# Patient Record
Sex: MALE | Race: WHITE | NOT HISPANIC OR LATINO | Employment: OTHER | URBAN - METROPOLITAN AREA
[De-identification: names, ages, dates, MRNs, and addresses within clinical notes are randomized per-mention and may not be internally consistent; named-entity substitution may affect disease eponyms.]

---

## 2017-08-16 ENCOUNTER — HOSPITAL ENCOUNTER (EMERGENCY)
Facility: HOSPITAL | Age: 62
Discharge: HOME | End: 2017-08-16
Attending: EMERGENCY MEDICINE
Payer: MEDICARE

## 2017-08-16 ENCOUNTER — APPOINTMENT (EMERGENCY)
Dept: RADIOLOGY | Facility: HOSPITAL | Age: 62
End: 2017-08-16
Payer: MEDICARE

## 2017-08-16 VITALS
HEART RATE: 61 BPM | OXYGEN SATURATION: 96 % | HEIGHT: 70 IN | BODY MASS INDEX: 28.63 KG/M2 | SYSTOLIC BLOOD PRESSURE: 149 MMHG | RESPIRATION RATE: 18 BRPM | WEIGHT: 200 LBS | DIASTOLIC BLOOD PRESSURE: 87 MMHG | TEMPERATURE: 98.1 F

## 2017-08-16 DIAGNOSIS — R07.89 CHEST WALL PAIN: Primary | ICD-10-CM

## 2017-08-16 DIAGNOSIS — B02.9 SHINGLES: ICD-10-CM

## 2017-08-16 LAB
ALBUMIN SERPL BCP-MCNC: 3.5 G/DL (ref 3.5–5)
ALP SERPL-CCNC: 102 U/L (ref 46–116)
ALT SERPL W P-5'-P-CCNC: 43 U/L (ref 12–78)
ANION GAP SERPL CALCULATED.3IONS-SCNC: 8 MMOL/L (ref 4–13)
APTT PPP: 28 SECONDS (ref 23–35)
AST SERPL W P-5'-P-CCNC: 55 U/L (ref 5–45)
BASOPHILS # BLD AUTO: 0 THOUSANDS/ΜL (ref 0–0.1)
BASOPHILS NFR BLD AUTO: 1 % (ref 0–1)
BILIRUB SERPL-MCNC: 0.2 MG/DL (ref 0.2–1)
BUN SERPL-MCNC: 18 MG/DL (ref 5–25)
CALCIUM SERPL-MCNC: 9.2 MG/DL (ref 8.3–10.1)
CHLORIDE SERPL-SCNC: 103 MMOL/L (ref 100–108)
CO2 SERPL-SCNC: 27 MMOL/L (ref 21–32)
CREAT SERPL-MCNC: 0.94 MG/DL (ref 0.6–1.3)
DEPRECATED D DIMER PPP: 330 NG/ML (FEU) (ref 190–520)
EOSINOPHIL # BLD AUTO: 0.3 THOUSAND/ΜL (ref 0–0.61)
EOSINOPHIL NFR BLD AUTO: 4 % (ref 0–6)
ERYTHROCYTE [DISTWIDTH] IN BLOOD BY AUTOMATED COUNT: 13.9 % (ref 11.6–15.1)
GFR SERPL CREATININE-BSD FRML MDRD: 87 ML/MIN/1.73SQ M
GLUCOSE SERPL-MCNC: 105 MG/DL (ref 65–140)
HCT VFR BLD AUTO: 41.2 % (ref 42–52)
HGB BLD-MCNC: 14.1 G/DL (ref 14–18)
INR PPP: 1 (ref 0.86–1.16)
LIPASE SERPL-CCNC: 217 U/L (ref 73–393)
LYMPHOCYTES # BLD AUTO: 1.5 THOUSANDS/ΜL (ref 0.6–4.47)
LYMPHOCYTES NFR BLD AUTO: 23 % (ref 14–44)
MAGNESIUM SERPL-MCNC: 2.1 MG/DL (ref 1.6–2.6)
MCH RBC QN AUTO: 29.7 PG (ref 27–31)
MCHC RBC AUTO-ENTMCNC: 34.3 G/DL (ref 31.4–37.4)
MCV RBC AUTO: 87 FL (ref 82–98)
MONOCYTES # BLD AUTO: 0.4 THOUSAND/ΜL (ref 0.17–1.22)
MONOCYTES NFR BLD AUTO: 6 % (ref 4–12)
NEUTROPHILS # BLD AUTO: 4.4 THOUSANDS/ΜL (ref 1.85–7.62)
NEUTS SEG NFR BLD AUTO: 67 % (ref 43–75)
NRBC BLD AUTO-RTO: 0 /100 WBCS
PLATELET # BLD AUTO: 356 THOUSANDS/UL (ref 130–400)
PMV BLD AUTO: 7.1 FL (ref 8.9–12.7)
POTASSIUM SERPL-SCNC: 3.9 MMOL/L (ref 3.5–5.3)
PROT SERPL-MCNC: 6.9 G/DL (ref 6.4–8.2)
PROTHROMBIN TIME: 10.5 SECONDS (ref 9.4–11.7)
RBC # BLD AUTO: 4.75 MILLION/UL (ref 4.7–6.1)
SODIUM SERPL-SCNC: 138 MMOL/L (ref 136–145)
TROPONIN I SERPL-MCNC: <0.02 NG/ML
TROPONIN I SERPL-MCNC: <0.02 NG/ML
WBC # BLD AUTO: 6.5 THOUSAND/UL (ref 4.8–10.8)

## 2017-08-16 PROCEDURE — 85025 COMPLETE CBC W/AUTO DIFF WBC: CPT | Performed by: EMERGENCY MEDICINE

## 2017-08-16 PROCEDURE — 85610 PROTHROMBIN TIME: CPT | Performed by: EMERGENCY MEDICINE

## 2017-08-16 PROCEDURE — 85379 FIBRIN DEGRADATION QUANT: CPT | Performed by: EMERGENCY MEDICINE

## 2017-08-16 PROCEDURE — 83690 ASSAY OF LIPASE: CPT | Performed by: EMERGENCY MEDICINE

## 2017-08-16 PROCEDURE — 71010 HB CHEST X-RAY 1 VIEW FRONTAL (PORTABLE): CPT

## 2017-08-16 PROCEDURE — 93005 ELECTROCARDIOGRAM TRACING: CPT | Performed by: EMERGENCY MEDICINE

## 2017-08-16 PROCEDURE — 96361 HYDRATE IV INFUSION ADD-ON: CPT

## 2017-08-16 PROCEDURE — 83735 ASSAY OF MAGNESIUM: CPT | Performed by: EMERGENCY MEDICINE

## 2017-08-16 PROCEDURE — 80053 COMPREHEN METABOLIC PANEL: CPT | Performed by: EMERGENCY MEDICINE

## 2017-08-16 PROCEDURE — 96375 TX/PRO/DX INJ NEW DRUG ADDON: CPT

## 2017-08-16 PROCEDURE — 85730 THROMBOPLASTIN TIME PARTIAL: CPT | Performed by: EMERGENCY MEDICINE

## 2017-08-16 PROCEDURE — 99285 EMERGENCY DEPT VISIT HI MDM: CPT

## 2017-08-16 PROCEDURE — 74177 CT ABD & PELVIS W/CONTRAST: CPT

## 2017-08-16 PROCEDURE — 84484 ASSAY OF TROPONIN QUANT: CPT | Performed by: EMERGENCY MEDICINE

## 2017-08-16 PROCEDURE — 36415 COLL VENOUS BLD VENIPUNCTURE: CPT | Performed by: EMERGENCY MEDICINE

## 2017-08-16 PROCEDURE — 96374 THER/PROPH/DIAG INJ IV PUSH: CPT

## 2017-08-16 RX ORDER — TRAMADOL HYDROCHLORIDE 50 MG/1
50 TABLET ORAL EVERY 6 HOURS PRN
Qty: 10 TABLET | Refills: 0 | Status: SHIPPED | OUTPATIENT
Start: 2017-08-16 | End: 2017-08-19

## 2017-08-16 RX ORDER — KETOROLAC TROMETHAMINE 30 MG/ML
30 INJECTION, SOLUTION INTRAMUSCULAR; INTRAVENOUS ONCE
Status: COMPLETED | OUTPATIENT
Start: 2017-08-16 | End: 2017-08-16

## 2017-08-16 RX ORDER — MORPHINE SULFATE 4 MG/ML
4 INJECTION, SOLUTION INTRAMUSCULAR; INTRAVENOUS ONCE
Status: DISCONTINUED | OUTPATIENT
Start: 2017-08-16 | End: 2017-08-16 | Stop reason: HOSPADM

## 2017-08-16 RX ORDER — ONDANSETRON 2 MG/ML
4 INJECTION INTRAMUSCULAR; INTRAVENOUS ONCE
Status: COMPLETED | OUTPATIENT
Start: 2017-08-16 | End: 2017-08-16

## 2017-08-16 RX ORDER — NAPROXEN 500 MG/1
500 TABLET ORAL 2 TIMES DAILY WITH MEALS
Qty: 10 TABLET | Refills: 0 | Status: SHIPPED | OUTPATIENT
Start: 2017-08-16 | End: 2019-05-24 | Stop reason: ALTCHOICE

## 2017-08-16 RX ORDER — LOVASTATIN 20 MG/1
20 TABLET ORAL
COMMUNITY
End: 2019-05-24 | Stop reason: ALTCHOICE

## 2017-08-16 RX ORDER — LISINOPRIL 5 MG/1
5 TABLET ORAL DAILY
COMMUNITY

## 2017-08-16 RX ORDER — VALACYCLOVIR HYDROCHLORIDE 1 G/1
1000 TABLET, FILM COATED ORAL 2 TIMES DAILY
Qty: 20 TABLET | Refills: 0 | Status: SHIPPED | OUTPATIENT
Start: 2017-08-16 | End: 2019-05-24 | Stop reason: ALTCHOICE

## 2017-08-16 RX ORDER — TRAMADOL HYDROCHLORIDE 50 MG/1
50 TABLET ORAL EVERY 6 HOURS PRN
COMMUNITY
End: 2022-05-27

## 2017-08-16 RX ADMIN — SODIUM CHLORIDE 1000 ML: 0.9 INJECTION, SOLUTION INTRAVENOUS at 17:20

## 2017-08-16 RX ADMIN — KETOROLAC TROMETHAMINE 30 MG: 30 INJECTION, SOLUTION INTRAMUSCULAR at 17:21

## 2017-08-16 RX ADMIN — ONDANSETRON 4 MG: 2 INJECTION INTRAMUSCULAR; INTRAVENOUS at 17:21

## 2017-08-16 RX ADMIN — IOHEXOL 100 ML: 350 INJECTION, SOLUTION INTRAVENOUS at 18:59

## 2017-08-17 LAB
ATRIAL RATE: 73 BPM
P AXIS: 19 DEGREES
PR INTERVAL: 176 MS
QRS AXIS: 13 DEGREES
QRSD INTERVAL: 98 MS
QT INTERVAL: 394 MS
QTC INTERVAL: 434 MS
T WAVE AXIS: 29 DEGREES
VENTRICULAR RATE: 73 BPM

## 2017-09-12 ENCOUNTER — TRANSCRIBE ORDERS (OUTPATIENT)
Dept: ADMINISTRATIVE | Facility: HOSPITAL | Age: 62
End: 2017-09-12

## 2017-09-12 DIAGNOSIS — M96.1 POSTLAMINECTOMY SYNDROME, UNSPECIFIED REGION: Primary | ICD-10-CM

## 2017-09-19 ENCOUNTER — HOSPITAL ENCOUNTER (OUTPATIENT)
Dept: RADIOLOGY | Facility: HOSPITAL | Age: 62
Discharge: HOME | End: 2017-09-19
Payer: MEDICARE

## 2017-09-19 DIAGNOSIS — M96.1 POSTLAMINECTOMY SYNDROME, UNSPECIFIED REGION: ICD-10-CM

## 2017-09-19 PROCEDURE — 72146 MRI CHEST SPINE W/O DYE: CPT

## 2017-09-25 ENCOUNTER — TRANSCRIBE ORDERS (OUTPATIENT)
Dept: ADMINISTRATIVE | Facility: HOSPITAL | Age: 62
End: 2017-09-25

## 2017-09-25 DIAGNOSIS — M96.1 POSTLAMINECTOMY SYNDROME, UNSPECIFIED REGION: Primary | ICD-10-CM

## 2017-10-06 ENCOUNTER — HOSPITAL ENCOUNTER (OUTPATIENT)
Dept: RADIOLOGY | Facility: HOSPITAL | Age: 62
Discharge: HOME | End: 2017-10-06
Payer: MEDICARE

## 2017-10-06 DIAGNOSIS — M96.1 POSTLAMINECTOMY SYNDROME: ICD-10-CM

## 2017-10-06 PROCEDURE — A9503 TC99M MEDRONATE: HCPCS

## 2017-10-06 PROCEDURE — 78320 HB BONE IMAGING (3D): CPT

## 2019-05-21 ENCOUNTER — TRANSCRIBE ORDERS (OUTPATIENT)
Dept: ADMINISTRATIVE | Facility: HOSPITAL | Age: 64
End: 2019-05-21

## 2019-05-21 DIAGNOSIS — M25.461 SWELLING OF RIGHT KNEE JOINT: Primary | ICD-10-CM

## 2019-05-22 ENCOUNTER — HOSPITAL ENCOUNTER (OUTPATIENT)
Dept: RADIOLOGY | Facility: HOSPITAL | Age: 64
Discharge: HOME | End: 2019-05-22
Payer: MEDICARE

## 2019-05-22 DIAGNOSIS — M25.461 SWELLING OF RIGHT KNEE JOINT: ICD-10-CM

## 2019-05-22 PROCEDURE — 73700 CT LOWER EXTREMITY W/O DYE: CPT

## 2019-05-24 VITALS
HEIGHT: 70 IN | DIASTOLIC BLOOD PRESSURE: 81 MMHG | WEIGHT: 219 LBS | HEART RATE: 76 BPM | SYSTOLIC BLOOD PRESSURE: 135 MMHG | BODY MASS INDEX: 31.35 KG/M2

## 2019-05-24 DIAGNOSIS — M25.461 EFFUSION OF RIGHT KNEE: ICD-10-CM

## 2019-05-24 DIAGNOSIS — M25.561 RIGHT KNEE PAIN, UNSPECIFIED CHRONICITY: Primary | ICD-10-CM

## 2019-05-24 DIAGNOSIS — M10.061 ACUTE IDIOPATHIC GOUT OF RIGHT KNEE: Primary | ICD-10-CM

## 2019-05-24 LAB
CRYSTALS SNV QL MICRO: NORMAL
LYMPHOCYTES # SNV MANUAL: 6 %
MONOCYTES NFR SNV MANUAL: 17 %
NEUTROPHILS NFR SNV MANUAL: 77 %
TOTAL CELLS COUNTED SPEC: 100
WBC # FLD MANUAL: 8619 /UL (ref 0–200)

## 2019-05-24 PROCEDURE — 87205 SMEAR GRAM STAIN: CPT | Performed by: ORTHOPAEDIC SURGERY

## 2019-05-24 PROCEDURE — 87476 LYME DIS DNA AMP PROBE: CPT | Performed by: ORTHOPAEDIC SURGERY

## 2019-05-24 PROCEDURE — 87070 CULTURE OTHR SPECIMN AEROBIC: CPT | Performed by: ORTHOPAEDIC SURGERY

## 2019-05-24 PROCEDURE — 99204 OFFICE O/P NEW MOD 45 MIN: CPT | Performed by: ORTHOPAEDIC SURGERY

## 2019-05-24 PROCEDURE — 89051 BODY FLUID CELL COUNT: CPT | Performed by: ORTHOPAEDIC SURGERY

## 2019-05-24 PROCEDURE — 89060 EXAM SYNOVIAL FLUID CRYSTALS: CPT | Performed by: ORTHOPAEDIC SURGERY

## 2019-05-24 PROCEDURE — 20610 DRAIN/INJ JOINT/BURSA W/O US: CPT | Performed by: ORTHOPAEDIC SURGERY

## 2019-05-24 RX ORDER — COLCHICINE 0.6 MG/1
0.6 TABLET ORAL DAILY
Qty: 5 TABLET | Refills: 0 | Status: SHIPPED | OUTPATIENT
Start: 2019-05-24 | End: 2019-10-04 | Stop reason: SDUPTHER

## 2019-05-24 RX ORDER — LIDOCAINE HYDROCHLORIDE 10 MG/ML
4 INJECTION, SOLUTION INFILTRATION; PERINEURAL
Status: COMPLETED | OUTPATIENT
Start: 2019-05-24 | End: 2019-05-24

## 2019-05-24 RX ORDER — LOVASTATIN 10 MG/1
10 TABLET ORAL EVERY EVENING
COMMUNITY

## 2019-05-24 RX ORDER — DEXAMETHASONE SODIUM PHOSPHATE 100 MG/10ML
40 INJECTION INTRAMUSCULAR; INTRAVENOUS
Status: COMPLETED | OUTPATIENT
Start: 2019-05-24 | End: 2019-05-24

## 2019-05-24 RX ORDER — OXYCODONE HYDROCHLORIDE AND ACETAMINOPHEN 5; 325 MG/1; MG/1
TABLET ORAL
Refills: 0 | COMMUNITY
Start: 2019-05-21 | End: 2020-02-13

## 2019-05-24 RX ADMIN — LIDOCAINE HYDROCHLORIDE 4 ML: 10 INJECTION, SOLUTION INFILTRATION; PERINEURAL at 11:47

## 2019-05-24 RX ADMIN — DEXAMETHASONE SODIUM PHOSPHATE 40 MG: 100 INJECTION INTRAMUSCULAR; INTRAVENOUS at 11:47

## 2019-05-27 LAB
BACTERIA SPEC BFLD CULT: NO GROWTH
GRAM STN SPEC: NORMAL

## 2019-05-30 LAB — B BURGDOR DNA SPEC QL NAA+PROBE: NEGATIVE

## 2019-10-04 DIAGNOSIS — M10.061 ACUTE IDIOPATHIC GOUT OF RIGHT KNEE: ICD-10-CM

## 2019-10-04 RX ORDER — COLCHICINE 0.6 MG/1
0.6 TABLET ORAL DAILY
Qty: 5 TABLET | Refills: 0 | Status: SHIPPED | OUTPATIENT
Start: 2019-10-04 | End: 2020-02-13

## 2019-10-04 NOTE — TELEPHONE ENCOUNTER
Patient states he is having a gout attack  Stated you told him to call if he had a gout attack and you would call in prescription

## 2019-10-04 NOTE — PROGRESS NOTES
I did call Quang Quinonez and spoke with him  He is having symptoms consistent with another gout attack  I did write for an electronically sent a prescription for colchicine once again to the pharmacy  He is going to pick that up tomorrow  In the meantime, I did let him know that he can take an anti-inflammatory such as Motrin or Aleve  He understands  He will let us know if there are any problems

## 2020-02-13 ENCOUNTER — HOSPITAL ENCOUNTER (EMERGENCY)
Facility: HOSPITAL | Age: 65
Discharge: HOME | End: 2020-02-13
Attending: EMERGENCY MEDICINE
Payer: MEDICARE

## 2020-02-13 ENCOUNTER — APPOINTMENT (EMERGENCY)
Dept: RADIOLOGY | Facility: HOSPITAL | Age: 65
End: 2020-02-13
Payer: MEDICARE

## 2020-02-13 VITALS
SYSTOLIC BLOOD PRESSURE: 147 MMHG | OXYGEN SATURATION: 96 % | RESPIRATION RATE: 18 BRPM | TEMPERATURE: 98.5 F | DIASTOLIC BLOOD PRESSURE: 97 MMHG | HEART RATE: 70 BPM | WEIGHT: 220.9 LBS | BODY MASS INDEX: 31.7 KG/M2

## 2020-02-13 DIAGNOSIS — R07.89 ATYPICAL CHEST PAIN: Primary | ICD-10-CM

## 2020-02-13 LAB
ALBUMIN SERPL BCP-MCNC: 3.9 G/DL (ref 3.5–5)
ALP SERPL-CCNC: 100 U/L (ref 46–116)
ALT SERPL W P-5'-P-CCNC: 53 U/L (ref 12–78)
ANION GAP SERPL CALCULATED.3IONS-SCNC: 9 MMOL/L (ref 4–13)
AST SERPL W P-5'-P-CCNC: 59 U/L (ref 5–45)
BASOPHILS # BLD AUTO: 0.03 THOUSANDS/ΜL (ref 0–0.1)
BASOPHILS NFR BLD AUTO: 1 % (ref 0–1)
BILIRUB SERPL-MCNC: 0.6 MG/DL (ref 0.2–1)
BUN SERPL-MCNC: 18 MG/DL (ref 5–25)
CALCIUM SERPL-MCNC: 9.4 MG/DL (ref 8.3–10.1)
CHLORIDE SERPL-SCNC: 104 MMOL/L (ref 100–108)
CO2 SERPL-SCNC: 25 MMOL/L (ref 21–32)
CREAT SERPL-MCNC: 0.95 MG/DL (ref 0.6–1.3)
EOSINOPHIL # BLD AUTO: 0.09 THOUSAND/ΜL (ref 0–0.61)
EOSINOPHIL NFR BLD AUTO: 2 % (ref 0–6)
ERYTHROCYTE [DISTWIDTH] IN BLOOD BY AUTOMATED COUNT: 12.6 % (ref 11.6–15.1)
GFR SERPL CREATININE-BSD FRML MDRD: 85 ML/MIN/1.73SQ M
GLUCOSE SERPL-MCNC: 108 MG/DL (ref 65–140)
HCT VFR BLD AUTO: 48.6 % (ref 36.5–49.3)
HGB BLD-MCNC: 15.8 G/DL (ref 12–17)
IMM GRANULOCYTES # BLD AUTO: 0.01 THOUSAND/UL (ref 0–0.2)
IMM GRANULOCYTES NFR BLD AUTO: 0 % (ref 0–2)
LIPASE SERPL-CCNC: 169 U/L (ref 73–393)
LYMPHOCYTES # BLD AUTO: 1.34 THOUSANDS/ΜL (ref 0.6–4.47)
LYMPHOCYTES NFR BLD AUTO: 22 % (ref 14–44)
MCH RBC QN AUTO: 29.3 PG (ref 26.8–34.3)
MCHC RBC AUTO-ENTMCNC: 32.5 G/DL (ref 31.4–37.4)
MCV RBC AUTO: 90 FL (ref 82–98)
MONOCYTES # BLD AUTO: 0.27 THOUSAND/ΜL (ref 0.17–1.22)
MONOCYTES NFR BLD AUTO: 4 % (ref 4–12)
NEUTROPHILS # BLD AUTO: 4.4 THOUSANDS/ΜL (ref 1.85–7.62)
NEUTS SEG NFR BLD AUTO: 71 % (ref 43–75)
NRBC BLD AUTO-RTO: 0 /100 WBCS
PLATELET # BLD AUTO: 301 THOUSANDS/UL (ref 149–390)
PMV BLD AUTO: 8.8 FL (ref 8.9–12.7)
POTASSIUM SERPL-SCNC: 5.2 MMOL/L (ref 3.5–5.3)
PROT SERPL-MCNC: 7.7 G/DL (ref 6.4–8.2)
RBC # BLD AUTO: 5.4 MILLION/UL (ref 3.88–5.62)
SODIUM SERPL-SCNC: 138 MMOL/L (ref 136–145)
TROPONIN I SERPL-MCNC: <0.02 NG/ML
TROPONIN I SERPL-MCNC: <0.02 NG/ML
WBC # BLD AUTO: 6.14 THOUSAND/UL (ref 4.31–10.16)

## 2020-02-13 PROCEDURE — 93005 ELECTROCARDIOGRAM TRACING: CPT

## 2020-02-13 PROCEDURE — 36415 COLL VENOUS BLD VENIPUNCTURE: CPT

## 2020-02-13 PROCEDURE — 99285 EMERGENCY DEPT VISIT HI MDM: CPT

## 2020-02-13 PROCEDURE — 83690 ASSAY OF LIPASE: CPT | Performed by: EMERGENCY MEDICINE

## 2020-02-13 PROCEDURE — 84484 ASSAY OF TROPONIN QUANT: CPT

## 2020-02-13 PROCEDURE — 96374 THER/PROPH/DIAG INJ IV PUSH: CPT

## 2020-02-13 PROCEDURE — 84484 ASSAY OF TROPONIN QUANT: CPT | Performed by: EMERGENCY MEDICINE

## 2020-02-13 PROCEDURE — 99283 EMERGENCY DEPT VISIT LOW MDM: CPT | Performed by: EMERGENCY MEDICINE

## 2020-02-13 PROCEDURE — 80053 COMPREHEN METABOLIC PANEL: CPT

## 2020-02-13 PROCEDURE — 85025 COMPLETE CBC W/AUTO DIFF WBC: CPT

## 2020-02-13 PROCEDURE — 71045 X-RAY EXAM CHEST 1 VIEW: CPT

## 2020-02-13 RX ORDER — KETOROLAC TROMETHAMINE 30 MG/ML
15 INJECTION, SOLUTION INTRAMUSCULAR; INTRAVENOUS ONCE
Status: COMPLETED | OUTPATIENT
Start: 2020-02-13 | End: 2020-02-13

## 2020-02-13 RX ADMIN — KETOROLAC TROMETHAMINE 15 MG: 30 INJECTION, SOLUTION INTRAMUSCULAR at 10:06

## 2020-02-13 NOTE — ED PROVIDER NOTES
History  Chief Complaint   Patient presents with    Chest Pain     States started 8pm last night with left sided chest pain and " fluttering in chest " episodes x 2  No aspirin      HPI    This is a 63 yo m who presents with chest pain  patietn states since last night 8pm chest pain and fluttering in his chest  Stats having left sided pain  No radiation  No hx of MI  No trauma  No shortness of breath  Impression: 63 yo M with chest pain  Will do cardiac workup     Prior to Admission Medications   Prescriptions Last Dose Informant Patient Reported? Taking?   lisinopril (ZESTRIL) 5 mg tablet 2/13/2020 at Unknown time Self Yes Yes   Sig: Take 5 mg by mouth daily   lovastatin (MEVACOR) 10 MG tablet 2/13/2020 at Unknown time  Yes Yes   Sig: Take 10 mg by mouth   traMADol (ULTRAM) 50 mg tablet 2/13/2020 at Unknown time  Yes Yes   Sig: Take 50 mg by mouth every 6 (six) hours as needed for moderate pain      Facility-Administered Medications: None       Past Medical History:   Diagnosis Date    Hyperlipidemia     Hypertension     Stroke [facility])        Past Surgical History:   Procedure Laterality Date    BACK SURGERY         History reviewed  No pertinent family history  I have reviewed and agree with the history as documented  Social History     Tobacco Use    Smoking status: Former Smoker    Smokeless tobacco: Never Used   Substance Use Topics    Alcohol use: Yes     Alcohol/week: 3 0 standard drinks     Types: 3 Cans of beer per week     Comment: drinks once a month    Drug use: No       Review of Systems   Constitutional: Negative  Negative for diaphoresis and fever  HENT: Negative  Respiratory: Negative  Negative for cough, shortness of breath and wheezing  Cardiovascular: Positive for chest pain  Negative for palpitations and leg swelling  Gastrointestinal: Negative for abdominal distention, abdominal pain, nausea and vomiting  Genitourinary: Negative  Musculoskeletal: Negative      Skin: Negative  Neurological: Negative  Psychiatric/Behavioral: Negative  All other systems reviewed and are negative  Physical Exam  Physical Exam   Constitutional: He is oriented to person, place, and time  He appears well-developed and well-nourished  No distress  HENT:   Head: Normocephalic and atraumatic  Nose: Nose normal    Mouth/Throat: Oropharynx is clear and moist    Eyes: Pupils are equal, round, and reactive to light  Conjunctivae and EOM are normal    Neck: Normal range of motion  Neck supple  Cardiovascular: Normal rate, regular rhythm and normal heart sounds  No murmur heard  Pulmonary/Chest: Effort normal and breath sounds normal  No respiratory distress  He has no wheezes  He has no rales  Abdominal: Soft  Bowel sounds are normal  He exhibits no distension  There is no tenderness  There is no rebound and no guarding  Musculoskeletal: Normal range of motion  He exhibits no edema, tenderness or deformity  Neurological: He is alert and oriented to person, place, and time  No cranial nerve deficit  Skin: Skin is warm and dry  No rash noted  He is not diaphoretic  No pallor  Psychiatric: He has a normal mood and affect  Vitals reviewed        Vital Signs  ED Triage Vitals [02/13/20 0926]   Temperature Pulse Respirations Blood Pressure SpO2   97 8 °F (36 6 °C) 80 20 160/93 98 %      Temp Source Heart Rate Source Patient Position - Orthostatic VS BP Location FiO2 (%)   Oral Monitor Lying Right arm --      Pain Score       3           Vitals:    02/13/20 0945 02/13/20 1144 02/13/20 1145 02/13/20 1257   BP: 157/85 132/81 132/81 147/97   Pulse: 78 73 68 70   Patient Position - Orthostatic VS: Lying Lying  Sitting         Visual Acuity      ED Medications  Medications   ketorolac (TORADOL) injection 15 mg (15 mg Intravenous Given 2/13/20 1006)       Diagnostic Studies  Results Reviewed     Procedure Component Value Units Date/Time    Troponin I [[id number]]  (Normal) Collected: 02/13/20 1214    Lab Status:  Final result Specimen:  Blood from Arm, Left Updated:  02/13/20 1239     Troponin I <0 02 ng/mL     Lipase [[id number]]  (Normal) Collected:  02/13/20 0945    Lab Status:  Final result Specimen:  Blood from Arm, Left Updated:  02/13/20 1021     Lipase 169 u/L     Troponin I [[id number]]  (Normal) Collected:  02/13/20 0945    Lab Status:  Final result Specimen:  Blood from Arm, Left Updated:  02/13/20 1012     Troponin I <0 02 ng/mL     Comprehensive metabolic panel [[id number]]  (Abnormal) Collected:  02/13/20 0945    Lab Status:  Final result Specimen:  Blood from Arm, Left Updated:  02/13/20 1009     Sodium 138 mmol/L      Potassium 5 2 mmol/L      Chloride 104 mmol/L      CO2 25 mmol/L      ANION GAP 9 mmol/L      BUN 18 mg/dL      Creatinine 0 95 mg/dL      Glucose 108 mg/dL      Calcium 9 4 mg/dL      AST 59 U/L      ALT 53 U/L      Alkaline Phosphatase 100 U/L      Total Protein 7 7 g/dL      Albumin 3 9 g/dL      Total Bilirubin 0 60 mg/dL      eGFR 85 ml/min/1 73sq m     Narrative:       [place] guidelines for Chronic Kidney Disease (CKD):     Stage 1 with normal or high GFR (GFR > 90 mL/min/1 73 square meters)    Stage 2 Mild CKD (GFR = 60-89 mL/min/1 73 square meters)    Stage 3A Moderate CKD (GFR = 45-59 mL/min/1 73 square meters)    Stage 3B Moderate CKD (GFR = 30-44 mL/min/1 73 square meters)    Stage 4 Severe CKD (GFR = 15-29 mL/min/1 73 square meters)    Stage 5 End Stage CKD (GFR <15 mL/min/1 73 square meters)  Note: GFR calculation is accurate only with a steady state creatinine    CBC and differential [[id number]]  (Abnormal) Collected:  02/13/20 0945    Lab Status:  Final result Specimen:  Blood from Arm, Left Updated:  02/13/20 0952     WBC 6 14 Thousand/uL      RBC 5 40 Million/uL      Hemoglobin 15 8 g/dL      Hematocrit 48 6 %      MCV 90 fL      MCH 29 3 pg      MCHC 32 5 g/dL      RDW 12 6 %      MPV 8 8 fL      Platelets 837 Thousands/uL      nRBC 0 /100 WBCs      Neutrophils Relative 71 %      Immat GRANS % 0 %      Lymphocytes Relative 22 %      Monocytes Relative 4 %      Eosinophils Relative 2 %      Basophils Relative 1 %      Neutrophils Absolute 4 40 Thousands/µL      Immature Grans Absolute 0 01 Thousand/uL      Lymphocytes Absolute 1 34 Thousands/µL      Monocytes Absolute 0 27 Thousand/µL      Eosinophils Absolute 0 09 Thousand/µL      Basophils Absolute 0 03 Thousands/µL                  XR chest 1 view portable   Final Result by Ivette De La Fuente MD (02/13 1017)      No acute cardiopulmonary disease  Workstation performed: [id number]                    Procedures  Procedures         ED Course  ED Course as of Feb 13 1701   Thu Feb 13, 2020   1249 Repeat troponin was normal  Will discharge home with appropriate return precautions             HEART Risk Score      Most Recent Value   History  0 Filed at: 02/13/2020 1700   ECG  0 Filed at: 02/13/2020 1700   Age  1 Filed at: 02/13/2020 1700   Risk Factors  1 Filed at: 02/13/2020 1700   Troponin  0 Filed at: 02/13/2020 1700   Heart Score Risk Calculator   History  0 Filed at: 02/13/2020 1700   ECG  0 Filed at: 02/13/2020 1700   Age  1 Filed at: 02/13/2020 1700   Risk Factors  1 Filed at: 02/13/2020 1700   Troponin  0 Filed at: 02/13/2020 1700   HEART Score  2 Filed at: 02/13/2020 1700   HEART Score  2 Filed at: 02/13/2020 1700                            MDM      Disposition  Final diagnoses:   Atypical chest pain     Time reflects when diagnosis was documented in both MDM as applicable and the Disposition within this note     Time User Action Codes Description Comment    2/13/2020 12:49 PM Marlen Gomez Add [R07 89] Atypical chest pain       ED Disposition     ED Disposition Condition Date/Time Comment    Discharge Stable Thu Feb 13, 2020 12:49 PM [address] discharge to home/self care              Follow-up Information     Follow up With Specialties Details Why Contact Info Additional [facility] Schedule an appointment as soon as possible for a visit  As needed [address] [facility], [place] [address] [place] [facility], [address] [place] [address], [address] [facility], [address] [facility] [place]          Discharge Medication List as of 2/13/2020 12:50 PM      CONTINUE these medications which have NOT CHANGED    Details   lisinopril (ZESTRIL) 5 mg tablet Take 5 mg by mouth daily, Historical Med      lovastatin (MEVACOR) 10 MG tablet Take 10 mg by mouth, Historical Med      traMADol (ULTRAM) 50 mg tablet Take 50 mg by mouth every 6 (six) hours as needed for moderate pain, Historical Med           No discharge procedures on file      PDMP Review     None          ED Provider  Electronically Signed by           Martha Faust MD  02/13/20 7069

## 2020-02-14 LAB
ATRIAL RATE: 72 BPM
P AXIS: 21 DEGREES
PR INTERVAL: 142 MS
QRS AXIS: 1 DEGREES
QRSD INTERVAL: 92 MS
QT INTERVAL: 372 MS
QTC INTERVAL: 407 MS
T WAVE AXIS: 53 DEGREES
VENTRICULAR RATE: 72 BPM

## 2020-02-14 PROCEDURE — 93010 ELECTROCARDIOGRAM REPORT: CPT | Performed by: INTERNAL MEDICINE

## 2021-03-04 ENCOUNTER — IMMUNIZATIONS (OUTPATIENT)
Dept: FAMILY MEDICINE CLINIC | Facility: HOSPITAL | Age: 66
End: 2021-03-04

## 2021-03-04 DIAGNOSIS — Z23 ENCOUNTER FOR IMMUNIZATION: Primary | ICD-10-CM

## 2021-03-04 PROCEDURE — 0011A SARS-COV-2 / COVID-19 MRNA VACCINE (MODERNA) 100 MCG: CPT

## 2021-03-04 PROCEDURE — 91301 SARS-COV-2 / COVID-19 MRNA VACCINE (MODERNA) 100 MCG: CPT

## 2021-04-02 ENCOUNTER — IMMUNIZATIONS (OUTPATIENT)
Dept: FAMILY MEDICINE CLINIC | Facility: HOSPITAL | Age: 66
End: 2021-04-02

## 2021-04-02 DIAGNOSIS — Z23 ENCOUNTER FOR IMMUNIZATION: Primary | ICD-10-CM

## 2021-04-02 PROCEDURE — 91301 SARS-COV-2 / COVID-19 MRNA VACCINE (MODERNA) 100 MCG: CPT

## 2021-04-02 PROCEDURE — 0012A SARS-COV-2 / COVID-19 MRNA VACCINE (MODERNA) 100 MCG: CPT

## 2021-11-17 VITALS
DIASTOLIC BLOOD PRESSURE: 83 MMHG | SYSTOLIC BLOOD PRESSURE: 163 MMHG | HEIGHT: 70 IN | HEART RATE: 73 BPM | BODY MASS INDEX: 30.06 KG/M2 | WEIGHT: 210 LBS

## 2021-11-17 DIAGNOSIS — Z98.890 HISTORY OF LUMBAR SURGERY: Primary | ICD-10-CM

## 2021-11-17 PROCEDURE — 99213 OFFICE O/P EST LOW 20 MIN: CPT | Performed by: ORTHOPAEDIC SURGERY

## 2021-12-13 ENCOUNTER — HOSPITAL ENCOUNTER (OUTPATIENT)
Dept: RADIOLOGY | Facility: HOSPITAL | Age: 66
Discharge: HOME | End: 2021-12-13
Payer: COMMERCIAL

## 2021-12-13 VITALS
HEIGHT: 70 IN | SYSTOLIC BLOOD PRESSURE: 172 MMHG | DIASTOLIC BLOOD PRESSURE: 116 MMHG | BODY MASS INDEX: 30.06 KG/M2 | HEART RATE: 94 BPM | WEIGHT: 210 LBS

## 2021-12-13 DIAGNOSIS — M54.6 THORACIC SPINE PAIN: ICD-10-CM

## 2021-12-13 DIAGNOSIS — M40.30 FLATBACK SYNDROME: ICD-10-CM

## 2021-12-13 DIAGNOSIS — M54.6 THORACIC SPINE PAIN: Primary | ICD-10-CM

## 2021-12-13 PROCEDURE — 99213 OFFICE O/P EST LOW 20 MIN: CPT | Performed by: ORTHOPAEDIC SURGERY

## 2021-12-13 PROCEDURE — 72082 X-RAY EXAM ENTIRE SPI 2/3 VW: CPT

## 2021-12-20 VITALS
BODY MASS INDEX: 30.13 KG/M2 | HEART RATE: 84 BPM | DIASTOLIC BLOOD PRESSURE: 92 MMHG | HEIGHT: 70 IN | SYSTOLIC BLOOD PRESSURE: 155 MMHG

## 2021-12-20 DIAGNOSIS — Z98.890 HISTORY OF LUMBAR SURGERY: ICD-10-CM

## 2021-12-20 DIAGNOSIS — M51.35 DJD (DEGENERATIVE JOINT DISEASE), THORACOLUMBAR: ICD-10-CM

## 2021-12-20 DIAGNOSIS — M40.30 FLATBACK SYNDROME: ICD-10-CM

## 2021-12-20 DIAGNOSIS — M54.6 THORACIC SPINE PAIN: Primary | ICD-10-CM

## 2021-12-20 PROCEDURE — 99213 OFFICE O/P EST LOW 20 MIN: CPT | Performed by: ORTHOPAEDIC SURGERY

## 2021-12-21 ENCOUNTER — HOSPITAL ENCOUNTER (OUTPATIENT)
Dept: RADIOLOGY | Facility: HOSPITAL | Age: 66
Discharge: HOME | End: 2021-12-21
Payer: COMMERCIAL

## 2021-12-21 DIAGNOSIS — M54.6 THORACIC SPINE PAIN: ICD-10-CM

## 2021-12-21 DIAGNOSIS — Z98.890 HISTORY OF LUMBAR SURGERY: ICD-10-CM

## 2021-12-21 DIAGNOSIS — M40.30 FLATBACK SYNDROME: ICD-10-CM

## 2021-12-21 PROCEDURE — 72146 MRI CHEST SPINE W/O DYE: CPT

## 2021-12-21 PROCEDURE — G1004 CDSM NDSC: HCPCS

## 2021-12-21 PROCEDURE — 72148 MRI LUMBAR SPINE W/O DYE: CPT

## 2021-12-27 ENCOUNTER — OFFICE VISIT (OUTPATIENT)
Dept: OBGYN CLINIC | Facility: CLINIC | Age: 66
End: 2021-12-27
Payer: COMMERCIAL

## 2021-12-27 VITALS
DIASTOLIC BLOOD PRESSURE: 92 MMHG | WEIGHT: 210 LBS | HEART RATE: 81 BPM | HEIGHT: 70 IN | BODY MASS INDEX: 30.06 KG/M2 | SYSTOLIC BLOOD PRESSURE: 165 MMHG

## 2021-12-27 DIAGNOSIS — Z98.890 HISTORY OF LUMBAR SURGERY: ICD-10-CM

## 2021-12-27 DIAGNOSIS — M51.35 DJD (DEGENERATIVE JOINT DISEASE), THORACOLUMBAR: Primary | ICD-10-CM

## 2021-12-27 DIAGNOSIS — M40.30 FLATBACK SYNDROME: ICD-10-CM

## 2021-12-27 PROCEDURE — 99214 OFFICE O/P EST MOD 30 MIN: CPT | Performed by: ORTHOPAEDIC SURGERY

## 2022-03-14 NOTE — PROGRESS NOTES
[id number] Daniel Walters MD  [address] [place] [id number]  [phone]    HISTORY OF PRESENT ILLNESS:  Pleasant 55-year-old male presents to the office for follow-up evaluation of degenerative joint disease of thoracolumbar, flat back syndrome, scoliosis, lumbar radiculopathy, and prior lumbar surgery  Patient was last seen in the clinic on 12/27/2021, at which time MRIs of the thoracic and lumbar spine reviewed with the patient  He was advised that he would require a surgery with replacement of spinal fusion hardware  Patient returns today for follow-up after being in [place] for past few months  Patient states he has had improvement in his function and low back pain since being in [place]  However, patient states he is not able to do daily activities because his nerves get pinched  Patient is unable to lay flat on his back  Patient is currently not using a cane for ambulation  Patent continues to have pain on the right side of neck and has difficulty moving the neck  ALLERGIES: No Known Allergies    MEDICATIONS:    Current Outpatient Medications:     lisinopril (ZESTRIL) 5 mg tablet, Take 5 mg by mouth daily, Disp: , Rfl:     lovastatin (MEVACOR) 10 MG tablet, Take 10 mg by mouth, Disp: , Rfl:     traMADol (ULTRAM) 50 mg tablet, Take 50 mg by mouth every 6 (six) hours as needed for moderate pain, Disp: , Rfl:      PAST MEDICAL HISTORY:   Past Medical History:   Diagnosis Date    Hyperlipidemia     Hypertension     Stroke ([facility] [address] )        PAST SURGICAL HISTORY:  Past Surgical History:   Procedure Laterality Date    BACK SURGERY         SOCIAL HISTORY:  Social History     Tobacco Use   Smoking Status Former Smoker   Smokeless Tobacco Never Used        ROS:  Review of Systems   Constitutional: Negative for appetite change, chills, fever and unexpected weight change  HENT: Negative for congestion, hearing loss, nosebleeds, sore throat and trouble swallowing  Eyes: Negative for pain, redness and visual disturbance  Respiratory: Negative for cough, shortness of breath and wheezing  Cardiovascular: Negative for chest pain, palpitations and leg swelling  Gastrointestinal: Negative for abdominal pain, nausea and vomiting  Endocrine: Negative for cold intolerance, heat intolerance, polydipsia and polyuria  Genitourinary: Negative for dysuria and hematuria  Musculoskeletal: Positive for arthralgias and back pain  Negative for gait problem and myalgias  Skin: Negative for rash and wound  Neurological: Negative for dizziness, light-headedness, numbness and headaches  Psychiatric/Behavioral: Negative for decreased concentration, dysphoric mood and suicidal ideas  The patient is not nervous/anxious  PHYSICAL EXAM:  Patient is a pleasant 28-year-old male sitting on exam table in mild distress  Patient ambulates with hunched position and antalgic gait Significant coronal/sagittal balance dysfunction  Loss of lumbar lordosis and lower thoracic kyphosis  No significant tenderness to palpation over cervical, thoracic, or lumbar spine  5/5 motor strength bilateral upper extremities  Normal sensation intact bilateral upper extremities  2+ patellar reflexes bilaterally  Symmetric upper extremity reflexes  5/5 motor strength with normal sensation in upper extremities  RADIOGRAPHIC STUDIES:  1 X-rays, scoli films, 12/13/2021:  Prior multilevel lumbar fusion with unknown instrumentation   Significant sagittal coronal balance   Adjacent segment kyphosis      2  MRI, lumbar spine, 12/21/2021:  Multilevel moderate to severe thoracic degenerative disc  Prior instrumentation from L1 to S1  Kyphosis at the thoracolumbar junction  No spinal cord compression or myelomalacia  Malposition right L1 screw      3  MRI, thoracic spine, 12/21/2021:  1 to S1 posterior instrumentation with kyphotic deformity at the upper lumbar spine    Flat back deformity  No significant central lateral recess stenosis  Malposition right L1 screw  4  Xrays, lumbar spine, 3/15/2022:  Status post L1-S1 posterior spinal fusion instrumentation  There was evidence of solid fusion from L2-S1  Screw cut-out is present at L1  There is also evidence of adjacent segment kyphosis and flat back deformity  ASSESSMENT:  1  Lumbar radiculopathy    2  Scoliosis, unspecified scoliosis type, unspecified spinal region        PLAN:  Patient is a 18-year-old male with back pain, kyphosis, and sagittal imbalance  History of lumbar spinal fusion 13 years ago by Dr Gabriel Torres  Lumbar fusion revision surgery planning for May  Ordered CT of thoracic spine to evaluate for surgery planning  Patient instructed to follow-up in 1month  At that visit, will obtain lumbar xrays with views cross table lateral with bolster under back        Scribe Attestation    I,:  Lopez Camargo PA-C am acting as a scribe while in the presence of the attending physician :       I,:  Vinny Soriano MD personally performed the services described in this documentation    as scribed in my presence :

## 2022-03-15 ENCOUNTER — OFFICE VISIT (OUTPATIENT)
Dept: OBGYN CLINIC | Facility: CLINIC | Age: 67
End: 2022-03-15
Payer: MEDICARE

## 2022-03-15 ENCOUNTER — APPOINTMENT (OUTPATIENT)
Dept: RADIOLOGY | Facility: CLINIC | Age: 67
End: 2022-03-15
Payer: MEDICARE

## 2022-03-15 VITALS
SYSTOLIC BLOOD PRESSURE: 146 MMHG | BODY MASS INDEX: 30.06 KG/M2 | HEIGHT: 70 IN | DIASTOLIC BLOOD PRESSURE: 88 MMHG | HEART RATE: 78 BPM | WEIGHT: 210 LBS

## 2022-03-15 DIAGNOSIS — M51.35 DJD (DEGENERATIVE JOINT DISEASE), THORACOLUMBAR: ICD-10-CM

## 2022-03-15 DIAGNOSIS — M41.9 SCOLIOSIS, UNSPECIFIED SCOLIOSIS TYPE, UNSPECIFIED SPINAL REGION: ICD-10-CM

## 2022-03-15 DIAGNOSIS — M54.6 THORACIC SPINE PAIN: ICD-10-CM

## 2022-03-15 DIAGNOSIS — Z98.890 HISTORY OF LUMBAR SURGERY: Primary | ICD-10-CM

## 2022-03-15 DIAGNOSIS — M40.30 FLATBACK SYNDROME: ICD-10-CM

## 2022-03-15 DIAGNOSIS — M54.2 NECK PAIN: ICD-10-CM

## 2022-03-15 DIAGNOSIS — M54.16 LUMBAR RADICULOPATHY: ICD-10-CM

## 2022-03-15 PROCEDURE — 99213 OFFICE O/P EST LOW 20 MIN: CPT | Performed by: ORTHOPAEDIC SURGERY

## 2022-03-15 PROCEDURE — 72100 X-RAY EXAM L-S SPINE 2/3 VWS: CPT

## 2022-03-16 ENCOUNTER — TELEPHONE (OUTPATIENT)
Dept: OBGYN CLINIC | Facility: CLINIC | Age: 67
End: 2022-03-16

## 2022-03-16 NOTE — TELEPHONE ENCOUNTER
Left message for pt to call me back to help schedule his CT-scan  Left my contact info    Timo Acosta

## 2022-03-18 ENCOUNTER — HOSPITAL ENCOUNTER (OUTPATIENT)
Dept: RADIOLOGY | Facility: HOSPITAL | Age: 67
Discharge: HOME | End: 2022-03-18
Payer: MEDICARE

## 2022-03-18 DIAGNOSIS — M51.35 DJD (DEGENERATIVE JOINT DISEASE), THORACOLUMBAR: ICD-10-CM

## 2022-03-18 DIAGNOSIS — M41.9 SCOLIOSIS, UNSPECIFIED SCOLIOSIS TYPE, UNSPECIFIED SPINAL REGION: ICD-10-CM

## 2022-03-18 DIAGNOSIS — Z98.890 HISTORY OF LUMBAR SURGERY: ICD-10-CM

## 2022-03-18 DIAGNOSIS — M40.30 FLATBACK SYNDROME: ICD-10-CM

## 2022-03-18 DIAGNOSIS — M54.6 THORACIC SPINE PAIN: ICD-10-CM

## 2022-03-18 PROCEDURE — G1004 CDSM NDSC: HCPCS

## 2022-03-18 PROCEDURE — 72131 CT LUMBAR SPINE W/O DYE: CPT

## 2022-03-18 PROCEDURE — 72128 CT CHEST SPINE W/O DYE: CPT

## 2022-04-18 ENCOUNTER — APPOINTMENT (OUTPATIENT)
Dept: RADIOLOGY | Facility: CLINIC | Age: 67
End: 2022-04-18
Payer: MEDICARE

## 2022-04-18 ENCOUNTER — OFFICE VISIT (OUTPATIENT)
Dept: OBGYN CLINIC | Facility: CLINIC | Age: 67
End: 2022-04-18
Payer: MEDICARE

## 2022-04-18 VITALS
SYSTOLIC BLOOD PRESSURE: 157 MMHG | HEART RATE: 78 BPM | BODY MASS INDEX: 30.35 KG/M2 | WEIGHT: 212 LBS | DIASTOLIC BLOOD PRESSURE: 90 MMHG | HEIGHT: 70 IN

## 2022-04-18 DIAGNOSIS — M41.9 SCOLIOSIS, UNSPECIFIED SCOLIOSIS TYPE, UNSPECIFIED SPINAL REGION: ICD-10-CM

## 2022-04-18 DIAGNOSIS — M41.9 SCOLIOSIS, UNSPECIFIED SCOLIOSIS TYPE, UNSPECIFIED SPINAL REGION: Primary | ICD-10-CM

## 2022-04-18 DIAGNOSIS — Z98.890 HISTORY OF LUMBAR SURGERY: ICD-10-CM

## 2022-04-18 DIAGNOSIS — M51.35 DJD (DEGENERATIVE JOINT DISEASE), THORACOLUMBAR: ICD-10-CM

## 2022-04-18 DIAGNOSIS — M40.30 FLATBACK SYNDROME: ICD-10-CM

## 2022-04-18 PROCEDURE — 99214 OFFICE O/P EST MOD 30 MIN: CPT | Performed by: ORTHOPAEDIC SURGERY

## 2022-04-18 PROCEDURE — 72100 X-RAY EXAM L-S SPINE 2/3 VWS: CPT

## 2022-04-18 RX ORDER — CHLORHEXIDINE GLUCONATE 0.12 MG/ML
15 RINSE ORAL ONCE
Status: CANCELLED | OUTPATIENT
Start: 2022-04-18 | End: 2022-04-18

## 2022-04-18 NOTE — PROGRESS NOTES
[id number] Daniel Walters MD  [address] [place] [address]  Sarah [place] [address]  [phone]    HISTORY OF PRESENT ILLNESS:      Prior history: Pleasant 59-year-old male presents to the office for follow-up evaluation of degenerative joint disease of thoracolumbar, flat back syndrome, scoliosis, lumbar radiculopathy, and prior lumbar surgery  Patient was last seen in the clinic on 12/27/2021, at which time MRIs of the thoracic and lumbar spine reviewed with the patient  He was advised that he would require a surgery with replacement of spinal fusion hardware  Patient returns today for follow-up after being in [place] for past few months  Patient states he has had improvement in his function and low back pain since being in [place]  However, patient states he is not able to do daily activities because his nerves get pinched  Patient is unable to lay flat on his back  Patient is currently not using a cane for ambulation  Patent continues to have pain on the right side of neck and has difficulty moving the neck  Updated history: Since his last visit, the patient has noted persistent back pain and inability to lay flat  He has had no interval changes since his last visit  He presents today to discuss surgical planning   He has no history of prior DVT or coagulopathy    ALLERGIES: No Known Allergies    MEDICATIONS:    Current Outpatient Medications:     lisinopril (ZESTRIL) 5 mg tablet, Take 5 mg by mouth daily, Disp: , Rfl:     lovastatin (MEVACOR) 10 MG tablet, Take 10 mg by mouth, Disp: , Rfl:     traMADol (ULTRAM) 50 mg tablet, Take 50 mg by mouth every 6 (six) hours as needed for moderate pain, Disp: , Rfl:      PAST MEDICAL HISTORY:   Past Medical History:   Diagnosis Date    Hyperlipidemia     Hypertension     Stroke ([facility] [address] )        PAST SURGICAL HISTORY:  Past Surgical History:   Procedure Laterality Date    BACK SURGERY         SOCIAL HISTORY:  Social History     Tobacco Use Smoking Status Former Smoker   Smokeless Tobacco Never Used        ROS:  Review of Systems   Constitutional: Negative  Negative for chills and fever  HENT: Negative  Eyes: Negative  Respiratory: Negative  Cardiovascular: Negative  Gastrointestinal: Negative  Endocrine: Negative  Genitourinary: Negative  Musculoskeletal: Positive for back pain and gait problem  Skin: Negative for wound  Neurological: Negative for weakness and numbness  Hematological: Does not bruise/bleed easily  Psychiatric/Behavioral: Negative  PHYSICAL EXAM:  Gen: no acute distress  HENT: moist mucous membranes  Heart: distal pulses intact  Lung: breathing comfortably on room air  Spine  - skin intact, well healed surgical scar  - gross kyphosis of the lumbar and thoracic spine  - 5/5 motor L3-S2  - Sensation intact L3-S2    RADIOGRAPHIC STUDIES:  1 X-rays, scoli films, 12/13/2021:  Prior multilevel lumbar fusion with unknown instrumentation   Significant sagittal coronal balance   Adjacent segment kyphosis      2  MRI, lumbar spine, 12/21/2021:  Multilevel moderate to severe thoracic degenerative disc   Prior instrumentation from L1 to S1   Kyphosis at the thoracolumbar junction   No spinal cord compression or myelomalacia   Malposition right L1 screw      3  MRI, thoracic spine, 12/21/2021:  1 to S1 posterior instrumentation with kyphotic deformity at the upper lumbar spine   Flat back deformity   No significant central lateral recess stenosis   Malposition right L1 screw      4  Xrays, lumbar spine, 3/15/2022:  Status post L1-S1 posterior spinal fusion instrumentation  There was evidence of solid fusion from L2-S1  Screw cut-out is present at L1  There is also evidence of adjacent segment kyphosis and flat back deformity      5  CT lumbar and thoracic spine 3/18/2022: significant kyphosis of the lumbar and thoracic spine  Robust bony fusion of L1-L5, hardware intact   Screw penetration of the right lumbar spinal canal at L2 and L1 right pedicle screw cut out into the disk space  Failure of fusion of L5-S1  6  X-ray lumbar spine 4/18 demonstrates intact hardware with lumbar and thoracic kyphosis  ASSESSMENT:  1  Scoliosis, unspecified scoliosis type, unspecified spinal region  -     XR spine lumbar 2 or 3 views injury; Future; Expected date: 04/18/2022  -     Case request operating room: FUSION LUMBAR/THORACIC POSTERIOR    Rule hardware from L1-S1; multilevel osteotomy for removal of bone mass; L5-S1 bilateral facetectomy possible osteotomy and revision laminectomy; interbody fusion M1-J8; application    ; Standing  -     Ambulatory referral to Internal Medicine; Future  -     CBC and Platelet; Future  -     Comprehensive metabolic panel; Future  -     EKG 12 lead; Future  -     XR chest pa & lateral; Future; Expected date: 04/18/2022  -     Case request operating room: FUSION LUMBAR/THORACIC POSTERIOR    Rule hardware from L1-S1; multilevel osteotomy for removal of bone mass; L5-S1 bilateral facetectomy possible osteotomy and revision laminectomy; interbody fusion O3-M1; application        2  Flatback syndrome  -     Case request operating room: FUSION LUMBAR/THORACIC POSTERIOR    Rule hardware from L1-S1; multilevel osteotomy for removal of bone mass; L5-S1 bilateral facetectomy possible osteotomy and revision laminectomy; interbody fusion Z3-F4; application    ; Standing  -     Ambulatory referral to Internal Medicine; Future  -     CBC and Platelet; Future  -     Comprehensive metabolic panel; Future  -     EKG 12 lead; Future  -     XR chest pa & lateral; Future; Expected date: 04/18/2022  -     Case request operating room: FUSION LUMBAR/THORACIC POSTERIOR    Rule hardware from L1-S1; multilevel osteotomy for removal of bone mass; L5-S1 bilateral facetectomy possible osteotomy and revision laminectomy; interbody fusion A6-W5; application        3  History of lumbar surgery  -     Case request operating room: FUSION LUMBAR/THORACIC POSTERIOR    Rule hardware from L1-S1; multilevel osteotomy for removal of bone mass; L5-S1 bilateral facetectomy possible osteotomy and revision laminectomy; interbody fusion L3-E3; application    ; Standing  -     Ambulatory referral to Internal Medicine; Future  -     CBC and Platelet; Future  -     Comprehensive metabolic panel; Future  -     EKG 12 lead; Future  -     XR chest pa & lateral; Future; Expected date: 04/18/2022  -     Case request operating room: FUSION LUMBAR/THORACIC POSTERIOR    Rule hardware from L1-S1; multilevel osteotomy for removal of bone mass; L5-S1 bilateral facetectomy possible osteotomy and revision laminectomy; interbody fusion N2-N5; application     4  DJD (degenerative joint disease), thoracolumbar  -     Case request operating room: FUSION LUMBAR/THORACIC POSTERIOR    Rule hardware from L1-S1; multilevel osteotomy for removal of bone mass; L5-S1 bilateral facetectomy possible osteotomy and revision laminectomy; interbody fusion F8-Y7; application    ; Standing  -     Ambulatory referral to Internal Medicine; Future  -     CBC and Platelet; Future  -     Comprehensive metabolic panel; Future  -     EKG 12 lead; Future  -     XR chest pa & lateral; Future; Expected date: 04/18/2022  -     Case request operating room: FUSION LUMBAR/THORACIC POSTERIOR    Rule hardware from L1-S1; multilevel osteotomy for removal of bone mass; L5-S1 bilateral facetectomy possible osteotomy and revision laminectomy; interbody fusion K8-H4; application     PLAN:  73year old male s/p L1- L5 posterior spinal fusion  The patient has significant low back pain with evidence of screw penetration into the T12 -L1 disk space and failure of L5-S1 fusion with junctional kyphosis of the lumbar spine with sagittal balance    The patient will require a staged procedure with hardware removal and L5-S1 osteotomies with possible additional levels and L5-S1 fusion with possible device insertion  Discussion was had with the patient regarding treatment options including risks, benefits and alternatives and the need for a staged procedure  The staged procedure will require him to return to the hospital approximately 4 weeks later for 2nd stage of the procedure  The patient understands and agrees with the plan  - Informed consent obtained today  - Case request placed  - pre-op labs  -Counseled regarding likelihood for need for transfusion during hospital stay  - Pre-op clearance with PCP  - Return to clinic after surgery for wound check and post op x-rays    Discuss the surgical option with Paul uDque  He does have a significant deformity affecting his lumbosacral junction, as lumbar spine and thoracolumbar junction  He has significant kyphosis at the thoracolumbar junction  Loss of lumbar lordosis present  The CT scan demonstrated evidence of solid fusion throughout the lumbar spine  At L5-S1 there is a vacuum disc formation which is consistent with pseudoarthrosis  The best way to obtain the sagittal correction is through a interbody approach at L5-S1  Unfortunately his hardware is fully encased in bone which would make its removal difficult  It is extremely unlikely that the entire procedure can be performed on a single day  He has been scheduled for late May  Risk and benefits surgery was discussed with him  The 1st stage of surgery would involve removal of hardware, I will replace the hardware with the new instrumentation  He does have a pedicle screw on the right side at L2 which appears to be within the canal   A1c that screw cannot be replaced  The facets at L5-S1 will also need to be excised and partial osteotomy will need to be performed  If possible L5-S1 will be decompressed and interbody fusion using a lordotic implant will be performed    If not the procedure will need to be stage I anterior procedure at L5-S1 will be necessary to correct the lower lumbar deformity  He most likely will need additional osteotomies in the upper lumbar area to correct the thoracolumbar deformity  The fusion will need to be extended to the midthoracic area  That procedure will be performed at the late fashion and the extent of procedure will depend on the amount of correction obtained through the lumbar spine  He will require medical clearance prior to surgery  Surgery has been scheduled for late May       Scribe Attestation    I,:   am acting as a scribe while in the presence of the attending physician :       I,:   personally performed the services described in this documentation    as scribed in my presence :

## 2022-04-19 ENCOUNTER — TELEPHONE (OUTPATIENT)
Dept: OBGYN CLINIC | Facility: CLINIC | Age: 67
End: 2022-04-19

## 2022-04-19 NOTE — TELEPHONE ENCOUNTER
Spoke pt today and confirm his surgery date for 05/24/22 and discuss preop testing appts    Shawn Goodman

## 2022-04-19 NOTE — TELEPHONE ENCOUNTER
Hi Dr Autumn Campbell,  Please provide cpt-codes for pt case  I need to schedule with the OR    Thanks   Win Frye

## 2022-05-03 ENCOUNTER — APPOINTMENT (OUTPATIENT)
Dept: LAB | Facility: HOSPITAL | Age: 67
End: 2022-05-03
Payer: MEDICARE

## 2022-05-03 DIAGNOSIS — M41.9 SCOLIOSIS, UNSPECIFIED SCOLIOSIS TYPE, UNSPECIFIED SPINAL REGION: ICD-10-CM

## 2022-05-03 DIAGNOSIS — M51.35 DJD (DEGENERATIVE JOINT DISEASE), THORACOLUMBAR: ICD-10-CM

## 2022-05-03 DIAGNOSIS — M40.30 FLATBACK SYNDROME: ICD-10-CM

## 2022-05-03 DIAGNOSIS — Z98.890 HISTORY OF LUMBAR SURGERY: ICD-10-CM

## 2022-05-03 LAB
ALBUMIN SERPL BCP-MCNC: 3.8 G/DL (ref 3.5–5)
ALP SERPL-CCNC: 88 U/L (ref 46–116)
ALT SERPL W P-5'-P-CCNC: 68 U/L (ref 12–78)
ANION GAP SERPL CALCULATED.3IONS-SCNC: 8 MMOL/L (ref 4–13)
AST SERPL W P-5'-P-CCNC: 63 U/L (ref 5–45)
ATRIAL RATE: 61 BPM
BILIRUB SERPL-MCNC: 0.53 MG/DL (ref 0.2–1)
BUN SERPL-MCNC: 17 MG/DL (ref 5–25)
CALCIUM SERPL-MCNC: 8.9 MG/DL (ref 8.3–10.1)
CHLORIDE SERPL-SCNC: 104 MMOL/L (ref 100–108)
CO2 SERPL-SCNC: 24 MMOL/L (ref 21–32)
CREAT SERPL-MCNC: 0.84 MG/DL (ref 0.6–1.3)
ERYTHROCYTE [DISTWIDTH] IN BLOOD BY AUTOMATED COUNT: 13.2 % (ref 11.6–15.1)
GFR SERPL CREATININE-BSD FRML MDRD: 91 ML/MIN/1.73SQ M
GLUCOSE P FAST SERPL-MCNC: 104 MG/DL (ref 65–99)
HCT VFR BLD AUTO: 45.7 % (ref 36.5–49.3)
HGB BLD-MCNC: 14.8 G/DL (ref 12–17)
MCH RBC QN AUTO: 28.7 PG (ref 26.8–34.3)
MCHC RBC AUTO-ENTMCNC: 32.4 G/DL (ref 31.4–37.4)
MCV RBC AUTO: 89 FL (ref 82–98)
P AXIS: 10 DEGREES
PLATELET # BLD AUTO: 291 THOUSANDS/UL (ref 149–390)
PMV BLD AUTO: 8.9 FL (ref 8.9–12.7)
POTASSIUM SERPL-SCNC: 4.1 MMOL/L (ref 3.5–5.3)
PR INTERVAL: 206 MS
PROT SERPL-MCNC: 7 G/DL (ref 6.4–8.2)
QRS AXIS: -8 DEGREES
QRSD INTERVAL: 96 MS
QT INTERVAL: 414 MS
QTC INTERVAL: 416 MS
RBC # BLD AUTO: 5.15 MILLION/UL (ref 3.88–5.62)
SODIUM SERPL-SCNC: 136 MMOL/L (ref 136–145)
T WAVE AXIS: 7 DEGREES
VENTRICULAR RATE: 61 BPM
WBC # BLD AUTO: 5.52 THOUSAND/UL (ref 4.31–10.16)

## 2022-05-03 PROCEDURE — 93005 ELECTROCARDIOGRAM TRACING: CPT

## 2022-05-03 PROCEDURE — 93010 ELECTROCARDIOGRAM REPORT: CPT | Performed by: INTERNAL MEDICINE

## 2022-05-03 PROCEDURE — 36415 COLL VENOUS BLD VENIPUNCTURE: CPT

## 2022-05-03 PROCEDURE — 85027 COMPLETE CBC AUTOMATED: CPT

## 2022-05-03 PROCEDURE — 80053 COMPREHEN METABOLIC PANEL: CPT

## 2022-05-09 ENCOUNTER — HOSPITAL ENCOUNTER (OUTPATIENT)
Dept: RADIOLOGY | Facility: HOSPITAL | Age: 67
Discharge: HOME | End: 2022-05-09
Payer: MEDICARE

## 2022-05-09 ENCOUNTER — TELEPHONE (OUTPATIENT)
Dept: OBGYN CLINIC | Facility: CLINIC | Age: 67
End: 2022-05-09

## 2022-05-09 DIAGNOSIS — Z98.890 HISTORY OF LUMBAR SURGERY: ICD-10-CM

## 2022-05-09 DIAGNOSIS — M41.9 SCOLIOSIS, UNSPECIFIED SCOLIOSIS TYPE, UNSPECIFIED SPINAL REGION: ICD-10-CM

## 2022-05-09 DIAGNOSIS — M40.30 FLATBACK SYNDROME: ICD-10-CM

## 2022-05-09 DIAGNOSIS — M51.35 DJD (DEGENERATIVE JOINT DISEASE), THORACOLUMBAR: ICD-10-CM

## 2022-05-09 PROCEDURE — 71046 X-RAY EXAM CHEST 2 VIEWS: CPT

## 2022-05-09 NOTE — TELEPHONE ENCOUNTER
Called patient to make him aware he did not get his chest xray done     Patient states he didn't think he has too     Made patient aware he does for surgery     He will go today for it and I also reminded him of his PCP appointment on 05/12/22 at 12:30

## 2022-05-12 ENCOUNTER — ANESTHESIA EVENT (OUTPATIENT)
Dept: PERIOP | Facility: HOSPITAL | Age: 67
DRG: 454 | End: 2022-05-12
Payer: MEDICARE

## 2022-05-12 NOTE — PRE-PROCEDURE INSTRUCTIONS
Pre-Surgery Instructions:   Medication Instructions    lisinopril (ZESTRIL) 5 mg tablet Hold day of surgery   lovastatin (MEVACOR) 10 MG tablet Take day of surgery   traMADol (ULTRAM) 50 mg tablet Take day of surgery  Patient / instructed on use of chlorhexidine soap per hospital protocol    Patient instructed to stop all ASA, NSAIDS, vitamins and herbal supplements one week prior to surgery or per Dr Block Began

## 2022-05-24 ENCOUNTER — APPOINTMENT (OUTPATIENT)
Dept: RADIOLOGY | Facility: HOSPITAL | Age: 67
DRG: 454 | End: 2022-05-24
Payer: MEDICARE

## 2022-05-24 ENCOUNTER — ANESTHESIA (OUTPATIENT)
Dept: PERIOP | Facility: HOSPITAL | Age: 67
DRG: 454 | End: 2022-05-24
Payer: MEDICARE

## 2022-05-24 ENCOUNTER — HOSPITAL ENCOUNTER (INPATIENT)
Facility: HOSPITAL | Age: 67
LOS: 3 days | DRG: 454 | End: 2022-05-27
Attending: ORTHOPAEDIC SURGERY | Admitting: ORTHOPAEDIC SURGERY
Payer: MEDICARE

## 2022-05-24 DIAGNOSIS — I10 PRIMARY HYPERTENSION: ICD-10-CM

## 2022-05-24 DIAGNOSIS — E78.5 HYPERLIPIDEMIA, UNSPECIFIED HYPERLIPIDEMIA TYPE: ICD-10-CM

## 2022-05-24 DIAGNOSIS — Z98.1 STATUS POST LUMBAR SPINAL FUSION: Primary | ICD-10-CM

## 2022-05-24 LAB
ABO GROUP BLD: NORMAL
ABO GROUP BLD: NORMAL
ALBUMIN SERPL BCP-MCNC: 3.2 G/DL (ref 3.5–5)
ALP SERPL-CCNC: 66 U/L (ref 46–116)
ALT SERPL W P-5'-P-CCNC: 41 U/L (ref 12–78)
ANION GAP SERPL CALCULATED.3IONS-SCNC: 10 MMOL/L (ref 4–13)
APTT PPP: 30 SECONDS (ref 23–37)
AST SERPL W P-5'-P-CCNC: 72 U/L (ref 5–45)
BASE EXCESS BLDA CALC-SCNC: -2 MMOL/L (ref -2–3)
BASE EXCESS BLDA CALC-SCNC: -3 MMOL/L (ref -2–3)
BASE EXCESS BLDA CALC-SCNC: -4 MMOL/L (ref -2–3)
BASE EXCESS BLDA CALC-SCNC: -4 MMOL/L (ref -2–3)
BILIRUB SERPL-MCNC: 0.61 MG/DL (ref 0.2–1)
BLD GP AB SCN SERPL QL: NEGATIVE
BUN SERPL-MCNC: 14 MG/DL (ref 5–25)
CA-I BLD-SCNC: 1.23 MMOL/L (ref 1.12–1.32)
CA-I BLD-SCNC: 1.25 MMOL/L (ref 1.12–1.32)
CA-I BLD-SCNC: 1.26 MMOL/L (ref 1.12–1.32)
CA-I BLD-SCNC: 1.28 MMOL/L (ref 1.12–1.32)
CALCIUM ALBUM COR SERPL-MCNC: 8.7 MG/DL (ref 8.3–10.1)
CALCIUM SERPL-MCNC: 8.1 MG/DL (ref 8.3–10.1)
CHLORIDE SERPL-SCNC: 106 MMOL/L (ref 100–108)
CO2 SERPL-SCNC: 24 MMOL/L (ref 21–32)
CREAT SERPL-MCNC: 0.81 MG/DL (ref 0.6–1.3)
GFR SERPL CREATININE-BSD FRML MDRD: 93 ML/MIN/1.73SQ M
GLUCOSE SERPL-MCNC: 121 MG/DL (ref 65–140)
GLUCOSE SERPL-MCNC: 129 MG/DL (ref 65–140)
GLUCOSE SERPL-MCNC: 134 MG/DL (ref 65–140)
GLUCOSE SERPL-MCNC: 135 MG/DL (ref 65–140)
GLUCOSE SERPL-MCNC: 139 MG/DL (ref 65–140)
HCO3 BLDA-SCNC: 20.7 MMOL/L (ref 22–28)
HCO3 BLDA-SCNC: 21.1 MMOL/L (ref 22–28)
HCO3 BLDA-SCNC: 22.1 MMOL/L (ref 22–28)
HCO3 BLDA-SCNC: 23.1 MMOL/L (ref 22–28)
HCT VFR BLD CALC: 30 % (ref 36.5–49.3)
HCT VFR BLD CALC: 32 % (ref 36.5–49.3)
HCT VFR BLD CALC: 37 % (ref 36.5–49.3)
HCT VFR BLD CALC: 39 % (ref 36.5–49.3)
HGB BLDA-MCNC: 10.2 G/DL (ref 12–17)
HGB BLDA-MCNC: 10.9 G/DL (ref 12–17)
HGB BLDA-MCNC: 12.6 G/DL (ref 12–17)
HGB BLDA-MCNC: 13.3 G/DL (ref 12–17)
INR PPP: 1.02 (ref 0.84–1.19)
PCO2 BLD: 22 MMOL/L (ref 21–32)
PCO2 BLD: 22 MMOL/L (ref 21–32)
PCO2 BLD: 23 MMOL/L (ref 21–32)
PCO2 BLD: 24 MMOL/L (ref 21–32)
PCO2 BLD: 36.3 MM HG (ref 36–44)
PCO2 BLD: 38.9 MM HG (ref 36–44)
PCO2 BLD: 39.4 MM HG (ref 36–44)
PCO2 BLD: 41 MM HG (ref 36–44)
PH BLD: 7.34 [PH] (ref 7.35–7.45)
PH BLD: 7.36 [PH] (ref 7.35–7.45)
PO2 BLD: 143 MM HG (ref 75–129)
PO2 BLD: 146 MM HG (ref 75–129)
PO2 BLD: 153 MM HG (ref 75–129)
PO2 BLD: 197 MM HG (ref 75–129)
POTASSIUM BLD-SCNC: 4.1 MMOL/L (ref 3.5–5.3)
POTASSIUM BLD-SCNC: 4.1 MMOL/L (ref 3.5–5.3)
POTASSIUM BLD-SCNC: 4.3 MMOL/L (ref 3.5–5.3)
POTASSIUM BLD-SCNC: 4.3 MMOL/L (ref 3.5–5.3)
POTASSIUM SERPL-SCNC: 4 MMOL/L (ref 3.5–5.3)
PROT SERPL-MCNC: 5.5 G/DL (ref 6.4–8.2)
PROTHROMBIN TIME: 13 SECONDS (ref 11.6–14.5)
RH BLD: POSITIVE
RH BLD: POSITIVE
SAO2 % BLD FROM PO2: 100 % (ref 60–85)
SAO2 % BLD FROM PO2: 99 % (ref 60–85)
SODIUM BLD-SCNC: 139 MMOL/L (ref 136–145)
SODIUM BLD-SCNC: 139 MMOL/L (ref 136–145)
SODIUM BLD-SCNC: 140 MMOL/L (ref 136–145)
SODIUM BLD-SCNC: 140 MMOL/L (ref 136–145)
SODIUM SERPL-SCNC: 140 MMOL/L (ref 136–145)
SPECIMEN EXPIRATION DATE: NORMAL
SPECIMEN SOURCE: ABNORMAL

## 2022-05-24 PROCEDURE — 86920 COMPATIBILITY TEST SPIN: CPT

## 2022-05-24 PROCEDURE — 84132 ASSAY OF SERUM POTASSIUM: CPT

## 2022-05-24 PROCEDURE — 22633 ARTHRD CMBN 1NTRSPC LUMBAR: CPT | Performed by: ORTHOPAEDIC SURGERY

## 2022-05-24 PROCEDURE — C1713 ANCHOR/SCREW BN/BN,TIS/BN: HCPCS | Performed by: ORTHOPAEDIC SURGERY

## 2022-05-24 PROCEDURE — 86850 RBC ANTIBODY SCREEN: CPT | Performed by: ANESTHESIOLOGY

## 2022-05-24 PROCEDURE — 22614 ARTHRD PST TQ 1NTRSPC EA ADD: CPT | Performed by: ORTHOPAEDIC SURGERY

## 2022-05-24 PROCEDURE — 84295 ASSAY OF SERUM SODIUM: CPT

## 2022-05-24 PROCEDURE — 20936 SP BONE AGRFT LOCAL ADD-ON: CPT | Performed by: ORTHOPAEDIC SURGERY

## 2022-05-24 PROCEDURE — 3E0U0GB INTRODUCTION OF RECOMBINANT BONE MORPHOGENETIC PROTEIN INTO JOINTS, OPEN APPROACH: ICD-10-PCS | Performed by: ORTHOPAEDIC SURGERY

## 2022-05-24 PROCEDURE — 22848 INSERT PELV FIXATION DEVICE: CPT | Performed by: ORTHOPAEDIC SURGERY

## 2022-05-24 PROCEDURE — 85730 THROMBOPLASTIN TIME PARTIAL: CPT | Performed by: ANESTHESIOLOGY

## 2022-05-24 PROCEDURE — 0SG10AJ FUSION OF 2 OR MORE LUMBAR VERTEBRAL JOINTS WITH INTERBODY FUSION DEVICE, POSTERIOR APPROACH, ANTERIOR COLUMN, OPEN APPROACH: ICD-10-PCS | Performed by: ORTHOPAEDIC SURGERY

## 2022-05-24 PROCEDURE — 82330 ASSAY OF CALCIUM: CPT

## 2022-05-24 PROCEDURE — 0SG00K1 FUSION OF LUMBAR VERTEBRAL JOINT WITH NONAUTOLOGOUS TISSUE SUBSTITUTE, POSTERIOR APPROACH, POSTERIOR COLUMN, OPEN APPROACH: ICD-10-PCS | Performed by: ORTHOPAEDIC SURGERY

## 2022-05-24 PROCEDURE — 22214 INCIS 1 VERTEBRAL SEG LUMBAR: CPT | Performed by: ORTHOPAEDIC SURGERY

## 2022-05-24 PROCEDURE — 20930 SP BONE ALGRFT MORSEL ADD-ON: CPT | Performed by: ORTHOPAEDIC SURGERY

## 2022-05-24 PROCEDURE — 22853 INSJ BIOMECHANICAL DEVICE: CPT | Performed by: ORTHOPAEDIC SURGERY

## 2022-05-24 PROCEDURE — 0SG00J1 FUSION OF LUMBAR VERTEBRAL JOINT WITH SYNTHETIC SUBSTITUTE, POSTERIOR APPROACH, POSTERIOR COLUMN, OPEN APPROACH: ICD-10-PCS | Performed by: ORTHOPAEDIC SURGERY

## 2022-05-24 PROCEDURE — 80053 COMPREHEN METABOLIC PANEL: CPT

## 2022-05-24 PROCEDURE — 86901 BLOOD TYPING SEROLOGIC RH(D): CPT | Performed by: ANESTHESIOLOGY

## 2022-05-24 PROCEDURE — 85610 PROTHROMBIN TIME: CPT | Performed by: ANESTHESIOLOGY

## 2022-05-24 PROCEDURE — 72100 X-RAY EXAM L-S SPINE 2/3 VWS: CPT

## 2022-05-24 PROCEDURE — 86900 BLOOD TYPING SEROLOGIC ABO: CPT | Performed by: ANESTHESIOLOGY

## 2022-05-24 PROCEDURE — 22842 INSERT SPINE FIXATION DEVICE: CPT | Performed by: ORTHOPAEDIC SURGERY

## 2022-05-24 PROCEDURE — 22852 REMOVE SPINE FIXATION DEVICE: CPT | Performed by: ORTHOPAEDIC SURGERY

## 2022-05-24 PROCEDURE — NC001 PR NO CHARGE: Performed by: ORTHOPAEDIC SURGERY

## 2022-05-24 PROCEDURE — 82947 ASSAY GLUCOSE BLOOD QUANT: CPT

## 2022-05-24 PROCEDURE — 85014 HEMATOCRIT: CPT

## 2022-05-24 PROCEDURE — 82803 BLOOD GASES ANY COMBINATION: CPT

## 2022-05-24 DEVICE — IMPLANTABLE DEVICE: Type: IMPLANTABLE DEVICE | Site: SPINE LUMBAR | Status: FUNCTIONAL

## 2022-05-24 DEVICE — MOSS MIAMI SPINE SYSTEM WASHER 5.5
Type: IMPLANTABLE DEVICE | Site: SPINE LUMBAR | Status: FUNCTIONAL
Brand: MOSS

## 2022-05-24 DEVICE — I-FACTOR™ PUTTY, 5.0 CC SYRINGE
Type: IMPLANTABLE DEVICE | Site: SPINE LUMBAR | Status: FUNCTIONAL
Brand: I-FACTOR™ PEPTIDE ENHANCED BONE GRAFT

## 2022-05-24 DEVICE — 5.5MM TI PRECONTOURED ROD 80MM STRAIGHT LENGTH/400MM: Type: IMPLANTABLE DEVICE | Site: SPINE LUMBAR | Status: FUNCTIONAL

## 2022-05-24 DEVICE — SHIM, 11 MM OR 13 MM X 23 MM/ 25 MM, 6 DEGREE, FH11 STERILE
Type: IMPLANTABLE DEVICE | Site: SPINE LUMBAR | Status: FUNCTIONAL
Brand: FLAREHAWK

## 2022-05-24 DEVICE — SHELL, LORDOTIC 25 MM, TIH11 STERILE
Type: IMPLANTABLE DEVICE | Site: SPINE LUMBAR | Status: FUNCTIONAL
Brand: FLAREHAWK

## 2022-05-24 DEVICE — IMPLANTABLE DEVICE
Type: IMPLANTABLE DEVICE | Site: SPINE LUMBAR | Status: FUNCTIONAL
Brand: OPTECURE

## 2022-05-24 RX ORDER — PROPOFOL 10 MG/ML
INJECTION, EMULSION INTRAVENOUS AS NEEDED
Status: DISCONTINUED | OUTPATIENT
Start: 2022-05-24 | End: 2022-05-24

## 2022-05-24 RX ORDER — SODIUM CHLORIDE 9 MG/ML
INJECTION, SOLUTION INTRAVENOUS CONTINUOUS PRN
Status: DISCONTINUED | OUTPATIENT
Start: 2022-05-24 | End: 2022-05-24

## 2022-05-24 RX ORDER — CHLORHEXIDINE GLUCONATE 0.12 MG/ML
15 RINSE ORAL ONCE
Status: COMPLETED | OUTPATIENT
Start: 2022-05-24 | End: 2022-05-24

## 2022-05-24 RX ORDER — CEFAZOLIN SODIUM 2 G/50ML
2000 SOLUTION INTRAVENOUS ONCE
Status: COMPLETED | OUTPATIENT
Start: 2022-05-24 | End: 2022-05-24

## 2022-05-24 RX ORDER — METOCLOPRAMIDE HYDROCHLORIDE 5 MG/ML
10 INJECTION INTRAMUSCULAR; INTRAVENOUS ONCE AS NEEDED
Status: DISCONTINUED | OUTPATIENT
Start: 2022-05-24 | End: 2022-05-24 | Stop reason: HOSPADM

## 2022-05-24 RX ORDER — MIDAZOLAM HYDROCHLORIDE 2 MG/2ML
INJECTION, SOLUTION INTRAMUSCULAR; INTRAVENOUS AS NEEDED
Status: DISCONTINUED | OUTPATIENT
Start: 2022-05-24 | End: 2022-05-24

## 2022-05-24 RX ORDER — ONDANSETRON 2 MG/ML
4 INJECTION INTRAMUSCULAR; INTRAVENOUS EVERY 6 HOURS PRN
Status: DISCONTINUED | OUTPATIENT
Start: 2022-05-24 | End: 2022-05-25 | Stop reason: SDUPTHER

## 2022-05-24 RX ORDER — CEFAZOLIN SODIUM 1 G/50ML
1000 SOLUTION INTRAVENOUS EVERY 8 HOURS
Status: DISPENSED | OUTPATIENT
Start: 2022-05-24 | End: 2022-05-25

## 2022-05-24 RX ORDER — PHENYLEPHRINE HYDROCHLORIDE 10 MG/ML
INJECTION INTRAVENOUS AS NEEDED
Status: DISCONTINUED | OUTPATIENT
Start: 2022-05-24 | End: 2022-05-24

## 2022-05-24 RX ORDER — SODIUM CHLORIDE, SODIUM LACTATE, POTASSIUM CHLORIDE, CALCIUM CHLORIDE 600; 310; 30; 20 MG/100ML; MG/100ML; MG/100ML; MG/100ML
75 INJECTION, SOLUTION INTRAVENOUS CONTINUOUS
Status: DISCONTINUED | OUTPATIENT
Start: 2022-05-24 | End: 2022-05-27 | Stop reason: HOSPADM

## 2022-05-24 RX ORDER — ASPIRIN 325 MG
325 TABLET ORAL DAILY
Status: DISCONTINUED | OUTPATIENT
Start: 2022-05-25 | End: 2022-05-27 | Stop reason: HOSPADM

## 2022-05-24 RX ORDER — HYDROMORPHONE HCL/PF 1 MG/ML
0.5 SYRINGE (ML) INJECTION
Status: DISCONTINUED | OUTPATIENT
Start: 2022-05-24 | End: 2022-05-24 | Stop reason: HOSPADM

## 2022-05-24 RX ORDER — ONDANSETRON 2 MG/ML
INJECTION INTRAMUSCULAR; INTRAVENOUS AS NEEDED
Status: DISCONTINUED | OUTPATIENT
Start: 2022-05-24 | End: 2022-05-24

## 2022-05-24 RX ORDER — PRAVASTATIN SODIUM 20 MG
10 TABLET ORAL
Status: DISCONTINUED | OUTPATIENT
Start: 2022-05-25 | End: 2022-05-27 | Stop reason: HOSPADM

## 2022-05-24 RX ORDER — ONDANSETRON 2 MG/ML
4 INJECTION INTRAMUSCULAR; INTRAVENOUS EVERY 6 HOURS PRN
Status: DISCONTINUED | OUTPATIENT
Start: 2022-05-24 | End: 2022-05-27 | Stop reason: HOSPADM

## 2022-05-24 RX ORDER — MAGNESIUM HYDROXIDE 1200 MG/15ML
LIQUID ORAL AS NEEDED
Status: DISCONTINUED | OUTPATIENT
Start: 2022-05-24 | End: 2022-05-24 | Stop reason: HOSPADM

## 2022-05-24 RX ORDER — HYDROMORPHONE HCL 110MG/55ML
PATIENT CONTROLLED ANALGESIA SYRINGE INTRAVENOUS AS NEEDED
Status: DISCONTINUED | OUTPATIENT
Start: 2022-05-24 | End: 2022-05-24

## 2022-05-24 RX ORDER — DEXAMETHASONE SODIUM PHOSPHATE 10 MG/ML
INJECTION, SOLUTION INTRAMUSCULAR; INTRAVENOUS AS NEEDED
Status: DISCONTINUED | OUTPATIENT
Start: 2022-05-24 | End: 2022-05-24

## 2022-05-24 RX ORDER — LISINOPRIL 5 MG/1
5 TABLET ORAL DAILY
Status: DISCONTINUED | OUTPATIENT
Start: 2022-05-25 | End: 2022-05-27 | Stop reason: HOSPADM

## 2022-05-24 RX ORDER — SODIUM CHLORIDE, SODIUM LACTATE, POTASSIUM CHLORIDE, CALCIUM CHLORIDE 600; 310; 30; 20 MG/100ML; MG/100ML; MG/100ML; MG/100ML
INJECTION, SOLUTION INTRAVENOUS CONTINUOUS PRN
Status: DISCONTINUED | OUTPATIENT
Start: 2022-05-24 | End: 2022-05-24

## 2022-05-24 RX ORDER — LIDOCAINE HYDROCHLORIDE 10 MG/ML
0.5 INJECTION, SOLUTION EPIDURAL; INFILTRATION; INTRACAUDAL; PERINEURAL ONCE AS NEEDED
Status: DISCONTINUED | OUTPATIENT
Start: 2022-05-24 | End: 2022-05-24 | Stop reason: HOSPADM

## 2022-05-24 RX ORDER — DOCUSATE SODIUM 100 MG/1
100 CAPSULE, LIQUID FILLED ORAL 2 TIMES DAILY
Status: DISCONTINUED | OUTPATIENT
Start: 2022-05-24 | End: 2022-05-27 | Stop reason: HOSPADM

## 2022-05-24 RX ORDER — PROPOFOL 10 MG/ML
INJECTION, EMULSION INTRAVENOUS CONTINUOUS PRN
Status: DISCONTINUED | OUTPATIENT
Start: 2022-05-24 | End: 2022-05-24

## 2022-05-24 RX ORDER — FENTANYL CITRATE/PF 50 MCG/ML
50 SYRINGE (ML) INJECTION
Status: DISCONTINUED | OUTPATIENT
Start: 2022-05-24 | End: 2022-05-24 | Stop reason: HOSPADM

## 2022-05-24 RX ORDER — SODIUM CHLORIDE, SODIUM LACTATE, POTASSIUM CHLORIDE, CALCIUM CHLORIDE 600; 310; 30; 20 MG/100ML; MG/100ML; MG/100ML; MG/100ML
125 INJECTION, SOLUTION INTRAVENOUS CONTINUOUS
Status: DISCONTINUED | OUTPATIENT
Start: 2022-05-24 | End: 2022-05-25

## 2022-05-24 RX ORDER — SUCCINYLCHOLINE/SOD CL,ISO/PF 100 MG/5ML
SYRINGE (ML) INTRAVENOUS AS NEEDED
Status: DISCONTINUED | OUTPATIENT
Start: 2022-05-24 | End: 2022-05-24

## 2022-05-24 RX ORDER — ALBUMIN, HUMAN INJ 5% 5 %
SOLUTION INTRAVENOUS CONTINUOUS PRN
Status: DISCONTINUED | OUTPATIENT
Start: 2022-05-24 | End: 2022-05-24

## 2022-05-24 RX ORDER — FENTANYL CITRATE 50 UG/ML
INJECTION, SOLUTION INTRAMUSCULAR; INTRAVENOUS AS NEEDED
Status: DISCONTINUED | OUTPATIENT
Start: 2022-05-24 | End: 2022-05-24

## 2022-05-24 RX ORDER — VANCOMYCIN HYDROCHLORIDE 1 G/20ML
INJECTION, POWDER, LYOPHILIZED, FOR SOLUTION INTRAVENOUS AS NEEDED
Status: DISCONTINUED | OUTPATIENT
Start: 2022-05-24 | End: 2022-05-24 | Stop reason: HOSPADM

## 2022-05-24 RX ORDER — NALOXONE HYDROCHLORIDE 0.4 MG/ML
0.1 INJECTION, SOLUTION INTRAMUSCULAR; INTRAVENOUS; SUBCUTANEOUS
Status: DISCONTINUED | OUTPATIENT
Start: 2022-05-24 | End: 2022-05-27 | Stop reason: HOSPADM

## 2022-05-24 RX ORDER — CEFAZOLIN SODIUM 1 G/3ML
INJECTION, POWDER, FOR SOLUTION INTRAMUSCULAR; INTRAVENOUS AS NEEDED
Status: DISCONTINUED | OUTPATIENT
Start: 2022-05-24 | End: 2022-05-24

## 2022-05-24 RX ORDER — LIDOCAINE HYDROCHLORIDE 20 MG/ML
INJECTION, SOLUTION EPIDURAL; INFILTRATION; INTRACAUDAL; PERINEURAL AS NEEDED
Status: DISCONTINUED | OUTPATIENT
Start: 2022-05-24 | End: 2022-05-24

## 2022-05-24 RX ADMIN — DEXAMETHASONE SODIUM PHOSPHATE 10 MG: 10 INJECTION, SOLUTION INTRAMUSCULAR; INTRAVENOUS at 10:10

## 2022-05-24 RX ADMIN — ALBUMIN (HUMAN): 12.5 INJECTION, SOLUTION INTRAVENOUS at 16:40

## 2022-05-24 RX ADMIN — REMIFENTANIL HYDROCHLORIDE 0.15 MCG/KG/MIN: 1 INJECTION, POWDER, LYOPHILIZED, FOR SOLUTION INTRAVENOUS at 10:33

## 2022-05-24 RX ADMIN — PHENYLEPHRINE HYDROCHLORIDE 100 MCG: 10 INJECTION INTRAVENOUS at 10:05

## 2022-05-24 RX ADMIN — HYDROMORPHONE HYDROCHLORIDE 0.5 MG: 2 INJECTION INTRAMUSCULAR; INTRAVENOUS; SUBCUTANEOUS at 12:21

## 2022-05-24 RX ADMIN — HYDROMORPHONE HYDROCHLORIDE 0.5 MG: 2 INJECTION INTRAMUSCULAR; INTRAVENOUS; SUBCUTANEOUS at 12:51

## 2022-05-24 RX ADMIN — HYDROMORPHONE HYDROCHLORIDE: 10 INJECTION, SOLUTION INTRAMUSCULAR; INTRAVENOUS; SUBCUTANEOUS at 20:08

## 2022-05-24 RX ADMIN — SODIUM CHLORIDE, SODIUM LACTATE, POTASSIUM CHLORIDE, AND CALCIUM CHLORIDE: .6; .31; .03; .02 INJECTION, SOLUTION INTRAVENOUS at 14:18

## 2022-05-24 RX ADMIN — CHLORHEXIDINE GLUCONATE 0.12% ORAL RINSE 15 ML: 1.2 LIQUID ORAL at 09:01

## 2022-05-24 RX ADMIN — PHENYLEPHRINE HYDROCHLORIDE 200 MCG: 10 INJECTION INTRAVENOUS at 10:27

## 2022-05-24 RX ADMIN — FENTANYL CITRATE 100 MCG: 50 INJECTION, SOLUTION INTRAMUSCULAR; INTRAVENOUS at 11:07

## 2022-05-24 RX ADMIN — HYDROMORPHONE HYDROCHLORIDE 0.5 MG: 2 INJECTION INTRAMUSCULAR; INTRAVENOUS; SUBCUTANEOUS at 13:36

## 2022-05-24 RX ADMIN — FENTANYL CITRATE 50 MCG: 50 INJECTION, SOLUTION INTRAMUSCULAR; INTRAVENOUS at 10:15

## 2022-05-24 RX ADMIN — PROPOFOL 120 MCG/KG/MIN: 10 INJECTION, EMULSION INTRAVENOUS at 11:46

## 2022-05-24 RX ADMIN — FENTANYL CITRATE 50 MCG: 50 INJECTION, SOLUTION INTRAMUSCULAR; INTRAVENOUS at 10:05

## 2022-05-24 RX ADMIN — CEFAZOLIN SODIUM 2000 MG: 1 INJECTION, POWDER, FOR SOLUTION INTRAMUSCULAR; INTRAVENOUS at 18:10

## 2022-05-24 RX ADMIN — SODIUM CHLORIDE, SODIUM LACTATE, POTASSIUM CHLORIDE, AND CALCIUM CHLORIDE: .6; .31; .03; .02 INJECTION, SOLUTION INTRAVENOUS at 16:41

## 2022-05-24 RX ADMIN — FENTANYL CITRATE 50 MCG: 50 INJECTION, SOLUTION INTRAMUSCULAR; INTRAVENOUS at 16:23

## 2022-05-24 RX ADMIN — FENTANYL CITRATE 50 MCG: 50 INJECTION, SOLUTION INTRAMUSCULAR; INTRAVENOUS at 15:24

## 2022-05-24 RX ADMIN — Medication 20 MG: at 12:29

## 2022-05-24 RX ADMIN — PROPOFOL 100 MG: 10 INJECTION, EMULSION INTRAVENOUS at 10:14

## 2022-05-24 RX ADMIN — CEFAZOLIN SODIUM 2000 MG: 1 INJECTION, POWDER, FOR SOLUTION INTRAMUSCULAR; INTRAVENOUS at 14:18

## 2022-05-24 RX ADMIN — SODIUM CHLORIDE, SODIUM LACTATE, POTASSIUM CHLORIDE, AND CALCIUM CHLORIDE 100 ML/HR: .6; .31; .03; .02 INJECTION, SOLUTION INTRAVENOUS at 21:27

## 2022-05-24 RX ADMIN — SODIUM CHLORIDE: 0.9 INJECTION, SOLUTION INTRAVENOUS at 13:15

## 2022-05-24 RX ADMIN — ALBUMIN (HUMAN): 12.5 INJECTION, SOLUTION INTRAVENOUS at 17:03

## 2022-05-24 RX ADMIN — LIDOCAINE HYDROCHLORIDE 100 MG: 20 INJECTION, SOLUTION EPIDURAL; INFILTRATION; INTRACAUDAL at 10:05

## 2022-05-24 RX ADMIN — Medication 100 MG: at 10:05

## 2022-05-24 RX ADMIN — SODIUM CHLORIDE, SODIUM LACTATE, POTASSIUM CHLORIDE, AND CALCIUM CHLORIDE: .6; .31; .03; .02 INJECTION, SOLUTION INTRAVENOUS at 19:24

## 2022-05-24 RX ADMIN — SODIUM CHLORIDE, SODIUM LACTATE, POTASSIUM CHLORIDE, AND CALCIUM CHLORIDE: .6; .31; .03; .02 INJECTION, SOLUTION INTRAVENOUS at 10:01

## 2022-05-24 RX ADMIN — CEFAZOLIN SODIUM 1000 MG: 1 SOLUTION INTRAVENOUS at 23:54

## 2022-05-24 RX ADMIN — PHENYLEPHRINE HYDROCHLORIDE 100 MCG/MIN: 10 INJECTION INTRAVENOUS at 10:05

## 2022-05-24 RX ADMIN — MIDAZOLAM 2 MG: 1 INJECTION INTRAMUSCULAR; INTRAVENOUS at 10:01

## 2022-05-24 RX ADMIN — PHENYLEPHRINE HYDROCHLORIDE 200 MCG: 10 INJECTION INTRAVENOUS at 10:20

## 2022-05-24 RX ADMIN — PROPOFOL 200 MG: 10 INJECTION, EMULSION INTRAVENOUS at 10:05

## 2022-05-24 RX ADMIN — Medication 30 MG: at 10:05

## 2022-05-24 RX ADMIN — FENTANYL CITRATE 50 MCG: 50 INJECTION, SOLUTION INTRAMUSCULAR; INTRAVENOUS at 14:57

## 2022-05-24 RX ADMIN — PHENYLEPHRINE HYDROCHLORIDE 200 MCG: 10 INJECTION INTRAVENOUS at 10:43

## 2022-05-24 RX ADMIN — SODIUM CHLORIDE, SODIUM LACTATE, POTASSIUM CHLORIDE, AND CALCIUM CHLORIDE 125 ML/HR: .6; .31; .03; .02 INJECTION, SOLUTION INTRAVENOUS at 09:43

## 2022-05-24 RX ADMIN — CEFAZOLIN SODIUM 2000 MG: 2 SOLUTION INTRAVENOUS at 10:24

## 2022-05-24 RX ADMIN — HYDROMORPHONE HYDROCHLORIDE 0.5 MG: 2 INJECTION INTRAMUSCULAR; INTRAVENOUS; SUBCUTANEOUS at 14:20

## 2022-05-24 RX ADMIN — PROPOFOL 140 MCG/KG/MIN: 10 INJECTION, EMULSION INTRAVENOUS at 10:33

## 2022-05-24 RX ADMIN — PROPOFOL 100 MG: 10 INJECTION, EMULSION INTRAVENOUS at 10:20

## 2022-05-24 RX ADMIN — PHENYLEPHRINE HYDROCHLORIDE 30 MCG/MIN: 10 INJECTION INTRAVENOUS at 10:58

## 2022-05-24 RX ADMIN — ONDANSETRON 4 MG: 2 INJECTION INTRAMUSCULAR; INTRAVENOUS at 18:09

## 2022-05-24 RX ADMIN — SODIUM CHLORIDE: 0.9 INJECTION, SOLUTION INTRAVENOUS at 10:12

## 2022-05-24 NOTE — ANESTHESIA PROCEDURE NOTES
Arterial Line Insertion    Date/Time: 5/24/2022 10:13 AM  Performed by: Lora Almonte MD  Authorized by: Lora Almonte MD   Consent: Verbal consent obtained  Written consent not obtained  Risks and benefits: risks, benefits and alternatives were discussed  Consent given by: patient  Patient understanding: patient states understanding of the procedure being performed  Patient consent: the patient's understanding of the procedure matches consent given  Procedure consent: procedure consent matches procedure scheduled  Relevant documents: relevant documents present and verified  Test results: test results available and properly labeled  Site marked: the operative site was marked  Radiology Images: Radiology Images displayed and confirmed  If images not available, report reviewed  Required items: required blood products, implants, devices, and special equipment available  Patient identity confirmed: arm band  Time out: Immediately prior to procedure a "time out" was called to verify the correct patient, procedure, equipment, support staff and site/side marked as required  Preparation: Patient was prepped and draped in the usual sterile fashion    Indications: hemodynamic monitoring  Orientation:  Left  Location: radial artery  Sedation:  Patient sedated: no    Procedure Details:  Jeffrey's test normal: yes  Needle gauge: 20  Seldinger technique: Seldinger technique used  Number of attempts: 1    Post-procedure:  Post-procedure: dressing applied  Waveform: good waveform  Post-procedure CNS: normal  Patient tolerance: patient tolerated the procedure well with no immediate complications

## 2022-05-24 NOTE — ANESTHESIA POSTPROCEDURE EVALUATION
Post-Op Assessment Note    CV Status:  Stable  Pain Score: 0    Pain management: adequate     Mental Status:  Alert and awake   Hydration Status:  Stable   PONV Controlled:  None   Airway Patency:  Patent      Post Op Vitals Reviewed: Yes      Staff: Anesthesiologist, CRNA         No complications documented      BP  153/72   Temp   97 2   Pulse  76   Resp   20   SpO2   96% Valacyclovir. No appointment since 6/19/19.   Needs appt. If still [facility] pt.

## 2022-05-24 NOTE — ANESTHESIA PREPROCEDURE EVALUATION
Procedure:  FUSION LUMBAR/THORACIC POSTERIOR Remove  hardware from L1-S1; multilevel osteotomy for removal of bone mass; L5-S1 bilateral facetectomy possible osteotomy and revision laminectomy; interbody fusion J8-I6; application of interbody implant L5-S1; posterior instrumentation L1 to S1; possible pelvic instrumentation (N/A Spine Lumbar)    Relevant Problems   No relevant active problems        Physical Exam    Airway    Mallampati score: III  TM Distance: >3 FB  Neck ROM: full     Dental   No notable dental hx     Cardiovascular      Pulmonary      Other Findings        Anesthesia Plan  ASA Score- 3     Anesthesia Type- general with ASA Monitors  Additional Monitors: arterial line  Airway Plan: ETT  Plan Factors-Exercise tolerance (METS): >4 METS  Chart reviewed  EKG reviewed  Existing labs reviewed  Patient summary reviewed  Patient is not a current smoker  Induction- intravenous  Postoperative Plan- Plan for postoperative opioid use  Informed Consent- Anesthetic plan and risks discussed with patient  I personally reviewed this patient with the CRNA  Discussed and agreed on the Anesthesia Plan with the CRNA  Kemi Sanders

## 2022-05-24 NOTE — H&P
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  T12-L1 disk space screw penetration    Plan:  Proceed today with L1-S1 removal of hardware revision instrumentation L1-S1, multilevel osteotomy and revision laminectomy with interbody fusion L5-S1 and pelvic instrumentation      History of Present Illness   HPI:  Ariana Hernandez is a 73 y o  male who presents with L5-S1 pseudoarthrosis and T12- L1 junctional kyphosis with disk space screw penetration  He has tried and failed conservative management and presents today for surgical treatment  The patient is planned for likely a staged procedure with today's procedure involving revision of L1- S1 instrumentation, laminectomy, L5-S1 interbody fusion and possible pelvic fixation  He has no interval changes to his medical or surgical history since his last visit  He has had no fevers or chills      Historical Information  Review Of Systems:   · Skin: Normal  · Neuro: See HPI  · Musculoskeletal: See HPI  · 14 point review of systems negative except as stated above     Past Medical History:   Past Medical History:   Diagnosis Date    Ambulates with cane     "long distance"    Arthritis     Back pain     Chronic pain disorder     Dental crowns present     Exercise involving walking     daily -short walks    History of hepatitis C     per pt "went away on its own" age 13"    History of transfusion     Hyperlipidemia     Hypertension     Leg pain     and foot pain    Limb alert care status     per pt NO IV's LEFT UPPER ARM due to old injury    Risk for falls     Spinal stenosis     Stroke ([facility] [address] )     per pt in 1996-and no lasting problems    Wears glasses     readers       Past Surgical History:   Past Surgical History:   Procedure Laterality Date    BACK SURGERY      with implants    COLONOSCOPY      WISDOM TOOTH EXTRACTION Family History:  Family history reviewed and non-contributory  History reviewed  No pertinent family history  Social History:  Social History     Socioeconomic History    Marital status: /Civil Union     Spouse name: None    Number of children: None    Years of education: None    Highest education level: None   Occupational History    None   Tobacco Use    Smoking status: Former Smoker    Smokeless tobacco: Never Used   Substance and Sexual Activity    Alcohol use:  Yes     Alcohol/week: 3 0 standard drinks     Types: 3 Cans of beer per week     Comment: drinks once a month    Drug use: No    Sexual activity: None     Comment: defer   Other Topics Concern    None   Social History Narrative    None     Social Determinants of Health     Financial Resource Strain: Not on file   Food Insecurity: Not on file   Transportation Needs: Not on file   Physical Activity: Not on file   Stress: Not on file   Social Connections: Not on file   Intimate Partner Violence: Not on file   Housing Stability: Not on file       Allergies:   No Known Allergies        Labs:  0   Lab Value Date/Time    HCT 45 7 05/03/2022 0800    HCT 48 6 02/13/2020 0945    HCT 41 2 (L) 08/16/2017 1718    HGB 14 8 05/03/2022 0800    HGB 15 8 02/13/2020 0945    HGB 14 1 08/16/2017 1718    INR 1 02 05/24/2022 0849    WBC 5 52 05/03/2022 0800    WBC 6 14 02/13/2020 0945    WBC 6 50 08/16/2017 1718       Meds:    Current Facility-Administered Medications:     ceFAZolin (ANCEF) IVPB (premix in dextrose) 2,000 mg 50 mL, 2,000 mg, Intravenous, Once, Jennifer Varma MD    lactated ringers infusion, 125 mL/hr, Intravenous, Continuous, Radha Evans MD    lidocaine (PF) (XYLOCAINE-MPF) 1 % injection 0 5 mL, 0 5 mL, Infiltration, Once PRN, Radha Evans MD    Blood Culture:   No results found for: BLOODCX    Wound Culture:   No results found for: WOUNDCULT    Ins and Outs:  No intake/output data recorded  Physical Exam  BP (!) 175/101   Pulse 70   Temp 98 7 °F (37 1 °C) (Temporal)   Resp 18   Ht 5' 10" (1 778 m)   Wt 97 6 kg (215 lb 2 7 oz)   SpO2 100%   BMI 30 87 kg/m²   BP (!) 175/101   Pulse 70   Temp 98 7 °F (37 1 °C) (Temporal)   Resp 18   Ht 5' 10" (1 778 m)   Wt 97 6 kg (215 lb 2 7 oz)   SpO2 100%   BMI 30 87 kg/m²   Gen: Alert and oriented to person, place, time  HEENT: EOMI, eyes clear, moist mucus membranes, hearing intact  Respiratory: Bilateral chest rise   No audible wheezing found  Cardiovascular: Regular Rate and Rhythm  Abdomen: soft nontender/nondistended  Ortho Exam:   Spine:   - Skin intact with well healed prior incision  - non tender to palpation  - gross kyphosis of the lumbar and thoracic spine  - 2+ DP pulses bilateral lower extremities    Neuro Exam:  Bilateral lower extremities:  - 5/5 motor L3-S2  - Sensation intact to light touch L3-S2    Lab Results: Reviewed  Imaging: Reviewed

## 2022-05-25 ENCOUNTER — APPOINTMENT (INPATIENT)
Dept: RADIOLOGY | Facility: HOSPITAL | Age: 67
DRG: 454 | End: 2022-05-25
Payer: MEDICARE

## 2022-05-25 PROBLEM — Z98.1 STATUS POST LUMBAR SPINAL FUSION: Status: ACTIVE | Noted: 2022-05-25

## 2022-05-25 LAB
ANION GAP SERPL CALCULATED.3IONS-SCNC: 7 MMOL/L (ref 4–13)
BASOPHILS # BLD AUTO: 0.01 THOUSANDS/ΜL (ref 0–0.1)
BASOPHILS NFR BLD AUTO: 0 % (ref 0–1)
BUN SERPL-MCNC: 16 MG/DL (ref 5–25)
CALCIUM SERPL-MCNC: 7.9 MG/DL (ref 8.3–10.1)
CHLORIDE SERPL-SCNC: 107 MMOL/L (ref 100–108)
CO2 SERPL-SCNC: 24 MMOL/L (ref 21–32)
CREAT SERPL-MCNC: 0.83 MG/DL (ref 0.6–1.3)
EOSINOPHIL # BLD AUTO: 0 THOUSAND/ΜL (ref 0–0.61)
EOSINOPHIL NFR BLD AUTO: 0 % (ref 0–6)
ERYTHROCYTE [DISTWIDTH] IN BLOOD BY AUTOMATED COUNT: 13.2 % (ref 11.6–15.1)
GFR SERPL CREATININE-BSD FRML MDRD: 92 ML/MIN/1.73SQ M
GLUCOSE SERPL-MCNC: 126 MG/DL (ref 65–140)
GLUCOSE SERPL-MCNC: 127 MG/DL (ref 65–140)
HCT VFR BLD AUTO: 30.3 % (ref 36.5–49.3)
HGB BLD-MCNC: 10 G/DL (ref 12–17)
IMM GRANULOCYTES # BLD AUTO: 0.04 THOUSAND/UL (ref 0–0.2)
IMM GRANULOCYTES NFR BLD AUTO: 0 % (ref 0–2)
LYMPHOCYTES # BLD AUTO: 1.02 THOUSANDS/ΜL (ref 0.6–4.47)
LYMPHOCYTES NFR BLD AUTO: 11 % (ref 14–44)
MCH RBC QN AUTO: 29.4 PG (ref 26.8–34.3)
MCHC RBC AUTO-ENTMCNC: 33 G/DL (ref 31.4–37.4)
MCV RBC AUTO: 89 FL (ref 82–98)
MONOCYTES # BLD AUTO: 0.52 THOUSAND/ΜL (ref 0.17–1.22)
MONOCYTES NFR BLD AUTO: 6 % (ref 4–12)
NEUTROPHILS # BLD AUTO: 7.93 THOUSANDS/ΜL (ref 1.85–7.62)
NEUTS SEG NFR BLD AUTO: 83 % (ref 43–75)
NRBC BLD AUTO-RTO: 0 /100 WBCS
PLATELET # BLD AUTO: 241 THOUSANDS/UL (ref 149–390)
PMV BLD AUTO: 8.8 FL (ref 8.9–12.7)
POTASSIUM SERPL-SCNC: 3.9 MMOL/L (ref 3.5–5.3)
RBC # BLD AUTO: 3.4 MILLION/UL (ref 3.88–5.62)
SODIUM SERPL-SCNC: 138 MMOL/L (ref 136–145)
WBC # BLD AUTO: 9.52 THOUSAND/UL (ref 4.31–10.16)

## 2022-05-25 PROCEDURE — 97167 OT EVAL HIGH COMPLEX 60 MIN: CPT

## 2022-05-25 PROCEDURE — 85025 COMPLETE CBC W/AUTO DIFF WBC: CPT

## 2022-05-25 PROCEDURE — 82948 REAGENT STRIP/BLOOD GLUCOSE: CPT

## 2022-05-25 PROCEDURE — 97163 PT EVAL HIGH COMPLEX 45 MIN: CPT

## 2022-05-25 PROCEDURE — 99223 1ST HOSP IP/OBS HIGH 75: CPT | Performed by: FAMILY MEDICINE

## 2022-05-25 PROCEDURE — 99024 POSTOP FOLLOW-UP VISIT: CPT | Performed by: ORTHOPAEDIC SURGERY

## 2022-05-25 PROCEDURE — 72100 X-RAY EXAM L-S SPINE 2/3 VWS: CPT

## 2022-05-25 PROCEDURE — 94762 N-INVAS EAR/PLS OXIMTRY CONT: CPT

## 2022-05-25 PROCEDURE — 80048 BASIC METABOLIC PNL TOTAL CA: CPT

## 2022-05-25 PROCEDURE — 97760 ORTHOTIC MGMT&TRAING 1ST ENC: CPT

## 2022-05-25 RX ORDER — HYDROCODONE BITARTRATE AND ACETAMINOPHEN 5; 325 MG/1; MG/1
1 TABLET ORAL EVERY 6 HOURS PRN
Status: DISCONTINUED | OUTPATIENT
Start: 2022-05-25 | End: 2022-05-27 | Stop reason: HOSPADM

## 2022-05-25 RX ORDER — METHOCARBAMOL 500 MG/1
500 TABLET, FILM COATED ORAL EVERY 6 HOURS PRN
Status: DISCONTINUED | OUTPATIENT
Start: 2022-05-25 | End: 2022-05-27 | Stop reason: HOSPADM

## 2022-05-25 RX ADMIN — PRAVASTATIN SODIUM 10 MG: 20 TABLET ORAL at 17:16

## 2022-05-25 RX ADMIN — LISINOPRIL 5 MG: 5 TABLET ORAL at 09:27

## 2022-05-25 RX ADMIN — ASPIRIN 325 MG ORAL TABLET 325 MG: 325 PILL ORAL at 09:27

## 2022-05-25 RX ADMIN — DOCUSATE SODIUM 100 MG: 100 CAPSULE ORAL at 17:16

## 2022-05-25 RX ADMIN — HYDROCODONE BITARTRATE AND ACETAMINOPHEN 1 TABLET: 5; 325 TABLET ORAL at 17:16

## 2022-05-25 NOTE — OP NOTE
OPERATIVE REPORT  PATIENT NAME: Joseph Claude    :  1955  MRN: [id number]  Pt Location: [facility] OR ROOM 03    SURGERY DATE: 2022    Surgeon(s) and Role:     * Jo Donald MD - Primary     * Sarah Ramos MD - Assisting     * Damon Moody PA-C - Assisting    Preop Diagnosis:  Scoliosis, unspecified scoliosis type, unspecified spinal region [M41 9]  Flatback syndrome [M40 30]  History of lumbar surgery [Z98 890]  DJD (degenerative joint disease), thoracolumbar [M51 35]    Post-Op Diagnosis Codes: * Scoliosis, unspecified scoliosis type, unspecified spinal region [M41 9]     * Flatback syndrome [M40 30]     * History of lumbar surgery [Z98 890]     * DJD (degenerative joint disease), thoracolumbar [M51 35]    Procedure(s) (LRB):  L1-S1 revision of segmental hardware, L5-S1 osteotomy, posterior spinal fusion L4-S1, pelvic instrumentation, Transforaminal lumbar interbody fusion L5S1, Cage L5S1, instrumentation L1-pelvis (N/A)    1  Removal of prior segmental instrumentation from L1-S1  2  Lumbar osteotomy at L5-S1  3  Transforaminal lumbar interbody fusion right L5-S1 for correction of sagittal and coronal deformity  4  Application of an interbody implant, 15 degree hyperlordotic expandable cage, right L5-S1  5  Posterior instrumentation L1 to pelvis  6  Application of bilateral pelvic screws, S2-Alar technique  7  Revision laminectomy L4-5 and L5-S1  8  Posterior spinal fusion L4-S1  9  Use of local autograft, demineralized bone matrix (Optecure) and iFactor to promote fusion    Specimen(s):  * No specimens in log *    Estimated Blood Loss:   1000 mL    Drains:  [REMOVED] Urethral Catheter Double-lumen 16 Fr   (Removed)   Goal for Removal Remove POD#1 22   Site Assessment Clean;Skin intact 22   Pollard Care Done 22   Collection Container Standard drainage bag 22   Securement Method Securing device (Describe) 22   Output (mL) 250 mL 22 0900 Number of days: 1       Anesthesia Type:   General    Operative Indications:  Scoliosis, unspecified scoliosis type, unspecified spinal region [M41 9]  Flatback syndrome [M40 30]  History of lumbar surgery [Z98 890]  DJD (degenerative joint disease), thoracolumbar []    52-year-old gentleman status post L1-S1 posterior instrumentation  He has significant sagittal imbalance and ambulatory dysfunction  He was treated conserve and failed opted to pursue surgery  Risks and benefits surgery was discussed  His questions were answered  He was fully aware that surgery will be performed staged procedure  The complexity of the prior hardware, duration of surgery could not be discussed  Operative Findings:  Pseudoarthrosis anteriorly at L5-S1  Malposition right L2 pedicle screw with medial breach  Malposition right L1 pedicle screw with superior breech  Significant sagittal imbalance  Hardware complication including metal debris and loosening at multiple locations  Complications:   None    Procedure and Technique:  The patient was brought to the operating room and following identification, underwent general endotracheal anesthesia  Antibiotic prophylaxis was administered  Neural monitoring electrodes were placed  Pollard was placed  He was then positioned prone on a Mo table and all bony promises were padded  The back was then prepped and draped in sterile fashion  Previous scar was visualized  Was significant bone formation on need to scar with sagittal deformity  The deformity appear to be both at the lumbosacral junction and at the thoracolumbar junction  Vertical incision was made in line with the prior scar  Subcutaneous tissue was divided using electrocautery  The paraspinal muscles were released subperiosteally  Significant bone mass was present bilaterally  The bone mass covered the hardware  Following completion of exposure attention was directed to the ex exposure    Multiple osteotomes were utilized and the bone was with removed and the hardware was exposed  There was evidence of metal debris involving multiple junctions  Prior instrumentation involved multiple single level stackable plates and screws  Major hardware was removed with minor difficulty  One of the knots was stock and could not be loosened  A metal cutting bur was then utilized and the knot was removed  Initially the plates were removed and the screws were examined  There was evidence of screw loosening at the lumbosacral junction, L5 and S1  The remainder of the screws appear to be intact  Screws were utilized bilaterally at L1 and L2 in addition to at L5 and S1  Unilateral screws were present on the left side at L3 and L4  Each screw was examined  The right L1 screw was into the T12-L1 disc space  The right L2 screw was medially within the canal and could not be replaced  Under right-sided L1 screw was replaced with a shorter screw  6 mm screws were utilized at upper and mid lumbar level  At L5 and S1 7 mm screws were utilized  I utilized a [employer] system  Attention was then directed to the osteotomy  Prior to this a revision decompression at to be performed at L4-5 and L5-S1  The scar tissue was released and removed  Central decompression was then performed by removing the scar tissue  This was followed by medial facetectomy  Once the position of the L5 and S1 were palpated, bilateral osteotomy was performed through the fusion mass posteriorly proximally a 1 5 cut was made into the bilateral fusion mass exposing the foramen at L5-S1  X-rays did demonstrate evidence of a coronal deformity  Given the coronal deformity right-sided approach was utilized  Far lateral disc was then exposed  The annular cut was made  The disc was then decompressed and the soft tissue was removed    Sequential dilation was then performed and a expandable 15 mm implant,Flarehawk was implanted with some difficulty  Local bone graft in addition to I factor was utilized to obtain interbody fusion  The wound was then thoroughly irrigated  Attention was then directed to the pelvic screws  Under fluoroscopy bilateral S2 a lower screws were placed  A guidewire was placed  The guidewire was then drilled  The drill hole was then tapped  9 x 90 mm screws were then placed  Two titanium rods were then bent to appropriate shape and placed bilaterally  Final tightening was then performed  The wound was again irrigated  Surgiflo was placed in the gutters assist with hemostasis  Local bone graft was morselized and cleaned of soft tissue  Normalized bone matrix and local bone graft was mixed with the remainder of the high factor and was placed posterior laterally at from L4 to pelvis  Given the significant scarring  The scar tissue needed to be released laterally  The fascia was then brought back together and closed along midline  This was followed by closure subcutaneous tissue and the skin  Subcuticular suture was utilized  At this time the patient was extubated and taken to PACU in stable condition  Procedure was performed with assistance of a resident  Dr Antoinette Monet assistant was essential during the case  At times the PA was also needed to assist with the procedure  Removal hardware to approximately 3 hours  Physician assistant was utilized during this part procedure  Assistant was also required to clean this bone of soft tissue for the fusion      I was present for the entire procedure    Patient Disposition:  PACU       SIGNATURE: Ana Mcdonald MD  DATE: May 25, 2022  TIME: 4:50 PM

## 2022-05-25 NOTE — OCCUPATIONAL THERAPY NOTE
Occupational Therapy Evaluation Note        Patient Name: Krysta Bennett  HDPQT'L Date: 5/25/2022 05/25/22 0813   OT Last Visit   OT Visit Date 05/25/22   Note Type   Note type Evaluation   Restrictions/Precautions   Weight Bearing Precautions Per Order Yes   RUE Weight Bearing Per Order WBAT   LUE Weight Bearing Per Order WBAT   RLE Weight Bearing Per Order WBAT   LLE Weight Bearing Per Order WBAT   Braces or Orthoses LSO  (LSO brace fitted by PT at time of IE)   Other Precautions Chair Alarm; Bed Alarm; Fall Risk;Multiple lines;Spinal precautions;Pain  (Back safety precautions/log roll technique)   Pain Assessment   Pain Assessment Tool 0-10   Pain Score 8   Pain Location/Orientation Orientation: Lower; Location: Back;Orientation: Left; Location: Groin   Pain Onset/Description Descriptor: [facility] Pain Intervention(s) Repositioned   Home Living   Type of [address] [place] [address] Two level;Performs ADLs on one level; Able to live on main level with bedroom/bathroom;Stairs to enter with rails  (Full bath on first floor, sleeps on couch; 1 BRENDA)   Bathroom Shower/Tub Walk-in shower   Bathroom Toilet Raised   Bathroom Equipment Built-in shower seat;Grab bars in shower;Grab bars around toilet   [address] [facility] for community mobility, primarily ambulatory with no AD at baseline)   Prior Function   Level of [place] Independent with ADLs and functional mobility   Lives With Spouse  (Reports spouse will be home all the time)   Receives Help From Family   ADL Assistance Independent   IADLs Independent  (Shares responsibilities with spouse)   Falls in the last 6 months 0   Vocational Retired   Comments (+) drives   Lifestyle   Autonomy Per patient report, he lives with his spouse in a two-story home with 1 BRENDA and a first-floor setup available   At baseline, patient reports that he is independent in ADLs and he and his spouse share responsibilities for performing IADLs  Patient is primarily ambulatory with no AD, uses a cane for community mobility  Reciprocal Relationships Supportive spouse   Service to Others Retired- Respiratory therapist   Intrinsic Gratification [address], watching TV   Psychosocial   Psychosocial ([address]) WDL   Patient Behaviors/Mood Cooperative;Pleasant   Ability to Express Feelings Able to express   Ability to Express Needs Able to express   Ability to Express Thoughts Able to express   Ability to Understand Others Understands   ADL   Eating Assistance 6  Modified independent   Grooming Assistance 5  Supervision/Setup   UB Bathing Assistance 4  Minimal Assistance   LB Bathing Assistance 2  Maximal Assistance   UB Dressing Assistance 5  Supervision/Setup   LB Dressing Assistance 2  Maximal [address]  3  Moderate Assistance   Functional Assistance 3  Moderate Assistance   Additional Comments ADL assist levels based on pt's functional performance during OT evaluation   Bed Mobility   Rolling R 5  Supervision   Additional items Assist x 1;HOB elevated; Bedrails; Increased time required;Verbal cues;LE management  (Log roll technique for back safety)   Supine to Sit 4  Minimal assistance   Additional items Assist x 2;HOB elevated; Bedrails; Increased time required;Verbal cues;LE management   Sit to Supine 4  Minimal assistance   Additional items Assist x 2;HOB elevated; Increased time required;Verbal cues;LE management   Additional Comments Patient received lying supine in bed upon OT arrival; at end of session: pt positioned sidelying on bed with all needs within reach and bed alarm activated  Log roll technique utilized for back safety/spinal precautions  Patient demonstrated ability to scoot laterally towards [facility] with minimal assist x1     Transfers   Sit to Stand 4  Minimal assistance  (Patient with noted L knee buckling upon STS, requiring eccentric control for return to seated position at EOB )   Additional items Assist x 2;Increased time required;Verbal cues   Stand to Sit 4  Minimal assistance   Additional items Assist x 2; Increased time required;Verbal cues   Additional Comments Attempted STS transfer with RW, pt with noted L knee buckling with STS trial, requiring assistance with eccentric control to return to seated position at EOB  Functional Mobility   Functional Mobility   (Unable to assess at time of IE)   Additional Comments Unable to assess d/t LLE weakness/knee buckling with STS trial    Balance   Static Sitting Fair +   Dynamic Sitting Fair   Static Standing Poor   Dynamic Standing   (DNT)   Activity Tolerance   Activity Tolerance Patient limited by fatigue;Patient limited by pain   Medical Staff Made Aware  [address]   Nurse Made Aware RN Madhavi Smith confirmed pt appropriate for therapy and made aware of session outcomes   RUE Assessment   RUE Assessment WFL  (Grossly WFL based on functional assessment, to be further formally assessed in future treatment sessions )   LUE Assessment   LUE Assessment WFL  (Grossly WFL based on functional assessment, to be further formally assessed in future treatment sessions )   Hand Function   Gross Motor Coordination   (Further assessment required)   Fine Motor Coordination   (Further assessment required)   Sensation   Light Touch No apparent deficits  (BUEs)   Additional Comments Patient denies diminished sensation t/o BUEs   Vision-Basic Assessment   Current Vision Wears glasses only for reading   Cognition   Overall Cognitive Status [facility]   Arousal/Participation Responsive; Cooperative   Attention Within functional limits   Orientation Level Oriented X4   Memory Within functional limits   Following Commands Follows all commands and directions without difficulty   Comments Patient agreeable to OT evaluation   Assessment   Limitation Decreased ADL status; Decreased endurance;Decreased self-care trans;Decreased high-level ADLs   Prognosis Good   Assessment Patient is a 73 y o  male seen for OT evaluation s/p admit to [address] [facility] on 5/24/2022 w/Status post lumbar spinal fusion  Commorbidities affecting patient's functional performance at time of assessment include: HTN and hyperlipidemia  Patient  has a past medical history of Ambulates with cane, Arthritis, Back pain, Chronic pain disorder, Dental crowns present, Exercise involving walking, History of hepatitis C, History of transfusion, Hyperlipidemia, Hypertension, Leg pain, Limb alert care status, Risk for falls, Spinal stenosis, Stroke ([facility] [address] ), and Wears glasses  Orders placed for OT evaluation and treatment  Performed at least two patient identifiers during session including name and wristband  Prior to admission, patient was living with his spouse in a two-story home with 1 step to enter  At baseline, patient reports that he is independent in both ADLs and IADLs  Personal factors affecting patient at time of initial evaluation include: steps to enter, difficulty performing ADLs and difficulty performing IADLs  Upon evaluation, patient requires supervision, set up and minimal assist for UB ADLs, maximal assist for LB ADLs, transfers with minimal assist x2, with the use of Rolling Walker  Patient is alert and oriented x 4  Occupational performance is affected by the following deficits: degenerative arthritic joint changes, dynamic sit/ stand balance deficit with poor standing tolerance time for self care and functional mobility, decreased activity tolerance, increased pain, orthopedic restrictions and postural control and postural alignment deficit, requiring external assistance to complete transitional movements, decreased energy level, and weakness  Patient to benefit from continued Occupational Therapy treatment while in the hospital to address deficits as defined above and maximize level of functional independence with ADLs and functional mobility   Occupational Performance areas to address include: grooming , bathing/ shower, dressing, toilet hygiene, transfer to all surfaces, functional mobility, IADLs: safety procedures, Leisure Participation and Home management  From OT standpoint, recommendation at time of d/c would be post-acute rehabilitation services  Plan   Treatment Interventions ADL retraining;Functional transfer training;UE strengthening/ROM; Endurance training;Patient/family training;Equipment evaluation/education; Compensatory technique education;Continued evaluation; Energy conservation; Activityengagement   Goal Expiration Date 06/08/22   OT Treatment Day 0   OT Frequency 3-5x/wk   Recommendation   OT Discharge Recommendation Post acute rehabilitation services   Additional Comments  The patient's raw score on the AM-PAC Daily Activity inpatient short form is 16, standardized score is 35 96, less than 39 4  Patients at this level are likely to benefit from discharge to post-acute rehabilitation services  Please refer to the recommendation of the Occupational Therapist for safe discharge planning     AM-PAC Daily Activity Inpatient   Lower Body Dressing 2   Bathing 2   Toileting 2   Upper Body Dressing 3   Grooming 3   Eating 4   Daily Activity Raw Score 16   Daily Activity Standardized Score (Calc for Raw Score >=11) 35 96   AM-PAC Applied Cognition Inpatient   Following a Speech/Presentation 4   Understanding Ordinary Conversation 4   Taking Medications 4   Remembering Where Things Are Placed or Put Away 4   Remembering List of 4-5 Errands 4   Taking Care of Complicated Tasks 3   Applied Cognition Raw Score 23   Applied Cognition Standardized Score 53 08   Barthel Index   Feeding 10   Bathing 0   Grooming Score 0   Dressing Score 5   Bladder Score 0   Bowels Score 10   Toilet Use Score 5   Transfers (Bed/Chair) Score 5   Mobility (Level Surface) Score 0   Stairs Score 0   Barthel Index Score 35   Modified Aspen Scale   Modified [place] Scale 4     Occupational Therapy Goals to be completed in 7-14 Days:    1 - Patient will verbalize and demonstrate use of energy conservation/ deep breathing technique and work simplification skills during functional activity with no verbal cues  2 - Patient will verbalize and demonstrate good body mechanics and joint protection techniques during  ADLs/ IADLs with no verbal cues  3 - Patient will increase OOB/ sitting tolerance to 2-4 hours per day for increased participation in self care and leisure tasks with no s/s of exertion  4 - Patient will increase standing tolerance time to 5 minutes with unilateral UE support to complete sink level ADLs@ supervision/setup assist level  5 - Patient will increase sitting tolerance at edge of bed to 30 minutes to complete UB ADLs @ set up assist level  6 - Patient will transfer bed to Chair / toilet at Set up assist level with AD as indicated  7 - Patient will complete UB ADLs with set up assist      8 - Patient will complete LB ADLs with supervision/setup assist with the use of adaptive equipment  9 - Patient will complete toileting hygiene with set up assist/ supervision for thoroughness  8 - Patient/ Family will demonstrate competency with UE Home Exercise Program     11- Patient will verbalize/ demonstrate back safety precautions during functional activity with no verbal cues  12- Patient will restore  independence in bed mobility using Log Rolling technique to transfer OOB at Mod I level       Gloria Mccain OTR/L

## 2022-05-25 NOTE — PLAN OF CARE
Problem: OCCUPATIONAL THERAPY ADULT  Goal: Performs self-care activities at highest level of function for planned discharge setting  See evaluation for individualized goals  Description: Treatment Interventions: ADL retraining, Functional transfer training, UE strengthening/ROM, Endurance training, Patient/family training, Equipment evaluation/education, Compensatory technique education, Continued evaluation, Energy conservation, Activityengagement          See flowsheet documentation for full assessment, interventions and recommendations  Note: Limitation: Decreased ADL status, Decreased endurance, Decreased self-care trans, Decreased high-level ADLs  Prognosis: Good  Assessment: Patient is a 73 y o  male seen for OT evaluation s/p admit to [id number] [place] [address] on 5/24/2022 w/Status post lumbar spinal fusion  Commorbidities affecting patient's functional performance at time of assessment include: HTN and hyperlipidemia  Patient  has a past medical history of Ambulates with cane, Arthritis, Back pain, Chronic pain disorder, Dental crowns present, Exercise involving walking, History of hepatitis C, History of transfusion, Hyperlipidemia, Hypertension, Leg pain, Limb alert care status, Risk for falls, Spinal stenosis, Stroke ([facility] [address] ), and Wears glasses  Orders placed for OT evaluation and treatment  Performed at least two patient identifiers during session including name and wristband  Prior to admission, patient was living with his spouse in a two-story home with 1 step to enter  At baseline, patient reports that he is independent in both ADLs and IADLs  Personal factors affecting patient at time of initial evaluation include: steps to enter, difficulty performing ADLs and difficulty performing IADLs  Upon evaluation, patient requires supervision, set up and minimal assist for UB ADLs, maximal assist for LB ADLs, transfers with minimal assist x2, with the use of Rolling Walker   Patient is alert and oriented x 4  Occupational performance is affected by the following deficits: degenerative arthritic joint changes, dynamic sit/ stand balance deficit with poor standing tolerance time for self care and functional mobility, decreased activity tolerance, increased pain, orthopedic restrictions and postural control and postural alignment deficit, requiring external assistance to complete transitional movements, and weakness  Patient to benefit from continued Occupational Therapy treatment while in the hospital to address deficits as defined above and maximize level of functional independence with ADLs and functional mobility  Occupational Performance areas to address include: grooming , bathing/ shower, dressing, toilet hygiene, transfer to all surfaces, functional mobility, IADLs: safety procedures, Leisure Participation and Home management  From OT standpoint, recommendation at time of d/c would be post-acute rehabilitation services       OT Discharge Recommendation: Post acute rehabilitation services

## 2022-05-25 NOTE — CONSULTS
[address] 1955, 73 y o  male MRN: [id number]  Unit/Bed#: -01 Encounter: [id number]  Primary Care Provider: Monie Angeles MD   Date and time admitted to hospital: 5/24/2022  8:14 AM    Consults    * Status post lumbar spinal fusion  Assessment & Plan  This is per the primary service  Monitor for postop blood loss anemia  TLSO brace is at the bedside  PT OT evaluation per Orthopedics    Hypertension  Assessment & Plan  Patient is on an ACE-inhibitor  Will continue  Will be careful not to lower blood pressure too much for the risk of postop acute renal insufficiency secondary to hypoperfusion    Hyperlipidemia  Assessment & Plan  Continue statin  Monitor LFTs  If they go up anymore we can hold it but looks like his LFTs have been chronically elevated  He does have a history of hepatitis-C as well  VTE Prophylaxis: Reason for no pharmacologic prophylaxis This will be per the primary service  Since the patient has had back surgery hopefully start tomorrow  / sequential compression device     Recommendations for Discharge:  · Will make recommendations closer to discharge    Counseling / Coordination of Care Time: 20 minutes  Greater than 50% of total time spent on patient counseling and coordination of care  Collaboration of Care: Were Recommendations Directly Discussed with Primary Treatment Team? - Yes     History of Present Illness:    Anton Merida is a 73 y o  male who is originally admitted to the orthopedic service due to elective back surgery  We are consulted for medical management  History presenting illness:  This is a very pleasant 71-year-old male patient who came in for back surgery  Patient has failed conservative treatment as an outpatient  The patient was plan for staged procedure involving revision of L1/S1 instrumentation laminectomy and L5 and S1 interbody fusion     The patient is postop day 1   He is currently a on a PCA pump but his pain is stable  Patient states he denies any nausea or vomiting  His appetite is getting better  The patient states he just feels a little loopy from the pain medications  Patient denies any chest pain or shortness of breath  Review of Systems:    Review of Systems   Constitutional: Positive for activity change  Musculoskeletal: Positive for back pain  All other systems reviewed and are negative  Past Medical and Surgical History:     Past Medical History:   Diagnosis Date    Ambulates with cane     "long distance"    Arthritis     Back pain     Chronic pain disorder     Dental crowns present     Exercise involving walking     daily -short walks    History of hepatitis C     per pt "went away on its own" age 13"    History of transfusion     Hyperlipidemia     Hypertension     Leg pain     and foot pain    Limb alert care status     per pt NO IV's LEFT UPPER ARM due to old injury    Risk for falls     Spinal stenosis     Stroke ([facility] [address] )     per pt in 1996-and no lasting problems    Wears glasses     readers       Past Surgical History:   Procedure Laterality Date    BACK SURGERY      with implants    COLONOSCOPY      LUMBAR FUSION N/A 5/24/2022    Procedure: L1-S1 revision of segmental hardware, L5-S1 osteotomy, posterior spinal fusion L4-S1, pelvic instrumentation, Transforaminal lumbar interbody fusion L5S1, Cage L5S1, instrumentation L1-pelvis;   Surgeon: Macy Lam MD;  Location: [facility];  Service: Orthopedics    WISDOM TOOTH EXTRACTION         Meds/Allergies:    all medications and allergies reviewed    Allergies: No Known Allergies    Social History:     Marital Status: /Civil Union    Substance Use History:   Social History     Substance and Sexual Activity   Alcohol Use Yes    Alcohol/week: 3 0 standard drinks    Types: 3 Cans of beer per week    Comment: drinks once a month     Social History     Tobacco Use   Smoking Status Former Smoker   Smokeless Tobacco Never Used     Social History     Substance and Sexual Activity   Drug Use No       Family History:    History reviewed  No pertinent family history  Physical Exam:     Vitals:   Blood Pressure: 140/91 (05/25/22 0747)  Pulse: 93 (05/25/22 0747)  Temperature: 98 3 °F (36 8 °C) (05/25/22 0747)  Temp Source: Oral (05/25/22 0517)  Respirations: 16 (05/25/22 0747)  Height: 5' 10" (177 8 cm) (05/24/22 0841)  Weight - Scale: 97 6 kg (215 lb 2 7 oz) (05/24/22 0841)  SpO2: 90 % (05/25/22 1300)    Physical Exam    (   General Appearance:    Alert, cooperative, no distress, appears stated age                               Lungs:     Clear to auscultation bilaterally, respirations unlabored       Heart:    Regular rate and rhythm, S1 and S2 normal, no murmur, rub    or gallop   Abdomen:     Soft, non-tender, bowel sounds active all four quadrants,     no masses, no organomegaly           Extremities:   Extremities normal, atraumatic, no cyanosis or edema       Additional Data:     Lab Results: I have personally reviewed pertinent reports  Results from last 7 days   Lab Units 05/25/22  0441   WBC Thousand/uL 9 52   HEMOGLOBIN g/dL 10 0*   HEMATOCRIT % 30 3*   PLATELETS Thousands/uL 241   NEUTROS PCT % 83*   LYMPHS PCT % 11*   MONOS PCT % 6   EOS PCT % 0     Results from last 7 days   Lab Units 05/25/22  0441 05/24/22  2245   SODIUM mmol/L 138 140   POTASSIUM mmol/L 3 9 4 0   CHLORIDE mmol/L 107 106   CO2 mmol/L 24 24   BUN mg/dL 16 14   CREATININE mg/dL 0 83 0 81   ANION GAP mmol/L 7 10   CALCIUM mg/dL 7 9* 8 1*   ALBUMIN g/dL  --  3 2*   TOTAL BILIRUBIN mg/dL  --  0 61   ALK PHOS U/L  --  66   ALT U/L  --  41   AST U/L  --  72*   GLUCOSE RANDOM mg/dL 126 139     Results from last 7 days   Lab Units 05/24/22  0849   INR  1 02         No results found for: HGBA1C            Imaging: I have personally reviewed pertinent reports        XR spine lumbar 2 or 3 views injury   Final Result by Chema Mendoza Altaf Pearson MD (05/25 6067)      Postoperative changes as described above without complication  Workstation performed: [id number]         XR spine lumbar 2 or 3 views injury    (Results Pending)       ·     ** Please Note: This note has been constructed using a voice recognition system   **

## 2022-05-25 NOTE — ASSESSMENT & PLAN NOTE
This is per the primary service  Monitor for postop blood loss anemia  TLSO brace is at the bedside    PT OT evaluation per Orthopedics

## 2022-05-25 NOTE — CASE MANAGEMENT
Case Management Assessment & Discharge Planning Note    Patient name Edie Bojorquez  Location /-43 MRN [id number]  : 1955 Date 2022       Current Admission Date: 2022  Current Admission Diagnosis:Scoliosis, unspecified scoliosis type, unspecified spinal region, Flatback syndrome, History of lumbar surgery, DJD (degenerative joint disease), thoracolumbar   There are no problems to display for this patient  LOS (days): 1  Geometric Mean LOS (GMLOS) (days): 3 70  Days to GMLOS:2 7     OBJECTIVE:    Risk of Unplanned Readmission Score: 8 25      Current admission status: Inpatient  Referral Reason: Other (D/C plan)    Preferred Pharmacy:   [facility] [phone]  Phone: [phone] Fax: [phone]    Primary Care Provider: Starla Rodriguez MD    Primary Insurance: MEDICARE  Secondary Insurance: [facility]    ASSESSMENT:  Nissa 26 Proxies     Dez Serrato 50 Representative - Spouse   Primary Phone: [phone] (Mobile)               Advance Directives  Does patient have a [address] [place] [address] Avenue?: Yes  Does patient have Advance Directives?: Yes  Advance Directives: Power of  for health care  Primary Contact: Spouse Mylene    Readmission Root Cause  30 Day Readmission: No    Patient Information  Admitted from[de-identified] Home  Mental Status: Alert  During Assessment patient was accompanied by: Not accompanied during assessment  Assessment information provided by[de-identified] Patient  Primary Caregiver: Spouse  Caregiver's Name[de-identified] Mylene-spouse  Caregiver's Relationship to Patient[de-identified] Family Member  Support Systems: Spouse/significant other, Family members  [place] of Residence: [address] [place] [address] do you live in?: [address] entry access options  Select all that apply : Stairs  Number of steps to enter home  : 1  Do the steps have railings?: Yes  Type of Current Residence: 2 story home (Patient able to stay on first floor)  Upon entering residence, is there a bathroom on the main floor (no further steps)?: Yes  In the last 12 months, was there a time when you were not able to pay the mortgage or rent on time?: No  In the last 12 months, was there a time when you did not have a steady place to sleep or slept in a shelter (including now)?: No  Homeless/housing insecurity resource given?: No  Living Arrangements: Lives w/ Spouse/significant other    Activities of Daily Living Prior to Admission  Functional Status: Independent  Completes ADLs independently?: Yes  Ambulates independently?: Yes  Does patient use assisted devices?: No  Does patient currently own DME?: Yes  What DME does the patient currently own?: Vivian Irwin  Does patient have a history of Outpatient Therapy (PT/OT)?: No  Does the patient have a history of Short-Term Rehab?: No  Does patient have a history of HHC?: No  Does patient currently have SeeWhy?: No    Patient Information Continued  Income Source: SSI/SSD  Does patient have prescription coverage?: Yes  Food insecurity resource given?: No  Does patient receive dialysis treatments?: No  Does patient have a history of substance abuse?: No  Does patient have a history of Mental Health Diagnosis?: No    PHQ 2/9 Screening   Reviewed PHQ 2/9 Depression Screening Score?: No    Means of Transportation  Means of Transport to Appts[de-identified] Drives Self  In the past 12 months, has lack of transportation kept you from medical appointments or from getting medications?: No  In the past 12 months, has lack of transportation kept you from meetings, work, or from getting things needed for daily living?: No  Was application for public transport provided?: No    DISCHARGE DETAILS:    Discharge planning discussed with[de-identified] patient and spouse  Freedom of Choice: Yes     CM contacted family/caregiver?: Yes  Were Treatment Team discharge recommendations reviewed with patient/caregiver?: Yes  Did patient/caregiver verbalize understanding of patient care needs?: Yes  Were patient/caregiver advised of the risks associated with not following Treatment Team discharge recommendations?: Yes    Contacts  Patient Contacts: Mylene-spouse  Relationship to Patient[de-identified] Family  Contact Method: Phone  Phone Number: [phone]  Reason/Outcome: Continuity of Care, Emergency Contact, Referral, Discharge [address]         Is the patient interested in [facility] at discharge?: Yes  [address] requested[de-identified] Occupational Therapy, Physical [address] Name[de-identified] Other  [facility] Provider[de-identified] PCP  Home Health Services Needed[de-identified] Evaluate Functional Status and Safety, Gait/ADL Training, Strengthening/Theraputic Exercises to Improve Function  Homebound Criteria Met[de-identified] Requires the Assistance of Another Person for Safe Ambulation or to Leave the Home, Uses an Assist Device (i e  cane, walker, etc)  Supporting Clincal Findings[de-identified] Limited Endurance    DME Referral Provided  Referral made for DME?: Yes  DME referral completed for the following items[de-identified] Dmitriy Flores  DME Supplier Name[de-identified] [employer]    Other Referral/Resources/Interventions Provided:  Interventions: HHC, DME  Referral Comments: [place] referrals sent for HHC-PT/OT evaluation pending, DME order sent for RW-pending, FU with PT/OT-Shower chair with back/no back recs-spouse interested in shower chair at d/c-aware may have copay    CM spoke with patient at the bedside  CM introduced self and role  Patient stated prior to hospitalization, he was independent with ADLS  Patient lives in [facility] with spouse Talia Mannie  Patient's spouse Talia Chand provides help at home  Spouse will transport at discharge  Patient sitting up on side of bed  No complaints noted at this time  Patient aware PT/OT evaluation pending  CM spoke with patient's spouse Talia Chand on the phone, [phone]  CM introduced self and role   Patient's spouse has a hospital bed coming to the home  Patient's spouse requesting a RW at discharge  CM discussed referral process and DME companies  No preference noted  Patient's spouse expressed interest in a shower chair  CM discussed different shower chairs with back/no back  CM will touch base with PT/OT best option for patient  Spouse aware that shower chair may have an out of pocket cost  Spouse expressed understanding on the phone  Spouse updated PT/OT evaluation pending  Spouse is open to Home PT/OT and Outpatient PT/OT  Outpatient PT/OT is located close to home  CM offered to send referrals to [facility] for possible Home PT/OT  CM will f/u with spouse and patient re:PT/OT recommendation at discharge  All questions/concerns answered at this time  Patient is Covid vaccinated x 2  CM sent referral via parachute to [facility] for RW  Waiting for response  CM sent referral to [facility] agencies for Home PT/OT  Waiting for options  CM reviewed discharge planning process including the following: identifying caregivers at home, preference for d/c planning needs,   availability of [facility] to [facility] program, availability of treatment team to discuss questions or concerns patient and/or family may have regarding diagnosis, plan of care, old or new medications and discharge planning   CM will continue to follow for care coordination and update assessment as appropriate

## 2022-05-25 NOTE — PHYSICAL THERAPY NOTE
Physical Therapy Evaluation   Time in: 807  Time out: 825  Total evaluation time: 18 minutes    Patient's Name: Leann Li    Admitting Diagnosis  Scoliosis, unspecified scoliosis type, unspecified spinal region [M41 9]  Flatback syndrome [M40 30]  History of lumbar surgery [Z98 890]  DJD (degenerative joint disease), thoracolumbar [M51 35]    Problem List  Patient Active Problem List   Diagnosis    Status post lumbar spinal fusion       Past Medical History  Past Medical History:   Diagnosis Date    Ambulates with cane     "long distance"    Arthritis     Back pain     Chronic pain disorder     Dental crowns present     Exercise involving walking     daily -short walks    History of hepatitis C     per pt "went away on its own" age 13"    History of transfusion     Hyperlipidemia     Hypertension     Leg pain     and foot pain    Limb alert care status     per pt NO IV's LEFT UPPER ARM due to old injury    Risk for falls     Spinal stenosis     Stroke ([facility] [address] )     per pt in 1996-and no lasting problems    Wears glasses     readers       Past Surgical History  Past Surgical History:   Procedure Laterality Date    BACK SURGERY      with implants    COLONOSCOPY      WISDOM TOOTH EXTRACTION         PT performed at least 2 patient identifiers during session: Name and wristband  05/25/22 0814   PT Last Visit   PT Visit Date 05/25/22   Note Type   Note type Evaluation   Pain Assessment   Pain Assessment Tool 0-10   Pain Score 8   Pain Location/Orientation Orientation: Lower; Location: Back;Orientation: Left; Location: Groin   Hospital Pain Intervention(s)   (RN aware of pt's pain, PCA in reach for pt to use prn)   Restrictions/Precautions   Weight Bearing Precautions Per Order Yes  (per ortho, WBAT all extremities)   RUE Weight Bearing Per Order WBAT   LUE Weight Bearing Per Order WBAT   RLE Weight Bearing Per Order WBAT   LLE Weight Bearing Per Order WBAT   Braces or Orthoses LSO  (see separate documentation re fitting/education of LSO brace)   Other Precautions Fall Risk;Bed Alarm; Chair Alarm  (back safety precautions/ log roll technique)   Home Living   Type of [address] Two level;Performs ADLs on one level; Able to live on main level with bedroom/bathroom;Stairs to enter with rails  (Full bath on first floor, sleeps on couch; 1 BRENDA)   Bathroom Shower/Tub Walk-in shower   Bathroom Toilet Raised   Bathroom Equipment Built-in shower seat;Grab bars in shower;Grab bars around toilet   [address]  ([place] for community mobility, primarily ambulatory with no AD at baseline)   Prior Function   Level of [place] Independent with ADLs and functional mobility   Lives With Spouse  (Reports spouse will be home all the time)   [address] [facility] in the last 6 months 0   Vocational Retired   Comments +   General   Family/Caregiver Present No   Cognition   Overall Cognitive Status WFL   Arousal/Participation Alert   Orientation Level Oriented X4   Memory Within functional limits   Following Commands Follows all commands and directions without difficulty   Comments pt agreeable to PT eval   Subjective   Subjective "I'm feeling alittle out of it"   RUE Assessment   RUE Assessment   (defer to OT eval for comments)   LUE Assessment   LUE Assessment   (defer to OT eval for comments)   RLE Assessment   RLE Assessment WFL   LLE Assessment   LLE Assessment X  (pt reporting since surgery, PT notified RN Karla Simmons and [employer] re same)   Strength LLE   L Hip Flexion 2+/5   L Knee Extension 2+/5   L Ankle Dorsiflexion 2+/5   Vision-Basic Assessment   Current Vision Wears glasses only for reading   Coordination   Movements are Fluid and Coordinated 1   Sensation X   Light Touch   RLE Light Touch Grossly intact   LLE Light Touch Impaired   LLE Light Touch Comments numbness reported to dorsal L foot [place] notified of same   Bed Mobility   Rolling R 5  Supervision   Additional items Assist x 1;HOB elevated; Increased time required;Verbal cues   Supine to Sit 4  Minimal assistance   Additional items HOB elevated; Bedrails; Increased time required;Verbal cues;LE management;Assist x 2   Sit to Supine 4  Minimal assistance   Additional items HOB elevated; Bedrails; Increased time required;Verbal cues;LE management;Assist x 2   Additional Comments LOG ROLL TECHNIQUE   Transfers   Sit to Stand   (attempted, however pt unable to achieve full upright posture d/t L knee buckling)   Balance   Static Sitting Fair +   Dynamic Sitting Fair   Static Standing Poor   Endurance Deficit   Endurance Deficit Yes   Activity Tolerance   Activity Tolerance Patient limited by fatigue   Medical Staff Made Aware SOMIN [address]   Assessment   Prognosis Good   Problem List Decreased strength;Decreased endurance; Impaired balance;Decreased mobility;Orthopedic restrictions;Pain   Assessment Pt is 73 y o  male seen for high-complexity PT evaluation on 5/25/2022 [facility]  Pt POD 1 L1-S1 revision of segmental hardware, L5-S1 osteotomy, posterior spinal fusion L4-S1, Transforaminal lumbar interbody fusion L5-S1, and instrumentation L1-pelvis  PT was consulted to assess pt's functional mobility and d/c needs  Order placed for PT eval and tx  PTA, pt resides with wife in [facility] with 1 BRENDA, 1st floor set up, ambulates with SPC in community, typically no AD at baseline, I with all I/ADLS, +  At time of eval, pt requiring min A x2 for supine to sit transfer, attempted STS, however unable to achieve full stance d/t L knee buckling  Upon evaluation, pt presenting with impaired functional mobility d/t decreased strength, decreased ROM, decreased endurance, impaired balance, decreased mobility, impaired sensation, orthopedic restrictions of POD1, pain and activity intolerance   Pertinent PMHx and current co-morbidities affecting pt's physical performance at time of assessment include: s/p L spinal fusion, scoliosis, DJD, flatback syndrome  Personal factors affecting pt at time of eval include: inaccessible home environment, ambulating w/ assistive device, inability to ambulate household distances and limited home support  The following objective measures performed on IE also reveal limitations: Barthel Index: 40/100, Modified [place]: 4 (moderate/severe disability) and AM-PAC 6-Clicks: 93/55  Pt's clinical presentation is currently unstable/unpredictable seen in pt's presentation of advanced age, abnormal lab value(s), need for input for task focus and mobility technique and ongoing medical assessment  Overall, pt's rehab potential and prognosis to return to PLOF is good as impacted by objective findings, warranting pt to receive further skilled PT interventions to address identified impairments, activity limitation(s), and participation restriction(s)  Pt to benefit from continued PT tx to address deficits as defined above and maximize level of functional independent mobility and consistency  From PT/mobility standpoint, recommendation at time of d/c would be post acute rehabilitation services pending progress in order to facilitate return to PLOF  Barriers to Discharge Decreased caregiver support; Inaccessible home environment   Goals   STG Expiration Date 06/04/22   Short Term Goal #1 In 7-10 days: Increase L LE strength 1/2 grade to facilitate independent mobility, Perform all bed mobility tasks modified independent to decrease caregiver burden, Perform all transfers modified independent to improve independence, Increase all balance 1/2 grade to decrease risk for falls, Tolerate seated at EOB 30 minutes to facilitate functional task performance, Improve Barthel Index score to 55 or greater to facilitate independence, PT provider will perform functional balance assessment to determine fall risk and PT to see and establish goals for gait, dynamic standing/ambulatory balance when appropriate   PT Treatment Day 1   Plan   Treatment/Interventions Functional transfer training;LE strengthening/ROM; Therapeutic exercise; Endurance training;Patient/family training;Equipment eval/education; Bed mobility;Spoke to nursing;Continued evaluation;Spoke to advanced practitioner;OT   PT Frequency 5-7x/wk   Recommendation   PT Discharge Recommendation Post acute rehabilitation services   Equipment Recommended   (TBD)   AM-PAC Basic Mobility Inpatient   Turning in Bed Without Bedrails 3   Lying on Back to Sitting on Edge of Flat Bed 3   Moving Bed to Chair 2   Standing Up From Chair 2   Walk in Room 1   Climb 3-5 Stairs 1   Basic Mobility Inpatient Raw Score 12   Basic Mobility Standardized Score 32 23   Highest Level Of Mobility   -HL Goal 4: Move to chair/commode   JH-HLM Achieved 3: Sit at edge of bed   Modified Menard Scale   Modified Menard Scale 4   Barthel Index   Feeding 10   Bathing 0   Grooming Score 0   Dressing Score 5   Bladder Score 0   Bowels Score 10   Toilet Use Score 5   Transfers (Bed/Chair) Score 10   Mobility (Level Surface) Score 0   Stairs Score 0   Barthel Index Score 40       Jennie Russoer, PT, DPT                    Orthotic Note            Date: 5/25/22      Patient Name: Lupe Sanchez         Reason for Consult:  Order placed for LSO brace per orthopedics  Recommendations:  Pt sized w/ LSO prior to PT mobility assessment, see PT evaluation above for assessment findings  Pt educated on brace components, wearing schedule when OOB per MD recommendation, maintenance of brace, donning/doffing, skin inspection  Educated that brace may need to be repositioned throughout the day   Pt verbalizing understanding w/ all components, requiring moderate assistance to don/doff brace w/ verbal instruction 100% of the time from therapist  Education to [facility] staff on same, including don/doffing, wearing schedule, skin inspection of NSG q shift as well as assessing circulation for appropriate fit of brace  PT provided handout re LSO brace, back safety precautions (no bending, no lifting, no twisting), proper body mechanics, log roll technique  PT to continue to assess pt's carryover of education via teachback/feedback method      Faheem Rivas, PT, DPT

## 2022-05-25 NOTE — PLAN OF CARE
Problem: PHYSICAL THERAPY ADULT  Goal: Performs mobility at highest level of function for planned discharge setting  See evaluation for individualized goals  Description: Treatment/Interventions: Functional transfer training, LE strengthening/ROM, Therapeutic exercise, Endurance training, Patient/family training, Equipment eval/education, Bed mobility, Spoke to nursing, Continued evaluation, Spoke to advanced practitioner, OT  Equipment Recommended:  (TBD)       See flowsheet documentation for full assessment, interventions and recommendations  5/25/2022 1322 by Jaya Ann PT  Note: Prognosis: Good  Problem List: Decreased strength, Decreased endurance, Impaired balance, Decreased mobility, Orthopedic restrictions, Pain  Assessment: Pt is 73 y o  male seen for high-complexity PT evaluation on 5/25/2022 s/p admit to [facility] on 5/24/2022  Pt POD 1 L1-S1 revision of segmental hardware, L5-S1 osteotomy, posterior spinal fusion L4-S1, Transforaminal lumbar interbody fusion L5-S1, and instrumentation L1-pelvis  PT was consulted to assess pt's functional mobility and d/c needs  Order placed for PT eval and tx  PTA, pt resides with wife in [facility] with 1 BRENDA, 1st floor set up, ambulates with SPC in community, typically no AD at baseline, I with all I/ADLS, +  At time of eval, pt requiring min A x2 for supine to sit transfer, attempted STS, however unable to achieve full stance d/t L knee buckling  Upon evaluation, pt presenting with impaired functional mobility d/t decreased strength, decreased ROM, decreased endurance, impaired balance, decreased mobility, impaired sensation, orthopedic restrictions of POD1, pain and activity intolerance  Pertinent PMHx and current co-morbidities affecting pt's physical performance at time of assessment include: s/p L spinal fusion, scoliosis, DJD, flatback syndrome   Personal factors affecting pt at time of eval include: inaccessible home environment, ambulating w/ assistive device, inability to ambulate household distances and limited home support  The following objective measures performed on IE also reveal limitations: Barthel Index: 40/100, Modified [place]: 4 (moderate/severe disability) and AM-PAC 6-Clicks: 82/29  Pt's clinical presentation is currently unstable/unpredictable seen in pt's presentation of advanced age, abnormal lab value(s), need for input for task focus and mobility technique and ongoing medical assessment  Overall, pt's rehab potential and prognosis to return to PLOF is good as impacted by objective findings, warranting pt to receive further skilled PT interventions to address identified impairments, activity limitation(s), and participation restriction(s)  Pt to benefit from continued PT tx to address deficits as defined above and maximize level of functional independent mobility and consistency  From PT/mobility standpoint, recommendation at time of d/c would be post acute rehabilitation services pending progress in order to facilitate return to PLOF  Barriers to Discharge: Decreased caregiver support, Inaccessible home environment        PT Discharge Recommendation: Post acute rehabilitation services          See flowsheet documentation for full assessment  5/25/2022 1322 by Apurva Cornejo PT  Note: Prognosis: Good  Problem List: Decreased strength, Decreased endurance, Impaired balance, Decreased mobility, Orthopedic restrictions, Pain  Assessment: Pt is 73 y o  male seen for high-complexity PT evaluation on 5/25/2022 s/p admit to [facility] on 5/24/2022  Pt POD 1 L1-S1 revision of segmental hardware, L5-S1 osteotomy, posterior spinal fusion L4-S1, Transforaminal lumbar interbody fusion L5-S1, and instrumentation L1-pelvis  PT was consulted to assess pt's functional mobility and d/c needs  Order placed for PT eval and tx   PTA, pt resides with wife in [facility] with 1 BRENDA, 1st floor set up, ambulates with SPC in community, typically no AD at baseline, I with all I/ADLS, +  At time of eval, pt requiring min A x2 for supine to sit transfer, attempted STS, however unable to achieve full stance d/t L knee buckling  Upon evaluation, pt presenting with impaired functional mobility d/t decreased strength, decreased ROM, decreased endurance, impaired balance, decreased mobility, impaired sensation, orthopedic restrictions of POD1, pain and activity intolerance  Pertinent PMHx and current co-morbidities affecting pt's physical performance at time of assessment include: s/p L spinal fusion, scoliosis, DJD, flatback syndrome  Personal factors affecting pt at time of eval include: inaccessible home environment, ambulating w/ assistive device, inability to ambulate household distances and limited home support  The following objective measures performed on IE also reveal limitations: Barthel Index: 40/100, Modified [place]: 4 (moderate/severe disability) and AM-PAC 6-Clicks: 12/51  Pt's clinical presentation is currently unstable/unpredictable seen in pt's presentation of advanced age, abnormal lab value(s), need for input for task focus and mobility technique and ongoing medical assessment  Overall, pt's rehab potential and prognosis to return to PLOF is good as impacted by objective findings, warranting pt to receive further skilled PT interventions to address identified impairments, activity limitation(s), and participation restriction(s)  Pt to benefit from continued PT tx to address deficits as defined above and maximize level of functional independent mobility and consistency  From PT/mobility standpoint, recommendation at time of d/c would be post acute rehabilitation services pending progress in order to facilitate return to PLOF  Barriers to Discharge: Decreased caregiver support, Inaccessible home environment        PT Discharge Recommendation: Post acute rehabilitation services          See flowsheet documentation for full assessment

## 2022-05-25 NOTE — ASSESSMENT & PLAN NOTE
Continue statin  Monitor LFTs  If they go up anymore we can hold it but looks like his LFTs have been chronically elevated  He does have a history of hepatitis-C as well

## 2022-05-25 NOTE — PLAN OF CARE
Problem: Prexisting or High Potential for Compromised Skin Integrity  Goal: Skin integrity is maintained or improved  Description: INTERVENTIONS:  - Identify patients at risk for skin breakdown  - Assess and monitor skin integrity  - Assess and monitor nutrition and hydration status  - Monitor labs   - Assess for incontinence   - Turn and reposition patient  - Assist with mobility/ambulation  - Relieve pressure over bony prominences  - Avoid friction and shearing  - Provide appropriate hygiene as needed including keeping skin clean and dry  - Evaluate need for skin moisturizer/barrier cream  - Collaborate with interdisciplinary team   - Patient/family teaching  - Consider wound care consult   Outcome: Progressing     Problem: PAIN - ADULT  Goal: Verbalizes/displays adequate comfort level or baseline comfort level  Description: Interventions:  - Encourage patient to monitor pain and request assistance  - Assess pain using appropriate pain scale  - Administer analgesics based on type and severity of pain and evaluate response  - Implement non-pharmacological measures as appropriate and evaluate response  - Consider cultural and social influences on pain and pain management  - Notify physician/advanced practitioner if interventions unsuccessful or patient reports new pain  Outcome: Progressing     Problem: INFECTION - ADULT  Goal: Absence or prevention of progression during hospitalization  Description: INTERVENTIONS:  - Assess and monitor for signs and symptoms of infection  - Monitor lab/diagnostic results  - Monitor all insertion sites, i e  indwelling lines, tubes, and drains  - Monitor endotracheal if appropriate and nasal secretions for changes in amount and color  - [place] appropriate cooling/warming therapies per order  - Administer medications as ordered  - Instruct and encourage patient and family to use good hand hygiene technique  - Identify and instruct in appropriate isolation precautions for identified infection/condition  Outcome: Progressing  Goal: Absence of fever/infection during neutropenic period  Description: INTERVENTIONS:  - Monitor WBC    Outcome: Progressing     Problem: SAFETY ADULT  Goal: Patient will remain free of falls  Description: INTERVENTIONS:  - Educate patient/family on patient safety including physical limitations  - Instruct patient to call for assistance with activity   - Consult OT/PT to assist with strengthening/mobility   - Keep Call bell within reach  - Keep bed low and locked with side rails adjusted as appropriate  - Keep care items and personal belongings within reach  - Initiate and maintain comfort rounds  - Make Fall Risk Sign visible to staff  - Offer Toileting every  Hours, in advance of need  - Initiate/Maintain alarm  - Obtain necessary fall risk management equipment:   - Apply yellow socks and bracelet for high fall risk patients  - Consider moving patient to room near nurses station  Outcome: Progressing  Goal: Maintain or return to baseline ADL function  Description: INTERVENTIONS:  -  Assess patient's ability to carry out ADLs; assess patient's baseline for ADL function and identify physical deficits which impact ability to perform ADLs (bathing, care of mouth/teeth, toileting, grooming, dressing, etc )  - Assess/evaluate cause of self-care deficits   - Assess range of motion  - Assess patient's mobility; develop plan if impaired  - Assess patient's need for assistive devices and provide as appropriate  - Encourage maximum independence but intervene and supervise when necessary  - Involve family in performance of ADLs  - Assess for home care needs following discharge   - Consider OT consult to assist with ADL evaluation and planning for discharge  - Provide patient education as appropriate  Outcome: Progressing  Goal: Maintains/Returns to pre admission functional level  Description: INTERVENTIONS:  - Perform BMAT or MOVE assessment daily    - Set and communicate daily mobility goal to care team and patient/family/caregiver  - Collaborate with rehabilitation services on mobility goals if consulted  - Perform Range of Motion  times a day  - Reposition patient every  hours  - Dangle patient  times a day  - Stand patient  times a day  - Ambulate patient  times a day  - Out of bed to chair  times a day   - Out of bed for meals times a day  - Out of bed for toileting  - Record patient progress and toleration of activity level   Outcome: Progressing     Problem: DISCHARGE PLANNING  Goal: Discharge to home or other facility with appropriate resources  Description: INTERVENTIONS:  - Identify barriers to discharge w/patient and caregiver  - Arrange for needed discharge resources and transportation as appropriate  - Identify discharge learning needs (meds, wound care, etc )  - Arrange for interpretive services to assist at discharge as needed  - Refer to Case Management Department for coordinating discharge planning if the patient needs post-hospital services based on physician/advanced practitioner order or complex needs related to functional status, cognitive ability, or social support system  Outcome: Progressing     Problem: Knowledge Deficit  Goal: Patient/family/caregiver demonstrates understanding of disease process, treatment plan, medications, and discharge instructions  Description: Complete learning assessment and assess knowledge base    Interventions:  - Provide teaching at level of understanding  - Provide teaching via preferred learning methods  Outcome: Progressing

## 2022-05-25 NOTE — ASSESSMENT & PLAN NOTE
Patient is on an ACE-inhibitor  Will continue    Will be careful not to lower blood pressure too much for the risk of postop acute renal insufficiency secondary to hypoperfusion

## 2022-05-25 NOTE — PROGRESS NOTES
Orthopedics   Shira Valladares 73 y o  male MRN: [id number]  Unit/Bed#: -01    Subjective:  73 y  o male POD#1 s/p L1-S1 revision of segmental hardware, L5-S1 osteotomy, posterior spinal fusion L4-S1, Transforaminal lumbar interbody fusion L5-S1, and instrumentation L1-pelvis  Patient is laying comfortably in hospital bed in no acute distress  Patient mentions some discomfort in his back this morning  Patient has been utilizing PCA pump  Patient denies any pain in lower extremities  Patient reports that left lower extremity is difficult to move with heavy sensation  Patient states he has numbness in the top of left foot  Patient denies any shortness of breath, chest pain, lightheadedness, dizziness, nausea, and vomiting  Patient offers no additional complaints at this time       Labs:  0   Lab Value Date/Time    HCT 30 3 (L) 05/25/2022 0441    HCT 30 (L) 05/24/2022 1732    HCT 32 (L) 05/24/2022 1642    HCT 37 05/24/2022 1452    HCT 45 7 05/03/2022 0800    HCT 48 6 02/13/2020 0945    HGB 10 0 (L) 05/25/2022 0441    HGB 10 2 (L) 05/24/2022 1732    HGB 10 9 (L) 05/24/2022 1642    HGB 12 6 05/24/2022 1452    HGB 14 8 05/03/2022 0800    HGB 15 8 02/13/2020 0945    INR 1 02 05/24/2022 0849    WBC 9 52 05/25/2022 0441    WBC 5 52 05/03/2022 0800    WBC 6 14 02/13/2020 0945       Meds:    Current Facility-Administered Medications:     aspirin tablet 325 mg, 325 mg, Oral, Daily, Tori Meehan PA-C    ceFAZolin (ANCEF) IVPB (premix in dextrose) 1,000 mg 50 mL, 1,000 mg, Intravenous, Q8H, Tori Meehan PA-C, Last Rate: 100 mL/hr at 05/24/22 2354, 1,000 mg at 05/24/22 2354    docusate sodium (COLACE) capsule 100 mg, 100 mg, Oral, BID, Tori Meehan PA-C    HYDROmorphone (DILAUDID) 1 mg/mL 50 mL PCA, , Intravenous, Continuous, Tori Meehan PA-C, New Bag at 05/24/22 2008    lactated ringers infusion, 125 mL/hr, Intravenous, Continuous, Radha Vaughan MD, Last Rate: 125 mL/hr at 05/24/22 6573, 125 mL/hr at 05/24/22 0943    lactated ringers infusion, 100 mL/hr, Intravenous, Continuous, Tori Meehan PA-C, Last Rate: 100 mL/hr at 05/24/22 2127, 100 mL/hr at 05/24/22 2127    lisinopril (ZESTRIL) tablet 5 mg, 5 mg, Oral, Daily, Tori Meehan PA-C    naloxone (NARCAN) injection 0 1 mg, 0 1 mg, Intravenous, Q3 min PRN, HUEY Juárez-RHONDA    ondansetron (ZOFRAN) injection 4 mg, 4 mg, Intravenous, Q6H PRN, Tori Meehan PA-RHONDA    ondansetron (ZOFRAN) injection 4 mg, 4 mg, Intravenous, Q6H PRN, Tori Meehan PA-RHONDA    pravastatin (PRAVACHOL) tablet 10 mg, 10 mg, Oral, Daily With Dinner, Tori Meehan PA-C    Blood Culture:   No results found for: BLOODCX    Wound Culture:   No results found for: WOUNDCULT    Ins and Outs:  I/O last 24 hours: In: 4300 [I V :3750; IV Piggyback:550]  Out: 3190 [Urine:1410; Blood:1000]          Physical:  Vitals:    05/25/22 0747   BP: 140/91   Pulse: 93   Resp: 16   Temp: 98 3 °F (36 8 °C)   SpO2: 94%     Lumbar spine:  · Patient is a 76-year-old male laying comfortably in hospital bed in no acute distress  · Mepilex dressing clean, dry, and intact without strike through  · Neurovascularly intact L2-S1 bilateral lower extremities  · 5/5 motor strength with EHL/FHL and ankle dorsi/plantar flexion in right lower extremity  · 3/5 motor strength with EHL/FHL and 5/5 motor strength with ankle dorsi/plantar flexion in left lower extremity  · 2+ DP pulse bilateral lower extremities  · Calfs supple and nontender    Assessment:  73 y  o male POD#1 s/p L1-S1 revision of segmental hardware, L5-S1 osteotomy, posterior spinal fusion L4-S1, Transforaminal lumbar interbody fusion L5-S1, and instrumentation L1-pelvis      Plan:  · Weight Bearing as tolerated all extremities  · Up and out of bed  · LSO brace to be worn at all times out of bed  · DVT prophylaxis: mechanical and aspirin 325mg   · Analgesics  · PT/OT  · Patient noted to have acute blood loss anemia due to a drop in Hbg of > 2 0g from preop levels, will monitor vital signs and resuscitate with IV fluids as needed   · Dispo: Patient is doing well follow-ing surgical intervention  Weightbearing as tolerated all extremities  Will discontinue PCA pump this morning  LSO brace to be worn at all times when up and out of bed  Ordered xrays of lumbar spine for postop evaluation       Vianey Pickens PA-C

## 2022-05-26 LAB
ANION GAP SERPL CALCULATED.3IONS-SCNC: 6 MMOL/L (ref 4–13)
BASOPHILS # BLD AUTO: 0.01 THOUSANDS/ΜL (ref 0–0.1)
BASOPHILS NFR BLD AUTO: 0 % (ref 0–1)
BUN SERPL-MCNC: 13 MG/DL (ref 5–25)
CALCIUM SERPL-MCNC: 8.1 MG/DL (ref 8.3–10.1)
CHLORIDE SERPL-SCNC: 103 MMOL/L (ref 100–108)
CO2 SERPL-SCNC: 26 MMOL/L (ref 21–32)
CREAT SERPL-MCNC: 0.72 MG/DL (ref 0.6–1.3)
EOSINOPHIL # BLD AUTO: 0.04 THOUSAND/ΜL (ref 0–0.61)
EOSINOPHIL NFR BLD AUTO: 1 % (ref 0–6)
ERYTHROCYTE [DISTWIDTH] IN BLOOD BY AUTOMATED COUNT: 13.1 % (ref 11.6–15.1)
FLUAV RNA RESP QL NAA+PROBE: NEGATIVE
FLUBV RNA RESP QL NAA+PROBE: NEGATIVE
GFR SERPL CREATININE-BSD FRML MDRD: 97 ML/MIN/1.73SQ M
GLUCOSE SERPL-MCNC: 121 MG/DL (ref 65–140)
HCT VFR BLD AUTO: 28.1 % (ref 36.5–49.3)
HGB BLD-MCNC: 9.3 G/DL (ref 12–17)
IMM GRANULOCYTES # BLD AUTO: 0.04 THOUSAND/UL (ref 0–0.2)
IMM GRANULOCYTES NFR BLD AUTO: 1 % (ref 0–2)
LYMPHOCYTES # BLD AUTO: 1.02 THOUSANDS/ΜL (ref 0.6–4.47)
LYMPHOCYTES NFR BLD AUTO: 12 % (ref 14–44)
MCH RBC QN AUTO: 29.3 PG (ref 26.8–34.3)
MCHC RBC AUTO-ENTMCNC: 33.1 G/DL (ref 31.4–37.4)
MCV RBC AUTO: 89 FL (ref 82–98)
MONOCYTES # BLD AUTO: 0.55 THOUSAND/ΜL (ref 0.17–1.22)
MONOCYTES NFR BLD AUTO: 6 % (ref 4–12)
NEUTROPHILS # BLD AUTO: 7.2 THOUSANDS/ΜL (ref 1.85–7.62)
NEUTS SEG NFR BLD AUTO: 80 % (ref 43–75)
NRBC BLD AUTO-RTO: 0 /100 WBCS
PLATELET # BLD AUTO: 199 THOUSANDS/UL (ref 149–390)
PMV BLD AUTO: 9.2 FL (ref 8.9–12.7)
POTASSIUM SERPL-SCNC: 3.6 MMOL/L (ref 3.5–5.3)
RBC # BLD AUTO: 3.17 MILLION/UL (ref 3.88–5.62)
RSV RNA RESP QL NAA+PROBE: NEGATIVE
SARS-COV-2 RNA RESP QL NAA+PROBE: NEGATIVE
SODIUM SERPL-SCNC: 135 MMOL/L (ref 136–145)
WBC # BLD AUTO: 8.86 THOUSAND/UL (ref 4.31–10.16)

## 2022-05-26 PROCEDURE — 80048 BASIC METABOLIC PNL TOTAL CA: CPT

## 2022-05-26 PROCEDURE — 97110 THERAPEUTIC EXERCISES: CPT

## 2022-05-26 PROCEDURE — 85025 COMPLETE CBC W/AUTO DIFF WBC: CPT

## 2022-05-26 PROCEDURE — NC001 PR NO CHARGE

## 2022-05-26 PROCEDURE — 99232 SBSQ HOSP IP/OBS MODERATE 35: CPT | Performed by: FAMILY MEDICINE

## 2022-05-26 PROCEDURE — 0241U HB NFCT DS VIR RESP RNA 4 TRGT: CPT

## 2022-05-26 PROCEDURE — 97763 ORTHC/PROSTC MGMT SBSQ ENC: CPT

## 2022-05-26 PROCEDURE — 99024 POSTOP FOLLOW-UP VISIT: CPT

## 2022-05-26 PROCEDURE — 97530 THERAPEUTIC ACTIVITIES: CPT

## 2022-05-26 RX ADMIN — HYDROCODONE BITARTRATE AND ACETAMINOPHEN 1 TABLET: 5; 325 TABLET ORAL at 15:39

## 2022-05-26 RX ADMIN — LISINOPRIL 5 MG: 5 TABLET ORAL at 09:21

## 2022-05-26 RX ADMIN — ASPIRIN 325 MG ORAL TABLET 325 MG: 325 PILL ORAL at 09:21

## 2022-05-26 RX ADMIN — PRAVASTATIN SODIUM 10 MG: 20 TABLET ORAL at 17:53

## 2022-05-26 RX ADMIN — DOCUSATE SODIUM 100 MG: 100 CAPSULE ORAL at 17:53

## 2022-05-26 RX ADMIN — HYDROCODONE BITARTRATE AND ACETAMINOPHEN 1 TABLET: 5; 325 TABLET ORAL at 01:16

## 2022-05-26 RX ADMIN — SODIUM CHLORIDE, SODIUM LACTATE, POTASSIUM CHLORIDE, AND CALCIUM CHLORIDE 75 ML/HR: .6; .31; .03; .02 INJECTION, SOLUTION INTRAVENOUS at 22:03

## 2022-05-26 RX ADMIN — SODIUM CHLORIDE, SODIUM LACTATE, POTASSIUM CHLORIDE, AND CALCIUM CHLORIDE 75 ML/HR: .6; .31; .03; .02 INJECTION, SOLUTION INTRAVENOUS at 17:41

## 2022-05-26 RX ADMIN — HYDROCODONE BITARTRATE AND ACETAMINOPHEN 1 TABLET: 5; 325 TABLET ORAL at 09:21

## 2022-05-26 RX ADMIN — DOCUSATE SODIUM 100 MG: 100 CAPSULE ORAL at 09:21

## 2022-05-26 RX ADMIN — SODIUM CHLORIDE, SODIUM LACTATE, POTASSIUM CHLORIDE, AND CALCIUM CHLORIDE 100 ML/HR: .6; .31; .03; .02 INJECTION, SOLUTION INTRAVENOUS at 04:55

## 2022-05-26 NOTE — CASE MANAGEMENT
Case Management Discharge Planning Note    Patient name Flora Sinclair  Location /-10 MRN [id number]  : 1955 Date 2022       Current Admission Date: 2022  Current Admission Diagnosis:Status post lumbar spinal fusion   Patient Active Problem List    Diagnosis Date Noted    Status post lumbar spinal fusion 2022    Hyperlipidemia     Hypertension       LOS (days): 2  Geometric Mean LOS (GMLOS) (days): 2 70  Days to GMLOS:0 8     OBJECTIVE:  Risk of Unplanned Readmission Score: 8 28         Current admission status: Inpatient   Preferred Pharmacy:   [facility] [id number] [facility] [address]  Phone: [phone] Fax: [phone]    Primary Care Provider: Layne Mendoza MD    Primary Insurance: MEDICARE  Secondary Insurance: [facility]    DISCHARGE DETAILS:    Discharge planning discussed with[de-identified] patient  Freedom of Choice: Yes  Comments - Freedom of Choice: pt discussed freedom of choice  pt wants acute/str rehab  no preference  CM contacted family/caregiver?: Yes  Were Treatment Team discharge recommendations reviewed with patient/caregiver?: Yes  Did patient/caregiver verbalize understanding of patient care needs?: Yes  Were patient/caregiver advised of the risks associated with not following Treatment Team discharge recommendations?: Yes    Contacts  Patient Contacts: Mylene-spouse  Relationship to Patient[de-identified] Family  Contact Method: Phone  Phone Number: [phone]  Reason/Outcome: Continuity of Care, Emergency Contact, Referral, Discharge [address]         Is the patient interested in [facility] at discharge?: No    DME Referral Provided  Referral made for DME?: No    Other Referral/Resources/Interventions Provided:  Interventions: Acute Rehab  Referral Comments: blanket referrals made for acute rehab and str as per no preference per pt and wife  [facility] HAS ACCEPTED  AWAIT ACUTE REHAB DETERMINATION  Treatment Team Recommendation: Acute Rehab  Discharge Destination Plan[de-identified] Acute Rehab  Transport at Discharge : BLS Ambulance  Dispatcher Contacted:  No                 Transfer Mode: Stretcher  Accompanied by: Alone

## 2022-05-26 NOTE — PLAN OF CARE
Problem: PAIN - ADULT  Goal: Verbalizes/displays adequate comfort level or baseline comfort level  Description: Interventions:  - Encourage patient to monitor pain and request assistance  - Assess pain using appropriate pain scale  - Administer analgesics based on type and severity of pain and evaluate response  - Implement non-pharmacological measures as appropriate and evaluate response  - Consider cultural and social influences on pain and pain management  - Notify physician/advanced practitioner if interventions unsuccessful or patient reports new pain  Outcome: Progressing     Problem: INFECTION - ADULT  Goal: Absence or prevention of progression during hospitalization  Description: INTERVENTIONS:  - Assess and monitor for signs and symptoms of infection  - Monitor lab/diagnostic results  - Monitor all insertion sites, i e  indwelling lines, tubes, and drains  - Monitor endotracheal if appropriate and nasal secretions for changes in amount and color  - [place] appropriate cooling/warming therapies per order  - Administer medications as ordered  - Instruct and encourage patient and family to use good hand hygiene technique  - Identify and instruct in appropriate isolation precautions for identified infection/condition  Outcome: Progressing  Goal: Absence of fever/infection during neutropenic period  Description: INTERVENTIONS:  - Monitor WBC    Outcome: Progressing     Problem: DISCHARGE PLANNING  Goal: Discharge to home or other facility with appropriate resources  Description: INTERVENTIONS:  - Identify barriers to discharge w/patient and caregiver  - Arrange for needed discharge resources and transportation as appropriate  - Identify discharge learning needs (meds, wound care, etc )  - Arrange for interpretive services to assist at discharge as needed  - Refer to Case Management Department for coordinating discharge planning if the patient needs post-hospital services based on physician/advanced practitioner order or complex needs related to functional status, cognitive ability, or social support system  Outcome: Progressing     Problem: Knowledge Deficit  Goal: Patient/family/caregiver demonstrates understanding of disease process, treatment plan, medications, and discharge instructions  Description: Complete learning assessment and assess knowledge base    Interventions:  - Provide teaching at level of understanding  - Provide teaching via preferred learning methods  Outcome: Progressing

## 2022-05-26 NOTE — ASSESSMENT & PLAN NOTE
This is per the primary service  Monitor for postop blood loss anemia  TLSO brace is at the bedside  PT OT evaluation per Orthopedics    Discharge per them

## 2022-05-26 NOTE — ARC ADMISSION
Referral received for [facility] [address]  Patient accepted at all three campuses  After speaking with patient, he decided on the [facility]

## 2022-05-26 NOTE — PROGRESS NOTES
PHYSICAL MEDICINE AND REHABILITATION   PREADMISSION ASSESSMENT     Projected [facility] Diagnoses:  Impairment of mobility, safety and Activities of Daily Living (ADLs) due to Orthopedic Disorders:  08 9  Other Orthopedic scoliosis, flat back syndrome, thoracolumbar DJD  Etiologic: scoliosis, flat back syndrome, thoracolumbar DJD  Date of Onset: 5/24/22   Date of surgery: 5/24/22     PATIENT INFORMATION  Name: Revonda Paget Phone #: [phone] (home)   Address: [address] [place] [address]  YOB: 1955 Age: 73 y o  SS#   Marital Status: /Civil Union  Ethnicity: White  Employment Status: retired  Extended Emergency Contact Information  Primary Emergency Contact: Luis Talavera of Meaghan Harmonors [place] Phone: [phone]  Relation: Spouse  Advance Directive: No ACP docs    INSURANCE/COVERAGE:     Primary Payor: MEDICARE / Plan: MEDICARE A AND B / Product Type: Medicare A & B Fee for Service /   Secondary Payer: [facility] #[id number]   Payer Contact:  Payer Contact:   Contact Phone:  Contact Phone:     Authorization #: n/a  Coverage Dates: n/a  MARIELADSsergioosiris WanChichi  MEDICARE #: [id number]  Medicare Days: 60/30/60  Medical Record #: [id number]    REFERRAL SOURCE:   Referring provider: Reyna Diaz MD  Referring facility: [address] [place] [address]  Room: [facility]/[place] [address]  PCP: Kelsy An MD PCP phone number: [phone]    MEDICAL INFORMATION  HPI:  This is a 73year old male who presented to [place] [address] on 5/25/22 for an elective back surgery after failing conservative treatment  Patient has a h/o scoliosis, flat back syndrome, thoracolumbar DJD and lumbar surgery  He is s/p L1-S1 revision of segmental hardware, L5-S1 osteotomy, posterior spinal fusion L4-S1, Transforaminal lumbar interbody fusion L5-S1, and instrumentation L1-pelvis on 5/24/22  Following his surgery, his pain was managed on a PCA pump    Pump now discontinued and his pain is being managed on PO pain medications  Patient also c/o numbness on the top of his left foot which has been decreasing  He is ordered WBAT to all extremities  Also ordered LSO brace to be worn at all times when OOB  SCDs and ASA ordered for DVT prophylaxis  X-rays of lumbar spine obtained on 5/25 show orthopedic hardware in stable alignment  He was noted to have ABLA  Hgb dropped to 8 9 on 5/27 from 10 0 on 5/25  Patient worked with PT and OT and recommended for rehab following his hospital stay  He is now medically cleared and ready for discharge to the [facility]  Past Medical History:   Past Surgical History: Allergies:     Past Medical History:   Diagnosis Date    Ambulates with cane     "long distance"    Arthritis     Back pain     Chronic pain disorder     Dental crowns present     Exercise involving walking     daily -short walks    History of hepatitis C     per pt "went away on its own" age 13"    History of transfusion     Hyperlipidemia     Hypertension     Leg pain     and foot pain    Limb alert care status     per pt NO IV's LEFT UPPER ARM due to old injury    Risk for falls     Spinal stenosis     Stroke ([facility] [address] )     per pt in 1996-and no lasting problems    Wears glasses     readers    Past Surgical History:   Procedure Laterality Date    BACK SURGERY      with implants    COLONOSCOPY      LUMBAR FUSION N/A 5/24/2022    Procedure: L1-S1 revision of segmental hardware, L5-S1 osteotomy, posterior spinal fusion L4-S1, pelvic instrumentation, Transforaminal lumbar interbody fusion L5S1, Cage L5S1, instrumentation L1-pelvis;   Surgeon: Ana Mcdonald MD;  Location: [facility];  Service: Orthopedics    WISDOM TOOTH EXTRACTION       No Known Allergies      Medical/functional conditions requiring inpatient rehabilitation:   Scoliosis, flat back syndrome, thoracolumbar DJD s/p L1-S1 revision of segmental hardware, L5-S1 osteotomy, posterior spinal fusion L4-S1, transforaminal lumbar interbody fusion L5-S1, and instrumentation L1-pelvis  Post-op pain  ABLA  HTN  Hyperlipidemia      Risk for medical/clinical complications: risk for falls and injury related to falls, risk for complications with incision, risk for skin breakdown, risk for DVT/PE/CVA, risk for uncontrolled pain, risk for post-op anemia    Comorbidities/Surgeries in the last 100 days:   5/24/22-L1-S1 revision of segmental hardware, L5-S1 osteotomy, posterior spinal fusion L4-S1, transforaminal lumbar interbody fusion L5-S1, and instrumentation L1-pelvis      CURRENT VITAL SIGNS:   Temp:  [99 1 °F (37 3 °C)] 99 1 °F (37 3 °C)  HR:  [94-99] 97  Resp:  [16-18] 18  BP: (120-162)/(55-83) 136/83   Intake/Output Summary (Last 24 hours) at 5/27/2022 1055  Last data filed at 5/27/2022 0800  Gross per 24 hour   Intake 1722 92 ml   Output 1600 ml   Net 122 92 ml        LABORATORY RESULTS:      Lab Results   Component Value Date    HGB 8 9 (L) 05/27/2022    HCT 27 5 (L) 05/27/2022    WBC 8 11 05/27/2022     Lab Results   Component Value Date    BUN 12 05/27/2022    K 4 0 05/27/2022     05/27/2022    GLUCOSE 134 05/24/2022    CREATININE 0 71 05/27/2022     Lab Results   Component Value Date    PROTIME 13 0 05/24/2022    INR 1 02 05/24/2022        DIAGNOSTIC STUDIES:  XR chest pa & lateral    Result Date: 5/11/2022  Impression: No acute cardiopulmonary disease   Workstation performed: [id number]        PRECAUTIONS/SPECIAL NEEDS:  Splints/Braces: LSO brace at all times when OOB, Anticoagulation:  aspirin 325 mg orally every day, Pain Management, Dietary Restrictions: Regular diet, Language Preference: English and fall precautions    MEDICATIONS:     Current Facility-Administered Medications:     aspirin tablet 325 mg, 325 mg, Oral, Daily, Tori Meehan PA-C, 325 mg at 05/27/22 0813    docusate sodium (COLACE) capsule 100 mg, 100 mg, Oral, BID, Tori Meehan PA-C, 100 mg at 05/27/22 6924   HYDROcodone-acetaminophen (NORCO) 5-325 mg per tablet 1 tablet, 1 tablet, Oral, Q6H PRN, Tori Schwabp, PA-C, 1 tablet at 05/27/22 0813    lactated ringers infusion, 75 mL/hr, Intravenous, Continuous, Todd Craven MD, Last Rate: 75 mL/hr at 05/26/22 2203, 75 mL/hr at 05/26/22 2203    lisinopril (ZESTRIL) tablet 5 mg, 5 mg, Oral, Daily, Tori Schwabp, PA-C, 5 mg at 05/27/22 0813    methocarbamol (ROBAXIN) tablet 500 mg, 500 mg, Oral, Q6H PRN, Tori Schwabp, PA-C    naloxone (NARCAN) injection 0 1 mg, 0 1 mg, Intravenous, Q3 min PRN, Tori Schwabp, PA-C    ondansetron (ZOFRAN) injection 4 mg, 4 mg, Intravenous, Q6H PRN, Tori Schwabp, PA-C    pravastatin (PRAVACHOL) tablet 10 mg, 10 mg, Oral, Daily With Dinner, Tori Meehan, PA-C, 10 mg at 05/26/22 1753    SKIN INTEGRITY:   Surgical site on back    PRIOR LEVEL OF FUNCTION:  He lives in a(n) single family home  Terrance Seo is  and lives with their spouse  Self Care: Independent, Indoor Mobility: Independent, Stairs (in/outdoor): Independent and Cognition: Independent    FALLS IN THE LAST 6 MONTHS: 0    HOME ENVIRONMENT:  The living area: Patient lives in a 2 story home  He can have a 1st floor set up  There are 1 step to enter the home  The patient will have 24 hour supervision/physical assistance available upon discharge  PREVIOUS DME:  Equipment in home (previous DME): Grab Bars, [address] and built in shower seat    FUNCTIONAL STATUS:  Physical Therapy Occupational Therapy Speech Therapy   05/26/22 0912    PT Last Visit   PT Visit Date 05/26/22   Note Type   Note Type Treatment   Pain Assessment   Pain Assessment Tool 0-10   Pain Score 4  (4/10 at rest, 8/10 with mobility)   Pain Location/Orientation Orientation: Lower; Location: Back   Hospital Pain Intervention(s) Repositioned; Ambulation/increased activity; Rest  (RN aware of pt's pain report)   Restrictions/Precautions   Weight Bearing Precautions Per Order Yes  (per ortho, WBAT all extremities)   RUE Weight Bearing Per Order WBAT   LUE Weight Bearing Per Order WBAT   RLE Weight Bearing Per Order WBAT   LLE Weight Bearing Per Order WBAT   Braces or Orthoses LSO   Other Precautions Chair Alarm; Bed Alarm; Fall Risk;Multiple lines;Spinal precautions;Pain  (Back safety precautions/log roll technique)   General   Chart Reviewed Yes   Additional Pertinent History per ortho: Weightbearing as tolerated all extremities  LSO brace to be worn at all times when up and out of bed   Response to Previous Treatment Patient with no complaints from previous session  Family/Caregiver Present No   Cognition   Overall Cognitive Status WFL   Arousal/Participation Alert; Cooperative   Attention Within functional limits   Orientation Level Oriented to person;Oriented to place;Oriented to time;Oriented to situation   Memory Within functional limits   Following Commands Follows all commands and directions without difficulty   Comments pt agreeable to PT session   Subjective   Subjective " I can try"   Bed Mobility   Supine to Sit 4  Minimal assistance   Additional items Assist x 2;HOB elevated; Increased time required;Verbal cues;LE management   Additional Comments LOG ROLL TECHNIQUE  time spent educating pt on back safety precautions, log roll technique, LSO brace components, donning instructions, adjusting brace for enhanced comfort, and skin check   Transfers   Sit to Stand 4  Minimal assistance   Additional items Assist x 2;Armrests; Increased time required;Verbal cues   Stand to Sit 4  Minimal assistance   Additional items Assist x 2;Armrests; Increased time required;Verbal cues   Ambulation/Elevation   Gait pattern Decreased foot clearance;Decreased L stance; Antalgic; Short stride; Step to   Gait Assistance 4  Minimal assist   Additional items Assist x 2;Verbal cues; Tactile cues   Assistive Device Rolling walker   Distance 5'   Balance   Static Sitting Fair +   Dynamic Sitting Fair   Static Standing Fair -   Dynamic Standing Poor +   Ambulatory Poor +   Endurance Deficit   Endurance Deficit Yes   Activity Tolerance   Activity Tolerance Patient limited by pain; Patient limited by fatigue   Medical Staff Made Aware KETURAH Menchaca   Nurse Made Aware RN [facility]   Exercises   Knee AROM Long Arc Quad Sitting;10 reps;AROM; Bilateral   Ankle Pumps Sitting;10 reps;AROM; Bilateral  (heel/toe raises)   Marching Sitting;10 reps;AROM; Bilateral   Assessment   Prognosis Good   Problem List Decreased strength;Decreased endurance; Impaired balance;Decreased mobility;Orthopedic restrictions;Pain   Assessment Pt seen for PT treatment session this date, consisting of ther act focused on bed mobility/log roll technique, STS transfers; short gait trial from EOB to recliner with use of RW, ther ex focused on strengthening and LSO brace training  Since previous session, pt has made good progress in terms of initiation of OOB mobility, able to achieve full upright posture without L knee buckling observed this date; pt able to initiate seated Reba to tolerance, pt requiring min A x2 for all phases of mobility; LSO brace donned at EOB prior to OOB mobility; improved standing balance scores and initiation of short gait trial  Pertinent barriers during this session include pain & fatigue  Current goals and POC remain appropriate, pt continues to have rehab potential and is making progress towards STGs  Pt prognosis for achieving goals is good, pending pt progress with hospitalization/medical status improvements, and indicated by [facility], ability to follow directions, recent onset and previous response to intervention  Pt limited d/t the presence of intractable pain, fear of pain provocation and fear of falling  PT recommends post acute rehabilitation services upon discharge  Pt continues to be functioning below baseline level, and remains limited 2* factors listed above   PT will continue to see pt during current hospitalization in order to address the deficits listed above and provide interventions consistent w/ POC in effort to achieve STGs     05/25/22 0813    OT Last Visit   OT Visit Date 05/25/22   Note Type   Note type Evaluation   Restrictions/Precautions   Weight Bearing Precautions Per Order Yes   RUE Weight Bearing Per Order WBAT   LUE Weight Bearing Per Order WBAT   RLE Weight Bearing Per Order WBAT   LLE Weight Bearing Per Order WBAT   Braces or Orthoses LSO  (LSO brace fitted by PT at time of IE)   Other Precautions Chair Alarm; Bed Alarm; Fall Risk;Multiple lines;Spinal precautions;Pain  (Back safety precautions/log roll technique)   Pain Assessment   Pain Assessment Tool 0-10   Pain Score 8   Pain Location/Orientation Orientation: Lower; Location: Back;Orientation: Left; Location: Groin   Pain Onset/Description Descriptor: [facility]   Hospital Pain Intervention(s) Repositioned   Home Living   Type of [address] [place] [address] Two level;Performs ADLs on one level; Able to live on main level with bedroom/bathroom;Stairs to enter with rails  (Full bath on first floor, sleeps on couch; 1 BRENDA)   Bathroom Shower/Tub Walk-in shower   Bathroom Toilet Raised   Bathroom Equipment Built-in shower seat;Grab bars in shower;Grab bars around toilet   [address] [facility]  ([address] [facility] [address] for community mobility, primarily ambulatory with no AD at baseline)   Prior Function   Level of [place] Independent with ADLs and functional mobility   Lives With Spouse  (Reports spouse will be home all the time)   Receives Help From Family   ADL Assistance Independent   IADLs Independent  (Shares responsibilities with spouse)   Falls in the last 6 months 0   Vocational Retired   Comments (+) drives   Lifestyle   Autonomy Per patient report, he lives with his spouse in a two-story home with 1 BRENDA and a first-floor setup available   At baseline, patient reports that he is independent in ADLs and he and his spouse share responsibilities for performing IADLs  Patient is primarily ambulatory with no AD, uses a cane for community mobility  Reciprocal Relationships Supportive spouse   Service to Others Retired- Respiratory therapist   Intrinsic Gratification [address], watching TV   Psychosocial   Psychosocial ([address]) WDL   Patient Behaviors/Mood Cooperative;Pleasant   Ability to Express Feelings Able to express   Ability to Express Needs Able to express   Ability to Express Thoughts Able to express   Ability to Understand Others Understands   ADL   Eating Assistance 6  Modified independent   Grooming Assistance 5  Supervision/Setup   UB Bathing Assistance 4  Minimal Assistance   LB Bathing Assistance 2  Maximal Assistance   UB Dressing Assistance 5  Supervision/Setup   LB Dressing Assistance 2  Maximal [address]  3  Moderate Assistance   Functional Assistance 3  Moderate Assistance   Additional Comments ADL assist levels based on pt's functional performance during OT evaluation   Bed Mobility   Rolling R 5  Supervision   Additional items Assist x 1;HOB elevated; Bedrails; Increased time required;Verbal cues;LE management  (Log roll technique for back safety)   Supine to Sit 4  Minimal assistance   Additional items Assist x 2;HOB elevated; Bedrails; Increased time required;Verbal cues;LE management   Sit to Supine 4  Minimal assistance   Additional items Assist x 2;HOB elevated; Increased time required;Verbal cues;LE management   Additional Comments Patient received lying supine in bed upon OT arrival; at end of session: pt positioned sidelying on bed with all needs within reach and bed alarm activated  Log roll technique utilized for back safety/spinal precautions  Patient demonstrated ability to scoot laterally towards [facility] with minimal assist x1     Transfers   Sit to Stand 4  Minimal assistance  (Patient with noted L knee buckling upon STS, requiring eccentric control for return to seated position at EOB )   Additional items Assist x 2;Increased time required;Verbal cues   Stand to Sit 4  Minimal assistance   Additional items Assist x 2; Increased time required;Verbal cues   Additional Comments Attempted STS transfer with RW, pt with noted L knee buckling with STS trial, requiring assistance with eccentric control to return to seated position at EOB  Functional Mobility   Functional Mobility    (Unable to assess at time of IE)   Additional Comments Unable to assess d/t LLE weakness/knee buckling with STS trial    Balance   Static Sitting Fair +   Dynamic Sitting Fair   Static Standing Poor   Dynamic Standing    (DNT)   Activity Tolerance   Activity Tolerance Patient limited by fatigue;Patient limited by pain   Medical Staff Made Aware PT Edel Ennis   Nurse Made Aware JENI Acosta confirmed pt appropriate for therapy and made aware of session outcomes   RUE Assessment   RUE Assessment WFL  (Grossly WFL based on functional assessment, to be further formally assessed in future treatment sessions )   LUE Assessment   LUE Assessment WFL  (Grossly WFL based on functional assessment, to be further formally assessed in future treatment sessions )   Hand Function   Gross Motor Coordination    (Further assessment required)   Fine Motor Coordination    (Further assessment required)   Sensation   Light Touch No apparent deficits  (BUEs)   Additional Comments Patient denies diminished sensation t/o BUEs   Vision-Basic Assessment   Current Vision Wears glasses only for reading   Cognition   Overall Cognitive Status [facility]   Arousal/Participation Responsive; Cooperative   Attention Within functional limits   Orientation Level Oriented X4   Memory Within functional limits   Following Commands Follows all commands and directions without difficulty   Comments Patient agreeable to OT evaluation   Assessment   Limitation Decreased ADL status; Decreased endurance;Decreased self-care trans;Decreased high-level ADLs   Prognosis Good   Assessment Patient is a 73 y o  male seen for OT evaluation s/p admit to [address] [place] [facility] on 5/24/2022 w/Status post lumbar spinal fusion  Commorbidities affecting patient's functional performance at time of assessment include: HTN and hyperlipidemia  Patient  has a past medical history of Ambulates with cane, Arthritis, Back pain, Chronic pain disorder, Dental crowns present, Exercise involving walking, History of hepatitis C, History of transfusion, Hyperlipidemia, Hypertension, Leg pain, Limb alert care status, Risk for falls, Spinal stenosis, Stroke ([facility] [address] ), and Wears glasses  Orders placed for OT evaluation and treatment  Performed at least two patient identifiers during session including name and wristband  Prior to admission, patient was living with his spouse in a two-story home with 1 step to enter  At baseline, patient reports that he is independent in both ADLs and IADLs  Personal factors affecting patient at time of initial evaluation include: steps to enter, difficulty performing ADLs and difficulty performing IADLs  Upon evaluation, patient requires supervision, set up and minimal assist for UB ADLs, maximal assist for LB ADLs, transfers with minimal assist x2, with the use of Rolling Walker  Patient is alert and oriented x 4  Occupational performance is affected by the following deficits: degenerative arthritic joint changes, dynamic sit/ stand balance deficit with poor standing tolerance time for self care and functional mobility, decreased activity tolerance, increased pain, orthopedic restrictions and postural control and postural alignment deficit, requiring external assistance to complete transitional movements, decreased energy level, and weakness  Patient to benefit from continued Occupational Therapy treatment while in the hospital to address deficits as defined above and maximize level of functional independence with ADLs and functional mobility   Occupational Performance areas to address include: grooming , bathing/ shower, dressing, toilet hygiene, transfer to all surfaces, functional mobility, IADLs: safety procedures, Leisure Participation and Home management  From OT standpoint, recommendation at time of d/c would be post-acute rehabilitation services  CARE SCORES:  Self Care:  Eatin: Independent  Oral hygiene: 04: Supervision or touching  assistance  Toilet hygiene: 03: Partial/moderate assistance  Shower/bathing self: 02: Substantial/maximal assistance  Upper body dressin: Supervision or touching  assistance  Lower body dressin: Substantial/maximal assistance  Putting on/taking off footwear: 09: Not applicable  Transfers:  Roll left and right: 09: Not applicable  Sit to lyin: Not applicable  Lying to sitting on side of bed: 01: Dependent min x 2 assistance  Sit to stand: 01: Dependent min x 2 assistance  Chair/bed to chair transfer: 09: Not applicable  Toilet transfer: 09: Not applicable  Mobility:  Walk 10 ft: 09: Not applicable  Walk 50 ft with two turns: 09: Not applicable  Walk 022WO: 09: Not applicable     Ambulated 5 feet with RW and min x 2 assistance    CURRENT GAP IN FUNCTION  Prior to Admission: Functional Status: Patient was independent with his ADLs  He ambulated primarily independently without an AD but did use cane PRN  +    Estimated length of stay: 10 to 14 days    Anticipated Post-Discharge Disposition/Treatment  Disposition: Return to previous home/apartment    Outpatient Services: Physical Therapy (PT) and Occupational Therapy (OT)    BARRIERS TO DISCHARGE  Home Accessibility, Decreased strength, Decreased endurance, Impaired balance, Decreased mobility, Orthopedic restrictions, Pain, Decreased ADL status, Decreased self-care trans, Decreased high-level ADLs    INTERVENTIONS FOR DISCHARGE  ADL retraining, Functional transfer training, UE strengthening/ROM, Endurance training, Patient/family training, Equipment evaluation/education, Compensatory technique education, Continued evaluation, Energy conservation, Activity engagement, LE strengthening/ROM, Therapeutic exercise, Bed mobility, Gait training, Continued evaluation    REQUIRED THERAPY:  Patient will require PT and OT 90 minutes each per day, five days per week to achieve rehab goals  REQUIRED FUNCTIONAL AND MEDICAL MANAGEMENT FOR INPATIENT REHABILITATION:  Skin:  Patient will require close monitoring of skin for any breakdown secondary to decreased mobility  He will also require close monitoring of his back incision for any complications, Pain Management: Overall pain is moderately controlled, Deep Vein Thrombosis (DVT) Prophylaxis:  SCD's while in bed, PT and OT interventions, any necessary labs, consults, imaging studies and medication adjustments needed to manage patient's case while on ARC    RECOMMENDED LEVEL OF CARE:   This is a 73year old male who presented to [address] [place] [address] on 5/25/22 for an elective back surgery after failing conservative treatment  Patient has a h/o scoliosis, flat back syndrome, thoracolumbar DJD and lumbar surgery  He is s/p L1-S1 revision of segmental hardware, L5-S1 osteotomy, posterior spinal fusion L4-S1, Transforaminal lumbar interbody fusion L5-S1, and instrumentation L1-pelvis on 5/24/22  Following his surgery, his pain was managed on a PCA pump  Pump now discontinued and his pain is being managed on PO pain medications  Patient also c/o numbness on the top of his left foot which has been decreasing  He is ordered WBAT to all extremities  Also ordered LSO brace to be worn at all times when OOB  SCDs and ASA ordered for DVT prophylaxis  X-rays of lumbar spine obtained on 5/25 show orthopedic hardware in stable alignment  He was noted to have ABLA  Hgb dropped to 8 9 on 5/27 from 10 0 on 5/25  Patient worked with PT and OT and recommended for rehab following his hospital stay  He is now medically cleared and ready for discharge to the [facility]    PTA, patient was independent with his ADLs and primarily ambulated independently without a device  He used a SPC PRN  +  He is now requiring supervision to maximum assistance for his ADLs  With PT, he is requiring min x 2 assistance for supine to sit bed mobility and min x 2 assistance for sit to stand and stand to sit transfers  He ambulated 5 feet with RW and min x 2 assistance  Gait pattern: decreased foot clearance, decreased left stance, antalgic, short stride and step to  Patient requires close oversight by a physician, PM&R management, 24/7 rehabilitation nursing care and specialized interdisciplinary therapy services in preparation for discharge which can only be provided in the inpatient acute rehabilitation setting  Patient requires close monitoring of his VS, I/Os, pain level, back incision, skin and his overall condition  Inpatient acute rehabilitation is recommended for this patient to maximize his overall strength, endurance, self care and mobility upon discharge home with the support of his wife

## 2022-05-26 NOTE — CASE MANAGEMENT
Case Management Discharge Planning Note    Patient name Harini Marc  Location /-44 MRN [id number]  : 1955 Date 2022       Current Admission Date: 2022  Current Admission Diagnosis:Status post lumbar spinal fusion   Patient Active Problem List    Diagnosis Date Noted    Status post lumbar spinal fusion 2022    Hyperlipidemia     Hypertension       LOS (days): 2  Geometric Mean LOS (GMLOS) (days): 2 70  Days to GMLOS:0 9     OBJECTIVE:  Risk of Unplanned Readmission Score: 8 26         Current admission status: Inpatient   Preferred Pharmacy:   [facility] [id number] [facility] [address]  Phone: [phone] Fax: [phone]    Primary Care Provider: Redd Mccormack MD    Primary Insurance: MEDICARE  Secondary Insurance: [facility]    DISCHARGE DETAILS:    Discharge planning discussed with[de-identified] patient  Freedom of Choice: Yes  Comments - Freedom of Choice: pt discussed freedom of choice  pt wants acute/str rehab  no preference  CM contacted family/caregiver?: Yes  Were Treatment Team discharge recommendations reviewed with patient/caregiver?: Yes  Did patient/caregiver verbalize understanding of patient care needs?: Yes  Were patient/caregiver advised of the risks associated with not following Treatment Team discharge recommendations?: Yes    Contacts  Patient Contacts: Mylene-spouse  Relationship to Patient[de-identified] Family  Contact Method: Phone  Phone Number: [phone]  Reason/Outcome: Continuity of Care, Emergency Contact, Referral, Discharge [address]         Is the patient interested in [place] [address] at discharge?: No    DME Referral Provided  Referral made for DME?: No    Other Referral/Resources/Interventions Provided:  Interventions: Acute Rehab  Referral Comments: blanket referrals made for acute rehab and str as per no preference per pt and wife           Treatment Team Recommendation: Acute Rehab  Discharge Destination Plan[de-identified] Acute Rehab  Transport at Discharge : BLS Ambulance  Dispatcher Contacted:  No                 Transfer Mode: Stretcher  Accompanied by: Alone

## 2022-05-26 NOTE — PHYSICAL THERAPY NOTE
Physical Therapy Treatment Note       05/26/22 0912   PT Last Visit   PT Visit Date 05/26/22   Note Type   Note Type Treatment   Pain Assessment   Pain Assessment Tool 0-10   Pain Score 4  (4/10 at rest, 8/10 with mobility)   Pain Location/Orientation Orientation: Lower; Location: Back   Hospital Pain Intervention(s) Repositioned; Ambulation/increased activity; Rest  (RN aware of pt's pain report)   Restrictions/Precautions   Weight Bearing Precautions Per Order Yes  (per ortho, WBAT all extremities)   RUE Weight Bearing Per Order WBAT   LUE Weight Bearing Per Order WBAT   RLE Weight Bearing Per Order WBAT   LLE Weight Bearing Per Order WBAT   Braces or Orthoses LSO   Other Precautions Chair Alarm; Bed Alarm; Fall Risk;Multiple lines;Spinal precautions;Pain  (Back safety precautions/log roll technique)   General   Chart Reviewed Yes   Additional Pertinent History per ortho: Weightbearing as tolerated all extremities  LSO brace to be worn at all times when up and out of bed   Response to Previous Treatment Patient with no complaints from previous session  Family/Caregiver Present No   Cognition   Overall Cognitive Status WFL   Arousal/Participation Alert; Cooperative   Attention Within functional limits   Orientation Level Oriented to person;Oriented to place;Oriented to time;Oriented to situation   Memory Within functional limits   Following Commands Follows all commands and directions without difficulty   Comments pt agreeable to PT session   Subjective   Subjective " I can try"   Bed Mobility   Supine to Sit 4  Minimal assistance   Additional items Assist x 2;HOB elevated; Increased time required;Verbal cues;LE management   Additional Comments LOG ROLL TECHNIQUE  time spent educating pt on back safety precautions, log roll technique, LSO brace components, donning instructions, adjusting brace for enhanced comfort, and skin check   Transfers   Sit to Stand 4  Minimal assistance   Additional items Assist x 2;Armrests; Increased time required;Verbal cues   Stand to Sit 4  Minimal assistance   Additional items Assist x 2;Armrests; Increased time required;Verbal cues   Ambulation/Elevation   Gait pattern Decreased foot clearance;Decreased L stance; Antalgic; Short stride; Step to   Gait Assistance 4  Minimal assist   Additional items Assist x 2;Verbal cues; Tactile cues   Assistive Device Rolling walker   Distance 5'   Balance   Static Sitting Fair +   Dynamic Sitting Fair   Static Standing Fair -   Dynamic Standing Poor +   Ambulatory Poor +   Endurance Deficit   Endurance Deficit Yes   Activity Tolerance   Activity Tolerance Patient limited by pain; Patient limited by fatigue   Medical Staff Made Aware KETURAH Menchaca   Nurse Made Aware JENI Mccall   Exercises   Knee AROM Long Arc Quad Sitting;10 reps;AROM; Bilateral   Ankle Pumps Sitting;10 reps;AROM; Bilateral  (heel/toe raises)   Marching Sitting;10 reps;AROM; Bilateral   Assessment   Prognosis Good   Problem List Decreased strength;Decreased endurance; Impaired balance;Decreased mobility;Orthopedic restrictions;Pain   Assessment Pt seen for PT treatment session this date, consisting of ther act focused on bed mobility/log roll technique, STS transfers; short gait trial from EOB to recliner with use of RW, ther ex focused on strengthening and LSO brace training  Since previous session, pt has made good progress in terms of initiation of OOB mobility, able to achieve full upright posture without L knee buckling observed this date; pt able to initiate seated Reba to tolerance, pt requiring min A x2 for all phases of mobility; LSO brace donned at EOB prior to OOB mobility; improved standing balance scores and initiation of short gait trial  Pertinent barriers during this session include pain & fatigue  Current goals and POC remain appropriate, pt continues to have rehab potential and is making progress towards STGs   Pt prognosis for achieving goals is good, pending pt progress with hospitalization/medical status improvements, and indicated by [facility], ability to follow directions, recent onset and previous response to intervention  Pt limited d/t the presence of intractable pain, fear of pain provocation and fear of falling  PT recommends post acute rehabilitation services upon discharge  Pt continues to be functioning below baseline level, and remains limited 2* factors listed above  PT will continue to see pt during current hospitalization in order to address the deficits listed above and provide interventions consistent w/ POC in effort to achieve STGs  Barriers to Discharge Decreased caregiver support; Inaccessible home environment   Goals   Patient Goals to go to rehab   STG Expiration Date 06/04/22   Short Term Goal #1 Additional: Ambulate > 50 ft  with least restrictive assistive device with close S w/o LOB and w/ normalized gait pattern 100% of the time, Increase dynamic standing and ambulatory balance 1/2 grade to decrease risk for falls and PT to see and establish goals for elevations/stairs when appropriate   PT Treatment Day 2   Plan   Treatment/Interventions Functional transfer training;LE strengthening/ROM; Therapeutic exercise; Endurance training;Patient/family training;Equipment eval/education; Bed mobility;Gait training;Continued evaluation;Spoke to nursing   Progress Progressing toward goals   PT Frequency 5-7x/wk   Recommendation   PT Discharge Recommendation Post acute rehabilitation services   Equipment Recommended Walker  (RW)   AM-PAC Basic Mobility Inpatient   Turning in Bed Without Bedrails 3   Lying on Back to Sitting on Edge of Flat Bed 3   Moving Bed to Chair 3   Standing Up From Chair 3   Walk in Room 1   Climb 3-5 Stairs 1   Basic Mobility Inpatient Raw Score 14   Basic Mobility Standardized Score 35 55   Highest Level Of Mobility   [facility] Goal 4: Move to chair/commode   Education   Education Provided Mobility training;Home exercise program;Assistive device Patient Reinforcement needed   End of Consult   Patient Position at End of Consult Bedside chair;Bed/Chair alarm activated; All needs within reach             Sukhdev Faustin PT    Time of PT treatment session: 122-159 (total treatment time = 39 minutes)

## 2022-05-26 NOTE — PLAN OF CARE
Problem: PHYSICAL THERAPY ADULT  Goal: Performs mobility at highest level of function for planned discharge setting  See evaluation for individualized goals  Description: Treatment/Interventions: Functional transfer training, LE strengthening/ROM, Therapeutic exercise, Endurance training, Patient/family training, Equipment eval/education, Bed mobility, Spoke to nursing, Continued evaluation, Spoke to advanced practitioner, OT  Equipment Recommended:  (TBD)       See flowsheet documentation for full assessment, interventions and recommendations  Outcome: Progressing  Note: Prognosis: Good  Problem List: Decreased strength, Decreased endurance, Impaired balance, Decreased mobility, Orthopedic restrictions, Pain  Assessment: Pt seen for PT treatment session this date, consisting of ther act focused on bed mobility/log roll technique, STS transfers; short gait trial from EOB to recliner with use of RW, ther ex focused on strengthening and LSO brace training  Since previous session, pt has made good progress in terms of initiation of OOB mobility, able to achieve full upright posture without L knee buckling observed this date; pt able to initiate seated Reba to tolerance, pt requiring min A x2 for all phases of mobility; LSO brace donned at EOB prior to OOB mobility; improved standing balance scores and initiation of short gait trial  Pertinent barriers during this session include pain & fatigue  Current goals and POC remain appropriate, pt continues to have rehab potential and is making progress towards STGs  Pt prognosis for achieving goals is good, pending pt progress with hospitalization/medical status improvements, and indicated by [facility], ability to follow directions, recent onset and previous response to intervention  Pt limited d/t the presence of intractable pain, fear of pain provocation and fear of falling  PT recommends post acute rehabilitation services upon discharge   Pt continues to be functioning below baseline level, and remains limited 2* factors listed above  PT will continue to see pt during current hospitalization in order to address the deficits listed above and provide interventions consistent w/ POC in effort to achieve STGs  Barriers to Discharge: Decreased caregiver support, Inaccessible home environment        PT Discharge Recommendation: Post acute rehabilitation services          See flowsheet documentation for full assessment

## 2022-05-26 NOTE — PROGRESS NOTES
[facility]  Progress Note - Cody Jimenez 1955, 73 y o  male MRN: [id number]  Unit/Bed#: -01 Encounter: [id number]  Primary Care Provider: Clark Hanson MD   Date and time admitted to hospital: 2022  8:14 AM    * Status post lumbar spinal fusion  Assessment & Plan  This is per the primary service  Monitor for postop blood loss anemia  TLSO brace is at the bedside  PT OT evaluation per Orthopedics  Discharge per them    Hypertension  Assessment & Plan  Patient is on an ACE-inhibitor  Will continue  Will be careful not to lower blood pressure too much for the risk of postop acute renal insufficiency secondary to hypoperfusion  Blood pressure is acceptable    Hyperlipidemia  Assessment & Plan  Continue statin  Monitor LFTs  If they go up anymore we can hold it but looks like his LFTs have been chronically elevated  He does have a history of hepatitis-C as well  Pio Brewer VTE Pharmacologic Prophylaxis:   Pharmacologic: VT prophylaxis per surgery  Mechanical VTE Prophylaxis in Place: Yes    Patient Centered Rounds: I have performed bedside rounds with nursing staff today  Time Spent for Care: 20 minutes  More than 50% of total time spent on counseling and coordination of care as described above  Current Length of Stay: 2 day(s)    Current Patient Status: Inpatient       Discharge Plan:  Disposition will depend on primary service    Code Status: No Order      Subjective:   Patient seen examined  Did have some pain after working with physical therapy    Objective:     Vitals:   Temp (24hrs), Av 6 °F (37 6 °C), Min:99 4 °F (37 4 °C), Max:99 8 °F (37 7 °C)    Temp:  [99 4 °F (37 4 °C)-99 8 °F (37 7 °C)] 99 4 °F (37 4 °C)  HR:  [] 98  Resp:  [18-19] 18  BP: (134-141)/(69-78) 134/69  SpO2:  [90 %-96 %] 96 %  Body mass index is 30 87 kg/m²  Input and Output Summary (last 24 hours):        Intake/Output Summary (Last 24 hours) at 2022 1250  Last data filed at 5/26/2022 0800  Gross per 24 hour   Intake 1360 ml   Output 2310 ml   Net -950 ml       Physical Exam:     Physical Exam  (   General Appearance:    Alert, cooperative, no distress, appears stated age                               Lungs:     Clear to auscultation bilaterally, respirations unlabored       Heart:    Regular rate and rhythm, S1 and S2 normal, no murmur, rub    or gallop   Abdomen:     Soft, non-tender, bowel sounds active all four quadrants,     no masses, no organomegaly           Extremities:   Extremities normal, atraumatic, no cyanosis or edema   )    Additional Data:     Labs:    Results from last 7 days   Lab Units 05/26/22  0526   WBC Thousand/uL 8 86   HEMOGLOBIN g/dL 9 3*   HEMATOCRIT % 28 1*   PLATELETS Thousands/uL 199   NEUTROS PCT % 80*   LYMPHS PCT % 12*   MONOS PCT % 6   EOS PCT % 1     Results from last 7 days   Lab Units 05/26/22  0526 05/25/22  0441 05/24/22  2245   SODIUM mmol/L 135*   < > 140   POTASSIUM mmol/L 3 6   < > 4 0   CHLORIDE mmol/L 103   < > 106   CO2 mmol/L 26   < > 24   BUN mg/dL 13   < > 14   CREATININE mg/dL 0 72   < > 0 81   ANION GAP mmol/L 6   < > 10   CALCIUM mg/dL 8 1*   < > 8 1*   ALBUMIN g/dL  --   --  3 2*   TOTAL BILIRUBIN mg/dL  --   --  0 61   ALK PHOS U/L  --   --  66   ALT U/L  --   --  41   AST U/L  --   --  72*   GLUCOSE RANDOM mg/dL 121   < > 139    < > = values in this interval not displayed  Results from last 7 days   Lab Units 05/24/22  0849   INR  1 02     Results from last 7 days   Lab Units 05/25/22  1557   POC GLUCOSE mg/dl 127                   * I Have Reviewed All Lab Data Listed Above  * Additional Pertinent Lab Tests Reviewed:  [facility] Review[facility]        Recent Cultures (last 7 days):           Last 24 Hours Medication List:   Current Facility-Administered Medications   Medication Dose Route Frequency Provider Last Rate    aspirin  325 mg Oral Daily Tori Meehan PA-C      docusate sodium 100 mg Oral BID Tori Meehan PA-C      HYDROcodone-acetaminophen  1 tablet Oral Q6H PRN Tori Meehan PA-C      lactated ringers  75 mL/hr Intravenous Continuous Jose Knight MD 75 mL/hr (05/26/22 1026)    lisinopril  5 mg Oral Daily Tori Meehan PA-C      methocarbamol  500 mg Oral Q6H PRN Tori Meehan PA-C      naloxone  0 1 mg Intravenous Q3 min PRN Tori Meehan PA-C      ondansetron  4 mg Intravenous Q6H PRN Tori Meehan PA-C      pravastatin  10 mg Oral Daily With OSMIN Brooks          Today, Patient Was Seen By: Jose Knight MD    ** Please Note: Dictation voice to text software may have been used in the creation of this document   **

## 2022-05-26 NOTE — PROGRESS NOTES
Orthopedics   Jessica Sanders 73 y o  male MRN: [id number]  Unit/Bed#: -01    Subjective:  73 y  o male POD#2 s/p L1-S1 revision of segmental hardware, L5-S1 osteotomy, posterior spinal fusion L4-S1, Transforaminal lumbar interbody fusion L5-S1, and instrumentation L1-pelvis  Patient has some pain in back this morning  Patient states he has no pain at rest, but pain is 9/10 with movement  Patient denies any radiation of pain into lower extremities  Patient continues to have numbness on the top of left foot  Patient states he is able to move the left lower extremity with more ease compared to yesterday  Patient denies any shortness of breath, chest pain, lightheadedness, dizziness, nausea, and vomiting  Patient offers no additional complaints at this time       Labs:  0   Lab Value Date/Time    HCT 28 1 (L) 05/26/2022 0526    HCT 30 3 (L) 05/25/2022 0441    HCT 30 (L) 05/24/2022 1732    HCT 32 (L) 05/24/2022 1642    HCT 37 05/24/2022 1452    HCT 45 7 05/03/2022 0800    HGB 9 3 (L) 05/26/2022 0526    HGB 10 0 (L) 05/25/2022 0441    HGB 10 2 (L) 05/24/2022 1732    HGB 10 9 (L) 05/24/2022 1642    HGB 12 6 05/24/2022 1452    HGB 14 8 05/03/2022 0800    INR 1 02 05/24/2022 0849    WBC 8 86 05/26/2022 0526    WBC 9 52 05/25/2022 0441    WBC 5 52 05/03/2022 0800       Meds:    Current Facility-Administered Medications:     aspirin tablet 325 mg, 325 mg, Oral, Daily, Tori Meehan PA-C, 325 mg at 05/25/22 0763    docusate sodium (COLACE) capsule 100 mg, 100 mg, Oral, BID, HUEY Juárez-C, 100 mg at 05/25/22 1716    HYDROcodone-acetaminophen (NORCO) 5-325 mg per tablet 1 tablet, 1 tablet, Oral, Q6H PRN, Tori Meehan PA-C, 1 tablet at 05/26/22 0116    lactated ringers infusion, 100 mL/hr, Intravenous, Continuous, Tori Meehan PA-C, Last Rate: 100 mL/hr at 05/26/22 0455, 100 mL/hr at 05/26/22 0455    lisinopril (ZESTRIL) tablet 5 mg, 5 mg, Oral, Daily, Tori Meehan PA-C, 5 mg at 05/25/22 3167    methocarbamol (ROBAXIN) tablet 500 mg, 500 mg, Oral, Q6H PRN, Tori eMehan PA-C    naloxone (NARCAN) injection 0 1 mg, 0 1 mg, Intravenous, Q3 min PRN, Tori Meehan PA-C    ondansetron (ZOFRAN) injection 4 mg, 4 mg, Intravenous, Q6H PRN, Tori Meehan PA-C    pravastatin (PRAVACHOL) tablet 10 mg, 10 mg, Oral, Daily With Dinner, Tori Meehan PA-C, 10 mg at 05/25/22 1716    Blood Culture:   No results found for: BLOODCX    Wound Culture:   No results found for: WOUNDCULT    Ins and Outs:  I/O last 24 hours: In: 1360 [P O :360; I V :1000]  Out: 2560 [Urine:2560]          Physical:  Vitals:    05/26/22 0800   BP: 134/69   Pulse: 98   Resp: 18   Temp: 99 4 °F (37 4 °C)   SpO2: 96%     Lumbar spine:  · Patient is a 64-year-old male laying comfortably in hospital bed in no acute distress  · Mepilex dressing clean, dry, and intact without strike through  · Neurovascularly intact L2-S1 bilateral lower extremities  · 5/5 motor strength with EHL/FHL and ankle dorsi/plantar flexion in right lower extremity  · 4/5 motor strength with EHL/FHL and 5/5 motor strength with ankle dorsi/plantar flexion in left lower extremity  · 2+ DP pulse bilateral lower extremities  · Calfs supple and nontender    Assessment:  73 y  o male POD#2 s/p L1-S1 revision of segmental hardware, L5-S1 osteotomy, posterior spinal fusion L4-S1, Transforaminal lumbar interbody fusion L5-S1, and instrumentation L1-pelvis  Plan:  · Weight Bearing as tolerated all extremities  · Up and out of bed  · LSO brace to be worn at all times out of bed  · DVT prophylaxis: mechanical and aspirin 325mg   · Analgesics  · PT/OT  · Patient noted to have acute blood loss anemia due to a drop in Hbg of > 2 0g from preop levels, will monitor vital signs and resuscitate with IV fluids as needed   · Dispo: Patient is doing well following surgical intervention  Weightbearing as tolerated all extremities   LSO brace to be worn at all times when up and out of bed  Patient encouraged to work with PT/OT  Xrays of lumbar spine obtained yesterday shows orthopedic hardware in stable alignment  Discharge planning to acute rehab       Stormy Li PA-C

## 2022-05-26 NOTE — ASSESSMENT & PLAN NOTE
Patient is on an ACE-inhibitor  Will continue  Will be careful not to lower blood pressure too much for the risk of postop acute renal insufficiency secondary to hypoperfusion    Blood pressure is acceptable

## 2022-05-26 NOTE — ASSESSMENT & PLAN NOTE
Continue statin  Monitor LFTs  If they go up anymore we can hold it but looks like his LFTs have been chronically elevated  He does have a history of hepatitis-C as well  Felix Cruz

## 2022-05-26 NOTE — CASE MANAGEMENT
Case Management Discharge Planning Note    Patient name Clem Morley  Location /-15 MRN [id number]  : 1955 Date 2022       Current Admission Date: 2022  Current Admission Diagnosis:Status post lumbar spinal fusion   Patient Active Problem List    Diagnosis Date Noted    Status post lumbar spinal fusion 2022    Hyperlipidemia     Hypertension       LOS (days): 2  Geometric Mean LOS (GMLOS) (days): 2 70  Days to GMLOS:0 6     OBJECTIVE:  Risk of Unplanned Readmission Score: 8 31         Current admission status: Inpatient   Preferred Pharmacy:   [facility] [phone] [facility] [address]  Phone: [phone] Fax: [phone]    Primary Care Provider: Rafaela Redmond MD    Primary Insurance: MEDICARE  Secondary Insurance: [facility]    DISCHARGE DETAILS:    Discharge planning discussed with[de-identified] PATIENT  Freedom of Choice: Yes  Comments - Freedom of Choice: PATIENT AGREES TO [facility] 2022 AS PER DONALD PT APPROVED  W/ BED AVAILABLE 2022 1330  CM contacted family/caregiver?: Yes  Were Treatment Team discharge recommendations reviewed with patient/caregiver?: Yes  Did patient/caregiver verbalize understanding of patient care needs?: Yes  Were patient/caregiver advised of the risks associated with not following Treatment Team discharge recommendations?: Yes    Contacts  Patient Contacts: Mylene-spouse  Relationship to Patient[de-identified] Family  Contact Method: Phone  Phone Number: [phone]  Reason/Outcome: Continuity of Care, Emergency Contact, Referral, Discharge [address]         Is the patient interested in [address] [address] at discharge?: No    DME Referral Provided  Referral made for DME?: No    Other Referral/Resources/Interventions Provided:  Interventions: Acute Rehab  Referral Comments: SEE NOTES ABOVE           Treatment Team Recommendation: Acute Rehab  Discharge Destination Plan[de-identified] Acute Rehab     Dispatcher Contacted: Yes (CM REQUESTED TRANSPORT FOR 5/27/2022  CMN SENT TO [facility]  COPY TO MEDICAL RECORDS BIN FOR SCANNING   ORIGINAL AND FACESHEET TO [facility] CHART FOR TRANSPORT)                 Transfer Mode: Stretcher  Accompanied by: Alone

## 2022-05-27 ENCOUNTER — HOSPITAL ENCOUNTER (INPATIENT)
Facility: HOSPITAL | Age: 67
LOS: 10 days | Discharge: HOME | DRG: 560 | End: 2022-06-06
Attending: PHYSICAL MEDICINE & REHABILITATION | Admitting: PHYSICAL MEDICINE & REHABILITATION
Payer: MEDICARE

## 2022-05-27 VITALS
RESPIRATION RATE: 18 BRPM | HEART RATE: 97 BPM | SYSTOLIC BLOOD PRESSURE: 136 MMHG | BODY MASS INDEX: 30.8 KG/M2 | WEIGHT: 215.17 LBS | HEIGHT: 70 IN | OXYGEN SATURATION: 95 % | TEMPERATURE: 99.1 F | DIASTOLIC BLOOD PRESSURE: 83 MMHG

## 2022-05-27 DIAGNOSIS — I10 HYPERTENSION: Primary | ICD-10-CM

## 2022-05-27 DIAGNOSIS — Z98.1 STATUS POST LUMBAR SPINAL FUSION: ICD-10-CM

## 2022-05-27 DIAGNOSIS — D64.9 ANEMIA: ICD-10-CM

## 2022-05-27 LAB
ABO GROUP BLD BPU: NORMAL
ABO GROUP BLD BPU: NORMAL
ANION GAP SERPL CALCULATED.3IONS-SCNC: 5 MMOL/L (ref 4–13)
BASOPHILS # BLD AUTO: 0.01 THOUSANDS/ΜL (ref 0–0.1)
BASOPHILS NFR BLD AUTO: 0 % (ref 0–1)
BPU ID: NORMAL
BPU ID: NORMAL
BUN SERPL-MCNC: 12 MG/DL (ref 5–25)
CALCIUM SERPL-MCNC: 8.2 MG/DL (ref 8.3–10.1)
CHLORIDE SERPL-SCNC: 105 MMOL/L (ref 100–108)
CO2 SERPL-SCNC: 27 MMOL/L (ref 21–32)
CREAT SERPL-MCNC: 0.71 MG/DL (ref 0.6–1.3)
CROSSMATCH: NORMAL
CROSSMATCH: NORMAL
EOSINOPHIL # BLD AUTO: 0.1 THOUSAND/ΜL (ref 0–0.61)
EOSINOPHIL NFR BLD AUTO: 1 % (ref 0–6)
ERYTHROCYTE [DISTWIDTH] IN BLOOD BY AUTOMATED COUNT: 13.2 % (ref 11.6–15.1)
GFR SERPL CREATININE-BSD FRML MDRD: 98 ML/MIN/1.73SQ M
GLUCOSE SERPL-MCNC: 113 MG/DL (ref 65–140)
HCT VFR BLD AUTO: 27.5 % (ref 36.5–49.3)
HGB BLD-MCNC: 8.9 G/DL (ref 12–17)
IMM GRANULOCYTES # BLD AUTO: 0.02 THOUSAND/UL (ref 0–0.2)
IMM GRANULOCYTES NFR BLD AUTO: 0 % (ref 0–2)
LYMPHOCYTES # BLD AUTO: 1.36 THOUSANDS/ΜL (ref 0.6–4.47)
LYMPHOCYTES NFR BLD AUTO: 17 % (ref 14–44)
MCH RBC QN AUTO: 29.2 PG (ref 26.8–34.3)
MCHC RBC AUTO-ENTMCNC: 32.4 G/DL (ref 31.4–37.4)
MCV RBC AUTO: 90 FL (ref 82–98)
MONOCYTES # BLD AUTO: 0.5 THOUSAND/ΜL (ref 0.17–1.22)
MONOCYTES NFR BLD AUTO: 6 % (ref 4–12)
NEUTROPHILS # BLD AUTO: 6.12 THOUSANDS/ΜL (ref 1.85–7.62)
NEUTS SEG NFR BLD AUTO: 76 % (ref 43–75)
NRBC BLD AUTO-RTO: 0 /100 WBCS
PLATELET # BLD AUTO: 196 THOUSANDS/UL (ref 149–390)
PMV BLD AUTO: 9 FL (ref 8.9–12.7)
POTASSIUM SERPL-SCNC: 4 MMOL/L (ref 3.5–5.3)
RBC # BLD AUTO: 3.05 MILLION/UL (ref 3.88–5.62)
SODIUM SERPL-SCNC: 137 MMOL/L (ref 136–145)
UNIT DISPENSE STATUS: NORMAL
UNIT DISPENSE STATUS: NORMAL
UNIT PRODUCT CODE: NORMAL
UNIT PRODUCT CODE: NORMAL
UNIT PRODUCT VOLUME: 350 ML
UNIT PRODUCT VOLUME: 350 ML
UNIT RH: NORMAL
UNIT RH: NORMAL
WBC # BLD AUTO: 8.11 THOUSAND/UL (ref 4.31–10.16)

## 2022-05-27 PROCEDURE — 99232 SBSQ HOSP IP/OBS MODERATE 35: CPT | Performed by: FAMILY MEDICINE

## 2022-05-27 PROCEDURE — NC001 PR NO CHARGE

## 2022-05-27 PROCEDURE — 97110 THERAPEUTIC EXERCISES: CPT

## 2022-05-27 PROCEDURE — 99222 1ST HOSP IP/OBS MODERATE 55: CPT | Performed by: INTERNAL MEDICINE

## 2022-05-27 PROCEDURE — 80048 BASIC METABOLIC PNL TOTAL CA: CPT

## 2022-05-27 PROCEDURE — 97116 GAIT TRAINING THERAPY: CPT

## 2022-05-27 PROCEDURE — 85025 COMPLETE CBC W/AUTO DIFF WBC: CPT

## 2022-05-27 PROCEDURE — 99222 1ST HOSP IP/OBS MODERATE 55: CPT | Performed by: PHYSICAL MEDICINE & REHABILITATION

## 2022-05-27 PROCEDURE — 99024 POSTOP FOLLOW-UP VISIT: CPT

## 2022-05-27 PROCEDURE — 1124F ACP DISCUSS-NO DSCNMKR DOCD: CPT | Performed by: INTERNAL MEDICINE

## 2022-05-27 PROCEDURE — 97530 THERAPEUTIC ACTIVITIES: CPT

## 2022-05-27 RX ORDER — PRAVASTATIN SODIUM 10 MG
10 TABLET ORAL EVERY EVENING
Status: DISCONTINUED | OUTPATIENT
Start: 2022-05-27 | End: 2022-06-06

## 2022-05-27 RX ORDER — ASPIRIN 325 MG
325 TABLET ORAL DAILY
Qty: 25 TABLET | Refills: 0 | Status: ON HOLD | OUTPATIENT
Start: 2022-05-28 | End: 2022-06-06 | Stop reason: CLARIF

## 2022-05-27 RX ORDER — NALOXONE HYDROCHLORIDE 0.4 MG/ML
0.1 INJECTION, SOLUTION INTRAMUSCULAR; INTRAVENOUS; SUBCUTANEOUS
Qty: 1 ML | Refills: 0 | Status: ON HOLD | OUTPATIENT
Start: 2022-05-27 | End: 2022-06-06 | Stop reason: CLARIF

## 2022-05-27 RX ORDER — HYDROCODONE BITARTRATE AND ACETAMINOPHEN 5; 325 MG/1; MG/1
1 TABLET ORAL EVERY 6 HOURS PRN
Qty: 30 TABLET | Refills: 0 | Status: ON HOLD | OUTPATIENT
Start: 2022-05-27 | End: 2022-06-06 | Stop reason: CLARIF

## 2022-05-27 RX ORDER — PRAVASTATIN SODIUM 20 MG
10 TABLET ORAL
Status: CANCELLED | OUTPATIENT
Start: 2022-05-27

## 2022-05-27 RX ORDER — METHOCARBAMOL 500 MG/1
500 TABLET, FILM COATED ORAL EVERY 6 HOURS PRN
Qty: 40 TABLET | Refills: 1 | Status: ON HOLD | OUTPATIENT
Start: 2022-05-27 | End: 2022-06-06 | Stop reason: CLARIF

## 2022-05-27 RX ORDER — ONDANSETRON 2 MG/ML
4 INJECTION INTRAMUSCULAR; INTRAVENOUS EVERY 6 HOURS PRN
Status: CANCELLED | OUTPATIENT
Start: 2022-05-27

## 2022-05-27 RX ORDER — HYDRALAZINE HYDROCHLORIDE 25 MG/1
25 TABLET, FILM COATED ORAL EVERY 8 HOURS PRN
Status: DISCONTINUED | OUTPATIENT
Start: 2022-05-27 | End: 2022-06-06

## 2022-05-27 RX ORDER — METHOCARBAMOL 500 MG/1
500 TABLET, FILM COATED ORAL EVERY 6 HOURS PRN
Status: CANCELLED | OUTPATIENT
Start: 2022-05-27

## 2022-05-27 RX ORDER — HYDROCODONE BITARTRATE AND ACETAMINOPHEN 5; 325 MG/1; MG/1
1 TABLET ORAL EVERY 6 HOURS PRN
Status: CANCELLED | OUTPATIENT
Start: 2022-05-27

## 2022-05-27 RX ORDER — DOCUSATE SODIUM 100 MG/1
100 CAPSULE, LIQUID FILLED ORAL 2 TIMES DAILY PRN
Qty: 30 CAPSULE | Refills: 0 | Status: ON HOLD | OUTPATIENT
Start: 2022-05-27 | End: 2022-06-06 | Stop reason: CLARIF

## 2022-05-27 RX ORDER — ASPIRIN 325 MG
325 TABLET ORAL DAILY
Status: DISCONTINUED | OUTPATIENT
Start: 2022-05-28 | End: 2022-06-06 | Stop reason: HOSPADM

## 2022-05-27 RX ORDER — ASPIRIN 325 MG
325 TABLET ORAL DAILY
Status: CANCELLED | OUTPATIENT
Start: 2022-05-28

## 2022-05-27 RX ORDER — METHOCARBAMOL 500 MG/1
500 TABLET, FILM COATED ORAL EVERY 6 HOURS PRN
Status: DISCONTINUED | OUTPATIENT
Start: 2022-05-27 | End: 2022-05-27

## 2022-05-27 RX ORDER — LISINOPRIL 5 MG/1
5 TABLET ORAL DAILY
Status: CANCELLED | OUTPATIENT
Start: 2022-05-28

## 2022-05-27 RX ORDER — LISINOPRIL 5 MG/1
5 TABLET ORAL DAILY
Status: DISCONTINUED | OUTPATIENT
Start: 2022-05-28 | End: 2022-06-06 | Stop reason: HOSPADM

## 2022-05-27 RX ORDER — OXYCODONE HYDROCHLORIDE 10 MG/1
10 TABLET ORAL EVERY 4 HOURS PRN
Status: DISCONTINUED | OUTPATIENT
Start: 2022-05-27 | End: 2022-06-06 | Stop reason: HOSPADM

## 2022-05-27 RX ORDER — OXYCODONE HYDROCHLORIDE 5 MG/1
5 TABLET ORAL EVERY 4 HOURS PRN
Status: DISCONTINUED | OUTPATIENT
Start: 2022-05-27 | End: 2022-06-06 | Stop reason: HOSPADM

## 2022-05-27 RX ORDER — POLYETHYLENE GLYCOL 3350 17 G/17G
17 POWDER, FOR SOLUTION ORAL DAILY PRN
Status: DISCONTINUED | OUTPATIENT
Start: 2022-05-27 | End: 2022-06-06

## 2022-05-27 RX ORDER — NALOXONE HYDROCHLORIDE 0.4 MG/ML
0.1 INJECTION, SOLUTION INTRAMUSCULAR; INTRAVENOUS; SUBCUTANEOUS
Status: CANCELLED | OUTPATIENT
Start: 2022-05-27

## 2022-05-27 RX ORDER — METHOCARBAMOL 500 MG/1
500 TABLET, FILM COATED ORAL EVERY 6 HOURS PRN
Status: DISCONTINUED | OUTPATIENT
Start: 2022-05-27 | End: 2022-06-03

## 2022-05-27 RX ADMIN — PRAVASTATIN SODIUM 10 MG: 10 TABLET ORAL at 17:05

## 2022-05-27 RX ADMIN — DOCUSATE SODIUM 100 MG: 100 CAPSULE ORAL at 08:12

## 2022-05-27 RX ADMIN — OXYCODONE HYDROCHLORIDE 10 MG: 10 TABLET ORAL at 17:05

## 2022-05-27 RX ADMIN — OXYCODONE HYDROCHLORIDE 10 MG: 10 TABLET ORAL at 22:08

## 2022-05-27 RX ADMIN — LISINOPRIL 5 MG: 5 TABLET ORAL at 08:13

## 2022-05-27 RX ADMIN — HYDROCODONE BITARTRATE AND ACETAMINOPHEN 1 TABLET: 5; 325 TABLET ORAL at 08:13

## 2022-05-27 RX ADMIN — ASPIRIN 325 MG ORAL TABLET 325 MG: 325 PILL ORAL at 08:13

## 2022-05-27 RX ADMIN — HYDROCODONE BITARTRATE AND ACETAMINOPHEN 1 TABLET: 5; 325 TABLET ORAL at 00:54

## 2022-05-27 NOTE — PHYSICAL THERAPY NOTE
Physical Therapy Treatment Note       05/27/22 1004   PT Last Visit   PT Visit Date 05/27/22   Note Type   Note Type Treatment   Pain Assessment   Pain Assessment Tool 0-10   Pain Score 6   Pain Location/Orientation Orientation: Lower; Location: Back   Restrictions/Precautions   Weight Bearing Precautions Per Order Yes  (WBAT all extremities per ortho)   RUE Weight Bearing Per Order WBAT   LUE Weight Bearing Per Order WBAT   RLE Weight Bearing Per Order WBAT   LLE Weight Bearing Per Order WBAT   Braces or Orthoses LSO   Other Precautions Chair Alarm; Bed Alarm; Fall Risk;Multiple lines;Spinal precautions;Pain;Limb alert  (Back safety precautions/log roll technique)   General   Chart Reviewed Yes   Response to Previous Treatment Patient with no complaints from previous session  Family/Caregiver Present No   Cognition   Overall Cognitive Status WFL   Arousal/Participation Alert; Cooperative   Attention Within functional limits   Orientation Level Oriented X4   Memory Within functional limits   Following Commands Follows all commands and directions without difficulty   Comments pt agreeable to PT session   Subjective   Subjective "I'm doing alot better today"   Bed Mobility   Supine to Sit 4  Minimal assistance   Additional items Assist x 1;HOB elevated; Bedrails; Increased time required;Verbal cues;LE management   Transfers   Sit to Stand 4  Minimal assistance   Additional items Assist x 1; Armrests; Increased time required;Verbal cues   Stand to Sit 4  Minimal assistance   Additional items Assist x 1; Armrests; Increased time required;Verbal cues   Ambulation/Elevation   Gait pattern Decreased foot clearance; Short stride; Step to; Antalgic  (no knee buckling observed)   Gait Assistance 4  Minimal assist   Additional items Assist x 1;Verbal cues; Tactile cues  (close chair follow of 2nd)   Assistive Device Rolling walker   Distance 70' x 2   Balance   Static Sitting Fair +   Dynamic Sitting Fair   Static Standing Fair   Dynamic Standing Fair -   Ambulatory Fair -   Endurance Deficit   Endurance Deficit Yes   Activity Tolerance   Activity Tolerance Patient limited by pain   Nurse Made Aware JENI Penn confirmed pt appropriate for therapy and made aware of session outcomes   Exercises   Heelslides Sitting;15 reps;AROM; Bilateral   Hip Abduction Sitting;15 reps;AROM; Bilateral   Knee AROM Long Arc Quad Sitting;15 reps;AROM; Bilateral   Ankle Pumps Sitting;15 reps;AROM; Bilateral   Marching Sitting;15 reps;AROM; Bilateral   Assessment   Prognosis Good   Problem List Decreased strength;Decreased endurance; Impaired balance;Decreased mobility;Orthopedic restrictions;Pain   Assessment Pt seen for PT treatment session this date, consisting of ther act focused on bed mobility/log roll technique, sit to stand transfers from various surfaces/heights, ther ex focused on strengthening and gt training on level surfaces to improve pt safety in household environment  Since previous session, pt has made good progress in terms of increased household distance gait trials without overt LOB observed, pt performing all phases of mobility at min A x1 with use of RW; continued LB exercises to tolerance without worsened pain reported  Pertinent barriers during this session include pain  Current goals and POC remain appropriate, pt continues to have rehab potential and is making progress towards STGs  Pt prognosis for achieving goals is excellent, pending pt progress with hospitalization/medical status improvements, and indicated by Stimulability, ability to follow directions, motivation, recent history of independence with functional skills/High PLOF, supportive family/caregivers, learning potential and previous response to intervention  Pt limited d/t fear of pain provocation and fear of falling  PT recommends post acute rehabilitation services upon discharge  Pt continues to be functioning below baseline level, and remains limited 2* factors listed above   PT will continue to see pt during current hospitalization in order to address the deficits listed above and provide interventions consistent w/ POC in effort to achieve STGs  Barriers to Discharge Decreased caregiver support; Inaccessible home environment   Goals   Patient Goals to go to rehab today   STG Expiration Date 06/04/22   Short Term Goal #1 Additional/ upgraded: Ambulate > 150 ft  with least restrictive assistive device with close S w/o LOB and w/ normalized gait pattern 100% of the time   PT Treatment Day 3   Plan   Treatment/Interventions Functional transfer training;LE strengthening/ROM   Progress Progressing toward goals   PT Frequency 5-7x/wk   Recommendation   PT Discharge Recommendation Post acute rehabilitation services   Equipment Recommended Walker  (RW)   [address] Mobility Inpatient   Turning in Bed Without Bedrails 3   Lying on Back to Sitting on Edge of Flat Bed 3   Moving Bed to Chair 3   Standing Up From Chair 3   Walk in Room 3   Climb 3-5 Stairs 1   Basic Mobility Inpatient Raw Score 16   Basic Mobility Standardized Score 38 32   Highest Level Of Mobility   JH-HLM Goal 5: Stand one or more mins   JH-HLM Achieved 7: Walk 25 feet or more   Education   Education Provided Mobility training;Home exercise program;Assistive device   Patient Demonstrates acceptance/verbal understanding   End of Consult   Patient Position at End of Consult Bedside chair;Bed/Chair alarm activated; All needs within reach             Tanner Tabor, PT, DPT

## 2022-05-27 NOTE — PROGRESS NOTES
Orthopedics   Twan Rodríguez 73 y o  male MRN: [id number]  Unit/Bed#: -01    Subjective:  73 y  o male POD#3 s/p L1-S1 revision of segmental hardware, L5-S1 osteotomy, posterior spinal fusion L4-S1, Transforaminal lumbar interbody fusion L5-S1, and instrumentation L1-pelvis  Patient states pain in back is improving since yesterday  Patient denies any radiation of pain into lower extremities  Patient continues to have numbness on the top of left foot, which is lessened since yesterday  Patient mentions left lower extremity weakness is improving  Patient was able to sit in the hospital chair yesterday for an hour  Patient denies any shortness of breath, chest pain, lightheadedness, dizziness, nausea, and vomiting  Patient offers no additional complaints at this time       Labs:  0   Lab Value Date/Time    HCT 27 5 (L) 05/27/2022 0433    HCT 28 1 (L) 05/26/2022 0526    HCT 30 3 (L) 05/25/2022 0441    HGB 8 9 (L) 05/27/2022 0433    HGB 9 3 (L) 05/26/2022 0526    HGB 10 0 (L) 05/25/2022 0441    INR 1 02 05/24/2022 0849    WBC 8 11 05/27/2022 0433    WBC 8 86 05/26/2022 0526    WBC 9 52 05/25/2022 0441       Meds:    Current Facility-Administered Medications:     aspirin tablet 325 mg, 325 mg, Oral, Daily, Tori Meehan PA-C, 325 mg at 05/26/22 0921    docusate sodium (COLACE) capsule 100 mg, 100 mg, Oral, BID, Tori Meehan PA-C, 100 mg at 05/26/22 1753    HYDROcodone-acetaminophen (NORCO) 5-325 mg per tablet 1 tablet, 1 tablet, Oral, Q6H PRN, Tori Meehan PA-C, 1 tablet at 05/27/22 0054    lactated ringers infusion, 75 mL/hr, Intravenous, Continuous, Billie Arenas MD, Last Rate: 75 mL/hr at 05/26/22 2203, 75 mL/hr at 05/26/22 2203    lisinopril (ZESTRIL) tablet 5 mg, 5 mg, Oral, Daily, Tori Meehan PA-C, 5 mg at 05/26/22 4467    methocarbamol (ROBAXIN) tablet 500 mg, 500 mg, Oral, Q6H PRN, Tori Meehan PA-C    naloxone (NARCAN) injection 0 1 mg, 0 1 mg, Intravenous, Q3 min PRN, Tori Meehan PA-C    ondansetron (ZOFRAN) injection 4 mg, 4 mg, Intravenous, Q6H PRN, Tori Meehan PA-C    pravastatin (PRAVACHOL) tablet 10 mg, 10 mg, Oral, Daily With Dinner, Tori Meehan PA-C, 10 mg at 05/26/22 1753    Blood Culture:   No results found for: BLOODCX    Wound Culture:   No results found for: WOUNDCULT    Ins and Outs:  I/O last 24 hours: In: 2902 9 [P O :480; I V :2422 9]  Out: 3560 [Urine:3560]          Physical:  Vitals:    05/26/22 2155   BP: 120/55   Pulse: 94   Resp: 18   Temp:    SpO2: 96%     Lumbar spine:  · Patient is a 71-year-old male laying comfortably in hospital bed in no acute distress  · Mepilex dressing clean, dry, and intact without strike through  · Neurovascularly intact L2-S1 bilateral lower extremities  · 5/5 motor strength with EHL/FHL and ankle dorsi/plantar flexion in right lower extremity  · 4/5 motor strength with EHL/FHL and 5/5 motor strength with ankle dorsi/plantar flexion in left lower extremity  · 2+ DP pulse bilateral lower extremities  · Calfs supple and nontender    Assessment:  73 y  o male POD#3 s/p L1-S1 revision of segmental hardware, L5-S1 osteotomy, posterior spinal fusion L4-S1, Transforaminal lumbar interbody fusion L5-S1, and instrumentation L1-pelvis  Plan:  · Weight Bearing as tolerated all extremities  · Up and out of bed  · LSO brace to be worn at all times out of bed  · DVT prophylaxis: mechanical and aspirin 325mg   · Analgesics  · PT/OT  · Patient noted to have acute blood loss anemia due to a drop in Hbg of > 2 0g from preop levels, will monitor vital signs and resuscitate with IV fluids as needed   · Xrays of lumbar spine obtained on 5/25 show orthopedic hardware in stable alignment  · Dispo: Patient is doing well following surgical intervention  Weightbearing as tolerated all extremities  LSO brace to be worn at all times when up and out of bed  Patient encouraged to work with PT/OT   Discharge planning to acute rehab today      Buck Yuen PA-C

## 2022-05-27 NOTE — ASSESSMENT & PLAN NOTE
Continue statin  Monitor LFTs  If they go up anymore we can hold it but looks like his LFTs have been chronically elevated  He does have a history of hepatitis-C as well   continue on dc

## 2022-05-27 NOTE — CASE MANAGEMENT
Case Management Discharge Planning Note    Patient name Rosalia Sanders  Location /-16 MRN [id number]  : 1955 Date 2022       Current Admission Date: 2022  Current Admission Diagnosis:Status post lumbar spinal fusion   Patient Active Problem List    Diagnosis Date Noted    Status post lumbar spinal fusion 2022    Hyperlipidemia     Hypertension       LOS (days): 3  Geometric Mean LOS (GMLOS) (days): 2 70  Days to GMLOS:-0 1     OBJECTIVE:  Risk of Unplanned Readmission Score: 8 39         Current admission status: Inpatient   Preferred Pharmacy:   [id number] [facility] [address]  Phone: [phone] Fax: [phone]    Primary Care Provider: Sagar Alvarado MD    Primary Insurance: MEDICARE  Secondary Insurance: AARP    DISCHARGE DETAILS:    Discharge planning discussed with[de-identified] PATIENT  Freedom of Choice: Yes  Comments - Freedom of Choice: PATIENT AGREES TO  [facility] 2022 AND AS PER DONALD PT APPROVED W/ BED AVAILABLE 2022 1330  CM contacted family/caregiver?: Yes  Were Treatment Team discharge recommendations reviewed with patient/caregiver?: Yes  Did patient/caregiver verbalize understanding of patient care needs?: Yes  Were patient/caregiver advised of the risks associated with not following Treatment Team discharge recommendations?: Yes    Contacts  Patient Contacts: Mylene-spouse  Relationship to Patient[de-identified] Family  Contact Method: Phone  Phone Number: [phone]  Reason/Outcome: Continuity of Care, Emergency Contact, Referral, Discharge [address]         Is the patient interested in [facility] at discharge?: No    DME Referral Provided  Referral made for DME?: No    Other Referral/Resources/Interventions Provided:  Interventions: Acute Rehab  Referral Comments: SEE NOTES ABOVE           Treatment Team Recommendation: Acute Rehab  Discharge Destination Plan[de-identified] Acute Rehab  Transport at Discharge : BLS Ambulance     Number/Name of Dispatcher: ORALIA PEDRAZA  Transported by [employer] and Unit #):  SLETS  ETA of Transport (Date): 05/27/22  ETA of Transport (Time): 1330     Transfer Mode: Stretcher  Accompanied by: Alone

## 2022-05-27 NOTE — PLAN OF CARE
Problem: PAIN - ADULT  Goal: Verbalizes/displays adequate comfort level or baseline comfort level  Description: Interventions:  - Encourage patient to monitor pain and request assistance  - Assess pain using appropriate pain scale  - Administer analgesics based on type and severity of pain and evaluate response  - Implement non-pharmacological measures as appropriate and evaluate response  - Consider cultural and social influences on pain and pain management  - Notify physician/advanced practitioner if interventions unsuccessful or patient reports new pain  Outcome: Progressing     Problem: INFECTION - ADULT  Goal: Absence or prevention of progression during hospitalization  Description: INTERVENTIONS:  - Assess and monitor for signs and symptoms of infection  - Monitor lab/diagnostic results  - Monitor all insertion sites, i e  indwelling lines, tubes, and drains  - Monitor endotracheal if appropriate and nasal secretions for changes in amount and color  - [place] appropriate cooling/warming therapies per order  - Administer medications as ordered  - Instruct and encourage patient and family to use good hand hygiene technique  - Identify and instruct in appropriate isolation precautions for identified infection/condition  Outcome: Progressing

## 2022-05-27 NOTE — ASSESSMENT & PLAN NOTE
This is per the primary service  Patient is doing well today  Ready for discharge    I just recommend continuing home medications

## 2022-05-27 NOTE — PROGRESS NOTES
Inter-facility transfer paperwork completed for receiving institution  IVF d/c'd  Jocelynn removed  Pt awaiting 1330 ambulance  at this time

## 2022-05-27 NOTE — ASSESSMENT & PLAN NOTE
Patient is on an ACE-inhibitor  Will continue  Will be careful not to lower blood pressure too much for the risk of postop acute renal insufficiency secondary to hypoperfusion    stable

## 2022-05-27 NOTE — DISCHARGE SUMMARY
[facility] DISCHARGE SUMMARY  Cydney No 73 y o  male MRN: [id number]  Unit/Bed#: -01    Attending Physician: Dr Rodriguez Paige    Admitting diagnosis: Scoliosis, unspecified scoliosis type, unspecified spinal region [M41 9]  Flatback syndrome [M40 30]  History of lumbar surgery [Z98 890]  DJD (degenerative joint disease), thoracolumbar [M51 35]    Discharge diagnosis: Scoliosis, unspecified scoliosis type, unspecified spinal region [M41 9]  Flatback syndrome [M40 30]  History of lumbar surgery [Z98 890]  DJD (degenerative joint disease), thoracolumbar [M51 35]    Date of admission: 5/24/2022    Date of discharge: 05/27/22    Procedure: L1-S1 revision of segmental hardware, L5-S1 osteotomy, posterior spinal fusion L4-S1, Transforaminal lumbar interbody fusion L5-S1, and instrumentation L1-pelvis    HPI:  This is a 73y o  year old male that presented to the office with signs and symptoms of L5-S1 pseudoarthrosis and T12- L1 junctional kyphosis with disk space screw penetration  They tried and failed conservative treatment measures and wished to proceed with surgical intervention  The risks, benefits, and complications of the procedure were discussed with the patient and informed consent was obtained  Hospital Course: The patient was admitted to the hospital on 5/24/2022 and underwent an uncomplicated S8-O0 revision of segmental hardware, L5-S1 osteotomy, posterior spinal fusion L4-S1, Transforaminal lumbar interbody fusion L5-S1, and instrumentation L1-pelvis  They were transferred to the floor after a brief stay in the post-anesthesia care unit  Their pain was well managed with IV and oral pain medications  Pain was managed with PCA pump and discontinued on POD#1  Urinary catheter was discontinued on POD#1  They began therapy on post operative day #1  Patient had some weakness in left lower extremity  Recommendations were made for acute rehab placement   On POD#3, patient was deemed ready for discharge to acute rehab center  Daily discussion was had with the patient, nursing staff, orthopaedic team, and family members if present  All questions were answered to the patients satisfaction  0   Lab Value Date/Time    HGB 8 9 (L) 05/27/2022 0433    HGB 9 3 (L) 05/26/2022 0526    HGB 10 0 (L) 05/25/2022 0441    HGB 10 2 (L) 05/24/2022 1732    HGB 10 9 (L) 05/24/2022 1642    HGB 12 6 05/24/2022 1452    HGB 13 3 05/24/2022 1300    HGB 14 8 05/03/2022 0800    HGB 15 8 02/13/2020 0945    HGB 14 1 08/16/2017 1718       Monitored for acute blood loss anemia  Vital signs remained stable and pt was resuscitated with IVF as needed   Body mass index is 30 87 kg/m²  mildly obese  Recommend behavior modifications  Discharge Instructions: The patient was discharged weight bearing as tolerated to all extremities  Take pain medications as instructed  Patient must keep LSO brace on when out of bed  Continue aspirin 325mg daily for 4 weeks postop  Follow-up in the office two weeks postop for further evaluation  Discharge Medications: For the complete list of discharge medications, please refer to the patient's medication reconciliation

## 2022-05-27 NOTE — DISCHARGE INSTRUCTIONS
Dr Ryan Troncoso Surgery  Post-op Information and Instructions  Lumbar Fusion    1  Follow-up  - You should return to the office for your follow-up appointment approximately 2 weeks post-op  This should have been scheduled prior to your surgery  If you do not have an appointment scheduled, please call [phone] to do so as soon as possible  - At the first post op appointment we will check your incision, remove any sutures, and make sure that you are healing well  - X-rays will be taken at this appointment to evaluate your hardware  - If you need any refills on your pain medications, this will be addressed at your follow-up appointment  - You can expect to have post-op appointments at 2 weeks, 6 weeks, 3 months, 6 months and 1 year after surgery  After that time, we will continue to follow you yearly to monitor your fusion  2  Incision Care:  - You will have a surgical dressing over the area  You may remove this dressing in 24 hours  It is placed in a sterile environment and is important to allow the wound to begin healing in a sterile environment   - Once the initial dressing is removed, you may leave the incision open to air   - You may shower on the day that your dressing is removed  Your incision may get wet in the shower, but DO NOT submerge your incision (bath, hot tub, pool, lake, etc ) until you have been cleared to do so  - The majority of your sutures are buried beneath the skin and will dissolve with time  You may have a small knot on either end of the incision which will be removed at your post-op appointment  The incision may be raised initially, although this will improve as the sutures dissolve  - You will have steri-strips over the incision - these look like little white pieces of tape  Please do not remove these as they will fall off on their own or be removed at your first post-op appointment    - Please do not apply any ointments or salves to the incision unless instructed to do so   - If you notice any drainage, foul odor, increased redness, swelling or pain of the incision, please call our office immediately  These may be signs of an infection and should be evaluated  3  Medications  - You will be given a prescription for pain medication when you are discharged from the hospital   - After the first few days, you should try to take the pain medication less often, and only when needed  - You may take Tylenol in place of, not in addition to, your pain medication if you wish  - Do not take NSAIDs (Advil, Aleve, Ibuprofen, Motrin, Naproxen, etc ) for 3 months following your surgery  This may impede the healing of your fusion  - DO NOT drive while you are taking narcotic pain medications  - If there is an issue with any of your discharge medications, please call our office at [phone] to discuss  - It is very common for surgery and narcotic pain medications to cause constipation  It is very important that you eat a high fiber diet, drink lots of water, and also consider taking a stool softener while taking pain medications to help with this  It is not uncommon for you to go several days without a bowel movement after surgery  If you are constipated and it is accompanied by severe abdominal pain, nausea and vomiting, inability to keep food or drink down, and/or a fever, please call our office as these may be signs of a more serious medical condition  4  Activity:  - You should have been given a prescription to get fitted for a lumbar brace  You may remove this for hygeine (showering, etc ), and for sleeping  The brace should otherwise be worn for the first 3 months post-operatively  If you have any issues with the fit of your brace, you can call [facility] [employer] [facility] at [phone]   - You should avoid any bending, twisting  If you must bend over, please use your knees  - You CANNOT lift greater than 10lbs, or a gallon of milk, until instructed    - You should try to avoid sitting for greater than 20 minutes at a time as this will cause your muscles to stiffen and spasm, making you more uncomfortable  - You will start PT (physical therapy) around 3 months after surgery   - You may resume your normal daily activities as tolerated  - Walking is the best exercise and you should try to walk up to 1 mile throughout the day as you are recovering   - You may resume sexual activity as tolerated if desired  - You should NOT smoke following spinal fusion as this will impede your healing  5  Diet:  - It is important to eat a well balanced diet to help your body heal   - You should make sure that you are drinking plenty of fluids - water is best    6  Return to Work:  - You can expect to be out of work for at least 6 weeks up to 3 months  We will discuss your return to work status at your post-op appointments, but please do not hesitate to call if you have any questions or concerns  - Please make sure that any short-term disability paperwork is dropped off at the   Please call our office if you have any of the following after surgery:  - Persistent fever greater than 100 5 degrees  - Foul odor, drainage, redness, swelling or increased pain around your incision site  - A headache that is worse with standing or sitting, and is alleviated with lying flat on your back  - New onset arm or leg weakness, numbness or tingling  - Significantly increased pain that is not alleviated with pain medications, rest and ice  - New-onset calf pain    If you experience any chest pain or shortness of breath after you are discharged, call 911 immediately                  HPI  Review of Systems

## 2022-05-27 NOTE — PROGRESS NOTES
[address] [facility]  Progress Note - Shira Agloni 1955, 73 y o  male MRN: [id number]  Unit/Bed#: -01 Encounter: [id number]  Primary Care Provider: Kitty Bradley MD   Date and time admitted to hospital: 2022  8:14 AM    * Status post lumbar spinal fusion  Assessment & Plan  This is per the primary service  Patient is doing well today  Ready for discharge  I just recommend continuing home medications    Hypertension  Assessment & Plan  Patient is on an ACE-inhibitor  Will continue  Will be careful not to lower blood pressure too much for the risk of postop acute renal insufficiency secondary to hypoperfusion  stable    Hyperlipidemia  Assessment & Plan  Continue statin  Monitor LFTs  If they go up anymore we can hold it but looks like his LFTs have been chronically elevated  He does have a history of hepatitis-C as well   continue on dc        VTE Pharmacologic Prophylaxis:   Pharmacologic:  Discharge per the primary service  Mechanical VTE Prophylaxis in Place: Yes    Patient Centered Rounds: I have performed bedside rounds with nursing staff today  Education and Discussions with Family / Patient:  Patient    Time Spent for Care: 15 minutes  More than 50% of total time spent on counseling and coordination of care as described above  Current Length of Stay: 3 day(s)    Current Patient Status: Inpatient       Discharge Plan:  Discharge today per the primary service    Code Status: No Order      Subjective:   Patient seen examined  He states he is doing okay today  States his pain is under control    Objective:     Vitals:   Temp (24hrs), Av 1 °F (37 3 °C), Min:99 1 °F (37 3 °C), Max:99 1 °F (37 3 °C)    Temp:  [99 1 °F (37 3 °C)] 99 1 °F (37 3 °C)  HR:  [94-99] 97  Resp:  [16-18] 18  BP: (120-162)/(55-83) 136/83  SpO2:  [94 %-96 %] 95 %  Body mass index is 30 87 kg/m²  Input and Output Summary (last 24 hours):        Intake/Output Summary (Last 24 hours) at 5/27/2022 1135  Last data filed at 5/27/2022 1101  Gross per 24 hour   Intake 1722 92 ml   Output 1950 ml   Net -227 08 ml       Physical Exam:     Physical Exam  (   General Appearance:    Alert, cooperative, no distress, appears stated age                               Lungs:     Clear to auscultation bilaterally, respirations unlabored       Heart:    Regular rate and rhythm, S1 and S2 normal, no murmur, rub    or gallop   Abdomen:     Soft, non-tender, bowel sounds active all four quadrants,     no masses, no organomegaly           Extremities:   Extremities normal, atraumatic, no cyanosis or edema     Additional Data:     Labs:    Results from last 7 days   Lab Units 05/27/22  0433   WBC Thousand/uL 8 11   HEMOGLOBIN g/dL 8 9*   HEMATOCRIT % 27 5*   PLATELETS Thousands/uL 196   NEUTROS PCT % 76*   LYMPHS PCT % 17   MONOS PCT % 6   EOS PCT % 1     Results from last 7 days   Lab Units 05/27/22  0433 05/25/22  0441 05/24/22  2245   SODIUM mmol/L 137   < > 140   POTASSIUM mmol/L 4 0   < > 4 0   CHLORIDE mmol/L 105   < > 106   CO2 mmol/L 27   < > 24   BUN mg/dL 12   < > 14   CREATININE mg/dL 0 71   < > 0 81   ANION GAP mmol/L 5   < > 10   CALCIUM mg/dL 8 2*   < > 8 1*   ALBUMIN g/dL  --   --  3 2*   TOTAL BILIRUBIN mg/dL  --   --  0 61   ALK PHOS U/L  --   --  66   ALT U/L  --   --  41   AST U/L  --   --  72*   GLUCOSE RANDOM mg/dL 113   < > 139    < > = values in this interval not displayed  Results from last 7 days   Lab Units 05/24/22  0849   INR  1 02     Results from last 7 days   Lab Units 05/25/22  1557   POC GLUCOSE mg/dl 127                   * I Have Reviewed All Lab Data Listed Above  * Additional Pertinent Lab Tests Reviewed:  [facility]        Recent Cultures (last 7 days):           Last 24 Hours Medication List:   Current Facility-Administered Medications   Medication Dose Route Frequency Provider Last Rate    aspirin  325 mg Oral Daily Jordyn Aponte PA-RHONDA      docusate sodium  100 mg Oral BID HUEY Juárez-RHONDA      HYDROcodone-acetaminophen  1 tablet Oral Q6H PRN Tori Meehan, PA-RHONDA      lactated ringers  75 mL/hr Intravenous Continuous Kevin Blackman MD 75 mL/hr (05/26/22 2203)    lisinopril  5 mg Oral Daily Tori Meehan, PA-RHONDA      methocarbamol  500 mg Oral Q6H PRN Tori Meehan, PA-RHONDA      naloxone  0 1 mg Intravenous Q3 min PRN Tori Meehan, PA-RHONDA      ondansetron  4 mg Intravenous Q6H PRN Tori Meehan, PA-RHONDA      pravastatin  10 mg Oral Daily With OSMIN Brooks          Today, Patient Was Seen By: Kevin Blackman MD    ** Please Note: Dictation voice to text software may have been used in the creation of this document   **

## 2022-05-27 NOTE — CONSULTS
Internal Medicine Consultation Note    Patient: Terrance Seo  Age/sex: 73 y o  male  Medical Record #: [id number]      ASSESSMENT PLAN    L1-S1 revision of HW, L5-S1 osteotomy  Posterior spinal fusion L4-S1  Interbody fusion L5-S1   Cont TLSO brace when OOB   Pain/rehab per PMR   DVT prophylaxis with ASA    Post operative blood loss anemia   Stable   Monitor cbc    HTN   Cont lisinopril   Monitor trend and titrate based on same    Hyperlipidemia   Low cholesterol diet   Continue statin therapy    Chronic elevated LFT's   stable      Subjective/ HPI: Patient seen and examined  Ade Esquivel is a 74 y/o male with h/o HTN, HLD, chronic LBP with previous surgery 13 y/ago by Dr Marilynn Muller  He had symptoms of progressive pain, neuropathic pain and opted to undergo surgical fixation  Dr Samm Harden performed L1-S1 revision of HW, L5-S1 osteotomy, post spinal fusion L4-S1, interbody fusion L5-S1 on 5/24  He underwent the surgery with some post operative LLE weakness  He is coming to [facility] for rehab and we are asked to follow along for medical management    ROS:   A 10 point ROS was performed; negative except as noted above  Social History:    Substance Use History:   Social History     Substance and Sexual Activity   Alcohol Use Yes    Alcohol/week: 3 0 standard drinks    Types: 3 Cans of beer per week    Comment: drinks once a month     Social History     Tobacco Use   Smoking Status Former Smoker   Smokeless Tobacco Never Used     Social History     Substance and Sexual Activity   Drug Use No       Family History:    History reviewed  No pertinent family history        Review of Scheduled Meds:  Current Facility-Administered Medications   Medication Dose Route Frequency Provider Last Rate    [START ON 5/28/2022] aspirin  325 mg Oral Daily MD Farheen Contreras [facility] ON 5/28/2022] lisinopril  5 mg Oral Daily Bakari Templeton MD      methocarbamol  500 mg Oral Q6H PRN Bakari Templeton MD      oxyCODONE  10 mg Oral Q4H PRN Nelia Carroll MD      oxyCODONE  5 mg Oral Q4H PRN Nelia Carroll MD      polyethylene glycol  17 g Oral Daily PRN Nelia Carroll MD      pravastatin  10 mg Oral QPM Nelia Carroll MD         Labs:     Results from last 7 days   Lab Units 22  0433 22  0526   WBC Thousand/uL 8 11 8 86   HEMOGLOBIN g/dL 8 9* 9 3*   HEMATOCRIT % 27 5* 28 1*   PLATELETS Thousands/uL 196 199     Results from last 7 days   Lab Units 22  0433 22  0526 22  2245 22  1732   SODIUM mmol/L 137 135*   < >  --    POTASSIUM mmol/L 4 0 3 6   < >  --    CHLORIDE mmol/L 105 103   < >  --    CO2 mmol/L 27 26   < >  --    CO2, I-STAT mmol/L  --   --   --  22   BUN mg/dL 12 13   < >  --    CREATININE mg/dL 0 71 0 72   < >  --    GLUCOSE, ISTAT mg/dl  --   --   --  134   CALCIUM mg/dL 8 2* 8 1*   < >  --     < > = values in this interval not displayed  Results from last 7 days   Lab Units 22  0849   INR  1 02        Results from last 7 days   Lab Units 22  1557   POC GLUCOSE mg/dl 127       No results found for: Ginger Nickels, WOUNDCULT, SPUTUMCULTUR    Input and Output Summary (last 24 hours): Intake/Output Summary (Last 24 hours) at 2022 1949  Last data filed at 2022 1901  Gross per 24 hour   Intake 240 ml   Output 400 ml   Net -160 ml       Imaging:     No orders to display       *Labs /Radiology studiesLabs reviewed  *Medications reviewed and reconciled as needed  *Please refer to order section for additional ordered labs studies  *Case discussed with primary attending during morning huddle case rounds    Vitals:   Temp (24hrs), Av 8 °F (37 1 °C), Min:98 8 °F (37 1 °C), Max:98 8 °F (37 1 °C)    Temp:  [98 8 °F (37 1 °C)] 98 8 °F (37 1 °C)  HR:  [93-97] 93  Resp:  [18-19] 19  BP: (120-151)/(55-83) 151/72  SpO2:  [95 %-96 %] 95 %  Body mass index is 31 27 kg/m²       Physical Exam:   GEN: No apparent distress, interactive  NEURO: Alert and oriented x3  HEENT: Pupils are equal and reactive, EOMI, mucous membranes are moist, face symmetrical  CV: S1 S2 regular, no MRG, no peripheral edema noted  RESP: Lungs are clear bilaterally, no wheezes, rales or rhonchi noted, on room air, respirations easy and non labored  GI: Flat, soft non tender, non distended; +BS x4  : Voiding without difficulty  MUSC: Moves all extremities; LLE weakness  SKIN: pink, warm and dry, normal turgor, lower back incision with dressing in place        Invasive Devices  Report    Peripheral Intravenous Line  Duration           Peripheral IV 05/24/22 Right Hand 3 days    Peripheral IV 05/24/22 Right;Ventral (anterior) Forearm 3 days                 VTE Pharmacologic Prophylaxis: Sequential compression device (Venodyne)   Code Status: Level 1 - Full Code  Current Length of Stay: 0 day(s)    Total floor / unit time spent today 1 hour with more than 50% spent counseling/coordinating care  Counseling includes discussion with patient re: progress  and discussion with patient of his/her current medical state/information  Coordination of patient's care was performed in conjunction with primary service  Time invested included review of patient's labs, vitals, and management of their comorbidities with continued monitoring  In addition, this patient was discussed with medical team including physician and advanced extenders  The care of the patient was extensively discussed and appropriate treatment plan was formulated unique for this patient  ** Please Note: Fluency Direct voice to text software may have been used in the creation of this document   Audio transcription errors may occur**

## 2022-05-27 NOTE — PLAN OF CARE
Problem: PAIN - ADULT  Goal: Verbalizes/displays adequate comfort level or baseline comfort level  Description: Interventions:  - Encourage patient to monitor pain and request assistance  - Assess pain using appropriate pain scale  - Administer analgesics based on type and severity of pain and evaluate response  - Implement non-pharmacological measures as appropriate and evaluate response  - Consider cultural and social influences on pain and pain management  - Notify physician/advanced practitioner if interventions unsuccessful or patient reports new pain  Outcome: Progressing     Problem: INFECTION - ADULT  Goal: Absence or prevention of progression during hospitalization  Description: INTERVENTIONS:  - Assess and monitor for signs and symptoms of infection  - Monitor lab/diagnostic results  - Monitor all insertion sites, i e  indwelling lines, tubes, and drains  - Monitor endotracheal if appropriate and nasal secretions for changes in amount and color  - [place] appropriate cooling/warming therapies per order  - Administer medications as ordered  - Instruct and encourage patient and family to use good hand hygiene technique  - Identify and instruct in appropriate isolation precautions for identified infection/condition  Outcome: Progressing  Goal: Absence of fever/infection during neutropenic period  Description: INTERVENTIONS:  - Monitor WBC    Outcome: Progressing     Problem: SAFETY ADULT  Goal: Patient will remain free of falls  Description: INTERVENTIONS:  - Educate patient/family on patient safety including physical limitations  - Instruct patient to call for assistance with activity   - Consult OT/PT to assist with strengthening/mobility   - Keep Call bell within reach  - Keep bed low and locked with side rails adjusted as appropriate  - Keep care items and personal belongings within reach  - Initiate and maintain comfort rounds  - Make Fall Risk Sign visible to staff  - Offer Toileting every  Hours, in advance of need  - Initiate/Maintain alarm  - Obtain necessary fall risk management equipment:   - Apply yellow socks and bracelet for high fall risk patients  - Consider moving patient to room near nurses station  Outcome: Progressing  Goal: Maintain or return to baseline ADL function  Description: INTERVENTIONS:  -  Assess patient's ability to carry out ADLs; assess patient's baseline for ADL function and identify physical deficits which impact ability to perform ADLs (bathing, care of mouth/teeth, toileting, grooming, dressing, etc )  - Assess/evaluate cause of self-care deficits   - Assess range of motion  - Assess patient's mobility; develop plan if impaired  - Assess patient's need for assistive devices and provide as appropriate  - Encourage maximum independence but intervene and supervise when necessary  - Involve family in performance of ADLs  - Assess for home care needs following discharge   - Consider OT consult to assist with ADL evaluation and planning for discharge  - Provide patient education as appropriate  Outcome: Progressing  Goal: Maintains/Returns to pre admission functional level  Description: INTERVENTIONS:  - Perform BMAT or MOVE assessment daily    - Set and communicate daily mobility goal to care team and patient/family/caregiver  - Collaborate with rehabilitation services on mobility goals if consulted  - Perform Range of Motion  times a day  - Reposition patient every  hours    - Dangle patient  times a day  - Stand patient  times a day  - Ambulate patient  times a day  - Out of bed to chair  times a day   - Out of bed for meal times a day  - Out of bed for toileting  - Record patient progress and toleration of activity level   Outcome: Progressing     Problem: DISCHARGE PLANNING  Goal: Discharge to home or other facility with appropriate resources  Description: INTERVENTIONS:  - Identify barriers to discharge w/patient and caregiver  - Arrange for needed discharge resources and transportation as appropriate  - Identify discharge learning needs (meds, wound care, etc )  - Arrange for interpretive services to assist at discharge as needed  - Refer to Case Management Department for coordinating discharge planning if the patient needs post-hospital services based on physician/advanced practitioner order or complex needs related to functional status, cognitive ability, or social support system  Outcome: Progressing

## 2022-05-27 NOTE — TREATMENT PLAN
PHYSICAL MEDICINE AND REHABILITATION IPOC  Irene See 73 y o  male MRN: [id number]  Unit/Bed#: -01 Encounter: [id number]       PATIENT INFORMATION  ADMISSION DATE: 5/27/22 [place] CATEGORY: other ortho   ADMISSION DIAGNOSIS: other ortho  EXPECTED LOS: 1-2 wks      MEDICAL/FUNCTIONAL PROGNOSIS  Based on my assessment of the patient's medical conditions and current functional status, the prognosis for attaining medical and functional goals or the IRF stay is:  Fair     Medical Goals: pain control     ANTICIPATED DISCHARGE DISPOSITION AND SERVICES  COMMUNITY SETTING: home     ANTICIPATED FOLLOW-UP SERVICE:   Outpatient Therapy Services: PT/OT    DISCIPLINE SPECIFIC PLANS:  Required Disciplines & Services: PT/OT    REQUIRED THERAPY:  Therapy Hours per Day Days per Week Total Days   Physical Therapy 1 5 5 10   Occupational Therapy 1 5 5 10   Speech/Language Therapy 0 0 0   NOTE: Additional therapy time(s) may be added as appropriate to meet patient needs and to achieve functional goals        ANTICIPATED FUNCTIONAL OUTCOMES:  ADL:   Patient will maximize level for ADLs with least restrictive device upon completion of the rehab program     Bladder/Bowel: Patient will maximize level for bladder/bowel management with least restrictive device upon completion of the rehab program     Transfers:   Patient will maximize level for transfers with least restrictive device upon completion of the rehab program     Locomotion:   Patient will maximize level for locomotion with least restrictive device upon completion of the rehab program     Cognitive:   Patient appears to be at baseline          DISCHARGE PLANNING NEEDS  Equipment needs: TBD       REHAB ANTICIPATED PARTICIPATION RESTRICTIONS:  None

## 2022-05-27 NOTE — H&P
PHYSICAL MEDICINE AND REHABILITATION H&P/ADMISSION NOTE  Irene See 73 y o  male MRN: [id number]  Unit/Bed#: [facility]-01 Encounter: [id number]     Rehab Diagnosis: 08 9    CC: Chronic back pain, L5-S1 pseudoarthrosis and T12- L1 junctional kyphosis with disk space screw penetration    HPI: Patient is a 74 yo male with h/o of prior spine surgery, chronic low back pain, L5-S1 pseudoarthrosis, and 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 who presented electively on 5/24 for L1-S1 revision of segmental hardware, L5-S1 osteotomy, posterior spinal fusion L4-S1, pelvic instrumentation, Transforaminal lumbar interbody fusion L5S1, Cage L5S1, instrumentation L1-pelvis by Dr Tavia Lin   Course complicated by LLE noted on POD #1 however patient was cleared by Dr Tavia Lin for transfer to acute inpt rehab on POD #3     ROS: A 10 point review of systems was performed and was negative unless noted below    Subjective: d/w patient pain management regimen       Assessment & Plan:     Chronic back pain, L5-S1 pseudoarthrosis and 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: s/p L1-S1 revision of segmental hardware, L5-S1 osteotomy, posterior spinal fusion L4-S1, pelvic instrumentation, Transforaminal lumbar interbody fusion L5S1, Cage L5S1, instrumentation L1-pelvis by Dr Tavia Lin on 5/24, LSO at all times when OOB per ortho, L-spine precautions; OP FU with Dr Tavia Lin scheduled for 6/6/22    LLE weakness: 2-3/5 L hip flexion, 5/5 dorsiflexion/plantar flexion (lt touch grossly intact), noted by acute care spine team on POD #1, will continue to monitor with serial exams during rehab course     Anemia: Hg currently 8 9 on 5/27 post-operatively, recheck on 5/31    Chronic pain: chronic back pain, at home on prn tramadol 50 mg (per PAPDMP patient last filled tramadol 50 mg on 5/19/22 for 60 tabs)    Elevated AST: appears to be chronic going back to at least 2017 per EMR lab records, was 72 on 5/24, HCV ab + in 3/2020 however PCR negative for HCV in 4/2021 and patient noted that he had HCV when he was younger but cleared it without treatment; patient states has been on lovastatin for 15 years w/o AE and is on only 10 mg qpm at home and per PCP's OP notes after investigating for HCV vs statin as possible etiology (with HCV PCR being negative in 4/2021) PCP elected to continue prescribing lovastatin 10 mg qpm (last prescribed in 12/2021 for 90 day supply with 2 refills); OP FU with PCP who is managing    Dyslipidemia: therapeutic substitution for home lovastatin 10 mg qpm; elevated AST appears to be chronic going back to at least 2017 per EMR lab records however per patient he has been on lovastatin for 15 years w/o AE and in only on 10 mg qpm, additionally PCP is aware of elevated AST and did elect to continue to prescribe it (last prescribed in 12/2021 for 90 day supply with 2 refills)    HTN: at home on lisinopril 5 mg qd, management per IM       DVT ppx: SCDs and  mg qd for 28 days post-op per Dr Karl York instructions    Note: upon dc from acute care, acute care team provided OP scrips to patient's OP pharmacy for  mg, hydrocodone-tylenol, robaxin, and narcan--> prior to dc will contact patient's OP pharmacy to determine if these prescriptions were maintained or cancelled by primary team      Incidental findings:    1) 1st degree AVB: OP FU with PCP with specialist referral at PCP's discretion     2) left hemidiaphragmatic elevation: OP FU with PCP with specialist referral at PCP's discretion     3) abnormalities on CT A/P of 8/2017 including but not limited to liver hypodensities (likely simple cysts per CT report), splenule, right renal cyst, diverticulosis, & prostatomegaly: OP FU with PCP with specialist referral at PCP's discretion         Functional History - Prior to Admission:     I PTA     Functional Status Upon Admission to AR[facility]:  Transfers: min  Mobility: min   ADLs: mod-max Physical Exam:        General: alert, no apparent distress, cooperative and comfortable  HEENT:  Head: Normal, normocephalic, atraumatic  Eye: Normal external eye, conjunctiva, lids, sclera   Ears: Normal external ears  Nose: Normal external nose, mucus membranes  CARDIAC:  +S1/2  LUNGS:  no abnormal respiratory pattern, no retractions noted, non-labored breathing   ABDOMEN:  soft NT   EXTREMITIES:  no cyanosis or clubbing   NEURO:  awake, alert, apporpriately answering questions, CN 2-12 grossly intact, no gross dysmetria on F-N testing B/L, lt touch grossly intact, 5/5 UE B/L, 5/5 R hip flexion, 2-3/5 L hip flexion, 5/5 B/L dorsiflexion/plantar flexion on MMT    PSYCH:  mood/affect currently stable  Incision: C/D/I      Laboratory:    Results from last 7 days   Lab Units 05/27/22  0433 05/26/22  0526 05/25/22  0441   HEMOGLOBIN g/dL 8 9* 9 3* 10 0*   HEMATOCRIT % 27 5* 28 1* 30 3*   WBC Thousand/uL 8 11 8 86 9 52     Results from last 7 days   Lab Units 05/27/22  0433 05/26/22  0526 05/25/22  0441 05/24/22  2245 05/24/22  2245 05/24/22  1732 05/24/22  1642 05/24/22  1452   BUN mg/dL 12 13 16   < > 14  --   --   --    SODIUM mmol/L 137 135* 138   < > 140  --   --   --    POTASSIUM mmol/L 4 0 3 6 3 9   < > 4 0  --   --   --    CHLORIDE mmol/L 105 103 107   < > 106  --   --   --    GLUCOSE, ISTAT mg/dl  --   --   --   --   --  134 135 129   CREATININE mg/dL 0 71 0 72 0 83   < > 0 81  --   --   --    AST U/L  --   --   --   --  72*  --   --   --    ALT U/L  --   --   --   --  41  --   --   --     < > = values in this interval not displayed       Results from last 7 days   Lab Units 05/24/22  0849   PROTIME seconds 13 0   INR  1 02            Past Medical History:   Past Surgical History:   Family History:   Social history:   Past Medical History:   Diagnosis Date    Ambulates with cane     "long distance"    Arthritis     Back pain     Chronic pain disorder     Dental crowns present     Exercise involving walking     daily -short walks    History of hepatitis C     per pt "went away on its own" age 13"    History of transfusion     Hyperlipidemia     Hypertension     Leg pain     and foot pain    Limb alert care status     per pt NO IV's LEFT UPPER ARM due to old injury    Risk for falls     Spinal stenosis     Stroke ([facility] [address] )     per pt in 1996-and no lasting problems    Wears glasses     readers    Past Surgical History:   Procedure Laterality Date    BACK SURGERY      with implants    COLONOSCOPY      LUMBAR FUSION N/A 5/24/2022    Procedure: L1-S1 revision of segmental hardware, L5-S1 osteotomy, posterior spinal fusion L4-S1, pelvic instrumentation, Transforaminal lumbar interbody fusion L5S1, Cage L5S1, instrumentation L1-pelvis; Surgeon: Louise Alicea MD;  Location: [facility];  Service: Orthopedics    WISDOM TOOTH EXTRACTION       No family history on file  Social History     Socioeconomic History    Marital status: /Civil Union     Spouse name: Not on file    Number of children: Not on file    Years of education: Not on file    Highest education level: Not on file   Occupational History    Not on file   Tobacco Use    Smoking status: Former Smoker    Smokeless tobacco: Never Used   Substance and Sexual Activity    Alcohol use: Yes     Alcohol/week: 3 0 standard drinks     Types: 3 Cans of beer per week     Comment: drinks once a month    Drug use: No    Sexual activity: Not on file     Comment: defer   Other Topics Concern    Not on file   Social History Narrative    Not on file     Social Determinants of Health     Financial Resource Strain: Not on file   Food Insecurity: Not on file   Transportation Needs: No Transportation Needs    Lack of Transportation (Medical): No    Lack of Transportation (Non-Medical):  No   Physical Activity: Not on file   Stress: Not on file   Social Connections: Not on file   Intimate Partner Violence: Not on file   Housing Stability: Unknown    Unable to Pay for Housing in the Last Year: No    Number of Places Lived in the Last Year: Not on file    Unstable Housing in the Last Year: No          Current Medical Diagnosis Allergies   Patient Active Problem List   Diagnosis    Status post lumbar spinal fusion    Hyperlipidemia    Hypertension    No Known Allergies        Medical Necessity Criteria for [facility] Admission: anemia In addition, the preadmission screen, post-admission physical evaluation, overall plan of care and admissions order demonstrate a reasonable expectation that the following criteria were met at the time of admission to the [facility]  1  The patient requires active and ongoing therapeutic intervention of multiple therapy disciplines (physical therapy, occupational therapy, speech-language pathology, or prosthetics/orthotics), one of which is physical or occupational therapy  2  Patient requires an intensive rehabilitation therapy program, as defined in Chapter 1, section 110 2 2 of the CMS Medicare Policy Manual  This intensive rehabilitation therapy program will consist of at least 3 hours of therapy per day at least 5 days per week or at least 15 hours of intensive rehabilitation therapy within a 7 consecutive day period, beginning with the date of admission to the [facility]  3  The patient is reasonably expected to actively participate in, and benefit significantly from, the intensive rehabilitation therapy program as defined in Chapter 1, section 110 2 2 of the CMS Medicare Policy Manual at this time of admission to the [facility]  He can reasonably be expected to make measurable improvement (that will be of practical value to improve the patients functional capacity or adaptation to impairments) as a result of the rehabilitation treatment, as defined in section 110 3, and such improvement can be expected to be made within the prescribed period of time   As noted in the CMS Medicare Policy Manual, the patient need not be expected to achieve complete independence in the domain of self-care nor be expected to return to his or her prior level of functioning in order to meet this standard  4  The patient must require physician supervision by a rehabilitation physician  As such, a rehabilitation physician will conduct face-to-face visits with the patient at least 3 days per week throughout the patients stay in the [facility] to assess the patient both medically and functionally, as well as to modify the course of treatment as needed to maximize the patients capacity to benefit from the rehabilitation process  5  The patient requires an intensive and coordinated interdisciplinary approach to providing rehabilitation, as defined in Chapter 1, section 110 2 5 of the CMS Medicare Policy Manual  This will be achieved through periodic team conferences, conducted at least once in a 7-day period, and comprising of an interdisciplinary team of medical professionals consisting of: a rehabilitation physician, registered nurse,  and/or , and a licensed/certified therapist from each therapy discipline involved in treating the patient  Changes Since Pre-admission Assessment: None -This patient's participation in rehab continues to be reasonable, necessary and appropriate  CMS Required Post-Admission Physician Evaluation Elements  History and Physical, including medical history, functional history and active comorbidities as in above text  Post-Admission Physician Evaluation:  The patient has the potential to make improvement and is in need of physical, occupational, and/or therapy services  The patient may also need nutritional services  Given the patient's complex medical condition and risk of further medical complications, rehabilitative services cannot be safely provided at a lower level of care, such as a skilled nursing facility   I have reviewed the patient's functional and medical status at the time of the preadmission screening and they are the same as on the day of this admission  I acknowledge that I have personally performed a full physical examination on this patient within 24 hours of admission  The patient and/or family demonstrated understanding the rehabilitation program and the discharge process after we discussed them       Agree in entirety: yes  Minor adaptions: none    Major changes: none     Heidi Nash MD  Physical Medicine and Rehabilitation

## 2022-05-27 NOTE — PLAN OF CARE
Problem: PHYSICAL THERAPY ADULT  Goal: Performs mobility at highest level of function for planned discharge setting  See evaluation for individualized goals  Description: Treatment/Interventions: Functional transfer training, LE strengthening/ROM, Therapeutic exercise, Endurance training, Patient/family training, Equipment eval/education, Bed mobility, Spoke to nursing, Continued evaluation, Spoke to advanced practitioner, OT  Equipment Recommended:  (TBD)       See flowsheet documentation for full assessment, interventions and recommendations  Outcome: Progressing  Note: Prognosis: Good  Problem List: Decreased strength, Decreased endurance, Impaired balance, Decreased mobility, Orthopedic restrictions, Pain  Assessment: Pt seen for PT treatment session this date, consisting of ther act focused on bed mobility/log roll technique, sit to stand transfers from various surfaces/heights, ther ex focused on strengthening and gt training on level surfaces to improve pt safety in household environment  Since previous session, pt has made good progress in terms of increased household distance gait trials without overt LOB observed, pt performing all phases of mobility at min A x1 with use of RW; continued LB exercises to tolerance without worsened pain reported  Pertinent barriers during this session include pain  Current goals and POC remain appropriate, pt continues to have rehab potential and is making progress towards STGs  Pt prognosis for achieving goals is excellent, pending pt progress with hospitalization/medical status improvements, and indicated by Stimulability, ability to follow directions, motivation, recent history of independence with functional skills/High PLOF, supportive family/caregivers, learning potential and previous response to intervention  Pt limited d/t fear of pain provocation and fear of falling  PT recommends post acute rehabilitation services upon discharge   Pt continues to be functioning below baseline level, and remains limited 2* factors listed above  PT will continue to see pt during current hospitalization in order to address the deficits listed above and provide interventions consistent w/ POC in effort to achieve STGs  Barriers to Discharge: Decreased caregiver support, Inaccessible home environment        PT Discharge Recommendation: Post acute rehabilitation services          See flowsheet documentation for full assessment

## 2022-05-28 PROCEDURE — 97116 GAIT TRAINING THERAPY: CPT

## 2022-05-28 PROCEDURE — 97166 OT EVAL MOD COMPLEX 45 MIN: CPT

## 2022-05-28 PROCEDURE — 97162 PT EVAL MOD COMPLEX 30 MIN: CPT

## 2022-05-28 PROCEDURE — 97535 SELF CARE MNGMENT TRAINING: CPT

## 2022-05-28 PROCEDURE — 99232 SBSQ HOSP IP/OBS MODERATE 35: CPT | Performed by: INTERNAL MEDICINE

## 2022-05-28 PROCEDURE — 97110 THERAPEUTIC EXERCISES: CPT

## 2022-05-28 PROCEDURE — 97530 THERAPEUTIC ACTIVITIES: CPT

## 2022-05-28 RX ORDER — DOCUSATE SODIUM 100 MG/1
100 CAPSULE, LIQUID FILLED ORAL 2 TIMES DAILY
Status: DISCONTINUED | OUTPATIENT
Start: 2022-05-28 | End: 2022-06-06

## 2022-05-28 RX ORDER — BISACODYL 10 MG
10 SUPPOSITORY, RECTAL RECTAL ONCE
Status: COMPLETED | OUTPATIENT
Start: 2022-05-28 | End: 2022-05-28

## 2022-05-28 RX ORDER — SENNOSIDES 8.6 MG
1 TABLET ORAL
Status: DISCONTINUED | OUTPATIENT
Start: 2022-05-28 | End: 2022-06-06

## 2022-05-28 RX ADMIN — OXYCODONE HYDROCHLORIDE 10 MG: 10 TABLET ORAL at 12:00

## 2022-05-28 RX ADMIN — OXYCODONE HYDROCHLORIDE 5 MG: 5 TABLET ORAL at 21:12

## 2022-05-28 RX ADMIN — SENNOSIDES 8.6 MG: 8.6 TABLET, FILM COATED ORAL at 21:12

## 2022-05-28 RX ADMIN — POLYETHYLENE GLYCOL 3350 17 G: 17 POWDER, FOR SOLUTION ORAL at 08:50

## 2022-05-28 RX ADMIN — OXYCODONE HYDROCHLORIDE 10 MG: 10 TABLET ORAL at 16:40

## 2022-05-28 RX ADMIN — DOCUSATE SODIUM 100 MG: 100 CAPSULE, LIQUID FILLED ORAL at 17:37

## 2022-05-28 RX ADMIN — POLYETHYLENE GLYCOL 3350 17 G: 17 POWDER, FOR SOLUTION ORAL at 17:37

## 2022-05-28 RX ADMIN — BISACODYL 10 MG: 10 SUPPOSITORY RECTAL at 19:32

## 2022-05-28 RX ADMIN — OXYCODONE HYDROCHLORIDE 10 MG: 10 TABLET ORAL at 06:13

## 2022-05-28 RX ADMIN — PRAVASTATIN SODIUM 10 MG: 10 TABLET ORAL at 17:37

## 2022-05-28 RX ADMIN — DOCUSATE SODIUM 100 MG: 100 CAPSULE, LIQUID FILLED ORAL at 11:51

## 2022-05-28 RX ADMIN — LISINOPRIL 5 MG: 5 TABLET ORAL at 08:46

## 2022-05-28 RX ADMIN — METHOCARBAMOL 500 MG: 500 TABLET, FILM COATED ORAL at 08:46

## 2022-05-28 RX ADMIN — ASPIRIN 325 MG ORAL TABLET 325 MG: 325 PILL ORAL at 08:46

## 2022-05-28 NOTE — PROGRESS NOTES
Internal Medicine Progress Note  Patient: Ariana Hernandez  Age/sex: 73 y o  male  Medical Record #: [id number]      ASSESSMENT/PLAN: (Interval History)  Ariana Hernandez is seen and examined and management for following issues:    L1-S1 revision of HW, L5-S1 osteotomy  Posterior spinal fusion L4-S1  Interbody fusion L5-S1  · Cont TLSO brace when OOB  · Pain/rehab per PMR  · DVT prophylaxis with ASA     Post operative blood loss anemia  · Stable  · Monitor cbc     HTN  · Cont lisinopril  · Monitor trend and titrate based on same     Hyperlipidemia  · Low cholesterol diet  · Continue statin therapy     Chronic elevated LFT's  · stable     Constipation  · Give miralax  · Cont colace/senna    The above assessment and plan was reviewed and updated as determined by my evaluation of the patient on 5/28/2022  Labs:   Results from last 7 days   Lab Units 05/27/22  0433 05/26/22  0526   WBC Thousand/uL 8 11 8 86   HEMOGLOBIN g/dL 8 9* 9 3*   HEMATOCRIT % 27 5* 28 1*   PLATELETS Thousands/uL 196 199     Results from last 7 days   Lab Units 05/27/22  0433 05/26/22  0526 05/24/22  2245 05/24/22  1732   SODIUM mmol/L 137 135*   < >  --    POTASSIUM mmol/L 4 0 3 6   < >  --    CHLORIDE mmol/L 105 103   < >  --    CO2 mmol/L 27 26   < >  --    CO2, I-STAT mmol/L  --   --   --  22   BUN mg/dL 12 13   < >  --    CREATININE mg/dL 0 71 0 72   < >  --    GLUCOSE, ISTAT mg/dl  --   --   --  134   CALCIUM mg/dL 8 2* 8 1*   < >  --     < > = values in this interval not displayed           Results from last 7 days   Lab Units 05/24/22  0849   INR  1 02     Results from last 7 days   Lab Units 05/25/22  1557   POC GLUCOSE mg/dl 127       Review of Scheduled Meds:  Current Facility-Administered Medications   Medication Dose Route Frequency Provider Last Rate    aspirin  325 mg Oral Daily Khai Bright MD      hydrALAZINE  25 mg Oral Q8H PRN BAYRON Judge      lisinopril  5 mg Oral Daily Khai Bright MD      methocarbamol  500 mg Oral Q6H PRN Eros Brown MD      oxyCODONE  10 mg Oral Q4H PRN Eros Brown MD      oxyCODONE  5 mg Oral Q4H PRN Eros Brown MD      polyethylene glycol  17 g Oral Daily PRN Eros Brown MD      pravastatin  10 mg Oral QPM Eros Brown MD         Subjective/ HPI: Patient seen and examined  Patients overnight issues or events were reviewed with nursing or staff during rounds or morning huddle session  New or overnight issues include the following:     Pt seen sitting in chair this am with TLSO in place  No overnight issues or concerns, he is anxious to start therapy    ROS:   A 10 point ROS was performed; negative except as noted above  Imaging:     No orders to display       *Labs /Radiology studies Reviewed  *Medications  reviewed and reconciled as needed  *Please refer to order section for additional ordered labs studies  *Case discussed with primary attending during morning huddle case rounds    Physical Examination:  Vitals:   Vitals:    05/27/22 1722 05/27/22 2100 05/28/22 0608 05/28/22 0843   BP:  139/65 142/75 115/75   BP Location:  Right arm Right arm    Pulse:  94 91 103   Resp:  17 18    Temp:  99 1 °F (37 3 °C) 98 6 °F (37 °C)    TempSrc:  Oral Oral    SpO2:  96% 95%    Weight: 98 8 kg (217 lb 14 4 oz)      Height: 5' 10" (1 778 m)          GEN: No apparent distress, interactive  NEURO: Alert and oriented x3  HEENT: Pupils are equal and reactive, EOMI, mucous membranes are moist, face symmetrical  CV: S1 S2 regular, no MRG, no peripheral edema noted  RESP: Lungs are clear bilaterally, no wheezes, rales or rhonchi noted, on room air, respirations easy and non labored  GI: Flat, soft non tender, non distended; +BS x4  : Voiding without difficulty  MUSC: Moves all extremities; +mild LLE weakness  SKIN: pink, warm and dry, normal turgor, dressing in place to lumbar spine      The above physical exam was reviewed and updated as determined by my evaluation of the patient on 5/28/2022      Invasive Devices  Report    Peripheral Intravenous Line  Duration           Peripheral IV 05/24/22 Right;Ventral (anterior) Forearm 4 days    Peripheral IV 05/24/22 Right Hand 3 days                   VTE Pharmacologic Prophylaxis: Sequential pneumatic compression stocking  Code Status: Level 1 - Full Code  Current Length of Stay: 1 day(s)      Total time spent:  1 hour with more than 50% spent counseling/coordinating care  Counseling includes discussion with patient re: progress  and discussion with patient of his/her current medical state/information  Coordination of patient's care was performed in conjunction with primary service  Time invested included review of patient's labs, vitals, and management of their comorbidities with continued monitoring  In addition, this patient was discussed with medical team including physician and advanced extenders  The care of the patient was extensively discussed and appropriate treatment plan was formulated unique for this patient  ** Please Note:  voice to text software may have been used in the creation of this document   Although proof errors in transcription or interpretation are a potential of such software**

## 2022-05-28 NOTE — PROGRESS NOTES
05/28/22 1230   Rehab Team Goals   Transfer Team Goal Patient will be independent with transfers with least restrictive device upon completion of rehab program   Locomotion Team Goal Patient will be independent with locomotion with least restrictive device upon completion of rehab program   Rehab Team Interventions   PT Interventions Gait Training; Therapeutic Exercise;Neuromuscualr Reeducation;Community Reintegration;Bed Mobility;Transfer Training;Modalities; Patient/Family Education;Group Therapy   PT Transfer Goal   Roll left and right Goal 06  Independent - Patient completes the activity by him/herself with no assistance from a helper  Sit to lying Goal 06  Independent - Patient completes the activity by him/herself with no assistance from a helper  Lying to sitting on side of bed Goal 06  Independent - Patient completes the activity by him/herself with no assistance from a helper  Sit to stand Goal 06  Independent - Patient completes the activity by him/herself with no assistance from a helper  Chair/bed-to-chair transfer Goal 06  Independent - Patient completes the activity by him/herself with no assistance from a helper  Car Transfer Goal 05  Setup or clean-up assistance - [place] SETS UP or CLEANS UP, patient completes activity  [place] assists only prior to or following the activity  Assistive Device Roller Walker   Environment Level Surface   Status Ongoing; Target goal - one week   Locomotion Goal   Primary discharge mode of locomotion Walking   Target Walk Distance 150 ft   Assist Device Roller Walker   Gait Pattern Improvement Inconsistant Leisa; Improper weight shift   Environment Level Surface   Walk 10 feet Goal 06  Independent - Patient completes the activity by him/herself with no assistance from a helper  Walk 50 feet with 2 turns Goal 06  Independent - Patient completes the activity by him/herself with no assistance from a helper  Walk 150 feet Goal 06   Independent - Patient completes the activity by him/herself with no assistance from a helper  Walking 10 feet on uneven surface 06  Independent - Patient completes the activity by him/herself with no assistance from a helper  Walking Goal Status Target goal - one week; Ongoing   Stairs Goal   1 step or curb goal 06  Independent - Patient completes the activity by him/herself with no assistance from a helper  4 steps Goal 04  Supervision or touching assistance- Lindrith provides VERBAL CUES or supervision throughout activity  12 steps Goal 09  Not applicable   Technique Reciprocal   Hand Rail Bilateral   Status Target goal - one week; Ongoing   Object Retrieval Goal   Picking up object Goal 06  Independent - Patient completes the activity by him/herself with no assistance from a helper     Assistive Device  Reacher   Small Object Picked Up cone

## 2022-05-28 NOTE — PLAN OF CARE
Problem: PAIN - ADULT  Goal: Verbalizes/displays adequate comfort level or baseline comfort level  Description: Interventions:  - Encourage patient to monitor pain and request assistance  - Assess pain using appropriate pain scale  - Administer analgesics based on type and severity of pain and evaluate response  - Implement non-pharmacological measures as appropriate and evaluate response  - Consider cultural and social influences on pain and pain management  - Notify physician/advanced practitioner if interventions unsuccessful or patient reports new pain  Outcome: Progressing     Problem: INFECTION - ADULT  Goal: Absence or prevention of progression during hospitalization  Description: INTERVENTIONS:  - Assess and monitor for signs and symptoms of infection  - Monitor lab/diagnostic results  - Monitor all insertion sites, i e  indwelling lines, tubes, and drains  - Monitor endotracheal if appropriate and nasal secretions for changes in amount and color  - [place] appropriate cooling/warming therapies per order  - Administer medications as ordered  - Instruct and encourage patient and family to use good hand hygiene technique  - Identify and instruct in appropriate isolation precautions for identified infection/condition  Outcome: Progressing  Goal: Absence of fever/infection during neutropenic period  Description: INTERVENTIONS:  - Monitor WBC    Outcome: Progressing     Problem: SAFETY ADULT  Goal: Patient will remain free of falls  Description: INTERVENTIONS:  - Educate patient/family on patient safety including physical limitations  - Instruct patient to call for assistance with activity   - Consult OT/PT to assist with strengthening/mobility   - Keep Call bell within reach  - Keep bed low and locked with side rails adjusted as appropriate  - Keep care items and personal belongings within reach  - Initiate and maintain comfort rounds  - Make Fall Risk Sign visible to staff  - Offer Toileting every Hours, in advance of need  - Initiate/Maintain alarm  - Obtain necessary fall risk management equipment:  - Apply yellow socks and bracelet for high fall risk patients  - Consider moving patient to room near nurses station  Outcome: Progressing  Goal: Maintain or return to baseline ADL function  Description: INTERVENTIONS:  -  Assess patient's ability to carry out ADLs; assess patient's baseline for ADL function and identify physical deficits which impact ability to perform ADLs (bathing, care of mouth/teeth, toileting, grooming, dressing, etc )  - Assess/evaluate cause of self-care deficits   - Assess range of motion  - Assess patient's mobility; develop plan if impaired  - Assess patient's need for assistive devices and provide as appropriate  - Encourage maximum independence but intervene and supervise when necessary  - Involve family in performance of ADLs  - Assess for home care needs following discharge   - Consider OT consult to assist with ADL evaluation and planning for discharge  - Provide patient education as appropriate  Outcome: Progressing  Goal: Maintains/Returns to pre admission functional level  Description: INTERVENTIONS:  - Perform BMAT or MOVE assessment daily    - Set and communicate daily mobility goal to care team and patient/family/caregiver     - Collaborate with rehabilitation services on mobility goals if consulted  - Perform Range of Mo  - Out of bed for toileting  - Record patient progress and toleration of activity level   Outcome: Progressing     Problem: DISCHARGE PLANNING  Goal: Discharge to home or other facility with appropriate resources  Description: INTERVENTIONS:  - Identify barriers to discharge w/patient and caregiver  - Arrange for needed discharge resources and transportation as appropriate  - Identify discharge learning needs (meds, wound care, etc )  - Arrange for interpretive services to assist at discharge as needed  - Refer to Case Management Department for coordinating discharge planning if the patient needs post-hospital services based on physician/advanced practitioner order or complex needs related to functional status, cognitive ability, or social support system  Outcome: Progressing     Problem: Potential for Falls  Goal: Patient will remain free of falls  Description: INTERVENTIONS:  - Educate patient/family on patient safety including physical limitations  - Instruct patient to call for assistance with activity   - Consult OT/PT to assist with strengthening/mobility   - Keep Call bell within reach  - Keep bed low and locked with side rails adjusted as appropriate  - Keep care items and personal belongings within reach  - Initiate and maintain comfort rounds  - Make Fall Risk Sign visible to staff  - Offer Toileting every Hours, in advance of need  - Initiate/Maintainalarm  - Obtain necessary fall risk management equipment:   - Apply yellow socks and bracelet for high fall risk patients  - Consider moving patient to room near nurses station  Outcome: Progressing

## 2022-05-28 NOTE — PLAN OF CARE
Problem: PAIN - ADULT  Goal: Verbalizes/displays adequate comfort level or baseline comfort level  Description: Interventions:  - Encourage patient to monitor pain and request assistance  - Assess pain using appropriate pain scale  - Administer analgesics based on type and severity of pain and evaluate response  - Implement non-pharmacological measures as appropriate and evaluate response  - Consider cultural and social influences on pain and pain management  - Notify physician/advanced practitioner if interventions unsuccessful or patient reports new pain  Outcome: Progressing     Problem: INFECTION - ADULT  Goal: Absence or prevention of progression during hospitalization  Description: INTERVENTIONS:  - Assess and monitor for signs and symptoms of infection  - Monitor lab/diagnostic results  - Monitor all insertion sites, i e  indwelling lines, tubes, and drains  - Monitor endotracheal if appropriate and nasal secretions for changes in amount and color  - [place] appropriate cooling/warming therapies per order  - Administer medications as ordered  - Instruct and encourage patient and family to use good hand hygiene technique  - Identify and instruct in appropriate isolation precautions for identified infection/condition  Outcome: Progressing  Goal: Absence of fever/infection during neutropenic period  Description: INTERVENTIONS:  - Monitor WBC    Outcome: Progressing     Problem: SAFETY ADULT  Goal: Patient will remain free of falls  Description: INTERVENTIONS:  - Educate patient/family on patient safety including physical limitations  - Instruct patient to call for assistance with activity   - Consult OT/PT to assist with strengthening/mobility   - Keep Call bell within reach  - Keep bed low and locked with side rails adjusted as appropriate  - Keep care items and personal belongings within reach  - Initiate and maintain comfort rounds  - Make Fall Risk Sign visible to staff  - Offer Toileting every Hours, in advance of need  - Initiate/Maintainalarm  - Obtain necessary fall risk management equipment:   - Apply yellow socks and bracelet for high fall risk patients  - Consider moving patient to room near nurses station  Outcome: Progressing  Goal: Maintain or return to baseline ADL function  Description: INTERVENTIONS:  -  Assess patient's ability to carry out ADLs; assess patient's baseline for ADL function and identify physical deficits which impact ability to perform ADLs (bathing, care of mouth/teeth, toileting, grooming, dressing, etc )  - Assess/evaluate cause of self-care deficits   - Assess range of motion  - Assess patient's mobility; develop plan if impaired  - Assess patient's need for assistive devices and provide as appropriate  - Encourage maximum independence but intervene and supervise when necessary  - Involve family in performance of ADLs  - Assess for home care needs following discharge   - Consider OT consult to assist with ADL evaluation and planning for discharge  - Provide patient education as appropriate  Outcome: Progressing  Goal: Maintains/Returns to pre admission functional level  Description: INTERVENTIONS:  - Perform BMAT or MOVE assessment daily    - Set and communicate daily mobility goal to care team and patient/family/caregiver     - Collaborate with rehabilitation services on mobility goals if consulted  - Perform Range of Mot  - Out of bed for toileting  - Record patient progress and toleration of activity level   Outcome: Progressing     Problem: DISCHARGE PLANNING  Goal: Discharge to home or other facility with appropriate resources  Description: INTERVENTIONS:  - Identify barriers to discharge w/patient and caregiver  - Arrange for needed discharge resources and transportation as appropriate  - Identify discharge learning needs (meds, wound care, etc )  - Arrange for interpretive services to assist at discharge as needed  - Refer to Case Management Department for coordinating discharge planning if the patient needs post-hospital services based on physician/advanced practitioner order or complex needs related to functional status, cognitive ability, or social support system  Outcome: Progressing     Problem: Potential for Falls  Goal: Patient will remain free of falls  Description: INTERVENTIONS:  - Educate patient/family on patient safety including physical limitations  - Instruct patient to call for assistance with activity   - Consult OT/PT to assist with strengthening/mobility   - Keep Call bell within reach  - Keep bed low and locked with side rails adjusted as appropriate  - Keep care items and personal belongings within reach  - Initiate and maintain comfort rounds  - Make Fall Risk Sign visible to staff  - Offer Toileting every Hours, in advance of need  - Initiate/Maintain alarm  - Obtain necessary fall risk management equipment:   - Apply yellow socks and bracelet for high fall risk patients  - Consider moving patient to room near nurses station  Outcome: Progressing

## 2022-05-28 NOTE — PROGRESS NOTES
05/28/22 0700   Patient Data   Etiologic Diagnosis orthopedic scoliosis flat back syndrome thoracolumbar DJD   Date of Onset 05/24/22   Home Setup   Type of Home Multi Level   Method of Entry Hand Rail Left   Number of Stairs 1   Number of Stairs in Home   (FF- but has 1st floor set up available)   First Floor Bathroom Full; Shower;Curtain   First Floor Setup Available Yes   Available Equipment Single Point Cane   Baseline Information   Outpatient Services Received Prior to Admission Physical Therapy   Vocation Other  (retired- respiratory therapist)   Transportation    Prior Device(s) [address]  ([facility] for community distances)   Prior IADL Participation   Money Management Identify Money;Estimate Costs;Estimate Change;Combine Bills;Manage Checkbook   Meal Preparation Full Participation   Laundry Full Participation   Home Cleaning Partial Participation  (splits with wife)   Prior Level of Function   Self-Care 3  Independent - Patient completed the activities by him/herself, with or without an assistive device, with no assistance from a helper  Indoor-Mobility (Ambulation) 3  Independent - Patient completed the activities by him/herself, with or without an assistive device, with no assistance from a helper  Stairs 3  Independent - Patient completed the activities by him/herself, with or without an assistive device, with no assistance from a helper  Functional Cognition 3  Independent - Patient completed the activities by him/herself, with or without an assistive device, with no assistance from a helper  Prior Device Used Z   None of the above  [facility] for community mobiliyt only)   Falls in the Last Year   Number of falls in the past 12 months 0   Patient Preference   Nickname (Patient Preference) Carlos   Psychosocial   Psychosocial (WDL) WDL   Patient Behaviors/Mood Appropriate for age   Restrictions/Precautions   Precautions Fall Risk;Pain;Spinal precautions   Braces or Orthoses LSO  (when OOB)   Pain Assessment   Pain Assessment Tool 0-10   Pain Score 7   Pain Location/Orientation Location: Back   Hospital Pain Intervention(s) Repositioned; Rest  (spoke to RN pt requesting robaxin)   Eating Assessment   Positioning Upright;Out of Bed   QI: Swallowing/Nutritional Status Regular food   Finishes Timely Yes   Type of Assistance Needed Independent   Physical Assistance Level No physical assistance   Eating CARE Score 6   Oral Hygiene   Type of Assistance Needed Incidental touching   Physical Assistance Level No physical assistance   Comment standing at sink to complete oral care CGA for balance support   Oral Hygiene CARE Score 4   Tub/Shower Transfer   Reason Not Assessed Medical  (pt has mepliex dressing in place for another 4 days)   Shower/Bathe Self   Type of Assistance Needed Physical assistance   Physical Assistance Level 26%-50%   Comment Pt able to wash 6/10 body parts  Pt requires assist with b/l LE's below knees, nila area and buttock  Pt able to wash UB and upper thighs while seated in w/c   Shower/Bathe Self CARE Score 3   Bathing   Assessed Bath Style Sponge Bath   Anticipated D/C Bath Style Shower   Able to [place] No   Dressing/Undressing Clothing   Remove UB Clothes   (hospital gown)   Don UB Clothes Pullover Shirt  (LSO)   Type of Assistance Needed Physical assistance   Physical Assistance Level 26%-50%   Comment Pt able to don pull over shirt but requires assist to pull shirt down in back  Pt also requires assist to don LSO brace  PT instructed to bring LSO overhead instead of around his back and pt with increased independence using this technique  Upper Body Dressing CARE Score 3   Remove LB Clothes Undergarment;Socks   Don LB Clothes Pants; Undergarment;TEDs;Socks   Type of Assistance Needed Physical assistance   Physical Assistance Level Total assistance   Comment Due to spinal precautions pt total A for LB dressing and footwear    Pt will need to [facility] at this time to improve independence  Lower Body Dressing CARE Score 1   Limitations Noted In Balance; Endurance;ROM;Strength   Positioning Supported Sit;Standing   Putting On/Taking Off Footwear   Type of Assistance Needed Physical assistance   Physical Assistance Level Total assistance   Comment Pt requires asisst to don MELANIE stockings and shoes  Pt unable to complete dressing using cross leg method  Putting On/Taking Off Footwear CARE Score 1   Toileting Hygiene   Type of Assistance Needed Physical assistance   Physical Assistance Level 26%-50%   Comment Pt able to manage pants up/down did require assist for rear hygiene   Toileting Hygiene CARE Score 3   Toilet Transfer   Surface Assessed Standard Commode   Limitations Noted In Balance; Endurance;LE Strength   Findings Pt requires assist to control descent of transfer  Pt reports having grab bar next to toilet at home on R side  Type of Assistance Needed Physical assistance   Physical Assistance Level 25% or less   Toilet Transfer CARE Score 3   Bowel/Bladder Management   QI: Bladder Continence 0  Always continent (no documented incontinence)   Transfer Bed/Chair/Wheelchair   Limitations Noted In Pain Management;LE Strength;Balance   Adaptive Equipment Roller Walker   Findings Pt requires assistance for stand pivot transfer with RW due to knee buckling as he becomes fatigued     Type of Assistance Needed Physical assistance   Physical Assistance Level 26%-50%   Chair/Bed-to-Chair Transfer CARE Score 3   Roll Left and Right   Type of Assistance Needed Physical assistance   Physical Assistance Level 25% or less   Comment Pt requires assist to come fully onto L side   Roll Left and Right CARE Score 3   Lying to Sitting on Side of Bed   Type of Assistance Needed Physical assistance   Physical Assistance Level 51%-75%   Comment Pt requires guiding assistance with LE's and assistance at trunk for sidelying to sit position   Lying to Sitting on Side of Bed CARE Score 2 Sit to Stand   Type of Assistance Needed Physical assistance   Physical Assistance Level 25% or less   Comment Pt requires min A for sit to stand transfers and cueing for correct hand placement  Pt noted with knee buckling when becoming fatigued  Sit to Stand CARE Score 3   Walk 10 Feet   Type of Assistance Needed Physical assistance   Physical Assistance Level 26%-50%   Comment assist with RW and support due to b/l knee buckling   Walk 10 Feet CARE Score 3   Walk 50 Feet with Two Turns   Type of Assistance Needed Physical assistance   Physical Assistance Level 26%-50%   Walk 50 Feet with Two Turns CARE Score 3   Comprehension   Auditory Complex   QI: Comprehension 4  Undestands: Clear comprehension without cues or repetitions   Comprehension (FIM) 7 - Understands complex/abstract conversation in a reasonable time w/o devices or helper  Expression   Verbal Complex   Intelligibility Sentence   QI: Expression 4  Express complex messages without difficulty and with speech that is clear and easy to [place]   Expression (FIM) 7 - Expresses complex/abstract ideas in a reasonable time w/o devices or helper  Social Interaction   Cooperation with staff   Social Interaction (FIM) 6 - Interacts appropriately with others BUT requires extra  time   Problem Solving   Routine [place] precautions;Manages ADL   Problem solving (FIM) 5 - Solves basic problems 90% of time   Memory   Recalls Precaution No   Memory (FIM) 5 - Needs cueing reminders <10%   RUE Assessment   RUE Assessment WFL   LUE Assessment   LUE Assessment WFL   Coordination   Movements are Fluid and Coordinated 0   Coordination and Movement Description antalgic gait pattern   Sensation   Light Touch Partial deficits in the LLE  (pt reports numbness and tingling in L toes)   Cognition   Overall Cognitive Status WFL   Arousal/Participation Alert; Cooperative   Attention Within functional limits   Orientation Level Oriented X4   Memory Within functional limits Discharge Information   Patient's Discharge Plan return home independently; wife works part time   Patient's Rehab Expectations I want to get stronger so my legs dont feel like theyre always going to buckle   Barriers to Discharge Home Decreased Strength;Decreased Endurance;Pain   Impressions Pt is 73 y o  M who [facility] for elective back sx after failed conservative treatment  Pt has h/o scoliosis and had back sx over 13 years ago  Pt is now s/p L1-S1 segmental hardware revision  L5-S1 osteotomy and posterior spinal fusion L4-S1  Pt had transforaminal lumbar interbody fusion L5-S1 and instrumentation of L1 through pelvis, Pt has spinal precautions and an LSO brace when OOB  Pt hospital stay complicated by Ree Morale but did not recieve a transfusion  Pt comorbidities include hyperlipidemia ABLA, and HTN  At baseline pt was living with wife (who works part time) in [facility] with 1STE (left HR)  Pt was using SPC for community mobility and when back pain was signficant  Pt otherwise was independent with ADLs/IADLs  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  Pt was seen today for medium complexity evaluation  Pt demonstrates deficits with strength, ROM, LE strength, balance, decreased acitivity tolerance, and signficant pain  Additional barriers include limited family support as wife works part time  Pt deficits lead to increased assistance for ADLs, decreased independence in functional transfers, and increased caregiver assistance  Pt would benefit from 2 week LOS to achieve mod I goals in order to d/c home safely adn prevent further risk for fall or injury  Please see ICP for detailed goals     OT Therapy Minutes   OT Time In 0700   OT Time Out 0830   OT Total Time (minutes) 90   OT Mode of treatment - Individual (minutes) 90   OT Mode of treatment - Concurrent (minutes) 0   OT Mode of treatment - Group (minutes) 0   OT Mode of treatment - Co-treat (minutes) 0 OT Mode of Treatment - Total time(minutes) 90 minutes   OT Cumulative Minutes 90   Cumulative Minutes   Cumulative therapy minutes 90

## 2022-05-28 NOTE — PROGRESS NOTES
[facility] OT LONG TERM GOALS: 2 WEEKS       05/28/22 0700   Rehab Team Goals   ADL Team Goal Patient will be independent with ADLs with least restrictive device upon completion of rehab program   Cognitive Team Goal Patient will be independent for basic and complex tasks upon completion of rehab program   Rehab Team Interventions   OT Interventions Self Care;Home Management; Therapeutic Exercise;Community Reintegration; Energy Conservation;Patient/Family Education;Group Therapy   Eating Goal   Eating Goal 06  Independent - Patient completes the activity by him/herself with no assistance from a helper  Meal Complete All meals   Status Ongoing; Target goal - two weeks   Grooming Goal   Oral Hygiene Goal 06  Independent - Patient completes the activity by him/herself with no assistance from a helper  Task Complete Groom   Environment Stand at [employer]   Status Ongoing; Target goal - two weeks   Intervention Balance Work;Tolerance Work   Tub/Shower Transfer Goal   Method Shower Stall  (CGA)   Assist Device Seat with Back;Grab Bar;Hand Held Shower   Status Ongoing; Target goal - two weeks   Interventions ADL Training;Assistive Device; Neuromuscular Education   Bathing Goal   Shower/bathe self Goal 06  Independent - Patient completes the activity by him/herself with no assistance from a helper  Environment Seated; Shower   Adaptive Equipment Long Handle Sponge;Seat with back;Grab Bar;Hand Held Shower   Safety Precautions   (back precautions)   Status Ongoing; Target goal - two weeks   Intervention Assistive Device; ADL Training; Therapeutic Exercise   Upper Body Dressing Goal   Upper body dressing Goal 06  Independent - Patient completes the activity by him/herself with no assistance from a helper  Task Upper Body;Arms in/out  (LSO brace)   Environment Seated   Safety Precautions   (back precautions)   Status Ongoing; Target goal - two weeks   Intervention Balance Work; Therapeutic Exercise; Tolerance Work   Lower [place] Goal Lower body dressing Goal 06  Independent - Patient completes the activity by him/herself with no assistance from a helper  Putting on/taking off footwear Goal 03  Partial/moderate assistance - [place] does less than half the effort  [place] lifts or holds trunk or limbs and provides more than half the effort   (for MELANIE stockings)   Task Lower Body; Undergarment;Pants;Shoe/Slipper;Socks; Anti-Embolic   Environment Seated;Standing   Safety Precautions   (back precautions)   Status Ongoing; Target goal - two weeks   Intervention Balance Work;Assistive Device; Therapeutic Exercise; Tolerance Work   Toileting Transfer Goal   Toilet transfer Goal 06  Independent - Patient completes the activity by him/herself with no assistance from a helper  Assistive Device [place]; Bedside Commode   Safety   (back precautions)   Status Ongoing; Target goal - two weeks   Intervention Balance Work;ADL Training;Assistive Device   Toileting Goal   Toileting hygiene Goal 06  Independent - Patient completes the activity by him/herself with no assistance from a helper  Task Pants Up;Pants Down;Hygiene   Safety Balance   Status Ongoing; Target goal - two weeks   PT Transfer Goal   Roll left and right Goal 06  Independent - Patient completes the activity by him/herself with no assistance from a helper  Sit to lying Goal 06  Independent - Patient completes the activity by him/herself with no assistance from a helper  Lying to sitting on side of bed Goal 06  Independent - Patient completes the activity by him/herself with no assistance from a helper  Sit to stand Goal 06  Independent - Patient completes the activity by him/herself with no assistance from a helper  Chair/bed-to-chair transfer Goal 06  Independent - Patient completes the activity by him/herself with no assistance from a helper  Assistive Device Roller Walker   Environment Level Surface; Well Lit   Status Ongoing; Target goal - two weeks   Comprehension Goal Comprehension Assist Level Independant   Function Demand Complex Needs   Status Ongoing; Target goal - two weeks   Expression Goal   Expression Assist Level Independant   Function Demand Complex Needs   Status Ongoing; Target goal - two weeks   Social Interaction Goal   Social Interaction Assist Level Independant   Status Ongoing; Target goal - two weeks   Problem Solving Goal   Problem Solving Assist Level Independant   Executive Function Cause/Effect;Provide Solution;Reason/; Thought Organ;Sequencing   Status Ongoing; Target goal - two weeks   Memory Goal   Memory Assist Level Moderate [place]   Short-Term Memory Orientation; Recent Recall   Long-Term Memory Past Events; [address] Re[facility]   Status Ongoing; Target goal - two weeks   Intervention Assistive Device;Precautions Review;Memory Book   Community Reintegration Goal   Goal mod I   Status Ongoing; Target - one week   Interventions Community Outing;Leisure Activity   Meal Prep and Kitchen Mobility   Assist Level Independent   Status Ongoing; Target goal - two weeks   Medication Management   Assist Level Independent   Status Ongoing; Target goal - two weeks   Laundry   Assist Level Independent   Status Target goal - two weeks; Ongoing   Finance Management   Assist Level Independent   Status Ongoing; Target goal - two weeks

## 2022-05-29 PROCEDURE — 99232 SBSQ HOSP IP/OBS MODERATE 35: CPT | Performed by: INTERNAL MEDICINE

## 2022-05-29 RX ADMIN — DOCUSATE SODIUM 100 MG: 100 CAPSULE, LIQUID FILLED ORAL at 09:38

## 2022-05-29 RX ADMIN — SENNOSIDES 8.6 MG: 8.6 TABLET, FILM COATED ORAL at 21:39

## 2022-05-29 RX ADMIN — OXYCODONE HYDROCHLORIDE 10 MG: 10 TABLET ORAL at 19:04

## 2022-05-29 RX ADMIN — OXYCODONE HYDROCHLORIDE 10 MG: 10 TABLET ORAL at 06:24

## 2022-05-29 RX ADMIN — PRAVASTATIN SODIUM 10 MG: 10 TABLET ORAL at 17:28

## 2022-05-29 RX ADMIN — OXYCODONE HYDROCHLORIDE 10 MG: 10 TABLET ORAL at 12:59

## 2022-05-29 RX ADMIN — LISINOPRIL 5 MG: 5 TABLET ORAL at 09:39

## 2022-05-29 RX ADMIN — ASPIRIN 325 MG ORAL TABLET 325 MG: 325 PILL ORAL at 09:40

## 2022-05-29 RX ADMIN — DOCUSATE SODIUM 100 MG: 100 CAPSULE, LIQUID FILLED ORAL at 17:28

## 2022-05-29 NOTE — PLAN OF CARE
Problem: PAIN - ADULT  Goal: Verbalizes/displays adequate comfort level or baseline comfort level  Description: Interventions:  - Encourage patient to monitor pain and request assistance  - Assess pain using appropriate pain scale  - Administer analgesics based on type and severity of pain and evaluate response  - Implement non-pharmacological measures as appropriate and evaluate response  - Consider cultural and social influences on pain and pain management  - Notify physician/advanced practitioner if interventions unsuccessful or patient reports new pain  Outcome: Progressing     Problem: INFECTION - ADULT  Goal: Absence or prevention of progression during hospitalization  Description: INTERVENTIONS:  - Assess and monitor for signs and symptoms of infection  - Monitor lab/diagnostic results  - Monitor all insertion sites, i e  indwelling lines, tubes, and drains  - Monitor endotracheal if appropriate and nasal secretions for changes in amount and color  - [place] appropriate cooling/warming therapies per order  - Administer medications as ordered  - Instruct and encourage patient and family to use good hand hygiene technique  - Identify and instruct in appropriate isolation precautions for identified infection/condition  Outcome: Progressing  Goal: Absence of fever/infection during neutropenic period  Description: INTERVENTIONS:  - Monitor WBC    Outcome: Progressing     Problem: SAFETY ADULT  Goal: Patient will remain free of falls  Description: INTERVENTIONS:  - Educate patient/family on patient safety including physical limitations  - Instruct patient to call for assistance with activity   - Consult OT/PT to assist with strengthening/mobility   - Keep Call bell within reach  - Keep bed low and locked with side rails adjusted as appropriate  - Keep care items and personal belongings within reach  - Initiate and maintain comfort rounds  - Make Fall Risk Sign visible to staff  - Offer Toileting every 2-4 Hours, in advance of need  - Initiate/Maintain bed/chair alarm  - Obtain necessary fall risk management equipment: yellow socks  - Apply yellow socks and bracelet for high fall risk patients  - Consider moving patient to room near nurses station  Outcome: Progressing  Goal: Maintain or return to baseline ADL function  Description: INTERVENTIONS:  -  Assess patient's ability to carry out ADLs; assess patient's baseline for ADL function and identify physical deficits which impact ability to perform ADLs (bathing, care of mouth/teeth, toileting, grooming, dressing, etc )  - Assess/evaluate cause of self-care deficits   - Assess range of motion  - Assess patient's mobility; develop plan if impaired  - Assess patient's need for assistive devices and provide as appropriate  - Encourage maximum independence but intervene and supervise when necessary  - Involve family in performance of ADLs  - Assess for home care needs following discharge   - Consider OT consult to assist with ADL evaluation and planning for discharge  - Provide patient education as appropriate  Outcome: Progressing  Goal: Maintains/Returns to pre admission functional level  Description: INTERVENTIONS:  - Perform BMAT or MOVE assessment daily    - Set and communicate daily mobility goal to care team and patient/family/caregiver  - Collaborate with rehabilitation services on mobility goals if consulted  - Perform Range of Motion 3 times a day  - Reposition patient every 2 hours    - Dangle patient 3 times a day  - Stand patient 3 times a day  - Ambulate patient 3 times a day  - Out of bed to chair 3 times a day   - Out of bed for meals 3 times a day  - Out of bed for toileting  - Record patient progress and toleration of activity level   Outcome: Progressing     Problem: DISCHARGE PLANNING  Goal: Discharge to home or other facility with appropriate resources  Description: INTERVENTIONS:  - Identify barriers to discharge w/patient and caregiver  - Arrange for needed discharge resources and transportation as appropriate  - Identify discharge learning needs (meds, wound care, etc )  - Arrange for interpretive services to assist at discharge as needed  - Refer to Case Management Department for coordinating discharge planning if the patient needs post-hospital services based on physician/advanced practitioner order or complex needs related to functional status, cognitive ability, or social support system  Outcome: Progressing     Problem: Potential for Falls  Goal: Patient will remain free of falls  Description: INTERVENTIONS:  - Educate patient/family on patient safety including physical limitations  - Instruct patient to call for assistance with activity   - Consult OT/PT to assist with strengthening/mobility   - Keep Call bell within reach  - Keep bed low and locked with side rails adjusted as appropriate  - Keep care items and personal belongings within reach  - Initiate and maintain comfort rounds  - Make Fall Risk Sign visible to staff  - Offer Toileting every 2-4 Hours, in advance of need  - Initiate/Maintain bed/chair alarm  - Obtain necessary fall risk management equipment: yellow socks   - Apply yellow socks and bracelet for high fall risk patients  - Consider moving patient to room near nurses station  Outcome: Progressing

## 2022-05-29 NOTE — PROGRESS NOTES
Internal Medicine Progress Note  Patient: Joseph Claude  Age/sex: 73 y o  male  Medical Record #: [id number]      ASSESSMENT/PLAN: (Interval History)  Joseph Claude is seen and examined and management for following issues:    L1-S1 revision of HW, L5-S1 osteotomy  Posterior spinal fusion L4-S1  Interbody fusion L5-S1  · Cont TLSO brace when OOB  · Pain/rehab per PMR  · DVT prophylaxis with ASA     Post operative blood loss anemia  · Stable  · Monitor cbc     HTN  · Cont lisinopril  · Monitor trend and titrate based on same     Hyperlipidemia  · Low cholesterol diet  · Continue statin therapy     Chronic elevated LFT's  · stable     Constipation  · improved    The above assessment and plan was reviewed and updated as determined by my evaluation of the patient on 5/29/2022  Labs:   Results from last 7 days   Lab Units 05/27/22  0433 05/26/22  0526   WBC Thousand/uL 8 11 8 86   HEMOGLOBIN g/dL 8 9* 9 3*   HEMATOCRIT % 27 5* 28 1*   PLATELETS Thousands/uL 196 199     Results from last 7 days   Lab Units 05/27/22  0433 05/26/22  0526 05/24/22  2245 05/24/22  1732   SODIUM mmol/L 137 135*   < >  --    POTASSIUM mmol/L 4 0 3 6   < >  --    CHLORIDE mmol/L 105 103   < >  --    CO2 mmol/L 27 26   < >  --    CO2, I-STAT mmol/L  --   --   --  22   BUN mg/dL 12 13   < >  --    CREATININE mg/dL 0 71 0 72   < >  --    GLUCOSE, ISTAT mg/dl  --   --   --  134   CALCIUM mg/dL 8 2* 8 1*   < >  --     < > = values in this interval not displayed           Results from last 7 days   Lab Units 05/24/22  0849   INR  1 02     Results from last 7 days   Lab Units 05/25/22  1557   POC GLUCOSE mg/dl 127       Review of Scheduled Meds:  Current Facility-Administered Medications   Medication Dose Route Frequency Provider Last Rate    aspirin  325 mg Oral Daily Zak Kidd MD      docusate sodium  100 mg Oral BID BAYRON Villarreal      hydrALAZINE  25 mg Oral Q8H PRN BAYRON Villarrael      lisinopril  5 mg Oral Daily Zak Kidd MD  methocarbamol  500 mg Oral Q6H PRN Salbador Bates MD      oxyCODONE  10 mg Oral Q4H PRN Salbador Bates MD      oxyCODONE  5 mg Oral Q4H PRN Salbador Bates MD      polyethylene glycol  17 g Oral Daily PRN Salbador Bates MD      pravastatin  10 mg Oral QPM Salbador Bates MD      senna  1 tablet Oral HS BAYRON Klein         Subjective/ HPI: Patient seen and examined  Patients overnight issues or events were reviewed with nursing or staff during rounds or morning huddle session  New or overnight issues include the following:     Pt seen in his room this am; no complaints overnight, pain is controlled    ROS:   A 10 point ROS was performed; negative except as noted above         Imaging:     No orders to display       *Labs /Radiology studies Reviewed  *Medications  reviewed and reconciled as needed  *Please refer to order section for additional ordered labs studies  *Case discussed with primary attending during morning huddle case rounds    Physical Examination:  Vitals:   Vitals:    05/28/22 0700 05/28/22 0843 05/1955 05/29/22 0618   BP: 136/81 115/75 155/83 130/68   BP Location: Left arm  Right arm Right arm   Pulse:  103 89 84   Resp:   17 18   Temp:   98 4 °F (36 9 °C) 98 2 °F (36 8 °C)   TempSrc:   Oral Oral   SpO2:   94% 97%   Weight:       Height:           GEN: No apparent distress, interactive  NEURO: Alert and oriented x3  HEENT: Pupils are equal and reactive, EOMI, mucous membranes are moist, face symmetrical  CV: S1 S2 regular, no MRG, no peripheral edema noted  RESP: Lungs are clear bilaterally, no wheezes, rales or rhonchi noted, on room air, respirations easy and non labored  GI: Flat, soft non tender, non distended; +BS x4  : Voiding without difficulty  MUSC: Moves all extremities; TLSO brace in place; LLE weakness is ongoing  SKIN: pink, warm and dry, normal turgor, no rashes, lesions        The above physical exam was reviewed and updated as determined by my evaluation of the patient on 5/29/2022  Invasive Devices  Report    None                    VTE Pharmacologic Prophylaxis: Sequential pneumatic compression stocking  Code Status: Level 1 - Full Code  Current Length of Stay: 2 day(s)      Total time spent:  1 hour with more than 50% spent counseling/coordinating care  Counseling includes discussion with patient re: progress  and discussion with patient of his/her current medical state/information  Coordination of patient's care was performed in conjunction with primary service  Time invested included review of patient's labs, vitals, and management of their comorbidities with continued monitoring  In addition, this patient was discussed with medical team including physician and advanced extenders  The care of the patient was extensively discussed and appropriate treatment plan was formulated unique for this patient  ** Please Note:  voice to text software may have been used in the creation of this document   Although proof errors in transcription or interpretation are a potential of such software**

## 2022-05-29 NOTE — PLAN OF CARE
Problem: PAIN - ADULT  Goal: Verbalizes/displays adequate comfort level or baseline comfort level  Description: Interventions:  - Encourage patient to monitor pain and request assistance  - Assess pain using appropriate pain scale  - Administer analgesics based on type and severity of pain and evaluate response  - Implement non-pharmacological measures as appropriate and evaluate response  - Consider cultural and social influences on pain and pain management  - Notify physician/advanced practitioner if interventions unsuccessful or patient reports new pain  Outcome: Progressing     Problem: INFECTION - ADULT  Goal: Absence or prevention of progression during hospitalization  Description: INTERVENTIONS:  - Assess and monitor for signs and symptoms of infection  - Monitor lab/diagnostic results  - Monitor all insertion sites, i e  indwelling lines, tubes, and drains  - Monitor endotracheal if appropriate and nasal secretions for changes in amount and color  - [place] appropriate cooling/warming therapies per order  - Administer medications as ordered  - Instruct and encourage patient and family to use good hand hygiene technique  - Identify and instruct in appropriate isolation precautions for identified infection/condition  Outcome: Progressing  Goal: Absence of fever/infection during neutropenic period  Description: INTERVENTIONS:  - Monitor WBC    Outcome: Progressing     Problem: SAFETY ADULT  Goal: Patient will remain free of falls  Description: INTERVENTIONS:  - Educate patient/family on patient safety including physical limitations  - Instruct patient to call for assistance with activity   - Consult OT/PT to assist with strengthening/mobility   - Keep Call bell within reach  - Keep bed low and locked with side rails adjusted as appropriate  - Keep care items and personal belongings within reach  - Initiate and maintain comfort rounds  - Make Fall Risk Sign visible to staff  - Offer Toileting every  Hours, in advance of need  - Initiate/Maintain larm  - Obtain necessary fall risk management equipment:   - Apply yellow socks and bracelet for high fall risk patients  - Consider moving patient to room near nurses station  Outcome: Progressing  Goal: Maintain or return to baseline ADL function  Description: INTERVENTIONS:  -  Assess patient's ability to carry out ADLs; assess patient's baseline for ADL function and identify physical deficits which impact ability to perform ADLs (bathing, care of mouth/teeth, toileting, grooming, dressing, etc )  - Assess/evaluate cause of self-care deficits   - Assess range of motion  - Assess patient's mobility; develop plan if impaired  - Assess patient's need for assistive devices and provide as appropriate  - Encourage maximum independence but intervene and supervise when necessary  - Involve family in performance of ADLs  - Assess for home care needs following discharge   - Consider OT consult to assist with ADL evaluation and planning for discharge  - Provide patient education as appropriate  Outcome: Progressing  Goal: Maintains/Returns to pre admission functional level  Description: INTERVENTIONS:  - Perform BMAT or MOVE assessment daily    - Set and communicate daily mobility goal to care team and patient/family/caregiver     - Collaborate with rehabilitation services on mobility goals if consulted  - Perform Range of [place]  - Out of bed for toileting  - Record patient progress and toleration of activity level   Outcome: Progressing     Problem: DISCHARGE PLANNING  Goal: Discharge to home or other facility with appropriate resources  Description: INTERVENTIONS:  - Identify barriers to discharge w/patient and caregiver  - Arrange for needed discharge resources and transportation as appropriate  - Identify discharge learning needs (meds, wound care, etc )  - Arrange for interpretive services to assist at discharge as needed  - Refer to Case Management Department for coordinating discharge planning if the patient needs post-hospital services based on physician/advanced practitioner order or complex needs related to functional status, cognitive ability, or social support system  Outcome: Progressing     Problem: Potential for Falls  Goal: Patient will remain free of falls  Description: INTERVENTIONS:  - Educate patient/family on patient safety including physical limitations  - Instruct patient to call for assistance with activity   - Consult OT/PT to assist with strengthening/mobility   - Keep Call bell within reach  - Keep bed low and locked with side rails adjusted as appropriate  - Keep care items and personal belongings within reach  - Initiate and maintain comfort rounds  - Make Fall Risk Sign visible to staff  - Offer Toileting every  Hours, in advance of need  - Initiate/Maintainalarm  - Obtain necessary fall risk management equipment:   - Apply yellow socks and bracelet for high fall risk patients  - Consider moving patient to room near nurses station  Outcome: Progressing

## 2022-05-30 PROCEDURE — 99232 SBSQ HOSP IP/OBS MODERATE 35: CPT | Performed by: INTERNAL MEDICINE

## 2022-05-30 PROCEDURE — 97110 THERAPEUTIC EXERCISES: CPT

## 2022-05-30 PROCEDURE — 97530 THERAPEUTIC ACTIVITIES: CPT

## 2022-05-30 PROCEDURE — 97535 SELF CARE MNGMENT TRAINING: CPT

## 2022-05-30 PROCEDURE — 97116 GAIT TRAINING THERAPY: CPT

## 2022-05-30 RX ADMIN — PRAVASTATIN SODIUM 10 MG: 10 TABLET ORAL at 17:32

## 2022-05-30 RX ADMIN — OXYCODONE HYDROCHLORIDE 10 MG: 10 TABLET ORAL at 06:19

## 2022-05-30 RX ADMIN — OXYCODONE HYDROCHLORIDE 10 MG: 10 TABLET ORAL at 20:27

## 2022-05-30 RX ADMIN — LISINOPRIL 5 MG: 5 TABLET ORAL at 08:26

## 2022-05-30 RX ADMIN — ASPIRIN 325 MG ORAL TABLET 325 MG: 325 PILL ORAL at 08:26

## 2022-05-30 RX ADMIN — OXYCODONE HYDROCHLORIDE 5 MG: 5 TABLET ORAL at 08:28

## 2022-05-30 RX ADMIN — DOCUSATE SODIUM 100 MG: 100 CAPSULE, LIQUID FILLED ORAL at 08:26

## 2022-05-30 RX ADMIN — OXYCODONE HYDROCHLORIDE 10 MG: 10 TABLET ORAL at 14:34

## 2022-05-30 NOTE — PROGRESS NOTES
Internal Medicine Progress Note  Patient: Cliff Ibrahim  Age/sex: 73 y o  male  Medical Record #: [id number]      ASSESSMENT/PLAN: (Interval History)  Cliff Ibrahim is seen and examined and management for following issues:    L1-S1 revision of HW, L5-S1 osteotomy  Posterior spinal fusion L4-S1  Interbody fusion L5-S1  · Cont TLSO brace when OOB  · Pain/rehab per PMR  · DVT prophylaxis with ASA     Post operative blood loss anemia  · Stable  · Monitor cbc     HTN  · Cont lisinopril  · Monitor trend and titrate based on same  · stable     Hyperlipidemia  · Low cholesterol diet  · Continue statin therapy     Chronic elevated LFT's  · stable     Constipation  · improved    The above assessment and plan was reviewed and updated as determined by my evaluation of the patient on 5/30/2022  Labs:   Results from last 7 days   Lab Units 05/27/22  0433 05/26/22  0526   WBC Thousand/uL 8 11 8 86   HEMOGLOBIN g/dL 8 9* 9 3*   HEMATOCRIT % 27 5* 28 1*   PLATELETS Thousands/uL 196 199     Results from last 7 days   Lab Units 05/27/22  0433 05/26/22  0526 05/24/22  2245 05/24/22  1732   SODIUM mmol/L 137 135*   < >  --    POTASSIUM mmol/L 4 0 3 6   < >  --    CHLORIDE mmol/L 105 103   < >  --    CO2 mmol/L 27 26   < >  --    CO2, I-STAT mmol/L  --   --   --  22   BUN mg/dL 12 13   < >  --    CREATININE mg/dL 0 71 0 72   < >  --    GLUCOSE, ISTAT mg/dl  --   --   --  134   CALCIUM mg/dL 8 2* 8 1*   < >  --     < > = values in this interval not displayed           Results from last 7 days   Lab Units 05/24/22  0849   INR  1 02     Results from last 7 days   Lab Units 05/25/22  1557   POC GLUCOSE mg/dl 127       Review of Scheduled Meds:  Current Facility-Administered Medications   Medication Dose Route Frequency Provider Last Rate    aspirin  325 mg Oral Daily Jermaine James MD      docusate sodium  100 mg Oral BID BAYRON James      hydrALAZINE  25 mg Oral Q8H PRN BAYRON James      lisinopril  5 mg Oral Daily Elijah Lyndsay Bentley MD      methocarbamol  500 mg Oral Q6H PRN Schuyler Michelle MD      oxyCODONE  10 mg Oral Q4H PRN Schuyler Michelle MD      oxyCODONE  5 mg Oral Q4H PRN Schuyler Michelle MD      polyethylene glycol  17 g Oral Daily PRN Schuyler Michelle MD      pravastatin  10 mg Oral QPM Schuyler Michelle MD      senna  1 tablet Oral HS BAYRON Levine         Subjective/ HPI: Patient seen and examined  Patients overnight issues or events were reviewed with nursing or staff during rounds or morning huddle session  New or overnight issues include the following:     Pt seen at bedside  Slept well overnight, feels that the weakness in his left leg is improving  Denies any pain         ROS:   A 10 point ROS was performed; negative except as noted above         Imaging:     No orders to display       *Labs /Radiology studies Reviewed  *Medications  reviewed and reconciled as needed  *Please refer to order section for additional ordered labs studies  *Case discussed with primary attending during morning huddle case rounds    Physical Examination:  Vitals:   Vitals:    05/29/22 0938 05/29/22 1353 05/29/22 2100 05/30/22 0616   BP: 134/78 124/71 159/95 134/82   BP Location: Left arm Right arm Right arm Right arm   Pulse:  102 98 91   Resp:  18 20 18   Temp:  98 1 °F (36 7 °C) 98 8 °F (37 1 °C) 98 °F (36 7 °C)   TempSrc:  Oral Oral Oral   SpO2:  96% 94% 92%   Weight:       Height:           GEN: No apparent distress, interactive  NEURO: Alert and oriented x3  HEENT: Pupils are equal and reactive, EOMI, mucous membranes are moist, face symmetrical  CV: S1 S2 regular, no MRG, no peripheral edema noted  RESP: Lungs are clear bilaterally, no wheezes, rales or rhonchi noted, on room air, respirations easy and non labored  GI: Flat, soft non tender, non distended; +BS x4  : Voiding without difficulty  MUSC: Moves all extremities; LLE weakness, TLSO brace in place  SKIN: pink, warm and dry, normal turgor, no rashes, lesions      The above physical exam was reviewed and updated as determined by my evaluation of the patient on 5/30/2022  Invasive Devices  Report    None                    VTE Pharmacologic Prophylaxis: Sequential pneumatic compression stocking  Code Status: Level 1 - Full Code  Current Length of Stay: 3 day(s)      Total time spent:  1 hour with more than 50% spent counseling/coordinating care  Counseling includes discussion with patient re: progress  and discussion with patient of his/her current medical state/information  Coordination of patient's care was performed in conjunction with primary service  Time invested included review of patient's labs, vitals, and management of their comorbidities with continued monitoring  In addition, this patient was discussed with medical team including physician and advanced extenders  The care of the patient was extensively discussed and appropriate treatment plan was formulated unique for this patient  ** Please Note:  voice to text software may have been used in the creation of this document   Although proof errors in transcription or interpretation are a potential of such software**

## 2022-05-30 NOTE — PCC OCCUPATIONAL THERAPY
Pt seen for OT sessions focused on ADL skills retraining, educating pt on LHAE for LB dressing and footwear mgmt, UE strengthening, and general OOB activity tolerance, for increased independence w/ADLs and decreased caregiver burden  Pt is tolerating sessions well, receptive to new education regarding [place] for LB dressing and demonstrating G carryover of therapist demonstrations and cues  Pt demo overall G awareness of maintaining spinal precautions, only requiring min vc's during nila/rear bathing to avoid twisting, pt receptive  Pt continues to be limited by decreased strength, ROM, standing balance, activity tolerance, and back pain  Pt would benefit from continued skilled OT focused on ADL skills retraining, repetitive practice using [place] for LB/footwear mgmt, standing balance/tolerance, fxl transfer training, progression to showering/ shower transfer when medically cleared to shower, and general OOB activity tolerance  D/C is pending pt progress

## 2022-05-30 NOTE — PROGRESS NOTES
05/30/22 1230   Pain Assessment   Pain Assessment Tool 0-10   Pain Score 5   Pain Location/Orientation Orientation: Lower; Location: Back   Pain Onset/Description Onset: Ongoing   Patient's Stated Pain Goal No pain   Hospital Pain Intervention(s) Medication (See MAR)   Multiple Pain Sites No   Restrictions/Precautions   Precautions Fall Risk;Pain;Spinal precautions; Bed/chair alarms;Limb alert  (LSO when OOB)   RUE Weight Bearing Per Order WBAT   LUE Weight Bearing Per Order WBAT   RLE Weight Bearing Per Order WBAT   LLE Weight Bearing Per Order WBAT   Braces or Orthoses LSO   Cognition   Overall Cognitive Status WFL   Arousal/Participation Alert; Cooperative   Attention Within functional limits   Orientation Level Oriented X4   Following Commands Follows all commands and directions without difficulty   Subjective   Subjective I'm sore from yesterday leg exercise   Roll Left and Right   Type of Assistance Needed Independent; Adaptive equipment   Physical Assistance Level No physical assistance   Comment bed rail   Roll Left and Right CARE Score 6   Sit to Lying   Type of Assistance Needed Supervision   Physical Assistance Level No physical assistance   Sit to Lying CARE Score 4   Lying to Sitting on Side of Bed   Type of Assistance Needed Supervision;Verbal cues   Physical Assistance Level No physical assistance   Comment rail   Lying to Sitting on Side of Bed CARE Score 4   Sit to Stand   Type of Assistance Needed Supervision;Verbal cues; Adaptive equipment   Physical Assistance Level No physical assistance   Comment RW   Sit to Stand CARE Score 4   Bed-Chair Transfer   Type of Assistance Needed Supervision;Verbal cues; Adaptive equipment   Physical Assistance Level No physical assistance   Comment RW   Chair/Bed-to-Chair Transfer CARE Score 4   Transfer Bed/Chair/Wheelchair   Limitations Noted In Balance; Endurance   Adaptive Equipment Roller Walker   Stand Pivot Supervision   Sit to TXU Johann   Stand to Alli Cartwright Supervision   Supine to Sit Supervision   Sit to Supine Supervision   Walk 10 Feet   Type of Assistance Needed Supervision;Verbal cues; Adaptive equipment   Physical Assistance Level No physical assistance   Walk 10 Feet CARE Score 4   Walk 50 Feet with Two Turns   Type of Assistance Needed Supervision;Verbal cues; Adaptive equipment   Physical Assistance Level No physical assistance   Walk 50 Feet with Two Turns CARE Score 4   Walk 150 Feet   Type of Assistance Needed Supervision;Verbal cues; Adaptive equipment   Physical Assistance Level No physical assistance   Walk 150 Feet CARE Score 4   Walking 10 Feet on Uneven Surfaces   Type of Assistance Needed Incidental touching;Verbal cues; Adaptive equipment   Physical Assistance Level No physical assistance   Comment CGA with RW   Walking 10 Feet on Uneven Surfaces CARE Score 4   Ambulation   Does the patient walk? 2  Yes   Primary Mode of Locomotion Prior to Admission Walk   Distance Walked (feet)   (180ft x 2 and 60ft)   Assist Device Roller Walker   Gait Pattern Slow Leisa; Inconsistant Leisa; Step through   Limitations Noted In Balance; Endurance;Speed;Strength   Provided Assistance with: Balance   Walk Assist Level Close Supervision   Findings cueing for posture and appropriate distance from [address] 50 Feet with Two Turns   Reason if not Attempted Activity not applicable   Wheel 50 Feet with Two Turns CARE Score 9   Wheel 150 Feet   Reason if not Attempted Activity not applicable   Wheel 849 Feet CARE Score 9   Curb or Single Stair   Style negotiated Curb   Type of Assistance Needed Incidental touching; Adaptive equipment;Verbal cues   Physical Assistance Level No physical assistance   Comment CGA with RW   1 Step (Curb) CARE Score 4   4 Steps   Type of Assistance Needed Incidental touching;Verbal cues   Physical Assistance Level No physical assistance   Comment cueing for sequence   4 Steps CARE Score 4   12 Steps   Reason if not Attempted Activity not applicable 12 Steps CARE Score 9   Stairs   Type Stairs   # of Steps 4   Assist Devices Bilateral Rail   Findings Ascend c step to and leading c R LE   Therapeutic Interventions   Strengthening Seated: hip add isometrics, hip abd, hamstring curls anc ankle DF c GTB, hip flex and LAQ 3#R and 2#L   Flexibility B hmastring and gastroc str x 15sec x 5 reps   Other STS c UE support x 5 reps x 2 sets   Equipment Use   NuStep L2 B UE/LE x 15reps SPM 31   Assessment   Treatment Assessment Patient participated well in skilled PT and showing progress with functional mobility  Pt able to perform bed mobility but log rolling, S and min vc for spinal precautions  He able to transfer c RW, S and min vc for hand placement specially during stand to sit  Patient amb c RW  x 180ft x 2, S and demonstrate improvement c step length  Also noted improvement c L LE strength  Cont PT as per POC to maximized functional mobility independence, dec risk of fall, burden of care and allow safe d/c to home   Problem List Decreased strength;Decreased endurance; Impaired balance;Decreased mobility;Pain;Orthopedic restrictions   Barriers to Discharge Inaccessible home environment;Decreased caregiver support   PT Barriers   Physical Impairment Decreased strength;Decreased endurance; Impaired balance;Decreased mobility;Orthopedic restrictions;Pain   Functional Limitation Car transfers;Standing;Stair negotiation;Ramp negotiation;Transfers; Walking   Plan   Treatment/Interventions Functional transfer training;Elevations;LE strengthening/ROM; Therapeutic exercise; Endurance training;Patient/family training;Bed mobility;Gait training   Progress Progressing toward goals   Recommendation   PT Discharge Recommendation Home with home health rehabilitation   Equipment Recommended Walker   PT Therapy Minutes   PT Time In 1230   PT Time Out 1400   PT Total Time (minutes) 90   PT Mode of treatment - Individual (minutes) 90   PT Mode of treatment - Concurrent (minutes) 0   PT Mode of treatment - Group (minutes) 0   PT Mode of treatment - Co-treat (minutes) 0   PT Mode of Treatment - Total time(minutes) 90 minutes   PT Cumulative Minutes 180   Therapy Time missed   Time missed?  No

## 2022-05-30 NOTE — PROGRESS NOTES
05/30/22 0900   Pain Assessment   Pain Assessment Tool 0-10   Pain Score 5   Pain Location/Orientation Orientation: Bilateral;Location: Back   Pain Onset/Description Onset: Ongoing;Frequency: Constant/Continuous   Hospital Pain Intervention(s) Repositioned   Restrictions/Precautions   Precautions Fall Risk;Pain;Spinal precautions   Braces or Orthoses LSO  (when OOB)   Oral Hygiene   Type of Assistance Needed Incidental touching   Physical Assistance Level No physical assistance   Comment CGA in stance at sink to brush teeth, with unilateral UE support on sink ledge, no LOB   Oral Hygiene CARE Score 4   Grooming   Able To Initiate Tasks;Comb/Brush Hair;Wash/Dry Face;Brush/Clean Teeth;Wash/Dry Hands   Limitation Noted In Strength;Timeliness   Findings CGA in stance at sink with unilateral UE release to complete all grooming tasks, no LOB   Shower/Bathe Self   Type of Assistance Needed Physical assistance;Verbal cues   Physical Assistance Level 25% or less   Comment Pt engaged in sponge bath, able to wash 8/10 parts, washing UB and B/L upper legs while seated in w/c at sink using blue-package body wipes  CGA in stance at sink with unilateral UE release to wash nila/rear, no LOB  Pt able to complete crouch/squatting position today to reach rear without twisting back, or breaking spinal precautions, with vc's to maintain  Pt continues to require A to wash B/L lower legs and feet 2* spinal precautions  Shower/Bathe Self CARE Score 3   Bathing   Assessed Bath Style Sponge Bath   Anticipated D/C Bath Style Shower   Able to Suki Scott No   Able to [employer] Temperature Yes   Able to Wash/Rinse/Dry (body part) Left Arm;Right Arm;L Upper Leg;R Upper Leg;Chest;Abdomen;Perineal Area; Buttocks   Limitations Noted in Balance; Coordination; Endurance;ROM;Strength;Timeliness   Positioning Seated;Standing   Tub/Shower Transfer   Reason Not Assessed Medical;Sponge Bath  (mepilex dressing in place for another 3 days)   Upper Body Dressing   Type of Assistance Needed Physical assistance;Verbal cues   Physical Assistance Level 26%-50%   Comment Pt continues to require A to pull down shirt in back (OH shirt) while seated, pt otherwise able to don  Pt able to doff button-down shirt with Sup  Pt continues to require Lucia to manage LSO brace, with vc's provided to educate pt on importance of pulling both 'pulleys' at the same time to maintain equal alignment/pressure on both sides of brace, pt receptive and demo G carryover   Upper Body Dressing CARE Score 3   Lower Body Dressing   Type of Assistance Needed Incidental touching;Verbal cues; Adaptive equipment   Physical Assistance Level No physical assistance   Comment CGA in stance for CM up/down over hips, no LOB  Educated pt on technique using dressing stick to doff LB clothing, and LHR to thread LEs through pants, pt receptive and demo G follow-through and carryover, pt would benefit from continued repetitive practice with Nicanor Resendiz for increased indep w/LB CM   Lower Body Dressing CARE Score 4   Putting On/Taking Off Footwear   Type of Assistance Needed Physical assistance;Verbal cues; Adaptive equipment   Physical Assistance Level 26%-50%   Comment Educated pt on technique of using dressing stick to doff B/L socks and sock aide to don them, pt receptive and demo G carryover of therapist demonstration with increased time to complete  Pt still required Lucia to adjust tops of socks fully over ankles  Pt dependent to don TEDs   Putting On/Taking Off Footwear CARE Score 3   Dressing/Undressing Clothing   Remove UB Clothes Button Shirt   Don UB Clothes Pullover Shirt   Remove LB Clothes Undergarment;Pants;Socks   Don LB Clothes Pants;Socks;TEDs   Limitations Noted In Balance; Endurance; Safety;Strength;ROM; Timeliness   Adaptive Equipment Reacher;Dressing Stick; Sock Aide   Positioning Supported Sit;Standing   Sit to Lying   Type of Assistance Needed Supervision   Physical Assistance Level No physical assistance   Comment increased time   Sit to Lying CARE Score 4   Lying to Sitting on Side of Bed   Type of Assistance Needed Supervision; Adaptive equipment;Verbal cues   Physical Assistance Level No physical assistance   Comment bed rail   Lying to Sitting on Side of Bed CARE Score 4   Sit to Stand   Type of Assistance Needed Incidental touching; Adaptive equipment   Physical Assistance Level No physical assistance   Comment CGA w/RW   Sit to Stand CARE Score 4   Bed-Chair Transfer   Type of Assistance Needed Incidental touching;Verbal cues; Adaptive equipment   Physical Assistance Level No physical assistance   Comment CGA SPT w/RW   Chair/Bed-to-Chair Transfer CARE Score 4   Exercise Tools   Exercise Tools Yes   Other Exercise Tool 1 Seated w/Sup, [id number] each of BUE chest press, bicep curls, horiz ABD, prograde rowing, retrograde rowing, and shoulder flexion to 90*, using 3# tbar, for increased UE strength/endurance for improved safety and indep during fxl transfers and sit<>stands  Pt tolerated well with brief rest breaks between sets to manage fatigue  Pt reported that shoulder flexion to 90* was the most difficult of exercises  Pt maintained spinal precautions throughout  Cognition   Overall Cognitive Status WFL   Arousal/Participation Alert; Cooperative   Attention Within functional limits   Orientation Level Oriented X4   Memory Within functional limits   Following Commands Follows all commands and directions without difficulty   Activity Tolerance   Activity Tolerance Patient tolerated treatment well   Assessment   Treatment Assessment Pt seen for 90min skilled OT session focused on ADL skills retraining, educating pt on LHAE for LB dressing and footwear mgmt, UE strengthening, and general OOB activity tolerance, for increased independence w/ADLs and decreased caregiver burden  See detailed descriptions of fxl performance above   Pt tolerated session well, and was receptive to new education regarding [place] for LB dressing and demo G carryover of therapist demonstration and cues  Pt demo overall G awareness of maintaining spinal precautions t/o session, only requiring min vc's during nila/rear bathing to avoid twisting, pt receptive  Pt continues to be limited by decreased strength, ROM, standing balance, activity tolerance, and back pain  Pt would benefit from continued skilled OT focused on ADL skills retraining, repetitive practice using Earlene Billie for LB/footwear mgmt, standing balance/tolerance, fxl transfer training, progression to showering/ shower transfer when medically cleared to shower, and general OOB activity tolerance  Prognosis Good   Problem List Decreased strength;Decreased range of motion;Decreased endurance; Impaired balance;Decreased mobility;Pain;Orthopedic restrictions   Barriers to Discharge Inaccessible home environment;Decreased caregiver support   Plan   Treatment/Interventions ADL retraining;Functional transfer training; Therapeutic exercise; Endurance training;Patient/family training;Equipment eval/education; Bed mobility; Compensatory technique education   Progress Progressing toward goals   Recommendation   OT Discharge Recommendation   (pending)   OT Therapy Minutes   OT Time In 0900   OT Time Out 1030   OT Total Time (minutes) 90   OT Mode of treatment - Individual (minutes) 90   OT Mode of treatment - Concurrent (minutes) 0   OT Mode of treatment - Group (minutes) 0   OT Mode of treatment - Co-treat (minutes) 0   OT Mode of Treatment - Total time(minutes) 90 minutes   OT Cumulative Minutes 180   Therapy Time missed   Time missed?  No

## 2022-05-30 NOTE — PLAN OF CARE
Problem: INFECTION - ADULT  Goal: Absence or prevention of progression during hospitalization  Description: INTERVENTIONS:  - Assess and monitor for signs and symptoms of infection  - Monitor lab/diagnostic results  - Monitor all insertion sites, i e  indwelling lines, tubes, and drains  - Monitor endotracheal if appropriate and nasal secretions for changes in amount and color  - Hubbard appropriate cooling/warming therapies per order  - Administer medications as ordered  - Instruct and encourage patient and family to use good hand hygiene technique  - Identify and instruct in appropriate isolation precautions for identified infection/condition  Outcome: Progressing

## 2022-05-31 LAB
HCT VFR BLD AUTO: 29.7 % (ref 36.5–49.3)
HGB BLD-MCNC: 9.5 G/DL (ref 12–17)

## 2022-05-31 PROCEDURE — 97530 THERAPEUTIC ACTIVITIES: CPT

## 2022-05-31 PROCEDURE — 99232 SBSQ HOSP IP/OBS MODERATE 35: CPT | Performed by: INTERNAL MEDICINE

## 2022-05-31 PROCEDURE — 85018 HEMOGLOBIN: CPT | Performed by: PHYSICAL MEDICINE & REHABILITATION

## 2022-05-31 PROCEDURE — 85014 HEMATOCRIT: CPT | Performed by: PHYSICAL MEDICINE & REHABILITATION

## 2022-05-31 PROCEDURE — 99233 SBSQ HOSP IP/OBS HIGH 50: CPT | Performed by: PHYSICAL MEDICINE & REHABILITATION

## 2022-05-31 PROCEDURE — 97110 THERAPEUTIC EXERCISES: CPT

## 2022-05-31 PROCEDURE — 97116 GAIT TRAINING THERAPY: CPT

## 2022-05-31 RX ADMIN — OXYCODONE HYDROCHLORIDE 10 MG: 10 TABLET ORAL at 06:38

## 2022-05-31 RX ADMIN — OXYCODONE HYDROCHLORIDE 5 MG: 5 TABLET ORAL at 02:37

## 2022-05-31 RX ADMIN — PRAVASTATIN SODIUM 10 MG: 10 TABLET ORAL at 17:28

## 2022-05-31 RX ADMIN — ASPIRIN 325 MG ORAL TABLET 325 MG: 325 PILL ORAL at 09:42

## 2022-05-31 RX ADMIN — OXYCODONE HYDROCHLORIDE 10 MG: 10 TABLET ORAL at 12:06

## 2022-05-31 RX ADMIN — OXYCODONE HYDROCHLORIDE 10 MG: 10 TABLET ORAL at 19:14

## 2022-05-31 RX ADMIN — DOCUSATE SODIUM 100 MG: 100 CAPSULE, LIQUID FILLED ORAL at 09:42

## 2022-05-31 RX ADMIN — LISINOPRIL 5 MG: 5 TABLET ORAL at 09:42

## 2022-05-31 NOTE — PROGRESS NOTES
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 who presented electively on 5/24 for L1-S1 revision of segmental hardware, L5-S1 osteotomy, posterior spinal fusion L4-S1, pelvic instrumentation, Transforaminal lumbar interbody fusion L5S1, Cage L5S1, instrumentation L1-pelvis by Dr James Ashley  Course complicated by LLE noted on POD #1 however patient was cleared by Dr James Ashley for transfer to acute inpt rehab on POD #3     Chief Complaint: Chronic back pain, L5-S1 pseudoarthrosis and T12- L1 junctional kyphosis with disk space screw penetration    Subjective: Patient denies any events overnight     ROS: A 10 point ROS was performed; negative except as noted above       Assessment/Plan:    Chronic back pain, L5-S1 pseudoarthrosis and 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: s/p L1-S1 revision of segmental hardware, L5-S1 osteotomy, posterior spinal fusion L4-S1, pelvic instrumentation, Transforaminal lumbar interbody fusion L5S1, Cage L5S1, instrumentation L1-pelvis by Dr James Ashley on 5/24, LSO at all times when OOB per ortho, L-spine precautions; OP FU with Dr James Ashley scheduled for 6/6/22     LLE weakness: 2-3/5 L hip flexion, 5/5 dorsiflexion/plantar flexion (lt touch grossly intact), noted by acute care spine team on POD #1, will continue to monitor with serial exams during rehab course      Anemia: Hg currently stable at 9 5 post-operatively, IM monitoring      Chronic pain: chronic back pain, at home on prn tramadol 50 mg (per PAPDMP patient last filled tramadol 50 mg on 5/19/22 for 60 tabs)     Elevated AST: appears to be chronic going back to at least 2017 per EMR lab records, was 72 on 5/24, HCV ab + in 3/2020 however PCR negative for HCV in 4/2021 and patient noted that he had HCV when he was younger but cleared it without treatment; patient states has been on lovastatin for 15 years w/o AE and is on only 10 mg qpm at home and per PCP's OP notes after investigating for HCV vs statin as possible etiology (with HCV PCR being negative in 4/2021) PCP elected to continue prescribing lovastatin 10 mg qpm (last prescribed in 12/2021 for 90 day supply with 2 refills); OP FU with PCP who is managing     Dyslipidemia: therapeutic substitution for home lovastatin 10 mg qpm; elevated AST appears to be chronic going back to at least 2017 per EMR lab records however per patient he has been on lovastatin for 15 years w/o AE and in only on 10 mg qpm, additionally PCP is aware of elevated AST and did elect to continue to prescribe it (last prescribed in 12/2021 for 90 day supply with 2 refills)     HTN: at home on lisinopril 5 mg qd, management per IM        DVT ppx: SCDs and  mg qd for 28 days post-op per Dr Hiram Brownlee instructions     Note: upon dc from acute care, acute care team provided OP scrips to patient's OP pharmacy for  mg, hydrocodone-tylenol, robaxin, and narcan--> prior to dc will contact patient's OP pharmacy to determine if these prescriptions were maintained or cancelled by primary team       Incidental findings:     1) 1st degree AVB: OP FU with PCP with specialist referral at PCP's discretion      2) left hemidiaphragmatic elevation: OP FU with PCP with specialist referral at PCP's discretion      3) abnormalities on CT A/P of 8/2017 including but not limited to liver hypodensities (likely simple cysts per CT report), splenule, right renal cyst, diverticulosis, & prostatomegaly: OP FU with PCP with specialist referral at PCP's discretion            Objective:    Functional Update:  Mobility: cg  Transfers: cg  ADLs: min       Physical Exam:  Vitals:    05/31/22 0941   BP: 136/74   Pulse:    Resp:    Temp:    SpO2: General: alert, no apparent distress, cooperative and comfortable  HEENT:  Head: Normal, normocephalic, atraumatic  Eye: Normal external eye, conjunctiva, lids, sclera   Ears: Normal external ears  Nose: Normal external nose, mucus membranes  CARDIAC:  +S1/2  LUNGS:  no abnormal respiratory pattern, no retractions noted, non-labored breathing   ABDOMEN:  soft NT   EXTREMITIES:  no cyanosis or clubbing   NEURO:  awake, alert, apporpriately answering questions  PSYCH:  mood/affect currently stable  Incision: C/D/I          This patient was discussed by the Interdisciplinary Team in weekly case conference today  The care of the patient was extensively discussed with all care providers and an appropriate rehabilitation plan was formulated unique for this patient  Barriers were identified preventing progression of therapy and appropriate interventions were discussed with each discipline  Please see the team note for input from all disciplines regarding barriers, intervention, and discharge planning  [ x ] Total time spent: 35 Mins, and greater than 50% of this time was spent counseling/coordinating care

## 2022-05-31 NOTE — PCC NURSING
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 who presented electively on 5/24 for L1-S1 revision of segmental hardware, L5-S1 osteotomy, posterior spinal fusion L4-S1, pelvic instrumentation, Transforaminal lumbar interbody fusion L5S1, Cage L5S1, instrumentation L1-pelvis by Dr Alberta Beckett  Course complicated by LLE noted on POD #1 however patient was cleared by Dr Alberta Beckett for transfer to acute inpt rehab on POD #3     L1-S1 revision of HW, L5-S1 osteotomy Posterior spinal fusion L4-S1 Interbody fusion L5-S1 Cont TLSO brace when OOB Pain/rehab per PMR DVT prophylaxis with ASA  Post operative blood loss anemia Stable Monitor cbc  HTN Cont lisinopril Monitor trend and titrate based on same stable  Hyperlipidemia low cholesterol diet, Continue statin therapy  Chronic elevated LFT's stable  Constipation improved  Pt is continent of bowel and bladder  Pt takes oxy for pain  Pt has incision on his back, still in mepilex dressing l     We willl encourage independence w/ ADL  We will monitor vitals and labs for s/s of infection  We will educate on the importance of t/r and perform routine skin checks  We will monitor incision for healing and s/s of infection  We will increase safety awareness and keep pt free from falls  We will monitor for adequate pain control and medicate  We will monitor for constipation and medicate as ordered  We will educate on the importance of LSO brace and maintaining spinal precautions

## 2022-05-31 NOTE — CASE MANAGEMENT
Cm met with pt and reviewed rehab routine and cm role  Pt lvies with his wife in a two story house with 2 BRENDA with a left handrail  He stays on the first floor in a hospital bed  He has a full bath with walk in shower on the first floor  He has a cane and srecently ordered a shower chair  He has had home therapy 25 years ago when he had a stroke  He uses [facility] in [id number] [facility] for rx needs  Reviewed team meeting process, update from today and IMM  His wife is able to provide transport at d/c to appointments and to home  Discussed recommendation for outpt therapy  He stated there is a  location close to his home in [facility] to assist w/ d/c planning needs

## 2022-05-31 NOTE — PROGRESS NOTES
Internal Medicine Progress Note  Patient: Edie Bojorquez  Age/sex: 73 y o  male  Medical Record #: [id number]      ASSESSMENT/PLAN: (Interval History)  Edie Bojorquez is seen and examined and management for following issues:    L1-S1 revision of HW, L5-S1 osteotomy  Posterior spinal fusion L4-S1  Interbody fusion L5-S1  · Cont TLSO brace when OOB  · Pain/rehab per PMR  · DVT prophylaxis with ASA     Post operative blood loss anemia  · Stable at 9 5  · Monitor cbc     HTN  · Cont lisinopril  · Monitor trend and titrate based on same  · stable     Hyperlipidemia  · Low cholesterol diet  · Continue statin therapy     Chronic elevated LFT's  · stable     Constipation  · improved    The above assessment and plan was reviewed and updated as determined by my evaluation of the patient on 5/31/2022  Labs:   Results from last 7 days   Lab Units 05/31/22  0555 05/27/22  0433 05/26/22  0526   WBC Thousand/uL  --  8 11 8 86   HEMOGLOBIN g/dL 9 5* 8 9* 9 3*   HEMATOCRIT % 29 7* 27 5* 28 1*   PLATELETS Thousands/uL  --  196 199     Results from last 7 days   Lab Units 05/27/22  0433 05/26/22  0526 05/24/22  2245 05/24/22  1732   SODIUM mmol/L 137 135*   < >  --    POTASSIUM mmol/L 4 0 3 6   < >  --    CHLORIDE mmol/L 105 103   < >  --    CO2 mmol/L 27 26   < >  --    CO2, I-STAT mmol/L  --   --   --  22   BUN mg/dL 12 13   < >  --    CREATININE mg/dL 0 71 0 72   < >  --    GLUCOSE, ISTAT mg/dl  --   --   --  134   CALCIUM mg/dL 8 2* 8 1*   < >  --     < > = values in this interval not displayed           Results from last 7 days   Lab Units 05/24/22  0849   INR  1 02     Results from last 7 days   Lab Units 05/25/22  1557   POC GLUCOSE mg/dl 127       Review of Scheduled Meds:  Current Facility-Administered Medications   Medication Dose Route Frequency Provider Last Rate    aspirin  325 mg Oral Daily Jerome Whelan MD      docusate sodium  100 mg Oral BID BAYRON Pollard      hydrALAZINE  25 mg Oral Q8H PRN Edward Villar BAYRON      lisinopril  5 mg Oral Daily Karen Brooks MD      methocarbamol  500 mg Oral Q6H PRN MD Nella Hathaway Dontrell oxyCODONE  10 mg Oral Q4H PRN Karen Brooks MD      oxyCODONE  5 mg Oral Q4H PRN Karen Brooks MD      polyethylene glycol  17 g Oral Daily PRN Karen Brooks MD      pravastatin  10 mg Oral QPM Karen Brooks MD      senna  1 tablet Oral HS BAYRON Buck         Subjective/ HPI: Patient seen and examined  Patients overnight issues or events were reviewed with nursing or staff during rounds or morning huddle session  New or overnight issues include the following:     Pt seen in bed  Overnight no issues, he had a little more pain than usual because he feels he pushed it a bit in therapy yesterday  ROS:   A 10 point ROS was performed; negative except as noted above         Imaging:     No orders to display       *Labs /Radiology studies Reviewed  *Medications  reviewed and reconciled as needed  *Please refer to order section for additional ordered labs studies  *Case discussed with primary attending during morning huddle case rounds    Physical Examination:  Vitals:   Vitals:    05/30/22 0826 05/30/22 1404 05/30/22 2001 05/31/22 0549   BP: 128/79 152/87 142/77 137/78   BP Location:  Right arm Left arm Right arm   Pulse:  94 88 88   Resp:  18 18 18   Temp:  98 3 °F (36 8 °C) 98 2 °F (36 8 °C) 98 6 °F (37 °C)   TempSrc:  Oral Oral Oral   SpO2:  94% 95% 99%   Weight:       Height:           GEN: No apparent distress, interactive  NEURO: Alert and oriented x3  HEENT: Pupils are equal and reactive, EOMI, mucous membranes are moist, face symmetrical  CV: S1 S2 regular, no MRG, no peripheral edema noted  RESP: Lungs are clear bilaterally, no wheezes, rales or rhonchi noted, on room air, respirations easy and non labored  GI: Flat, soft non tender, non distended; +BS x4  : Voiding without difficulty  MUSC: Moves all extremities; mild LLE weakness appears to be improving; TLSO brace in place when OOB  SKIN: pink, warm and dry, normal turgor, dressing intact      The above physical exam was reviewed and updated as determined by my evaluation of the patient on 5/31/2022  Invasive Devices  Report    None                    VTE Pharmacologic Prophylaxis: Sequential pneumatic compression stocking  Code Status: Level 1 - Full Code  Current Length of Stay: 4 day(s)      Total time spent:  30 minutes with more than 50% spent counseling/coordinating care  Counseling includes discussion with patient re: progress  and discussion with patient of his/her current medical state/information  Coordination of patient's care was performed in conjunction with primary service  Time invested included review of patient's labs, vitals, and management of their comorbidities with continued monitoring  In addition, this patient was discussed with medical team including physician and advanced extenders  The care of the patient was extensively discussed and appropriate treatment plan was formulated unique for this patient  ** Please Note:  voice to text software may have been used in the creation of this document   Although proof errors in transcription or interpretation are a potential of such software**

## 2022-05-31 NOTE — PROGRESS NOTES
05/31/22 0700   Pain Assessment   Pain Assessment Tool 0-10   Pain Score 3   Pain Location/Orientation Location: Back   Pain Onset/Description   (stiffness)   Restrictions/Precautions   Precautions Fall Risk;Pain;Spinal precautions;Limb alert  (LSO OOB)   RLE Weight Bearing Per Order WBAT   LLE Weight Bearing Per Order WBAT   Braces or Orthoses LSO   Upper Body Dressing   Type of Assistance Needed Supervision   Physical Assistance Level No physical assistance   Comment Sup to don LSO EOB, OK'd by MD to don/doff in this position  Upper Body Dressing CARE Score 4   Sit to Stand   Type of Assistance Needed Supervision   Physical Assistance Level No physical assistance   Comment CS w/ RW and extra time d/t reports of stiffness in back   Sit to Stand CARE Score 4   Meal Prep   Meal Preparation Basic meal prep using toaster to make toast w/ butter/jelly  Use of RW tray for activity  Pt completes w/ CGA-CS  Tolerated prolonged stance well  Required cues for safest technique for completing task w/ RW and tray  Pt receptive to new learning  Pt reports PTA completing hot meal prep occasionally, as well as frequently retrieving items from kitchen area  Will be appropriate to trial higher level cooking task in future session prior to DC home in effort to dec burden of care  Cognition   Overall Cognitive Status WFL   Arousal/Participation Cooperative   Attention Within functional limits   Orientation Level Oriented X4   Memory Within functional limits   Following Commands Follows all commands and directions without difficulty   Additional Activities   Additional Activities Comments Discussed purchase of hip kit w/ pt as he has successfully trialed LHAE for LB dressing   At this time pt is considering purchase of hip kit but also states he has a disabled friend who has many reachers that has offered to give him one, and then in addition to that pt will most benefit from sock aid and [facility], which he is open to use providing picture hand outs for self purchase  Also reviewed RW tray options, basket, and bag  At this time pt most interested in basic RW tray, will continue to trial use in OT sessions and if pt continues to express interest will provide handout to pt for self purchase  Activity Tolerance   Activity Tolerance Patient tolerated treatment well   Other Comments   Assessment /82 sitting, 126/77 standing  Assessment   Treatment Assessment OT session focusing on review of equipment such as RW tray, LHAE and what pt may be interested in purchasing prior to DC, basic meal prep, UE TE  Overall pt tolerated well despite report of stiffness in back and L leg which he contributes to yesterday's activity  Overall pt is progressing towards OT goals  OT to continue to treat and follow established POC  Prognosis Good   Problem List Decreased strength;Decreased range of motion;Decreased endurance; Impaired balance;Decreased mobility;Orthopedic restrictions;Pain;Decreased skin integrity   Plan   Treatment/Interventions ADL retraining;Functional transfer training; Therapeutic exercise; Endurance training;Patient/family training; Compensatory technique education;Equipment eval/education;Continued evaluation   Progress Progressing toward goals   Recommendation   OT Discharge Recommendation   (anticipating pt will not need OP OT)   Equipment Recommended Shower/Tub chair with back ($);Reacher ($);Sock aid ($)  (RW)   OT Therapy Minutes   OT Time In 0700   OT Time Out 0830   OT Total Time (minutes) 90   OT Mode of treatment - Individual (minutes) 90   OT Mode of treatment - Concurrent (minutes) 0   OT Mode of treatment - Group (minutes) 0   OT Mode of treatment - Co-treat (minutes) 0   OT Mode of Treatment - Total time(minutes) 90 minutes   OT Cumulative Minutes 270   Therapy Time missed   Time missed?  No

## 2022-05-31 NOTE — PROGRESS NOTES
05/31/22 1230   Pain Assessment   Pain Assessment Tool 0-10   Pain Score 5  (5/10 at rest; 6/10 with WB using SPC)   Pain Location/Orientation Location: Back   Pain Onset/Description Descriptor: Aching; Other (Comment)  (stiffness)   Effect of Pain on Daily Activities mobility   Patient's Stated Pain Goal No pain   Hospital Pain Intervention(s) Medication (See MAR); Repositioned; Ambulation/increased activity;Relaxation technique   Restrictions/Precautions   Precautions Fall Risk;Pain;Spinal precautions;Limb alert  (LSO OOB)   Braces or Orthoses LSO  (OOB)   Cognition   Overall Cognitive Status WFL   Arousal/Participation Cooperative   Attention Within functional limits   Subjective   Subjective "my legs are a little sore from yesterday  I think I overdid it" Pt reports has hospital bed and first floor set up at home  Roll Left and Right   Type of Assistance Needed Independent   Physical Assistance Level No physical assistance   Comment no bed rail, increased time needed to complete   Roll Left and Right CARE Score 6   Sit to Lying   Type of Assistance Needed Supervision   Physical Assistance Level No physical assistance   Comment log roll; increased time needed to complete; no bed rail use, however pt reports rented hospital bed for home   Sit to Lying CARE Score 4   Lying to Sitting on Side of Bed   Type of Assistance Needed Supervision   Physical Assistance Level No physical assistance   Comment log roll; increased time needed to complete; no bed rail use, however pt reports rented hospital bed for home   Lying to Sitting on Side of Bed CARE Score 4   Sit to Stand   Type of Assistance Needed Incidental touching   Physical Assistance Level No physical assistance   Comment RW   Sit to Stand CARE Score 4   Bed-Chair Transfer   Type of Assistance Needed Physical assistance   Physical Assistance Level 25% or less   Comment with SPC min Ax1 for proper weightshift and balance   CS with RW   Chair/Bed-to-Chair Transfer CARE Score 3   Car Transfer   Type of Assistance Needed Verbal cues; Physical assistance   Physical Assistance Level 25% or less   Comment VC for safe/appropriate hand placement, assist to not hit head on rail; using RW; used cushion to mimic SUV height   Car Transfer CARE Score 3   Walk 10 Feet   Type of Assistance Needed Physical assistance   Physical Assistance Level 25% or less   Comment min Ax1 with SPC and CS with RW   Walk 10 Feet CARE Score 3   Walk 50 Feet with Two Turns   Type of Assistance Needed Supervision   Physical Assistance Level No physical assistance   Comment RW   Walk 50 Feet with Two Turns CARE Score 4   Walk 150 Feet   Type of Assistance Needed Supervision   Physical Assistance Level No physical assistance   Comment RW   Walk 150 Feet CARE Score 4   Ambulation   Does the patient walk? 2  Yes   Primary Mode of Locomotion Prior to Admission Walk   Distance Walked (feet) 150 ft  (150 x2, 75ft, 10ft x2)   Assist Device Roller Walker;Cane   Gait Pattern Slow Leisa; Antalgic; Step through; Inconsistant Leisa   Limitations Noted In Balance; Endurance;Posture; Safety;Speed;Strength   Provided Assistance with: Balance;Direction;Trunk Support   Walk Assist Level Close Supervision;Minimum Assist;Assist x 1  (CS with RW; min Ax1 with SPC)   Findings Pt able to improve gait quality with VC for increased speed  VC required to maintain body positioning within RW and for proper posture with ambulation     Curb or Single Stair   Style negotiated Curb   Type of Assistance Needed Incidental touching   Physical Assistance Level No physical assistance   Comment CGA with RW   1 Step (Curb) CARE Score 4   4 Steps   Type of Assistance Needed Incidental touching   Physical Assistance Level No physical assistance   Comment VC for sequence, B/L hand rail on 6"  to mimic home set up   4 Steps CARE Score 4   12 Steps   Type of Assistance Needed Incidental touching   Physical Assistance Level No physical assistance Comment B/L handrail on 6" ; plan to complete FF next session   12 Steps CARE Score 4   Toilet Transfer   Type of Assistance Needed Physical assistance;Verbal cues   Physical Assistance Level 25% or less   Comment VC for safe/appropriate hand placement with grab bar on R side; independent with hygiene   Toilet Transfer CARE Score 3   Therapeutic Interventions   Strengthening B/L LE LAQ 1x10   Other 5xSTS: 22 8 sec (goal: <11 4sec for age-related norm); TU 61 sec (goal <13 9sec for age-related norm); pt is at a high risk for falls   Assessment   Treatment Assessment Pt was seen for 90 min skilled PT session focusing on functional mobility, LE strengthing and balance assessments  He reports he has new hospital bed for first floor set up, and wife available to assist  He was able to demo safe spinal precautions for bed mobility using log roll, and applied LSO in sitting without VC  He was able to complete fall risk outcome measures including 5xSTS (22 8sec) and TUG (20 61 sec) and remains at a high risk for falls  He required min Ax1 with ambulation with SPC for weight shifts, and balance, and supervision with ambulation using RW with VCs for appropriate positioning within walker Pt able to self correct demonstrating carry over of education of RW maintenance  He negotiated 8" steps 12x with B/L HR and CG assist  Pt continues to be limited by LE strength, balance, activity tolerance, and pain  He would benefit from continued skilled therapy focusing on functional mobility, LE strenghening, balance with SPC training and pain management  With progress, anticipate pt d/c home with OPPT  Will need RW for discharge, ordered   Will continue to progress as able  Next visit trial FF, and balance training with SPC  Family/Caregiver Present no   Problem List Decreased strength; Impaired balance;Decreased endurance;Decreased mobility; Decreased coordination;Decreased safety awareness;Pain   PT Barriers Physical Impairment Decreased strength;Decreased endurance; Impaired balance;Decreased mobility; Decreased coordination;Decreased safety awareness;Pain;Orthopedic restrictions   Functional Limitation Car transfers; Ramp negotiation;Stair negotiation;Standing;Transfers; Walking   Plan   Treatment/Interventions ADL retraining;Functional transfer training;LE strengthening/ROM; Therapeutic exercise; Endurance training;Patient/family training;Bed mobility;Gait training; Compensatory technique education;Spoke to MD;Spoke to nursing;Spoke to case management;Spoke to advanced practitioner   Progress Progressing toward goals   Recommendation   PT Discharge Recommendation Home with outpatient rehabilitation   Equipment Recommended Walker   PT Equipment ordered RW   Date ordered 05/31/22   PT - OK to Discharge No   PT Therapy Minutes   PT Time In 1230   PT Time Out 1400   PT Total Time (minutes) 90   PT Mode of treatment - Individual (minutes) 90   PT Mode of treatment - Concurrent (minutes) 0   PT Mode of treatment - Group (minutes) 0   PT Mode of treatment - Co-treat (minutes) 0   PT Mode of Treatment - Total time(minutes) 90 minutes   PT Cumulative Minutes 270   Therapy Time missed   Time missed?  No

## 2022-05-31 NOTE — PCC PHYSICAL THERAPY
Patient is a 74 yo male with h/o of prior spine surgery, chronic low back pain, L5-S1 pseudoarthrosis, and 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 who presented electively on 5/24 for L1-S1 revision of segmental hardware, L5-S1 osteotomy, posterior spinal fusion L4-S1, pelvic instrumentation, Transforaminal lumbar interbody fusion L5S1, Cage L5S1, instrumentation L1-pelvis by Dr Violet Gomes  Pt demos dec functional indep and safety requiring use of walker for support due to pain, dec standing balance, dec activity tolerance and gait dysfunction  Pt will benefit from cont skilled PT intervention to address abovementioned impairments, improve overall indep and dec functional indep to facilitate a safe home d/c

## 2022-05-31 NOTE — PCC CARE MANAGEMENT
Pt is a new admission and initial evaluation to occur 
The patient is a 2y11m Male complaining of allergic reaction.

## 2022-05-31 NOTE — TEAM CONFERENCE
Acute RehabilitationTeam Conference Note  Date: 5/31/2022   Time: 11:13 AM       Patient Name:  Severa Rondo       Medical Record Number: [id number]   YOB: 1955  Sex: Male          Room/Bed:  [facility] 459/[facility] 459-01  Payor Info:  Payor: MEDICARE / Plan: MEDICARE A AND B / Product Type: Medicare A & B Fee for Service /      Admitting Diagnosis: Status post lumbar spinal fusion [Z98 1]   Admit Date/Time:  5/27/2022  4:19 PM  Admission Comments: No comment available     Primary Diagnosis:  Status post lumbar spinal fusion  Principal Problem: Status post lumbar spinal fusion    Patient Active Problem List    Diagnosis Date Noted    Status post lumbar spinal fusion 05/25/2022    Hyperlipidemia     Hypertension        Physical Therapy:    Weight Bearing Status: Weight Bearing as Tolerated  Transfers: Incidental Touching  Bed Mobility: Supervision  Amulation Distance (ft): 180 feet  Ambulation: Incidental Touching  Assistive Device for Ambulation: Roller Walker  Number of Stairs: 4  Assistive Device for Stairs: Bilateral [employer]  Stair Assistance: Minimal Assistance  Discharge Recommendations: Home with:  [address] with[de-identified] Outpatient Physical Therapy, First Floor Setup, Family Support    Patient is a 74 yo male with h/o of prior spine surgery, chronic low back pain, L5-S1 pseudoarthrosis, and 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 who presented electively on 5/24 for L1-S1 revision of segmental hardware, L5-S1 osteotomy, posterior spinal fusion L4-S1, pelvic instrumentation, Transforaminal lumbar interbody fusion L5S1, Cage L5S1, instrumentation L1-pelvis by Dr James Ashley  Pt demos dec functional indep and safety requiring use of walker for support due to pain, dec standing balance, dec activity tolerance and gait dysfunction   Pt will benefit from cont skilled PT intervention to address abovementioned impairments, improve overall indep and dec functional indep to facilitate a safe home Community Health Worker called and left a message for the patient.  If the patient is returning my call, please transfer the patient to Reyna at ext. [phone].   Patient has been mailed a unreachable letter and was provided with [facility]W contact information if they are interested in accessing Clinic Care Coordination.  Order for Care Management has been closed, no further outreach will be done at this time and patient can be re-referred.    d/c        Occupational Therapy:  Eating: Independent  Grooming: Incidental Touching  Bathing: Minimal Assistance  Bathing: Minimal Assistance  Upper Body Dressing: Minimal Assistance  Lower Body Dressing: Incidental Touching  Toileting: Minimal Assistance  Tub/Shower Transfer:  (N/A - sponge bathing)  Toilet Transfer: Incidental Touching  Cognition: Within Defined Limits  Orientation: Person, Place, Time, Situation       Pt seen for OT sessions focused on ADL skills retraining, educating pt on LHAE for LB dressing and footwear mgmt, UE strengthening, and general OOB activity tolerance, for increased independence w/ADLs and decreased caregiver burden  Pt is tolerating sessions well, receptive to new education regarding [place] for LB dressing and demonstrating G carryover of therapist demonstrations and cues  Pt demo overall G awareness of maintaining spinal precautions, only requiring min vc's during nila/rear bathing to avoid twisting, pt receptive  Pt continues to be limited by decreased strength, ROM, standing balance, activity tolerance, and back pain  Pt would benefit from continued skilled OT focused on ADL skills retraining, repetitive practice using [place] for LB/footwear mgmt, standing balance/tolerance, fxl transfer training, progression to showering/ shower transfer when medically cleared to shower, and general OOB activity tolerance  D/C is pending pt progress  Speech Therapy:           No notes on file    Nursing Notes:  Appetite: Good  Diet Type: Regular/House                      Diet Patient/Family Education Complete: Yes    Type of Wound (LDA):  Wound                       Bladder: Continent     Bladder Patient/Family Education: Yes  Bowel: Continent     Bowel Patient/Family Education: Yes  Pain Location/Orientation: Orientation: Bilateral, Location: Back, Orientation: Left, Location: Foot  Pain Score: 0                       Hospital Pain Intervention(s): Medication (See MAR)          Patient is a 74 yo male with h/o of prior spine surgery, chronic low back pain, L5-S1 pseudoarthrosis, and 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 who presented electively on 5/24 for L1-S1 revision of segmental hardware, L5-S1 osteotomy, posterior spinal fusion L4-S1, pelvic instrumentation, Transforaminal lumbar interbody fusion L5S1, Cage L5S1, instrumentation L1-pelvis by Dr Sol Ko  Course complicated by LLE noted on POD #1 however patient was cleared by Dr Sol Ko for transfer to acute inpt rehab on POD #3     L1-S1 revision of HW, L5-S1 osteotomy Posterior spinal fusion L4-S1 Interbody fusion L5-S1 Cont TLSO brace when OOB Pain/rehab per PMR DVT prophylaxis with ASA  Post operative blood loss anemia Stable Monitor cbc  HTN Cont lisinopril Monitor trend and titrate based on same stable  Hyperlipidemia low cholesterol diet, Continue statin therapy  Chronic elevated LFT's stable  Constipation improved  Pt is continent of bowel and bladder  Pt takes oxy for pain  Pt has incision on his back, still in mepilex dressing l     We willl encourage independence w/ ADL  We will monitor vitals and labs for s/s of infection  We will educate on the importance of t/r and perform routine skin checks  We will monitor incision for healing and s/s of infection  We will increase safety awareness and keep pt free from falls  We will monitor for adequate pain control and medicate  We will monitor for constipation and medicate as ordered  We will educate on the importance of LSO brace and maintaining spinal precautions  Case Management:     Discharge Planning  Living Arrangements: Lives w/ Spouse/significant other  Support Systems: Spouse/significant other, Family members  Assistance Needed: TBD  Type of Current Residence: Private residence  Current Home Care Services: No  Pt is a new admission and initial evaluation to occur  Is the patient actively participating in therapies?  yes  List any modifications to the treatment plan:     Barriers Interventions   pain Med management   Decreased standing tolerance Therapy exercises   LSO brace management exercises             Is the patient making expected progress toward goals? yes  List any update or changes to goals:     Medical Goals: Patient will be medically stable for discharge to [facility] [place] upon completion of rehab program and Patient will be able to manage medical conditions and comorbid conditions with medications and follow up upon completion of rehab program    Weekly Team Goals:   Rehab Team Goals  ADL Team Goal: Patient will be independent with ADLs with least restrictive device upon completion of rehab program  Bowel/Bladder Team Goal: Patient will require assist with bladder/bowel management with least restrictive device upon completion of rehab program  Transfer Team Goal: Patient will be independent with transfers with least restrictive device upon completion of rehab program  Locomotion Team Goal: Patient will be independent with locomotion with least restrictive device upon completion of rehab program  Cognitive Team Goal: Patient will be independent for basic and complex tasks upon completion of rehab program    Discussion: Pt presents with the above barriers  He is functioning at contact guard/ min a for ambulation and he is min a for ADLs  He has a wife to assist and provide assist with transportation  Recommendations are for outpt PT  Anticipated Discharge Date:  TBD  [facility] Team Members Present: The following team members are supervising care for this patient and were present during this Weekly Team Conference      Physician: Dr Gemma Ryan MD  : RICHIE Ponce  Registered Nurse: Vernell Okeefe RN  Physical Therapist: Nava Nevarez DPT  Occupational Therapist: Messi Vital OTR/KEILA

## 2022-05-31 NOTE — PLAN OF CARE
Problem: PAIN - ADULT  Goal: Verbalizes/displays adequate comfort level or baseline comfort level  Description: Interventions:  - Encourage patient to monitor pain and request assistance  - Assess pain using appropriate pain scale  - Administer analgesics based on type and severity of pain and evaluate response  - Implement non-pharmacological measures as appropriate and evaluate response  - Consider cultural and social influences on pain and pain management  - Notify physician/advanced practitioner if interventions unsuccessful or patient reports new pain  5/31/2022 1021 by Randy Samaniego RN  Outcome: Progressing  5/31/2022 1021 by Randy Samaniego RN  Outcome: Progressing     Problem: INFECTION - ADULT  Goal: Absence or prevention of progression during hospitalization  Description: INTERVENTIONS:  - Assess and monitor for signs and symptoms of infection  - Monitor lab/diagnostic results  - Monitor all insertion sites, i e  indwelling lines, tubes, and drains  - Monitor endotracheal if appropriate and nasal secretions for changes in amount and color  - [place] appropriate cooling/warming therapies per order  - Administer medications as ordered  - Instruct and encourage patient and family to use good hand hygiene technique  - Identify and instruct in appropriate isolation precautions for identified infection/condition  5/31/2022 1021 by Randy Samaniego RN  Outcome: Progressing  5/31/2022 1021 by Randy Samaniego RN  Outcome: Progressing  Goal: Absence of fever/infection during neutropenic period  Description: INTERVENTIONS:  - Monitor WBC    5/31/2022 1021 by Randy Samaniego RN  Outcome: Progressing  5/31/2022 1021 by Randy Samaniego RN  Outcome: Progressing     Problem: SAFETY ADULT  Goal: Patient will remain free of falls  Description: INTERVENTIONS:  - Educate patient/family on patient safety including physical limitations  - Instruct patient to call for assistance with activity   - Consult OT/PT to assist with strengthening/mobility   - Keep Call bell within reach  - Keep bed low and locked with side rails adjusted as appropriate  - Keep care items and personal belongings within reach  - Initiate and maintain comfort rounds  - Make Fall Risk Sign visible to staff  - Offer Toileting every 2-4 Hours, in advance of need  - Initiate/Maintain bed/chair alarm  - Obtain necessary fall risk management equipment: yellow socks   - Apply yellow socks and bracelet for high fall risk patients  - Consider moving patient to room near nurses station  5/31/2022 1021 by Corina De La Torre RN  Outcome: Progressing  5/31/2022 1021 by Corina De La Torre RN  Outcome: Progressing  Goal: Maintain or return to baseline ADL function  Description: INTERVENTIONS:  -  Assess patient's ability to carry out ADLs; assess patient's baseline for ADL function and identify physical deficits which impact ability to perform ADLs (bathing, care of mouth/teeth, toileting, grooming, dressing, etc )  - Assess/evaluate cause of self-care deficits   - Assess range of motion  - Assess patient's mobility; develop plan if impaired  - Assess patient's need for assistive devices and provide as appropriate  - Encourage maximum independence but intervene and supervise when necessary  - Involve family in performance of ADLs  - Assess for home care needs following discharge   - Consider OT consult to assist with ADL evaluation and planning for discharge  - Provide patient education as appropriate  5/31/2022 1021 by Corina De La Torre RN  Outcome: Progressing  5/31/2022 1021 by Corina De La Torre RN  Outcome: Progressing  Goal: Maintains/Returns to pre admission functional level  Description: INTERVENTIONS:  - Perform BMAT or MOVE assessment daily    - Set and communicate daily mobility goal to care team and patient/family/caregiver  - Collaborate with rehabilitation services on mobility goals if consulted  - Perform Range of Motion 2 times a day  - Reposition patient every 2 hours    - Dangle patient 3 times a day  - Stand patient 3 times a day  - Ambulate patient 3 times a day  - Out of bed to chair 3 times a day   - Out of bed for meals 3 times a day  - Out of bed for toileting  - Record patient progress and toleration of activity level   5/31/2022 1021 by oCrina De La Torre RN  Outcome: Progressing  5/31/2022 1021 by Corina De La Torre RN  Outcome: Progressing     Problem: DISCHARGE PLANNING  Goal: Discharge to home or other facility with appropriate resources  Description: INTERVENTIONS:  - Identify barriers to discharge w/patient and caregiver  - Arrange for needed discharge resources and transportation as appropriate  - Identify discharge learning needs (meds, wound care, etc )  - Arrange for interpretive services to assist at discharge as needed  - Refer to Case Management Department for coordinating discharge planning if the patient needs post-hospital services based on physician/advanced practitioner order or complex needs related to functional status, cognitive ability, or social support system  5/31/2022 1021 by Corina De La Torre RN  Outcome: Progressing  5/31/2022 1021 by Corina De La Torre RN  Outcome: Progressing     Problem: Potential for Falls  Goal: Patient will remain free of falls  Description: INTERVENTIONS:  - Educate patient/family on patient safety including physical limitations  - Instruct patient to call for assistance with activity   - Consult OT/PT to assist with strengthening/mobility   - Keep Call bell within reach  - Keep bed low and locked with side rails adjusted as appropriate  - Keep care items and personal belongings within reach  - Initiate and maintain comfort rounds  - Make Fall Risk Sign visible to staff  - Offer Toileting every 2-4 Hours, in advance of need  - Initiate/Maintain bed/chair alarm  - Obtain necessary fall risk management equipment: yellow socks  - Apply yellow socks and bracelet for high fall risk patients  - Consider moving patient to room near nurses station  5/31/2022 1021 by Lydia Zarate RN  Outcome: Progressing  5/31/2022 1021 by Lydia Zarate RN  Outcome: Progressing

## 2022-05-31 NOTE — PLAN OF CARE
Problem: PAIN - ADULT  Goal: Verbalizes/displays adequate comfort level or baseline comfort level  Description: Interventions:  - Encourage patient to monitor pain and request assistance  - Assess pain using appropriate pain scale  - Administer analgesics based on type and severity of pain and evaluate response  - Implement non-pharmacological measures as appropriate and evaluate response  - Consider cultural and social influences on pain and pain management  - Notify physician/advanced practitioner if interventions unsuccessful or patient reports new pain  Outcome: Progressing     Problem: INFECTION - ADULT  Goal: Absence or prevention of progression during hospitalization  Description: INTERVENTIONS:  - Assess and monitor for signs and symptoms of infection  - Monitor lab/diagnostic results  - Monitor all insertion sites, i e  indwelling lines, tubes, and drains  - Monitor endotracheal if appropriate and nasal secretions for changes in amount and color  - [place] appropriate cooling/warming therapies per order  - Administer medications as ordered  - Instruct and encourage patient and family to use good hand hygiene technique  - Identify and instruct in appropriate isolation precautions for identified infection/condition  Outcome: Progressing  Goal: Absence of fever/infection during neutropenic period  Description: INTERVENTIONS:  - Monitor WBC    Outcome: Progressing     Problem: SAFETY ADULT  Goal: Patient will remain free of falls  Description: INTERVENTIONS:  - Educate patient/family on patient safety including physical limitations  - Instruct patient to call for assistance with activity   - Consult OT/PT to assist with strengthening/mobility   - Keep Call bell within reach  - Keep bed low and locked with side rails adjusted as appropriate  - Keep care items and personal belongings within reach  - Initiate and maintain comfort rounds  - Make Fall Risk Sign visible to staff  - Offer Toileting every 2-4 Hours, in advance of need  - Initiate/Maintain bed/chair alarm  - Obtain necessary fall risk management equipment: yellow socks   - Apply yellow socks and bracelet for high fall risk patients  - Consider moving patient to room near nurses station  Outcome: Progressing  Goal: Maintain or return to baseline ADL function  Description: INTERVENTIONS:  -  Assess patient's ability to carry out ADLs; assess patient's baseline for ADL function and identify physical deficits which impact ability to perform ADLs (bathing, care of mouth/teeth, toileting, grooming, dressing, etc )  - Assess/evaluate cause of self-care deficits   - Assess range of motion  - Assess patient's mobility; develop plan if impaired  - Assess patient's need for assistive devices and provide as appropriate  - Encourage maximum independence but intervene and supervise when necessary  - Involve family in performance of ADLs  - Assess for home care needs following discharge   - Consider OT consult to assist with ADL evaluation and planning for discharge  - Provide patient education as appropriate  Outcome: Progressing  Goal: Maintains/Returns to pre admission functional level  Description: INTERVENTIONS:  - Perform BMAT or MOVE assessment daily    - Set and communicate daily mobility goal to care team and patient/family/caregiver  - Collaborate with rehabilitation services on mobility goals if consulted  - Perform Range of Motion 2 times a day  - Reposition patient every 2 hours    - Dangle patient 3 times a day  - Stand patient 3 times a day  - Ambulate patient 3 times a day  - Out of bed to chair 3 times a day   - Out of bed for meals 3 times a day  - Out of bed for toileting  - Record patient progress and toleration of activity level   Outcome: Progressing     Problem: DISCHARGE PLANNING  Goal: Discharge to home or other facility with appropriate resources  Description: INTERVENTIONS:  - Identify barriers to discharge w/patient and caregiver  - Arrange for needed discharge resources and transportation as appropriate  - Identify discharge learning needs (meds, wound care, etc )  - Arrange for interpretive services to assist at discharge as needed  - Refer to Case Management Department for coordinating discharge planning if the patient needs post-hospital services based on physician/advanced practitioner order or complex needs related to functional status, cognitive ability, or social support system  Outcome: Progressing

## 2022-06-01 ENCOUNTER — TELEPHONE (OUTPATIENT)
Dept: OBGYN CLINIC | Facility: HOSPITAL | Age: 67
End: 2022-06-01

## 2022-06-01 PROCEDURE — 97110 THERAPEUTIC EXERCISES: CPT

## 2022-06-01 PROCEDURE — 99232 SBSQ HOSP IP/OBS MODERATE 35: CPT | Performed by: INTERNAL MEDICINE

## 2022-06-01 PROCEDURE — 99233 SBSQ HOSP IP/OBS HIGH 50: CPT | Performed by: PHYSICAL MEDICINE & REHABILITATION

## 2022-06-01 PROCEDURE — 97530 THERAPEUTIC ACTIVITIES: CPT

## 2022-06-01 PROCEDURE — 97116 GAIT TRAINING THERAPY: CPT

## 2022-06-01 PROCEDURE — 97112 NEUROMUSCULAR REEDUCATION: CPT

## 2022-06-01 RX ADMIN — DOCUSATE SODIUM 100 MG: 100 CAPSULE, LIQUID FILLED ORAL at 08:09

## 2022-06-01 RX ADMIN — OXYCODONE HYDROCHLORIDE 10 MG: 10 TABLET ORAL at 08:04

## 2022-06-01 RX ADMIN — PRAVASTATIN SODIUM 10 MG: 10 TABLET ORAL at 18:46

## 2022-06-01 RX ADMIN — ASPIRIN 325 MG ORAL TABLET 325 MG: 325 PILL ORAL at 08:09

## 2022-06-01 RX ADMIN — OXYCODONE HYDROCHLORIDE 10 MG: 10 TABLET ORAL at 13:39

## 2022-06-01 RX ADMIN — DOCUSATE SODIUM 100 MG: 100 CAPSULE, LIQUID FILLED ORAL at 18:46

## 2022-06-01 RX ADMIN — LISINOPRIL 5 MG: 5 TABLET ORAL at 08:10

## 2022-06-01 RX ADMIN — OXYCODONE HYDROCHLORIDE 10 MG: 10 TABLET ORAL at 18:47

## 2022-06-01 NOTE — TELEPHONE ENCOUNTER
DR Jose Contreras  RE: Narcan nasal spray  CB: [phone]    [facility] called stating that they do not have the injectable narcan  They can dispense it in a nasal spray  Caller asking for clarification and can be reached at number above

## 2022-06-01 NOTE — TELEPHONE ENCOUNTER
Spoke to pharmacist  Asher Dow that  they can dispense narcan nasal spray instead       Understanding verbalized

## 2022-06-01 NOTE — PROGRESS NOTES
Internal Medicine Progress Note  Patient: Hermes Urbina  Age/sex: 73 y o  male  Medical Record #: [id number]      ASSESSMENT/PLAN: (Interval History)  Hermes Urbina is seen and examined and management for following issues:    L1-S1 revision of HW, L5-S1 osteotomy  Posterior spinal fusion L4-S1  Interbody fusion L5-S1  · Cont TLSO brace when OOB  · Pain/rehab per PMR  · DVT prophylaxis with ASA     Post operative blood loss anemia  · Stable at 9 5  · Monitor cbc     HTN  · Cont lisinopril  · Monitor trend and titrate based on same  · stable     Hyperlipidemia  · Low cholesterol diet  · Continue statin therapy     Chronic elevated LFT's  · stable     Constipation  · improved    DC plannin/6  The above assessment and plan was reviewed and updated as determined by my evaluation of the patient on 2022      Labs:   Results from last 7 days   Lab Units 22  0555 22  0433 22  0526   WBC Thousand/uL  --  8 11 8 86   HEMOGLOBIN g/dL 9 5* 8 9* 9 3*   HEMATOCRIT % 29 7* 27 5* 28 1*   PLATELETS Thousands/uL  --  196 199     Results from last 7 days   Lab Units 22  0433 22  0526   SODIUM mmol/L 137 135*   POTASSIUM mmol/L 4 0 3 6   CHLORIDE mmol/L 105 103   CO2 mmol/L 27 26   BUN mg/dL 12 13   CREATININE mg/dL 0 71 0 72   CALCIUM mg/dL 8 2* 8 1*             Results from last 7 days   Lab Units 22  1557   POC GLUCOSE mg/dl 127       Review of Scheduled Meds:  Current Facility-Administered Medications   Medication Dose Route Frequency Provider Last Rate    aspirin  325 mg Oral Daily Noa Mcdaniel MD      docusate sodium  100 mg Oral BID Vinetta Fort, CRNP      hydrALAZINE  25 mg Oral Q8H PRN Vinetta Fort, CRNP      lisinopril  5 mg Oral Daily Noa Mcdaniel MD      methocarbamol  500 mg Oral Q6H PRN Noa Mcdaniel MD      oxyCODONE  10 mg Oral Q4H PRN Noa Mcdaniel MD      oxyCODONE  5 mg Oral Q4H PRN Noa Mcdaniel MD      polyethylene glycol  17 g Oral Daily PRN MD Geronimo Roland pravastatin  10 mg Oral QPM MD Jack Hathaway  1 tablet Oral HS BAYRON Buck         Subjective/ HPI: Patient seen and examined  Patients overnight issues or events were reviewed with nursing or staff during rounds or morning huddle session  New or overnight issues include the following:     Pt seen in therapy ambulating in hallway  No overnight complaints          ROS:   A 10 point ROS was performed; negative except as noted above  Imaging:     No orders to display       *Labs /Radiology studies Reviewed  *Medications  reviewed and reconciled as needed  *Please refer to order section for additional ordered labs studies  *Case discussed with primary attending during morning huddle case rounds    Physical Examination:  Vitals:   Vitals:    05/31/22 2040 06/01/22 0619 06/01/22 0715 06/01/22 0805   BP: 158/88 113/67  131/76   BP Location: Left arm Left arm     Pulse: 83 84     Resp: 16 18     Temp: 98 3 °F (36 8 °C) 98 2 °F (36 8 °C)     TempSrc: Oral Oral     SpO2: 98% 96%     Weight:   93 8 kg (206 lb 14 4 oz)    Height:           GEN: No apparent distress, interactive  NEURO: Alert and oriented x3  HEENT: Pupils are equal and reactive, EOMI, mucous membranes are moist, face symmetrical  CV: S1 S2 regular, no MRG, no peripheral edema noted  RESP: Lungs are clear bilaterally, no wheezes, rales or rhonchi noted, on room air, respirations easy and non labored  GI: Flat, soft non tender, non distended; +BS x4  : Voiding without difficulty  MUSC: Moves all extremities; mild LLE weakness  SKIN: pink, warm and dry, normal turgor, dressing in place to lumbar spine      The above physical exam was reviewed and updated as determined by my evaluation of the patient on 6/1/2022      Invasive Devices  Report    None                    VTE Pharmacologic Prophylaxis: Sequential pneumatic compression stocking  Code Status: Level 1 - Full Code  Current Length of Stay: 5 day(s)      Total time spent:  30 minutes with more than 50% spent counseling/coordinating care  Counseling includes discussion with patient re: progress  and discussion with patient of his/her current medical state/information  Coordination of patient's care was performed in conjunction with primary service  Time invested included review of patient's labs, vitals, and management of their comorbidities with continued monitoring  In addition, this patient was discussed with medical team including physician and advanced extenders  The care of the patient was extensively discussed and appropriate treatment plan was formulated unique for this patient  ** Please Note:  voice to text software may have been used in the creation of this document   Although proof errors in transcription or interpretation are a potential of such software**

## 2022-06-01 NOTE — PROGRESS NOTES
Physical Medicine and Rehabilitation Progress Note  Severa Rondo 73 y o  male MRN: [id number]  Unit/Bed#: -10 Encounter: [id number]    Chief Complaint:  Feeling well today  Interval History: No acute events overnight  Last BM 5/31  A little tachycardic this morning with activity, but improves from 110s to 90s with rest  He denies any CP, new SOB  No N/V, abdominal pain  Pain is controlled as long as he takes oxycodone prior to therapy and prior to going to sleep  No new numbness/tingling/weakness  Bowel/bladder are stable  Assessment/Plan - Continue plan of care as per primary attending, unless otherwise stated below:      · Chronic back pain, L5-S1 pseudoarthrosis, and 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  · S/p L1-S1 revision of segmental hardware, L5-S1 osteotomy, posterior spinal fusion L4-S1, pelvic instrumentation, transforaminal lumabr interbody fusion L5-S1, cage L5-S1, instrumentation L1-plevix by Dr James Ashley on 5/24  · LSO when OOB  · L-spine precautions  · F/U with Dr James Ashley on 6/6   · Continue current pain regimen without any adjustments  · Tachycardia  · MM appear dry  · Encouraged fluid intake  · 110s, improved to 98 with rest in therapy  ·  Asymptomatic, and did not sound irregular  If persistent, will hold therapy and check EKG  · Updated IM   · Anemia  · Hgb improved to 9 5 yesterday  Stable  · Monitor and transfuse PRN  · Chronic back pain on home tramadol  · See #1  · Elevated AST  · Per primary team, has history of HCV but "cleared without treatment"  · HCV PCR negative in 2021  · Continuing statin  · Outpatient f/u with PCP  · Dyslipidemia  · Continue therapeutic substitution for home lovastatin  · HTN  · Home: Lisinopril 5mg daily  · Monitoring and titrating  · DVT Ppx: SCDs and ASA 325mg daily for 28 days       Sabi Wolf MD  Physical Medicine and Rehabilitation      Review of Systems: A 10 point review of systems was negative except for what is noted in the HPI  Current Facility-Administered Medications:     aspirin tablet 325 mg, 325 mg, Oral, Daily, Thena Schlatter, MD, 325 mg at 06/01/22 0809    docusate sodium (COLACE) capsule 100 mg, 100 mg, Oral, BID, BAYRON Zhao, 100 mg at 06/01/22 0809    hydrALAZINE (APRESOLINE) tablet 25 mg, 25 mg, Oral, Q8H PRN, BAYRON Zhao    lisinopril (ZESTRIL) tablet 5 mg, 5 mg, Oral, Daily, Thena Schlatter, MD, 5 mg at 06/01/22 0810    methocarbamol (ROBAXIN) tablet 500 mg, 500 mg, Oral, Q6H PRN, Thena Schlatter, MD, 500 mg at 05/28/22 0846    oxyCODONE (ROXICODONE) immediate release tablet 10 mg, 10 mg, Oral, Q4H PRN, Thena Schlatter, MD, 10 mg at 06/01/22 0804    oxyCODONE (ROXICODONE) IR tablet 5 mg, 5 mg, Oral, Q4H PRN, Thena Schlatter, MD, 5 mg at 05/31/22 0237    polyethylene glycol (MIRALAX) packet 17 g, 17 g, Oral, Daily PRN, Thena Schlatter, MD, 17 g at 05/28/22 1737    pravastatin (PRAVACHOL) tablet 10 mg, 10 mg, Oral, QPM, Thena Schlatter, MD, 10 mg at 05/31/22 1728    senna (SENOKOT) tablet 8 6 mg, 1 tablet, Oral, HS, BAYRON Zhao, 8 6 mg at 05/29/22 2139    Physical Exam:  Temp:  [98 2 °F (36 8 °C)-98 3 °F (36 8 °C)] 98 2 °F (36 8 °C)  HR:  [83-96] 84  Resp:  [16-18] 18  BP: (113-158)/(67-88) 131/76  SpO2:  [96 %-98 %] 96 %      Gen: No acute distress, Well-nourished, well-appearing  HEENT: Dry lips and MM  Cardiovascular: Regular but tachycardic  Improved to 90s with rest    Heme/Extr: No edema  Pulmonary: Non-labored breathing  Lungs CTAB  : No randhawa  GI: Soft, non-tender, non-distended  BS+  MSK: Wearing LSO   Neuro: AAOx3,  Speech is intact  Appropriate to questioning  Psych: Normal mood and affect       Laboratory:  Labs reviewed  Results from last 7 days   Lab Units 05/31/22  0555 05/27/22  0433 05/26/22  0526   HEMOGLOBIN g/dL 9 5* 8 9* 9 3*   HEMATOCRIT % 29 7* 27 5* 28 1*   WBC Thousand/uL  --  8 11 8 86     Results from last 7 days   Lab Units 05/27/22  0433 05/26/22  0526 BUN mg/dL 12 13   SODIUM mmol/L 137 135*   POTASSIUM mmol/L 4 0 3 6   CHLORIDE mmol/L 105 103   CREATININE mg/dL 0 71 0 72            Diagnostic Studies: Reviewed, no new imaging   No orders to display         ** Please Note: Fluency Direct voice to text software may have been used in the creation of this document  **    Total time spent:  35 minutes, with more than 50% spent counseling/coordinating care  Counseling includes discussion with patient re: progress in therapies, functional issues observed by therapy staff, and discussion with patient his/her current medical state/wellbeing  Coordination of patient's care was performed in conjunction with Internal Medicine service to monitor patient's labs, vitals, and management of their comorbidities

## 2022-06-01 NOTE — PROGRESS NOTES
06/01/22 1230   Pain Assessment   Pain Assessment Tool 0-10   Pain Score 5   Pain Location/Orientation Orientation: Right;Location: Foot   Restrictions/Precautions   Precautions Fall Risk;Limb alert;Pain;Spinal precautions  (limb alert LUE)   RLE Weight Bearing Per Order WBAT   LLE Weight Bearing Per Order WBAT   Braces or Orthoses LSO  (LSO OOb)   Sit to Stand   Type of Assistance Needed Supervision   Physical Assistance Level No physical assistance   Comment RW   Sit to Stand CARE Score 4   Exercise Tools   Other Exercise Tool 1 Seated w/Sup, [id number] each of BUE chest press, bicep curls, horiz ABD, prograde rowing, retrograde rowing, and shoulder flexion to 90*, using 3# tbar, for increased UE strength/endurance for improved safety and indep during fxl transfers and sit<>stands  Pt tolerated well with brief rest breaks between sets to manage fatigue  Pt reported that shoulder flexion to 90* was the most difficult of exercises  Pt maintained spinal precautions throughout  Cognition   Overall Cognitive Status WFL   Arousal/Participation Cooperative   Attention Within functional limits   Orientation Level Oriented X4   Memory Within functional limits   Following Commands Follows all commands and directions without difficulty   Additional Activities   Additional Activities Comments Following tasks completed: 1) Fxnl mobility in therapy room and frausto w/ CS to retrieve targetted items from railing height  Use of RW tray to transport objects  Pt completed task but then reports increased pain in R foot, requesting pain meds  Pt reports that he knows he is due for pain med, as he was testing if he could tolerate therapy w/o taking dose of meds prior  2) In stance pt completes PVC pipe design activity w/ CS  Min cueing for problem solving initially  Pt in stance for 8min total  3) Card sort task in stance, pt completes w/ CS in 5 5min   Activities completed to increase tolerance for standing tasks and mobility to continue to increase pt IND for all fxnl tasks  Assessment   Treatment Assessment OT session focusing on fxnl standing tolerance, provided handouts of AE, fxnl standing activity and fxnl mobility tasks  Pt tolerated well despite increased pain as session progressed, pt was due for pain meds w/ RN provided  OT sessions to continue to focus on increasing IND w/ ADLs including shower (seated w/ incision covered,) use of LHAE, and higher level IADL and mobility tasks  Prognosis Good   Problem List Decreased strength;Decreased range of motion;Decreased endurance; Impaired balance;Decreased mobility; Decreased skin integrity;Orthopedic restrictions;Pain   Plan   Treatment/Interventions ADL retraining;Functional transfer training; Therapeutic exercise; Endurance training;Patient/family training;Equipment eval/education; Compensatory technique education;Continued evaluation   Progress Progressing toward goals   Recommendation   Equipment Recommended Shower/Tub chair with back ($);Reacher ($);Sock aid ($)   Date ordered   (Pt's wife ordered and now has shower chair, provided pt w/ handouts for RW tray, LH reacher, sock aid for private purchase  )   OT Therapy Minutes   OT Time In 1230   OT Time Out 1400   OT Total Time (minutes) 90   OT Mode of treatment - Individual (minutes) 90   OT Mode of treatment - Concurrent (minutes) 0   OT Mode of treatment - Group (minutes) 0   OT Mode of treatment - Co-treat (minutes) 0   OT Mode of Treatment - Total time(minutes) 90 minutes   OT Cumulative Minutes 360   Therapy Time missed   Time missed?  No

## 2022-06-01 NOTE — PLAN OF CARE
Problem: PAIN - ADULT  Goal: Verbalizes/displays adequate comfort level or baseline comfort level  Description: Interventions:  - Encourage patient to monitor pain and request assistance  - Assess pain using appropriate pain scale  - Administer analgesics based on type and severity of pain and evaluate response  - Implement non-pharmacological measures as appropriate and evaluate response  - Consider cultural and social influences on pain and pain management  - Notify physician/advanced practitioner if interventions unsuccessful or patient reports new pain  Outcome: Progressing     Problem: INFECTION - ADULT  Goal: Absence or prevention of progression during hospitalization  Description: INTERVENTIONS:  - Assess and monitor for signs and symptoms of infection  - Monitor lab/diagnostic results  - Monitor all insertion sites, i e  indwelling lines, tubes, and drains  - Monitor endotracheal if appropriate and nasal secretions for changes in amount and color  - [place] appropriate cooling/warming therapies per order  - Administer medications as ordered  - Instruct and encourage patient and family to use good hand hygiene technique  - Identify and instruct in appropriate isolation precautions for identified infection/condition  Outcome: Progressing  Goal: Absence of fever/infection during neutropenic period  Description: INTERVENTIONS:  - Monitor WBC    Outcome: Progressing     Problem: SAFETY ADULT  Goal: Patient will remain free of falls  Description: INTERVENTIONS:  - Educate patient/family on patient safety including physical limitations  - Instruct patient to call for assistance with activity   - Consult OT/PT to assist with strengthening/mobility   - Keep Call bell within reach  - Keep bed low and locked with side rails adjusted as appropriate  - Keep care items and personal belongings within reach  - Initiate and maintain comfort rounds  - Make Fall Risk Sign visible to staff  - Offer Toileting every 2-4 Hours, in advance of need  - Initiate/Maintain bed/chair alarm  - Obtain necessary fall risk management equipment: yellow socks   - Apply yellow socks and bracelet for high fall risk patients  - Consider moving patient to room near nurses station  Outcome: Progressing  Goal: Maintain or return to baseline ADL function  Description: INTERVENTIONS:  -  Assess patient's ability to carry out ADLs; assess patient's baseline for ADL function and identify physical deficits which impact ability to perform ADLs (bathing, care of mouth/teeth, toileting, grooming, dressing, etc )  - Assess/evaluate cause of self-care deficits   - Assess range of motion  - Assess patient's mobility; develop plan if impaired  - Assess patient's need for assistive devices and provide as appropriate  - Encourage maximum independence but intervene and supervise when necessary  - Involve family in performance of ADLs  - Assess for home care needs following discharge   - Consider OT consult to assist with ADL evaluation and planning for discharge  - Provide patient education as appropriate  Outcome: Progressing  Goal: Maintains/Returns to pre admission functional level  Description: INTERVENTIONS:  - Perform BMAT or MOVE assessment daily    - Set and communicate daily mobility goal to care team and patient/family/caregiver  - Collaborate with rehabilitation services on mobility goals if consulted  - Perform Range of Motion 2 times a day  - Reposition patient every 2 hours    - Dangle patient 3 times a day  - Stand patient 3 times a day  - Ambulate patient 3 times a day  - Out of bed to chair 3 times a day   - Out of bed for meals 3 times a day  - Out of bed for toileting  - Record patient progress and toleration of activity level   Outcome: Progressing     Problem: DISCHARGE PLANNING  Goal: Discharge to home or other facility with appropriate resources  Description: INTERVENTIONS:  - Identify barriers to discharge w/patient and caregiver  - Arrange for needed discharge resources and transportation as appropriate  - Identify discharge learning needs (meds, wound care, etc )  - Arrange for interpretive services to assist at discharge as needed  - Refer to Case Management Department for coordinating discharge planning if the patient needs post-hospital services based on physician/advanced practitioner order or complex needs related to functional status, cognitive ability, or social support system  Outcome: Progressing     Problem: Potential for Falls  Goal: Patient will remain free of falls  Description: INTERVENTIONS:  - Educate patient/family on patient safety including physical limitations  - Instruct patient to call for assistance with activity   - Consult OT/PT to assist with strengthening/mobility   - Keep Call bell within reach  - Keep bed low and locked with side rails adjusted as appropriate  - Keep care items and personal belongings within reach  - Initiate and maintain comfort rounds  - Make Fall Risk Sign visible to staff  - Offer Toileting every 2-4 Hours, in advance of need  - Initiate/Maintain bed/chair alarm  - Obtain necessary fall risk management equipment: yellow socks  - Apply yellow socks and bracelet for high fall risk patients  - Consider moving patient to room near nurses station  Outcome: Progressing

## 2022-06-01 NOTE — PROGRESS NOTES
06/01/22 0901   Pain Assessment   Pain Assessment Tool 0-10   Pain Score 4   Pain Location/Orientation Orientation: Right;Location: Foot; Location: Back   Pain Onset/Description Descriptor: Aching   Effect of Pain on Daily Activities mobility   Hospital Pain Intervention(s) Repositioned; Ambulation/increased activity;Relaxation technique   Restrictions/Precautions   Precautions Fall Risk;Limb alert;Pain;Spinal precautions  (Limb alert LUE)   Braces or Orthoses LSO  (LSO OOB)   Cognition   Overall Cognitive Status WFL   Arousal/Participation Cooperative; Alert   Attention Within functional limits   Memory Within functional limits   Following Commands Follows all commands and directions without difficulty   Subjective   Subjective "my right foot hurts, i think I slept on it wrong" Received sitting EOB, wife at bedside, agreeable to PT   Sit to Stand   Type of Assistance Needed Supervision   Physical Assistance Level No physical assistance   Comment RW   Sit to Stand CARE Score 4   Bed-Chair Transfer   Type of Assistance Needed Supervision   Physical Assistance Level No physical assistance   Comment RW   Chair/Bed-to-Chair Transfer CARE Score 4   Walk 10 Feet   Type of Assistance Needed Supervision   Physical Assistance Level No physical assistance   Comment RW, VC 25% for upright posture and RW management   Walk 10 Feet CARE Score 4   Walk 50 Feet with Two Turns   Type of Assistance Needed Supervision   Physical Assistance Level No physical assistance   Comment RW   Walk 50 Feet with Two Turns CARE Score 4   Walk 150 Feet   Type of Assistance Needed Supervision   Physical Assistance Level No physical assistance   Comment RW   Walk 150 Feet CARE Score 4   Ambulation   Does the patient walk? 2  Yes   Primary Mode of Locomotion Prior to Admission Walk   Distance Walked (feet) 200 ft  (200ft x 3)   Assist Device Roller Walker   Gait Pattern Inconsistant Leisa; Slow Leisa; Antalgic; Improper weight shift;Decreased R stance   Limitations Noted In Balance; Coordination; Endurance;Device Management;Posture;Speed;Strength   Walk Assist Level Supervision   Wheelchair mobility   Does the patient use a wheelchair? 0  No   4 Steps   Type of Assistance Needed Physical assistance   Physical Assistance Level 25% or less   Comment VC for safe/appropriate sequencing; B/L HR to mimic home set up; min Ax1 for descending, CG for ascending   4 Steps CARE Score 3   12 Steps   Type of Assistance Needed Physical assistance   Physical Assistance Level 25% or less   Comment VC for safe/appropriate sequencing; B/L HR to mimic home set up; min Ax1 for descending, CG for ascending   12 Steps CARE Score 3   Picking Up Object   Type of Assistance Needed Adaptive equipment;Supervision   Physical Assistance Level No physical assistance   Comment S with RW and reacher   Picking Up Object CARE Score 4   Therapeutic Interventions   Strengthening STS 3x10 with emphasis on slow eccentric (counting to 5 descent), narrow KAMALA  Pt able to hold squat on command and continue  No UE use; Hip adductor squeezes 2x10 3SH   Flexibility B/L hamstring and gastroc 30sec x 3   Balance standing unsupported 5 min with UE bean bag toss; Standing unsupported 5 min with ball toss   Equipment Use   NuStep L1 LEs 5 min HR <120bpm; L1 all four extremities 5 min HR SPM >45; did not aggravate pain   Other Comments   Comments R foot inspected, patient reported tenderness on R 3rd met head dorsally with palpation, no pain plantarly  no bruising, redness or swelling noted, does not aggravate with toe or ankle ROM; but noted decreased stance with ambulation and antalgic gait pattern   Assessment   Treatment Assessment Pt was seen for 90 min skilled PT session focusing on functional mobility, LE strengthening and balance   Upon start of session pt had HR 114bpm, MD made aware, cleared for therapy HR <120-130bpm  He was supervision with ambulation using RW, pt able to demo appropriate body position within walker  Pt educated on proper STS hand placement on variable surfaces  Pt able to complete FF steps with CG and B/L handrail for ascending, step to pattern, and min Ax1 for descent with B/L HR and VC for step to pattern  Pt continues to be limited in balance and LE strength  Pt would benefit from continued skilled therapy to improve functional mobility, strength and balance to allow increased independence and safe d/c home  Plan for d/c 6/6/22 and pt agreeable  Progress as able  Family/Caregiver Present no   Problem List Decreased strength;Decreased endurance; Impaired balance;Decreased mobility; Decreased safety awareness   PT Barriers   Physical Impairment Decreased strength;Decreased endurance; Impaired balance;Decreased mobility; Decreased safety awareness   Functional Limitation Car transfers;Stair negotiation;Ramp negotiation;Standing;Transfers; Walking   Plan   Treatment/Interventions ADL retraining;Functional transfer training;LE strengthening/ROM; Therapeutic exercise; Endurance training;Patient/family training;Bed mobility;Gait training;Spoke to MD;Spoke to nursing;Spoke to case management;Spoke to advanced practitioner   Progress Progressing toward goals   Recommendation   PT Discharge Recommendation Home with outpatient rehabilitation   Equipment Recommended Walker   PT Equipment ordered RW   Date ordered 05/31/22   PT - OK to Discharge No   PT Therapy Minutes   PT Time In 0900   PT Time Out 1030   PT Total Time (minutes) 90   PT Mode of treatment - Individual (minutes) 90   PT Mode of treatment - Concurrent (minutes) 0   PT Mode of treatment - Group (minutes) 0   PT Mode of treatment - Co-treat (minutes) 0   PT Mode of Treatment - Total time(minutes) 90 minutes   PT Cumulative Minutes 360   Therapy Time missed   Time missed?  No

## 2022-06-01 NOTE — CASE MANAGEMENT
In preparation for d/c, cm received DME sheet from therapy  Order for roller walker was placed with [employer] via ecin  Also in preparation for d/c, cm placed voicemail to [facility] for outpt Awaiting returned call  Following to assist w/ d/c planning needs

## 2022-06-02 PROCEDURE — 97110 THERAPEUTIC EXERCISES: CPT

## 2022-06-02 PROCEDURE — 99232 SBSQ HOSP IP/OBS MODERATE 35: CPT | Performed by: PHYSICAL MEDICINE & REHABILITATION

## 2022-06-02 PROCEDURE — 97530 THERAPEUTIC ACTIVITIES: CPT

## 2022-06-02 PROCEDURE — 97535 SELF CARE MNGMENT TRAINING: CPT

## 2022-06-02 PROCEDURE — 99232 SBSQ HOSP IP/OBS MODERATE 35: CPT | Performed by: INTERNAL MEDICINE

## 2022-06-02 RX ADMIN — OXYCODONE HYDROCHLORIDE 10 MG: 10 TABLET ORAL at 08:38

## 2022-06-02 RX ADMIN — LISINOPRIL 5 MG: 5 TABLET ORAL at 08:39

## 2022-06-02 RX ADMIN — DOCUSATE SODIUM 100 MG: 100 CAPSULE, LIQUID FILLED ORAL at 17:10

## 2022-06-02 RX ADMIN — PRAVASTATIN SODIUM 10 MG: 10 TABLET ORAL at 17:10

## 2022-06-02 RX ADMIN — ASPIRIN 325 MG ORAL TABLET 325 MG: 325 PILL ORAL at 08:39

## 2022-06-02 RX ADMIN — OXYCODONE HYDROCHLORIDE 10 MG: 10 TABLET ORAL at 20:35

## 2022-06-02 RX ADMIN — OXYCODONE HYDROCHLORIDE 10 MG: 10 TABLET ORAL at 13:59

## 2022-06-02 RX ADMIN — DOCUSATE SODIUM 100 MG: 100 CAPSULE, LIQUID FILLED ORAL at 08:39

## 2022-06-02 NOTE — PROGRESS NOTES
06/02/22 1230   Pain Assessment   Pain Assessment Tool 0-10   Pain Score 7   Pain Location/Orientation Orientation: Lower; Location: Back;Orientation: Bilateral;Location: Leg   Restrictions/Precautions   Precautions Fall Risk;Spinal precautions;Supervision on toilet/commode   Braces or Orthoses LSO   Cognition   Overall Cognitive Status WFL   Subjective   Subjective feeling better; pain at end of session, more discomfort   Roll Left and Right   Type of Assistance Needed Independent   Roll Left and Right CARE Score 6   Sit to Lying   Type of Assistance Needed Supervision   Comment log roll   Sit to Lying CARE Score 4   Lying to Sitting on Side of Bed   Type of Assistance Needed Supervision   Comment log roll   Lying to Sitting on Side of Bed CARE Score 4   Sit to Stand   Type of Assistance Needed Supervision   Comment with RW   Sit to Stand CARE Score 4   Bed-Chair Transfer   Type of Assistance Needed Supervision   Comment with RW   Chair/Bed-to-Chair Transfer CARE Score 4   Transfer Bed/Chair/Wheelchair   Adaptive Equipment Roller Walker   Walk 10 Feet   Type of Assistance Needed Incidental touching; Adaptive equipment   Comment with cane; sup with RW   Walk 10 Feet CARE Score 4   Walk 50 Feet with Two Turns   Type of Assistance Needed Incidental touching   Comment with cane; Sup with RW   Walk 50 Feet with Two Turns CARE Score 4   Walk 150 Feet   Type of Assistance Needed Incidental touching; Adaptive equipment   Comment with cane; sup with RW   Walk 150 Feet CARE Score 4   Ambulation   Distance Walked (feet) 200 ft  (x2)   Assist Device Roller Walker   Gait Pattern Inconsistant Leisa; Slow Leisa;Decreased foot clearance; Improper weight shift; Forward Flexion   Limitations Noted In Balance;Speed;Strength   Findings practiced with 'x2  slower gait speed with use of cane, faster pace with RW   Curb or Single Stair   Style negotiated Single stair   Type of Assistance Needed Supervision; Adaptive equipment Comment with B HRs   1 Step (Curb) CARE Score 4   4 Steps   Type of Assistance Needed Supervision; Adaptive equipment   Comment B HRs   4 Steps CARE Score 4   12 Steps   Type of Assistance Needed Adaptive equipment;Supervision   Comment B HRs   12 Steps CARE Score 4   Stairs   Findings FF; step to pattern with B HRs   Therapeutic Interventions   Strengthening supine modified bridging with green bolster 2x10   hooklying hip abd supine x20, sidelying clamshells both sides 3# x30  SAQ B x30 3#   Balance standing with 2# dowel with ball toss x2mins   Equipment Use   NuStep L2 10mins   Assessment   Treatment Assessment pt tolerated tx well  pt did practice walking with SPC for balance and strengthening but feels his legs, reynold L   pt still feels comfortable with use of RW, but will cont to use SPC with balance  Problem List Decreased strength;Decreased range of motion;Decreased endurance; Impaired balance;Decreased mobility;Orthopedic restrictions;Pain   Barriers to Discharge Inaccessible home environment;Decreased caregiver support   PT Barriers   Functional Limitation Car transfers;Stair negotiation;Ramp negotiation;Standing;Transfers; Walking   Plan   Progress Progressing toward goals   Recommendation   PT Discharge Recommendation Home with outpatient rehabilitation   Equipment Recommended Walker   PT Therapy Minutes   PT Time In 1230   PT Time Out 1400   PT Total Time (minutes) 90   PT Mode of treatment - Individual (minutes) 90   PT Mode of treatment - Concurrent (minutes) 0   PT Mode of treatment - Group (minutes) 0   PT Mode of treatment - Co-treat (minutes) 0   PT Mode of Treatment - Total time(minutes) 90 minutes   PT Cumulative Minutes 450   Therapy Time missed   Time missed?  No

## 2022-06-02 NOTE — PROGRESS NOTES
Internal Medicine Progress Note  Patient: Terrance Seo  Age/sex: 73 y o  male  Medical Record #: [id number]      ASSESSMENT/PLAN: (Interval History)  Terrance Seo is seen and examined and management for following issues:    L1-S1 revision of HW, L5-S1 osteotomy  Posterior spinal fusion L4-S1  Interbody fusion L5-S1  · Cont TLSO brace when OOB  · Pain/rehab per PMR  · DVT prophylaxis with ASA     Post operative blood loss anemia  · Stable at 9 5  · Monitor cbc     HTN  · Cont lisinopril  · Monitor trend and titrate based on same  · stable     Hyperlipidemia  · Low cholesterol diet  · Continue statin therapy     Chronic elevated LFT's  · stable     Constipation  · improved    DC plannin/6  The above assessment and plan was reviewed and updated as determined by my evaluation of the patient on 2022  Labs:   Results from last 7 days   Lab Units 22  0555 22  0433   WBC Thousand/uL  --  8 11   HEMOGLOBIN g/dL 9 5* 8 9*   HEMATOCRIT % 29 7* 27 5*   PLATELETS Thousands/uL  --  196     Results from last 7 days   Lab Units 22  0433   SODIUM mmol/L 137   POTASSIUM mmol/L 4 0   CHLORIDE mmol/L 105   CO2 mmol/L 27   BUN mg/dL 12   CREATININE mg/dL 0 71   CALCIUM mg/dL 8 2*                   Review of Scheduled Meds:  Current Facility-Administered Medications   Medication Dose Route Frequency Provider Last Rate    aspirin  325 mg Oral Daily Bakari Templeton MD      docusate sodium  100 mg Oral BID BAYRON Romano      hydrALAZINE  25 mg Oral Q8H PRN BAYRON Romano      lisinopril  5 mg Oral Daily Bakari Templeton MD      methocarbamol  500 mg Oral Q6H PRN Bakari Templeton MD      oxyCODONE  10 mg Oral Q4H PRN Bakari Templeton MD      oxyCODONE  5 mg Oral Q4H PRN Bakari Templeton MD      polyethylene glycol  17 g Oral Daily PRN Bakari Templeton MD      pravastatin  10 mg Oral QPM Bakari Templeton MD      senna  1 tablet Oral HS BAYRON Romano         Subjective/ HPI: Patient seen and examined   Patients overnight issues or events were reviewed with nursing or staff during rounds or morning huddle session  New or overnight issues include the following:     Pt seen in his room  He denies any current complaints  ROS:   A 10 point ROS was performed; negative except as noted above  Imaging:     No orders to display       *Labs /Radiology studies Reviewed  *Medications  reviewed and reconciled as needed  *Please refer to order section for additional ordered labs studies  *Case discussed with primary attending during morning huddle case rounds    Physical Examination:  Vitals:   Vitals:    06/01/22 0901 06/01/22 1339 06/01/22 2208 06/02/22 0609   BP:  149/85 143/79 141/82   BP Location:  Right arm Right arm Right arm   Pulse: (!) 110 90 85 84   Resp:  18 16 18   Temp:  98 5 °F (36 9 °C) 98 2 °F (36 8 °C) 98 7 °F (37 1 °C)   TempSrc:  Oral Oral Oral   SpO2:  96% 91% 95%   Weight:       Height:         GEN: No apparent distress, interactive  NEURO: Alert and oriented x3  HEENT: Pupils are equal and reactive, EOMI, mucous membranes are moist, face symmetrical  CV: S1 S2 regular, no MRG, no peripheral edema noted  RESP: Lungs are clear bilaterally, no wheezes, rales or rhonchi noted, on room air, respirations easy and non labored  GI: Flat, soft non tender, non distended; +BS x4  : Voiding without difficulty  MUSC: Moves all extremities; mild LLE weakness  SKIN: pink, warm and dry, normal turgor, dressing in place to lumbar spine    The above physical exam was reviewed and updated as determined by my evaluation of the patient on 6/2/2022  Invasive Devices  Report    None                    VTE Pharmacologic Prophylaxis: Sequential pneumatic compression stocking  Code Status: Level 1 - Full Code  Current Length of Stay: 6 day(s)      Total time spent:  30 minutes with more than 50% spent counseling/coordinating care   Counseling includes discussion with patient re: progress  and discussion with patient of his/her current medical state/information  Coordination of patient's care was performed in conjunction with primary service  Time invested included review of patient's labs, vitals, and management of their comorbidities with continued monitoring  In addition, this patient was discussed with medical team including physician and advanced extenders  The care of the patient was extensively discussed and appropriate treatment plan was formulated unique for this patient  ** Please Note:  voice to text software may have been used in the creation of this document   Although proof errors in transcription or interpretation are a potential of such software**

## 2022-06-02 NOTE — PROGRESS NOTES
OT Treatment Note       06/02/22 5144   Pain Assessment   Pain Assessment Tool 0-10   Pain Score 8  (decreasing to 5/10 during session)   Pain Location/Orientation Orientation: Bilateral;Location: Back   Hospital Pain Intervention(s) Repositioned; Shower/Bath;Ambulation/increased activity  (RN providing pain meds at start of session)   Restrictions/Precautions   Precautions Fall Risk;Limb alert;Pain;Spinal precautions   RLE Weight Bearing Per Order WBAT   LLE Weight Bearing Per Order WBAT   Braces or Orthoses LSO  (LSO OOB)   Lifestyle   Autonomy "That's what my wife's for," when OT referring to using LH sponge to wash feet upon d/c   Oral Hygiene   Type of Assistance Needed Supervision   Physical Assistance Level No physical assistance   Comment SUP standing at sink   Oral Hygiene CARE Score 4   Grooming   Able To Shave;Comb/Brush Hair;Wash/Dry Face;Brush/Clean Teeth;Wash/Dry Hands   Findings SUP standing at sink   Shower/Bathe Self   Type of Assistance Needed Supervision;Verbal cues   Physical Assistance Level No physical assistance   Comment Pt cleared for shower by MD, incision covered with waterproof dressing per orders  Pt edu re: technique to sit for entirety of shower without brace, shifting weight to wash buttocks  Pt requiring min vc's to maintain spinal precautions during task, attempting to bend and reach feet to wash  Edu re: LH sponge, pt reporting "that's what my wife's for "   Shower/Bathe Self CARE Score 4   Tub/Shower Transfer   Adaptive Equipment Seat with Back;Grab Bars   Assessed Shower   Findings SUP RW<->shower chair   Upper Body Dressing   Type of Assistance Needed Supervision   Physical Assistance Level No physical assistance   Comment Pt retrieving clothing from closet using RW with SUP   pt donning tshirt and LSO with SUP seated EOB   Upper Body Dressing CARE Score 4   Lower Body Dressing   Type of Assistance Needed Supervision;Verbal cues   Physical Assistance Level No physical assistance Comment Pt able to doff/don briefs/pants using reacher with SUP, though pt also initially attempting to don pants without AD by bending forward and requiring vc's to maintain spinal precautions  Lower Body Dressing CARE Score 4   Putting On/Taking Off Footwear   Type of Assistance Needed Supervision   Physical Assistance Level No physical assistance   Comment SUP using sock aide and reacher   Putting On/Taking Off Footwear CARE Score 4   Sit to Stand   Type of Assistance Needed Supervision   Physical Assistance Level No physical assistance   Comment using RW   Sit to Stand CARE Score 4   Bed-Chair Transfer   Type of Assistance Needed Supervision   Physical Assistance Level No physical assistance   Comment using RW   Chair/Bed-to-Chair Transfer CARE Score 4   Exercise Tools   Other Exercise Tool 1 Sitting and standing BUE therex completed unilaterally using 2# db 10x3 reps each for increased functional strength, endurance, and standing balance in prep for ADL tasks  pt completing seated bicep curls, lat pullbacks and standing chest press, shoulder flexion, and lat raises with SUP  All completed within spinal precautions, pt denying increased pain with exercises  Cognition   Overall Cognitive Status WFL   Arousal/Participation Alert; Cooperative   Attention Within functional limits   Orientation Level Oriented X4   Memory Within functional limits   Following Commands Follows all commands and directions without difficulty   Additional Activities   Additional Activities Comments Functional mobiltiy household distances with SUP using RW   Activity Tolerance   Activity Tolerance Patient tolerated treatment well   Assessment   Treatment Assessment Pt seen for 90 min OT session focusing on ADL routine including shower, functional transfers/mobility and BUE strengthening  Pt tolerating session well  Pt reporting he plans to purchase LHAE and handheld showerhead in prep for d/c home, will have his son install showerhead  Pt able to complete ADL routine at SUP level, required vc's in order to maintain spinal precautions throughout ADL session and use LHAE  OT to continue POC to continue to progress towards goals  Prognosis Good   Problem List Decreased strength;Decreased range of motion;Decreased endurance; Impaired balance;Decreased mobility;Orthopedic restrictions;Pain   Barriers to Discharge Inaccessible home environment;Decreased caregiver support   Plan   Treatment/Interventions ADL retraining;Functional transfer training; Therapeutic exercise; Endurance training;Patient/family training;Equipment eval/education; Compensatory technique education   Progress Progressing toward goals   Recommendation   OT Discharge Recommendation Home with outpatient rehabilitation  (outpt PT)   OT Therapy Minutes   OT Time In 0830   OT Time Out 1000   OT Total Time (minutes) 90   OT Mode of treatment - Individual (minutes) 90   OT Mode of treatment - Concurrent (minutes) 0   OT Mode of treatment - Group (minutes) 0   OT Mode of treatment - Co-treat (minutes) 0   OT Mode of Treatment - Total time(minutes) 90 minutes   OT Cumulative Minutes 450   Therapy Time missed   Time missed?  No

## 2022-06-02 NOTE — PROGRESS NOTES
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 who presented electively on 5/24 for L1-S1 revision of segmental hardware, L5-S1 osteotomy, posterior spinal fusion L4-S1, pelvic instrumentation, Transforaminal lumbar interbody fusion L5S1, Cage L5S1, instrumentation L1-pelvis by Dr Sol Ko  Course complicated by LLE noted on POD #1 however patient was cleared by Dr Sol Ko for transfer to acute inpt rehab on POD #3     Chief Complaint: Chronic back pain, L5-S1 pseudoarthrosis and T12- L1 junctional kyphosis with disk space screw penetration    Subjective: d/w patient potential dc date and patient is agreeable     ROS: A 10 point ROS was performed; negative except as noted above       Assessment/Plan:    Chronic back pain, L5-S1 pseudoarthrosis and 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: s/p L1-S1 revision of segmental hardware, L5-S1 osteotomy, posterior spinal fusion L4-S1, pelvic instrumentation, Transforaminal lumbar interbody fusion L5S1, Cage L5S1, instrumentation L1-pelvis by Dr Sol Ko on 5/24, LSO at all times when OOB per ortho, L-spine precautions; OP FU with Dr Sol Ko scheduled for 6/6/22 at 10:15 AM at the [facility] office (scheduled day of dc is also 6/6 therefore will dc patient in AM so that they may go to their appt)      LLE weakness: noted by acute care spine team on POD #1, however now resolved as patient is 5/5 in B/L LE      Anemia: Hg currently stable at 9 5 post-operatively, IM monitoring      Chronic pain: chronic back pain, at home on prn tramadol 50 mg (per PAPDMP patient last filled tramadol 50 mg on 5/19/22 for 60 tabs)     Elevated AST: appears to be chronic going back to at least 2017 per EMR lab records, was 67 on 5/24, HCV ab + in 3/2020 however PCR negative for HCV in 4/2021 and patient noted that he had HCV when he was younger but cleared it without treatment; patient states has been on lovastatin for 15 years w/o AE and is on only 10 mg qpm at home and per PCP's OP notes after investigating for HCV vs statin as possible etiology (with HCV PCR being negative in 4/2021) PCP elected to continue prescribing lovastatin 10 mg qpm (last prescribed in 12/2021 for 90 day supply with 2 refills); OP FU with PCP who is managing     Dyslipidemia: therapeutic substitution for home lovastatin 10 mg qpm; elevated AST appears to be chronic going back to at least 2017 per EMR lab records however per patient he has been on lovastatin for 15 years w/o AE and in only on 10 mg qpm, additionally PCP is aware of elevated AST and did elect to continue to prescribe it (last prescribed in 12/2021 for 90 day supply with 2 refills)     HTN: at home on lisinopril 5 mg qd, management per IM        DVT ppx: SCDs and  mg qd for 28 days post-op per Dr Kristian Mendoza instructions     Note: upon dc from acute care, acute care team provided OP scrips to patient's OP pharmacy for  mg, hydrocodone-tylenol, robaxin, and narcan--> prior to dc will contact patient's OP pharmacy to determine if these prescriptions were maintained or cancelled by primary team       Incidental findings:     1) 1st degree AVB: OP FU with PCP with specialist referral at PCP's discretion      2) left hemidiaphragmatic elevation: OP FU with PCP with specialist referral at PCP's discretion      3) abnormalities on CT A/P of 8/2017 including but not limited to liver hypodensities (likely simple cysts per CT report), splenule, right renal cyst, diverticulosis, & prostatomegaly: OP FU with PCP with specialist referral at PCP's discretion            Objective:    Functional Update:  Mobility: cg  Transfers: cg  ADLs: min       Physical Exam:    Vitals: 06/02/22 0609   BP: 141/82   Pulse: 84   Resp: 18   Temp: 98 7 °F (37 1 °C)   SpO2: 95%          General: alert, no apparent distress, cooperative and comfortable  HEENT:  Head: Normal, normocephalic, atraumatic    Eye: Normal external eye, conjunctiva, lids, sclera   Ears: Normal external ears  Nose: Normal external nose, mucus membranes  CARDIAC:  +S1/2  LUNGS:  no abnormal respiratory pattern, no retractions noted, non-labored breathing   ABDOMEN:  soft NT   EXTREMITIES:  no cyanosis or clubbing   NEURO:  awake, alert, apporpriately answering questions  PSYCH:  mood/affect currently stable  Incision: C/D/I

## 2022-06-02 NOTE — PLAN OF CARE
Problem: PAIN - ADULT  Goal: Verbalizes/displays adequate comfort level or baseline comfort level  Description: Interventions:  - Encourage patient to monitor pain and request assistance  - Assess pain using appropriate pain scale  - Administer analgesics based on type and severity of pain and evaluate response  - Implement non-pharmacological measures as appropriate and evaluate response  - Consider cultural and social influences on pain and pain management  - Notify physician/advanced practitioner if interventions unsuccessful or patient reports new pain  Outcome: Progressing     Problem: INFECTION - ADULT  Goal: Absence or prevention of progression during hospitalization  Description: INTERVENTIONS:  - Assess and monitor for signs and symptoms of infection  - Monitor lab/diagnostic results  - Monitor all insertion sites, i e  indwelling lines, tubes, and drains  - Monitor endotracheal if appropriate and nasal secretions for changes in amount and color  - [place] appropriate cooling/warming therapies per order  - Administer medications as ordered  - Instruct and encourage patient and family to use good hand hygiene technique  - Identify and instruct in appropriate isolation precautions for identified infection/condition  Outcome: Progressing  Goal: Absence of fever/infection during neutropenic period  Description: INTERVENTIONS:  - Monitor WBC    Outcome: Progressing     Problem: SAFETY ADULT  Goal: Patient will remain free of falls  Description: INTERVENTIONS:  - Educate patient/family on patient safety including physical limitations  - Instruct patient to call for assistance with activity   - Consult OT/PT to assist with strengthening/mobility   - Keep Call bell within reach  - Keep bed low and locked with side rails adjusted as appropriate  - Keep care items and personal belongings within reach  - Initiate and maintain comfort rounds  - Make Fall Risk Sign visible to staff  - Offer Toileting every 2-4 Hours, in advance of need  - Initiate/Maintain bed/chair alarm  - Obtain necessary fall risk management equipment: yellow socks   - Apply yellow socks and bracelet for high fall risk patients  - Consider moving patient to room near nurses station  Outcome: Progressing  Goal: Maintain or return to baseline ADL function  Description: INTERVENTIONS:  -  Assess patient's ability to carry out ADLs; assess patient's baseline for ADL function and identify physical deficits which impact ability to perform ADLs (bathing, care of mouth/teeth, toileting, grooming, dressing, etc )  - Assess/evaluate cause of self-care deficits   - Assess range of motion  - Assess patient's mobility; develop plan if impaired  - Assess patient's need for assistive devices and provide as appropriate  - Encourage maximum independence but intervene and supervise when necessary  - Involve family in performance of ADLs  - Assess for home care needs following discharge   - Consider OT consult to assist with ADL evaluation and planning for discharge  - Provide patient education as appropriate  Outcome: Progressing  Goal: Maintains/Returns to pre admission functional level  Description: INTERVENTIONS:  - Perform BMAT or MOVE assessment daily    - Set and communicate daily mobility goal to care team and patient/family/caregiver  - Collaborate with rehabilitation services on mobility goals if consulted  - Perform Range of Motion 2 times a day  - Reposition patient every 2 hours    - Dangle patient 3 times a day  - Stand patient 3 times a day  - Ambulate patient 3 times a day  - Out of bed to chair 3 times a day   - Out of bed for meals 3 times a day  - Out of bed for toileting  - Record patient progress and toleration of activity level   Outcome: Progressing     Problem: DISCHARGE PLANNING  Goal: Discharge to home or other facility with appropriate resources  Description: INTERVENTIONS:  - Identify barriers to discharge w/patient and caregiver  - Arrange for needed discharge resources and transportation as appropriate  - Identify discharge learning needs (meds, wound care, etc )  - Arrange for interpretive services to assist at discharge as needed  - Refer to Case Management Department for coordinating discharge planning if the patient needs post-hospital services based on physician/advanced practitioner order or complex needs related to functional status, cognitive ability, or social support system  Outcome: Progressing     Problem: Potential for Falls  Goal: Patient will remain free of falls  Description: INTERVENTIONS:  - Educate patient/family on patient safety including physical limitations  - Instruct patient to call for assistance with activity   - Consult OT/PT to assist with strengthening/mobility   - Keep Call bell within reach  - Keep bed low and locked with side rails adjusted as appropriate  - Keep care items and personal belongings within reach  - Initiate and maintain comfort rounds  - Make Fall Risk Sign visible to staff  - Offer Toileting every 2-4 Hours, in advance of need  - Initiate/Maintain bed/chair alarm  - Obtain necessary fall risk management equipment: yellow socks  - Apply yellow socks and bracelet for high fall risk patients  - Consider moving patient to room near nurses station  Outcome: Progressing

## 2022-06-03 PROCEDURE — 99232 SBSQ HOSP IP/OBS MODERATE 35: CPT | Performed by: INTERNAL MEDICINE

## 2022-06-03 PROCEDURE — 97112 NEUROMUSCULAR REEDUCATION: CPT

## 2022-06-03 PROCEDURE — 97530 THERAPEUTIC ACTIVITIES: CPT

## 2022-06-03 PROCEDURE — 97110 THERAPEUTIC EXERCISES: CPT

## 2022-06-03 PROCEDURE — 97116 GAIT TRAINING THERAPY: CPT

## 2022-06-03 PROCEDURE — 99232 SBSQ HOSP IP/OBS MODERATE 35: CPT | Performed by: PHYSICAL MEDICINE & REHABILITATION

## 2022-06-03 RX ADMIN — PRAVASTATIN SODIUM 10 MG: 10 TABLET ORAL at 17:19

## 2022-06-03 RX ADMIN — ASPIRIN 325 MG ORAL TABLET 325 MG: 325 PILL ORAL at 08:08

## 2022-06-03 RX ADMIN — DOCUSATE SODIUM 100 MG: 100 CAPSULE, LIQUID FILLED ORAL at 08:09

## 2022-06-03 RX ADMIN — OXYCODONE HYDROCHLORIDE 10 MG: 10 TABLET ORAL at 06:56

## 2022-06-03 RX ADMIN — LISINOPRIL 5 MG: 5 TABLET ORAL at 08:08

## 2022-06-03 RX ADMIN — OXYCODONE HYDROCHLORIDE 5 MG: 5 TABLET ORAL at 11:05

## 2022-06-03 RX ADMIN — OXYCODONE HYDROCHLORIDE 10 MG: 10 TABLET ORAL at 16:17

## 2022-06-03 RX ADMIN — SENNOSIDES 8.6 MG: 8.6 TABLET, FILM COATED ORAL at 21:11

## 2022-06-03 NOTE — PROGRESS NOTES
Internal Medicine Progress Note  Patient: Kolby Villa  Age/sex: 73 y o  male  Medical Record #: [id number]      ASSESSMENT/PLAN: (Interval History)  Kolby Villa is seen and examined and management for following issues:    L1-S1 revision of HW, L5-S1 osteotomy  Posterior spinal fusion L4-S1  Interbody fusion L5-S1  · Cont TLSO brace when OOB  · Pain/rehab per PMR  · DVT prophylaxis with ASA     Post operative blood loss anemia  · Stable at 9 5  · Monitor cbc     HTN  · Cont lisinopril  · Monitor trend and titrate based on same  · stable     Hyperlipidemia  · Low cholesterol diet  · Continue statin therapy     Chronic elevated LFT's  · stable     Constipation  · improved    DC plannin/6  The above assessment and plan was reviewed and updated as determined by my evaluation of the patient on 6/3/2022  Labs:   Results from last 7 days   Lab Units 22  0555   HEMOGLOBIN g/dL 9 5*   HEMATOCRIT % 29 7*           Invalid input(s): LABGLOM, CMP                Review of Scheduled Meds:  Current Facility-Administered Medications   Medication Dose Route Frequency Provider Last Rate    aspirin  325 mg Oral Daily Calli Salinas MD      docusate sodium  100 mg Oral BID BAYRON Arnold      hydrALAZINE  25 mg Oral Q8H PRN BAYRON Arnold      lisinopril  5 mg Oral Daily Calli Salinas MD      methocarbamol  500 mg Oral Q6H PRN Calli Salinas MD      oxyCODONE  10 mg Oral Q4H PRN Calli Salinas MD      oxyCODONE  5 mg Oral Q4H PRN Calli Salinas MD      polyethylene glycol  17 g Oral Daily PRN Calli Salinas MD      pravastatin  10 mg Oral QPM Calli Salinas MD      senna  1 tablet Oral HS BAYRON Arnold         Subjective/ HPI: Patient seen and examined  Patients overnight issues or events were reviewed with nursing or staff during rounds or morning huddle session  New or overnight issues include the following:     Pt seen in PT  He reports 6/10 back pain  He denies any other complaints      ROS:   A 10 point ROS was performed; negative except as noted above  Imaging:     No orders to display       *Labs /Radiology studies Reviewed  *Medications  reviewed and reconciled as needed  *Please refer to order section for additional ordered labs studies  *Case discussed with primary attending during morning huddle case rounds    Physical Examination:  Vitals:   Vitals:    06/02/22 1500 06/02/22 2108 06/03/22 0625 06/03/22 0808   BP: 127/68 124/59 141/70 149/81   BP Location: Right arm Right arm Right arm    Pulse: 63 87 85    Resp: 18 20 16    Temp: 98 3 °F (36 8 °C) 99 4 °F (37 4 °C) (!) 97 3 °F (36 3 °C)    TempSrc: Oral Oral Oral    SpO2: 96% 94% 95%    Weight:       Height:         GEN: No apparent distress, interactive  NEURO: Alert and oriented x3  HEENT: Pupils are equal and reactive, EOMI, mucous membranes are moist, face symmetrical  CV: S1 S2 regular, no MRG, no peripheral edema noted  RESP: Lungs are clear bilaterally, no wheezes, rales or rhonchi noted, on room air, respirations easy and non labored  GI: Flat, soft non tender, non distended; +BS x4  : Voiding without difficulty  MUSC: Moves all extremities; mild LLE weakness  SKIN: pink, warm and dry, normal turgor, dressing in place to lumbar spine    The above physical exam was reviewed and updated as determined by my evaluation of the patient on 6/3/2022  Invasive Devices  Report    None                    VTE Pharmacologic Prophylaxis: Sequential pneumatic compression stocking  Code Status: Level 1 - Full Code  Current Length of Stay: 7 day(s)      Total time spent:  30 minutes with more than 50% spent counseling/coordinating care  Counseling includes discussion with patient re: progress  and discussion with patient of his/her current medical state/information  Coordination of patient's care was performed in conjunction with primary service   Time invested included review of patient's labs, vitals, and management of their comorbidities with continued monitoring  In addition, this patient was discussed with medical team including physician and advanced extenders  The care of the patient was extensively discussed and appropriate treatment plan was formulated unique for this patient  ** Please Note:  voice to text software may have been used in the creation of this document   Although proof errors in transcription or interpretation are a potential of such software**

## 2022-06-03 NOTE — PROGRESS NOTES
06/03/22 0800   Pain Assessment   Pain Score 4   Pain Location/Orientation Orientation: Lower; Location: Back; Location: Generalized   Hospital Pain Intervention(s) Ambulation/increased activity;Repositioned  (reported to have pain med earlier)   Restrictions/Precautions   Precautions Fall Risk;Pain;Spinal precautions   Braces or Orthoses LSO   General   Change In Medical/Functional Status progressed to IRP with RW, nurse made aware   Cognition   Overall Cognitive Status WFL   Subjective   Subjective Pt  reported having severe pain when he woke up this morning but now is feeling better   Roll Left and Right   Type of Assistance Needed Independent   Roll Left and Right CARE Score 6   Sit to Lying   Type of Assistance Needed Independent   Comment via log roll   Sit to Lying CARE Score 6   Lying to Sitting on Side of Bed   Type of Assistance Needed Independent   Comment via log roll   Lying to Sitting on Side of Bed CARE Score 6   Sit to Stand   Type of Assistance Needed Independent; Adaptive equipment   Comment with RW   Sit to Stand CARE Score 6   Bed-Chair Transfer   Type of Assistance Needed Independent; Adaptive equipment   Comment with RW   Chair/Bed-to-Chair Transfer CARE Score 6   Transfer Bed/Chair/Wheelchair   Adaptive Equipment Roller Walker   Findings progressed to IRP   Walk 10 Feet   Type of Assistance Needed Adaptive equipment; Independent   Comment IRP with RW   Walk 10 Feet CARE Score 6   Walk 50 Feet with Two Turns   Type of Assistance Needed Supervision; Adaptive equipment   Comment with RW   Walk 50 Feet with Two Turns CARE Score 4   Walk 150 Feet   Type of Assistance Needed Supervision; Adaptive equipment   Comment with RW   Walk 150 Feet CARE Score 4   Walking 10 Feet on Uneven Surfaces   Type of Assistance Needed Supervision; Adaptive equipment;Verbal cues   Comment with RW   Cueing to take his time especially walking through gaps in cement side walk   Walking 10 Feet on Uneven Surfaces CARE Score 4 Ambulation   Does the patient walk? 2  Yes   Primary Mode of Locomotion Prior to Admission Walk   Distance Walked (feet) 200 ft  (walked outisde 200-300 feet , with seated rest in between walks)   Assist Device Roller Walker   Gait Pattern Forward Flexion; Inconsistant Leisa  (able to correct posture and stands up straight)   Limitations Noted In Endurance   Walk Assist Level Supervision   Findings IRP in room, S otherwise  walked from Cw4> lobby> outside, walked in side walk , outside ramp and up and down curb   Wheel 50 Feet with Two Turns   Reason if not Attempted Activity not applicable   Wheel 50 Feet with Two Turns CARE Score 9   Wheel 150 Feet   Reason if not Attempted Activity not applicable   Wheel 473 Feet CARE Score 9   Wheelchair mobility   Does the patient use a wheelchair? 0  No   Curb or Single Stair   Style negotiated Curb   Type of Assistance Needed Supervision; Adaptive equipment   Comment with RW   1 Step (Curb) CARE Score 4   4 Steps   Type of Assistance Needed Supervision; Adaptive equipment   Comment FF using B HR non reciprocal pattern   4 Steps CARE Score 4   12 Steps   Type of Assistance Needed Supervision; Adaptive equipment   Comment FF using B HR non reciprocal pattern   12 Steps CARE Score 4   Stairs   Type Stairs;Curb;Ramp   # of Steps   (FF)   Weight Bearing Precautions Back; Fall Risk   Assist Devices Bilateral Rail   Toilet Transfer   Type of Assistance Needed Independent; Adaptive equipment   Toilet Transfer CARE Score 6   Therapeutic Interventions   Flexibility B hamstring and gastroc stretching   Equipment Use   NuStep Level 1 X 10 mins LE only   Assessment   Treatment Assessment Pt  participated in 60 mins PT session and was able to tolerate above activities  Tx focused on community ambulation and uneven surfaces , and as well as IRP trial uisng RW  Pt  did well with IRP trial with good safety and no LOB noted   Pt  reminded to use DME when doing loer body dressing to keep spinal precautions as he tried to bend over to pull pants up from the floor sitting OOB  Handed patient grabber with OT made aware  Pt  also did well in community ambulation but needed cueing to pace himself especially with change of surfaces to dec risk for falls  Pt  able to demonstrate understanding  Cont with POC in the Pm and can work [facility] for balance again if appropriate  Problem List Decreased strength;Decreased endurance; Impaired balance;Decreased mobility;Orthopedic restrictions;Pain   Barriers to Discharge Inaccessible home environment;Decreased caregiver support   PT Barriers   Functional Limitation Car transfers; Ramp negotiation;Stair negotiation;Standing;Transfers; Walking   Plan   Treatment/Interventions Functional transfer training;LE strengthening/ROM; Elevations; Therapeutic exercise; Endurance training;Patient/family training;Equipment eval/education; Bed mobility;Gait training   Recommendation   PT Discharge Recommendation Home with outpatient rehabilitation   Equipment Recommended Walker   PT Therapy Minutes   PT Time In 0800   PT Time Out 0900   PT Total Time (minutes) 60   PT Mode of treatment - Individual (minutes) 60   PT Mode of treatment - Concurrent (minutes) 0   PT Mode of treatment - Group (minutes) 0   PT Mode of treatment - Co-treat (minutes) 0   PT Mode of Treatment - Total time(minutes) 60 minutes   PT Cumulative Minutes 510   Therapy Time missed   Time missed?  No

## 2022-06-03 NOTE — PROGRESS NOTES
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 who presented electively on 5/24 for L1-S1 revision of segmental hardware, L5-S1 osteotomy, posterior spinal fusion L4-S1, pelvic instrumentation, Transforaminal lumbar interbody fusion L5S1, Cage L5S1, instrumentation L1-pelvis by Dr Queen Bunch  Course complicated by LLE noted on POD #1 however patient was cleared by Dr Queen Bunch for transfer to acute inpt rehab on POD #3     Chief Complaint: Chronic back pain, L5-S1 pseudoarthrosis and T12- L1 junctional kyphosis with disk space screw penetration    Subjective: patient without complaint currently     ROS: A 10 point ROS was performed; negative except as noted above       Assessment/Plan:    Chronic back pain, L5-S1 pseudoarthrosis and 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: s/p L1-S1 revision of segmental hardware, L5-S1 osteotomy, posterior spinal fusion L4-S1, pelvic instrumentation, Transforaminal lumbar interbody fusion L5S1, Cage L5S1, instrumentation L1-pelvis by Dr Block Began on 5/24, LSO at all times when OOB per ortho, L-spine precautions; OP FU with Dr Block Began scheduled for 6/6/22 at 10:15 AM at the [facility] office (scheduled day of dc is also 6/6 therefore will dc patient in AM so that they may go to their appt)      LLE weakness: noted by acute care spine team on POD #1, however now resolved as patient is 5/5 in B/L LE      Anemia: Hg currently stable at 9 5 post-operatively, IM monitoring      Chronic pain: chronic back pain, at home on prn tramadol 50 mg (per PAPDMP patient last filled tramadol 50 mg on 5/19/22 for 60 tabs)     Elevated AST: appears to be chronic going back to at least 2017 per EMR lab records, was 73 on 5/24, HCV ab + in 3/2020 however PCR negative for HCV in 4/2021 and patient noted that he had HCV when he was younger but cleared it without treatment; patient states has been on lovastatin for 15 years w/o AE and is on only 10 mg qpm at home and per PCP's OP notes after investigating for HCV vs statin as possible etiology (with HCV PCR being negative in 4/2021) PCP elected to continue prescribing lovastatin 10 mg qpm (last prescribed in 12/2021 for 90 day supply with 2 refills); OP FU with PCP who is managing     Dyslipidemia: therapeutic substitution for home lovastatin 10 mg qpm; elevated AST appears to be chronic going back to at least 2017 per EMR lab records however per patient he has been on lovastatin for 15 years w/o AE and in only on 10 mg qpm, additionally PCP is aware of elevated AST and did elect to continue to prescribe it (last prescribed in 12/2021 for 90 day supply with 2 refills)     HTN: at home on lisinopril 5 mg qd, management per IM        DVT ppx: SCDs and  mg qd for 28 days post-op per Dr Ana Estrada instructions     Note: upon dc from acute care, acute care team provided OP scrips to patient's OP pharmacy for  mg (25 tabs 0 refills), hydrocodone-tylenol, robaxin, and narcan--> contacted patient's OP pharmacy and these prescriptions were maintained however patient plans to resume taking prn tramadol (takes this at home PTA) for pain and has not required any robaxin while in the rehab unit therefore spoke with pharmacist at [facility] and canceled the robaxin & hydrocodone-tylenol scrips and advised pharmacist to dispense only enough  mg tabs (scrip was originally written for 25 tabs) to complete a course through 6/21 which would be 28 days post-op per Dr Ana Estrada instructions      Incidental findings:     1) 1st degree AVB: OP FU with PCP with specialist referral at PCP's discretion      2) left hemidiaphragmatic elevation: OP FU with PCP with specialist referral at PCP's discretion      3) abnormalities on CT A/P of 8/2017 including but not limited to liver hypodensities (likely simple cysts per CT report), splenule, right renal cyst, diverticulosis, & prostatomegaly: OP FU with PCP with specialist referral at PCP's discretion            Objective:    Functional Update:  Mobility: cg  Transfers: cg  ADLs: min       Physical Exam:           General: alert, no apparent distress, cooperative and comfortable  HEENT:  Head: Normal, normocephalic, atraumatic    Eye: Normal external eye, conjunctiva, lids, sclera   Ears: Normal external ears  Nose: Normal external nose, mucus membranes  CARDIAC:  +S1/2  LUNGS:  no abnormal respiratory pattern, no retractions noted, non-labored breathing   ABDOMEN:  soft NT   EXTREMITIES:  no cyanosis or clubbing   NEURO:  awake, alert, apporpriately answering questions  PSYCH:  mood/affect currently stable  Incision: C/D/I

## 2022-06-03 NOTE — PROGRESS NOTES
06/03/22 1500   Pain Assessment   Pain Assessment Tool 0-10   Pain Score 4   Pain Location/Orientation Location: Back   Restrictions/Precautions   Precautions Fall Risk;Pain;Spinal precautions   Braces or Orthoses LSO   Cognition   Overall Cognitive Status WFL   Arousal/Participation Alert; Responsive; Cooperative   Attention Within functional limits   Orientation Level Oriented X4   Memory Within functional limits   Following Commands Follows all commands and directions without difficulty   Subjective   Subjective Patient reports that he is fatigued from his therapy today  Roll Left and Right   Type of Assistance Needed Independent   Roll Left and Right CARE Score 6   Sit to Lying   Type of Assistance Needed Independent   Comment log roll   Sit to Lying CARE Score 6   Lying to Sitting on Side of Bed   Type of Assistance Needed Independent   Comment log roll   Lying to Sitting on Side of Bed CARE Score 6   Sit to Stand   Type of Assistance Needed Independent   Comment rw   Sit to Stand CARE Score 6   Bed-Chair Transfer   Type of Assistance Needed Independent; Adaptive equipment   Comment rw   Chair/Bed-to-Chair Transfer CARE Score 6   Transfer Bed/Chair/Wheelchair   Adaptive Equipment Roller Walker   Walk 10 Feet   Type of Assistance Needed Adaptive equipment; Independent   Comment IRP with RW   Walk 10 Feet CARE Score 6   Walk 50 Feet with Two Turns   Type of Assistance Needed Adaptive equipment;Supervision   Comment RW   Walk 50 Feet with Two Turns CARE Score 4   Walk 150 Feet   Type of Assistance Needed Adaptive equipment;Supervision   Comment RW   Walk 150 Feet CARE Score 4   Ambulation   Does the patient walk? 2  Yes   Primary Mode of Locomotion Prior to Admission Walk   Distance Walked (feet) 200 ft   Assist Device Roller Walker   Gait Pattern Forward Flexion; Inconsistant Leisa   Limitations Noted In Endurance   Walk Assist Level Supervision   Wheel 50 Feet with Two Turns   Reason if not Attempted Activity not applicable   Wheel 50 Feet with Two Turns CARE Score 9   Wheel 150 Feet   Reason if not Attempted Activity not applicable   Wheel 003 Feet CARE Score 9   Wheelchair mobility   Does the patient use a wheelchair? 0  No   Therapeutic Interventions   Strengthening 20 reps, 3 second holds of the following- Hip flexion, hip abduction, hip extension; Heel raises; Mini squats- 20 reps, 3 second holds   Flexibility B hamstring and gastroc stretching   Balance FTEO on firm- 30 seconds, 3 sets; cone taps 20 reps, 3 second holds each cone   Other Step taps- 8", 20 reps forward, 20 reps laterally ; ambulation and bed mobility   Assessment   Treatment Assessment Patient tolerated session well, patient fatigued post session, patient demonstrates antalgic gait as well as challenged with unsupported walking, patient challenged with step height today with ambulation, demonstrates occasional foward flexion with activities  Patient requires skilled PT in order to maximize function  Problem List Decreased strength;Decreased endurance; Impaired balance;Decreased mobility;Orthopedic restrictions;Pain   Barriers to Discharge Inaccessible home environment   PT Barriers   Functional Limitation Car transfers; Ramp negotiation;Stair negotiation;Standing;Transfers; Walking   Plan   Treatment/Interventions Functional transfer training;LE strengthening/ROM; Elevations; Therapeutic exercise; Endurance training;Patient/family training;Equipment eval/education; Bed mobility;Gait training   Progress Progressing toward goals   Recommendation   PT Discharge Recommendation Home with outpatient rehabilitation   Equipment Recommended Walker   PT Therapy Minutes   PT Time In 1430   PT Time Out 1530   PT Total Time (minutes) 60   PT Mode of treatment - Individual (minutes) 60   PT Mode of treatment - Concurrent (minutes) 0   PT Mode of treatment - Group (minutes) 0   PT Mode of treatment - Co-treat (minutes) 0   PT Mode of Treatment - Total time(minutes) 60 minutes   PT Cumulative Minutes 570   Therapy Time missed   Time missed?  No

## 2022-06-03 NOTE — PROGRESS NOTES
06/03/22 1004   Pain Assessment   Pain Assessment Tool 0-10   Pain Score 5  (increased to 7 by end of session)   Pain Location/Orientation Orientation: Lower; Location: Back   Restrictions/Precautions   Precautions Fall Risk;Pain;Spinal precautions   Weight Bearing Restrictions No   ROM Restrictions   (lumbar spinal precautions)   Braces or Orthoses LSO  (pt independent with donning/doffing)   Sit to Lying   Type of Assistance Needed Independent   Physical Assistance Level No physical assistance   Sit to Lying CARE Score 6   Lying to Sitting on Side of Bed   Type of Assistance Needed Independent   Physical Assistance Level No physical assistance   Lying to Sitting on Side of Bed CARE Score 6   Sit to Stand   Type of Assistance Needed Independent   Physical Assistance Level No physical assistance   Comment RW   Sit to Stand CARE Score 6   Bed-Chair Transfer   Comment functional mobility to and from OT room with RW mod I with slightly increased time   Toilet Transfer   Type of Assistance Needed Independent   Physical Assistance Level No physical assistance   Comment practiced toilet transfer without toileting to ensure mod I status in room, pt mod I with transfer and reported no problems with clothing and LSO management during toileting tasks   Toilet Transfer CARE Score 6   Toilet Transfer   Surface Assessed Raised Toilet   Transfer Technique Standard   Limitations Noted In Balance; Safety   Adaptive Equipment Grab Bar;Walker   Functional Standing Tolerance   Time 10m47s during static standing shape matching/building activity, unilateral support as needed with one UE however pt primarily used bilat UE to complete task and showed no LOB throughout   Comments pt reported increased pain near end of standing activity, RN aware and pain medicine administered   Therapeutic Exercise - ROM   UE-ROM Yes   ROM- Right Upper Extremities   RUE ROM Comment used reacher to retrieve pegs from floor and place in bucket on table, pt moved chair as needed to maintain spinal precautions without verbal cues from OT   Therapeutic Excerise-Strength   UE Strength Yes   Right Upper Extremity- Strength   RUE Strength Comment 3x10 using 3# dumbbell: elbow flexion/extension, protraction/retraction, shoulder flexion/extension to 90 degrees, shoulder press to 90 degrees, internal/external rotation   Left Upper Extremity-Strength   LUE Strength Comment 3x10 using 3# dumbbell: elbow flexion/extension, protraction/retraction, shoulder flexion/extension to 90 degrees, shoulder press to 90 degrees, internal/external rotation   Cognition   Overall Cognitive Status WFL   Arousal/Participation Alert; Responsive; Cooperative   Attention Within functional limits   Orientation Level Oriented X4   Memory Within functional limits   Following Commands Follows all commands and directions without difficulty   Activity Tolerance   Activity Tolerance Patient tolerated treatment well   Assessment   Treatment Assessment Pt agreeable to OT session this AM  Received lying supine in bed  Pt transferred to EOB at mod I level using log roll technique to maintain spinal precautions; LSO brace donned at EOB at mod I level  Pt completed toilet transfer to ensure mod I status in room, pt completed at [facility] I level and verbalized no previous issues with toileting tasks and navigating LSO  Functional mobility to OT room completed with RW, then completed ROM/strength and standing tolerance exercises; details in respective sections  During session, pt maintained precautions without cues from OT and adjusted equipment and chairs as needed to maintain during exercises  Pt reported moderate pain that increased by end of session in lower back; RN notified  Pt reported having reacher in room that he uses to retrieve items from floor and demonstrated competency with reacher during session   Pt remains limited by spinal precautions, pain in low back, impaired balance, and overall decreased activity tolerance; however pt is a motivated participant in therapy and is making progress towards d/c goals  Pt would benefit from continued skilled OT services in order to maximize independence in self care, functional mobility/transfers, ROM/strength, and activity tolerance  Prognosis Good   Problem List Decreased strength;Decreased range of motion;Decreased endurance; Impaired balance;Orthopedic restrictions;Pain   Plan   Treatment/Interventions ADL retraining;Functional transfer training;LE strengthening/ROM; Therapeutic exercise; Endurance training;Patient/family training;Equipment eval/education; Compensatory technique education;OT   Progress Progressing toward goals   OT Therapy Minutes   OT Time In 1004   OT Time Out 1108   OT Total Time (minutes) 64   OT Mode of treatment - Individual (minutes) 64   OT Mode of treatment - Concurrent (minutes) 0   OT Mode of treatment - Group (minutes) 0   OT Mode of treatment - Co-treat (minutes) 0   OT Mode of Treatment - Total time(minutes) 64 minutes   OT Cumulative Minutes 514

## 2022-06-03 NOTE — PLAN OF CARE
Problem: INFECTION - ADULT  Goal: Absence or prevention of progression during hospitalization  Description: INTERVENTIONS:  - Assess and monitor for signs and symptoms of infection  - Monitor lab/diagnostic results  - Monitor all insertion sites, i e  indwelling lines, tubes, and drains  - Monitor endotracheal if appropriate and nasal secretions for changes in amount and color  - [place] appropriate cooling/warming therapies per order  - Administer medications as ordered  - Instruct and encourage patient and family to use good hand hygiene technique  - Identify and instruct in appropriate isolation precautions for identified infection/condition  Outcome: Progressing

## 2022-06-04 PROCEDURE — 97530 THERAPEUTIC ACTIVITIES: CPT

## 2022-06-04 PROCEDURE — 99232 SBSQ HOSP IP/OBS MODERATE 35: CPT | Performed by: NURSE PRACTITIONER

## 2022-06-04 PROCEDURE — 97110 THERAPEUTIC EXERCISES: CPT | Performed by: PHYSICAL THERAPIST

## 2022-06-04 PROCEDURE — 97112 NEUROMUSCULAR REEDUCATION: CPT | Performed by: PHYSICAL THERAPIST

## 2022-06-04 PROCEDURE — 97116 GAIT TRAINING THERAPY: CPT | Performed by: PHYSICAL THERAPIST

## 2022-06-04 RX ADMIN — OXYCODONE HYDROCHLORIDE 10 MG: 10 TABLET ORAL at 06:35

## 2022-06-04 RX ADMIN — LISINOPRIL 5 MG: 5 TABLET ORAL at 09:08

## 2022-06-04 RX ADMIN — OXYCODONE HYDROCHLORIDE 10 MG: 10 TABLET ORAL at 12:55

## 2022-06-04 RX ADMIN — OXYCODONE HYDROCHLORIDE 10 MG: 10 TABLET ORAL at 19:14

## 2022-06-04 RX ADMIN — PRAVASTATIN SODIUM 10 MG: 10 TABLET ORAL at 17:21

## 2022-06-04 RX ADMIN — DOCUSATE SODIUM 100 MG: 100 CAPSULE, LIQUID FILLED ORAL at 17:21

## 2022-06-04 RX ADMIN — ASPIRIN 325 MG ORAL TABLET 325 MG: 325 PILL ORAL at 09:08

## 2022-06-04 NOTE — PLAN OF CARE
Problem: INFECTION - ADULT  Goal: Absence or prevention of progression during hospitalization  Description: INTERVENTIONS:  - Assess and monitor for signs and symptoms of infection  - Monitor lab/diagnostic results  - Monitor all insertion sites, i e  indwelling lines, tubes, and drains  - Monitor endotracheal if appropriate and nasal secretions for changes in amount and color  - [place] appropriate cooling/warming therapies per order  - Administer medications as ordered  - Instruct and encourage patient and family to use good hand hygiene technique  - Identify and instruct in appropriate isolation precautions for identified infection/condition  Outcome: Progressing

## 2022-06-04 NOTE — PROGRESS NOTES
06/04/22 1301   Pain Assessment   Pain Assessment Tool 0-10   Pain Score 4   Pain Location/Orientation Orientation: Bilateral;Orientation: Lower; Location: Back;Orientation: Right;Location: Leg   Pain Radiating Towards R inner thigh   Pain Onset/Description Onset: Ongoing  ("tightness" "stiff)   Effect of Pain on Daily Activities stand tolerance   Patient's Stated Pain Goal No pain   Hospital Pain Intervention(s) Ambulation/increased activity   Restrictions/Precautions   Precautions Spinal precautions   ROM Restrictions   (spinal precautions)   Braces or Orthoses LSO   Lifestyle   Autonomy "I don't like any of it" regarding what activities pt enjoyed in therapy sessions   Intrinsic Gratification golfing   Picking Up Object   Type of Assistance Needed Adaptive equipment   Physical Assistance Level No physical assistance   Comment Indep with inc time with SPC and use of LHR to retrieve bean bags from all over the therapy gym and hallway, G carryover for spinal precautions and brief review with OT on need to turn whole body as a unit to maintain spinal precautions during turns and to  feet during turns to reduce fall risk  No rest break required and no increased pain reported with pt engaging in continuous activity up to at least 35 minutes and good activity tolerance noted     Picking Up Object CARE Score 6   Sit to Stand   Type of Assistance Needed Adaptive equipment   Physical Assistance Level No physical assistance   Comment SPC   Sit to Stand CARE Score 6   Bed-Chair Transfer   Type of Assistance Needed Adaptive equipment   Physical Assistance Level No physical assistance   Comment SPC   Chair/Bed-to-Chair Transfer CARE Score 6   Transfer Bed/Chair/Wheelchair   Findings IRP with SPC, RW with fatigue/pain   Functional Standing Tolerance   Time 13 minutes   Activity ball toss   Comments Pt indep in stance with no AD to engage in ball toss to basket game with OT with focus on challenging standing balance and inc stand tolerance  Pt tolerates though requires cue to weight shift back and forth and bend knees for back pain relief as needed as pt is uncomfortable in static stance for too long  Pt required seated rest break post completion of ther act prior to engaging in next task  No LOB noted and G awareness to not attempt to  dropped ball from the floor due to spinal precautions  Cognition   Overall Cognitive Status WFL   Arousal/Participation Alert; Cooperative   Attention Within functional limits   Orientation Level Oriented X4   Memory Within functional limits   Following Commands Follows all commands and directions without difficulty   Activity Tolerance   Activity Tolerance Patient tolerated treatment well   Assessment   Treatment Assessment Pt engaged in 60 min OT tx with focus on fxnl reach, item retrieval, activity tolerance, endurance, and stand tolerance  See above for detail  Pt demo G progress and activity tolerance with pain of 4 throughout and no increase in pain  Pt is receptive to instruction for use of LHR to retrieve items at home for fall prevention  Pt continues to require skilled OT intervention to max rehab potential for DC home on Monday with supportive wife and plan to FU OP PT services  Prognosis Good   Problem List Decreased strength;Decreased endurance;Decreased mobility; Impaired balance;Orthopedic restrictions;Pain   Barriers to Discharge Inaccessible home environment   Plan   Treatment/Interventions ADL retraining;Functional transfer training; Endurance training;Equipment eval/education;Patient/family training   Progress Progressing toward goals   Recommendation   OT Discharge Recommendation Home with outpatient rehabilitation  ((PT only))   OT Therapy Minutes   OT Time In 1300   OT Time Out 1400   OT Total Time (minutes) 60   OT Mode of treatment - Individual (minutes) 60   OT Mode of treatment - Concurrent (minutes) 0   OT Mode of treatment - Group (minutes) 0   OT Mode of treatment - Co-treat (minutes) 0   OT Mode of Treatment - Total time(minutes) 60 minutes   OT Cumulative Minutes 574   Therapy Time missed   Time missed?  No

## 2022-06-04 NOTE — PROGRESS NOTES
06/04/22 0800   Pain Assessment   Pain Assessment Tool 0-10   Pain Score No Pain   Restrictions/Precautions   Precautions Spinal precautions   Cognition   Overall Cognitive Status WFL   Subjective   Subjective No changes or new complaints  Going home on Monday  Thinks he may mostly use the cane in the house  Sit to Stand   Type of Assistance Needed Independent   Physical Assistance Level No physical assistance   Sit to Stand CARE Score 6   Transfer Bed/Chair/Wheelchair   Sit to Stand Modified Independent   Stand to Sit Modified Independent   Walk 10 Feet   Type of Assistance Needed Supervision   Physical Assistance Level No physical assistance   Walk 10 Feet CARE Score 4   Walk 50 Feet with Two Turns   Type of Assistance Needed Supervision   Physical Assistance Level No physical assistance   Walk 50 Feet with Two Turns CARE Score 4   Walk 150 Feet   Type of Assistance Needed Supervision   Physical Assistance Level No physical assistance   Walk 150 Feet CARE Score 4   Ambulation   Does the patient walk? 2  Yes   Primary Mode of Locomotion Prior to Admission Walk   Distance Walked (feet) 500 ft   Assist Device Roller Walker;Cane   Gait Pattern Forward Flexion; Slow Leisa  (decreased heel strike B)   Limitations Noted In Endurance; Safety;Speed   Walk Assist Level Supervision   Findings 500 x2 with [employer]; >1000', 500' with [facility]   Wheelchair mobility   Does the patient use a wheelchair? 0   No   Curb or Single Stair   Style negotiated Single stair   Type of Assistance Needed Supervision   Physical Assistance Level No physical assistance   1 Step (Curb) CARE Score 4   4 Steps   Type of Assistance Needed Supervision   Physical Assistance Level No physical assistance   4 Steps CARE Score 4   12 Steps   Type of Assistance Needed Incidental touching   Physical Assistance Level No physical assistance   12 Steps CARE Score 4   Stairs   Type Stairs   # of Steps 24   Assist Devices Bilateral Rail   Findings in [facility], reciprical   Therapeutic Interventions   Flexibility gastroc stretching off step 3x60s B   Neuromuscular Re-Education side-stepping along bar in fraust[place] x30' ea R & L; bwd walking at rail in [place] 2x30'   Equipment Use   NuStep L4 x15min for endurance and LE strength training   Assessment   Treatment Assessment Initiated gait training with SPC with pt demonstrating good balance, no LE buckling, and improved upright posture  Pt able to increase confidence with SPC with increased distances and states he would prefer to continue gait training with SPC  Continue SPC gait training needed to increase independence prior to d/c  Problem List Decreased strength;Decreased endurance; Impaired balance;Decreased mobility;Orthopedic restrictions;Pain   Barriers to Discharge Inaccessible home environment   PT Barriers   Physical Impairment Decreased strength;Decreased endurance; Impaired balance;Decreased mobility; Decreased safety awareness   Functional Limitation Car transfers; Ramp negotiation;Stair negotiation;Standing;Transfers; Walking   Plan   Treatment/Interventions Functional transfer training;LE strengthening/ROM; Elevations; Therapeutic exercise; Endurance training;Patient/family training;Bed mobility;Gait training   Progress Progressing toward goals   Recommendation   PT Discharge Recommendation Home with outpatient rehabilitation   PT Therapy Minutes   PT Time In 0800   PT Time Out 0900   PT Total Time (minutes) 60   PT Mode of treatment - Individual (minutes) 60   PT Mode of treatment - Concurrent (minutes) 0   PT Mode of treatment - Group (minutes) 0   PT Mode of treatment - Co-treat (minutes) 0   PT Mode of Treatment - Total time(minutes) 60 minutes   PT Cumulative Minutes 630   Therapy Time missed   Time missed?  No

## 2022-06-04 NOTE — PROGRESS NOTES
Internal Medicine Progress Note  Patient: Twan Rodríguez  Age/sex: 73 y o  male  Medical Record #: [id number]      ASSESSMENT/PLAN: (Interval History)  Twan Rodríguez is seen and examined and management for following issues:    L1-S1 revision of HW, L5-S1 osteotomy  Posterior spinal fusion L4-S1  Interbody fusion L5-S1  · Cont TLSO brace when OOB  · Pain/rehab per PMR  · DVT prophylaxis with ASA     Post operative blood loss anemia  · Stable at 9 5     HTN  · Cont lisinopril 5mg qd as at home  · stable     Hyperlipidemia  · Continue statin therapy     Chronic elevated LFT's  · stable        DC plannin/6    The above assessment and plan was reviewed and updated as determined by my evaluation of the patient on 2022  Labs:   Results from last 7 days   Lab Units 22  0555   HEMOGLOBIN g/dL 9 5*   HEMATOCRIT % 29 7*           Invalid input(s): LABGLOM, CMP                Review of Scheduled Meds:  Current Facility-Administered Medications   Medication Dose Route Frequency Provider Last Rate    aspirin  325 mg Oral Daily Schuyler Michelle MD      docusate sodium  100 mg Oral BID BAYRON Levine      hydrALAZINE  25 mg Oral Q8H PRN BAYRON Levine      lisinopril  5 mg Oral Daily Schuyler Michelle MD      oxyCODONE  10 mg Oral Q4H PRN Schuyler Michelle MD      oxyCODONE  5 mg Oral Q4H PRN Schuyler Michelle MD      polyethylene glycol  17 g Oral Daily PRN Schuyler Michelle MD      pravastatin  10 mg Oral QPM Schuyler Michelle MD      senna  1 tablet Oral HS BAYRON Levine         Subjective/ HPI: Patient seen and examined  Patients overnight issues or events were reviewed with nursing or staff during rounds or morning huddle session  New or overnight issues include the following:     No new or overnight issues  Offers no complaints    ROS:   A 10 point ROS was performed; negative except as noted above         Imaging:     No orders to display       *Labs /Radiology studies reviewed  *Medications reviewed and reconciled as needed  *Please refer to order section for additional ordered labs studies  *Case discussed with primary attending during morning huddle case rounds    Physical Examination:  Vitals:   Vitals:    06/03/22 1301 06/03/22 2015 06/04/22 0705 06/04/22 0908   BP: 115/66 109/61 106/64 140/76   BP Location:  Right arm Right arm    Pulse: 82 82 77    Resp: 18 20 18    Temp: 98 1 °F (36 7 °C) 98 3 °F (36 8 °C) 98 2 °F (36 8 °C)    TempSrc: Oral Oral Oral    SpO2: 98% 95% 95%    Weight:       Height:           General Appearance: no distress, conversive  HEENT: PERRLA, conjuctiva normal; oropharynx clear; mucous membranes moist   Neck:  Supple, normal ROM  Lungs: CTA, normal respiratory effort, no retractions, expiratory effort normal  CV: regular rate and rhythm; no rubs/murmurs/gallops, PMI normal   ABD: soft; ND/NT; +BS  EXT: no edema  Skin: normal turgor, normal texture, no rashes  Back incision is w/o erythema/drainage  Psych: affect normal, mood normal  Neuro: AAO     The above physical exam was reviewed and updated as determined by my evaluation of the patient on 6/4/2022  Invasive Devices  Report    None                    VTE Pharmacologic Prophylaxis: Sequential pneumatic compression stocking and ASA  Code Status: Level 1 - Full Code  Current Length of Stay: 8 day(s)      Total time spent:  30 minutes with more than 50% spent counseling/coordinating care  Counseling includes discussion with patient re: progress  and discussion with patient of his/her current medical state/information  Coordination of patient's care was performed in conjunction with primary service  Time invested included review of patient's labs, vitals, and management of their comorbidities with continued monitoring  In addition, this patient was discussed with medical team including physician and advanced extenders   The care of the patient was extensively discussed and appropriate treatment plan was formulated unique for this patient  ** Please Note:  voice to text software may have been used in the creation of this document   Although proof errors in transcription or interpretation are a potential of such software**

## 2022-06-05 PROCEDURE — 99232 SBSQ HOSP IP/OBS MODERATE 35: CPT | Performed by: INTERNAL MEDICINE

## 2022-06-05 PROCEDURE — 97535 SELF CARE MNGMENT TRAINING: CPT

## 2022-06-05 PROCEDURE — 97530 THERAPEUTIC ACTIVITIES: CPT

## 2022-06-05 PROCEDURE — 97110 THERAPEUTIC EXERCISES: CPT

## 2022-06-05 PROCEDURE — 97116 GAIT TRAINING THERAPY: CPT

## 2022-06-05 RX ADMIN — OXYCODONE HYDROCHLORIDE 10 MG: 10 TABLET ORAL at 19:24

## 2022-06-05 RX ADMIN — LISINOPRIL 5 MG: 5 TABLET ORAL at 08:22

## 2022-06-05 RX ADMIN — PRAVASTATIN SODIUM 10 MG: 10 TABLET ORAL at 18:15

## 2022-06-05 RX ADMIN — DOCUSATE SODIUM 100 MG: 100 CAPSULE, LIQUID FILLED ORAL at 18:15

## 2022-06-05 RX ADMIN — OXYCODONE HYDROCHLORIDE 10 MG: 10 TABLET ORAL at 09:03

## 2022-06-05 RX ADMIN — DOCUSATE SODIUM 100 MG: 100 CAPSULE, LIQUID FILLED ORAL at 08:22

## 2022-06-05 RX ADMIN — ASPIRIN 325 MG ORAL TABLET 325 MG: 325 PILL ORAL at 08:22

## 2022-06-05 RX ADMIN — OXYCODONE HYDROCHLORIDE 10 MG: 10 TABLET ORAL at 04:25

## 2022-06-05 NOTE — PROGRESS NOTES
Internal Medicine Progress Note  Patient: Cliff Ibrahim  Age/sex: 73 y o  male  Medical Record #: [id number]      ASSESSMENT/PLAN: (Interval History)  Cliff Ibrahim is seen and examined and management for following issues:    L1-S1 revision of HW, L5-S1 osteotomy  Posterior spinal fusion L4-S1  Interbody fusion L5-S1  · Cont TLSO brace when OOB  · Pain/rehab per PMR  · DVT prophylaxis with ASA 325mg qd     Post operative blood loss anemia  · Hemoglobin stable at 9 5     HTN  · Cont Lisinopril 5mg qd as at home  · stable     Hyperlipidemia  · Continue statin therapy     Chronic elevated LFT's  · stable        DC plannin/6    The above assessment and plan was reviewed and updated as determined by my evaluation of the patient on 2022  Labs:   Results from last 7 days   Lab Units 22  0555   HEMOGLOBIN g/dL 9 5*   HEMATOCRIT % 29 7*           Invalid input(s): LABGLOM, CMP                Review of Scheduled Meds:  Current Facility-Administered Medications   Medication Dose Route Frequency Provider Last Rate    aspirin  325 mg Oral Daily Jermaine James MD      docusate sodium  100 mg Oral BID BAYRON James      hydrALAZINE  25 mg Oral Q8H PRN BAYRON James      lisinopril  5 mg Oral Daily Jermaine James MD      oxyCODONE  10 mg Oral Q4H PRN Jermaine James MD      oxyCODONE  5 mg Oral Q4H PRN Jermaine James MD      polyethylene glycol  17 g Oral Daily PRN Jermaine James MD      pravastatin  10 mg Oral QPM Jermaine James MD      senna  1 tablet Oral HS BAYRON James         Subjective/ HPI: Patient seen and examined  Patients overnight issues or events were reviewed with nursing or staff during rounds or morning huddle session  New or overnight issues include the following:     No new or overnight issues  Offers no complaints    ROS:   A 10 point ROS was performed; negative except as noted above         Imaging:     No orders to display       *Labs /Radiology studies reviewed  *Medications reviewed and reconciled as needed  *Please refer to order section for additional ordered labs studies  *Case discussed with primary attending during morning huddle case rounds    Physical Examination:  Vitals:   Vitals:    06/04/22 0908 06/04/22 1414 06/1955 06/05/22 0553   BP: 140/76 142/81 167/72 114/58   BP Location:  Left arm Right arm Right arm   Pulse:  98 80 74   Resp:  18 20 20   Temp:  98 2 °F (36 8 °C) 98 4 °F (36 9 °C) 98 1 °F (36 7 °C)   TempSrc:  Oral Oral Oral   SpO2:  98% 97% 96%   Weight:       Height:           General Appearance: no distress, conversive  HEENT: PERRLA, conjuctiva normal; oropharynx clear; mucous membranes moist   Neck:  Supple, normal ROM  Lungs: CTA, normal respiratory effort, no retractions, expiratory effort normal  CV: regular rate and rhythm; no rubs/murmurs/gallops, PMI normal   ABD: soft; ND/NT; +BS  EXT: no edema  Skin: normal turgor, normal texture, no rashes  Psych: affect normal, mood normal  Neuro: AAO     The above physical exam was reviewed and updated as determined by my evaluation of the patient on 6/5/2022  Invasive Devices  Report    None                    VTE Pharmacologic Prophylaxis: Sequential pneumatic compression stocking  Code Status: Level 1 - Full Code  Current Length of Stay: 9 day(s)      Total time spent:  30 minutes with more than 50% spent counseling/coordinating care  Counseling includes discussion with patient re: progress  and discussion with patient of his/her current medical state/information  Coordination of patient's care was performed in conjunction with primary service  Time invested included review of patient's labs, vitals, and management of their comorbidities with continued monitoring  In addition, this patient was discussed with medical team including physician and advanced extenders  The care of the patient was extensively discussed and appropriate treatment plan was formulated unique for this patient       ** Please Note: voice to text software may have been used in the creation of this document   Although proof errors in transcription or interpretation are a potential of such software**

## 2022-06-05 NOTE — PROGRESS NOTES
06/05/22 1000   Pain Assessment   Pain Assessment Tool 0-10   Pain Score 5   Pain Location/Orientation Orientation: Bilateral;Location: Back   Pain Radiating Towards L foot   Pain Onset/Description Onset: Ongoing  (numbness)   Patient's Stated Pain Goal No pain   Hospital Pain Intervention(s) Repositioned; Ambulation/increased activity; Emotional support   Restrictions/Precautions   Precautions (S)  Spinal precautions   Weight Bearing Restrictions No   RUE Weight Bearing Per Order WBAT   LUE Weight Bearing Per Order WBAT   RLE Weight Bearing Per Order WBAT   LLE Weight Bearing Per Order WBAT   ROM Restrictions Yes  (spinal precautions)   Braces or Orthoses LSO   General   Change In Medical/Functional Status (S)  progressed to [facility] priviledges w/ RW, RN made aware   Cognition   Overall Cognitive Status [facility]   Arousal/Participation Alert; Cooperative   Attention Within functional limits   Orientation Level Oriented X4   Memory Within functional limits   Following Commands Follows all commands and directions without difficulty   Subjective   Subjective Pt reports that he didn't have a great night sleep and that he is ready to go home tomorrow  Pt able to recall all spinal precautions when asked     Roll Left and Right   Type of Assistance Needed Independent   Physical Assistance Level No physical assistance   Roll Left and Right CARE Score 6   Sit to Lying   Type of Assistance Needed Independent   Physical Assistance Level No physical assistance   Sit to Lying CARE Score 6   Lying to Sitting on Side of Bed   Type of Assistance Needed Independent   Physical Assistance Level No physical assistance   Comment log roll   Lying to Sitting on Side of Bed CARE Score 6   Sit to Stand   Type of Assistance Needed Independent   Physical Assistance Level No physical assistance   Comment up to RW   Sit to Stand CARE Score 6   Bed-Chair Transfer   Type of Assistance Needed Independent   Physical Assistance Level No physical assistance   Comment w/ RW   Chair/Bed-to-Chair Transfer CARE Score 6   Transfer Bed/Chair/Wheelchair   Limitations Noted In Balance;Pain Management   Adaptive Equipment Roller Walker;Cane   Sit to Stand Modified Independent   Stand to Sit Modified Independent   Supine to Sit Modified Independent   Sit to Supine Modified Independent   Findings Pt mod I for all functional mobility transfers   Car Transfer   Type of Assistance Needed Independent   Physical Assistance Level No physical assistance   Car Transfer CARE Score 6   Walk 10 Feet   Type of Assistance Needed Independent   Physical Assistance Level No physical assistance   Comment w/ RW   Walk 10 Feet CARE Score 6   Walk 50 Feet with Two Turns   Type of Assistance Needed Independent   Physical Assistance Level No physical assistance   Comment w/ RW   Walk 50 Feet with Two Turns CARE Score 6   Walk 150 Feet   Type of Assistance Needed Independent   Physical Assistance Level No physical assistance   Comment w/ RW   Walk 150 Feet CARE Score 6   Walking 10 Feet on Uneven Surfaces   Type of Assistance Needed Independent   Physical Assistance Level No physical assistance   Comment w/ RW   Walking 10 Feet on Uneven Surfaces CARE Score 6   Ambulation   Does the patient walk? 2  Yes   Primary Mode of Locomotion Prior to Admission Walk   Distance Walked (feet) 1000 ft  (150 ft w/ [facility])   Assist Device Roller Walker;Cane   Gait Pattern Inconsistant Leisa; Slow Leisa; Antalgic   Limitations Noted In Speed;Posture   Walk Assist Level Modified Independent   Findings 1000 ft w/ RW, 150 ft w/ SPC   Wheel 50 Feet with Two Turns   Reason if not Attempted Activity not applicable   Wheel 50 Feet with Two Turns CARE Score 9   Wheel 150 Feet   Reason if not Attempted Activity not applicable   Wheel 617 Feet CARE Score 9   Wheelchair mobility   Does the patient use a wheelchair? 0   No   Findings ambulatory upon D/C   Curb or Single Stair   Style negotiated Curb   Type of Assistance Needed Independent   Physical Assistance Level No physical assistance   Comment w/ RW   1 Step (Curb) CARE Score 6   4 Steps   Type of Assistance Needed Independent   Physical Assistance Level No physical assistance   Comment w/ B/L HR   4 Steps CARE Score 6   12 Steps   Type of Assistance Needed Independent   Physical Assistance Level No physical assistance   Comment FF using B/L HR, non-reciprocal pattern   12 Steps CARE Score 6   Stairs   Type Stairs   # of Steps 12   Weight Bearing Precautions Back; Fall Risk  (spinal precautions)   Assist Devices Bilateral Rail   Findings FF stairs, non-reciprocal pattern   Picking Up Object   Type of Assistance Needed Adaptive equipment   Physical Assistance Level No physical assistance   Comment I w/ object pick-ups w/ RW and reacher in RUE to retrieve various options around gym floor  Pt able to maintain spinal precautions throughout  Picking Up Object CARE Score 6   Therapeutic Interventions   Strengthening standing B/L: hip abduction (2 set x 10), hip ext (2 set x 10) HS curls (2 sets x 10), hip flexion (2 sets x 10), heel raises (2 sets x 10) STS from various surfaces, stair negotiation   Flexibility seated B/L HS stretch 30 sec x 3   Balance ambulating uneven surface on mat   Equipment Use   NuStep L4 B/L UE/LE x 15 min   Assessment   Treatment Assessment Pt pleasant and agreeable to participate in 90 min skilled PT tx session, w/ focus on functional mobility tasks, transfers, ambulation on various surfaces, balance, stair negotiaiton, and LE strengthening exercises  Pt has achieved PT goals of independent bed mobility tasks, functional mobility transfers including STS, bed-chair transfer, and toilet transfer  Pt is also I w/ picking object up off floor utilizing hand grabber, I w/ ambulating >150 ft w/ RW, and I w/ stair negotiation (12 stairs w/ non-reciprocal pattern and B/L HR)  Barriers that cont to impact pt safety to return home are: pain management   Seeking script for Diluted for breakthrough pain  Will talk to Didi Chan prior to D/C  Pt has made significant strength, balance, and functional mobility gains since admission to [address] [place], [place] [id number]  HEP was reviewed w/ pt and handout was provided  Pt was able to demonstrate and verbalize understanding of HEP while maintaining spinal precautions  Pt reports having cane and WC at home if needed  Pt plans to purchase reacher upon D/C and is awaiting delivery of RW  Pt plan to D/C home on 6/6/2022 with a 1st flr s/u and outpatient PT  Family/Caregiver Present no   Problem List Decreased strength;Decreased range of motion;Decreased mobility;Orthopedic restrictions;Pain  (spinal precautions)   Barriers to Discharge Inaccessible home environment   PT Barriers   Physical Impairment Decreased strength;Decreased range of motion;Decreased mobility;Orthopedic restrictions;Pain   Functional Limitation Walking;Stair negotiation   Plan   Progress Improving as expected   Recommendation   PT Discharge Recommendation Home with outpatient rehabilitation   Equipment Recommended Walker   PT Equipment ordered RW  (awaiting delivery)   PT - OK to Discharge Yes   PT Therapy Minutes   PT Time In 1000   PT Time Out 1130   PT Total Time (minutes) 90   PT Mode of treatment - Individual (minutes) 90   PT Mode of treatment - Concurrent (minutes) 0   PT Mode of treatment - Group (minutes) 0   PT Mode of treatment - Co-treat (minutes) 0   PT Mode of Treatment - Total time(minutes) 90 minutes   PT Cumulative Minutes 720   Therapy Time missed   Time missed?  No

## 2022-06-05 NOTE — PLAN OF CARE
Problem: PAIN - ADULT  Goal: Verbalizes/displays adequate comfort level or baseline comfort level  Description: Interventions:  - Encourage patient to monitor pain and request assistance  - Assess pain using appropriate pain scale  - Administer analgesics based on type and severity of pain and evaluate response  - Implement non-pharmacological measures as appropriate and evaluate response  - Consider cultural and social influences on pain and pain management  - Notify physician/advanced practitioner if interventions unsuccessful or patient reports new pain  Outcome: Progressing     Problem: INFECTION - ADULT  Goal: Absence or prevention of progression during hospitalization  Description: INTERVENTIONS:  - Assess and monitor for signs and symptoms of infection  - Monitor lab/diagnostic results  - Monitor all insertion sites, i e  indwelling lines, tubes, and drains  - Monitor endotracheal if appropriate and nasal secretions for changes in amount and color  - [place] appropriate cooling/warming therapies per order  - Administer medications as ordered  - Instruct and encourage patient and family to use good hand hygiene technique  - Identify and instruct in appropriate isolation precautions for identified infection/condition  Outcome: Progressing  Goal: Absence of fever/infection during neutropenic period  Description: INTERVENTIONS:  - Monitor WBC    Outcome: Progressing     Problem: SAFETY ADULT  Goal: Patient will remain free of falls  Description: INTERVENTIONS:  - Educate patient/family on patient safety including physical limitations  - Instruct patient to call for assistance with activity   - Consult OT/PT to assist with strengthening/mobility   - Keep Call bell within reach  - Keep bed low and locked with side rails adjusted as appropriate  - Keep care items and personal belongings within reach  - Initiate and maintain comfort rounds  - Make Fall Risk Sign visible to staff  - Offer Toileting every 2-4 Hours, in advance of need  - Initiate/Maintain bed/chair alarm  - Obtain necessary fall risk management equipment: yellow socks   - Apply yellow socks and bracelet for high fall risk patients  - Consider moving patient to room near nurses station  Outcome: Progressing  Goal: Maintain or return to baseline ADL function  Description: INTERVENTIONS:  -  Assess patient's ability to carry out ADLs; assess patient's baseline for ADL function and identify physical deficits which impact ability to perform ADLs (bathing, care of mouth/teeth, toileting, grooming, dressing, etc )  - Assess/evaluate cause of self-care deficits   - Assess range of motion  - Assess patient's mobility; develop plan if impaired  - Assess patient's need for assistive devices and provide as appropriate  - Encourage maximum independence but intervene and supervise when necessary  - Involve family in performance of ADLs  - Assess for home care needs following discharge   - Consider OT consult to assist with ADL evaluation and planning for discharge  - Provide patient education as appropriate  Outcome: Progressing  Goal: Maintains/Returns to pre admission functional level  Description: INTERVENTIONS:  - Perform BMAT or MOVE assessment daily    - Set and communicate daily mobility goal to care team and patient/family/caregiver  - Collaborate with rehabilitation services on mobility goals if consulted  - Perform Range of Motion 2 times a day  - Reposition patient every 2 hours    - Dangle patient 3 times a day  - Stand patient 3 times a day  - Ambulate patient 3 times a day  - Out of bed to chair 3 times a day   - Out of bed for meals 3 times a day  - Out of bed for toileting  - Record patient progress and toleration of activity level   Outcome: Progressing     Problem: DISCHARGE PLANNING  Goal: Discharge to home or other facility with appropriate resources  Description: INTERVENTIONS:  - Identify barriers to discharge w/patient and caregiver  - Arrange for needed discharge resources and transportation as appropriate  - Identify discharge learning needs (meds, wound care, etc )  - Arrange for interpretive services to assist at discharge as needed  - Refer to Case Management Department for coordinating discharge planning if the patient needs post-hospital services based on physician/advanced practitioner order or complex needs related to functional status, cognitive ability, or social support system  Outcome: Progressing     Problem: Potential for Falls  Goal: Patient will remain free of falls  Description: INTERVENTIONS:  - Educate patient/family on patient safety including physical limitations  - Instruct patient to call for assistance with activity   - Consult OT/PT to assist with strengthening/mobility   - Keep Call bell within reach  - Keep bed low and locked with side rails adjusted as appropriate  - Keep care items and personal belongings within reach  - Initiate and maintain comfort rounds  - Make Fall Risk Sign visible to staff  - Offer Toileting every 2-4 Hours, in advance of need  - Initiate/Maintain bed/chair alarm  - Obtain necessary fall risk management equipment: yellow socks  - Apply yellow socks and bracelet for high fall risk patients  - Consider moving patient to room near nurses station  Outcome: Progressing

## 2022-06-05 NOTE — PROGRESS NOTES
06/05/22 0700   Pain Assessment   Pain Assessment Tool 0-10   Pain Score 4   Pain Location/Orientation Orientation: Mid;Location: Back   Pain Onset/Description Onset: Ongoing   Restrictions/Precautions   Precautions (S)  Spinal precautions   Weight Bearing Restrictions No   RUE Weight Bearing Per Order WBAT   LUE Weight Bearing Per Order WBAT   RLE Weight Bearing Per Order WBAT   LLE Weight Bearing Per Order WBAT   ROM Restrictions Yes  (spinal precautions)   Braces or Orthoses LSO   Lifestyle   Autonomy "I'm ready to go wilfredo I'm just hoping they give me something stronger for pain if needed"   Eating   Type of Assistance Needed Independent   Physical Assistance Level No physical assistance   Eating CARE Score 6   Oral Hygiene   Comment pt completed prior to OT session   Shower/Bathe Self   Type of Assistance Needed Independent   Physical Assistance Level No physical assistance   Comment completed shower; waterproof dressing applied to incision per RN  pt able to wash 10/10 body parts using LH reacher w/ washcloth to bathe lower legs/feet due to bending restriction and LH sponge not present at time of session  Pt attempted to stand briefly to bathe nila/buttocks; however, self-corrected to seated weight shifting due to LSO being doffed - demo G insight/body mechanics throughout  No complaints of worsening pain  Provided w/ further education regarding recommendation for LHS  Pt receptive, yet reports wife will provide assistance w/ LB ADLs as needed while spinal precautions in place   Shower/Bathe Self CARE Score 6   Bathing   Assessed Bath Style Shower   Anticipated D/C Bath Style Shower   Able to [place] Yes   Able to [employer] Temperature Yes   Able to Wash/Rinse/Dry (body part) Left Arm;Right Arm;L Upper Leg;R Upper Leg;Chest;Abdomen;Perineal Area; Buttocks;L Lower Leg/Foot;R Lower Leg/Foot   Limitations Noted in ROM   Positioning Seated   Adaptive Equipment Shower Bars; [facility] Shower;Longhand Reacher   Tub/Shower Transfer   Adaptive Equipment Grab Bars;Seat with Back   Assessed Shower   Findings Mod I using RW to shower chair; use of grab bars intermittently for safety   Upper Body Dressing   Type of Assistance Needed Independent   Physical Assistance Level No physical assistance   Comment Mod I to don [place] shirt and LSO while seated   Upper Body Dressing CARE Score 6   Lower Body Dressing   Type of Assistance Needed Independent; Adaptive equipment   Physical Assistance Level No physical assistance   Comment Mod I to doff/don pants; able to doff and kick off heels to maintain spinal precautions  Use of reacher to thread; able to stand w/o support for CM w/ LSO donned   Lower Body Dressing CARE Score 6   Putting On/Taking Off Footwear   Type of Assistance Needed Independent   Physical Assistance Level No physical assistance   Comment Mod I using sock aide to don socks; able to doff  by stepping on toes and slipping off  Pt did not don TEDs per goals  reports wife will assist at d/c as needed w/ LB ADLs while spinal precautions in place   Putting On/Taking Off Footwear CARE Score 6   Lying to Sitting on Side of Bed   Type of Assistance Needed Independent   Physical Assistance Level No physical assistance   Comment G carryover of log rollig   Lying to Sitting on Side of Bed CARE Score 6   Sit to Stand   Type of Assistance Needed Independent   Physical Assistance Level No physical assistance   Comment Mod I to RW   Sit to Stand CARE Score 6   Bed Mobility   Supine to Sit 7  Independent   Bed-Chair Transfer   Type of Assistance Needed Independent   Physical Assistance Level No physical assistance   Comment Mod I using RW   Chair/Bed-to-Chair Transfer CARE Score 6   Functional Standing Tolerance   Time 16 minutes   Activity table top activity   Comments completed table top activity of checkers independently focusing on standing tolerance/activity tolerance    Pt tolerated activity for ~16 minutes w/o complaints or increased pain  Noted to weight shift intermittently to adjust standing position; able to do so w/o support  Continues to report prolonged static standing is difficulty due to discomfort; however pt verbalized length of time noted was okay  Cognition   Overall Cognitive Status WFL   Arousal/Participation Alert; Cooperative   Attention Within functional limits   Orientation Level Oriented X4   Memory Within functional limits   Following Commands Follows all commands and directions without difficulty   Comments pt agreeable and cooperative throughout OT session; excited and anxious for d/c   Additional Activities   Additional Activities Comments completed functional mob  to <> from shower room and in/around therapy gym using RW w/ Mod I to simulate household distances  Verbally educated regarding body mechanics when navigating small spaces using RW to encure compliance w/ spinal precautions; pt verbalized understanding and able to carryover into activity appropriately; required x1 VC when turning to limit twisting - pt able to carryover for remainder of session   Activity Tolerance   Activity Tolerance Patient tolerated treatment well   Other Comments   Assessment Completed ball toss in unsupported stance focusing on activity tolerance, standing tolerance/balance while challenging KAMALA  Pt completed activity independently; RW kept in close proximity for steadying as needed  Able to complete task unilaterally and bilaterally w/o difficulty when challenged in all planes (maintained spinal precautions throughout)  Pt w/o complaints of pain throughout activity and tolerated well  Assessment   Treatment Assessment Pt participated in 90 minute skilled OT session focusing on ADL re-training, functional standing tolerance/balance, review of proper body mechanics during ADL performance, and discussion regarding needed DME in prep for upcoming d/c    Pt tolerated session well; complaints of 4/10 pain throughout session which did not impact participation  Pain/discomfort appears to be patients main limiting factor at this time  Questioned OT about pain medication at time of d/c stating he felt he needed something "stronger" for break through pain  Informed RN  Details regarding D/C ADL noted above; completed Mod I  Wife has purchased shower chair and pt was previously provided w/ handout for RW tray, LHR, and sock aide  Provided w/ further education regarding LHS  Pt states wife will provide assistance as needed at d/c  Pt demo G insight regarding spinal precautions throughout session; able to self-correct when needed  Independently donned LSO while seated; reinforced wear schedule when OOB; pt verbalized understanding  Pt w/o concerns or questions regarding pending d/c  OTR to follow up to complete tomorrow  Pt awaiting RW prior to d/c home; informed PT Jennifer Wilson of same  Pt w/o further OT needs at time of d/c    Prognosis Good   Problem List Orthopedic restrictions;Pain;Decreased range of motion   Barriers to Discharge Inaccessible home environment  (pt reports he will stay on first floor)   Plan   Treatment/Interventions Patient/family training   Progress Improving as expected   Recommendation   OT Discharge Recommendation Home with outpatient rehabilitation   Date ordered   (has shower chair; previously provided w/ handout for RW tray, LHR and sock aide for private purchase  Friend to provide reacher)   OT - OK to Discharge Yes   OT Therapy Minutes   OT Time In 0700   OT Time Out 0830   OT Total Time (minutes) 90   OT Mode of treatment - Individual (minutes) 90   OT Mode of treatment - Concurrent (minutes) 0   OT Mode of treatment - Group (minutes) 0   OT Mode of treatment - Co-treat (minutes) 0   OT Mode of Treatment - Total time(minutes) 90 minutes   OT Cumulative Minutes 664   Therapy Time missed   Time missed?  No

## 2022-06-06 ENCOUNTER — APPOINTMENT (OUTPATIENT)
Dept: RADIOLOGY | Facility: CLINIC | Age: 67
End: 2022-06-06
Payer: MEDICARE

## 2022-06-06 ENCOUNTER — OFFICE VISIT (OUTPATIENT)
Dept: OBGYN CLINIC | Facility: CLINIC | Age: 67
End: 2022-06-06

## 2022-06-06 VITALS
HEART RATE: 102 BPM | BODY MASS INDEX: 29.49 KG/M2 | SYSTOLIC BLOOD PRESSURE: 125 MMHG | WEIGHT: 206 LBS | DIASTOLIC BLOOD PRESSURE: 78 MMHG | HEIGHT: 70 IN

## 2022-06-06 VITALS
BODY MASS INDEX: 29.62 KG/M2 | RESPIRATION RATE: 16 BRPM | TEMPERATURE: 98.2 F | SYSTOLIC BLOOD PRESSURE: 149 MMHG | WEIGHT: 206.9 LBS | HEIGHT: 70 IN | OXYGEN SATURATION: 95 % | HEART RATE: 76 BPM | DIASTOLIC BLOOD PRESSURE: 85 MMHG

## 2022-06-06 DIAGNOSIS — Z98.890 HISTORY OF LUMBAR SURGERY: ICD-10-CM

## 2022-06-06 DIAGNOSIS — M51.35 DJD (DEGENERATIVE JOINT DISEASE), THORACOLUMBAR: ICD-10-CM

## 2022-06-06 DIAGNOSIS — M41.9 SCOLIOSIS, UNSPECIFIED SCOLIOSIS TYPE, UNSPECIFIED SPINAL REGION: ICD-10-CM

## 2022-06-06 DIAGNOSIS — Z98.890 HISTORY OF LUMBAR SURGERY: Primary | ICD-10-CM

## 2022-06-06 PROCEDURE — 99024 POSTOP FOLLOW-UP VISIT: CPT | Performed by: ORTHOPAEDIC SURGERY

## 2022-06-06 PROCEDURE — 99238 HOSP IP/OBS DSCHRG MGMT 30/<: CPT | Performed by: PHYSICAL MEDICINE & REHABILITATION

## 2022-06-06 PROCEDURE — 72110 X-RAY EXAM L-2 SPINE 4/>VWS: CPT

## 2022-06-06 RX ORDER — ASPIRIN 325 MG
325 TABLET ORAL DAILY
COMMUNITY
Start: 2022-06-07

## 2022-06-06 RX ADMIN — LISINOPRIL 5 MG: 5 TABLET ORAL at 07:44

## 2022-06-06 RX ADMIN — OXYCODONE HYDROCHLORIDE 10 MG: 10 TABLET ORAL at 03:48

## 2022-06-06 RX ADMIN — OXYCODONE HYDROCHLORIDE 10 MG: 10 TABLET ORAL at 07:44

## 2022-06-06 RX ADMIN — ASPIRIN 325 MG ORAL TABLET 325 MG: 325 PILL ORAL at 07:44

## 2022-06-06 NOTE — PLAN OF CARE
Problem: PAIN - ADULT  Goal: Verbalizes/displays adequate comfort level or baseline comfort level  Description: Interventions:  - Encourage patient to monitor pain and request assistance  - Assess pain using appropriate pain scale  - Administer analgesics based on type and severity of pain and evaluate response  - Implement non-pharmacological measures as appropriate and evaluate response  - Consider cultural and social influences on pain and pain management  - Notify physician/advanced practitioner if interventions unsuccessful or patient reports new pain  Outcome: Completed     Problem: INFECTION - ADULT  Goal: Absence or prevention of progression during hospitalization  Description: INTERVENTIONS:  - Assess and monitor for signs and symptoms of infection  - Monitor lab/diagnostic results  - Monitor all insertion sites, i e  indwelling lines, tubes, and drains  - Monitor endotracheal if appropriate and nasal secretions for changes in amount and color  - [place] appropriate cooling/warming therapies per order  - Administer medications as ordered  - Instruct and encourage patient and family to use good hand hygiene technique  - Identify and instruct in appropriate isolation precautions for identified infection/condition  Outcome: Completed  Goal: Absence of fever/infection during neutropenic period  Description: INTERVENTIONS:  - Monitor WBC    Outcome: Completed     Problem: SAFETY ADULT  Goal: Patient will remain free of falls  Description: INTERVENTIONS:  - Educate patient/family on patient safety including physical limitations  - Instruct patient to call for assistance with activity   - Consult OT/PT to assist with strengthening/mobility   - Keep Call bell within reach  - Keep bed low and locked with side rails adjusted as appropriate  - Keep care items and personal belongings within reach  - Initiate and maintain comfort rounds  - Make Fall Risk Sign visible to staff  - Offer Toileting every 2-4 Hours, in advance of need  - Initiate/Maintain bed/chair alarm  - Obtain necessary fall risk management equipment: yellow socks   - Apply yellow socks and bracelet for high fall risk patients  - Consider moving patient to room near nurses station  Outcome: Completed  Goal: Maintain or return to baseline ADL function  Description: INTERVENTIONS:  -  Assess patient's ability to carry out ADLs; assess patient's baseline for ADL function and identify physical deficits which impact ability to perform ADLs (bathing, care of mouth/teeth, toileting, grooming, dressing, etc )  - Assess/evaluate cause of self-care deficits   - Assess range of motion  - Assess patient's mobility; develop plan if impaired  - Assess patient's need for assistive devices and provide as appropriate  - Encourage maximum independence but intervene and supervise when necessary  - Involve family in performance of ADLs  - Assess for home care needs following discharge   - Consider OT consult to assist with ADL evaluation and planning for discharge  - Provide patient education as appropriate  Outcome: Completed  Goal: Maintains/Returns to pre admission functional level  Description: INTERVENTIONS:  - Perform BMAT or MOVE assessment daily    - Set and communicate daily mobility goal to care team and patient/family/caregiver  - Collaborate with rehabilitation services on mobility goals if consulted  - Perform Range of Motion 2 times a day  - Reposition patient every 2 hours    - Dangle patient 3 times a day  - Stand patient 3 times a day  - Ambulate patient 3 times a day  - Out of bed to chair 3 times a day   - Out of bed for meals 3 times a day  - Out of bed for toileting  - Record patient progress and toleration of activity level   Outcome: Completed     Problem: DISCHARGE PLANNING  Goal: Discharge to home or other facility with appropriate resources  Description: INTERVENTIONS:  - Identify barriers to discharge w/patient and caregiver  - Arrange for needed discharge resources and transportation as appropriate  - Identify discharge learning needs (meds, wound care, etc )  - Arrange for interpretive services to assist at discharge as needed  - Refer to Case Management Department for coordinating discharge planning if the patient needs post-hospital services based on physician/advanced practitioner order or complex needs related to functional status, cognitive ability, or social support system  Outcome: Completed     Problem: Potential for Falls  Goal: Patient will remain free of falls  Description: INTERVENTIONS:  - Educate patient/family on patient safety including physical limitations  - Instruct patient to call for assistance with activity   - Consult OT/PT to assist with strengthening/mobility   - Keep Call bell within reach  - Keep bed low and locked with side rails adjusted as appropriate  - Keep care items and personal belongings within reach  - Initiate and maintain comfort rounds  - Make Fall Risk Sign visible to staff  - Offer Toileting every 2-4 Hours, in advance of need  - Initiate/Maintain bed/chair alarm  - Obtain necessary fall risk management equipment: yellow socks  - Apply yellow socks and bracelet for high fall risk patients  - Consider moving patient to room near nurses station  Outcome: Completed

## 2022-06-06 NOTE — DISCHARGE SUMMARY
Physical Medicine and Rehabilitation Discharge Note  Adryan Cruz 73 y o  male MRN: [id number]  Unit/Bed#: -01 Encounter: [id number]        Admission Date:    5/27/22  Discharge Date:   6/6/22    Diagnosis:   Chronic back pain, L5-S1 pseudoarthrosis and T12- L1 junctional kyphosis with disk space screw penetration    Functional Status Upon Discharge:   Modified independent for mobility, transfers, ADLs     Condition at Discharge: stable    Discharge instructions/Information to patient and family:   See after visit summary for information provided to patient and family  Provisions for Follow-Up Care:  See after visit summary for information related to follow-up care and any pertinent home health orders  Disposition: home with family     Planned Readmission: no        Discharge Medications:  See after visit summary for reconciled discharge medications provided to patient and family  Comorbidities:   See below for medical details     Hospital Course: The patient had an unremarkable rehab course with significant functional gains made, please see below for medical details:          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 who presented electively on 5/24 for L1-S1 revision of segmental hardware, L5-S1 osteotomy, posterior spinal fusion L4-S1, pelvic instrumentation, Transforaminal lumbar interbody fusion L5S1, Cage L5S1, instrumentation L1-pelvis by Dr Adriana Samson   Course complicated by LLE noted on POD #1 however patient was cleared by Dr Adriana Samson for transfer to acute inpt rehab on POD #3         Chronic back pain, L5-S1 pseudoarthrosis and 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: s/p L1-S1 revision of segmental hardware, L5-S1 osteotomy, posterior spinal fusion L4-S1, pelvic instrumentation, Transforaminal lumbar interbody fusion L5S1, Cage L5S1, instrumentation L1-pelvis by Dr Nguyen Nowak on 5/24, LSO at all times when OOB per ortho, L-spine precautions; OP FU with Dr Nguyen Nowak scheduled for 6/6/22 at 10:15 AM at the [facility] office (scheduled day of dc is also 6/6 therefore will dc patient in AM so that they may go to their appt)      LLE weakness: noted by acute care spine team on POD #1, however now resolved as patient is 5/5 in B/L LE      Anemia: Hg currently stable at 9 5 post-operatively, IM monitoring and have cleared patient for dc (scrip provided for OP H&H with results to PCP)      Chronic pain: chronic back pain, at home on prn tramadol 50 mg (per PAPD[facility] patient last filled tramadol 50 mg on 5/19/22 for 60 tabs)     Elevated AST: appears to be chronic going back to at least 2017 per EMR lab records, was 72 on 5/24, HCV ab + in 3/2020 however PCR negative for HCV in 4/2021 and patient noted that he had HCV when he was younger but cleared it without treatment; patient states has been on lovastatin for 15 years w/o AE and is on only 10 mg qpm at home and per PCP's OP notes after investigating for HCV vs statin as possible etiology (with HCV PCR being negative in 4/2021) PCP elected to continue prescribing lovastatin 10 mg qpm (last prescribed in 12/2021 for 90 day supply with 2 refills); OP FU with PCP who is managing     Dyslipidemia: therapeutic substitution for home lovastatin 10 mg qpm; elevated AST appears to be chronic going back to at least 2017 per EMR lab records however per patient he has been on lovastatin for 15 years w/o AE and in only on 10 mg qpm, additionally PCP is aware of elevated AST and did elect to continue to prescribe it (last prescribed in 12/2021 for 90 day supply with 2 refills)     HTN: on home lisinopril 5 mg qd, management per IM who recommend patient continue inpt (and home) lisinopril 5 mg qd upon dc      DVT ppx: SCDs and  mg qd for 28 days post-op per Dr Darryle Barrios instructions     Note: upon dc from acute care, acute care team provided OP scrips to patient's OP pharmacy for  mg (25 tabs 0 refills), hydrocodone-tylenol, robaxin, and narcan--> contacted patient's OP pharmacy and these prescriptions were maintained however patient plans to resume taking prn tramadol (takes this at home PTA) for pain and has not required any robaxin while in the rehab unit therefore spoke with pharmacist at [facility] and canceled the robaxin & hydrocodone-tylenol scrips and advised pharmacist to dispense only enough  mg tabs (scrip was originally written for 25 tabs) to complete a course through 6/21 which would be 28 days post-op per Dr Jaleesa Mcadams instructions      Incidental findings:     1) 1st degree AVB: OP FU with PCP with specialist referral at PCP's discretion      2) left hemidiaphragmatic elevation: OP FU with PCP with specialist referral at PCP's discretion      3) abnormalities on CT A/P of 8/2017 including but not limited to liver hypodensities (likely simple cysts per CT report), splenule, right renal cyst, diverticulosis, & prostatomegaly: OP FU with PCP with specialist referral at PCP's discretion

## 2022-06-06 NOTE — NURSING NOTE
Pt being discharged today @ [facility] s/p back surgery  Pain is controlled & will resume home dose of tramadol  Skin WNL, continent B&B

## 2022-06-06 NOTE — PLAN OF CARE
Problem: PAIN - ADULT  Goal: Verbalizes/displays adequate comfort level or baseline comfort level  Description: Interventions:  - Encourage patient to monitor pain and request assistance  - Assess pain using appropriate pain scale  - Administer analgesics based on type and severity of pain and evaluate response  - Implement non-pharmacological measures as appropriate and evaluate response  - Consider cultural and social influences on pain and pain management  - Notify physician/advanced practitioner if interventions unsuccessful or patient reports new pain  Outcome: Progressing     Problem: SAFETY ADULT  Goal: Patient will remain free of falls  Description: INTERVENTIONS:  - Educate patient/family on patient safety including physical limitations  - Instruct patient to call for assistance with activity   - Consult OT/PT to assist with strengthening/mobility   - Keep Call bell within reach  - Keep bed low and locked with side rails adjusted as appropriate  - Keep care items and personal belongings within reach  - Initiate and maintain comfort rounds  - Make Fall Risk Sign visible to staff  - Offer Toileting every 2-4 Hours, in advance of need  - Initiate/Maintain bed/chair alarm  - Obtain necessary fall risk management equipment: yellow socks   - Apply yellow socks and bracelet for high fall risk patients  - Consider moving patient to room near nurses station  Outcome: Progressing

## 2022-06-06 NOTE — INCIDENTAL FINDINGS
1) 1st degree AV block on EKG of your heart: Please follow up with your primary care provider for further testing/treatment if any and/or specialist referral if any as they deem necessary      2) left hemidiaphragmatic elevation: Please follow up with your primary care provider for further testing/treatment if any and/or specialist referral if any as they deem necessary      3) abnormalities noted on report of CAT scan of your abdomen & pelvis performed in 8/2017 including but not limited to liver hypodensities (likely simple cysts per CAT scan report), splenule, right kidney cyst, diverticulosis, & prostatomegaly (enlarged prostate): Please follow up with your primary care provider for further testing/treatment if any and/or specialist referral if any as they deem necessary     4) elevated AST: This appears to be chronic however please follow up with your primary care provider for further testing/treatment if any and/or specialist referral if any as they deem necessary     5) low hemoglobin (anemia): Please have your blood work drawn as prescribed to monitor your hemoglobin level the results will be sent to your primary care provider

## 2022-06-06 NOTE — PROGRESS NOTES
[id number] Walters MD  [id number] [id number]  [phone]    HISTORY OF PRESENT ILLNESS:    73yo male presents two weeks s/p L1-S1 revision of segmental hardware, L5-S1 osteotomy, posterior spinal fusion L4-S1, pelvic instrumentation, transforaminal lumbar interbody fusion L5-S1, Cage L5-S1 instrumentation L1-Pelvis done 5/24/2022  Today he complains of mild low back pain  He notes resolution of leg pain and increase lumbar mobility following surgery  He does use a walker for ambulation  He thinks he has used Dilaudid recently  He states he has not use tramadol recently  ALLERGIES: No Known Allergies    MEDICATIONS:    Current Outpatient Medications:     [START ON 6/7/2022] aspirin 325 mg tablet, Take 325 mg by mouth daily for 15 days starting on 6/7/22 through 6/21/22, Disp: , Rfl:     lisinopril (ZESTRIL) 5 mg tablet, Take 5 mg by mouth daily, Disp: , Rfl:     lovastatin (MEVACOR) 10 MG tablet, Take 10 mg by mouth every evening, Disp: , Rfl:     TRAMADOL HCL PO, Resume use on an as needed basis for pain as you were prior to hospitalization, Disp: , Rfl:   No current facility-administered medications for this visit       PAST MEDICAL HISTORY:   Past Medical History:   Diagnosis Date    Ambulates with cane     "long distance"    Arthritis     Back pain     Chronic pain disorder     Dental crowns present     Exercise involving walking     daily -short walks    History of hepatitis C     per pt "went away on its own" age 13"    History of transfusion     Hyperlipidemia     Hypertension     Leg pain     and foot pain    Limb alert care status     per pt NO IV's LEFT UPPER ARM due to old injury    Risk for falls     Spinal stenosis     Stroke ([facility] [address] )     per pt in 1996-and no lasting problems    Wears glasses     readers       PAST SURGICAL HISTORY:  Past Surgical History:   Procedure Laterality Date    BACK SURGERY      with implants    COLONOSCOPY      LUMBAR FUSION N/A 5/24/2022    Procedure: L1-S1 revision of segmental hardware, L5-S1 osteotomy, posterior spinal fusion L4-S1, pelvic instrumentation, Transforaminal lumbar interbody fusion L5S1, Cage L5S1, instrumentation L1-pelvis; Surgeon: Meg Simms MD;  Location: [facility];  Service: Orthopedics    WISDOM TOOTH EXTRACTION         SOCIAL HISTORY:  Social History     Tobacco Use   Smoking Status Former Smoker   Smokeless Tobacco Never Used        ROS:  Review of Systems   Constitutional: Negative for chills, fever and unexpected weight change  HENT: Negative for hearing loss, nosebleeds and sore throat  Eyes: Negative for pain, redness and visual disturbance  Respiratory: Negative for cough, shortness of breath and wheezing  Cardiovascular: Negative for chest pain, palpitations and leg swelling  Gastrointestinal: Negative for abdominal pain, nausea and vomiting  Endocrine: Negative for polydipsia and polyuria  Genitourinary: Negative for difficulty urinating and hematuria  Skin: Negative for rash and wound  Psychiatric/Behavioral: Negative for decreased concentration and suicidal ideas  The patient is not nervous/anxious  PHYSICAL EXAM:  Lumbar:  Posterior incision clean dry and intact  No signs of infection  Patient uses walker for ambulation  Patient able to stand from exam table without assistance  Incision is healing appropriately  Sutures were removed  No evidence of infection was present    RADIOGRAPHIC STUDIES:  1  X-rays, scoli films, 12/13/2021:  Prior multilevel lumbar fusion with unknown instrumentation   Significant sagittal coronal balance   Adjacent segment kyphosis      2  MRI, lumbar spine, 12/21/2021:  Multilevel moderate to severe thoracic degenerative disc   Prior instrumentation from L1 to S1   Kyphosis at the thoracolumbar junction   No spinal cord compression or myelomalacia   Malposition right L1 screw      3   MRI, thoracic spine, 12/21/2021:  1 to S1 posterior instrumentation with kyphotic deformity at the upper lumbar spine   Flat back deformity   No significant central lateral recess stenosis   Malposition right L1 screw      4  Xrays, lumbar spine, 3/15/2022:  Status post L1-S1 posterior spinal fusion instrumentation   There was evidence of solid fusion from L2-S1   Screw cut-out is present at L1  Lydia Caulk is also evidence of adjacent segment kyphosis and flat back deformity      5  CT lumbar and thoracic spine 3/18/2022: significant kyphosis of the lumbar and thoracic spine  Robust bony fusion of L1-L5, hardware intact  Screw penetration of the right lumbar spinal canal at L2 and L1 right pedicle screw cut out into the disk space  Failure of fusion of L5-S1      6  X-ray lumbar spine 4/18 demonstrates intact hardware with lumbar and thoracic kyphosis  7  Lumbar x-ray of 6/6/22:  Stable fusion L1-Pelvis with no evidence of hardware failure  Interbody implant is in appropriate position  There is evidence of correction of sagittal deformity or proximally 10°  ASSESSMENT:  1  History of lumbar surgery  -     XR spine lumbar minimum 4 views non injury; Future; Expected date: 06/06/2022    2  DJD (degenerative joint disease), thoracolumbar    3  Scoliosis, unspecified scoliosis type, unspecified spinal region        PLAN:  74 yo male two weeks s/p L1-S1 revision of segmental hardware, L5-S1 osteotomy, posterior spinal fusion L4-S1, pelvic instrumentation, transforaminal lumbar interbody fusion L5-S1, Cage L5-S1 instrumentation L1-Pelvis done 5/24/2022  The patient notes resolution of leg symptoms and increase lumbar mobility  He does use a walker for ambulation  Radiograph display stable fusion and hardware  He has well exam with clean and dry incision  No lifting greater than 10 lbs  Continue tramadol as needed for pain  Follow up in 4 weeks       Scribe Attestation    I,:  Yulia Thorne am acting as a scribe while in the presence of the attending physician :       I,:  Janna Conn MD personally performed the services described in this documentation    as scribed in my presence :

## 2022-06-06 NOTE — DISCHARGE INSTRUCTIONS
Should you develop fevers, chills, sweats, rigors, or any drainage from your surgical site please contact your family doctor or surgeon immediately or go to the ER immediately as these are indicators of possible infection     Please have your blood work drawn as prescribed the results will be sent to your primary care provider     Please note you are restricted from driving/operating a motorized vehicle/operating heavy machinery/etc until you are cleared by Dr Keyona Frazier     Please see your doctors listed in the follow up providers section of your discharge paperwork, and take the discharge paperwork with you to your appointments    Please call the doctors listed in the follow up providers section to confirm office locations of your follow up appointments     Please note changes may have been made to your medications please refer to your discharge paperwork for your current medications and take this list with you to all your doctors appointments for your doctors to review    Please do not resume a home medication unless the medication reconciliation sheet indicates to do so, please do not assume that a medication that you were given a prescription for is the same as a medication you have at home based on both medications having the same name as dosages and frequency may have changed  Your nurse also reviewed with you just prior to your discharge from the hospital your medications, when to take them, how to take them, what they are for, how much to take, and when your next dose is due, please follow these instructions           Please check your blood pressure prior to taking your blood pressure medications and 1 hour after, please contact your family doctor immediately for a blood pressure below 100/60 and do not take your blood pressure medications until speaking with them, please contact your family doctor immediately for blood pressure greater than 160/100 (but do still take your blood pressure medications if you are unable to reach your health care providers in a timely manner)       Please note the following incidental findings were found during your recent hospitalization please discuss them with your doctors so that they may arrange any tests/referrals as they deem necessary:     1) 1st degree AV block on EKG of your heart: Please follow up with your primary care provider for further testing/treatment if any and/or specialist referral if any as they deem necessary      2) left hemidiaphragmatic elevation: Please follow up with your primary care provider for further testing/treatment if any and/or specialist referral if any as they deem necessary      3) abnormalities noted on report of CAT scan of your abdomen & pelvis performed in 8/2017 including but not limited to liver hypodensities (likely simple cysts per CAT scan report), splenule, right kidney cyst, diverticulosis, & prostatomegaly (enlarged prostate): Please follow up with your primary care provider for further testing/treatment if any and/or specialist referral if any as they deem necessary     4) elevated AST: This appears to be chronic however please follow up with your primary care provider for further testing/treatment if any and/or specialist referral if any as they deem necessary     5) low hemoglobin (anemia): Please have your blood work drawn as prescribed to monitor your hemoglobin level the results will be sent to your primary care provider       Please avoid NSAID (including but not limited to nabumetone, relafen, celebrex, celecoxib, advil, aleve, motrin, naproxen, ibuprofen, mobic, meloxicam, diclofenac, voltaren etc) medications as NSAID medications may delay bone healing unless cleared to do so by your doctors       Please avoid NSAID (including but not limited to nabumetone, relafen, celebrex, celecoxib, advil, aleve, motrin, naproxen, ibuprofen, mobic, meloxicam, diclofenac, voltaren etc) medications due to your recent surgery as these medications can increase your risk of bleeding; you may be cleared to use these medications in the future but do not do so unless advised to do so by your surgeon    Please avoid NSAID (including but not limited to nabumetone, relafen, celebrex, celecoxib, advil, aleve, motrin, naproxen, ibuprofen, mobic, meloxicam, diclofenac, voltaren etc) medications due to your already being on aspirin through 6/21/22 and when combined with these other medications they can increase your risk of bleeding; you may be cleared to use these medications in the future but do not do so unless advised to do so by your doctors      Please continue your spine precautions as instructed until cleared by Dr Alberta Beckett    Please continue to use your back brace as instructed until cleared by Dr Alberta Beckett     Please complete your course of aspirin 325 mg by mouth each morning through 6/21/22 as recommended by Dr Alberta Beckett       Please note a summary of your hospital stay with relevant information for your doctors has been sent to them, please confirm with your doctors at your follow up visits that they have received this summary and have them contact [address] [place] [address] if they have not received them along with any other medical records they may require

## 2022-06-07 NOTE — CASE MANAGEMENT
Team d/c summary:     Pt made good rehab progress and returned home with family  He will receive continued outpt PT at [facility]  He did not need any DME at d/c  Pt's family was present for d/c and understand his functional ability

## 2022-06-08 ENCOUNTER — EVALUATION (OUTPATIENT)
Dept: PHYSICAL THERAPY | Facility: CLINIC | Age: 67
End: 2022-06-08
Payer: MEDICARE

## 2022-06-08 DIAGNOSIS — Z98.1 S/P LUMBAR FUSION: Primary | ICD-10-CM

## 2022-06-08 PROCEDURE — 97161 PT EVAL LOW COMPLEX 20 MIN: CPT | Performed by: PHYSICAL THERAPIST

## 2022-06-08 PROCEDURE — 97110 THERAPEUTIC EXERCISES: CPT | Performed by: PHYSICAL THERAPIST

## 2022-06-08 NOTE — PROGRESS NOTES
PT Evaluation     Today's date: 2022  Patient name: Rosalia Sanders  : 1955  MRN: [id number]  Referring provider: Myriam Falk MD  Dx:   Encounter Diagnosis     ICD-10-CM    1  S/P lumbar fusion  Z98 1                   Assessment  Assessment details: 2022: Rosalia Sanders is a 73 y o  male who presents s/p revision of lumbar fusion with pain, decreased strength, decreased ROM, decreased joint mobility, ambulatory dysfunction, postural dysfunction and balance dysfunction  Due to these impairments, patient has difficulty performing ADL's, recreational activities, engaging in social activities, ambulation, stair negotiation, lifting/carrying, transfers, reaching  Patient's clinical presentation is consistent with their referring diagnosis of S/P lumbar fusion  (primary encounter diagnosis)  He is compliant w/ physician's post-op instructions and reports decreasing leg pain post-op  He will benefit from skilled physical therapy to improve LE flexibility/strength and core strength as able within precautions, improve his balance and endurance with hopes of ambulation w/ SPC or no AD  Patient has been educated in post-op contraindications / precautions, home exercise program and plan of care  Patient would benefit from skilled physical therapy services to address their aforementioned functional limitations and progress towards prior level of function and independence with home exercise program and self symptom management/resolution  Impairments: abnormal gait, abnormal or restricted ROM, activity intolerance, impaired balance, impaired physical strength, lacks appropriate home exercise program, pain with function, weight-bearing intolerance, poor posture  and poor body mechanics  Understanding of Dx/Px/POC: good   Prognosis: good    Goals  Short Term Goals: Target Date 2022  1  Initiate and advance HEP toward self symptom reduction/resolution    2  Pt to D/C RW for ambulation and progress to [address] [place] [address] indep   3  Improve PROM B/L hip IR in prone to 15 deg or more to reduce stress on low back and improve ease in LE dressing/bathing  4  Pt to report pain to be intermittent 0-5/10 vs current constant  5  Pt to be able to transfer sit to stand w/o UE assist       Long Term Goals: Target Date 8/31/2022  1  Indep with HEP and self symptom prevention  2  Achieve FOTO score of 57 or higher to allow pt to lift and carry groceries 10# or less  3  Improve LE/core strength to 4+/5 to 5/5 t/o B/L LE's to allow recip stairs w/ rail  4  Pt to ambulate indoors w/o AD and outdoors w/ SPC   5  Improve LE flexibility hams B/L and quads/hip flexors to min ruby or better to allow pt to stand up erect and to reduce stress on low back, assisting to achieve pain level of 0-3/10  Plan  Patient would benefit from: skilled PT and PT eval  Planned modality interventions: thermotherapy: hydrocollator packs and unattended electrical stimulation  Planned therapy interventions: joint mobilization, patient education, postural training, abdominal trunk stabilization, functional ROM exercises, home exercise program, neuromuscular re-education, strengthening, stretching, therapeutic activities and therapeutic exercise  Other planned therapy interventions: mechanical assessment  Frequency: 2x week  Plan of Care beginning date: 6/8/2022  Plan of Care expiration date: 8/31/2022  Treatment plan discussed with: patient        Subjective Evaluation    History of Present Illness  Mechanism of injury: Subjective 6/8/2022: pt reports years of wear and tear of his back due to physical job and recreation lead to him needing fusion about 12 years ago  He did continue to wear a back brace after the fusion, and used a cane for longer walks  The surgery was a success, but had worn out  He reports symptoms of B/L leg pain throughout both legs/feet began to worsen a few years ago  This pain became constant 3-8/10   He needed to use the [address] for most ambulation  Post-operatively he reports lessening B/L LE pain, especially distally  He reports the worst pain he has now is in his thighs and low back, less in his lower legs/feet now  He walks w/ a RW and wears his back brace when he is OOB  He takes Tramadol once/day, sometimes twice  He has minor numbness in L toes  Pain  At best pain rating: 3  At worst pain ratin  Quality: dull ache  Relieving factors: medications  Aggravating factors: lifting (bending (pre-op); )  Progression: improved    Social Support  Steps to enter house: no  Stairs in house: yes (w/ rail)     Employment status: not working  Life stress: pt has resumed driving      Diagnostic Tests  X-ray: abnormal  Treatments  Previous treatment: immobilization and medication  Current treatment: physical therapy  Patient Goals  Patient goals for therapy: increased strength, decreased pain and independence with ADLs/IADLs  Patient goal: walk w/o AD short dist; SPC for long distances        Objective    Posture: Lumbar lordosis is absent in standing  There is left lateral shift and flexed hips L>R  Pt wears LSO brace at all times when OOB  SPECIAL TESTS/ROM:     2022     Right  left  SLR: (negative)  65  65  Crossed SLR:   (-)  (-)  Prone instab test:   (NT)  Prone hip IR ROM:  10  -15  [place] sign:   (NT)  Supine to sit test:  (NT)  [place] test prone:   (NT)  Slump test:    (NT)  Femoral NTT:   (-)  (-)  Hip ext  PROM: -20  (-10)  Hip flex AROM sup  108  80  Prone knee flexion 90  80    FLEXIBILITY:  2022 - shaina ruby L>R hip flexors/quads; shaina ruby L hip IR vs R mod ruby; mod ruby B/L hams      DERMATOMAL TESTIN2022 - L1-L5 Myotomal testing WNL B/L to pinprick      MYOTOMAL TESTING:     Right  Left     2022  L2-3 Hip flexion:  4+/5  4/5  L3-4 Knee extension:  5/5  4+/5  L5 Great toe exten:   5/  4-/5  L4 Heel walk/DF:    4+/5  S1 eversion:     4/5  S1 PF uni HR  4+/5  4-/5 (unable, but can eccent lower)  S2 knee flex:   5/5  4/5    REFLEXES: 0= none; 1+ = slight; 2+ = brisk/normal; 3+= very brisk; 4+= clonus  6/8/2022  L3-4 Quadriceps: 2+ B/L  L5-S1 Achilles: 2+ B/L    SPINAL/PIAVM ASSESSMENT:   6/8/2022 -not tested - post op fusion    LUMBAR AROM: 6/8/2022  Flexion   NT  Extension  NT  R sideglide  NT  L sideglide  NT    BALANCE:  6/8/2022  SLS EO R:  30 SEC+  SLS EO L:   30 SEC+  TANDEM EC R POST: 10 SEC  TANDEM EC L POST:  10 SEC  SLS EC R:   Unable  SLD EC L:  unable    FUNCTION:  Pt is limited with balance, transfers, lifting, carrying  He is to wear his LSO brace at all times OOB; no lifing >5#; mod difficulty w/ bathing/LE dressing; needs UE assist xfer sit to stand       Precautions: LSO brace all times OOB; no bending/twisting; 5# lifting limit    L1-S1 revision of segmental hardware, L5-S1 osteotomy, posterior spinal fusion L4-S1, pelvic instrumentation, Transforaminal lumbar interbody fusion L5S1, Cage L5S1, instrumentation L1-pelvis (N/A Spine Lumbar)      Past Medical History:   Diagnosis Date    Ambulates with cane     "long distance"    Arthritis     Back pain     Chronic pain disorder     Dental crowns present     Exercise involving walking     daily -short walks    History of hepatitis C     per pt "went away on its own" age 13"    History of transfusion     Hyperlipidemia     Hypertension     Leg pain     and foot pain    Limb alert care status     per pt NO IV's LEFT UPPER ARM due to old injury    Risk for falls     Spinal stenosis     [facility]roke [facility])     per pt in 1996-and no lasting problems    Wears glasses     readers     SOC: 6/8/2022  DOS: 5/24/2022  FOTO: 6/8/2022  POC Expiration: 8/31/2022  Daily Treatment Log:  Date 6/8/2022       Visit # 1       auth         Manual                        There Exer 15'                B/L HR w/ uni lowering 1x10 R/L       hooklying clamshells grn 5"x10       Hip flexor stretch off edge        clamshells        Reverse clamshells w/ Towel roll        Stand hip abd grn TB thighs 1x10 R/L                               HEP Issued/reviewed       There Activ        Glut raise w/ bolster        Low mat STS                                        NMReed        Sciatic NG w/ SOS 1x10 R/L       Alt taps to cone        FT w/ EC on foam        Tandem w/ EC                Modalities                                Access Code: [email or web address]  URL: [email or web address]  Date: 06/08/2022  Prepared by:  Cristian Brunson    Exercises  · Hooklying Clamshell with Resistance - 1 x daily - 7 x weekly - 2 sets - 10 reps - 5 hold  · Supine Sciatic Nerve Glide - 1 x daily - 7 x weekly - 1 sets - 15 reps  · Hip Abduction with Resistance Loop - 1 x daily - 7 x weekly - 2 sets - 10 reps  · Standing Eccentric Heel Raise - 1 x daily - 7 x weekly - 1 sets - 10 reps

## 2022-06-09 ENCOUNTER — OFFICE VISIT (OUTPATIENT)
Dept: PHYSICAL THERAPY | Facility: CLINIC | Age: 67
End: 2022-06-09
Payer: MEDICARE

## 2022-06-09 ENCOUNTER — APPOINTMENT (OUTPATIENT)
Dept: LAB | Facility: CLINIC | Age: 67
End: 2022-06-09
Payer: MEDICARE

## 2022-06-09 DIAGNOSIS — D64.9 ANEMIA: ICD-10-CM

## 2022-06-09 DIAGNOSIS — Z98.1 S/P LUMBAR FUSION: Primary | ICD-10-CM

## 2022-06-09 LAB
HCT VFR BLD AUTO: 33.6 % (ref 36.5–49.3)
HGB BLD-MCNC: 10.2 G/DL (ref 12–17)

## 2022-06-09 PROCEDURE — 85014 HEMATOCRIT: CPT

## 2022-06-09 PROCEDURE — 36415 COLL VENOUS BLD VENIPUNCTURE: CPT

## 2022-06-09 PROCEDURE — 97112 NEUROMUSCULAR REEDUCATION: CPT

## 2022-06-09 PROCEDURE — 85018 HEMOGLOBIN: CPT

## 2022-06-09 PROCEDURE — 97110 THERAPEUTIC EXERCISES: CPT

## 2022-06-09 PROCEDURE — 97530 THERAPEUTIC ACTIVITIES: CPT

## 2022-06-09 NOTE — PROGRESS NOTES
Daily Note     Today's date: 2022  Patient name: Donavon John  : 1955  MRN: [id number]  Referring provider: Hema Ford MD  Dx:   Encounter Diagnosis     ICD-10-CM    1  S/P lumbar fusion  Z98 1                   Subjective: pt reports his R leg was so sore last night it kept him from sleeping, it is feeling much better this morning on arrival to session  Objective: See treatment diary below      Assessment: Tolerated treatment well  Pt requires VC/dem for sequencing of eccentric HR  Challenged with additional balance activities  Pt edu on graded exercise and symptom irritability in regards to soreness and how long it lasts will determine how much more/less we do in his therapy sessions, pt dem an understanding  Patient would benefit from continued PT      Plan: Continue per plan of care        Precautions: LSO brace all times OOB; no bending/twisting; 5# lifting limit    L1-S1 revision of segmental hardware, L5-S1 osteotomy, posterior spinal fusion L4-S1, pelvic instrumentation, Transforaminal lumbar interbody fusion L5S1, Cage L5S1, instrumentation L1-pelvis (N/A Spine Lumbar)      Past Medical History:   Diagnosis Date    Ambulates with cane     "long distance"    Arthritis     Back pain     Chronic pain disorder     Dental crowns present     Exercise involving walking     daily -short walks    History of hepatitis C     per pt "went away on its own" age 13"    History of transfusion     Hyperlipidemia     Hypertension     Leg pain     and foot pain    Limb alert care status     per pt NO IV's LEFT UPPER ARM due to old injury    Risk for falls     Spinal stenosis     Stroke [facility])     per pt in -and no lasting problems    Wears glasses     readers     SOC: 2022  DOS: 2022  FOTO: 2022  POC Expiration: 2022  Daily Treatment Log:  Date 2022      Visit # 1 2      auth         Manual                        There Exer 15' 15'                B/L HR w/ uni lowering 1x10 R/L 1x10 R/L       hooklying clamshells grn 5"x10 grn 5" 2x10       Hip flexor stretch off edge  30"x3 R/L       clamshells        Reverse clamshells w/ Towel roll        Stand hip abd grn TB thighs 1x10 R/L grn TB thighs 1x10 R/L                               HEP Issued/reviewed       There Activ  15'       Glut raise w/ bolster  3" 2x10       Low mat STS  2x10                                       NMReed  15'       Sciatic NG w/ SOS 1x10 R/L 2x10 R/L       Alt taps to cone  1x10 R/L       FT w/ EC on foam  Stance on foam EC 30"x3 CS/CGA      Tandem w/ EC  20"x3 R/L                Modalities                                Access Code: [email or web address]  URL: [email or web address]  Date: 06/08/2022  Prepared by:  Barber Delacruz    Exercises  · Hooklying Clamshell with Resistance - 1 x daily - 7 x weekly - 2 sets - 10 reps - 5 hold  · Supine Sciatic Nerve Glide - 1 x daily - 7 x weekly - 1 sets - 15 reps  · Hip Abduction with Resistance Loop - 1 x daily - 7 x weekly - 2 sets - 10 reps  · Standing Eccentric Heel Raise - 1 x daily - 7 x weekly - 1 sets - 10 reps

## 2022-06-13 ENCOUNTER — OFFICE VISIT (OUTPATIENT)
Dept: PHYSICAL THERAPY | Facility: CLINIC | Age: 67
End: 2022-06-13
Payer: MEDICARE

## 2022-06-13 DIAGNOSIS — Z98.1 S/P LUMBAR FUSION: Primary | ICD-10-CM

## 2022-06-13 PROCEDURE — 97112 NEUROMUSCULAR REEDUCATION: CPT

## 2022-06-13 PROCEDURE — 97530 THERAPEUTIC ACTIVITIES: CPT

## 2022-06-13 PROCEDURE — 97110 THERAPEUTIC EXERCISES: CPT

## 2022-06-13 NOTE — PROGRESS NOTES
Daily Note     Today's date: 2022  Patient name: Vernon Mendoza  : 1955  MRN: [id number]  Referring provider: Pastor Roberts MD  Dx:   Encounter Diagnosis     ICD-10-CM    1  S/P lumbar fusion  Z98 1                   Subjective: pt reports that he was sore the next day after he was here last in his legs, it resolved the next day  Objective: See treatment diary below      Assessment: Tolerated treatment well  Pt challenged and fatigued with posterolateral hip strengthening additions today to POC  No noted increases in LBP during session, cont to progress functional mobility as tolerated within precautions  Patient would benefit from continued PT      Plan: Continue per plan of care        Precautions: LSO brace all times OOB; no bending/twisting; 5# lifting limit    L1-S1 revision of segmental hardware, L5-S1 osteotomy, posterior spinal fusion L4-S1, pelvic instrumentation, Transforaminal lumbar interbody fusion L5S1, Cage L5S1, instrumentation L1-pelvis (N/A Spine Lumbar)      Past Medical History:   Diagnosis Date    Ambulates with cane     "long distance"    Arthritis     Back pain     Chronic pain disorder     Dental crowns present     Exercise involving walking     daily -short walks    History of hepatitis C     per pt "went away on its own" age 13"    History of transfusion     Hyperlipidemia     Hypertension     Leg pain     and foot pain    Limb alert care status     per pt NO IV's LEFT UPPER ARM due to old injury    Risk for falls     Spinal stenosis     [facility])     per pt in -and no lasting problems    Wears glasses     readers     SOC: 2022  DOS: 2022  FOTO: 2022  POC Expiration: 2022  Daily Treatment Log:  Date 2022     Visit # 1 2 3     auth         Manual                        There Exer 15' 15'  20'              B/L HR w/ uni lowering 1x10 R/L 1x10 R/L  1x10 R/L      hooklying clamshells grn 5"x10 grn 5" 2x10  grn 5" 2x10       Hip flexor stretch off edge  30"x3 R/L  30"x3 R/L       clamshells   RTB 2x10 R/L      Reverse clamshells w/ Towel roll        Stand hip abd grn TB thighs 1x10 R/L grn TB thighs 1x10 R/L       Side stepping    YTB shin 3 laps windowsill     diagonal walking   YTB shin 3 laps windowsill              HEP Issued/reviewed       There Activ  15'  10'      Glut raise w/ bolster  3" 2x10  Bridges 2x10      Low mat STS  2x10  2x10                                      NMReed  15'  15'     Sciatic NG w/ SOS 1x10 R/L 2x10 R/L  15x R/L      Alt taps to cone  1x10 R/L       FT w/ EC on foam  Stance on foam EC 30"x3 CS/CGA Stance on foam EC 30"x3 CS/CGA     Tandem w/ EC  20"x3 R/L   20"x2 R/L              Modalities                                Access Code: [email or web address]  URL: [email or web address]  Date: 06/08/2022  Prepared by:  Dori Robles    Exercises  · Hooklying Clamshell with Resistance - 1 x daily - 7 x weekly - 2 sets - 10 reps - 5 hold  · Supine Sciatic Nerve Glide - 1 x daily - 7 x weekly - 1 sets - 15 reps  · Hip Abduction with Resistance Loop - 1 x daily - 7 x weekly - 2 sets - 10 reps  · Standing Eccentric Heel Raise - 1 x daily - 7 x weekly - 1 sets - 10 reps

## 2022-06-14 NOTE — OCCUPATIONAL THERAPY NOTE
[facility] OT DC SUMMARY    Pt successfully compete intensive IPOT program and DC'd home at Mod I level w/ no further OT needs  Pt owns all required DME at this time and was provided w/ hand out for reacher and sock aid while at [facility]  Formal FT was not required as pt DC at Mod I level, but wife was frequently communicated w/ during pt stay and she verbalized agreement to plan  Pt initially required up to total assist w/ basic ADLs d/t spinal precautions, but achieved Mod I status  Pt DC home on 6/6/22      Casimer Reason, OTR/L

## 2022-06-16 ENCOUNTER — OFFICE VISIT (OUTPATIENT)
Dept: PHYSICAL THERAPY | Facility: CLINIC | Age: 67
End: 2022-06-16
Payer: MEDICARE

## 2022-06-16 DIAGNOSIS — Z98.1 S/P LUMBAR FUSION: Primary | ICD-10-CM

## 2022-06-16 PROCEDURE — 97112 NEUROMUSCULAR REEDUCATION: CPT

## 2022-06-16 PROCEDURE — 97110 THERAPEUTIC EXERCISES: CPT

## 2022-06-16 NOTE — PROGRESS NOTES
Daily Note     Today's date: 2022  Patient name: Moni Davis  : 1955  MRN: [id number]  Referring provider: Destinee Dooley MD  Dx:   Encounter Diagnosis     ICD-10-CM    1  S/P lumbar fusion  Z98 1        Start Time: 808  Stop Time: 850  Total time in clinic (min): 42 minutes    Subjective: Patient reports 2/10 pain at start of session, with soreness down B/L legs  Reports it feels different every day  Objective: See treatment diary below      Assessment: Tolerated treatment well and without increased soreness/pain reported during session  Improving ease of performing activities noted by patient  Patient demonstrated fatigue post treatment, exhibited good technique with therapeutic exercises and would benefit from continued PT  Tolerated progression of activities well during session, with patient requested to report back next session how he felt after  Noted to have a history of increased soreness after PT session  Plan: Continue per plan of care  Progress treatment as tolerated         Precautions: LSO brace all times OOB; no bending/twisting; 5# lifting limit    L1-S1 revision of segmental hardware, L5-S1 osteotomy, posterior spinal fusion L4-S1, pelvic instrumentation, Transforaminal lumbar interbody fusion L5S1, Cage L5S1, instrumentation L1-pelvis (N/A Spine Lumbar)      Past Medical History:   Diagnosis Date    Ambulates with cane     "long distance"    Arthritis     Back pain     Chronic pain disorder     Dental crowns present     Exercise involving walking     daily -short walks    History of hepatitis C     per pt "went away on its own" age 13"    History of transfusion     Hyperlipidemia     Hypertension     Leg pain     and foot pain    Limb alert care status     per pt NO IV's LEFT UPPER ARM due to old injury    Risk for falls     Spinal stenosis     Stroke ([facility] [address] )     per pt in -and no lasting problems    Wears glasses     readers     SOC: 2022  DOS: 5/24/2022  FOTO: 6/8/2022  POC Expiration: 8/31/2022  Daily Treatment Log:  Date 6/8/2022 6/9/2022 6/13/2022 6/16/22    Visit # 1 2 3 4    auth         Manual                        Ther Exer 15' 15'  20' 28'             B/L HR w/ uni lowering 1x10 R/L 1x10 R/L  1x10 R/L  1x10 R/L    hooklying clamshells grn 5"x10 grn 5" 2x10  grn 5" 2x10       Hip flexor stretch off edge  30"x3 R/L  30"x3 R/L   30"x3 R/L    clamshells   RTB 2x10 R/L  RTB 2x10 R/L    Reverse clamshells w/ Towel roll    x10 R/L    Stand hip abd grn TB thighs 1x10 R/L grn TB thighs 1x10 R/L       Side stepping    YTB shin 3 laps windowsill RTB 3 laps near rail, no UE    diagonal walking   YTB shin 3 laps windowsill  RTB 2 laps near rail, no UE            HEP Issued/reviewed       Ther Activ  15'  10'      Glut raise w/ bolster  3" 2x10  Bridges 2x10      Low mat STS  2x10  2x10  2x15                                    NMReed  15'  15' 10'    Sciatic NG w/ SOS 1x10 R/L 2x10 R/L  15x R/L  15x R/L    Alt taps to cone  1x10 R/L       FT w/ EC on foam  Stance on foam EC 30"x3 CS/CGA Stance on foam EC 30"x3 CS/CGA FT on foam EC 30"x2, stance EO w/ horiz HT 2x30"    Tandem w/ EC  20"x3 R/L   20"x2 R/L              Modalities                                Access Code: [email or web address]  URL: [email or web address]  Date: 06/08/2022  Prepared by:  Joelle Heading    Exercises  · Hooklying Clamshell with Resistance - 1 x daily - 7 x weekly - 2 sets - 10 reps - 5 hold  · Supine Sciatic Nerve Glide - 1 x daily - 7 x weekly - 1 sets - 15 reps  · Hip Abduction with Resistance Loop - 1 x daily - 7 x weekly - 2 sets - 10 reps  · Standing Eccentric Heel Raise - 1 x daily - 7 x weekly - 1 sets - 10 reps

## 2022-06-21 ENCOUNTER — OFFICE VISIT (OUTPATIENT)
Dept: PHYSICAL THERAPY | Facility: CLINIC | Age: 67
End: 2022-06-21
Payer: MEDICARE

## 2022-06-21 DIAGNOSIS — Z98.1 S/P LUMBAR FUSION: Primary | ICD-10-CM

## 2022-06-21 PROCEDURE — 97110 THERAPEUTIC EXERCISES: CPT | Performed by: PHYSICAL THERAPIST

## 2022-06-21 PROCEDURE — 97112 NEUROMUSCULAR REEDUCATION: CPT | Performed by: PHYSICAL THERAPIST

## 2022-06-21 NOTE — PROGRESS NOTES
Daily Note     Today's date: 2022  Patient name: Juan Carlos Willis  : 1955  MRN: [id number]  Referring provider: Jonel Chavez MD  Dx:   Encounter Diagnosis     ICD-10-CM    1  S/P lumbar fusion  Z98 1                   Subjective: pt reports constant L anterior thigh pain  He speculates a screw may be pushing on a disc (brings picture of Xray), noting this happened w/ a prior surgery  He states his L LE seems weaker still vs R LE, but his balance seems pretty good  Objective: See treatment diary below      Assessment: Tolerated treatment well and demonstrates limited ROM L hip flexor and L hip IR vs R  Pain noted w/ stretching L hip flexor relieved by placing lumbar roll under L thigh while stretch still felt    Patient would benefit from continued PT and attention to LE mobility and strength  Plan: Continue per plan of care        Precautions: LSO brace all times OOB; no bending/twisting; 5# lifting limit    L1-S1 revision of segmental hardware, L5-S1 osteotomy, posterior spinal fusion L4-S1, pelvic instrumentation, Transforaminal lumbar interbody fusion L5S1, Cage L5S1, instrumentation L1-pelvis (N/A Spine Lumbar)      Past Medical History:   Diagnosis Date    Ambulates with cane     "long distance"    Arthritis     Back pain     Chronic pain disorder     Dental crowns present     Exercise involving walking     daily -short walks    History of hepatitis C     per pt "went away on its own" age 13"    History of transfusion     Hyperlipidemia     Hypertension     Leg pain     and foot pain    Limb alert care status     per pt NO IV's LEFT UPPER ARM due to old injury    Risk for falls     Spinal stenosis     Stroke [facility])     per pt in -and no lasting problems    Wears glasses     readers     SOC: 2022  DOS: 2022  FOTO: 2022  POC Expiration: 2022  Daily Treatment Log:  Date 2022  FOTO   Visit # 1 2 3 4 5   [facility] Manual                        Ther Exer 15' 15'  20' 28' 30'            B/L HR w/ uni lowering 1x10 R/L 1x10 R/L  1x10 R/L  1x10 R/L B/L HR off step 2x10   hooklying clamshells grn 5"x10 grn 5" 2x10  grn 5" 2x10       Hip flexor stretch off edge  30"x3 R/L  30"x3 R/L   30"x3 R/L 30"x3 R/L (lumb roll under L thigh)   clamshells   RTB 2x10 R/L  RTB 2x10 R/L RTB 2x10 R/L   Reverse clamshells w/ Towel roll    x10 R/L 2x10 R/L   Stand hip abd grn TB thighs 1x10 R/L grn TB thighs 1x10 R/L    Stand hip ext RTB ankles 15x ea   Side stepping    YTB shin 3 laps windowsill RTB 3 laps near rail, no UE RTB 10'x3 R/L    diagonal walking   YTB shin 3 laps windowsill  RTB 2 laps near rail, no UE RTB 10'x6   Upright leg press     B/L 70# 2x10   HEP Issued/reviewed    updated   Ther Activ  15'  10'      Glut raise w/ bolster  3" 2x10  Bridges 2x10   Bridges 2x10   Low mat STS  2x10  2x10  2x15                                    NMReed  15'  15' 10' 10'   Sciatic NG w/ SOS 1x10 R/L 2x10 R/L  15x R/L  15x R/L 15x R/L   Alt taps to cone  1x10 R/L    Tandem walk cone taps; 5 cones 6x   FT w/ EC on foam  Stance on foam EC 30"x3 CS/CGA Stance on foam EC 30"x3 CS/CGA FT on foam EC 30"x2, stance EO w/ horiz HT 2x30"    Tandem w/ EC  20"x3 R/L   20"x2 R/L              Modalities                                Access Code: [email or web address]  URL: [email or web address]  Date: 06/21/2022  Prepared by:  Emory Ni    Exercises  · Hooklying Clamshell with Resistance - 1 x daily - 7 x weekly - 2 sets - 10 reps - 5 hold  · Supine Sciatic Nerve Glide - 1 x daily - 7 x weekly - 1 sets - 15 reps  · Supine Bridge - 1 x daily - 7 x weekly - 2 sets - 10 reps  · Modified Mika Stretch - 1 x daily - 7 x weekly - 3 reps - 30 hold  · Hip Abduction with Resistance Loop - 1 x daily - 7 x weekly - 2 sets - 10 reps  · Standing Eccentric Heel Raise - 1 x daily - 7 x weekly - 1 sets - 10 reps  · Hip Extension with Resistance Loop - 1 x daily - 7 x weekly - 2 sets - 10 reps

## 2022-06-22 ENCOUNTER — APPOINTMENT (OUTPATIENT)
Dept: PHYSICAL THERAPY | Facility: CLINIC | Age: 67
End: 2022-06-22
Payer: MEDICARE

## 2022-06-24 ENCOUNTER — OFFICE VISIT (OUTPATIENT)
Dept: PHYSICAL THERAPY | Facility: CLINIC | Age: 67
End: 2022-06-24
Payer: MEDICARE

## 2022-06-24 DIAGNOSIS — Z98.1 S/P LUMBAR FUSION: Primary | ICD-10-CM

## 2022-06-24 PROCEDURE — 97112 NEUROMUSCULAR REEDUCATION: CPT | Performed by: PHYSICAL THERAPIST

## 2022-06-24 PROCEDURE — 97110 THERAPEUTIC EXERCISES: CPT | Performed by: PHYSICAL THERAPIST

## 2022-06-24 NOTE — PHYSICAL THERAPY NOTE
[facility] PT Discharge Summary  Pt demonstrated good progress in PT from requiring min-CGA on eval using a RW to mod indep with AD at d/c including stairs negotiation and object retrieval  No DME needs identified at this admission for pt already has a walker  Pt was d/c on 6/6/22 to home with family support and outpatient PT services to address ongoing rehab needs and improve functional indep without an AD

## 2022-06-24 NOTE — PROGRESS NOTES
Daily Note     Today's date: 2022  Patient name: Vidal Bennett  : 1955  MRN: [id number]  Referring provider: Pennie Pandya MD  Dx:   Encounter Diagnosis     ICD-10-CM    1  S/P lumbar fusion  Z98 1                   Subjective: Pt reports he is doing "real good"  He still has 2-3/10 constant ache L anterior thigh which gets a little worse at night  Objective: See treatment diary below; reviewed and updated HEP      Assessment: Tolerated treatment well  Patient exhibited good technique with therapeutic exercises and demonstrates improving balance and tolerance to LE stretching w/ progressing strength  Plan: Continue per plan of care        Precautions: LSO brace all times OOB; no bending/twisting; 5# lifting limit    L1-S1 revision of segmental hardware, L5-S1 osteotomy, posterior spinal fusion L4-S1, pelvic instrumentation, Transforaminal lumbar interbody fusion L5S1, Cage L5S1, instrumentation L1-pelvis (N/A Spine Lumbar)      Past Medical History:   Diagnosis Date    Ambulates with cane     "long distance"    Arthritis     Back pain     Chronic pain disorder     Dental crowns present     Exercise involving walking     daily -short walks    History of hepatitis C     per pt "went away on its own" age 13"    History of transfusion     Hyperlipidemia     Hypertension     Leg pain     and foot pain    Limb alert care status     per pt NO IV's LEFT UPPER ARM due to old injury    Risk for falls     Spinal stenosis     Stroke [facility])     per pt in -and no lasting problems    Wears glasses     readers     SOC: 2022  DOS: 2022  FOTO: 2022  POC Expiration: 2022  Daily Treatment Log:  Date 2022       Visit # 6       auth         Manual                        Ther Exer 30'        FSU w/ knee hike 8" step 1x10 R/L       B/L HR off step 2x15       Hip flexor stretch off edge 1'X2 R/L lumbar roll under L thigh       clamshells RTB       Reverse clamshells w/ Towel roll        Stand hip ext RTB @ ankles 2x10 ea       diagonal walking RTB 20'x2 ea fwd/bkwd       Upright leg press B/L 70# 2x10  R/L 40# 1x10       Lateral lunge at rail 2x5 R/L       HEP Updated/reviewed       Ther Activ                                        NMReed 15'       Sciatic NG w/ SOS 15x R/L       Tandem walk Alt cone taps 5 cones; 6x       Bridge w/ hold 3" 2x10       Low mat STS w/ qset full upright stance 2x10 chair                                 Access Code: [email or web address]  URL: [email or web address]  Date: 06/24/2022  Prepared by:  Buzz Belch    Exercises  · Hooklying Clamshell with Resistance - 1 x daily - 7 x weekly - 2 sets - 10 reps - 5 hold  · Supine Sciatic Nerve Glide - 1 x daily - 7 x weekly - 1 sets - 15 reps  · Supine Bridge - 1 x daily - 7 x weekly - 2 sets - 10 reps - 5 hold  · Modified Mika Stretch - 1 x daily - 7 x weekly - 3 reps - 30 hold  · Hip Extension with Resistance Loop - 1 x daily - 7 x weekly - 2 sets - 10 reps  · Standing Bilateral Heel Raise on Step - 1 x daily - 7 x weekly - 2 sets - 15 reps  · Forward Monster Walks - 1 x daily - 7 x weekly - 3 sets - 10 reps

## 2022-06-27 ENCOUNTER — OFFICE VISIT (OUTPATIENT)
Dept: PHYSICAL THERAPY | Facility: CLINIC | Age: 67
End: 2022-06-27
Payer: MEDICARE

## 2022-06-27 DIAGNOSIS — Z98.1 S/P LUMBAR FUSION: Primary | ICD-10-CM

## 2022-06-27 PROCEDURE — 97112 NEUROMUSCULAR REEDUCATION: CPT

## 2022-06-27 PROCEDURE — 97110 THERAPEUTIC EXERCISES: CPT

## 2022-06-27 NOTE — PROGRESS NOTES
Daily Note     Today's date: 2022  Patient name: Leann Li  : 1955  MRN: [id number]  Referring provider: Esperanza Sam MD  Dx:   Encounter Diagnosis     ICD-10-CM    1  S/P lumbar fusion  Z98 1                   Subjective: pt reports overall doing well with all his exercises and after PT, other activities outside of therapy he feels makes him sore and he isnt able to do much without getting sore  Objective: See treatment diary below      Assessment: Tolerated treatment well  Pt fatigued post session, challenged with current program  Pt dem improved tolerance to LE stretching today  Cont with LE strengthening as tolerated  Patient would benefit from continued PT      Plan: Continue per plan of care        Precautions: LSO brace all times OOB; no bending/twisting; 5# lifting limit    L1-S1 revision of segmental hardware, L5-S1 osteotomy, posterior spinal fusion L4-S1, pelvic instrumentation, Transforaminal lumbar interbody fusion L5S1, Cage L5S1, instrumentation L1-pelvis (N/A Spine Lumbar)      Past Medical History:   Diagnosis Date    Ambulates with cane     "long distance"    Arthritis     Back pain     Chronic pain disorder     Dental crowns present     Exercise involving walking     daily -short walks    History of hepatitis C     per pt "went away on its own" age 13"    History of transfusion     Hyperlipidemia     Hypertension     Leg pain     and foot pain    Limb alert care status     per pt NO IV's LEFT UPPER ARM due to old injury    Risk for falls     Spinal stenosis     Stroke [facility])     per pt in -and no lasting problems    Wears glasses     readers     [facility]  DOS: 2022  FOTO: 2022  POC Expiration: 2022  Daily Treatment Log:  Date 2022      Visit # 6 7       auth         Manual                        Ther Exer 30'        FSU w/ knee hike 8" step 1x10 R/L 8" step 1x10 R/L       B/L HR off step 2x15 2x10       Hip flexor stretch off edge 1'X2 R/L lumbar roll under L thigh 1'x2 R/L lumbar roll under L thigh       clamshells RTB RTB 2x10 R/L      Reverse clamshells w/ Towel roll  YTB 2x10 R/L       Stand hip ext RTB @ ankles 2x10 ea       diagonal walking RTB 20'x2 ea fwd/bkwd       Upright leg press B/L 70# 2x10  R/L 40# 1x10 B/L 70# 2x15  R/L 40# 2x10       Lateral lunge at rail 2x5 R/L 2x5 R/L       HEP Updated/reviewed       Ther Activ                                        NMReed 15'       Sciatic NG w/ SOS 15x R/L 15x R/L       Tandem walk Alt cone taps 5 cones; 6x       Bridge w/ hold 3" 2x10 3" 2x10       Low mat STS w/ qset full upright stance 2x10 chair Low mat 2x10                                 Access Code: [email or web address]  URL: [email or web address]  Date: 06/24/2022  Prepared by:  Adal Simpson    Exercises  · Hooklying Clamshell with Resistance - 1 x daily - 7 x weekly - 2 sets - 10 reps - 5 hold  · Supine Sciatic Nerve Glide - 1 x daily - 7 x weekly - 1 sets - 15 reps  · Supine Bridge - 1 x daily - 7 x weekly - 2 sets - 10 reps - 5 hold  · Modified Mika Stretch - 1 x daily - 7 x weekly - 3 reps - 30 hold  · Hip Extension with Resistance Loop - 1 x daily - 7 x weekly - 2 sets - 10 reps  · Standing Bilateral Heel Raise on Step - 1 x daily - 7 x weekly - 2 sets - 15 reps  · Forward Monster Walks - 1 x daily - 7 x weekly - 3 sets - 10 reps

## 2022-06-29 ENCOUNTER — APPOINTMENT (OUTPATIENT)
Dept: LAB | Facility: CLINIC | Age: 67
End: 2022-06-29
Payer: MEDICARE

## 2022-06-29 ENCOUNTER — OFFICE VISIT (OUTPATIENT)
Dept: PHYSICAL THERAPY | Facility: CLINIC | Age: 67
End: 2022-06-29
Payer: MEDICARE

## 2022-06-29 DIAGNOSIS — D64.9 ANEMIA, UNSPECIFIED TYPE: ICD-10-CM

## 2022-06-29 DIAGNOSIS — Z98.1 S/P LUMBAR FUSION: Primary | ICD-10-CM

## 2022-06-29 LAB
ERYTHROCYTE [DISTWIDTH] IN BLOOD BY AUTOMATED COUNT: 13.3 % (ref 11.6–15.1)
HCT VFR BLD AUTO: 38.5 % (ref 36.5–49.3)
HGB BLD-MCNC: 11.8 G/DL (ref 12–17)
IRON SERPL-MCNC: 27 UG/DL (ref 65–175)
MCH RBC QN AUTO: 27 PG (ref 26.8–34.3)
MCHC RBC AUTO-ENTMCNC: 30.6 G/DL (ref 31.4–37.4)
MCV RBC AUTO: 88 FL (ref 82–98)
PLATELET # BLD AUTO: 405 THOUSANDS/UL (ref 149–390)
PMV BLD AUTO: 9.2 FL (ref 8.9–12.7)
RBC # BLD AUTO: 4.37 MILLION/UL (ref 3.88–5.62)
WBC # BLD AUTO: 7.26 THOUSAND/UL (ref 4.31–10.16)

## 2022-06-29 PROCEDURE — 97110 THERAPEUTIC EXERCISES: CPT

## 2022-06-29 PROCEDURE — 83540 ASSAY OF IRON: CPT

## 2022-06-29 PROCEDURE — 97112 NEUROMUSCULAR REEDUCATION: CPT

## 2022-06-29 PROCEDURE — 36415 COLL VENOUS BLD VENIPUNCTURE: CPT

## 2022-06-29 PROCEDURE — 85027 COMPLETE CBC AUTOMATED: CPT

## 2022-06-29 NOTE — PROGRESS NOTES
Daily Note     Today's date: 2022  Patient name: Kyung Davis  : 1955  MRN: [id number]  Referring provider: Indio Machado MD  Dx:   Encounter Diagnosis     ICD-10-CM    1  S/P lumbar fusion  Z98 1                   Subjective: pt has no new complaints on arrival, overall things are going well, has not been getting the same soreness as he once was with TE        Objective: See treatment diary below      Assessment: Tolerated treatment well  Pt challenged with dynamic balance activities, bridging progressions tolerated today  Cont to progress LE strengthening and balance as tolerated  Patient would benefit from continued PT      Plan: Continue per plan of care        Precautions: LSO brace all times OOB; no bending/twisting; 5# lifting limit    L1-S1 revision of segmental hardware, L5-S1 osteotomy, posterior spinal fusion L4-S1, pelvic instrumentation, Transforaminal lumbar interbody fusion L5S1, Cage L5S1, instrumentation L1-pelvis (N/A Spine Lumbar)      Past Medical History:   Diagnosis Date    Ambulates with cane     "long distance"    Arthritis     Back pain     Chronic pain disorder     Dental crowns present     Exercise involving walking     daily -short walks    History of hepatitis C     per pt "went away on its own" age 13"    History of transfusion     Hyperlipidemia     Hypertension     Leg pain     and foot pain    Limb alert care status     per pt NO IV's LEFT UPPER ARM due to old injury    Risk for falls     Spinal stenosis     Stroke [facility])     per pt in -and no lasting problems    Wears glasses     readers     [facility]  DOS: 2022  FOTO: 2022  POC Expiration: 2022  Daily Treatment Log:  Date 2022     Visit # 6 7  8      auth         Manual                        Ther Exer 30'  25'       FSU w/ knee hike 8" step 1x10 R/L 8" step 1x10 R/L       B/L HR off step 2x15 2x10  2x10      Hip flexor stretch off edge 1'X2 R/L lumbar roll under L thigh 1'x2 R/L lumbar roll under L thigh  1'x2 R/L lumbar roll under L thigh       clamshells RTB RTB 2x10 R/L RTB 2x10 R/L      Reverse clamshells w/ Towel roll  YTB 2x10 R/L       Stand hip abd    RTB @ ankles 2x10 ea     Stand hip ext RTB @ ankles 2x10 ea  RTB @ ankles 2x10 ea      diagonal walking RTB 20'x2 ea fwd/bkwd       Upright leg press B/L 70# 2x10  R/L 40# 1x10 B/L 70# 2x15  R/L 40# 2x10  B/L 70# 2x15  R/L 40# 2x10       Lateral lunge at rail 2x5 R/L 2x5 R/L  2x5 R/L      HEP Updated/reviewed       Ther Activ                                        NMReed 15'  20'      Sciatic NG w/ SOS 15x R/L 15x R/L  15x R/L      Tandem walk Alt cone taps 5 cones; 6x  6 cones 3 laps      Bridge w/ hold 3" 2x10 3" 2x10  W/ abd RTB 2x10      Low mat STS w/ qset full upright stance 2x10 chair Low mat 2x10  Low mat 2x10                                 Access Code: [email or web address]  URL: [email or web address]  Date: 06/24/2022  Prepared by:  Tatyana Thomas    Exercises  · Hooklying Clamshell with Resistance - 1 x daily - 7 x weekly - 2 sets - 10 reps - 5 hold  · Supine Sciatic Nerve Glide - 1 x daily - 7 x weekly - 1 sets - 15 reps  · Supine Bridge - 1 x daily - 7 x weekly - 2 sets - 10 reps - 5 hold  · Modified Mika Stretch - 1 x daily - 7 x weekly - 3 reps - 30 hold  · Hip Extension with Resistance Loop - 1 x daily - 7 x weekly - 2 sets - 10 reps  · Standing Bilateral Heel Raise on Step - 1 x daily - 7 x weekly - 2 sets - 15 reps  · Forward Monster Walks - 1 x daily - 7 x weekly - 3 sets - 10 reps

## 2022-07-05 ENCOUNTER — OFFICE VISIT (OUTPATIENT)
Dept: OBGYN CLINIC | Facility: CLINIC | Age: 67
End: 2022-07-05

## 2022-07-05 ENCOUNTER — APPOINTMENT (OUTPATIENT)
Dept: RADIOLOGY | Facility: CLINIC | Age: 67
End: 2022-07-05
Payer: MEDICARE

## 2022-07-05 VITALS
SYSTOLIC BLOOD PRESSURE: 133 MMHG | HEART RATE: 77 BPM | HEIGHT: 70 IN | DIASTOLIC BLOOD PRESSURE: 71 MMHG | BODY MASS INDEX: 29.49 KG/M2 | WEIGHT: 206 LBS

## 2022-07-05 DIAGNOSIS — M51.35 DJD (DEGENERATIVE JOINT DISEASE), THORACOLUMBAR: ICD-10-CM

## 2022-07-05 DIAGNOSIS — Z98.890 HISTORY OF LUMBAR SURGERY: Primary | ICD-10-CM

## 2022-07-05 DIAGNOSIS — Z98.890 HISTORY OF LUMBAR SURGERY: ICD-10-CM

## 2022-07-05 PROCEDURE — 99024 POSTOP FOLLOW-UP VISIT: CPT | Performed by: ORTHOPAEDIC SURGERY

## 2022-07-05 PROCEDURE — 72110 X-RAY EXAM L-2 SPINE 4/>VWS: CPT

## 2022-07-05 NOTE — PROGRESS NOTES
[id number] Daniel Walters MD  [address] [facility] [address]  [phone]    HISTORY OF PRESENT ILLNESS:    Ronal Rosario is a 73y o  year old  male who presents 6 weeks status post L1-S1 revision of segmental hardware, L5-S1 osteotomy, posterior spinal fusion L4-S1, transforaminal lumbar interbody fusion L5S1, Cage L5S1, instrumentation L1-pelvis performed on 5/24/22  Today he complains of mild low back pain  He notes resolution of leg pain and increase lumbar mobility following surgery  He reports today with his back brace and a cane for ambulation       ALLERGIES: [unfilled]    MEDICATIONS:    Current Outpatient Medications:     aspirin 325 mg tablet, Take 325 mg by mouth daily for 15 days starting on 6/7/22 through 6/21/22, Disp: , Rfl:     lisinopril (ZESTRIL) 5 mg tablet, Take 5 mg by mouth daily, Disp: , Rfl:     lovastatin (MEVACOR) 10 MG tablet, Take 10 mg by mouth every evening, Disp: , Rfl:     TRAMADOL HCL PO, Resume use on an as needed basis for pain as you were prior to hospitalization, Disp: , Rfl:      PAST MEDICAL HISTORY:   Past Medical History:   Diagnosis Date    Ambulates with cane     "long distance"    Arthritis     Back pain     Chronic pain disorder     Dental crowns present     Exercise involving walking     daily -short walks    History of hepatitis C     per pt "went away on its own" age 13"    History of transfusion     Hyperlipidemia     Hypertension     Leg pain     and foot pain    Limb alert care status     per pt NO IV's LEFT UPPER ARM due to old injury    Risk for falls     Spinal stenosis     St[facility] ([facility] [address] )     per pt in 1996-and no lasting problems    Wears glasses     readers       PAST SURGICAL HISTORY:  Past Surgical History:   Procedure Laterality Date    BACK SURGERY      with implants    COLONOSCOPY      LUMBAR FUSION N/A 5/24/2022    Procedure: L1-S1 revision of segmental hardware, L5-S1 osteotomy, posterior spinal fusion L4-S1, pelvic instrumentation, Transforaminal lumbar interbody fusion L5S1, Cage L5S1, instrumentation L1-pelvis; Surgeon: Gurwinder Grey MD;  Location: [facility];  Service: Orthopedics    WISDOM TOOTH EXTRACTION         SOCIAL HISTORY:  Social History         ROS:  Review of Systems   Constitutional: Negative for appetite change and unexpected weight change  HENT: Negative for congestion and trouble swallowing  Eyes: Negative for visual disturbance  Respiratory: Negative for cough and shortness of breath  Cardiovascular: Negative for chest pain and palpitations  Gastrointestinal: Negative for nausea and vomiting  Endocrine: Negative for cold intolerance and heat intolerance  Musculoskeletal: Negative for joint swelling and myalgias  Skin: Negative for rash  Neurological: Negative for numbness  PHYSICAL EXAM:  Incision looks appropriate and healing well  No signs of infection  Statial imbalance  Normal gait, uses cane for ambulating  Unsteady on toes and heals  RADIOGRAPHIC STUDIES:  1  X-rays, scoli films, 12/13/2021:  Prior multilevel lumbar fusion with unknown instrumentation   Significant sagittal coronal balance   Adjacent segment kyphosis      2  MRI, lumbar spine, 12/21/2021:  Multilevel moderate to severe thoracic degenerative disc   Prior instrumentation from L1 to S1   Kyphosis at the thoracolumbar junction   No spinal cord compression or myelomalacia   Malposition right L1 screw      3   MRI, thoracic spine, 12/21/2021:  1 to S1 posterior instrumentation with kyphotic deformity at the upper lumbar spine   Flat back deformity   No significant central lateral recess stenosis   Malposition right L1 screw      4  Xrays, lumbar spine, 3/15/2022:  Status post L1-S1 posterior spinal fusion instrumentation   There was evidence of solid fusion from L2-S1   Screw cut-out is present at L1  Kenny Barry is also evidence of adjacent segment kyphosis and flat back deformity      5  CT lumbar and thoracic spine 3/18/2022: significant kyphosis of the lumbar and thoracic spine  Robust bony fusion of L1-L5, hardware intact  Screw penetration of the right lumbar spinal canal at L2 and L1 right pedicle screw cut out into the disk space  Failure of fusion of L5-S1      6  X-ray lumbar spine 4/18/2022:  intact hardware with lumbar and thoracic kyphosis      7  X-rays, Lumbar spine 6/6/22:  Stable fusion L1-Pelvis with no evidence of hardware failure  Interbody implant is in appropriate position  There is evidence of correction of sagittal deformity or proximally 10°  8  X-rays, lumbar spine, 07/05/2022:  Status post prior fusion from L1-S1  Pelvic instrumentation is in appropriate position  There is also hardware complication  There is no change in alignment of the spine when compared to prior films from 06/06/2022  ASSESSMENT:  Assessment   1  History of lumbar surgery  -     XR spine lumbar minimum 4 views non injury; Future; Expected date: 06/06/2022     2  DJD (degenerative joint disease), thoracolumbar     3  Scoliosis, unspecified scoliosis type, unspecified spinal region      PLAN:    72 yo male 6 weeks s/p L1-S1 revision of segmental hardware, L5-S1 osteotomy, posterior spinal fusion L4-S1, pelvic instrumentation, transforaminal lumbar interbody fusion L5-S1, Cage L5-S1 instrumentation L1-Pelvis done 5/24/2022  The patients incision is healing well  The patient is ambulating with cane appropriately  Follow up in 6 weeks  Continue with back brace    I am planning to see him back in approximately 6 weeks for 3 months follow-up  At that time scoliosis films will be obtained and surgical planning will be initiated  He does need correction of his thoracolumbar deformity which will be discussed at that time         Scribe Attestation    I,:  James Rolno am acting as a scribe while in the presence of the attending physician :       I,:  Mireya Biggs MD personally performed the services described in this documentation    as scribed in my presence :

## 2022-07-05 NOTE — LETTER
July 5, 2022     Patient: Mina Wilkinson  YOB: 1955  Date of Visit: 7/5/2022      To Whom it May Concern:    Frankey Lout is under my professional care  Kailash Sommers was seen in my office on 7/5/2022  Kailashluis [place]ock  May not return to work at this time  If you have any questions or concerns, please don't hesitate to call           Sincerely,          Chente Damico MD        CC: No Recipients

## 2022-07-06 ENCOUNTER — OFFICE VISIT (OUTPATIENT)
Dept: PHYSICAL THERAPY | Facility: CLINIC | Age: 67
End: 2022-07-06
Payer: MEDICARE

## 2022-07-06 DIAGNOSIS — Z98.1 S/P LUMBAR FUSION: Primary | ICD-10-CM

## 2022-07-06 PROCEDURE — 97110 THERAPEUTIC EXERCISES: CPT | Performed by: PHYSICAL THERAPIST

## 2022-07-06 PROCEDURE — 97112 NEUROMUSCULAR REEDUCATION: CPT | Performed by: PHYSICAL THERAPIST

## 2022-07-06 NOTE — PROGRESS NOTES
PT Evaluation     Today's date: 2022  Patient name: Sushant Jean  : 1955  MRN: [id number]  Referring provider: Angela Hoffman MD  Dx:   Encounter Diagnosis     ICD-10-CM    1  S/P lumbar fusion  Z98 1                   Assessment  Assessment details: 2022: Pt has made strong gains in ROM and LE strength with improved function and less pain  He still presents w/ some hip flexor and hip IR ROM restrictions  He no longer uses AD for ambulation  He is aware continued stretching of these will help to further improve his standing posture (he is much less flexed at his hips now in standing)  Pt did follow up w/ Dr Suzanne Hill yesterday, no further PT was recommended at this time  He is planning another surgery in about a month  Pt will D/C today to updated HEP     2022: Sushant Jean is a 73 y o  male who presents s/p revision of lumbar fusion with pain, decreased strength, decreased ROM, decreased joint mobility, ambulatory dysfunction, postural dysfunction and balance dysfunction  Due to these impairments, patient has difficulty performing ADL's, recreational activities, engaging in social activities, ambulation, stair negotiation, lifting/carrying, transfers, reaching  Patient's clinical presentation is consistent with their referring diagnosis of S/P lumbar fusion  (primary encounter diagnosis)  He is compliant w/ physician's post-op instructions and reports decreasing leg pain post-op  He will benefit from skilled physical therapy to improve LE flexibility/strength and core strength as able within precautions, improve his balance and endurance with hopes of ambulation w/ SPC or no AD  Patient has been educated in post-op contraindications / precautions, home exercise program and plan of care   Patient would benefit from skilled physical therapy services to address their aforementioned functional limitations and progress towards prior level of function and independence with home exercise program and self symptom management/resolution  Impairments: abnormal or restricted ROM and poor posture   Understanding of Dx/Px/POC: good   Prognosis: good    Goals  Short Term Goals: Target Date 7/6/2022 -   1  Initiate and advance HEP toward self symptom reduction/resolution  - met  2  Pt to D/C RW for ambulation and progress to [facility] indep  - met  3  Improve PROM B/L hip IR in prone to 15 deg or more to reduce stress on low back and improve ease in LE dressing/bathing  - Met R/not met L  4  Pt to report pain to be intermittent 0-5/10 vs current constant  - met  5  Pt to be able to transfer sit to stand w/o UE assist  - met      Long Term Goals: Target Date 8/31/2022  1  Indep with HEP and self symptom prevention  - met  2  Achieve FOTO score of 57 or higher to allow pt to lift and carry groceries 10# or less  - met  3  Improve LE/core strength to 4+/5 to 5/5 t/o B/L LE's to allow recip stairs w/ rail  - met  4  Pt to ambulate indoors w/o AD and outdoors w/ SPC  - met  5  Improve LE flexibility hams B/L and quads/hip flexors to min ruby or better to allow pt to stand up erect and to reduce stress on low back, assisting to achieve pain level of 0-3/10  - met pain, still hip IR/hip flexor ROM restrictions (improved)    Plan  Planned therapy interventions: home exercise program  Other planned therapy interventions: mechanical assessment  Plan of Care beginning date: 6/8/2022  Plan of Care expiration date: 8/31/2022  Treatment plan discussed with: patient        Subjective Evaluation    History of Present Illness  Mechanism of injury: Subjective   7/6/2022: Pt reports he saw his surgeon yesterday  His second surgery will be in a month  Pt states he is ready to D/C PT for now  He reports he has met PLOF  He reports the pain in his L anterior thigh is improving, but still constant 2/10  He is hopeful the next surgery will improve this further       6/8/2022: pt reports years of wear and tear of his back due to physical job and recreation lead to him needing fusion about 12 years ago  He did continue to wear a back brace after the fusion, and used a cane for longer walks  The surgery was a success, but had worn out  He reports symptoms of B/L leg pain throughout both legs/feet began to worsen a few years ago  This pain became constant 3-8/10  He needed to use the [facility] for most ambulation  Post-operatively he reports lessening B/L LE pain, especially distally  He reports the worst pain he has now is in his thighs and low back, less in his lower legs/feet now  He walks w/ a RW and wears his back brace when he is OOB  He takes Tramadol once/day, sometimes twice  He has minor numbness in L toes  Pain  At best pain ratin  At worst pain rating: 3  Quality: dull ache  Relieving factors: medications  Aggravating factors: lifting  Progression: resolved    Social Support  Steps to enter house: no  Stairs in house: yes (w/ rail)     Employment status: not working  Life stress: pt has resumed driving      Diagnostic Tests  X-ray: abnormal  Treatments  Previous treatment: immobilization and medication  Current treatment: physical therapy  Patient Goals  Patient goals for therapy: increased strength, decreased pain and independence with ADLs/IADLs  Patient goal: walk w/o AD short dist; SPC for long distances        Objective    Posture: Lumbar lordosis is absent in standing  There is left lateral shift and flexed hips L>R  Pt wears LSO brace at all times when OOB      SPECIAL TESTS/ROM:     2022     Right  Left  Right  left  SLR: (negative)  80  80  65  65  Crossed SLR:   (-)  (-)  (-)  (-)  Prone instab test:   NT    (NT)  Prone hip IR ROM:  15  0  10  -15  Aberrant/Srikanth sign:   NT    (NT)  Supine to sit test:  NT    (NT)  Toomsuba test prone:   NT    (NT)  Slump test:    (NT)  Femoral NTT:   (-)  (-)  (-)  (-)  Hip ext  PROM: -10  0  -20  (-10)  Hip flex AROM sup  110  110  108  80  Prone knee flexion 110  110  90  80    FLEXIBILITY:  2022 -  Mod ruby B/L hip flexors; mod ruby L hip IR vs min ruby R IR; hams WNL  2022 - shaina ruby L>R hip flexors/quads; shaina ruby L hip IR vs R mod ruby; mod ruby B/L hams  DERMATOMAL TESTIN2022 - L1-L5 Myotomal testing WNL B/L to pinprick      MYOTOMAL TESTING:     Right  Left  Right  Left     2022  L2-3 Hip flexion:    5/5  4+/5  4/5  L3-4 Knee extension:    5/  5/5  4+/5  L5 Great toe exten:   /  5/5  4-/5  L4 Heel walk/DF:  /  5/5  4+/5  S1 eversion:   /  5/5  4/5  S1 PF uni HR    4+/5  4+/5  4-/5 (unable, but can eccent lower)  S2 knee flex:     5/5  5/5  4/5  Hip abd  5/5  5/5  NT  NT  Hip exten  4+/5  4+/5  NT  NT    REFLEXES: 0= none; 1+ = slight; 2+ = brisk/normal; 3+= very brisk; 4+= clonus  2022  L3-4 Quadriceps: 2+ B/L  L5-S1 Achilles: 2+ B/L    SPINAL/PIAVM ASSESSMENT:   2022 -not tested - post op fusion    LUMBAR AROM: 2022  Flexion   NT  Extension  NT  R sideglide  NT  L sideglide  NT    BALANCE:  2022  SLS EO R:  30 sec+ 30 SEC+  SLS EO L:   30 sec+ 30 SEC+  TANDEM EC R POST: 10sec  10 SEC  TANDEM EC L POST:  10 sec 10 SEC  SLS EC R:   2 sec  Unable  SLD EC L:  2 sec  unable    FUNCTION:  2022 - Pt reports return to PLOF   2022 - Pt is limited with balance, transfers, lifting, carrying  He is to wear his LSO brace at all times OOB; no lifing >5#; mod difficulty w/ bathing/LE dressing; needs UE assist xfer sit to stand       Precautions: LSO brace all times OOB; no bending/twisting; 5# lifting limit    L1-S1 revision of segmental hardware, L5-S1 osteotomy, posterior spinal fusion L4-S1, pelvic instrumentation, Transforaminal lumbar interbody fusion L5S1, Cage L5S1, instrumentation L1-pelvis (N/A Spine Lumbar)      Past Medical History:   Diagnosis Date    Ambulates with cane     "long distance"    Arthritis     Back pain     Chronic pain disorder     Dental crowns present     Exercise involving walking     daily -short walks    History of hepatitis C     per pt "went away on its own" age 13"    History of transfusion     Hyperlipidemia     Hypertension     Leg pain     and foot pain    Limb alert care status     per pt NO IV's LEFT UPPER ARM due to old injury    Risk for falls     Spinal stenosis     Stroke [facility])     per pt in 1996-and no lasting problems    Wears glasses     readers     [facility]: 6/8/2022  DOS: 5/24/2022  FOTO: 6/8/2022  POC Expiration: 8/31/2022  Daily Treatment Log:  Date 6/24/2022 6/27/2022 6/29/2022 7/6/2022    Visit # 6 7  8  9    auth         Manual                        Ther Exer 30'  25'  30'     FSU w/ knee hike 8" step 1x10 R/L 8" step 1x10 R/L   1x10 R/L    B/L HR off step 2x15 2x10  2x10  2x10    Hip flexor stretch off edge 1'X2 R/L lumbar roll under L thigh 1'x2 R/L lumbar roll under L thigh  1'x2 R/L lumbar roll under L thigh   1'x2 R/L towel roll under thigh    clamshells RTB RTB 2x10 R/L RTB 2x10 R/L  RTB 2x10 R/L    Reverse clamshells w/ Towel roll  YTB 2x10 R/L   2x10 R/L no TB    Stand hip abd    RTB @ ankles 2x10 ea RTB @ ankles 2x10 ea    Stand hip ext RTB @ ankles 2x10 ea  RTB @ ankles 2x10 ea  RTB @ ankles 2x10 ea    diagonal walking RTB 20'x2 ea fwd/bkwd       Upright leg press B/L 70# 2x10  R/L 40# 1x10 B/L 70# 2x15  R/L 40# 2x10  B/L 70# 2x15  R/L 40# 2x10       Lateral lunge at rail 2x5 R/L 2x5 R/L  2x5 R/L  2x5 R/L    HEP Updated/reviewed   Updated/reviewed    Ther Activ                                        NMReed 15'  20'  15'    Sciatic NG w/ SOS 15x R/L 15x R/L  15x R/L  15x R/L    Tandem walk Alt cone taps 5 cones; 6x  6 cones 3 laps      Bridge w/ hold 3" 2x10 3" 2x10  W/ abd RTB 2x10  grn 3" 2x10    Low mat STS w/ qset full upright stance 2x10 chair Low mat 2x10  Low mat 2x10       Uni HR w/ rail    2x5 r/L                      Access Code: [email or web address]  URL: [email or web address]  Date: 07/06/2022  Prepared by:  Darci Johnson    Exercises  · Supine Sciatic Nerve Glide - 1 x daily - 7 x weekly - 1 sets - 15 reps  · Supine Bridge with Resistance Band - 1 x daily - 7 x weekly - 2 sets - 10 reps - 3 hold  · Modified Mika Stretch - 1 x daily - 7 x weekly - 3 reps - 30 hold  · Sidelying Reverse Clamshell - 1 x daily - 7 x weekly - 2 sets - 10 reps  · Clam with Resistance - 1 x daily - 7 x weekly - 2 sets - 10 reps  · Hip Extension with Resistance Loop - 1 x daily - 7 x weekly - 2 sets - 10 reps  · Standing Bilateral Heel Raise on Step - 1 x daily - 7 x weekly - 2 sets - 15 reps  · Standing Hip Abduction with Resistance at Ankles and Counter Support - 1 x daily - 7 x weekly - 2 sets - 10 reps  · Hip Extension with Resistance Loop - 1 x daily - 7 x weekly - 2 sets - 10 reps  · Single leg Heel raise w/ rail 2x5 R/L

## 2022-07-08 ENCOUNTER — APPOINTMENT (OUTPATIENT)
Dept: PHYSICAL THERAPY | Facility: CLINIC | Age: 67
End: 2022-07-08
Payer: MEDICARE

## 2022-07-12 ENCOUNTER — APPOINTMENT (OUTPATIENT)
Dept: PHYSICAL THERAPY | Facility: CLINIC | Age: 67
End: 2022-07-12
Payer: MEDICARE

## 2022-09-12 ENCOUNTER — OFFICE VISIT (OUTPATIENT)
Dept: OBGYN CLINIC | Facility: CLINIC | Age: 67
End: 2022-09-12
Payer: MEDICARE

## 2022-09-12 ENCOUNTER — HOSPITAL ENCOUNTER (OUTPATIENT)
Dept: RADIOLOGY | Facility: HOSPITAL | Age: 67
Discharge: HOME | End: 2022-09-12
Payer: MEDICARE

## 2022-09-12 VITALS
HEIGHT: 70 IN | WEIGHT: 218 LBS | BODY MASS INDEX: 31.21 KG/M2 | SYSTOLIC BLOOD PRESSURE: 158 MMHG | HEART RATE: 73 BPM | DIASTOLIC BLOOD PRESSURE: 92 MMHG

## 2022-09-12 DIAGNOSIS — M41.9 SCOLIOSIS, UNSPECIFIED SCOLIOSIS TYPE, UNSPECIFIED SPINAL REGION: ICD-10-CM

## 2022-09-12 DIAGNOSIS — Z98.890 HISTORY OF LUMBAR SURGERY: ICD-10-CM

## 2022-09-12 DIAGNOSIS — M41.9 SCOLIOSIS, UNSPECIFIED SCOLIOSIS TYPE, UNSPECIFIED SPINAL REGION: Primary | ICD-10-CM

## 2022-09-12 DIAGNOSIS — M40.30 FLATBACK SYNDROME: ICD-10-CM

## 2022-09-12 DIAGNOSIS — M51.35 DJD (DEGENERATIVE JOINT DISEASE), THORACOLUMBAR: ICD-10-CM

## 2022-09-12 PROCEDURE — 72082 X-RAY EXAM ENTIRE SPI 2/3 VW: CPT

## 2022-09-12 PROCEDURE — 99214 OFFICE O/P EST MOD 30 MIN: CPT | Performed by: ORTHOPAEDIC SURGERY

## 2022-09-12 NOTE — PROGRESS NOTES
[id number] Daniel Walters MD  [address] [facility] [address]  [phone]    HISTORY OF PRESENT ILLNESS:  Joelle Powers is a 73y o  year old  male who presents 6 weeks status post L1-S1 revision of segmental hardware, L5-S1 osteotomy, posterior spinal fusion L4-S1, transforaminal lumbar interbody fusion L5S1, Cage L5S1, instrumentation L1-pelvis performed on 5/24/22  Today he complains of mild low back pain   He notes resolution of leg pain and increase lumbar mobility following surgery   He reports today with his back brace and a cane for ambulation  He is 4 months out from the lumbar procedure  He is interested in getting the rest of the back taking care  He has been using a brace and uses 1 tramadol in the morning      ALLERGIES: No Known Allergies    MEDICATIONS:    Current Outpatient Medications:     lisinopril (ZESTRIL) 5 mg tablet, Take 5 mg by mouth daily, Disp: , Rfl:     lovastatin (MEVACOR) 10 MG tablet, Take 10 mg by mouth every evening, Disp: , Rfl:     TRAMADOL HCL PO, Resume use on an as needed basis for pain as you were prior to hospitalization, Disp: , Rfl:     aspirin 325 mg tablet, Take 325 mg by mouth daily for 15 days starting on 6/7/22 through 6/21/22, Disp: , Rfl:      PAST MEDICAL HISTORY:   Past Medical History:   Diagnosis Date    Ambulates with cane     "long distance"    Arthritis     Back pain     Chronic pain disorder     Dental crowns present     Exercise involving walking     daily -short walks    History of hepatitis C     per pt "went away on its own" age 13"    History of transfusion     Hyperlipidemia     Hypertension     Leg pain     and foot pain    Limb alert care status     per pt NO IV's LEFT UPPER ARM due to old injury    Risk for falls     Spinal stenosis     Stroke ([facility] [address] )     per pt in 1996-and no lasting problems    Wears glasses     readers       PAST SURGICAL HISTORY:  Past Surgical History:   Procedure Laterality Date    BACK SURGERY      with implants    COLONOSCOPY      LUMBAR FUSION N/A 5/24/2022    Procedure: L1-S1 revision of segmental hardware, L5-S1 osteotomy, posterior spinal fusion L4-S1, pelvic instrumentation, Transforaminal lumbar interbody fusion L5S1, Cage L5S1, instrumentation L1-pelvis; Surgeon: Rosangela Ramos MD;  Location: [facility] OR;  Service: Orthopedics    WISDOM TOOTH EXTRACTION         SOCIAL HISTORY:  Social History     Tobacco Use   Smoking Status Former Smoker   Smokeless Tobacco Never Used          PHYSICAL EXAM:  61-year-old gentleman, stands leaning forward  There is evidence of kyphosis at the thoracolumbar junction  His incisions healed  His neurologic exam is within normal limits and unchanged compared to last visit  RADIOGRAPHIC STUDIES:  1  X-rays, scoli films, 12/13/2021:  Prior multilevel lumbar fusion with unknown instrumentation   Significant sagittal coronal balance   Adjacent segment kyphosis      2  MRI, lumbar spine, 12/21/2021:  Multilevel moderate to severe thoracic degenerative disc   Prior instrumentation from L1 to S1   Kyphosis at the thoracolumbar junction   No spinal cord compression or myelomalacia   Malposition right L1 screw  3  MRI, thoracic spine, 12/21/2021:  1 to S1 posterior instrumentation with kyphotic deformity at the upper lumbar spine   Flat back deformity   No significant central lateral recess stenosis   Malposition right L1 screw      4  Xrays, lumbar spine, 3/15/2022:  Status post L1-S1 posterior spinal fusion instrumentation   There was evidence of solid fusion from L2-S1   Screw cut-out is present at L1  [place] is also evidence of adjacent segment kyphosis and flat back deformity      5  CT lumbar and thoracic spine 3/18/2022: significant kyphosis of the lumbar and thoracic spine  Robust bony fusion of L1-L5, hardware intact   Screw penetration of the right lumbar spinal canal at L2 and L1 right pedicle screw cut out into the disk space  Failure of fusion of L5-S1      6  X-ray lumbar spine 4/18/2022:  intact hardware with lumbar and thoracic kyphosis      7  X-rays, Lumbar spine 6/6/22:  Stable fusion L1-Pelvis with no evidence of hardware failure   Interbody implant is in appropriate position  Candelaria Flax is evidence of correction of sagittal deformity or proximally 10°       8  X-rays, lumbar spine, 07/05/2022:  Status post prior fusion from L1-S1  Pelvic instrumentation is in appropriate position  There is also hardware complication  There is no change in alignment of the spine when compared to prior films from 06/06/2022  9  X-rays, scoliosis films, 09/12/2022:  Status post L1-S1 revision instrumentation and fusion with pelvic screw instrumentation  No evidence of hardware complication  Alignment of the lumbar spine is maintained  There is significant kyphosis at the thoracolumbar junction  ASSESSMENT:  1  Scoliosis, unspecified scoliosis type, unspecified spinal region  -     XR entire spine (scoliosis) 2-3 vw; Future; Expected date: 09/12/2022    2  Flatback syndrome    3  History of lumbar surgery    4  DJD (degenerative joint disease), thoracolumbar        PLAN:    30-year-old gentleman status post revision lumbar fusion and instrumentation  The staged procedure has been discussed with him in detail  The 1st stage is complete  He is interested in pursuing the 2nd stage which will be extension of fusion to the upper thoracic spine  Risk and benefits surgery was briefly discussed with him  We will need to get him in the operating room as soon as he is ready  I plan is to get a schedule in for a 6 months time point  I am going to see him back in about 4 weeks to see how is doing  At that time will discuss the correction of the upper back  Did review the radiographic studies including updated x-rays and compared to prior films  I scoliosis films were obtained today  There is no need for additional scoliosis film  At time of next visit, x-rays of the lumbosacral spine will be necessary  I do need to make sure that the L5-S1 fused  I would suggest the AP and lateral view of the L5-S1 level  Follow-up in 4 weeks       Scribe Attestation    I,:   am acting as a scribe while in the presence of the attending physician :       I,:   personally performed the services described in this documentation    as scribed in my presence :

## 2022-10-10 ENCOUNTER — OFFICE VISIT (OUTPATIENT)
Dept: OBGYN CLINIC | Facility: CLINIC | Age: 67
End: 2022-10-10
Payer: MEDICARE

## 2022-10-10 ENCOUNTER — APPOINTMENT (OUTPATIENT)
Dept: RADIOLOGY | Facility: CLINIC | Age: 67
End: 2022-10-10
Payer: MEDICARE

## 2022-10-10 VITALS
SYSTOLIC BLOOD PRESSURE: 158 MMHG | HEIGHT: 70 IN | BODY MASS INDEX: 30.78 KG/M2 | DIASTOLIC BLOOD PRESSURE: 93 MMHG | HEART RATE: 77 BPM | WEIGHT: 215 LBS

## 2022-10-10 DIAGNOSIS — M40.30 FLATBACK SYNDROME: ICD-10-CM

## 2022-10-10 DIAGNOSIS — M41.9 SCOLIOSIS, UNSPECIFIED SCOLIOSIS TYPE, UNSPECIFIED SPINAL REGION: Primary | ICD-10-CM

## 2022-10-10 DIAGNOSIS — M41.9 SCOLIOSIS, UNSPECIFIED SCOLIOSIS TYPE, UNSPECIFIED SPINAL REGION: ICD-10-CM
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
## 2022-10-10 PROCEDURE — 72100 X-RAY EXAM L-S SPINE 2/3 VWS: CPT

## 2022-10-10 PROCEDURE — 99213 OFFICE O/P EST LOW 20 MIN: CPT | Performed by: ORTHOPAEDIC SURGERY

## 2022-10-10 RX ORDER — CHLORHEXIDINE GLUCONATE 0.12 MG/ML
15 RINSE ORAL ONCE
OUTPATIENT
Start: 2022-10-10 | End: 2022-10-10

## 2022-10-10 NOTE — PROGRESS NOTES
[facility]'S ORTHOPEDIC SPINE SURGERY  Prabhjot Andrew MD  [address] [place] [id number]  [phone]    HISTORY OF PRESENT ILLNESS:    Patient is a 60-year-old male approximately 4 5 months status post L1-S1 revision of segmental hardware, L5-S1 osteotomy, posterior spinal fusion L4-S1, transforaminal lumbar interbody fusion L5S1, Cage L5S1, instrumentation L1-pelvis performed on 5/24/22  Patient states he is interested in taking care of the rest of his back  Patient denies any significant low back pain  Patient reports having pain in his upper back       ALLERGIES: No Known Allergies    MEDICATIONS:    Current Outpatient Medications:   •  lisinopril (ZESTRIL) 5 mg tablet, Take 5 mg by mouth daily, Disp: , Rfl:   •  lovastatin (MEVACOR) 10 MG tablet, Take 10 mg by mouth every evening, Disp: , Rfl:   •  TRAMADOL HCL PO, Resume use on an as needed basis for pain as you were prior to hospitalization, Disp: , Rfl:   •  aspirin 325 mg tablet, Take 325 mg by mouth daily for 15 days starting on 6/7/22 through 6/21/22, Disp: , Rfl:      PAST MEDICAL HISTORY:   Past Medical History:   Diagnosis Date   • Ambulates with cane     "long distance"   • Arthritis    • Back pain    • Chronic pain disorder    • Dental crowns present    • Exercise involving walking     daily -short walks   • History of hepatitis C     per pt "went away on its own" age 13"   • History of transfusion    • Hyperlipidemia    • Hypertension    • Leg pain     and foot pain   • Limb alert care status     per pt NO IV's LEFT UPPER ARM due to old injury   • Risk for falls    • Spinal stenosis    • Stroke ([facility] [address] )     per pt in 1996-and no lasting problems   • Wears glasses     readers       PAST SURGICAL HISTORY:  Past Surgical History:   Procedure Laterality Date   • BACK SURGERY      with implants   • COLONOSCOPY     • LUMBAR FUSION N/A 5/24/2022    Procedure: L1-S1 revision of segmental hardware, L5-S1 osteotomy, posterior spinal fusion L4-S1, pelvic instrumentation, Transforaminal lumbar interbody fusion L5S1, Cage L5S1, instrumentation L1-pelvis; Surgeon: Valarie Beth MD;  Location: [facility];  Service: Orthopedics   • WISDOM TOOTH EXTRACTION         SOCIAL HISTORY:  Social History     Tobacco Use   • Smoking status: Former Smoker   • Smokeless tobacco: Never Used   Vaping Use   • Vaping Use: Never used   Substance Use Topics   • Alcohol use: Yes     Alcohol/week: 3 0 standard drinks     Types: 3 Cans of beer per week     Comment: drinks once a month   • Drug use: No       ROS:  Review of Systems   Constitutional: Negative for chills, fatigue and fever  HENT: Negative for drooling, ear discharge, facial swelling, hearing loss, nosebleeds, rhinorrhea, sneezing and trouble swallowing  Eyes: Negative for pain, discharge, redness and itching  Respiratory: Negative for cough, choking, shortness of breath and wheezing  Cardiovascular: Negative for chest pain and palpitations  Gastrointestinal: Negative for abdominal pain, constipation and diarrhea  Endocrine: Negative for cold intolerance and heat intolerance  Genitourinary: Negative for dysuria and flank pain  Musculoskeletal:        See HPI and PE  Skin: Negative for rash  Neurological: Negative for dizziness, light-headedness and headaches  Psychiatric/Behavioral: Negative for agitation, self-injury and suicidal ideas  PHYSICAL EXAM:  Patient is a 41-year-old male sitting comfortably on exam chair in no acute distress  Stands and ambulates leaning forward  Incision is well healed without signs of acute infection  RADIOGRAPHIC STUDIES:  1  X-rays, scoli films, 12/13/2021:  Prior multilevel lumbar fusion with unknown instrumentation   Significant sagittal coronal balance   Adjacent segment kyphosis      2   MRI, lumbar spine, 12/21/2021:  Multilevel moderate to severe thoracic degenerative disc   Prior instrumentation from L1 to S1   Kyphosis at the thoracolumbar junction   No spinal cord compression or myelomalacia   Malposition right L1 screw      3  MRI, thoracic spine, 12/21/2021:  1 to S1 posterior instrumentation with kyphotic deformity at the upper lumbar spine   Flat back deformity   No significant central lateral recess stenosis   Malposition right L1 screw      4  Xrays, lumbar spine, 3/15/2022:  Status post L1-S1 posterior spinal fusion instrumentation   There was evidence of solid fusion from L2-S1   Screw cut-out is present at L1  Sanders Gifty is also evidence of adjacent segment kyphosis and flat back deformity      5  CT lumbar and thoracic spine 3/18/2022: significant kyphosis of the lumbar and thoracic spine  Robust bony fusion of L1-L5, hardware intact  Screw penetration of the right lumbar spinal canal at L2 and L1 right pedicle screw cut out into the disk space  Failure of fusion of L5-S1      6  X-ray lumbar spine 4/18/2022:  intact hardware with lumbar and thoracic kyphosis      7  X-rays, Lumbar spine 6/6/22:  Stable fusion L1-Pelvis with no evidence of hardware failure   Interbody implant is in appropriate position  Sanders Gifty is evidence of correction of sagittal deformity or proximally 10°       8  X-rays, lumbar spine, 07/05/2022:  Status post prior fusion from L1-S1   Pelvic instrumentation is in appropriate position  Sanders Gifyt is also hardware complication  Sanders Gifty is no change in alignment of the spine when compared to prior films from 06/06/2022      9  X-rays, scoliosis films, 09/12/2022:  Status post L1-S1 revision instrumentation and fusion with pelvic screw instrumentation  No evidence of hardware complication  Alignment of the lumbar spine is maintained  There is significant kyphosis at the thoracolumbar junction  10  Xrays, lumbar spine, 10/10/2022:  Status post L1 to pelvis posterior spinal fusion instrumentation  There is also hardware complication    There was also significant deformity at the thoracolumbar junction        ASSESSMENT:  Problem List Items Addressed This Visit    None     Visit Diagnoses     Scoliosis, unspecified scoliosis type, unspecified spinal region    -  Primary    Relevant Orders    XR spine lumbar 2 or 3 views injury    Flatback syndrome        Relevant Orders    XR spine lumbar 2 or 3 views injury            PLAN:  Patient is a 60-year-old male  status post revision lumbar fusion and instrumentation  Patient is interested in the second stage of procedure involving extension of fusion into thoracic spine, likely to T6  I did explain to the patient that the surgery will involve revision of the prior instrumentation with extension to at least the T6 level  He most likely will need at least 1 maybe 2 level osteotomy at the thoracolumbar junction to correct most of the deformity  Risk and benefits surgery was discussed with the patient  I am going to see him back in a couple weeks for completion of the paperwork  Interim we are going to need to get medical clearance  Risks and benefits of procedure were discussed  It is too early to perform surgical H&P  We will plan for surgery on November 8, 2022  Patient will follow-up in two weeks for surgical H&P and to obtain surgical consent        Scribe Attestation    I,:  Montserrat Branch PA-C am acting as a scribe while in the presence of the attending physician :       I,:  Yimi Mays MD personally performed the services described in this documentation    as scribed in my presence :

## 2022-10-13 ENCOUNTER — TELEPHONE (OUTPATIENT)
Dept: OBGYN CLINIC | Facility: CLINIC | Age: 67
End: 2022-10-13

## 2022-10-17 ENCOUNTER — TELEPHONE (OUTPATIENT)
Dept: OBGYN CLINIC | Facility: CLINIC | Age: 67
End: 2022-10-17

## 2022-10-17 NOTE — TELEPHONE ENCOUNTER
Called patient to discuss upcoming surgery     Patient is scheduled for his PCP on 11/14/22 at 1:15pm     Made patient aware he needs to have his LABS, XRAY and EKG done at a [facility] facility prior to surgery and before his PCP appointment

## 2022-10-18 ENCOUNTER — APPOINTMENT (OUTPATIENT)
Dept: LAB | Facility: HOSPITAL | Age: 67
End: 2022-10-18
Payer: MEDICARE

## 2022-10-18 ENCOUNTER — HOSPITAL ENCOUNTER (OUTPATIENT)
Dept: RADIOLOGY | Facility: HOSPITAL | Age: 67
Discharge: HOME | End: 2022-10-18
Payer: MEDICARE

## 2022-10-18 DIAGNOSIS — M40.30 FLATBACK SYNDROME: ICD-10-CM

## 2022-10-18 DIAGNOSIS — M41.9 SCOLIOSIS, UNSPECIFIED SCOLIOSIS TYPE, UNSPECIFIED SPINAL REGION: ICD-10-CM
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
## 2022-10-18 LAB
ALBUMIN SERPL BCP-MCNC: 3.8 G/DL (ref 3.5–5)
ALP SERPL-CCNC: 123 U/L (ref 46–116)
ALT SERPL W P-5'-P-CCNC: 52 U/L (ref 12–78)
ANION GAP SERPL CALCULATED.3IONS-SCNC: 10 MMOL/L (ref 4–13)
AST SERPL W P-5'-P-CCNC: 45 U/L (ref 5–45)
ATRIAL RATE: 72 BPM
BILIRUB SERPL-MCNC: 0.46 MG/DL (ref 0.2–1)
BUN SERPL-MCNC: 19 MG/DL (ref 5–25)
CALCIUM SERPL-MCNC: 9.1 MG/DL (ref 8.3–10.1)
CHLORIDE SERPL-SCNC: 104 MMOL/L (ref 96–108)
CO2 SERPL-SCNC: 24 MMOL/L (ref 21–32)
CREAT SERPL-MCNC: 0.93 MG/DL (ref 0.6–1.3)
ERYTHROCYTE [DISTWIDTH] IN BLOOD BY AUTOMATED COUNT: 16.4 % (ref 11.6–15.1)
GFR SERPL CREATININE-BSD FRML MDRD: 85 ML/MIN/1.73SQ M
GLUCOSE SERPL-MCNC: 92 MG/DL (ref 65–140)
HCT VFR BLD AUTO: 45.5 % (ref 36.5–49.3)
HGB BLD-MCNC: 14.3 G/DL (ref 12–17)
MCH RBC QN AUTO: 26.4 PG (ref 26.8–34.3)
MCHC RBC AUTO-ENTMCNC: 31.4 G/DL (ref 31.4–37.4)
MCV RBC AUTO: 84 FL (ref 82–98)
P AXIS: 23 DEGREES
PLATELET # BLD AUTO: 372 THOUSANDS/UL (ref 149–390)
PMV BLD AUTO: 9.9 FL (ref 8.9–12.7)
POTASSIUM SERPL-SCNC: 4.1 MMOL/L (ref 3.5–5.3)
PR INTERVAL: 140 MS
PROT SERPL-MCNC: 7.3 G/DL (ref 6.4–8.4)
QRS AXIS: -4 DEGREES
QRSD INTERVAL: 96 MS
QT INTERVAL: 382 MS
QTC INTERVAL: 418 MS
RBC # BLD AUTO: 5.42 MILLION/UL (ref 3.88–5.62)
SODIUM SERPL-SCNC: 138 MMOL/L (ref 135–147)
T WAVE AXIS: 23 DEGREES
VENTRICULAR RATE: 72 BPM
WBC # BLD AUTO: 6.85 THOUSAND/UL (ref 4.31–10.16)

## 2022-10-18 PROCEDURE — 85027 COMPLETE CBC AUTOMATED: CPT

## 2022-10-18 PROCEDURE — 80053 COMPREHEN METABOLIC PANEL: CPT

## 2022-10-18 PROCEDURE — 36415 COLL VENOUS BLD VENIPUNCTURE: CPT

## 2022-10-18 PROCEDURE — 71046 X-RAY EXAM CHEST 2 VIEWS: CPT

## 2022-10-18 PROCEDURE — 93010 ELECTROCARDIOGRAM REPORT: CPT | Performed by: INTERNAL MEDICINE

## 2022-10-24 ENCOUNTER — TELEPHONE (OUTPATIENT)
Dept: OBGYN CLINIC | Facility: CLINIC | Age: 67
End: 2022-10-24

## 2022-10-24 ENCOUNTER — OFFICE VISIT (OUTPATIENT)
Dept: OBGYN CLINIC | Facility: CLINIC | Age: 67
End: 2022-10-24
Payer: MEDICARE

## 2022-10-24 VITALS
WEIGHT: 216 LBS | SYSTOLIC BLOOD PRESSURE: 139 MMHG | BODY MASS INDEX: 30.92 KG/M2 | HEIGHT: 70 IN | DIASTOLIC BLOOD PRESSURE: 91 MMHG | HEART RATE: 97 BPM

## 2022-10-24 DIAGNOSIS — M54.6 THORACIC SPINE PAIN: ICD-10-CM

## 2022-10-24 DIAGNOSIS — Z98.890 HISTORY OF LUMBAR SURGERY: ICD-10-CM

## 2022-10-24 DIAGNOSIS — M51.35 DJD (DEGENERATIVE JOINT DISEASE), THORACOLUMBAR: ICD-10-CM

## 2022-10-24 DIAGNOSIS — M40.30 FLATBACK SYNDROME: ICD-10-CM

## 2022-10-24 DIAGNOSIS — M41.9 SCOLIOSIS, UNSPECIFIED SCOLIOSIS TYPE, UNSPECIFIED SPINAL REGION: Primary | ICD-10-CM

## 2022-10-24 PROCEDURE — 99214 OFFICE O/P EST MOD 30 MIN: CPT | Performed by: ORTHOPAEDIC SURGERY

## 2022-10-24 NOTE — TELEPHONE ENCOUNTER
Faxed patient PCP a clearance form to be filled out and faxed back once patient is seen on 11/14/22 I also faxed them patient Lab results, xray and ekg

## 2022-10-24 NOTE — PROGRESS NOTES
[facility]'S ORTHOPEDIC SPINE SURGERY  Jayne Whitmore MD  [address] [place] [address]  [phone]    HISTORY OF PRESENT ILLNESS:    Patient is a 72-year-old male approximately 4 5 months status post L1-S1 revision of segmental hardware, L5-S1 osteotomy, posterior spinal fusion L4-S1, transforaminal lumbar interbody fusion L5S1, Cage L5S1, instrumentation L1-pelvis performed on 5/24/22  Patient states he is interested in taking care of the rest of his back  Patient denies any significant low back pain  Patient reports having pain in his upper back       ALLERGIES: No Known Allergies    MEDICATIONS:    Current Outpatient Medications:   •  lisinopril (ZESTRIL) 5 mg tablet, Take 5 mg by mouth daily, Disp: , Rfl:   •  lovastatin (MEVACOR) 10 MG tablet, Take 10 mg by mouth every evening, Disp: , Rfl:   •  TRAMADOL HCL PO, Resume use on an as needed basis for pain as you were prior to hospitalization, Disp: , Rfl:   •  aspirin 325 mg tablet, Take 325 mg by mouth daily for 15 days starting on 6/7/22 through 6/21/22, Disp: , Rfl:      PAST MEDICAL HISTORY:   Past Medical History:   Diagnosis Date   • Ambulates with cane     "long distance"   • Arthritis    • Back pain    • Chronic pain disorder    • Dental crowns present    • Exercise involving walking     daily -short walks   • History of hepatitis C     per pt "went away on its own" age 13"   • History of transfusion    • Hyperlipidemia    • Hypertension    • Leg pain     and foot pain   • Limb alert care status     per pt NO IV's LEFT UPPER ARM due to old injury   • Risk for falls    • Spinal stenosis    • Stroke ([facility] [address] )     per pt in 1996-and no lasting problems   • Wears glasses     readers       PAST SURGICAL HISTORY:  Past Surgical History:   Procedure Laterality Date   • BACK SURGERY      with implants   • COLONOSCOPY     • LUMBAR FUSION N/A 5/24/2022    Procedure: L1-S1 revision of segmental hardware, L5-S1 osteotomy, posterior spinal fusion L4-S1, pelvic instrumentation, Transforaminal lumbar interbody fusion L5S1, Cage L5S1, instrumentation L1-pelvis; Surgeon: Olga Fountain MD;  Location: [facility];  Service: Orthopedics   • WISDOM TOOTH EXTRACTION         SOCIAL HISTORY:  Social History     Tobacco Use   • Smoking status: Former Smoker   • Smokeless tobacco: Never Used   Vaping Use   • Vaping Use: Never used   Substance Use Topics   • Alcohol use: Yes     Alcohol/week: 3 0 standard drinks     Types: 3 Cans of beer per week     Comment: drinks once a month   • Drug use: No       ROS:  Review of Systems   Constitutional: Negative for chills, fatigue and fever  HENT: Negative for drooling, ear discharge, facial swelling, hearing loss, nosebleeds, rhinorrhea, sneezing and trouble swallowing  Eyes: Negative for pain, discharge, redness and itching  Respiratory: Negative for cough, choking, shortness of breath and wheezing  Cardiovascular: Negative for chest pain and palpitations  Gastrointestinal: Negative for abdominal pain, constipation and diarrhea  Endocrine: Negative for cold intolerance and heat intolerance  Genitourinary: Negative for dysuria and flank pain  Musculoskeletal:        See HPI and PE  Skin: Negative for rash  Neurological: Negative for dizziness, light-headedness and headaches  Psychiatric/Behavioral: Negative for agitation, self-injury and suicidal ideas  PHYSICAL EXAM:  Patient is a 79-year-old male sitting comfortably on exam chair in no acute distress  Stands and ambulates leaning forward  Incision is well healed without signs of acute infection  Neurological exam is within normal limits bilateral lower extremities  Chest is clear to auscultation    Normal rate and regular rhythm   Abdomen is soft and nontender        RADIOGRAPHIC STUDIES:  1  X-rays, scoli films, 12/13/2021:  Prior multilevel lumbar fusion with unknown instrumentation   Significant sagittal coronal balance   Adjacent segment kyphosis      2  MRI, lumbar spine, 12/21/2021:  Multilevel moderate to severe thoracic degenerative disc   Prior instrumentation from L1 to S1   Kyphosis at the thoracolumbar junction   No spinal cord compression or myelomalacia   Malposition right L1 screw      3  MRI, thoracic spine, 12/21/2021:  1 to S1 posterior instrumentation with kyphotic deformity at the upper lumbar spine   Flat back deformity   No significant central lateral recess stenosis   Malposition right L1 screw      4  Xrays, lumbar spine, 3/15/2022:  Status post L1-S1 posterior spinal fusion instrumentation   There was evidence of solid fusion from L2-S1   Screw cut-out is present at L1  Bertrum Baeza is also evidence of adjacent segment kyphosis and flat back deformity      5  CT lumbar and thoracic spine 3/18/2022: significant kyphosis of the lumbar and thoracic spine  Robust bony fusion of L1-L5, hardware intact  Screw penetration of the right lumbar spinal canal at L2 and L1 right pedicle screw cut out into the disk space  Failure of fusion of L5-S1      6  X-ray lumbar spine 4/18/2022:  intact hardware with lumbar and thoracic kyphosis      7  X-rays, Lumbar spine 6/6/22:  Stable fusion L1-Pelvis with no evidence of hardware failure   Interbody implant is in appropriate position  Bertrum Baeza is evidence of correction of sagittal deformity or proximally 10°       8  X-rays, lumbar spine, 07/05/2022:  Status post prior fusion from L1-S1   Pelvic instrumentation is in appropriate position  Bertrum Baeza is also hardware complication  Bertrum Baeza is no change in alignment of the spine when compared to prior films from 06/06/2022      9  X-rays, scoliosis films, 09/12/2022:  Status post L1-S1 revision instrumentation and fusion with pelvic screw instrumentation  No evidence of hardware complication  Alignment of the lumbar spine is maintained  There is significant kyphosis at the thoracolumbar junction      10  Xrays, lumbar spine, 10/10/2022:  Status post L1 to pelvis posterior spinal fusion instrumentation  There is also hardware complication  There was also significant deformity at the thoracolumbar junction        ASSESSMENT:  Problem List Items Addressed This Visit    None     Visit Diagnoses     Scoliosis, unspecified scoliosis type, unspecified spinal region    -  Primary    Flatback syndrome        History of lumbar surgery        DJD (degenerative joint disease), thoracolumbar        Thoracic spine pain                PLAN:  Patient is a 61-year-old male  status post revision lumbar fusion and instrumentation  Patient is interested in the second stage of procedure involving extension of fusion into thoracic spine, likely to T6  I did explain to the patient that the surgery will involve revision of the prior instrumentation with extension to at least the T6 level  He most likely will need at least 1 maybe 2 level osteotomy at the thoracolumbar junction to correct most of the deformity  Risk and benefits surgery was discussed with patient  Risks and benefits of procedure were discussed  We will plan for surgery on November 8, 2022  The patient is scheduled to undergo surgery the week of Thanksgiving  Risk and benefits surgery was again discussed  His questions were answered  He will need medical clearance  Laboratory studies in addition to chest x-ray and EKG have been ordered  Surgery will involve revision of the fusion and extension to the T6 level  He will need at least 1 or 2 level thoracic osteotomy  Part of the old hardware will be removed any hardware will be connected to the old hardware  Fusion will take place from T6-L1  He is fused from L1-S1 are ready       Scribe Attestation    I,:  Chan Varma am acting as a scribe while in the presence of the attending physician :       I,:  Cristina Escobar MD personally performed the services described in this documentation    as scribed in my presence :

## 2022-10-24 NOTE — H&P (VIEW-ONLY)
[facility]'S ORTHOPEDIC SPINE SURGERY  Eddie Martin MD  [address] [place] [address]  [place] [address]  [phone]    HISTORY OF PRESENT ILLNESS:    Patient is a 72-year-old male approximately 4 5 months status post L1-S1 revision of segmental hardware, L5-S1 osteotomy, posterior spinal fusion L4-S1, transforaminal lumbar interbody fusion L5S1, Cage L5S1, instrumentation L1-pelvis performed on 5/24/22  Patient states he is interested in taking care of the rest of his back  Patient denies any significant low back pain  Patient reports having pain in his upper back       ALLERGIES: No Known Allergies    MEDICATIONS:    Current Outpatient Medications:   •  lisinopril (ZESTRIL) 5 mg tablet, Take 5 mg by mouth daily, Disp: , Rfl:   •  lovastatin (MEVACOR) 10 MG tablet, Take 10 mg by mouth every evening, Disp: , Rfl:   •  TRAMADOL HCL PO, Resume use on an as needed basis for pain as you were prior to hospitalization, Disp: , Rfl:   •  aspirin 325 mg tablet, Take 325 mg by mouth daily for 15 days starting on 6/7/22 through 6/21/22, Disp: , Rfl:      PAST MEDICAL HISTORY:   Past Medical History:   Diagnosis Date   • Ambulates with cane     "long distance"   • Arthritis    • Back pain    • Chronic pain disorder    • Dental crowns present    • Exercise involving walking     daily -short walks   • History of hepatitis C     per pt "went away on its own" age 13"   • History of transfusion    • Hyperlipidemia    • Hypertension    • Leg pain     and foot pain   • Limb alert care status     per pt NO IV's LEFT UPPER ARM due to old injury   • Risk for falls    • Spinal stenosis    • Stroke ([facility] [address] )     per pt in 1996-and no lasting problems   • Wears glasses     readers       PAST SURGICAL HISTORY:  Past Surgical History:   Procedure Laterality Date   • BACK SURGERY      with implants   • COLONOSCOPY     • LUMBAR FUSION N/A 5/24/2022    Procedure: L1-S1 revision of segmental hardware, L5-S1 osteotomy, posterior spinal fusion L4-S1, pelvic instrumentation, Transforaminal lumbar interbody fusion L5S1, Cage L5S1, instrumentation L1-pelvis; Surgeon: Ana Mcdonald MD;  Location: [facility];  Service: Orthopedics   • WISDOM TOOTH EXTRACTION         SOCIAL HISTORY:  Social History     Tobacco Use   • Smoking status: Former Smoker   • Smokeless tobacco: Never Used   Vaping Use   • Vaping Use: Never used   Substance Use Topics   • Alcohol use: Yes     Alcohol/week: 3 0 standard drinks     Types: 3 Cans of beer per week     Comment: drinks once a month   • Drug use: No       ROS:  Review of Systems   Constitutional: Negative for chills, fatigue and fever  HENT: Negative for drooling, ear discharge, facial swelling, hearing loss, nosebleeds, rhinorrhea, sneezing and trouble swallowing  Eyes: Negative for pain, discharge, redness and itching  Respiratory: Negative for cough, choking, shortness of breath and wheezing  Cardiovascular: Negative for chest pain and palpitations  Gastrointestinal: Negative for abdominal pain, constipation and diarrhea  Endocrine: Negative for cold intolerance and heat intolerance  Genitourinary: Negative for dysuria and flank pain  Musculoskeletal:        See HPI and PE  Skin: Negative for rash  Neurological: Negative for dizziness, light-headedness and headaches  Psychiatric/Behavioral: Negative for agitation, self-injury and suicidal ideas  PHYSICAL EXAM:  Patient is a 29-year-old male sitting comfortably on exam chair in no acute distress  Stands and ambulates leaning forward  Incision is well healed without signs of acute infection  Neurological exam is within normal limits bilateral lower extremities  Chest is clear to auscultation    Normal rate and regular rhythm   Abdomen is soft and nontender        RADIOGRAPHIC STUDIES:  1  X-rays, scoli films, 12/13/2021:  Prior multilevel lumbar fusion with unknown instrumentation   Significant sagittal coronal balance   Adjacent segment kyphosis      2  MRI, lumbar spine, 12/21/2021:  Multilevel moderate to severe thoracic degenerative disc   Prior instrumentation from L1 to S1   Kyphosis at the thoracolumbar junction   No spinal cord compression or myelomalacia   Malposition right L1 screw      3  MRI, thoracic spine, 12/21/2021:  1 to S1 posterior instrumentation with kyphotic deformity at the upper lumbar spine   Flat back deformity   No significant central lateral recess stenosis   Malposition right L1 screw      4  Xrays, lumbar spine, 3/15/2022:  Status post L1-S1 posterior spinal fusion instrumentation   There was evidence of solid fusion from L2-S1   Screw cut-out is present at L1  Ewing Jordon is also evidence of adjacent segment kyphosis and flat back deformity      5  CT lumbar and thoracic spine 3/18/2022: significant kyphosis of the lumbar and thoracic spine  Robust bony fusion of L1-L5, hardware intact  Screw penetration of the right lumbar spinal canal at L2 and L1 right pedicle screw cut out into the disk space  Failure of fusion of L5-S1      6  X-ray lumbar spine 4/18/2022:  intact hardware with lumbar and thoracic kyphosis      7  X-rays, Lumbar spine 6/6/22:  Stable fusion L1-Pelvis with no evidence of hardware failure   Interbody implant is in appropriate position  Ewing Jordon is evidence of correction of sagittal deformity or proximally 10°       8  X-rays, lumbar spine, 07/05/2022:  Status post prior fusion from L1-S1   Pelvic instrumentation is in appropriate position  Ewing Jordon is also hardware complication  [place] Jordon is no change in alignment of the spine when compared to prior films from 06/06/2022      9  X-rays, scoliosis films, 09/12/2022:  Status post L1-S1 revision instrumentation and fusion with pelvic screw instrumentation  No evidence of hardware complication  Alignment of the lumbar spine is maintained  There is significant kyphosis at the thoracolumbar junction      10  Xrays, lumbar spine, 10/10/2022:  Status post L1 to pelvis posterior spinal fusion instrumentation  There is also hardware complication  There was also significant deformity at the thoracolumbar junction        ASSESSMENT:  Problem List Items Addressed This Visit    None     Visit Diagnoses     Scoliosis, unspecified scoliosis type, unspecified spinal region    -  Primary    Flatback syndrome        History of lumbar surgery        DJD (degenerative joint disease), thoracolumbar        Thoracic spine pain                PLAN:  Patient is a 81-year-old male  status post revision lumbar fusion and instrumentation  Patient is interested in the second stage of procedure involving extension of fusion into thoracic spine, likely to T6  I did explain to the patient that the surgery will involve revision of the prior instrumentation with extension to at least the T6 level  He most likely will need at least 1 maybe 2 level osteotomy at the thoracolumbar junction to correct most of the deformity  Risk and benefits surgery was discussed with patient  Risks and benefits of procedure were discussed  We will plan for surgery on November 8, 2022  The patient is scheduled to undergo surgery the week of Thanksgiving  Risk and benefits surgery was again discussed  His questions were answered  He will need medical clearance  Laboratory studies in addition to chest x-ray and EKG have been ordered  Surgery will involve revision of the fusion and extension to the T6 level  He will need at least 1 or 2 level thoracic osteotomy  Part of the old hardware will be removed any hardware will be connected to the old hardware  Fusion will take place from T6-L1  He is fused from L1-S1 are ready       Scribe Attestation    I,:  Delaney Benz am acting as a scribe while in the presence of the attending physician :       I,:  Monalisa Tellez MD personally performed the services described in this documentation    as scribed in my presence :

## 2022-10-24 NOTE — H&P
[facility] ORTHOPEDIC SPINE SURGERY  Candie Muller MD  [address] [place] [id number]  [phone]    HISTORY OF PRESENT ILLNESS:    Patient is a 31-year-old male approximately 4 5 months status post L1-S1 revision of segmental hardware, L5-S1 osteotomy, posterior spinal fusion L4-S1, transforaminal lumbar interbody fusion L5S1, Cage L5S1, instrumentation L1-pelvis performed on 5/24/22  Patient states he is interested in taking care of the rest of his back  Patient denies any significant low back pain  Patient reports having pain in his upper back       ALLERGIES: No Known Allergies    MEDICATIONS:    Current Outpatient Medications:   •  lisinopril (ZESTRIL) 5 mg tablet, Take 5 mg by mouth daily, Disp: , Rfl:   •  lovastatin (MEVACOR) 10 MG tablet, Take 10 mg by mouth every evening, Disp: , Rfl:   •  TRAMADOL HCL PO, Resume use on an as needed basis for pain as you were prior to hospitalization, Disp: , Rfl:   •  aspirin 325 mg tablet, Take 325 mg by mouth daily for 15 days starting on 6/7/22 through 6/21/22, Disp: , Rfl:      PAST MEDICAL HISTORY:   Past Medical History:   Diagnosis Date   • Ambulates with cane     "long distance"   • Arthritis    • Back pain    • Chronic pain disorder    • Dental crowns present    • Exercise involving walking     daily -short walks   • History of hepatitis C     per pt "went away on its own" age 13"   • History of transfusion    • Hyperlipidemia    • Hypertension    • Leg pain     and foot pain   • Limb alert care status     per pt NO IV's LEFT UPPER ARM due to old injury   • Risk for falls    • Spinal stenosis    • Stroke ([facility] [address] )     per pt in 1996-and no lasting problems   • Wears glasses     readers       PAST SURGICAL HISTORY:  Past Surgical History:   Procedure Laterality Date   • BACK SURGERY      with implants   • COLONOSCOPY     • LUMBAR FUSION N/A 5/24/2022    Procedure: L1-S1 revision of segmental hardware, L5-S1 osteotomy, posterior spinal fusion L4-S1, pelvic instrumentation, Transforaminal lumbar interbody fusion L5S1, Cage L5S1, instrumentation L1-pelvis; Surgeon: Bindu Crawford MD;  Location: [facility] MAIN OR;  Service: Orthopedics   • WISDOM TOOTH EXTRACTION         SOCIAL HISTORY:  Social History     Tobacco Use   • Smoking status: Former Smoker   • Smokeless tobacco: Never Used   Vaping Use   • Vaping Use: Never used   Substance Use Topics   • Alcohol use: Yes     Alcohol/week: 3 0 standard drinks     Types: 3 Cans of beer per week     Comment: drinks once a month   • Drug use: No       ROS:  Review of Systems   Constitutional: Negative for chills, fatigue and fever  HENT: Negative for drooling, ear discharge, facial swelling, hearing loss, nosebleeds, rhinorrhea, sneezing and trouble swallowing  Eyes: Negative for pain, discharge, redness and itching  Respiratory: Negative for cough, choking, shortness of breath and wheezing  Cardiovascular: Negative for chest pain and palpitations  Gastrointestinal: Negative for abdominal pain, constipation and diarrhea  Endocrine: Negative for cold intolerance and heat intolerance  Genitourinary: Negative for dysuria and flank pain  Musculoskeletal:        See HPI and PE  Skin: Negative for rash  Neurological: Negative for dizziness, light-headedness and headaches  Psychiatric/Behavioral: Negative for agitation, self-injury and suicidal ideas  PHYSICAL EXAM:  Patient is a 60-year-old male sitting comfortably on exam chair in no acute distress  Stands and ambulates leaning forward  Incision is well healed without signs of acute infection  Neurological exam is within normal limits bilateral lower extremities  Chest is clear to auscultation    Normal rate and regular rhythm   Abdomen is soft and nontender        RADIOGRAPHIC STUDIES:  1  X-rays, scoli films, 12/13/2021:  Prior multilevel lumbar fusion with unknown instrumentation   Significant sagittal coronal balance   Adjacent segment kyphosis      2  MRI, lumbar spine, 12/21/2021:  Multilevel moderate to severe thoracic degenerative disc   Prior instrumentation from L1 to S1   Kyphosis at the thoracolumbar junction   No spinal cord compression or myelomalacia   Malposition right L1 screw      3  MRI, thoracic spine, 12/21/2021:  1 to S1 posterior instrumentation with kyphotic deformity at the upper lumbar spine   Flat back deformity   No significant central lateral recess stenosis   Malposition right L1 screw      4  Xrays, lumbar spine, 3/15/2022:  Status post L1-S1 posterior spinal fusion instrumentation   There was evidence of solid fusion from L2-S1   Screw cut-out is present at L1  Edy Monik is also evidence of adjacent segment kyphosis and flat back deformity      5  CT lumbar and thoracic spine 3/18/2022: significant kyphosis of the lumbar and thoracic spine  Robust bony fusion of L1-L5, hardware intact  Screw penetration of the right lumbar spinal canal at L2 and L1 right pedicle screw cut out into the disk space  Failure of fusion of L5-S1      6  X-ray lumbar spine 4/18/2022:  intact hardware with lumbar and thoracic kyphosis      7  X-rays, Lumbar spine 6/6/22:  Stable fusion L1-Pelvis with no evidence of hardware failure   Interbody implant is in appropriate position  Edy Monik is evidence of correction of sagittal deformity or proximally 10°       8  X-rays, lumbar spine, 07/05/2022:  Status post prior fusion from L1-S1   Pelvic instrumentation is in appropriate position  Edy Monik is also hardware complication  Edy Monik is no change in alignment of the spine when compared to prior films from 06/06/2022      9  X-rays, scoliosis films, 09/12/2022:  Status post L1-S1 revision instrumentation and fusion with pelvic screw instrumentation  No evidence of hardware complication  Alignment of the lumbar spine is maintained  There is significant kyphosis at the thoracolumbar junction      10  Xrays, lumbar spine, 10/10/2022:  Status post L1 to pelvis posterior spinal fusion instrumentation  There is also hardware complication  There was also significant deformity at the thoracolumbar junction        ASSESSMENT:  Problem List Items Addressed This Visit    None     Visit Diagnoses     Scoliosis, unspecified scoliosis type, unspecified spinal region    -  Primary    Flatback syndrome        History of lumbar surgery        DJD (degenerative joint disease), thoracolumbar        Thoracic spine pain                PLAN:  Patient is a 79-year-old male  status post revision lumbar fusion and instrumentation  Patient is interested in the second stage of procedure involving extension of fusion into thoracic spine, likely to T6  I did explain to the patient that the surgery will involve revision of the prior instrumentation with extension to at least the T6 level  He most likely will need at least 1 maybe 2 level osteotomy at the thoracolumbar junction to correct most of the deformity  Risk and benefits surgery was discussed with patient  Risks and benefits of procedure were discussed  We will plan for surgery on November 8, 2022  The patient is scheduled to undergo surgery the week of Thanksgiving  Risk and benefits surgery was again discussed  His questions were answered  He will need medical clearance  Laboratory studies in addition to chest x-ray and EKG have been ordered  Surgery will involve revision of the fusion and extension to the T6 level  He will need at least 1 or 2 level thoracic osteotomy  Part of the old hardware will be removed any hardware will be connected to the old hardware  Fusion will take place from T6-L1  He is fused from L1-S1 are ready       Scribe Attestation    I,:  Chan Varma am acting as a scribe while in the presence of the attending physician :       I,:  Cristina Escobar MD personally performed the services described in this documentation    as scribed in my presence :

## 2022-11-15 RX ORDER — FERROUS SULFATE 325(65) MG
325 TABLET ORAL
Status: ON HOLD | COMMUNITY

## 2022-11-15 NOTE — PRE-PROCEDURE INSTRUCTIONS
Pre-Surgery Instructions:   Medication Instructions   • ferrous sulfate 325 (65 Fe) mg tablet Stop taking 7 days prior to surgery  • lisinopril (ZESTRIL) 5 mg tablet Hold day of surgery  • lovastatin (MEVACOR) 10 MG tablet Take as directed   • TRAMADOL HCL PO Take as directed      My Surgical Experience    The following information was developed to assist you to prepare for your operation  What do I need to do before coming to the hospital?  • Arrange for a responsible person to drive you to and from the hospital   • Arrange care for your children at home  Children are not allowed in the recovery areas of the hospital  • Plan to wear clothing that is easy to put on and take off  If you are having shoulder surgery, wear a shirt that buttons or zippers in the front  Bathing  o Shower the evening before and the morning of your surgery with an antibacterial soap  Please refer to the Pre Op Showering Instructions for Surgery Patients Sheet   o Remove nail polish and all body piercing jewelry  o Do not shave any body part for at least 24 hours before surgery-this includes face, arms, legs and upper body  Food  o Nothing to eat or drink after midnight the night before your surgery  This includes candy and chewing gum  o Exception: If your surgery is after 12:00pm (noon), you may have clear liquids such as 7-Up®, ginger ale, apple or cranberry juice, Jell-O®, water, or clear broth until 8:00 am  o Do not drink milk or juice with pulp on the morning before surgery  o Do not drink alcohol 24 hours before surgery  Medicine  o Follow instructions you received from your surgeon about which medicines you may take on the day of surgery  o If instructed to take medicine on the morning of surgery, take pills with just a small sip of water   Call your prescribing doctor for specific infroamtion on what to do if you take insulin    What should I bring to the hospital?    Bring:  • Crutches or a walker, if you have them, for foot or knee surgery  • A list of the daily medicines, vitamins, minerals, herbals and nutritional supplements you take  Include the dosages of medicines and the time you take them each day  • Glasses, dentures or hearing aids  • Minimal clothing; you will be wearing hospital sleepwear  • Photo ID; required to verify your identity  • If you have a Living Will or Power of , bring a copy of the documents  • If you have an ostomy, bring an extra pouch and any supplies you use    Do not bring  • Medicines or inhalers  • Money, valuables or jewelry    What other information should I know about the day of surgery? • Notify your surgeons if you develop a cold, sore throat, cough, fever, rash or any other illness  • Report to the Ambulatory Surgical/Same Day Surgery Unit  • You will be instructed to stop at Registration only if you have not been pre-registered  • Inform your  fi they do not stay that they will be asked by the staff to leave a phone number where they can be reached  • Be available to be reached before surgery  In the event the operating room schedule changes, you may be asked to come in earlier or later than expected    *It is important to tell your doctor and others involved in your health care if you are taking or have been taking any non-prescription drugs, vitamins, minerals, herbals or other nutritional supplements   Any of these may interact with some food or medicines and cause a reaction

## 2022-11-21 ENCOUNTER — ANESTHESIA EVENT (OUTPATIENT)
Dept: PERIOP | Facility: HOSPITAL | Age: 67
End: 2022-11-21

## 2022-11-22 ENCOUNTER — HOSPITAL ENCOUNTER (INPATIENT)
Facility: HOSPITAL | Age: 67
LOS: 3 days | End: 2022-11-25
Attending: ORTHOPAEDIC SURGERY | Admitting: ORTHOPAEDIC SURGERY

## 2022-11-22 ENCOUNTER — APPOINTMENT (OUTPATIENT)
Dept: RADIOLOGY | Facility: HOSPITAL | Age: 67
End: 2022-11-22

## 2022-11-22 ENCOUNTER — ANESTHESIA (OUTPATIENT)
Dept: PERIOP | Facility: HOSPITAL | Age: 67
End: 2022-11-22

## 2022-11-22 DIAGNOSIS — I10 HYPERTENSION, UNSPECIFIED TYPE: ICD-10-CM

## 2022-11-22 DIAGNOSIS — E78.5 HYPERLIPIDEMIA, UNSPECIFIED HYPERLIPIDEMIA TYPE: ICD-10-CM

## 2022-11-22 DIAGNOSIS — Z98.1 STATUS POST LUMBAR SPINAL FUSION: Primary | ICD-10-CM

## 2022-11-22 PROBLEM — M40.30 FLATBACK SYNDROME: Status: ACTIVE | Noted: 2022-11-22

## 2022-11-22 PROBLEM — G89.29 CHRONIC PAIN: Status: ACTIVE | Noted: 2022-11-22

## 2022-11-22 PROBLEM — M54.9 BACK PAIN: Status: ACTIVE | Noted: 2022-11-22

## 2022-11-22 LAB
ABO GROUP BLD: NORMAL
BASE EXCESS BLDA CALC-SCNC: -2 MMOL/L (ref -2–3)
BASE EXCESS BLDA CALC-SCNC: -2 MMOL/L (ref -2–3)
BASE EXCESS BLDA CALC-SCNC: 0 MMOL/L (ref -2–3)
BLD GP AB SCN SERPL QL: NEGATIVE
CA-I BLD-SCNC: 1.29 MMOL/L (ref 1.12–1.32)
CA-I BLD-SCNC: 1.29 MMOL/L (ref 1.12–1.32)
CA-I BLD-SCNC: 1.32 MMOL/L (ref 1.12–1.32)
GLUCOSE SERPL-MCNC: 108 MG/DL (ref 65–140)
GLUCOSE SERPL-MCNC: 137 MG/DL (ref 65–140)
GLUCOSE SERPL-MCNC: 143 MG/DL (ref 65–140)
GLUCOSE SERPL-MCNC: 149 MG/DL (ref 65–140)
HCO3 BLDA-SCNC: 23.4 MMOL/L (ref 22–28)
HCO3 BLDA-SCNC: 24.2 MMOL/L (ref 22–28)
HCO3 BLDA-SCNC: 25.6 MMOL/L (ref 22–28)
HCT VFR BLD CALC: 37 % (ref 36.5–49.3)
HCT VFR BLD CALC: 39 % (ref 36.5–49.3)
HCT VFR BLD CALC: 39 % (ref 36.5–49.3)
HGB BLDA-MCNC: 12.6 G/DL (ref 12–17)
HGB BLDA-MCNC: 13.3 G/DL (ref 12–17)
HGB BLDA-MCNC: 13.3 G/DL (ref 12–17)
PCO2 BLD: 25 MMOL/L (ref 21–32)
PCO2 BLD: 26 MMOL/L (ref 21–32)
PCO2 BLD: 27 MMOL/L (ref 21–32)
PCO2 BLD: 42.6 MM HG (ref 36–44)
PCO2 BLD: 42.6 MM HG (ref 36–44)
PCO2 BLD: 44 MM HG (ref 36–44)
PH BLD: 7.35 [PH] (ref 7.35–7.45)
PH BLD: 7.35 [PH] (ref 7.35–7.45)
PH BLD: 7.39 [PH] (ref 7.35–7.45)
PO2 BLD: 153 MM HG (ref 75–129)
PO2 BLD: 153 MM HG (ref 75–129)
PO2 BLD: 220 MM HG (ref 75–129)
POTASSIUM BLD-SCNC: 4 MMOL/L (ref 3.5–5.3)
POTASSIUM BLD-SCNC: 4.1 MMOL/L (ref 3.5–5.3)
POTASSIUM BLD-SCNC: 4.3 MMOL/L (ref 3.5–5.3)
RH BLD: POSITIVE
SAO2 % BLD FROM PO2: 100 % (ref 60–85)
SAO2 % BLD FROM PO2: 99 % (ref 60–85)
SAO2 % BLD FROM PO2: 99 % (ref 60–85)
SODIUM BLD-SCNC: 140 MMOL/L (ref 136–145)
SODIUM BLD-SCNC: 141 MMOL/L (ref 136–145)
SODIUM BLD-SCNC: 141 MMOL/L (ref 136–145)
SPECIMEN EXPIRATION DATE: NORMAL
SPECIMEN SOURCE: ABNORMAL

## 2022-11-22 PROCEDURE — 0SP004Z REMOVAL OF INTERNAL FIXATION DEVICE FROM LUMBAR VERTEBRAL JOINT, OPEN APPROACH: ICD-10-PCS | Performed by: ORTHOPAEDIC SURGERY

## 2022-11-22 PROCEDURE — 0RG7071 FUSION OF 2 TO 7 THORACIC VERTEBRAL JOINTS WITH AUTOLOGOUS TISSUE SUBSTITUTE, POSTERIOR APPROACH, POSTERIOR COLUMN, OPEN APPROACH: ICD-10-PCS | Performed by: ORTHOPAEDIC SURGERY

## 2022-11-22 DEVICE — IMPLANTABLE DEVICE: Type: IMPLANTABLE DEVICE | Site: SPINE LUMBAR | Status: FUNCTIONAL

## 2022-11-22 DEVICE — IMPLANTABLE DEVICE
Type: IMPLANTABLE DEVICE | Site: SPINE LUMBAR | Status: FUNCTIONAL
Brand: OPTECURE

## 2022-11-22 DEVICE — IMPLANTABLE DEVICE: Type: IMPLANTABLE DEVICE | Status: FUNCTIONAL

## 2022-11-22 RX ORDER — ONDANSETRON 2 MG/ML
INJECTION INTRAMUSCULAR; INTRAVENOUS AS NEEDED
Status: DISCONTINUED | OUTPATIENT
Start: 2022-11-22 | End: 2022-11-22

## 2022-11-22 RX ORDER — FERROUS SULFATE 325(65) MG
325 TABLET ORAL
Status: DISCONTINUED | OUTPATIENT
Start: 2022-11-23 | End: 2022-11-25 | Stop reason: HOSPADM

## 2022-11-22 RX ORDER — DOCUSATE SODIUM 100 MG/1
100 CAPSULE, LIQUID FILLED ORAL 2 TIMES DAILY
Status: DISCONTINUED | OUTPATIENT
Start: 2022-11-22 | End: 2022-11-25 | Stop reason: HOSPADM

## 2022-11-22 RX ORDER — LISINOPRIL 5 MG/1
5 TABLET ORAL DAILY
Status: DISCONTINUED | OUTPATIENT
Start: 2022-11-23 | End: 2022-11-25 | Stop reason: HOSPADM

## 2022-11-22 RX ORDER — SODIUM CHLORIDE, SODIUM LACTATE, POTASSIUM CHLORIDE, CALCIUM CHLORIDE 600; 310; 30; 20 MG/100ML; MG/100ML; MG/100ML; MG/100ML
20 INJECTION, SOLUTION INTRAVENOUS CONTINUOUS
Status: DISCONTINUED | OUTPATIENT
Start: 2022-11-22 | End: 2022-11-23

## 2022-11-22 RX ORDER — SODIUM CHLORIDE 9 MG/ML
INJECTION, SOLUTION INTRAVENOUS CONTINUOUS PRN
Status: DISCONTINUED | OUTPATIENT
Start: 2022-11-22 | End: 2022-11-22

## 2022-11-22 RX ORDER — ACETAMINOPHEN 325 MG/1
975 TABLET ORAL ONCE
Status: COMPLETED | OUTPATIENT
Start: 2022-11-22 | End: 2022-11-22

## 2022-11-22 RX ORDER — ONDANSETRON 2 MG/ML
4 INJECTION INTRAMUSCULAR; INTRAVENOUS EVERY 6 HOURS PRN
Status: DISCONTINUED | OUTPATIENT
Start: 2022-11-22 | End: 2022-11-25 | Stop reason: HOSPADM

## 2022-11-22 RX ORDER — HYDROMORPHONE HCL 110MG/55ML
PATIENT CONTROLLED ANALGESIA SYRINGE INTRAVENOUS AS NEEDED
Status: DISCONTINUED | OUTPATIENT
Start: 2022-11-22 | End: 2022-11-22

## 2022-11-22 RX ORDER — ASPIRIN 325 MG
325 TABLET ORAL DAILY
Status: DISCONTINUED | OUTPATIENT
Start: 2022-11-23 | End: 2022-11-25 | Stop reason: HOSPADM

## 2022-11-22 RX ORDER — CHLORHEXIDINE GLUCONATE 0.12 MG/ML
15 RINSE ORAL ONCE
Status: COMPLETED | OUTPATIENT
Start: 2022-11-22 | End: 2022-11-22

## 2022-11-22 RX ORDER — SODIUM CHLORIDE, SODIUM LACTATE, POTASSIUM CHLORIDE, CALCIUM CHLORIDE 600; 310; 30; 20 MG/100ML; MG/100ML; MG/100ML; MG/100ML
125 INJECTION, SOLUTION INTRAVENOUS CONTINUOUS
Status: DISCONTINUED | OUTPATIENT
Start: 2022-11-22 | End: 2022-11-23

## 2022-11-22 RX ORDER — CEFAZOLIN SODIUM 2 G/50ML
2000 SOLUTION INTRAVENOUS EVERY 8 HOURS
Status: COMPLETED | OUTPATIENT
Start: 2022-11-22 | End: 2022-11-23

## 2022-11-22 RX ORDER — CEFAZOLIN SODIUM 2 G/50ML
2000 SOLUTION INTRAVENOUS ONCE
Status: COMPLETED | OUTPATIENT
Start: 2022-11-22 | End: 2022-11-22

## 2022-11-22 RX ORDER — KETAMINE HYDROCHLORIDE 100 MG/ML
INJECTION, SOLUTION INTRAMUSCULAR; INTRAVENOUS AS NEEDED
Status: DISCONTINUED | OUTPATIENT
Start: 2022-11-22 | End: 2022-11-22

## 2022-11-22 RX ORDER — VANCOMYCIN HYDROCHLORIDE 1 G/20ML
INJECTION, POWDER, LYOPHILIZED, FOR SOLUTION INTRAVENOUS AS NEEDED
Status: DISCONTINUED | OUTPATIENT
Start: 2022-11-22 | End: 2022-11-22 | Stop reason: HOSPADM

## 2022-11-22 RX ORDER — ONDANSETRON 2 MG/ML
4 INJECTION INTRAMUSCULAR; INTRAVENOUS ONCE AS NEEDED
Status: DISCONTINUED | OUTPATIENT
Start: 2022-11-22 | End: 2022-11-22 | Stop reason: HOSPADM

## 2022-11-22 RX ORDER — SODIUM CHLORIDE, SODIUM LACTATE, POTASSIUM CHLORIDE, CALCIUM CHLORIDE 600; 310; 30; 20 MG/100ML; MG/100ML; MG/100ML; MG/100ML
100 INJECTION, SOLUTION INTRAVENOUS CONTINUOUS
Status: DISCONTINUED | OUTPATIENT
Start: 2022-11-22 | End: 2022-11-23

## 2022-11-22 RX ORDER — GABAPENTIN 300 MG/1
300 CAPSULE ORAL ONCE
Status: COMPLETED | OUTPATIENT
Start: 2022-11-22 | End: 2022-11-22

## 2022-11-22 RX ORDER — PRAVASTATIN SODIUM 20 MG
10 TABLET ORAL
Status: DISCONTINUED | OUTPATIENT
Start: 2022-11-22 | End: 2022-11-25 | Stop reason: HOSPADM

## 2022-11-22 RX ORDER — MIDAZOLAM HYDROCHLORIDE 2 MG/2ML
INJECTION, SOLUTION INTRAMUSCULAR; INTRAVENOUS AS NEEDED
Status: DISCONTINUED | OUTPATIENT
Start: 2022-11-22 | End: 2022-11-22

## 2022-11-22 RX ORDER — METOPROLOL TARTRATE 5 MG/5ML
INJECTION INTRAVENOUS AS NEEDED
Status: DISCONTINUED | OUTPATIENT
Start: 2022-11-22 | End: 2022-11-22

## 2022-11-22 RX ORDER — PROPOFOL 10 MG/ML
INJECTION, EMULSION INTRAVENOUS AS NEEDED
Status: DISCONTINUED | OUTPATIENT
Start: 2022-11-22 | End: 2022-11-22

## 2022-11-22 RX ORDER — FENTANYL CITRATE 50 UG/ML
INJECTION, SOLUTION INTRAMUSCULAR; INTRAVENOUS AS NEEDED
Status: DISCONTINUED | OUTPATIENT
Start: 2022-11-22 | End: 2022-11-22

## 2022-11-22 RX ORDER — LABETALOL HYDROCHLORIDE 5 MG/ML
INJECTION, SOLUTION INTRAVENOUS AS NEEDED
Status: DISCONTINUED | OUTPATIENT
Start: 2022-11-22 | End: 2022-11-22

## 2022-11-22 RX ORDER — SENNOSIDES 8.6 MG
1 TABLET ORAL DAILY
Status: DISCONTINUED | OUTPATIENT
Start: 2022-11-23 | End: 2022-11-25 | Stop reason: HOSPADM

## 2022-11-22 RX ORDER — PROPOFOL 10 MG/ML
INJECTION, EMULSION INTRAVENOUS CONTINUOUS PRN
Status: DISCONTINUED | OUTPATIENT
Start: 2022-11-22 | End: 2022-11-22

## 2022-11-22 RX ORDER — METHOCARBAMOL 500 MG/1
500 TABLET, FILM COATED ORAL EVERY 6 HOURS SCHEDULED
Status: DISCONTINUED | OUTPATIENT
Start: 2022-11-22 | End: 2022-11-25 | Stop reason: HOSPADM

## 2022-11-22 RX ORDER — HYDROMORPHONE HCL/PF 1 MG/ML
0.5 SYRINGE (ML) INJECTION
Status: COMPLETED | OUTPATIENT
Start: 2022-11-22 | End: 2022-11-22

## 2022-11-22 RX ORDER — LIDOCAINE HYDROCHLORIDE 10 MG/ML
INJECTION, SOLUTION EPIDURAL; INFILTRATION; INTRACAUDAL; PERINEURAL AS NEEDED
Status: DISCONTINUED | OUTPATIENT
Start: 2022-11-22 | End: 2022-11-22

## 2022-11-22 RX ORDER — SUCCINYLCHOLINE/SOD CL,ISO/PF 100 MG/5ML
SYRINGE (ML) INTRAVENOUS AS NEEDED
Status: DISCONTINUED | OUTPATIENT
Start: 2022-11-22 | End: 2022-11-22

## 2022-11-22 RX ORDER — NALOXONE HYDROCHLORIDE 0.4 MG/ML
0.1 INJECTION, SOLUTION INTRAMUSCULAR; INTRAVENOUS; SUBCUTANEOUS
Status: DISCONTINUED | OUTPATIENT
Start: 2022-11-22 | End: 2022-11-25 | Stop reason: HOSPADM

## 2022-11-22 RX ORDER — BUPIVACAINE HYDROCHLORIDE AND EPINEPHRINE 2.5; 5 MG/ML; UG/ML
INJECTION, SOLUTION EPIDURAL; INFILTRATION; INTRACAUDAL; PERINEURAL AS NEEDED
Status: DISCONTINUED | OUTPATIENT
Start: 2022-11-22 | End: 2022-11-22 | Stop reason: HOSPADM

## 2022-11-22 RX ORDER — DEXAMETHASONE SODIUM PHOSPHATE 10 MG/ML
INJECTION, SOLUTION INTRAMUSCULAR; INTRAVENOUS AS NEEDED
Status: DISCONTINUED | OUTPATIENT
Start: 2022-11-22 | End: 2022-11-22

## 2022-11-22 RX ORDER — FENTANYL CITRATE/PF 50 MCG/ML
50 SYRINGE (ML) INJECTION
Status: DISCONTINUED | OUTPATIENT
Start: 2022-11-22 | End: 2022-11-22 | Stop reason: HOSPADM

## 2022-11-22 RX ORDER — HYDROMORPHONE HCL/PF 1 MG/ML
0.2 SYRINGE (ML) INJECTION
Status: DISCONTINUED | OUTPATIENT
Start: 2022-11-22 | End: 2022-11-22 | Stop reason: HOSPADM

## 2022-11-22 RX ADMIN — ALBUMIN HUMAN 250 ML: 0.05 SOLUTION INTRAVENOUS at 08:55

## 2022-11-22 RX ADMIN — CEFAZOLIN SODIUM 2000 MG: 2 SOLUTION INTRAVENOUS at 08:40

## 2022-11-22 RX ADMIN — PROPOFOL 50 MG: 10 INJECTION, EMULSION INTRAVENOUS at 09:13

## 2022-11-22 RX ADMIN — Medication 100 MG: at 08:08

## 2022-11-22 RX ADMIN — LABETALOL HYDROCHLORIDE 5 MG: 5 INJECTION, SOLUTION INTRAVENOUS at 09:31

## 2022-11-22 RX ADMIN — SODIUM CHLORIDE 0.3 MCG/KG/HR: 9 INJECTION, SOLUTION INTRAVENOUS at 08:12

## 2022-11-22 RX ADMIN — SODIUM CHLORIDE: 9 INJECTION INTRAMUSCULAR; INTRAVENOUS; SUBCUTANEOUS at 17:04

## 2022-11-22 RX ADMIN — HYDROMORPHONE HYDROCHLORIDE 0.5 MG: 1 INJECTION, SOLUTION INTRAMUSCULAR; INTRAVENOUS; SUBCUTANEOUS at 17:15

## 2022-11-22 RX ADMIN — LABETALOL HYDROCHLORIDE 5 MG: 5 INJECTION, SOLUTION INTRAVENOUS at 09:26

## 2022-11-22 RX ADMIN — KETAMINE HYDROCHLORIDE 30 MG: 100 INJECTION INTRAMUSCULAR; INTRAVENOUS at 08:06

## 2022-11-22 RX ADMIN — DEXAMETHASONE SODIUM PHOSPHATE 10 MG: 10 INJECTION, SOLUTION INTRAMUSCULAR; INTRAVENOUS at 08:10

## 2022-11-22 RX ADMIN — KETAMINE HYDROCHLORIDE 0.15 MG/KG/HR: 50 INJECTION INTRAMUSCULAR; INTRAVENOUS at 08:12

## 2022-11-22 RX ADMIN — PHENYLEPHRINE HYDROCHLORIDE 20 MCG/MIN: 10 INJECTION INTRAVENOUS at 08:29

## 2022-11-22 RX ADMIN — GABAPENTIN 300 MG: 300 CAPSULE ORAL at 06:56

## 2022-11-22 RX ADMIN — SODIUM CHLORIDE: 0.9 INJECTION, SOLUTION INTRAVENOUS at 15:15

## 2022-11-22 RX ADMIN — LIDOCAINE HYDROCHLORIDE 50 MG: 10 INJECTION, SOLUTION EPIDURAL; INFILTRATION; INTRACAUDAL; PERINEURAL at 08:05

## 2022-11-22 RX ADMIN — SODIUM CHLORIDE, POTASSIUM CHLORIDE, SODIUM LACTATE AND CALCIUM CHLORIDE 125 ML/HR: 600; 310; 30; 20 INJECTION, SOLUTION INTRAVENOUS at 06:58

## 2022-11-22 RX ADMIN — SODIUM CHLORIDE, SODIUM LACTATE, POTASSIUM CHLORIDE, AND CALCIUM CHLORIDE 20 ML/HR: .6; .31; .03; .02 INJECTION, SOLUTION INTRAVENOUS at 18:26

## 2022-11-22 RX ADMIN — ACETAMINOPHEN 975 MG: 325 TABLET ORAL at 06:56

## 2022-11-22 RX ADMIN — REMIFENTANIL HYDROCHLORIDE 0.15 MCG/KG/MIN: 1 INJECTION, POWDER, LYOPHILIZED, FOR SOLUTION INTRAVENOUS at 08:12

## 2022-11-22 RX ADMIN — SODIUM CHLORIDE, SODIUM LACTATE, POTASSIUM CHLORIDE, AND CALCIUM CHLORIDE 100 ML/HR: .6; .31; .03; .02 INJECTION, SOLUTION INTRAVENOUS at 23:30

## 2022-11-22 RX ADMIN — HYDROMORPHONE HYDROCHLORIDE 0.5 MG: 2 INJECTION INTRAMUSCULAR; INTRAVENOUS; SUBCUTANEOUS at 09:13

## 2022-11-22 RX ADMIN — METHOCARBAMOL 500 MG: 500 TABLET ORAL at 23:31

## 2022-11-22 RX ADMIN — ALBUMIN HUMAN 250 ML: 0.05 SOLUTION INTRAVENOUS at 12:58

## 2022-11-22 RX ADMIN — SODIUM CHLORIDE: 0.9 INJECTION, SOLUTION INTRAVENOUS at 08:32

## 2022-11-22 RX ADMIN — HYDROMORPHONE HYDROCHLORIDE 0.5 MG: 2 INJECTION INTRAMUSCULAR; INTRAVENOUS; SUBCUTANEOUS at 13:27

## 2022-11-22 RX ADMIN — DOCUSATE SODIUM 100 MG: 100 CAPSULE, LIQUID FILLED ORAL at 18:54

## 2022-11-22 RX ADMIN — PROPOFOL 80 MCG/KG/MIN: 10 INJECTION, EMULSION INTRAVENOUS at 08:12

## 2022-11-22 RX ADMIN — SODIUM CHLORIDE 2 MG/HR: 0.9 INJECTION, SOLUTION INTRAVENOUS at 09:40

## 2022-11-22 RX ADMIN — CEFAZOLIN SODIUM 2000 MG: 2 SOLUTION INTRAVENOUS at 18:54

## 2022-11-22 RX ADMIN — ONDANSETRON 4 MG: 2 INJECTION INTRAMUSCULAR; INTRAVENOUS at 15:21

## 2022-11-22 RX ADMIN — SODIUM CHLORIDE, SODIUM LACTATE, POTASSIUM CHLORIDE, AND CALCIUM CHLORIDE 100 ML/HR: .6; .31; .03; .02 INJECTION, SOLUTION INTRAVENOUS at 18:51

## 2022-11-22 RX ADMIN — FENTANYL CITRATE 100 MCG: 50 INJECTION INTRAMUSCULAR; INTRAVENOUS at 08:06

## 2022-11-22 RX ADMIN — PROPOFOL 30 MG: 10 INJECTION, EMULSION INTRAVENOUS at 09:25

## 2022-11-22 RX ADMIN — SODIUM CHLORIDE: 0.9 INJECTION, SOLUTION INTRAVENOUS at 09:30

## 2022-11-22 RX ADMIN — PRAVASTATIN SODIUM 10 MG: 20 TABLET ORAL at 18:54

## 2022-11-22 RX ADMIN — PROPOFOL 20 MG: 10 INJECTION, EMULSION INTRAVENOUS at 09:30

## 2022-11-22 RX ADMIN — METHOCARBAMOL 500 MG: 500 TABLET ORAL at 18:54

## 2022-11-22 RX ADMIN — CEFAZOLIN SODIUM 2000 MG: 2 SOLUTION INTRAVENOUS at 12:34

## 2022-11-22 RX ADMIN — PROPOFOL 50 MG: 10 INJECTION, EMULSION INTRAVENOUS at 09:19

## 2022-11-22 RX ADMIN — PROPOFOL 200 MG: 10 INJECTION, EMULSION INTRAVENOUS at 08:06

## 2022-11-22 RX ADMIN — CHLORHEXIDINE GLUCONATE 0.12% ORAL RINSE 15 ML: 1.2 LIQUID ORAL at 06:58

## 2022-11-22 RX ADMIN — METOROPROLOL TARTRATE 2.5 MG: 5 INJECTION, SOLUTION INTRAVENOUS at 13:35

## 2022-11-22 RX ADMIN — MIDAZOLAM 2 MG: 1 INJECTION INTRAMUSCULAR; INTRAVENOUS at 08:03

## 2022-11-22 RX ADMIN — HYDROMORPHONE HYDROCHLORIDE 0.5 MG: 2 INJECTION INTRAMUSCULAR; INTRAVENOUS; SUBCUTANEOUS at 09:19

## 2022-11-22 RX ADMIN — KETAMINE HYDROCHLORIDE 5 MG: 100 INJECTION INTRAMUSCULAR; INTRAVENOUS at 09:28

## 2022-11-22 NOTE — INTERVAL H&P NOTE
H&P reviewed  After examining the patient I find no changes in the patients condition since the H&P had been written      Vitals:    11/22/22 0647   BP: (!) 187/91   Pulse: 72   Resp: 18   Temp: (!) 97 2 °F (36 2 °C)   SpO2: 96%

## 2022-11-22 NOTE — OP NOTE
OPERATIVE REPORT  PATIENT NAME: Cliff Ibrahim    :  1955  MRN: [id number]  Pt Location: [facility] OR ROOM 03    SURGERY DATE: 2022    Surgeon(s) and Role:     * Amber Saldana MD - Primary     * Tori Meehan PA-C - Assisting    Preop Diagnosis:  Scoliosis, unspecified scoliosis type, unspecified spinal region [M41 9]  Flatback syndrome [M40 30]    Post-Op Diagnosis Codes: * Scoliosis, unspecified scoliosis type, unspecified spinal region [M41 9]     * Flatback syndrome [M40 30]    Procedure(s) (LRB):  removal of hardware L1-L3  Posterior thoracic/ lumbar instrumentation from T8 to pelvis  Thoracic osteotomy T11/12  Thoracic laminotomy T8/9, T9/10 and T10/11  Posterior thoracic fusion T7-L1  Revision instrumentation T8 to pelvis  (N/A)    Specimen(s):  * No specimens in log *    Estimated Blood Loss:   500 mL    Drains:  Urethral Catheter Latex;Straight-tip 16 Fr  (Active)   Number of days: 0       Anesthesia Type:   General    Operative Indications:  Scoliosis, unspecified scoliosis type, unspecified spinal region [M41 9]  Flatback syndrome []    60-year-old gentleman with prior multilevel lumbar fusion from L1 to S1  He had pseudoarthrosis and flat back deformity  He was initially treated surgically  The prior hardware was removed then revision fusion was performed in the lumbar spine  He also was suffering with significant deformity at the thoracolumbar junction  He was consented L4 stage procedure and was brought to the operating room pool instead risk and benefits of the procedure  Operative Findings:  Thoracolumbar kyphosis  Other fusion of the T12-L1 vertebral    Complications:   None    Procedure and Technique:  Patient was brought to the operating room and following identification, underwent general endotracheal anesthesia  Antibiotic prophylaxis was administered  Neural monitoring electrodes and Pollard were placed    He was then positioned prone on a Mo table and all bony promises were padded  Attention was directed to the lower thoracic area  Vertical incision was made proximal to the prior scar  Subcu tissue was divided using electrocautery  The paraspinal muscles were then released subperiosteally and retracted facet capsule  Following identification facet capsules at T8-9, T9-10, T10-11, and T11-12 were resected and the transverse processes of the thoracic vertebral bodies were exposed       Attention was then directed distally  Prior scar was incised vertically  Subcutaneous tissue was bluntly dissected  The scar was taken off the posterior fusion mass and the lateral hardware were exposed  Set screws were then removed from bilateral L1, left L2 and left L3 pedicle screws  The ciro was then cut on the left side between the L1 and L2 pedicle screws  On the right-sided ciro was cut at the L3 level  Previously screws were not placed at either L2 and L3  L1 screw was also short and reach the disc space  Attention was then directed to instrumentation on the right side  The L2 pedicle was cannulated using degenerative she needle  Guidewire was placed  It was then tapped and appropriate size pedicle screw was placed without difficulty  At L1 the prior pedicle screw was removed  Under fluoroscopy the pedicle was cannulated using a different starting hole  Guidewire was placed  The pedicle was tapped appropriate-sized screw was placed  The "[employer] instrumentation was utilized for the case  Attention was then put directed proximally  Facetectomy was then performed at T8-9, T9-10, and T10-11  Better screws of T8-T9 T10 and T11 were then cannulated  Each pedicle was marked  Markers were examined  The left T9 pedicle marker was medial and was removed  The remainder of the pedicles were then tapped and appropriate size pedicle screw were placed  Fluoroscopy was utilized to confirm and confirmed the position of the pedicle screws      Attention was then directed laminotomy  Laminectomy was performed at T8-9 T9-10 and T10-11  Sublaminar cables were then passed underneath the T9 and T10 lamina  Attention was then directed to osteotomy  Superior aspect of T12 and inferior aspect of the T11 lamina was then resected  Ligamentum flavum was resected  Complete facetectomy was then performed bilaterally at T11-T12  The patient was consented to undergo osteotomy at T12  The T12 however was fully fused and incorporated into the fusion mass  Rods were then cut and bent to the appropriate shape  There were stabilized distally  Able connected to the prior ciro using end-to-end connector  Set screws were then placed at L1 and L2 and final tightening was performed distally  Prior hardware was stabilized  The rods were then slowly reduced  The spine was pool back pain compression was applied across the T11-T12 osteotomy site  Once the deformity was corrected  Set screws were finally tightened  Sublaminar wires were then passed underneath the rods and were tightened and crimped  The wound was then thoroughly   The laminotomy and laminectomy sites were then covered with FloSeal and Gelfoam to prevent bony dislodgement into the canal   Bone graft was then placed dorsally  I used a combination of I factor, local bone which was cleaned and morselized, and demineralized bone matrix (optecure) the bone graft was placed dorsally from T7-L1  The lamina was decorticated to assist with the fusion  Instrumentation was from T8 and was connected to the prior instrumentation went that went down to pelvis  Fusion was performed at T7-T8, T8-9, T9-10, T10-11 and T11-12  The proximal muscles were then reapproximated  The fascia was then reapproximated  This was followed by closure of subcutaneous tissue and the skin  Subcuticular suture was utilized  At and procedure the patient was extubated and taken to PACU in stable condition      I was present for the entire procedure, A qualified resident physician was not available, and A physician assistant was required during the procedure for retraction tissue handling,dissection and suturing    Patient Disposition:  PACU         SIGNATURE: Graciela Barrientos MD  DATE: November 22, 2022  TIME: 3:49 PM

## 2022-11-22 NOTE — ANESTHESIA POSTPROCEDURE EVALUATION
Post-Op Assessment Note    CV Status:  Stable  Pain Score: 0    Pain management: adequate     Mental Status:  Alert and awake   Hydration Status:  Stable   PONV Controlled:  None   Airway Patency:  Patent and adequate      Post Op Vitals Reviewed: Yes      Staff: Anesthesiologist, CRNA         No notable events documented      BP  105/50   Temp 97 9 °F (36 6 °C) (11/22/22 1626)    Pulse  68   Resp   18   SpO2   92%

## 2022-11-22 NOTE — ANESTHESIA PROCEDURE NOTES
Arterial Line Insertion  Performed by: Saad Gann CRNA  Authorized by: Barbara Cisneros MD   Consent: Verbal consent obtained  Written consent obtained  Risks and benefits: risks, benefits and alternatives were discussed  Consent given by: patient  Patient understanding: patient states understanding of the procedure being performed  Patient consent: the patient's understanding of the procedure matches consent given  Procedure consent: procedure consent matches procedure scheduled  Relevant documents: relevant documents present and verified  Patient identity confirmed: verbally with patient, arm band and hospital-assigned identification number  Time out: Immediately prior to procedure a "time out" was called to verify the correct patient, procedure, equipment, support staff and site/side marked as required  Preparation: Patient was prepped and draped in the usual sterile fashion    Indications: multiple ABGs and hemodynamic monitoring  Orientation:  Right  Location: radial artery  Sedation:  Patient sedated: no    Procedure Details:  Jeffrey's test normal: yes  Needle gauge: 20  Seldinger technique: Seldinger technique used  Number of attempts: 1    Post-procedure:  Post-procedure: dressing applied  Waveform: good waveform and waveform confirmed  Post-procedure CNS: normal  Patient tolerance: Patient tolerated the procedure well with no immediate complications

## 2022-11-22 NOTE — ANESTHESIA PREPROCEDURE EVALUATION
Procedure:  Partial removal of hardware upper lumbar spine  Posterior thoracic instrumentation from T6 with extension to the prior lumbar hardware  Thoracic osteotomy  Posterior thoracic fusion T6-L1  Revision instrumentation T6 to pelvis  (Spine Lumbar)    Relevant Problems   ANESTHESIA (within normal limits)   (-) History of anesthesia complications      CARDIO   (+) Hyperlipidemia   (+) Hypertension      ENDO (within normal limits)      GI/HEPATIC  Confirmed NPO appropriate      /RENAL (within normal limits)      HEMATOLOGY (within normal limits)      MUSCULOSKELETAL   (+) Back pain      NEURO/PSYCH   (+) Chronic pain      PULMONARY  Seasonal allergies   (-) Sleep apnea   (-) Smoking (former smoker)   (-) URI (upper respiratory infection)        Physical Exam    Airway    Mallampati score: II  TM Distance: >3 FB  Neck ROM: full     Dental   No notable dental hx     Cardiovascular  Rhythm: regular, Rate: normal,     Pulmonary  Breath sounds clear to auscultation,     Other Findings        Anesthesia Plan  ASA Score- 3     Anesthesia Type- general with ASA Monitors  Additional Monitors: arterial line  Airway Plan: ETT  Plan Factors-Exercise tolerance (METS): >4 METS  Chart reviewed  EKG reviewed  Existing labs reviewed  Patient is not a current smoker  Induction- intravenous  Postoperative Plan- Plan for postoperative opioid use  Planned trial extubation    Informed Consent- Anesthetic plan and risks discussed with patient  I personally reviewed this patient with the CRNA  Discussed and agreed on the Anesthesia Plan with the CRNA             Lab Results   Component Value Date    WBC 6 85 10/18/2022    HGB 14 3 10/18/2022    HCT 45 5 10/18/2022    MCV 84 10/18/2022     10/18/2022     Lab Results   Component Value Date    SODIUM 138 10/18/2022    K 4 1 10/18/2022     10/18/2022    CO2 24 10/18/2022    BUN 19 10/18/2022    CREATININE 0 93 10/18/2022    GLUC 92 10/18/2022    CALCIUM 9 1 10/18/2022     Lab Results   Component Value Date    ALT 52 10/18/2022    AST 45 10/18/2022    ALKPHOS 123 (H) 10/18/2022     Lab Results   Component Value Date    INR 1 02 05/24/2022    INR 1 00 08/16/2017    PROTIME 13 0 05/24/2022    PROTIME 10 5 08/16/2017     No results found for: HGBA1C

## 2022-11-22 NOTE — ASSESSMENT & PLAN NOTE
· Blood pressure adequately controlled at this time  · Continue home lisinopril  · Continue to monitor

## 2022-11-22 NOTE — ASSESSMENT & PLAN NOTE
· Postop day 0 from thoracic and lumbar fusion  · Pain regimen in place per primary team orthopedic surgery  · PT OT to evaluate  · Continue to monitor

## 2022-11-22 NOTE — CONSULTS
[address] 1955, 78 y o  male MRN: [id number]  Unit/Bed#: -01 Encounter: [id number]  Primary Care Provider: Nicanor Bonilla MD   Date and time admitted to hospital: 11/22/2022  5:47 AM    Consults    * Status post lumbar spinal fusion  Assessment & Plan  · Postop day 0 from thoracic and lumbar fusion  · Pain regimen in place per primary team orthopedic surgery  · PT OT to evaluate  · Continue to monitor    Back pain  Assessment & Plan  · Pain regimen per Orthopedic surgery    Hypertension  Assessment & Plan  · Blood pressure adequately controlled at this time  · Continue home lisinopril  · Continue to monitor    Hyperlipidemia  Assessment & Plan  · Continue statin      VTE Prophylaxis: VTE Score: 12 Moderate Risk (Score 3-4) - Pharmacological DVT Prophylaxis Contraindicated  Sequential Compression Devices Ordered  Counseling / Coordination of Care Time: 45 minutes Greater than 50% of total time spent on patient counseling and coordination of care  Collaboration of Care: Were Recommendations Directly Discussed with Primary Treatment Team? Yes    History of Present Illness:  Anselmo Cabot is a 78 y o  male who is originally admitted to the orthopedic surgery service due to thoracic and lumbar fusion  We are consulted for medical management  Patient coming in secondary to thoracic and lumbar fusion  Past medical history significant for hypertension, history of CVA, and hyperlipidemia  Patient resting comfortably on examination but is complaining of back pain likely related to procedure  Patient does have slightly blurry vision after procedure but states it is improving at this time  Patient had no other complaints on exam     Review of Systems:  Review of Systems   Constitutional: Negative for chills, fatigue, fever and unexpected weight change  HENT: Negative for congestion, ear pain, sore throat and tinnitus      Respiratory: Negative for cough, chest tightness, shortness of breath and wheezing  Cardiovascular: Negative for chest pain, palpitations and leg swelling  Gastrointestinal: Negative for abdominal pain, constipation, diarrhea, nausea and vomiting  Genitourinary: Negative for dysuria and frequency  Skin: Negative for rash  Neurological: Negative for dizziness, weakness, light-headedness, numbness and headaches  All other systems reviewed and are negative  Past Medical and Surgical History:   Past Medical History:   Diagnosis Date   • Ambulates with cane     "long distance"   • Arthritis    • Back pain    • Chronic pain disorder    • Dental crowns present    • Exercise involving walking     daily -short walks   • History of hepatitis C     per pt "went away on its own" age 13"   • History of transfusion    • Hyperlipidemia    • Hypertension    • Leg pain     and foot pain   • Limb alert care status     per pt NO IV's LEFT UPPER ARM due to old injury   • Risk for falls    • Spinal stenosis    • Stroke ([facility] [address] )     per pt in 1996-and no lasting problems   • Wears glasses     readers       Past Surgical History:   Procedure Laterality Date   • BACK SURGERY      with implants   • COLONOSCOPY     • LUMBAR FUSION N/A 5/24/2022    Procedure: L1-S1 revision of segmental hardware, L5-S1 osteotomy, posterior spinal fusion L4-S1, pelvic instrumentation, Transforaminal lumbar interbody fusion L5S1, Cage L5S1, instrumentation L1-pelvis;   Surgeon: Graciela Barrientos MD;  Location: [facility];  Service: Orthopedics   • WISDOM TOOTH EXTRACTION         Meds/Allergies:  all medications and allergies reviewed    Allergies: No Known Allergies    Social History:  Marital Status: /Civil Union  Substance Use History:   Social History     Substance and Sexual Activity   Alcohol Use Yes   • Alcohol/week: 3 0 standard drinks   • Types: 3 Cans of beer per week    Comment: drinks once a month     Social History     Tobacco Use   Smoking Status Former   • Types: Cigarettes   • Quit date: 2012   • Years since quitting: 10 8   Smokeless Tobacco Never     Social History     Substance and Sexual Activity   Drug Use No       Family History:   History reviewed  No pertinent family history  Physical Exam:   Vitals:   Blood Pressure: 134/82 (11/22/22 1804)  Pulse: 77 (11/22/22 1804)  Temperature: 97 6 °F (36 4 °C) (11/22/22 1804)  Temp Source: Temporal (11/22/22 0647)  Respirations: 18 (11/22/22 1804)  Height: 5' 10" (177 8 cm) (11/22/22 6719)  Weight - Scale: 99 2 kg (218 lb 11 1 oz) (11/22/22 0647)  SpO2: 94 % (11/22/22 1804)    Physical Exam  Vitals and nursing note reviewed  Constitutional:       General: He is not in acute distress  Appearance: He is well-developed and well-nourished  HENT:      Head: Normocephalic and atraumatic  Eyes:      General: No scleral icterus  Conjunctiva/sclera: Conjunctivae normal    Cardiovascular:      Rate and Rhythm: Normal rate and regular rhythm  Heart sounds: Normal heart sounds  No murmur heard  No friction rub  No gallop  Pulmonary:      Effort: Pulmonary effort is normal  No respiratory distress  Breath sounds: Normal breath sounds  No wheezing or rales  Abdominal:      General: Bowel sounds are normal  There is no distension  Palpations: Abdomen is soft  Tenderness: There is no abdominal tenderness  Musculoskeletal:         General: No edema  Normal range of motion  Skin:     General: Skin is warm  Findings: No rash  Neurological:      General: No focal deficit present  Mental Status: He is alert and oriented to person, place, and time            Additional Data:   Lab Results:    Results from last 7 days   Lab Units 11/22/22  1430   I STAT HEMOGLOBIN g/dl 12 6   HEMATOCRIT, ISTAT % 37     Results from last 7 days   Lab Units 11/22/22  1430   CO2, I-STAT mmol/L 25             No results found for: HGBA1C  Results from last 7 days   Lab Units 11/22/22  0659   POC GLUCOSE mg/dl 108           Imaging: No pertinent imaging reviewed  XR spine thoracic 2 vw    (Results Pending)       ** Please Note: This note may have been constructed using a voice recognition system   **

## 2022-11-23 LAB
ALBUMIN SERPL BCP-MCNC: 3.3 G/DL (ref 3.5–5)
ALP SERPL-CCNC: 93 U/L (ref 46–116)
ALT SERPL W P-5'-P-CCNC: 43 U/L (ref 12–78)
ANION GAP SERPL CALCULATED.3IONS-SCNC: 9 MMOL/L (ref 4–13)
AST SERPL W P-5'-P-CCNC: 80 U/L (ref 5–45)
BASOPHILS # BLD AUTO: 0.01 THOUSANDS/ÂΜL (ref 0–0.1)
BASOPHILS NFR BLD AUTO: 0 % (ref 0–1)
BILIRUB SERPL-MCNC: 0.8 MG/DL (ref 0.2–1)
BUN SERPL-MCNC: 15 MG/DL (ref 5–25)
CALCIUM ALBUM COR SERPL-MCNC: 9.3 MG/DL (ref 8.3–10.1)
CALCIUM SERPL-MCNC: 8.7 MG/DL (ref 8.3–10.1)
CHLORIDE SERPL-SCNC: 103 MMOL/L (ref 96–108)
CO2 SERPL-SCNC: 23 MMOL/L (ref 21–32)
CREAT SERPL-MCNC: 0.84 MG/DL (ref 0.6–1.3)
EOSINOPHIL # BLD AUTO: 0.01 THOUSAND/ÂΜL (ref 0–0.61)
EOSINOPHIL NFR BLD AUTO: 0 % (ref 0–6)
ERYTHROCYTE [DISTWIDTH] IN BLOOD BY AUTOMATED COUNT: 14.6 % (ref 11.6–15.1)
GFR SERPL CREATININE-BSD FRML MDRD: 91 ML/MIN/1.73SQ M
GLUCOSE SERPL-MCNC: 119 MG/DL (ref 65–140)
HCT VFR BLD AUTO: 38.2 % (ref 36.5–49.3)
HGB BLD-MCNC: 12.3 G/DL (ref 12–17)
IMM GRANULOCYTES # BLD AUTO: 0.04 THOUSAND/UL (ref 0–0.2)
IMM GRANULOCYTES NFR BLD AUTO: 0 % (ref 0–2)
LYMPHOCYTES # BLD AUTO: 1.34 THOUSANDS/ÂΜL (ref 0.6–4.47)
LYMPHOCYTES NFR BLD AUTO: 11 % (ref 14–44)
MCH RBC QN AUTO: 28.3 PG (ref 26.8–34.3)
MCHC RBC AUTO-ENTMCNC: 32.2 G/DL (ref 31.4–37.4)
MCV RBC AUTO: 88 FL (ref 82–98)
MONOCYTES # BLD AUTO: 0.76 THOUSAND/ÂΜL (ref 0.17–1.22)
MONOCYTES NFR BLD AUTO: 6 % (ref 4–12)
NEUTROPHILS # BLD AUTO: 10.37 THOUSANDS/ÂΜL (ref 1.85–7.62)
NEUTS SEG NFR BLD AUTO: 83 % (ref 43–75)
NRBC BLD AUTO-RTO: 0 /100 WBCS
PLATELET # BLD AUTO: 253 THOUSANDS/UL (ref 149–390)
PMV BLD AUTO: 9 FL (ref 8.9–12.7)
POTASSIUM SERPL-SCNC: 4.2 MMOL/L (ref 3.5–5.3)
PROT SERPL-MCNC: 6.2 G/DL (ref 6.4–8.4)
RBC # BLD AUTO: 4.34 MILLION/UL (ref 3.88–5.62)
SODIUM SERPL-SCNC: 135 MMOL/L (ref 135–147)
WBC # BLD AUTO: 12.53 THOUSAND/UL (ref 4.31–10.16)

## 2022-11-23 RX ORDER — KETOROLAC TROMETHAMINE 30 MG/ML
15 INJECTION, SOLUTION INTRAMUSCULAR; INTRAVENOUS EVERY 6 HOURS PRN
Status: DISCONTINUED | OUTPATIENT
Start: 2022-11-23 | End: 2022-11-25 | Stop reason: HOSPADM

## 2022-11-23 RX ORDER — HYDROCODONE BITARTRATE AND ACETAMINOPHEN 5; 325 MG/1; MG/1
2 TABLET ORAL EVERY 6 HOURS PRN
Status: DISCONTINUED | OUTPATIENT
Start: 2022-11-23 | End: 2022-11-25 | Stop reason: HOSPADM

## 2022-11-23 RX ORDER — KETOROLAC TROMETHAMINE 30 MG/ML
15 INJECTION, SOLUTION INTRAMUSCULAR; INTRAVENOUS EVERY 6 HOURS PRN
Status: DISCONTINUED | OUTPATIENT
Start: 2022-11-23 | End: 2022-11-23

## 2022-11-23 RX ORDER — HYDROCODONE BITARTRATE AND ACETAMINOPHEN 5; 325 MG/1; MG/1
1 TABLET ORAL EVERY 6 HOURS PRN
Status: DISCONTINUED | OUTPATIENT
Start: 2022-11-23 | End: 2022-11-25 | Stop reason: HOSPADM

## 2022-11-23 RX ADMIN — DOCUSATE SODIUM 100 MG: 100 CAPSULE, LIQUID FILLED ORAL at 09:18

## 2022-11-23 RX ADMIN — HYDROCODONE BITARTRATE AND ACETAMINOPHEN 1 TABLET: 5; 325 TABLET ORAL at 19:53

## 2022-11-23 RX ADMIN — METHOCARBAMOL 500 MG: 500 TABLET ORAL at 23:10

## 2022-11-23 RX ADMIN — METHOCARBAMOL 500 MG: 500 TABLET ORAL at 06:04

## 2022-11-23 RX ADMIN — SODIUM CHLORIDE, SODIUM LACTATE, POTASSIUM CHLORIDE, AND CALCIUM CHLORIDE 100 ML/HR: .6; .31; .03; .02 INJECTION, SOLUTION INTRAVENOUS at 11:10

## 2022-11-23 RX ADMIN — ASPIRIN 325 MG ORAL TABLET 325 MG: 325 PILL ORAL at 09:18

## 2022-11-23 RX ADMIN — KETOROLAC TROMETHAMINE 15 MG: 30 INJECTION, SOLUTION INTRAMUSCULAR at 10:24

## 2022-11-23 RX ADMIN — CEFAZOLIN SODIUM 2000 MG: 2 SOLUTION INTRAVENOUS at 01:23

## 2022-11-23 RX ADMIN — PRAVASTATIN SODIUM 10 MG: 20 TABLET ORAL at 15:28

## 2022-11-23 RX ADMIN — METHOCARBAMOL 500 MG: 500 TABLET ORAL at 11:11

## 2022-11-23 RX ADMIN — METHOCARBAMOL 500 MG: 500 TABLET ORAL at 17:47

## 2022-11-23 RX ADMIN — HYDROCODONE BITARTRATE AND ACETAMINOPHEN 2 TABLET: 5; 325 TABLET ORAL at 22:18

## 2022-11-23 RX ADMIN — LISINOPRIL 5 MG: 5 TABLET ORAL at 09:18

## 2022-11-23 RX ADMIN — FERROUS SULFATE TAB 325 MG (65 MG ELEMENTAL FE) 325 MG: 325 (65 FE) TAB at 09:25

## 2022-11-23 RX ADMIN — DOCUSATE SODIUM 100 MG: 100 CAPSULE, LIQUID FILLED ORAL at 17:47

## 2022-11-23 RX ADMIN — KETOROLAC TROMETHAMINE 15 MG: 30 INJECTION, SOLUTION INTRAMUSCULAR at 17:51

## 2022-11-23 RX ADMIN — SENNOSIDES 8.6 MG: 8.6 TABLET, FILM COATED ORAL at 09:18

## 2022-11-23 RX ADMIN — HYDROCODONE BITARTRATE AND ACETAMINOPHEN 2 TABLET: 5; 325 TABLET ORAL at 15:27

## 2022-11-23 NOTE — OCCUPATIONAL THERAPY NOTE
Occupational Therapy Evaluation      Joseph Claude    11/23/2022    Patient Active Problem List   Diagnosis    Status post lumbar spinal fusion    Hyperlipidemia    Hypertension    Back pain    Chronic pain    Flatback syndrome       Past Medical History:   Diagnosis Date    Ambulates with cane     "long distance"    Arthritis     Back pain     Chronic pain disorder     Dental crowns present     Exercise involving walking     daily -short walks    History of hepatitis C     per pt "went away on its own" age 13"    History of transfusion     Hyperlipidemia     Hypertension     Leg pain     and foot pain    Limb alert care status     per pt NO IV's LEFT UPPER ARM due to old injury    Risk for falls     Spinal stenosis     Stroke ([facility] [address] )     per pt in 1996-and no lasting problems    Wears glasses     readers       Past Surgical History:   Procedure Laterality Date    BACK SURGERY      with implants    COLONOSCOPY      LUMBAR FUSION N/A 5/24/2022    Procedure: L1-S1 revision of segmental hardware, L5-S1 osteotomy, posterior spinal fusion L4-S1, pelvic instrumentation, Transforaminal lumbar interbody fusion L5S1, Cage L5S1, instrumentation L1-pelvis; Surgeon: Jo Donald MD;  Location: [facility];  Service: Orthopedics    WISDOM TOOTH EXTRACTION          11/23/22 0811   OT Last Visit   OT Visit Date 11/23/22   Note Type   Note type Evaluation   Additional Comments Pt agreeable to OT session  Upon arrival pt supine in bed with HOB elevated  Pain Assessment   Pain Assessment Tool 0-10   Pain Score 9   Pain Location/Orientation Orientation: Mid;Location: Back   Pain Onset/Description Onset: Ongoing;Frequency: Constant/Continuous; Descriptor: [address] Pain Intervention(s) Ambulation/increased activity;Repositioned;Medication (See MAR)   Restrictions/Precautions   Weight Bearing Precautions Per Order No   Braces or Orthoses TLSO  (donned while seated at EOB)   Other Precautions Bed Alarm; Fall Risk;Multiple lines; O2;Spinal precautions  (2 L O2 via NC- not used at baseline)   Home Living   Type of [address] Two level;Performs ADLs on one level; Able to live on main level with bedroom/bathroom;Stairs to enter with rails  (1 BRENDA; sleeps on couch on 1st floor)   Bathroom Shower/Tub Walk-in shower   Bathroom Toilet Raised   Bathroom Equipment Shower chair;Grab bars around toilet   [address] Cane;Walker;Reacher  (no AD used in house; [facility] in [facility])   Prior Function   Level of [place] Independent with ADLs; Independent with functional mobility   Lives With Spouse; Son   Niranjan Nunes Help From Family   IADLs Independent with driving; Independent with meal prep; Independent with medication management   Falls in the last 6 months 0   Vocational On disability   Lifestyle   Autonomy Patient reporting being independent with ADLs/IADLs, ambulatory with no AD or SPC, and lives with family in a two story house   Reciprocal Relationships Supportive family   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   Intrinsic Gratification Enjoys playing in garage   ADL   Eating Assistance 5  Supervision/Setup   Grooming Assistance 5  Supervision/Setup   UB Bathing Assistance 4  Minimal Assistance   LB [address] 3  Moderate Assistance   UB Dressing Assistance 3  Moderate Assistance    [address] 2  Maximal [address]  2  Maximal Assistance   Bed Mobility   Supine to Sit 3  Moderate assistance   Additional items Assist x 2;HOB elevated; Bedrails; Increased time required;Verbal cues;LE management   Sit to Supine 4  Minimal assistance   Additional items Assist x 2;Bedrails; Increased time required;Verbal cues;LE management   Additional Comments Log-roll technique utilized for bed mobility to maintain spinal precautions   Transfers   Sit to Stand 4  Minimal assistance   Additional items Assist x 2; Increased time required;Verbal cues; Bedrails Stand to Sit 4  Minimal assistance   Additional items Assist x 2; Increased time required;Verbal cues   Additional Comments Pt reported (+) dizziness with transitional movements  BP while seated at EOB: 145/83  Pt reported symtpoms decreased once supine in bed at end of session  Functional Mobility   Functional Mobility 4  Minimal assistance  (Assist of 2)   Additional Comments Pt ambulated side-steps towards HOB  Pt unsteady and reported feeling weak  Pt reported (+) dizziness and requested to return BTB  Additional items Rolling walker   Balance   Static Sitting Fair +   Dynamic Sitting Fair -   Static Standing Poor +   Dynamic Standing Poor   Activity Tolerance   Activity Tolerance Patient limited by pain   Medical Staff Made Aware Pt seen as a co-eval with PT Saurabh Anthony due to the patient's co-morbidities, clinically unstable presentation, and present impairments which are a regression from the patient's baseline  RUE Assessment   RUE Assessment WFL  (grossly 3+/5 MMT)   LUE Assessment   LUE Assessment WFL  (grossly 3+/5 MMT)   Hand Function   Gross Motor Coordination Functional   Fine Motor Coordination Functional   Sensation   Light Touch No apparent deficits   Vision-Basic Assessment   Current Vision Wears glasses only for reading   Cognition   Overall Cognitive Status [facility]   Arousal/Participation Alert; Responsive; Cooperative   Attention Attends with cues to redirect   Orientation Level Oriented X4   Memory Decreased recall of precautions  (Pt did not recall log-roll technique or spinal precautions from previous surgery)   Following Commands Follows one step commands without difficulty   Assessment   Limitation Decreased ADL status; Decreased UE strength;Decreased endurance;Decreased self-care trans;Decreased high-level ADLs; Decreased cognition   Prognosis Good   Assessment Patient is a 78 y o  male seen for OT evaluation s/p admit to [id number] [facility]  on 11/22/2022 w/Status post lumbar spinal fusion  Commorbidities affecting patient's functional performance at time of assessment include: HLD, HTN, chronic pain, and flatback syndrome  Orders placed for OT evaluation and treatment and ambulate patient  Performed at least two patient identifiers during session including name and wristband  Prior to admission, Patient reporting being independent with ADLs/IADLs, ambulatory with no AD or SPC, and lives with family in a two story house  Personal factors affecting patient at time of initial evaluation include: steps to enter, difficulty performing ADLs and difficulty performing IADLs  Upon evaluation, patient requires minimal  and moderate assist for UB ADLs, moderate and maximal assist for LB ADLs, transfers and functional ambulation in room and bathroom with min assist of 2, with the use of Rolling Walker  Patient is oriented x 4  Occupational performance is affected by the following deficits: decreased muscle strength, dynamic sit/ stand balance deficit with poor standing tolerance time for self care and functional mobility, decreased activity tolerance, impaired memory, increased pain, orthopedic restrictions and delayed righting and equilibrium reactions  Patient to benefit from continued Occupational Therapy treatment while in the hospital to address deficits as defined above and maximize level of functional independence with ADLs and functional mobility  Occupational Performance areas to address include: grooming , bathing/ shower, dressing, toilet hygiene, transfer to all surfaces, functional ambulation, medication routine/ management, IADLS: Household maintenance, IADLs: safety procedures and IADLs: meal prep/ clean up  From OT standpoint, recommendation at time of d/c would be Post acute rehabilitation services  Goals   Patient Goals to have less pain   Plan   Treatment Interventions ADL retraining;Functional transfer training;UE strengthening/ROM; Endurance training;Patient/family training;Equipment evaluation/education; Compensatory technique education   Goal Expiration Date 12/13/22   OT Treatment Day 0   OT Frequency 3-5x/wk   Recommendation   OT Discharge Recommendation Post acute rehabilitation services   Additional Comments  The patient's raw score on the AM-PAC Daily Activity inpatient short form is 12, standardized score is 30 6, less than 39 4  Patients at this level are likely to benefit from discharge to post-acute rehabilitation services  Please refer to the recommendation of the Occupational Therapist for safe discharge planning  AM-PAC Daily Activity Inpatient   Lower Body Dressing 1   Bathing 2   Toileting 1   Upper Body Dressing 2   Grooming 3   Eating 3   Daily Activity Raw Score 12   Daily Activity Standardized Score (Calc for Raw Score >=11) 30 6   AM-PAC Applied Cognition Inpatient   Following a Speech/Presentation 4   Understanding Ordinary Conversation 4   Taking Medications 3   Remembering Where Things Are Placed or Put Away 2   Remembering List of 4-5 Errands 3   Taking Care of Complicated Tasks 3   Applied Cognition Raw Score 19   Applied Cognition Standardized Score 39 77   End of Consult   Education Provided Yes  (Educated patient on spinal precautions and log-roll technique)   Patient Position at End of Consult Supine; All needs within reach  (PT [place] Forget at bedside)   Nurse Communication Nurse aware of consult     GOALS:    *ADL transfers with (I) for inc'd independence with ADLs/purposeful tasks    *UB ADL with (I) for inc'd independence with self cares    *LB ADL with (S) using AE prn for inc'd independence with self cares    *Toileting with (S) for clothing management and hygiene for return to PLOF with personal care    *Increase stand tolerance x 5  m for inc'd tolerance with standing purposeful tasks    *Participate in 10m UE therex to increase overall stamina/activity tolerance for purposeful tasks    *Bed mobility- (I) for inc'd independence to manage own comfort and initiate EOB & OOB purposeful tasks    *Patient will verbalize 3 safety awareness/ principles to prevent falls in the home setting  *Patient will verbalize and demonstrate use of energy conservation/deep breathing techniques and work simplification skills during functional activities with no verbal cues  *Patient will increase OOB/sitting tolerance to 2-4 hours per day to increase participation in self-care and leisure tasks with no s/s of exertion  *Pt will verbalize and demonstrate understanding of post-op movement precautions 100% in tx sessions for increased safety and functional mobility        *Pt will demonstrate use of long handled AE during 100% of tx sessions for increased ADL safety and independence following D/C     Apryl Hernandes, OTJEREMIAH, OTR/L

## 2022-11-23 NOTE — PROGRESS NOTES
[address] [place] [address]  Progress Note - Hermes Ciara 1955, 78 y o  male MRN: [id number]  Unit/Bed#: MS [facility]rick-Henny Encounter: [id number]  Primary Care Provider: Edgar Ruvalcaba MD   Date and time admitted to hospital: 2022  5:47 AM    Back pain  Assessment & Plan  · Pain regimen per Orthopedic surgery    Hypertension  Assessment & Plan  · Blood pressure adequately controlled at this time  · Continue home lisinopril  · Continue to monitor    Hyperlipidemia  Assessment & Plan  · Continue statin    * Status post lumbar spinal fusion  Assessment & Plan  · Postop day 1 from thoracic and lumbar fusion  · Pain regimen in place per primary team orthopedic surgery  · PT OT to evaluate  · Continue to monitor        VTE Pharmacologic Prophylaxis:   VTE Score: 12 Low Risk (Score 0-2) - Encourage Ambulation  Mechanical VTE Prophylaxis in Place: Yes    Patient Centered Rounds: I have performed bedside rounds with nursing staff today  Discussions with Specialists or Other Care Team Provider: yes    Education and Discussions with Family / Patient: yes    Current Length of Stay: 1 day(s)    Current Patient Status: Inpatient     Discharge Plan / Estimated Discharge Date: Maegan Pinto is following this patient on consult  They are medically stable for discharge when deemed appropriate by primary service  Code Status: Prior      Subjective:     Doing generally well after surgery, denies chest pain or shortness of breath  Denies nausea/vomiting  No overnight events reported     Objective:     Vitals:   Temp (24hrs), Av 2 °F (36 8 °C), Min:97 6 °F (36 4 °C), Max:99 4 °F (37 4 °C)    Temp:  [97 6 °F (36 4 °C)-99 4 °F (37 4 °C)] 98 5 °F (36 9 °C)  HR:  [68-88] 88  Resp:  [18-41] 18  BP: ()/(58-98) 145/83  SpO2:  [90 %-98 %] 95 %  Body mass index is 31 38 kg/m²  Input and Output Summary (last 24 hours):        Intake/Output Summary (Last 24 hours) at 2022 1006  Last data filed at 2022 0701  Gross per 24 hour   Intake 2260 ml   Output 3035 ml   Net -775 ml       Physical Exam:     Physical Exam  Vitals reviewed  Constitutional:       Appearance: Normal appearance  HENT:      Head: Atraumatic  Eyes:      General: No scleral icterus  Cardiovascular:      Rate and Rhythm: Normal rate and regular rhythm  Heart sounds: Normal heart sounds  Pulmonary:      Effort: No respiratory distress  Abdominal:      General: Bowel sounds are normal    Musculoskeletal:      Cervical back: Neck supple  Right lower leg: No edema  Left lower leg: No edema  Skin:     General: Skin is warm and dry  Capillary Refill: Capillary refill takes less than 2 seconds  Neurological:      Mental Status: He is alert and oriented to person, place, and time  Psychiatric:         Mood and Affect: Mood normal         Additional Data:     Labs:  Results from last 7 days   Lab Units 11/23/22  0532   WBC Thousand/uL 12 53*   HEMOGLOBIN g/dL 12 3   HEMATOCRIT % 38 2   PLATELETS Thousands/uL 253   NEUTROS PCT % 83*   LYMPHS PCT % 11*   MONOS PCT % 6   EOS PCT % 0     Results from last 7 days   Lab Units 11/23/22  0532   SODIUM mmol/L 135   POTASSIUM mmol/L 4 2   CHLORIDE mmol/L 103   CO2 mmol/L 23   BUN mg/dL 15   CREATININE mg/dL 0 84   ANION GAP mmol/L 9   CALCIUM mg/dL 8 7   ALBUMIN g/dL 3 3*   TOTAL BILIRUBIN mg/dL 0 80   ALK PHOS U/L 93   ALT U/L 43   AST U/L 80*   GLUCOSE RANDOM mg/dL 119         Results from last 7 days   Lab Units 11/22/22  0659   POC GLUCOSE mg/dl 108               Imaging: No pertinent imaging reviewed      Recent Cultures (last 7 days):           Lines/Drains:  Invasive Devices     Peripheral Intravenous Line  Duration           Peripheral IV 11/22/22 Left Forearm 1 day    Peripheral IV 11/22/22 Left Forearm 1 day                Telemetry:        Last 24 Hours Medication List:   Current Facility-Administered Medications   Medication Dose Route Frequency Provider Last Rate   • aspirin  325 mg Oral Daily Tori Meehan PA-C     • docusate sodium  100 mg Oral BID Tori Meehan PA-C     • ferrous sulfate  325 mg Oral Daily With Breakfast Tori Meehan PA-C     • HYDROcodone-acetaminophen  1 tablet Oral Q6H PRN Tori Meehan PA-C     • lactated ringers  125 mL/hr Intravenous Continuous Vince Saldana  mL/hr (11/22/22 1539)   • lactated ringers  20 mL/hr Intravenous Continuous Vince Saldana MD 20 mL/hr (11/22/22 1826)   • lactated ringers  100 mL/hr Intravenous Continuous Tori Meehan PA-C 100 mL/hr (11/22/22 2330)   • lisinopril  5 mg Oral Daily Tori Meehan PA-C     • magnesium hydroxide  30 mL Oral Daily PRN Tori Meehan PA-C     • methocarbamol  500 mg Oral Q6H [address] Tori Meehan PA-C     • naloxone  0 1 mg Intravenous Q3 min PRN Tori Meehan PA-C     • ondansetron  4 mg Intravenous Q6H PRN Tori Meehan PA-C     • pravastatin  10 mg Oral Daily With AnOSMIN Manuel     • senna  1 tablet Oral Daily Tori Meehan PA-C          Today, Patient Was Seen By: Custer Sandhoff, MD    ** Please Note: This note has been constructed using a voice recognition system   **

## 2022-11-23 NOTE — CONSULTS
[address] 1955, 78 y o  male MRN: [id number]  Unit/Bed#: -01 Encounter: [id number]  Primary Care Provider: Celsa Shea MD   Date and time admitted to hospital: 11/22/2022  5:47 AM    Inpatient consult to Internal Medicine  Consult performed by:  Roberta Nissen, DO  Consult ordered by: Ivan Greene PA-C        Please refer to consult note from yesterday

## 2022-11-23 NOTE — PLAN OF CARE
Problem: MOBILITY - ADULT  Goal: Maintain or return to baseline ADL function  Description: INTERVENTIONS:  -  Assess patient's ability to carry out ADLs; assess patient's baseline for ADL function and identify physical deficits which impact ability to perform ADLs (bathing, care of mouth/teeth, toileting, grooming, dressing, etc )  - Assess/evaluate cause of self-care deficits   - Assess range of motion  - Assess patient's mobility; develop plan if impaired  - Assess patient's need for assistive devices and provide as appropriate  - Encourage maximum independence but intervene and supervise when necessary  - Involve family in performance of ADLs  - Assess for home care needs following discharge   - Consider OT consult to assist with ADL evaluation and planning for discharge  - Provide patient education as appropriate  11/22/2022 2217 by Ryland Linn RN  Outcome: Progressing  11/22/2022 2217 by Ryland Linn RN  Outcome: Progressing  Goal: Maintains/Returns to pre admission functional level  Description: INTERVENTIONS:  - Perform BMAT or MOVE assessment daily    - Set and communicate daily mobility goal to care team and patient/family/caregiver  - Collaborate with rehabilitation services on mobility goals if consulted  - Perform Range of Motion  times a day  - Reposition patient every  hours    - Dangle patient  times a day  - Stand patient  times a day  - Ambulate patient  times a day  - Out of bed to chair  times a day   - Out of bed for meals  times a day  - Out of bed for toileting  - Record patient progress and toleration of activity level   11/22/2022 2217 by Ryland Linn RN  Outcome: Progressing  11/22/2022 2217 by Ryland Linn RN  Outcome: Progressing     Problem: Potential for Falls  Goal: Patient will remain free of falls  Description: INTERVENTIONS:  - Educate patient/family on patient safety including physical limitations  - Instruct patient to call for assistance with activity   - Consult OT/PT to assist with strengthening/mobility   - Keep Call bell within reach  - Keep bed low and locked with side rails adjusted as appropriate  - Keep care items and personal belongings within reach  - Initiate and maintain comfort rounds  - Make Fall Risk Sign visible to staff  - Offer Toileting every  Hours, in advance of need  - Initiate/Maintain alarm  - Obtain necessary fall risk management equipment  - Apply yellow socks and bracelet for high fall risk patients  - Consider moving patient to room near nurses station  11/22/2022 2217 by Shay Figueroa RN  Outcome: Progressing  11/22/2022 2217 by Shay Figueroa RN  Outcome: Progressing     Problem: PAIN - ADULT  Goal: Verbalizes/displays adequate comfort level or baseline comfort level  Description: Interventions:  - Encourage patient to monitor pain and request assistance  - Assess pain using appropriate pain scale  - Administer analgesics based on type and severity of pain and evaluate response  - Implement non-pharmacological measures as appropriate and evaluate response  - Consider cultural and social influences on pain and pain management  - Notify physician/advanced practitioner if interventions unsuccessful or patient reports new pain  Outcome: Progressing     Problem: INFECTION - ADULT  Goal: Absence or prevention of progression during hospitalization  Description: INTERVENTIONS:  - Assess and monitor for signs and symptoms of infection  - Monitor lab/diagnostic results  - Monitor all insertion sites, i e  indwelling lines, tubes, and drains  - Monitor endotracheal if appropriate and nasal secretions for changes in amount and color  - [place] appropriate cooling/warming therapies per order  - Administer medications as ordered  - Instruct and encourage patient and family to use good hand hygiene technique  - Identify and instruct in appropriate isolation precautions for identified infection/condition  Outcome: Progressing  Goal: Absence of fever/infection during neutropenic period  Description: INTERVENTIONS:  - Monitor WBC    Outcome: Progressing     Problem: DISCHARGE PLANNING  Goal: Discharge to home or other facility with appropriate resources  Description: INTERVENTIONS:  - Identify barriers to discharge w/patient and caregiver  - Arrange for needed discharge resources and transportation as appropriate  - Identify discharge learning needs (meds, wound care, etc )  - Arrange for interpretive services to assist at discharge as needed  - Refer to Case Management Department for coordinating discharge planning if the patient needs post-hospital services based on physician/advanced practitioner order or complex needs related to functional status, cognitive ability, or social support system  Outcome: Progressing     Problem: Knowledge Deficit  Goal: Patient/family/caregiver demonstrates understanding of disease process, treatment plan, medications, and discharge instructions  Description: Complete learning assessment and assess knowledge base    Interventions:  - Provide teaching at level of understanding  - Provide teaching via preferred learning methods  Outcome: Progressing

## 2022-11-23 NOTE — PLAN OF CARE
Problem: OCCUPATIONAL THERAPY ADULT  Goal: Performs self-care activities at highest level of function for planned discharge setting  See evaluation for individualized goals  Description: Treatment Interventions: ADL retraining, Functional transfer training, UE strengthening/ROM, Endurance training, Patient/family training, Equipment evaluation/education, Compensatory technique education          See flowsheet documentation for full assessment, interventions and recommendations  Note: Limitation: Decreased ADL status, Decreased UE strength, Decreased endurance, Decreased self-care trans, Decreased high-level ADLs, Decreased cognition  Prognosis: Good  Assessment: Patient is a 78 y o  male seen for OT evaluation s/p admit to [id number] [place] [address]  on 11/22/2022 w/Status post lumbar spinal fusion  Commorbidities affecting patient's functional performance at time of assessment include: HLD, HTN, chronic pain, and flatback syndrome  Orders placed for OT evaluation and treatment and ambulate patient  Performed at least two patient identifiers during session including name and wristband  Prior to admission, Patient reporting being independent with ADLs/IADLs, ambulatory with no AD or SPC, and lives with family in a two story house  Personal factors affecting patient at time of initial evaluation include: steps to enter, difficulty performing ADLs and difficulty performing IADLs  Upon evaluation, patient requires minimal  and moderate assist for UB ADLs, moderate and maximal assist for LB ADLs, transfers and functional ambulation in room and bathroom with min assist of 2, with the use of Rolling Walker  Patient is oriented x 4    Occupational performance is affected by the following deficits: decreased muscle strength, dynamic sit/ stand balance deficit with poor standing tolerance time for self care and functional mobility, decreased activity tolerance, impaired memory, increased pain, orthopedic restrictions and delayed righting and equilibrium reactions  Patient to benefit from continued Occupational Therapy treatment while in the hospital to address deficits as defined above and maximize level of functional independence with ADLs and functional mobility  Occupational Performance areas to address include: grooming , bathing/ shower, dressing, toilet hygiene, transfer to all surfaces, functional ambulation, medication routine/ management, IADLS: Household maintenance, IADLs: safety procedures and IADLs: meal prep/ clean up  From OT standpoint, recommendation at time of d/c would be Post acute rehabilitation services       OT Discharge Recommendation: Post acute rehabilitation services        Iam HAWKINS

## 2022-11-23 NOTE — PROGRESS NOTES
Orthopedics   Adryan Cruz 78 y o  male MRN: [id number]  Unit/Bed#: -01    Subjective:  78 y o male POD#1 s/p removal of hardware L1-L3, posterior thoracic fusion T7-L1, thoracic osteotomy T11/12, revision instrumentation T8 to pelvis, and thoracic laminotomy T8-9, T9-10, and T10-11  Patient reports he is doing well this morning  Patient states he has pain in his back that is 8/10 currently  Patient has been utilizing PCA pump  Patient denies any radiation of pain into lower extremities  Patient reports he has not been out of bed since surgery  Patient states randhawa catheter was removed this morning , and he has been able to urinate on his own  Patient denies any shortness of breath, chest pain, lightheadedness, dizziness, nausea, and vomiting       Labs:  0   Lab Value Date/Time    HCT 38 2 11/23/2022 0532    HCT 37 11/22/2022 1430    HCT 39 11/22/2022 1248    HCT 39 11/22/2022 1021    HCT 45 5 10/18/2022 0902    HCT 38 5 06/29/2022 0942    HGB 12 3 11/23/2022 0532    HGB 12 6 11/22/2022 1430    HGB 13 3 11/22/2022 1248    HGB 13 3 11/22/2022 1021    HGB 14 3 10/18/2022 0902    HGB 11 8 (L) 06/29/2022 0942    INR 1 02 05/24/2022 0849    WBC 12 53 (H) 11/23/2022 0532    WBC 6 85 10/18/2022 0902    WBC 7 26 06/29/2022 0942       Meds:    Current Facility-Administered Medications:   •  aspirin tablet 325 mg, 325 mg, Oral, Daily, Tori Meehan PA-C  •  docusate sodium (COLACE) capsule 100 mg, 100 mg, Oral, BID, Tori Meehan PA-C, 100 mg at 11/22/22 1854  •  ferrous sulfate tablet 325 mg, 325 mg, Oral, Daily With Breakfast, Tori Meehan PA-C  •  HYDROmorphone (DILAUDID) 1 mg/mL 50 mL PCA, , Intravenous, Continuous, Tori Meehan PA-C, Rate Verify at 11/23/22 0700  •  lactated ringers infusion, 125 mL/hr, Intravenous, Continuous, Barbara Cisneros MD, Last Rate: 125 mL/hr at 11/22/22 0658, Restarted at 11/22/22 0803  •  lactated ringers infusion, 20 mL/hr, Intravenous, Continuous, Bdu Griffin Comfort Rosas MD, Last Rate: 20 mL/hr at 11/22/22 1826, 20 mL/hr at 11/22/22 1826  •  lactated ringers infusion, 100 mL/hr, Intravenous, Continuous, Tori Meehan PA-C, Last Rate: 100 mL/hr at 11/22/22 2330, 100 mL/hr at 11/22/22 2330  •  lisinopril (ZESTRIL) tablet 5 mg, 5 mg, Oral, Daily, HUEY Juárez-C  •  magnesium hydroxide (MILK OF MAGNESIA) oral suspension 30 mL, 30 mL, Oral, Daily PRN, Tori Meehan PA-C  •  methocarbamol (ROBAXIN) tablet 500 mg, 500 mg, Oral, Q6H [address], HUEY Juárez-C, 500 mg at 11/23/22 0604  •  naloxone (NARCAN) injection 0 1 mg, 0 1 mg, Intravenous, Q3 min PRN, HUEY Juárez-C  •  ondansetron (ZOFRAN) injection 4 mg, 4 mg, Intravenous, Q6H PRN, Tori Meehan PA-C  •  pravastatin (PRAVACHOL) tablet 10 mg, 10 mg, Oral, Daily With Dinner, HUEY Juárez-C, 10 mg at 11/22/22 1854  •  senna (SENOKOT) tablet 8 6 mg, 1 tablet, Oral, Daily, Tori Meehan PA-C    Blood Culture:   No results found for: BLOODCX    Wound Culture:   No results found for: WOUNDCULT    Ins and Outs:  I/O last 24 hours: In: 36 [P O :960; I V :2600]  Out: 8025 [Urine:3135; Blood:500]          Physical:  Vitals:    11/23/22 0700   BP:    Pulse: 85   Resp:    Temp:    SpO2: 97%     Lumbar spine:  · Patient is a 27-year-old male laying in hospital bed in no acute distress  · Dressings clean, dry, and intact without soilage or strike through present  · Neurovascularly intact L2-S1 bilateral lower extremities  · Motor intact from L2-S1  · Able to perform straight leg raise bilaterally  · Able to perform active ROM in knees from 0 to 120 bilaterally   · 2+ DP pulse bilateral lower extremities  · Calfs supple and nontender    Assessment:  67 y o male POD#1 s/p removal of hardware L1-L3, posterior thoracic fusion T7-L1, thoracic osteotomy T11/12, revision instrumentation T8 to pelvis, and thoracic laminotomy T8-9, T9-10, and T10-11       Plan:  · Weight Bearing as tolerated all extremities  · Up and out of bed  · TLSO brace to be worn at all times out of bed  · DVT prophylaxis: mechanical and aspirin 325mg daily   · Analgesics  · PT/OT  · Patient noted to have acute blood loss anemia due to a drop in Hbg of > 2 0g from preop levels, will monitor vital signs and resuscitate with IV fluids as needed  Hgb noted to be 12 3 this morning  · Consulted SL[facility] yesterday for management of medical conditions including hypertension and hyperlipidemia  · Dispo: PCA pump will be discontinued this morning and will start on oral pain medications  Encouraged patient to work with PT/OT today on ambulation  See above for additional details       Savannah Simons PA-C

## 2022-11-23 NOTE — PHYSICAL THERAPY NOTE
Physical Therapy Evaluation     Patient's Name: Deborah Bennett    Admitting Diagnosis  Scoliosis, unspecified scoliosis type, unspecified spinal region [M41 9]  Flatback syndrome [M40 30]    Problem List  Patient Active Problem List   Diagnosis    Status post lumbar spinal fusion    Hyperlipidemia    Hypertension    Back pain    Chronic pain    Flatback syndrome       Past Medical History  Past Medical History:   Diagnosis Date    Ambulates with cane     "long distance"    Arthritis     Back pain     Chronic pain disorder     Dental crowns present     Exercise involving walking     daily -short walks    History of hepatitis C     per pt "went away on its own" age 13"    History of transfusion     Hyperlipidemia     Hypertension     Leg pain     and foot pain    Limb alert care status     per pt NO IV's LEFT UPPER ARM due to old injury    Risk for falls     Spinal stenosis     Stroke ([facility] [address] )     per pt in 1996-and no lasting problems    Wears glasses     readers       Past Surgical History  Past Surgical History:   Procedure Laterality Date    BACK SURGERY      with implants    COLONOSCOPY      LUMBAR FUSION N/A 5/24/2022    Procedure: L1-S1 revision of segmental hardware, L5-S1 osteotomy, posterior spinal fusion L4-S1, pelvic instrumentation, Transforaminal lumbar interbody fusion L5S1, Cage L5S1, instrumentation L1-pelvis; Surgeon: Lashaun Pearson MD;  Location: [facility];  Service: Orthopedics    WISDOM TOOTH EXTRACTION          11/23/22 0843   PT Last Visit   PT Visit Date 11/23/22   Note Type   Note type Evaluation   Pain Assessment   Pain Assessment Tool 0-10   Pain Score 9   Pain Location/Orientation Orientation: Mid;Location: Back   Pain Onset/Description Onset: Ongoing;Frequency: Constant/Continuous; Descriptor: [address] Pain Intervention(s) Ambulation/increased activity;Repositioned;Medication (See MAR)   Restrictions/Precautions   Weight Bearing Precautions Per Order No   Braces or Orthoses TLSO  (donned while seated at EOB)   Other Precautions Bed Alarm; Fall Risk;Multiple lines;O2;Spinal precautions  (2 L O2 via NC- not used at baseline)   Home Living   Type of [address] Two level;Performs ADLs on one level; Able to live on main level with bedroom/bathroom;Stairs to enter with rails  (1 BRENDA; sleeps on couch on 1st floor)   Bathroom Shower/Tub Walk-in shower   Bathroom Toilet Raised   Bathroom Equipment Shower chair;Grab bars around toilet   [address] Cane;Walker;Reacher  (no AD used in house; [facility])   Prior Function   Level of [place] Independent with ADLs; Independent with functional mobility   Lives With Spouse; Son   Jacinta Robles Help From Family   IADLs Independent with driving; Independent with meal prep; Independent with medication management   Falls in the last 6 months 0   Vocational On disability   General   Family/Caregiver Present No   Cognition   Overall Cognitive Status WFL   Arousal/Participation Alert   Orientation Level Oriented X4   Memory Decreased recall of precautions  (Pt did not recall log-roll technique or spinal precautions from previous surgery)   Following Commands Follows one step commands without difficulty   Comments Pt agreeable to PT   RLE Assessment   RLE Assessment X   Strength RLE   RLE Overall Strength 4/5   LLE Assessment   LLE Assessment X   Strength LLE   LLE Overall Strength 4/5   Vision-Basic Assessment   Current Vision Wears glasses only for reading   Light Touch   RLE Light Touch Grossly intact   LLE Light Touch Grossly intact   Bed Mobility   Supine to Sit 3  Moderate assistance   Additional items Assist x 2;HOB elevated; Bedrails; Increased time required;Verbal cues;LE management   Sit to Supine 4  Minimal assistance   Additional items Assist x 2;Bedrails; Increased time required;Verbal cues;LE management   Additional Comments Pt received at start of session supine in bed with HOB elevated  Following session, pt returned to bed and remained with needs met, alarm activated and call bell within reach   Transfers   Sit to Stand 4  Minimal assistance   Additional items Assist x 2; Increased time required;Verbal cues; Bedrails   Stand to Sit 4  Minimal assistance   Additional items Assist x 2; Increased time required;Verbal cues   Additional Comments with use of RW  Pt reported increased dizziness with transitions to short sit and STS  Pt reporting increasing dizziness with prolonged standing and declined transferring to recliner chair at this time  Following session, pt remained in bed with needs met, alarm activated and call bell within reach   Ambulation/Elevation   Gait pattern Decreased foot clearance; Inconsistent osvaldo; Short stride;Decreased heel strike   Gait Assistance 4  Minimal assist   Additional items Assist x 2;Verbal cues   Assistive Device Rolling walker   Distance 3' lateral side steps towards [facility]   Stair Management Assistance Not tested   Balance   Static Sitting Fair +   Dynamic Sitting Fair   Static Standing Poor +   Dynamic Standing Poor   Ambulatory Poor   Endurance Deficit   Endurance Deficit Yes   Endurance Deficit Description decreased endurance and reports of increased dizziness   Activity Tolerance   Activity Tolerance Patient limited by pain   Medical Staff Made Aware OT Viviana   Nurse Made Aware JENI Garcia   Assessment   Prognosis Good   Problem List Decreased strength;Decreased endurance;Decreased mobility; Impaired balance;Pain;Orthopedic restrictions   Assessment Pt is 78 y o  male seen for PT evaluation [facility] w/ Status post lumbar spinal fusion  PT consulted to assess pt's functional mobility and d/c needs  Order placed for PT eval and tx, w/ up w/ A order  Comorbidities affecting pt's physical performance at time of assessment include: status post lumbar spinal fusion, HTN, back pain, HLD   PTA, pt was independent w/ all functional mobility w/ no device, ambulates community distances and elevations, ambulates household distances, has 1 BRENDA, lives w/ spouse in two level house and on disability  Personal factors affecting pt at time of IE include: inaccessible home environment, ambulating w/ assistive device, stairs to enter home, inability to ambulate household distances, inability to navigate community distances, inability to navigate level surfaces w/o external assistance, unable to perform dynamic tasks in community, inability to perform IADLs and inability to perform ADLs  Please find objective findings from PT assessment regarding body systems outlined above with impairments and limitations including weakness, impaired balance, decreased endurance, gait deviations, pain, decreased activity tolerance, decreased functional mobility tolerance and fall risk  The following objective measures performed on IE also reveal limitations: Barthel Index: 55/100, Modified [place]: 4 (moderate/severe disability) and AM-PAC 6-Clicks: 64/28  Pt's clinical presentation is currently unstable/unpredictable seen in pt's presentation of continued need for medical management and monitoring, decreased strength and balance resulting in an increased risk for falls, decreased endurance and activity tolerance limiting mobility, increased pain, reports of dizziness with position changes  Pt to benefit from continued PT tx to address deficits as defined above and maximize level of functional independent mobility and consistency  From PT/mobility standpoint, recommendation at time of d/c would be post acute rehabilitation services pending progress in order to facilitate return to PLOF     Barriers to Discharge Inaccessible home environment;Decreased caregiver support   Goals   STG Expiration Date 12/03/22   Short Term Goal #1 In 7-10 days: Increase bilateral LE strength 1/2 grade to facilitate independent mobility, Perform all bed mobility tasks modified independent to decrease caregiver burden, Perform all transfers modified independent to improve independence, Ambulate > 200 ft  with least restrictive assistive device modified independent w/o LOB and w/ normalized gait pattern 100% of the time, Tolerate 4 hr OOB to faciliate upright tolerance and PT to see and establish goals for stair negotiation when appropriate   Plan   Treatment/Interventions Functional transfer training;LE strengthening/ROM;ADL retraining; Therapeutic exercise; Endurance training;Patient/family training;Bed mobility;Gait training;Spoke to case management;OT   PT Frequency 3-5x/wk   Recommendation   PT Discharge Recommendation Post acute rehabilitation services   AM-PAC Basic Mobility Inpatient   Turning in Bed Without Bedrails 2   Lying on Back to Sitting on Edge of Flat Bed 2   Moving Bed to Chair 2   Standing Up From Chair 2   Walk in Room 2   Climb 3-5 Stairs 1   Basic Mobility Inpatient Raw Score 11   Basic Mobility Standardized Score 30 25   Turning Head Towards Sound 4   Follow Simple Instructions 4   Low Function Basic Mobility Raw Score 19   Low Function Basic Mobility Standardized Score 31 06   Highest Level Of Mobility   -[facility] Goal 4: Move to chair/commode   -[facility] Achieved 5: Stand (1 or more minutes)   Modified Colbert Scale   Modified Colbert Scale 4   Barthel Index   Feeding 10   Bathing 0   Grooming Score 5   Dressing Score 5   Bladder Score 10   Bowels Score 10   Toilet Use Score 5   Transfers (Bed/Chair) Score 10   Mobility (Level Surface) Score 0   Stairs Score 0   Barthel Index Score 55   Additional Treatment Session   Start Time 0843   End Time 3042   Treatment Assessment Pt seen for PT treatment session this date with interventions consisting of Therapeutic exercise consisting of: BLE exercises performed supine in bed  Pt agreeable to PT treatment session following initial evaluation   Post session: pt returned BTB, bed alarm engaged, all needs in reach and RN notified of session findings/recommendations  Continue to recommend post acute rehabilitation services at time of d/c in order to maximize pt's functional independence and safety w/ mobility  Pt continues to be functioning below baseline level, and remains limited 2* factors listed above and including continued need for medical management and monitoring, decreased strength and balance resulting in an increased risk for falls, decreased endurance and activity tolerance limiting mobility, increased pain, reports of dizziness with position changes  PT will continue to see pt during current hospitalization in order to address the deficits listed above and provide interventions consistent w/ POC in effort to achieve STGs  Exercises   Quad Sets Supine;15 reps;AROM; Bilateral   Heelslides Supine;10 reps;AROM; Bilateral   Glute Sets Supine;15 reps;AROM; Bilateral   Hip Abduction Supine;10 reps;AROM; Bilateral   Ankle Pumps Supine;20 reps;AROM; Bilateral   End of Consult   Patient Position at End of Consult Supine;Bed/Chair alarm activated; All needs within reach       Jama Robin, PT

## 2022-11-23 NOTE — PLAN OF CARE
Problem: PHYSICAL THERAPY ADULT  Goal: Performs mobility at highest level of function for planned discharge setting  See evaluation for individualized goals  Description: Treatment/Interventions: Functional transfer training, LE strengthening/ROM, ADL retraining, Therapeutic exercise, Endurance training, Patient/family training, Bed mobility, Gait training, Spoke to case management, OT          See flowsheet documentation for full assessment, interventions and recommendations  Outcome: Progressing  Note: Prognosis: Good  Problem List: Decreased strength, Decreased endurance, Decreased mobility, Impaired balance, Pain, Orthopedic restrictions  Assessment: Pt is 78 y o  male seen for PT evaluation s/p admit to [facility] on 11/22/2022 w/ Status post lumbar spinal fusion  PT consulted to assess pt's functional mobility and d/c needs  Order placed for PT eval and tx, w/ up w/ A order  Comorbidities affecting pt's physical performance at time of assessment include: status post lumbar spinal fusion, HTN, back pain, HLD  PTA, pt was independent w/ all functional mobility w/ no device, ambulates community distances and elevations, ambulates household distances, has 1 BRENDA, lives w/ spouse in two level house and on disability  Personal factors affecting pt at time of IE include: inaccessible home environment, ambulating w/ assistive device, stairs to enter home, inability to ambulate household distances, inability to navigate community distances, inability to navigate level surfaces w/o external assistance, unable to perform dynamic tasks in community, inability to perform IADLs and inability to perform ADLs  Please find objective findings from PT assessment regarding body systems outlined above with impairments and limitations including weakness, impaired balance, decreased endurance, gait deviations, pain, decreased activity tolerance, decreased functional mobility tolerance and fall risk   The following objective measures performed on IE also reveal limitations: Barthel Index: 55/100, Modified [place]: 4 (moderate/severe disability) and AM-PAC 6-Clicks: 81/79  Pt's clinical presentation is currently unstable/unpredictable seen in pt's presentation of continued need for medical management and monitoring, decreased strength and balance resulting in an increased risk for falls, decreased endurance and activity tolerance limiting mobility, increased pain, reports of dizziness with position changes  Pt to benefit from continued PT tx to address deficits as defined above and maximize level of functional independent mobility and consistency  From PT/mobility standpoint, recommendation at time of d/c would be post acute rehabilitation services pending progress in order to facilitate return to PLOF  Barriers to Discharge: Inaccessible home environment, Decreased caregiver support     PT Discharge Recommendation: Post acute rehabilitation services    See flowsheet documentation for full assessment

## 2022-11-23 NOTE — ORTHOTIC NOTE
Orthotic Note        Date: 11/23/2022    Patient Name: Royer Castaneda        Reason for Consult:  Order placed for TLSO brace per Albertina Cornell on 11/22/22  Recommendations:  Pt sized w/ TLSO prior to PT mobility assessment, see PT evaluation for assessment findings  Pt educated on brace components, wearing schedule when OOB per MD recommendation, maintenance of brace, donning/doffing, skin inspection  Educated that brace may need to be repositioned throughout the day  Pt verbalized understanding w/ all components, requiring total assistance to don/doff brace w/ verbal instruction 100% of the time from therapist  Education to [facility] staff on same, including don/doffing, wearing schedule, skin inspection of NSG q shift as well as assessing circulation for appropriate fit of brace      Italia Alcala, PT

## 2022-11-23 NOTE — CASE MANAGEMENT
Case Management Assessment & Discharge Planning Note    Patient name Monique Petty  Location /-09 MRN [id number]  : 1955 Date 2022       Current Admission Date: 2022  Current Admission Diagnosis:Status post lumbar spinal fusion   Patient Active Problem List    Diagnosis Date Noted   • Back pain 2022   • Chronic pain 2022   • Flatback syndrome 2022   • Status post lumbar spinal fusion 2022   • Hyperlipidemia    • Hypertension       LOS (days): 1  Geometric Mean LOS (GMLOS) (days): 2 80  Days to GMLOS:2     OBJECTIVE:    Risk of Unplanned Readmission Score: 8 06         Current admission status: Inpatient       Preferred Pharmacy:   Karla Horvath [id number] [facility] [address] [id number]  Phone: [phone] Fax: [phone]    PATIENT/FAMILY REPORTS NO PREFERRED PHARMACY  No address on file      Primary Care Provider: Steve Bucio MD    Primary Insurance: MEDICARE  Secondary Insurance: [facility]    ASSESSMENT:  Nissa 26 Proxies     Dez Serrato 50 Representative - Spouse   Primary Phone: [phone] (Mobile)               Advance Directives  Does patient have Advance Directives?: Yes  Advance Directives: Living will, Power of  for health care  Primary Contact: SPOUSE         Readmission Root Cause  30 Day Readmission: No    Patient Information  Admitted from[de-identified] Home  Mental Status: Alert  During Assessment patient was accompanied by: Not accompanied during assessment  Assessment information provided by[de-identified] Patient  Primary Caregiver: Self  Support Systems: Spouse/significant other  [place] of Residence: [address] [place] [address] do you live in?: [address]  entry access options  Select all that apply : Stairs  Number of steps to enter home  : 1  Do the steps have railings?: Yes  Type of Current Residence: 2 Bath home  Upon entering residence, is there a bedroom on the main floor (no further steps)?: Yes  Upon entering residence, is there a bathroom on the main floor (no further steps)?: Yes  In the last 12 months, was there a time when you were not able to pay the mortgage or rent on time?: No  In the last 12 months, how many places have you lived?: 1  In the last 12 months, was there a time when you did not have a steady place to sleep or slept in a shelter (including now)?: No  Homeless/housing insecurity resource given?: N/A  Living Arrangements: Lives w/ Spouse/significant other  Is patient a ?: No    Activities of Daily Living Prior to Admission  Functional Status: Independent  Completes ADLs independently?: Yes  Ambulates independently?: Yes  Does patient use assisted devices?: Yes  Assisted Devices (DME) used: Rajgerard Rosario, Shower Chair, Wheelchair  Does patient currently own DME?: Yes  What DME does the patient currently own?: Wheelchair, Rajgerard Rosario, Shower Chair  Does patient have a history of Outpatient Therapy (PT/OT)?: Yes  Does the patient have a history of Short-Term Rehab?: Yes ([facility])  Does patient have a history of HHC?: No  Does patient currently have [place] ?: No         Patient Information Continued  Income Source: SSI/SSD  Does patient have prescription coverage?: Yes  Within the past 12 months, you worried that your food would run out before you got the money to buy more : Never true  Within the past 12 months, the food you bought just didn't last and you didn't have money to get more : Never true  Food insecurity resource given?: N/A  Does patient receive dialysis treatments?: No  Does patient have a history of substance abuse?: No  Does patient have a history of Mental Health Diagnosis?: No    PHQ 2/9 Screening   Reviewed PHQ 2/9 Depression Screening Score?: No    Means of Transportation  Means of Transport to Appts[de-identified] Drives Self  In the past 12 months, has lack of transportation kept you from medical appointments or from getting medications?: No  In the past 12 months, has lack of transportation kept you from meetings, work, or from getting things needed for daily living?: No  Was application for public transport provided?: N/A        DISCHARGE DETAILS:    Discharge planning discussed with[de-identified] Patient at bedside  Freedom of Choice: Yes  Comments - Freedom of Choice: CM discussed freedom of choice as it pertains to discharge planning  Patient agreed to [facility] specifically wants [place]  CM contacted family/caregiver?: No- see comments (Patient declined)  Were Treatment Team discharge recommendations reviewed with patient/caregiver?: Yes  Did patient/caregiver verbalize understanding of patient care needs?: Yes  Were patient/caregiver advised of the risks associated with not following Treatment Team discharge recommendations?: Yes         [address]         Is the patient interested in [facility] at discharge?: No    DME Referral Provided  Referral made for DME?: No    Other Referral/Resources/Interventions Provided:  Interventions: Acute Rehab  Referral Comments: CM sent referral to [facility]    Would you like to participate in our [address] service program?  : No - Declined    Treatment Team Recommendation: Acute Rehab  Discharge Destination Plan[de-identified] Acute Rehab  Transport at Discharge :  Other (Comment) (TBD)

## 2022-11-23 NOTE — PLAN OF CARE
Problem: MOBILITY - ADULT  Goal: Maintain or return to baseline ADL function  Description: INTERVENTIONS:  -  Assess patient's ability to carry out ADLs; assess patient's baseline for ADL function and identify physical deficits which impact ability to perform ADLs (bathing, care of mouth/teeth, toileting, grooming, dressing, etc )  - Assess/evaluate cause of self-care deficits   - Assess range of motion  - Assess patient's mobility; develop plan if impaired  - Assess patient's need for assistive devices and provide as appropriate  - Encourage maximum independence but intervene and supervise when necessary  - Involve family in performance of ADLs  - Assess for home care needs following discharge   - Consider OT consult to assist with ADL evaluation and planning for discharge  - Provide patient education as appropriate  Outcome: Progressing  Goal: Maintains/Returns to pre admission functional level  Description: INTERVENTIONS:  - Perform BMAT or MOVE assessment daily    - Set and communicate daily mobility goal to care team and patient/family/caregiver  - Collaborate with rehabilitation services on mobility goals if consulted  - Perform Range of Motion 2 times a day  - Reposition patient every 2 hours    - Dangle patient 2 times a day  - Stand patient 2 times a day  - Ambulate patient 2 times a day  - Out of bed to chair 2 times a day   - Out of bed for meals 2 times a day  - Out of bed for toileting  - Record patient progress and toleration of activity level   Outcome: Progressing     Problem: Potential for Falls  Goal: Patient will remain free of falls  Description: INTERVENTIONS:  - Educate patient/family on patient safety including physical limitations  - Instruct patient to call for assistance with activity   - Consult OT/PT to assist with strengthening/mobility   - Keep Call bell within reach  - Keep bed low and locked with side rails adjusted as appropriate  - Keep care items and personal belongings within reach  - Initiate and maintain comfort rounds  - Make Fall Risk Sign visible to staff  - Offer Toileting every 2 Hours, in advance of need  - Initiate/Maintain 2alarm  - Obtain necessary fall risk management equipment: 2  - Apply yellow socks and bracelet for high fall risk patients  - Consider moving patient to room near nurses station  Outcome: Progressing     Problem: PAIN - ADULT  Goal: Verbalizes/displays adequate comfort level or baseline comfort level  Description: Interventions:  - Encourage patient to monitor pain and request assistance  - Assess pain using appropriate pain scale  - Administer analgesics based on type and severity of pain and evaluate response  - Implement non-pharmacological measures as appropriate and evaluate response  - Consider cultural and social influences on pain and pain management  - Notify physician/advanced practitioner if interventions unsuccessful or patient reports new pain  Outcome: Progressing     Problem: INFECTION - ADULT  Goal: Absence or prevention of progression during hospitalization  Description: INTERVENTIONS:  - Assess and monitor for signs and symptoms of infection  - Monitor lab/diagnostic results  - Monitor all insertion sites, i e  indwelling lines, tubes, and drains  - Monitor endotracheal if appropriate and nasal secretions for changes in amount and color  - [place] appropriate cooling/warming therapies per order  - Administer medications as ordered  - Instruct and encourage patient and family to use good hand hygiene technique  - Identify and instruct in appropriate isolation precautions for identified infection/condition  Outcome: Progressing  Goal: Absence of fever/infection during neutropenic period  Description: INTERVENTIONS:  - Monitor WBC    Outcome: Progressing     Problem: DISCHARGE PLANNING  Goal: Discharge to home or other facility with appropriate resources  Description: INTERVENTIONS:  - Identify barriers to discharge w/patient and caregiver  - Arrange for needed discharge resources and transportation as appropriate  - Identify discharge learning needs (meds, wound care, etc )  - Arrange for interpretive services to assist at discharge as needed  - Refer to Case Management Department for coordinating discharge planning if the patient needs post-hospital services based on physician/advanced practitioner order or complex needs related to functional status, cognitive ability, or social support system  Outcome: Progressing     Problem: Knowledge Deficit  Goal: Patient/family/caregiver demonstrates understanding of disease process, treatment plan, medications, and discharge instructions  Description: Complete learning assessment and assess knowledge base    Interventions:  - Provide teaching at level of understanding  - Provide teaching via preferred learning methods  Outcome: Progressing     Problem: RESPIRATORY - ADULT  Goal: Achieves optimal ventilation and oxygenation  Description: INTERVENTIONS:  - Assess for changes in respiratory status  - Assess for changes in mentation and behavior  - Position to facilitate oxygenation and minimize respiratory effort  - Oxygen administered by appropriate delivery if ordered  - Initiate smoking cessation education as indicated  - Encourage broncho-pulmonary hygiene including cough, deep breathe, Incentive Spirometry  - Assess the need for suctioning and aspirate as needed  - Assess and instruct to report SOB or any respiratory difficulty  - Respiratory Therapy support as indicated  Outcome: Progressing     Problem: GASTROINTESTINAL - ADULT  Goal: Maintains or returns to baseline bowel function  Description: INTERVENTIONS:  - Assess bowel function  - Encourage oral fluids to ensure adequate hydration  - Administer IV fluids if ordered to ensure adequate hydration  - Administer ordered medications as needed  - Encourage mobilization and activity  - Consider nutritional services referral to assist patient with adequate nutrition and appropriate food choices  Outcome: Progressing     Problem: GENITOURINARY - ADULT  Goal: Absence of urinary retention  Description: INTERVENTIONS:  - Assess patient’s ability to void and empty bladder  - Monitor I/O  - Bladder scan as needed  - Discuss with physician/AP medications to alleviate retention as needed  - Discuss catheterization for long term situations as appropriate  Outcome: Progressing

## 2022-11-23 NOTE — PROGRESS NOTES
Patient randhawa catheter removed at 0610am  Patient educated on procedure and tolerated procedure well  Patient had an output of 20cc clear yellow urine  Will continue to monitor for output

## 2022-11-23 NOTE — PROGRESS NOTES
Patient alert and oriented x 4  Able to verbalize needs and obey commands with limitations  Patient slept during shift  Patient continues on IV fluids and PCA pump  Patient pleasant to staff  No deviation in assessment finding noted when compared to previous documentation  Will continue to monitor

## 2022-11-23 NOTE — ASSESSMENT & PLAN NOTE
· Postop day 1 from thoracic and lumbar fusion  · Pain regimen in place per primary team orthopedic surgery  · PT OT to evaluate  · Continue to monitor

## 2022-11-24 LAB
ALBUMIN SERPL BCP-MCNC: 2.9 G/DL (ref 3.5–5)
ALP SERPL-CCNC: 87 U/L (ref 46–116)
ALT SERPL W P-5'-P-CCNC: 33 U/L (ref 12–78)
ANION GAP SERPL CALCULATED.3IONS-SCNC: 11 MMOL/L (ref 4–13)
AST SERPL W P-5'-P-CCNC: 71 U/L (ref 5–45)
BASOPHILS # BLD AUTO: 0.04 THOUSANDS/ÂΜL (ref 0–0.1)
BASOPHILS NFR BLD AUTO: 0 % (ref 0–1)
BILIRUB SERPL-MCNC: 0.99 MG/DL (ref 0.2–1)
BUN SERPL-MCNC: 17 MG/DL (ref 5–25)
CALCIUM ALBUM COR SERPL-MCNC: 9.4 MG/DL (ref 8.3–10.1)
CALCIUM SERPL-MCNC: 8.5 MG/DL (ref 8.3–10.1)
CHLORIDE SERPL-SCNC: 101 MMOL/L (ref 96–108)
CO2 SERPL-SCNC: 24 MMOL/L (ref 21–32)
CREAT SERPL-MCNC: 0.83 MG/DL (ref 0.6–1.3)
EOSINOPHIL # BLD AUTO: 0.09 THOUSAND/ÂΜL (ref 0–0.61)
EOSINOPHIL NFR BLD AUTO: 1 % (ref 0–6)
ERYTHROCYTE [DISTWIDTH] IN BLOOD BY AUTOMATED COUNT: 14.6 % (ref 11.6–15.1)
GFR SERPL CREATININE-BSD FRML MDRD: 91 ML/MIN/1.73SQ M
GLUCOSE SERPL-MCNC: 121 MG/DL (ref 65–140)
HCT VFR BLD AUTO: 36.5 % (ref 36.5–49.3)
HGB BLD-MCNC: 12.1 G/DL (ref 12–17)
IMM GRANULOCYTES # BLD AUTO: 0.06 THOUSAND/UL (ref 0–0.2)
IMM GRANULOCYTES NFR BLD AUTO: 1 % (ref 0–2)
LYMPHOCYTES # BLD AUTO: 0.96 THOUSANDS/ÂΜL (ref 0.6–4.47)
LYMPHOCYTES NFR BLD AUTO: 8 % (ref 14–44)
MCH RBC QN AUTO: 28.3 PG (ref 26.8–34.3)
MCHC RBC AUTO-ENTMCNC: 33.2 G/DL (ref 31.4–37.4)
MCV RBC AUTO: 86 FL (ref 82–98)
MONOCYTES # BLD AUTO: 0.74 THOUSAND/ÂΜL (ref 0.17–1.22)
MONOCYTES NFR BLD AUTO: 6 % (ref 4–12)
NEUTROPHILS # BLD AUTO: 10.47 THOUSANDS/ÂΜL (ref 1.85–7.62)
NEUTS SEG NFR BLD AUTO: 84 % (ref 43–75)
NRBC BLD AUTO-RTO: 0 /100 WBCS
PLATELET # BLD AUTO: 217 THOUSANDS/UL (ref 149–390)
PMV BLD AUTO: 8.8 FL (ref 8.9–12.7)
POTASSIUM SERPL-SCNC: 4.1 MMOL/L (ref 3.5–5.3)
PROT SERPL-MCNC: 6 G/DL (ref 6.4–8.4)
RBC # BLD AUTO: 4.27 MILLION/UL (ref 3.88–5.62)
SODIUM SERPL-SCNC: 136 MMOL/L (ref 135–147)
WBC # BLD AUTO: 12.36 THOUSAND/UL (ref 4.31–10.16)

## 2022-11-24 RX ADMIN — KETOROLAC TROMETHAMINE 15 MG: 30 INJECTION, SOLUTION INTRAMUSCULAR at 20:37

## 2022-11-24 RX ADMIN — SENNOSIDES 8.6 MG: 8.6 TABLET, FILM COATED ORAL at 08:27

## 2022-11-24 RX ADMIN — DOCUSATE SODIUM 100 MG: 100 CAPSULE, LIQUID FILLED ORAL at 08:27

## 2022-11-24 RX ADMIN — FERROUS SULFATE TAB 325 MG (65 MG ELEMENTAL FE) 325 MG: 325 (65 FE) TAB at 08:27

## 2022-11-24 RX ADMIN — KETOROLAC TROMETHAMINE 15 MG: 30 INJECTION, SOLUTION INTRAMUSCULAR at 14:53

## 2022-11-24 RX ADMIN — HYDROCODONE BITARTRATE AND ACETAMINOPHEN 2 TABLET: 5; 325 TABLET ORAL at 10:22

## 2022-11-24 RX ADMIN — HYDROCODONE BITARTRATE AND ACETAMINOPHEN 2 TABLET: 5; 325 TABLET ORAL at 22:21

## 2022-11-24 RX ADMIN — ASPIRIN 325 MG ORAL TABLET 325 MG: 325 PILL ORAL at 08:27

## 2022-11-24 RX ADMIN — METHOCARBAMOL 500 MG: 500 TABLET ORAL at 23:02

## 2022-11-24 RX ADMIN — HYDROCODONE BITARTRATE AND ACETAMINOPHEN 1 TABLET: 5; 325 TABLET ORAL at 16:57

## 2022-11-24 RX ADMIN — LISINOPRIL 5 MG: 5 TABLET ORAL at 08:27

## 2022-11-24 RX ADMIN — HYDROCODONE BITARTRATE AND ACETAMINOPHEN 2 TABLET: 5; 325 TABLET ORAL at 04:08

## 2022-11-24 RX ADMIN — METHOCARBAMOL 500 MG: 500 TABLET ORAL at 05:00

## 2022-11-24 RX ADMIN — METHOCARBAMOL 500 MG: 500 TABLET ORAL at 17:00

## 2022-11-24 RX ADMIN — KETOROLAC TROMETHAMINE 15 MG: 30 INJECTION, SOLUTION INTRAMUSCULAR at 05:00

## 2022-11-24 RX ADMIN — DOCUSATE SODIUM 100 MG: 100 CAPSULE, LIQUID FILLED ORAL at 17:00

## 2022-11-24 RX ADMIN — PRAVASTATIN SODIUM 10 MG: 20 TABLET ORAL at 16:57

## 2022-11-24 RX ADMIN — METHOCARBAMOL 500 MG: 500 TABLET ORAL at 12:06

## 2022-11-24 NOTE — PROGRESS NOTES
Orthopedics   [facility] 78 y o  male MRN: [id number]  Unit/Bed#: -01    Subjective:  78 y o male POD#2 s/p removal of hardware L1-L3, posterior thoracic fusion T7-L1, thoracic osteotomy T11/12, revision instrumentation T8 to pelvis, and thoracic laminotomy T8-9, T9-10, and T10-11  Patient is resting comfortably on exam this morning  Patient states pain in his back is 5-6/10 currently  Patient denies any radiation of pain into lower extremities  Patient denies any numbness or tingling in lower extremities  Patient reports he was able to get out of bed yesterday with physical therapy  Patient placed on soft diet yesterday due to distended abdomen  Patient denies any shortness of breath, chest pain, lightheadedness, dizziness, nausea, and vomiting  Patient denies any fevers or chills  Patient offers no additional complaints at this time       Labs:  0   Lab Value Date/Time    HCT 36 5 11/24/2022 0500    HCT 38 2 11/23/2022 0532    HCT 37 11/22/2022 1430    HCT 39 11/22/2022 1248    HCT 39 11/22/2022 1021    HCT 45 5 10/18/2022 0902    HGB 12 1 11/24/2022 0500    HGB 12 3 11/23/2022 0532    HGB 12 6 11/22/2022 1430    HGB 13 3 11/22/2022 1248    HGB 13 3 11/22/2022 1021    HGB 14 3 10/18/2022 0902    INR 1 02 05/24/2022 0849    WBC 12 36 (H) 11/24/2022 0500    WBC 12 53 (H) 11/23/2022 0532    WBC 6 85 10/18/2022 0902       Meds:    Current Facility-Administered Medications:   •  aspirin tablet 325 mg, 325 mg, Oral, Daily, HUEY Juárez-RHONDA, 325 mg at 11/23/22 1565  •  docusate sodium (COLACE) capsule 100 mg, 100 mg, Oral, BID, Tori Meehan PA-C, 100 mg at 11/23/22 1747  •  ferrous sulfate tablet 325 mg, 325 mg, Oral, Daily With Breakfast, HUEY Juárez-C, 325 mg at 11/23/22 3126  •  HYDROcodone-acetaminophen (NORCO) 5-325 mg per tablet 1 tablet, 1 tablet, Oral, Q6H PRN, Tori Meehan PA-C, 1 tablet at 11/23/22 1953  •  HYDROcodone-acetaminophen (NORCO) 5-325 mg per tablet 2 tablet, 2 tablet, Oral, Q6H PRN, Tori Meehan, PA-C, 2 tablet at 11/24/22 0408  •  ketorolac (TORADOL) injection 15 mg, 15 mg, Intravenous, Q6H PRN, Tori Schwabp, PA-C, 15 mg at 11/24/22 0500  •  lisinopril (ZESTRIL) tablet 5 mg, 5 mg, Oral, Daily, Tori Meehan, PA-C, 5 mg at 11/23/22 9280  •  magnesium hydroxide (MILK OF MAGNESIA) oral suspension 30 mL, 30 mL, Oral, Daily PRN, Tori Meehan, PA-C  •  methocarbamol (ROBAXIN) tablet 500 mg, 500 mg, Oral, Q6H [address], Tori Schwabp, PA-C, 500 mg at 11/24/22 0500  •  naloxone (NARCAN) injection 0 1 mg, 0 1 mg, Intravenous, Q3 min PRN, Tori Meehan, PA-C  •  ondansetron (ZOFRAN) injection 4 mg, 4 mg, Intravenous, Q6H PRN, Tori Newdillonp, PA-C  •  pravastatin (PRAVACHOL) tablet 10 mg, 10 mg, Oral, Daily With Dinner, Tori Meehan, PA-C, 10 mg at 11/23/22 1528  •  senna (SENOKOT) tablet 8 6 mg, 1 tablet, Oral, Daily, Tori Meehan, PA-C, 8 6 mg at 11/23/22 0355    Blood Culture:   No results found for: BLOODCX    Wound Culture:   No results found for: WOUNDCULT    Ins and Outs:  I/O last 24 hours: In: 240 [P O :240]  Out: 900 [Urine:900]          Physical:  Vitals:    11/24/22 0801   BP: 153/87   Pulse: 87   Resp:    Temp: 98 7 °F (37 1 °C)   SpO2: 92%     Lumbar spine:  · Patient is a 27-year-old male laying in hospital bed in no acute distress  · Dressings clean, dry, and intact without soilage or strike through present  · Neurovascularly intact L2-S1 bilateral lower extremities  · Motor intact from L2-S1  · Able to perform straight leg raise bilaterally  · Able to perform active ROM in knees from 0 to 120 bilaterally   · 2+ DP pulse bilateral lower extremities  · Calfs supple and nontender    Assessment:  67 y o male POD#2 s/p removal of hardware L1-L3, posterior thoracic fusion T7-L1, thoracic osteotomy T11/12, revision instrumentation T8 to pelvis, and thoracic laminotomy T8-9, T9-10, and T10-11       Plan:  · Weight Bearing as tolerated all extremities  · Up and out of bed  · TLSO brace to be worn at all times out of bed  · DVT prophylaxis: mechanical and aspirin 325mg daily   · Analgesics  · PT/OT  · Soft diet due to mild abdominal distention  May consider advancing diet as tolerated  · Patient noted to have acute blood loss anemia due to a drop in Hbg of > 2 0g from preop levels, will monitor vital signs and resuscitate with IV fluids as needed  Hgb noted to be 12 1 this morning  · Medical team for management of medical conditions including hypertension and hyperlipidemia  · Dispo: Advised patient to continue working with PT/OT today  PT/OT recommending acute rehab placement  Referrals to [facility] placed yesterday  Discharge planning  See above for additional details       Arcelia Stephens PA-C

## 2022-11-24 NOTE — PROGRESS NOTES
[address] [facility]  Progress Note - Leann Li 1955, 78 y o  male MRN: [id number]  Unit/Bed#: -01 Encounter: [id number]  Primary Care Provider: Kat Hodges MD   Date and time admitted to hospital: 2022  5:47 AM    Back pain  Assessment & Plan  · Pain regimen per Orthopedic surgery    Hypertension  Assessment & Plan  · Blood pressure adequately controlled at this time  · Continue home lisinopril  · Continue to monitor    Hyperlipidemia  Assessment & Plan  · Continue statin    * Status post lumbar spinal fusion  Assessment & Plan  · Postop day 1 from thoracic and lumbar fusion  · Pain regimen in place per primary team orthopedic surgery  · PT OT -recommended rehab, pending placement  · Continue to monitor        VTE Pharmacologic Prophylaxis:   VTE Score: 12 Low Risk (Score 0-2) - Encourage Ambulation  Mechanical VTE Prophylaxis in Place: Yes    Patient Centered Rounds: I have performed bedside rounds with nursing staff today  Discussions with Specialists or Other Care Team Provider: yes    Education and Discussions with Family / Patient: yes    Current Length of Stay: 2 day(s)    Current Patient Status: Inpatient     Discharge Plan / Estimated Discharge Date: Frederic Duggan is following this patient on consult  They are medically stable for discharge when deemed appropriate by primary service  Code Status: Prior      Subjective:  Seen and examined at bedside  Denied any active issues ,  stated that his pain is better controlled  He was evaluated by PT/OT who recommended rehab, currently pending placement  No overnight events reported      Objective:     Vitals:   Temp (24hrs), Av °F (37 2 °C), Min:98 4 °F (36 9 °C), Max:99 4 °F (37 4 °C)    Temp:  [98 4 °F (36 9 °C)-99 4 °F (37 4 °C)] 98 7 °F (37 1 °C)  HR:  [86-95] 87  Resp:  [18] 18  BP: (129-153)/(74-87) 153/87  SpO2:  [92 %-94 %] 92 %  Body mass index is 31 38 kg/m²       Input and Output Summary (last 24 hours): Intake/Output Summary (Last 24 hours) at 11/24/2022 0952  Last data filed at 11/24/2022 0302  Gross per 24 hour   Intake 240 ml   Output 850 ml   Net -610 ml       Physical Exam:     Physical Exam  Vitals and nursing note reviewed  Constitutional:       General: He is not in acute distress  Appearance: He is well-developed  HENT:      Head: Normocephalic and atraumatic  Eyes:      Conjunctiva/sclera: Conjunctivae normal    Cardiovascular:      Rate and Rhythm: Normal rate and regular rhythm  Heart sounds: No murmur heard  Pulmonary:      Effort: Pulmonary effort is normal  No respiratory distress  Breath sounds: Normal breath sounds  Abdominal:      Palpations: Abdomen is soft  Tenderness: There is no abdominal tenderness  Musculoskeletal:         General: No swelling  Cervical back: Neck supple  Skin:     General: Skin is warm and dry  Capillary Refill: Capillary refill takes less than 2 seconds  Neurological:      Mental Status: He is alert  Psychiatric:         Mood and Affect: Mood normal           Additional Data:     Labs:  Results from last 7 days   Lab Units 11/24/22  0500   WBC Thousand/uL 12 36*   HEMOGLOBIN g/dL 12 1   HEMATOCRIT % 36 5   PLATELETS Thousands/uL 217   NEUTROS PCT % 84*   LYMPHS PCT % 8*   MONOS PCT % 6   EOS PCT % 1     Results from last 7 days   Lab Units 11/24/22  0500   SODIUM mmol/L 136   POTASSIUM mmol/L 4 1   CHLORIDE mmol/L 101   CO2 mmol/L 24   BUN mg/dL 17   CREATININE mg/dL 0 83   ANION GAP mmol/L 11   CALCIUM mg/dL 8 5   ALBUMIN g/dL 2 9*   TOTAL BILIRUBIN mg/dL 0 99   ALK PHOS U/L 87   ALT U/L 33   AST U/L 71*   GLUCOSE RANDOM mg/dL 121         Results from last 7 days   Lab Units 11/22/22  0659   POC GLUCOSE mg/dl 108               Imaging: No pertinent imaging reviewed      Recent Cultures (last 7 days):           Lines/Drains:  Invasive Devices     Peripheral Intravenous Line  Duration           Peripheral IV 11/22/22 Left Forearm 2 days                Telemetry:        Last 24 Hours Medication List:   Current Facility-Administered Medications   Medication Dose Route Frequency Provider Last Rate   • aspirin  325 mg Oral Daily Tori Meehan PA-C     • docusate sodium  100 mg Oral BID Tori Meehan PA-C     • ferrous sulfate  325 mg Oral Daily With Breakfast Tori Meehan PA-C     • HYDROcodone-acetaminophen  1 tablet Oral Q6H PRN Tori Meehan PA-C     • HYDROcodone-acetaminophen  2 tablet Oral Q6H PRN Tori Meehan PA-C     • ketorolac  15 mg Intravenous Q6H PRN Tori Meehan PA-C     • lisinopril  5 mg Oral Daily Tori Meehan PA-C     • magnesium hydroxide  30 mL Oral Daily PRN Tori Meehan PA-C     • methocarbamol  500 mg Oral Q6H [address] Tori Meehan PA-C     • naloxone  0 1 mg Intravenous Q3 min PRN Tori Meehan PA-C     • ondansetron  4 mg Intravenous Q6H PRN Tori Meehan PA-C     • pravastatin  10 mg Oral Daily With OSMIN Brooks     • senna  1 tablet Oral Daily Tori Meehan PA-C          Today, Patient Was Seen By: Alcides Alvarado MD    ** Please Note: This note has been constructed using a voice recognition system   **

## 2022-11-24 NOTE — PLAN OF CARE
Problem: MOBILITY - ADULT  Goal: Maintain or return to baseline ADL function  Description: INTERVENTIONS:  -  Assess patient's ability to carry out ADLs; assess patient's baseline for ADL function and identify physical deficits which impact ability to perform ADLs (bathing, care of mouth/teeth, toileting, grooming, dressing, etc )  - Assess/evaluate cause of self-care deficits   - Assess range of motion  - Assess patient's mobility; develop plan if impaired  - Assess patient's need for assistive devices and provide as appropriate  - Encourage maximum independence but intervene and supervise when necessary  - Involve family in performance of ADLs  - Assess for home care needs following discharge   - Consider OT consult to assist with ADL evaluation and planning for discharge  - Provide patient education as appropriate  Outcome: Progressing  Goal: Maintains/Returns to pre admission functional level  Description: INTERVENTIONS:  - Perform BMAT or MOVE assessment daily    - Set and communicate daily mobility goal to care team and patient/family/caregiver  - Collaborate with rehabilitation services on mobility goals if consulted  - Perform Range of Motion  times a day  - Reposition patient every  hours    - Dangle patient  times a day  - Stand patient  times a day  - Ambulate patient  times a day  - Out of bed to chair  times a day   - Out of bed for meals  times a day  - Out of bed for toileting  - Record patient progress and toleration of activity level   Outcome: Progressing     Problem: Potential for Falls  Goal: Patient will remain free of falls  Description: INTERVENTIONS:  - Educate patient/family on patient safety including physical limitations  - Instruct patient to call for assistance with activity   - Consult OT/PT to assist with strengthening/mobility   - Keep Call bell within reach  - Keep bed low and locked with side rails adjusted as appropriate  - Keep care items and personal belongings within reach  - Initiate and maintain comfort rounds  - Make Fall Risk Sign visible to staff  - Offer Toileting every  Hours, in advance of need  - Initiate/Maintain alarm  - Obtain necessary fall risk management equipment  - Apply yellow socks and bracelet for high fall risk patients  - Consider moving patient to room near nurses station  Outcome: Progressing     Problem: PAIN - ADULT  Goal: Verbalizes/displays adequate comfort level or baseline comfort level  Description: Interventions:  - Encourage patient to monitor pain and request assistance  - Assess pain using appropriate pain scale  - Administer analgesics based on type and severity of pain and evaluate response  - Implement non-pharmacological measures as appropriate and evaluate response  - Consider cultural and social influences on pain and pain management  - Notify physician/advanced practitioner if interventions unsuccessful or patient reports new pain  Outcome: Progressing     Problem: INFECTION - ADULT  Goal: Absence or prevention of progression during hospitalization  Description: INTERVENTIONS:  - Assess and monitor for signs and symptoms of infection  - Monitor lab/diagnostic results  - Monitor all insertion sites, i e  indwelling lines, tubes, and drains  - Monitor endotracheal if appropriate and nasal secretions for changes in amount and color  - [place] appropriate cooling/warming therapies per order  - Administer medications as ordered  - Instruct and encourage patient and family to use good hand hygiene technique  - Identify and instruct in appropriate isolation precautions for identified infection/condition  Outcome: Progressing  Goal: Absence of fever/infection during neutropenic period  Description: INTERVENTIONS:  - Monitor WBC    Outcome: Progressing     Problem: DISCHARGE PLANNING  Goal: Discharge to home or other facility with appropriate resources  Description: INTERVENTIONS:  - Identify barriers to discharge w/patient and caregiver  - Arrange for needed discharge resources and transportation as appropriate  - Identify discharge learning needs (meds, wound care, etc )  - Arrange for interpretive services to assist at discharge as needed  - Refer to Case Management Department for coordinating discharge planning if the patient needs post-hospital services based on physician/advanced practitioner order or complex needs related to functional status, cognitive ability, or social support system  Outcome: Progressing     Problem: Knowledge Deficit  Goal: Patient/family/caregiver demonstrates understanding of disease process, treatment plan, medications, and discharge instructions  Description: Complete learning assessment and assess knowledge base    Interventions:  - Provide teaching at level of understanding  - Provide teaching via preferred learning methods  Outcome: Progressing     Problem: RESPIRATORY - ADULT  Goal: Achieves optimal ventilation and oxygenation  Description: INTERVENTIONS:  - Assess for changes in respiratory status  - Assess for changes in mentation and behavior  - Position to facilitate oxygenation and minimize respiratory effort  - Oxygen administered by appropriate delivery if ordered  - Initiate smoking cessation education as indicated  - Encourage broncho-pulmonary hygiene including cough, deep breathe, Incentive Spirometry  - Assess the need for suctioning and aspirate as needed  - Assess and instruct to report SOB or any respiratory difficulty  - Respiratory Therapy support as indicated  Outcome: Progressing     Problem: GASTROINTESTINAL - ADULT  Goal: Maintains or returns to baseline bowel function  Description: INTERVENTIONS:  - Assess bowel function  - Encourage oral fluids to ensure adequate hydration  - Administer IV fluids if ordered to ensure adequate hydration  - Administer ordered medications as needed  - Encourage mobilization and activity  - Consider nutritional services referral to assist patient with adequate nutrition and appropriate food choices  Outcome: Progressing     Problem: GENITOURINARY - ADULT  Goal: Absence of urinary retention  Description: INTERVENTIONS:  - Assess patient’s ability to void and empty bladder  - Monitor I/O  - Bladder scan as needed  - Discuss with physician/AP medications to alleviate retention as needed  - Discuss catheterization for long term situations as appropriate  Outcome: Progressing

## 2022-11-24 NOTE — ASSESSMENT & PLAN NOTE
· Postop day 1 from thoracic and lumbar fusion  · Pain regimen in place per primary team orthopedic surgery  · PT OT -recommended rehab, pending placement  · Continue to monitor

## 2022-11-25 ENCOUNTER — HOSPITAL ENCOUNTER (INPATIENT)
Facility: HOSPITAL | Age: 67
LOS: 15 days | Discharge: HOME | End: 2022-12-10

## 2022-11-25 ENCOUNTER — APPOINTMENT (INPATIENT)
Dept: VASCULAR ULTRASOUND | Facility: HOSPITAL | Age: 67
End: 2022-11-25

## 2022-11-25 ENCOUNTER — TELEPHONE (OUTPATIENT)
Dept: OBGYN CLINIC | Facility: HOSPITAL | Age: 67
End: 2022-11-25

## 2022-11-25 VITALS
WEIGHT: 218.7 LBS | HEART RATE: 83 BPM | BODY MASS INDEX: 31.31 KG/M2 | RESPIRATION RATE: 18 BRPM | DIASTOLIC BLOOD PRESSURE: 94 MMHG | OXYGEN SATURATION: 95 % | HEIGHT: 70 IN | SYSTOLIC BLOOD PRESSURE: 155 MMHG | TEMPERATURE: 98.5 F

## 2022-11-25 DIAGNOSIS — E55.9 VITAMIN D INSUFFICIENCY: ICD-10-CM

## 2022-11-25 DIAGNOSIS — Z86.73 HISTORY OF STROKE: ICD-10-CM

## 2022-11-25 DIAGNOSIS — R25.2 SPASM: ICD-10-CM

## 2022-11-25 DIAGNOSIS — Z98.1 STATUS POST LUMBAR SPINAL FUSION: ICD-10-CM

## 2022-11-25 DIAGNOSIS — G89.29 CHRONIC PAIN: ICD-10-CM

## 2022-11-25 DIAGNOSIS — R52 PAIN: ICD-10-CM

## 2022-11-25 DIAGNOSIS — I10 HYPERTENSION: Primary | ICD-10-CM

## 2022-11-25 DIAGNOSIS — R52 DIFFICULTY COPING WITH PAIN: ICD-10-CM

## 2022-11-25 PROBLEM — D72.829 LEUKOCYTOSIS: Status: ACTIVE | Noted: 2022-11-25

## 2022-11-25 PROBLEM — Z91.89 AT RISK FOR VENOUS THROMBOEMBOLISM (VTE): Status: ACTIVE | Noted: 2022-11-25

## 2022-11-25 PROBLEM — R74.01 ELEVATED AST (SGOT): Status: ACTIVE | Noted: 2022-11-25

## 2022-11-25 LAB
ALBUMIN SERPL BCP-MCNC: 2.6 G/DL (ref 3.5–5)
ALP SERPL-CCNC: 80 U/L (ref 46–116)
ALT SERPL W P-5'-P-CCNC: 32 U/L (ref 12–78)
ANION GAP SERPL CALCULATED.3IONS-SCNC: 6 MMOL/L (ref 4–13)
AST SERPL W P-5'-P-CCNC: 66 U/L (ref 5–45)
BASOPHILS # BLD AUTO: 0.03 THOUSANDS/ÂΜL (ref 0–0.1)
BASOPHILS NFR BLD AUTO: 0 % (ref 0–1)
BILIRUB SERPL-MCNC: 0.83 MG/DL (ref 0.2–1)
BUN SERPL-MCNC: 17 MG/DL (ref 5–25)
CALCIUM ALBUM COR SERPL-MCNC: 9.7 MG/DL (ref 8.3–10.1)
CALCIUM SERPL-MCNC: 8.6 MG/DL (ref 8.3–10.1)
CHLORIDE SERPL-SCNC: 102 MMOL/L (ref 96–108)
CO2 SERPL-SCNC: 27 MMOL/L (ref 21–32)
CREAT SERPL-MCNC: 0.76 MG/DL (ref 0.6–1.3)
EOSINOPHIL # BLD AUTO: 0.21 THOUSAND/ÂΜL (ref 0–0.61)
EOSINOPHIL NFR BLD AUTO: 2 % (ref 0–6)
ERYTHROCYTE [DISTWIDTH] IN BLOOD BY AUTOMATED COUNT: 14.5 % (ref 11.6–15.1)
FLUAV RNA RESP QL NAA+PROBE: NEGATIVE
FLUBV RNA RESP QL NAA+PROBE: NEGATIVE
GFR SERPL CREATININE-BSD FRML MDRD: 95 ML/MIN/1.73SQ M
GLUCOSE SERPL-MCNC: 111 MG/DL (ref 65–140)
HCT VFR BLD AUTO: 37.5 % (ref 36.5–49.3)
HGB BLD-MCNC: 12.2 G/DL (ref 12–17)
IMM GRANULOCYTES # BLD AUTO: 0.04 THOUSAND/UL (ref 0–0.2)
IMM GRANULOCYTES NFR BLD AUTO: 0 % (ref 0–2)
LYMPHOCYTES # BLD AUTO: 0.93 THOUSANDS/ÂΜL (ref 0.6–4.47)
LYMPHOCYTES NFR BLD AUTO: 9 % (ref 14–44)
MCH RBC QN AUTO: 28.7 PG (ref 26.8–34.3)
MCHC RBC AUTO-ENTMCNC: 32.5 G/DL (ref 31.4–37.4)
MCV RBC AUTO: 88 FL (ref 82–98)
MONOCYTES # BLD AUTO: 0.57 THOUSAND/ÂΜL (ref 0.17–1.22)
MONOCYTES NFR BLD AUTO: 6 % (ref 4–12)
NEUTROPHILS # BLD AUTO: 8.4 THOUSANDS/ÂΜL (ref 1.85–7.62)
NEUTS SEG NFR BLD AUTO: 83 % (ref 43–75)
NRBC BLD AUTO-RTO: 0 /100 WBCS
PLATELET # BLD AUTO: 228 THOUSANDS/UL (ref 149–390)
PMV BLD AUTO: 9 FL (ref 8.9–12.7)
POTASSIUM SERPL-SCNC: 4.2 MMOL/L (ref 3.5–5.3)
PROT SERPL-MCNC: 6.3 G/DL (ref 6.4–8.4)
RBC # BLD AUTO: 4.25 MILLION/UL (ref 3.88–5.62)
RSV RNA RESP QL NAA+PROBE: NEGATIVE
SARS-COV-2 RNA RESP QL NAA+PROBE: NEGATIVE
SODIUM SERPL-SCNC: 135 MMOL/L (ref 135–147)
WBC # BLD AUTO: 10.18 THOUSAND/UL (ref 4.31–10.16)

## 2022-11-25 RX ORDER — DOCUSATE SODIUM 100 MG/1
100 CAPSULE, LIQUID FILLED ORAL 2 TIMES DAILY
Status: DISCONTINUED | OUTPATIENT
Start: 2022-11-25 | End: 2022-12-05

## 2022-11-25 RX ORDER — ASPIRIN 325 MG
325 TABLET ORAL DAILY
Status: DISCONTINUED | OUTPATIENT
Start: 2022-11-26 | End: 2022-12-07

## 2022-11-25 RX ORDER — FERROUS SULFATE 325(65) MG
325 TABLET ORAL
Status: DISCONTINUED | OUTPATIENT
Start: 2022-11-26 | End: 2022-11-26

## 2022-11-25 RX ORDER — OXYCODONE HYDROCHLORIDE 10 MG/1
10 TABLET ORAL EVERY 4 HOURS PRN
Status: DISCONTINUED | OUTPATIENT
Start: 2022-11-25 | End: 2022-11-26

## 2022-11-25 RX ORDER — POLYETHYLENE GLYCOL 3350 17 G/17G
17 POWDER, FOR SOLUTION ORAL DAILY PRN
Status: DISCONTINUED | OUTPATIENT
Start: 2022-11-25 | End: 2022-12-02

## 2022-11-25 RX ORDER — ONDANSETRON 4 MG/1
4 TABLET, ORALLY DISINTEGRATING ORAL EVERY 8 HOURS PRN
Status: DISCONTINUED | OUTPATIENT
Start: 2022-11-25 | End: 2022-12-10 | Stop reason: HOSPADM

## 2022-11-25 RX ORDER — HYDROCODONE BITARTRATE AND ACETAMINOPHEN 5; 325 MG/1; MG/1
2 TABLET ORAL EVERY 6 HOURS PRN
Status: DISCONTINUED | OUTPATIENT
Start: 2022-11-25 | End: 2022-11-25

## 2022-11-25 RX ORDER — SENNOSIDES 8.6 MG
1 TABLET ORAL 2 TIMES DAILY
Status: DISCONTINUED | OUTPATIENT
Start: 2022-11-25 | End: 2022-12-02

## 2022-11-25 RX ORDER — BISACODYL 10 MG
10 SUPPOSITORY, RECTAL RECTAL ONCE
Status: COMPLETED | OUTPATIENT
Start: 2022-11-25 | End: 2022-11-25

## 2022-11-25 RX ORDER — POLYETHYLENE GLYCOL 3350 17 G/17G
17 POWDER, FOR SOLUTION ORAL DAILY
Status: DISCONTINUED | OUTPATIENT
Start: 2022-11-25 | End: 2022-11-30

## 2022-11-25 RX ORDER — PRAVASTATIN SODIUM 20 MG
10 TABLET ORAL
Status: DISCONTINUED | OUTPATIENT
Start: 2022-11-25 | End: 2022-11-28

## 2022-11-25 RX ORDER — LISINOPRIL 5 MG/1
5 TABLET ORAL DAILY
Status: DISCONTINUED | OUTPATIENT
Start: 2022-11-26 | End: 2022-12-05

## 2022-11-25 RX ORDER — METHOCARBAMOL 500 MG/1
500 TABLET, FILM COATED ORAL EVERY 6 HOURS SCHEDULED
Qty: 40 TABLET | Refills: 1 | Status: ON HOLD | OUTPATIENT
Start: 2022-11-25

## 2022-11-25 RX ORDER — DOCUSATE SODIUM 100 MG/1
100 CAPSULE, LIQUID FILLED ORAL 2 TIMES DAILY PRN
Qty: 20 CAPSULE | Refills: 0 | Status: ON HOLD | OUTPATIENT
Start: 2022-11-25

## 2022-11-25 RX ORDER — OXYCODONE HYDROCHLORIDE 5 MG/1
5 TABLET ORAL EVERY 4 HOURS PRN
Status: DISCONTINUED | OUTPATIENT
Start: 2022-11-25 | End: 2022-11-26

## 2022-11-25 RX ORDER — BISACODYL 10 MG
10 SUPPOSITORY, RECTAL RECTAL ONCE
Status: DISCONTINUED | OUTPATIENT
Start: 2022-11-26 | End: 2022-11-25

## 2022-11-25 RX ORDER — HYDROCODONE BITARTRATE AND ACETAMINOPHEN 5; 325 MG/1; MG/1
1 TABLET ORAL EVERY 6 HOURS PRN
Status: DISCONTINUED | OUTPATIENT
Start: 2022-11-25 | End: 2022-11-25

## 2022-11-25 RX ORDER — HYDROCODONE BITARTRATE AND ACETAMINOPHEN 5; 325 MG/1; MG/1
1 TABLET ORAL EVERY 6 HOURS PRN
Qty: 30 TABLET | Refills: 0 | Status: ON HOLD | OUTPATIENT
Start: 2022-11-25 | End: 2022-12-05

## 2022-11-25 RX ORDER — METHOCARBAMOL 500 MG/1
500 TABLET, FILM COATED ORAL EVERY 6 HOURS SCHEDULED
Status: DISCONTINUED | OUTPATIENT
Start: 2022-11-25 | End: 2022-11-26

## 2022-11-25 RX ORDER — BISACODYL 10 MG
10 SUPPOSITORY, RECTAL RECTAL DAILY PRN
Status: DISCONTINUED | OUTPATIENT
Start: 2022-11-25 | End: 2022-12-10 | Stop reason: HOSPADM

## 2022-11-25 RX ORDER — SENNOSIDES 8.6 MG
1 TABLET ORAL DAILY
Status: DISCONTINUED | OUTPATIENT
Start: 2022-11-26 | End: 2022-11-25

## 2022-11-25 RX ADMIN — METHOCARBAMOL 500 MG: 500 TABLET ORAL at 11:40

## 2022-11-25 RX ADMIN — HYDROCODONE BITARTRATE AND ACETAMINOPHEN 2 TABLET: 5; 325 TABLET ORAL at 15:26

## 2022-11-25 RX ADMIN — POLYETHYLENE GLYCOL 3350 17 G: 17 POWDER, FOR SOLUTION ORAL at 17:45

## 2022-11-25 RX ADMIN — METHOCARBAMOL 500 MG: 500 TABLET, FILM COATED ORAL at 23:49

## 2022-11-25 RX ADMIN — ASPIRIN 325 MG ORAL TABLET 325 MG: 325 PILL ORAL at 08:59

## 2022-11-25 RX ADMIN — DOCUSATE SODIUM 100 MG: 100 CAPSULE, LIQUID FILLED ORAL at 17:45

## 2022-11-25 RX ADMIN — HYDROCODONE BITARTRATE AND ACETAMINOPHEN 2 TABLET: 5; 325 TABLET ORAL at 11:40

## 2022-11-25 RX ADMIN — SENNOSIDES 8.6 MG: 8.6 TABLET, FILM COATED ORAL at 17:45

## 2022-11-25 RX ADMIN — METHOCARBAMOL 500 MG: 500 TABLET, FILM COATED ORAL at 17:45

## 2022-11-25 RX ADMIN — OXYCODONE HYDROCHLORIDE 10 MG: 10 TABLET ORAL at 23:49

## 2022-11-25 RX ADMIN — LISINOPRIL 5 MG: 5 TABLET ORAL at 08:59

## 2022-11-25 RX ADMIN — BISACODYL 10 MG: 10 SUPPOSITORY RECTAL at 20:02

## 2022-11-25 RX ADMIN — FERROUS SULFATE TAB 325 MG (65 MG ELEMENTAL FE) 325 MG: 325 (65 FE) TAB at 08:59

## 2022-11-25 RX ADMIN — KETOROLAC TROMETHAMINE 15 MG: 30 INJECTION, SOLUTION INTRAMUSCULAR at 02:59

## 2022-11-25 RX ADMIN — SENNOSIDES 8.6 MG: 8.6 TABLET, FILM COATED ORAL at 08:59

## 2022-11-25 RX ADMIN — BISACODYL 10 MG RECTAL SUPPOSITORY 10 MG: at 22:13

## 2022-11-25 RX ADMIN — METHOCARBAMOL 500 MG: 500 TABLET ORAL at 05:08

## 2022-11-25 RX ADMIN — HYDROCODONE BITARTRATE AND ACETAMINOPHEN 2 TABLET: 5; 325 TABLET ORAL at 05:07

## 2022-11-25 RX ADMIN — PRAVASTATIN SODIUM 10 MG: 20 TABLET ORAL at 17:45

## 2022-11-25 RX ADMIN — DOCUSATE SODIUM 100 MG: 100 CAPSULE, LIQUID FILLED ORAL at 08:58

## 2022-11-25 RX ADMIN — KETOROLAC TROMETHAMINE 15 MG: 30 INJECTION, SOLUTION INTRAMUSCULAR at 08:59

## 2022-11-25 RX ADMIN — OXYCODONE HYDROCHLORIDE 10 MG: 10 TABLET ORAL at 19:54

## 2022-11-25 NOTE — PLAN OF CARE
Problem: MOBILITY - ADULT  Goal: Maintain or return to baseline ADL function  Description: INTERVENTIONS:  -  Assess patient's ability to carry out ADLs; assess patient's baseline for ADL function and identify physical deficits which impact ability to perform ADLs (bathing, care of mouth/teeth, toileting, grooming, dressing, etc )  - Assess/evaluate cause of self-care deficits   - Assess range of motion  - Assess patient's mobility; develop plan if impaired  - Assess patient's need for assistive devices and provide as appropriate  - Encourage maximum independence but intervene and supervise when necessary  - Involve family in performance of ADLs  - Assess for home care needs following discharge   - Consider OT consult to assist with ADL evaluation and planning for discharge  - Provide patient education as appropriate  Outcome: Progressing  Goal: Maintains/Returns to pre admission functional level  Description: INTERVENTIONS:  - Perform BMAT or MOVE assessment daily    - Set and communicate daily mobility goal to care team and patient/family/caregiver  - Collaborate with rehabilitation services on mobility goals if consulted  - Perform Range of Motion  times a day  - Reposition patient every  hours    - Dangle patient  times a day  - Stand patient  times a day  - Ambulate patient  times a day  - Out of bed to chair  times a day   - Out of bed for meals  times a day  - Out of bed for toileting  - Record patient progress and toleration of activity level   Outcome: Progressing     Problem: Potential for Falls  Goal: Patient will remain free of falls  Description: INTERVENTIONS:  - Educate patient/family on patient safety including physical limitations  - Instruct patient to call for assistance with activity   - Consult OT/PT to assist with strengthening/mobility   - Keep Call bell within reach  - Keep bed low and locked with side rails adjusted as appropriate  - Keep care items and personal belongings within reach  - Initiate and maintain comfort rounds  - Make Fall Risk Sign visible to staff  - Offer Toileting every  Hours, in advance of need  - Initiate/Maintain alarm  - Obtain necessary fall risk management equipment:   - Apply yellow socks and bracelet for high fall risk patients  - Consider moving patient to room near nurses station  Outcome: Progressing     Problem: PAIN - ADULT  Goal: Verbalizes/displays adequate comfort level or baseline comfort level  Description: Interventions:  - Encourage patient to monitor pain and request assistance  - Assess pain using appropriate pain scale  - Administer analgesics based on type and severity of pain and evaluate response  - Implement non-pharmacological measures as appropriate and evaluate response  - Consider cultural and social influences on pain and pain management  - Notify physician/advanced practitioner if interventions unsuccessful or patient reports new pain  Outcome: Progressing     Problem: INFECTION - ADULT  Goal: Absence or prevention of progression during hospitalization  Description: INTERVENTIONS:  - Assess and monitor for signs and symptoms of infection  - Monitor lab/diagnostic results  - Monitor all insertion sites, i e  indwelling lines, tubes, and drains  - Monitor endotracheal if appropriate and nasal secretions for changes in amount and color  - [place] appropriate cooling/warming therapies per order  - Administer medications as ordered  - Instruct and encourage patient and family to use good hand hygiene technique  - Identify and instruct in appropriate isolation precautions for identified infection/condition  Outcome: Progressing  Goal: Absence of fever/infection during neutropenic period  Description: INTERVENTIONS:  - Monitor WBC    Outcome: Progressing     Problem: DISCHARGE PLANNING  Goal: Discharge to home or other facility with appropriate resources  Description: INTERVENTIONS:  - Identify barriers to discharge w/patient and caregiver  - Arrange for needed discharge resources and transportation as appropriate  - Identify discharge learning needs (meds, wound care, etc )  - Arrange for interpretive services to assist at discharge as needed  - Refer to Case Management Department for coordinating discharge planning if the patient needs post-hospital services based on physician/advanced practitioner order or complex needs related to functional status, cognitive ability, or social support system  Outcome: Progressing     Problem: Knowledge Deficit  Goal: Patient/family/caregiver demonstrates understanding of disease process, treatment plan, medications, and discharge instructions  Description: Complete learning assessment and assess knowledge base    Interventions:  - Provide teaching at level of understanding  - Provide teaching via preferred learning methods  Outcome: Progressing     Problem: RESPIRATORY - ADULT  Goal: Achieves optimal ventilation and oxygenation  Description: INTERVENTIONS:  - Assess for changes in respiratory status  - Assess for changes in mentation and behavior  - Position to facilitate oxygenation and minimize respiratory effort  - Oxygen administered by appropriate delivery if ordered  - Initiate smoking cessation education as indicated  - Encourage broncho-pulmonary hygiene including cough, deep breathe, Incentive Spirometry  - Assess the need for suctioning and aspirate as needed  - Assess and instruct to report SOB or any respiratory difficulty  - Respiratory Therapy support as indicated  Outcome: Progressing     Problem: GASTROINTESTINAL - ADULT  Goal: Maintains or returns to baseline bowel function  Description: INTERVENTIONS:  - Assess bowel function  - Encourage oral fluids to ensure adequate hydration  - Administer IV fluids if ordered to ensure adequate hydration  - Administer ordered medications as needed  - Encourage mobilization and activity  - Consider nutritional services referral to assist patient with adequate nutrition and appropriate food choices  Outcome: Progressing     Problem: GENITOURINARY - ADULT  Goal: Absence of urinary retention  Description: INTERVENTIONS:  - Assess patient’s ability to void and empty bladder  - Monitor I/O  - Bladder scan as needed  - Discuss with physician/AP medications to alleviate retention as needed  - Discuss catheterization for long term situations as appropriate  Outcome: Progressing

## 2022-11-25 NOTE — TELEPHONE ENCOUNTER
Caller: Minoo Santizo from [employer]    Doctor: Julia Elliott    Reason for call: Michelle Dewayne states that they received a script for Norco but the patient already takes Tramadol  Please advise if the script should be filled       Call back#: [phone]

## 2022-11-25 NOTE — PROGRESS NOTES
[address]  Progress Note - Shellie Bond 1955, 78 y o  male MRN: [id number]  Unit/Bed#: -Henny Encounter: [id number]  Primary Care Provider: Alycia Martell MD   Date and time admitted to hospital: 2022  5:47 AM    Back pain  Assessment & Plan  · Pain regimen per Orthopedic surgery    Hypertension  Assessment & Plan  · Blood pressure adequately controlled at this time  · Continue home lisinopril  · Continue to monitor    Hyperlipidemia  Assessment & Plan  · Continue statin    * Status post lumbar spinal fusion  Assessment & Plan  · Postop day 1 from thoracic and lumbar fusion  · Pain regimen in place per primary team orthopedic surgery  · PT OT -recommended rehab, pending placement  · Continue to monitor        VTE Pharmacologic Prophylaxis:   VTE Score: 12 Low Risk (Score 0-2) - Encourage Ambulation  Mechanical VTE Prophylaxis in Place: Yes    Patient Centered Rounds: I have performed bedside rounds with nursing staff today  Discussions with Specialists or Other Care Team Provider: yes    Education and Discussions with Family / Patient: yes    Current Length of Stay: 3 day(s)    Current Patient Status: Inpatient     Discharge Plan / Estimated Discharge Date: Valla Levo is following this patient on consult  They are medically stable for discharge when deemed appropriate by primary service  Code Status: Prior      Subjective:   Seen and examined at bedside  No acute issues  No over night events  Pending placement     Objective:     Vitals:   Temp (24hrs), Av 6 °F (37 °C), Min:98 °F (36 7 °C), Max:99 4 °F (37 4 °C)    Temp:  [98 °F (36 7 °C)-99 4 °F (37 4 °C)] 98 5 °F (36 9 °C)  HR:  [83-91] 83  Resp:  [18] 18  BP: (123-161)/(60-94) 155/94  SpO2:  [92 %-96 %] 95 %  Body mass index is 31 38 kg/m²  Input and Output Summary (last 24 hours):        Intake/Output Summary (Last 24 hours) at 2022 0844  Last data filed at 2022 1023  Gross per 24 hour   Intake -- Output 300 ml   Net -300 ml       Physical Exam:     Physical Exam  Vitals and nursing note reviewed  Constitutional:       General: He is not in acute distress  Appearance: He is well-developed  HENT:      Head: Normocephalic and atraumatic  Eyes:      Conjunctiva/sclera: Conjunctivae normal    Cardiovascular:      Rate and Rhythm: Normal rate and regular rhythm  Heart sounds: No murmur heard  Pulmonary:      Effort: Pulmonary effort is normal  No respiratory distress  Breath sounds: Normal breath sounds  Abdominal:      Palpations: Abdomen is soft  Tenderness: There is no abdominal tenderness  Musculoskeletal:         General: No swelling  Cervical back: Neck supple  Skin:     General: Skin is warm and dry  Capillary Refill: Capillary refill takes less than 2 seconds  Neurological:      Mental Status: He is alert and oriented to person, place, and time  Psychiatric:         Mood and Affect: Mood normal           Additional Data:     Labs:  Results from last 7 days   Lab Units 11/25/22  0508   WBC Thousand/uL 10 18*   HEMOGLOBIN g/dL 12 2   HEMATOCRIT % 37 5   PLATELETS Thousands/uL 228   NEUTROS PCT % 83*   LYMPHS PCT % 9*   MONOS PCT % 6   EOS PCT % 2     Results from last 7 days   Lab Units 11/25/22  0508   SODIUM mmol/L 135   POTASSIUM mmol/L 4 2   CHLORIDE mmol/L 102   CO2 mmol/L 27   BUN mg/dL 17   CREATININE mg/dL 0 76   ANION GAP mmol/L 6   CALCIUM mg/dL 8 6   ALBUMIN g/dL 2 6*   TOTAL BILIRUBIN mg/dL 0 83   ALK PHOS U/L 80   ALT U/L 32   AST U/L 66*   GLUCOSE RANDOM mg/dL 111         Results from last 7 days   Lab Units 11/22/22  0659   POC GLUCOSE mg/dl 108               Imaging: No pertinent imaging reviewed      Recent Cultures (last 7 days):           Lines/Drains:  Invasive Devices     Peripheral Intravenous Line  Duration           Peripheral IV 11/22/22 Left Forearm 3 days                Telemetry:        Last 24 Hours Medication List:   Current Facility-Administered Medications   Medication Dose Route Frequency Provider Last Rate   • aspirin  325 mg Oral Daily Tori Meehan PA-C     • docusate sodium  100 mg Oral BID Tori Meehan PA-C     • ferrous sulfate  325 mg Oral Daily With Breakfast Tori Meehan PA-C     • HYDROcodone-acetaminophen  1 tablet Oral Q6H PRN Tori Meehan PA-C     • HYDROcodone-acetaminophen  2 tablet Oral Q6H PRN Tori Meeahn PA-C     • ketorolac  15 mg Intravenous Q6H PRN Tori Meehan PA-C     • lisinopril  5 mg Oral Daily Tori Meehan PA-C     • magnesium hydroxide  30 mL Oral Daily PRN Tori Meehan PA-C     • methocarbamol  500 mg Oral Q6H [facility] Tori Meehan PA-C     • naloxone  0 1 mg Intravenous Q3 min PRN Tori Meehan PA-C     • ondansetron  4 mg Intravenous Q6H PRN Tori Meehan PA-C     • pravastatin  10 mg Oral Daily With OSMIN Brooks     • senna  1 tablet Oral Daily Tori Meehan PA-C          Today, Patient Was Seen By: Colonel Ken MD    ** Please Note: This note has been constructed using a voice recognition system   **

## 2022-11-25 NOTE — PLAN OF CARE
Problem: Potential for Falls  Goal: Patient will remain free of falls  Description: INTERVENTIONS:  - Educate patient/family on patient safety including physical limitations  - Instruct patient to call for assistance with activity   - Consult OT/PT to assist with strengthening/mobility   - Keep Call bell within reach  - Keep bed low and locked with side rails adjusted as appropriate  - Keep care items and personal belongings within reach  - Initiate and maintain comfort rounds  - Make Fall Risk Sign visible to staff  - Offer Toileting every 4Hours, in advance of need  -   - Obtain necessary fall risk management equipment: rw wc  - Apply yellow socks and bracelet for high fall risk patients  - Consider moving patient to room near nurses station  Outcome: Progressing

## 2022-11-25 NOTE — PROGRESS NOTES
PHYSICAL MEDICINE AND REHABILITATION   PREADMISSION ASSESSMENT     Projected [facility] Diagnoses:  Impairment of mobility, safety and Activities of Daily Living (ADLs) due to Orthopedic Disorders:  08 9  Other Orthopedic scoliosis and Flatback syndrome  Etiologic: Scoliosis and Flatback syndrome  Date of Onset: 11/22/22   Date of surgery: 11/22/22-s/p removal of hardware L1-L3, posterior thoracic fusion T7-L1, thoracic osteotomy T11/12, revision instrumentation T8 to pelvis, and thoracic laminotomy T8-9, T9-10, and T10-11    PATIENT INFORMATION  Name: Donavon John Phone #: [phone] (home)   Address: [address] [place], [place] [id number]  YOB: 1955 Age: 78 y o  SS#   Marital Status: /Civil Union  Ethnicity: White  Employment Status: on disability  Extended Emergency Contact Information  Primary Emergency Contact: [facility]  Mobile Phone: [phone]  Relation: Spouse  Advance Directive: (no ACP docs)    INSURANCE/COVERAGE:     Primary Payor: MEDICARE / Plan: MEDICARE A AND B / Product Type: Medicare A & B Fee for Service /   Secondary Payer: Nela Kimberley #[id number]   Payer Contact:  Payer Contact:   Contact Phone:  Contact Phone:     Authorization #:   Coverage Dates:  LCD:   MEDICARE #: [id number]  Medicare Days: 60/30/60  Medical Record #: [id number]    REFERRAL SOURCE:   Referring provider: April Rios MD  Referring facility: [address] [place] [address]  Room: [facility] [address]  PCP: Maura Melissa MD PCP phone number: [phone]    MEDICAL INFORMATION  HPI: This is a 31-year-old male with a h/o scoliosis and Flatback syndrome who is s/p removal of hardware L1-L3, posterior thoracic fusion T7-L1, thoracic osteotomy T11/12, revision instrumentation T8 to pelvis, and thoracic laminotomy T8-9, T9-10, and T10-11 on 11/22/22  Patient c/o slightly blurry vision after procedure but it improved    He is WBAT and has a TLSO Patient declines ACP document.   brace to be worn when OOB  Pain was well managed with PCA pump for approximately 16 hours after surgery  PCA pump was discontinued on POD#1  Pain regimen including IV and oral pain medication was adjusted on POD#1  Duplex venous ultrasound of bilateral lower extremities was ordered on POD#3 that ruled out acute DVT postoperatively  He was ordered SCDs and  mg daily for DVT prophylaxis  He was placed on soft diet on 11/24/22 due to mild abdominal distention  Distention resolved as of 11/25 and diet was advanced to regular  Patient worked with PT and OT and recommended for rehab following his hospital stay  He has demonstrated that he can tolerate three hours of therapy, five days a week and is now medically stable for discharge to the [facility]  Past Medical History:   Past Surgical History: Allergies:     Past Medical History:   Diagnosis Date   • Ambulates with cane     "long distance"   • Arthritis    • Back pain    • Chronic pain disorder    • Dental crowns present    • Exercise involving walking     daily -short walks   • History of hepatitis C     per pt "went away on its own" age 13"   • History of transfusion    • Hyperlipidemia    • Hypertension    • Leg pain     and foot pain   • Limb alert care status     per pt NO IV's LEFT UPPER ARM due to old injury   • Risk for falls    • Spinal stenosis    • Stroke ([address] )     per pt in 1996-and no lasting problems   • Wears glasses     readers    Past Surgical History:   Procedure Laterality Date   • BACK SURGERY      with implants   • COLONOSCOPY     • LUMBAR FUSION N/A 5/24/2022    Procedure: L1-S1 revision of segmental hardware, L5-S1 osteotomy, posterior spinal fusion L4-S1, pelvic instrumentation, Transforaminal lumbar interbody fusion L5S1, Cage L5S1, instrumentation L1-pelvis;   Surgeon: Alisa Girard MD;  Location: [facility];  Service: Orthopedics   • MS ARTHRODESIS POSTERIOR/POSTEROLATERAL THORACIC N/A 11/22/2022    Procedure: removal of hardware L1-L3  Posterior thoracic/ lumbar instrumentation from T8 to pelvis  Thoracic osteotomy T11/12  Thoracic laminotomy T8/9, T9/10 and T10/11  Posterior thoracic fusion T7-L1  Revision instrumentation T8 to pelvis ;  Surgeon: Darren Jordan MD;  Location: [facility];  Service: Orthopedics   • WISDOM TOOTH EXTRACTION       No Known Allergies      Medical/functional conditions requiring inpatient rehabilitation:   Scoliosis and Flatback syndrome s/p thoracic and lumbar fusion  Hyperlipidemia  HTN    Risk for medical/clinical complications: risk for falls and injury related to falls, risk for infection, risk for complications with incision, risk for uncontrolled pain, risk for DVT/PE/CVA, risk for skin breakdown    Comorbidities/Surgeries in the last 100 days:   HTN  Hyperlipidemia  11/22/22 s/p removal of hardware L1-L3, posterior thoracic fusion T7-L1, thoracic osteotomy T11/12, revision instrumentation T8 to pelvis, and thoracic laminotomy T8-9, T9-10, and T10-11    CURRENT VITAL SIGNS:   Temp:  [98 °F (36 7 °C)-99 4 °F (37 4 °C)] 98 5 °F (36 9 °C)  HR:  [83-91] 83  Resp:  [18] 18  BP: (123-161)/(60-94) 155/94 No intake or output data in the 24 hours ending 11/25/22 1313     LABORATORY RESULTS:      Lab Results   Component Value Date    HGB 12 2 11/25/2022    HCT 37 5 11/25/2022    WBC 10 18 (H) 11/25/2022     Lab Results   Component Value Date    BUN 17 11/25/2022    K 4 2 11/25/2022     11/25/2022    GLUCOSE 149 (H) 11/22/2022    CREATININE 0 76 11/25/2022     Lab Results   Component Value Date    PROTIME 13 0 05/24/2022    INR 1 02 05/24/2022        DIAGNOSTIC STUDIES:  XR spine thoracic 2 vw    Result Date: 11/23/2022  Impression: Fluoroscopic guidance provided for procedure guidance  Please refer to the separate procedure notes for additional details   Workstation performed: [id number]       PRECAUTIONS/SPECIAL NEEDS:  Splints/Braces: TLSO brace when OOB, Anticoagulation:  aspirin 325 mg orally every day, Pain Management, Dietary Restrictions: Regular Diet, Visually Impaired, Language Preference: English and fall precautions    MEDICATIONS:     Current Facility-Administered Medications:   •  aspirin tablet 325 mg, 325 mg, Oral, Daily, HUEY Juárez-C, 325 mg at 11/25/22 0859  •  docusate sodium (COLACE) capsule 100 mg, 100 mg, Oral, BID, Tori Meehan PA-C, 100 mg at 11/25/22 5496  •  ferrous sulfate tablet 325 mg, 325 mg, Oral, Daily With Breakfast, Tori Meehan PA-C, 325 mg at 11/25/22 0859  •  HYDROcodone-acetaminophen (NORCO) 5-325 mg per tablet 1 tablet, 1 tablet, Oral, Q6H PRN, HUEY Juárez-C, 1 tablet at 11/24/22 1657  •  HYDROcodone-acetaminophen (NORCO) 5-325 mg per tablet 2 tablet, 2 tablet, Oral, Q6H PRN, HUEY Juárez-C, 2 tablet at 11/25/22 1140  •  ketorolac (TORADOL) injection 15 mg, 15 mg, Intravenous, Q6H PRN, Tori Meehan PA-C, 15 mg at 11/25/22 0859  •  lisinopril (ZESTRIL) tablet 5 mg, 5 mg, Oral, Daily, Tori Meehan PA-C, 5 mg at 11/25/22 0294  •  magnesium hydroxide (MILK OF MAGNESIA) oral suspension 30 mL, 30 mL, Oral, Daily PRN, Tori Meehan PA-C  •  methocarbamol (ROBAXIN) tablet 500 mg, 500 mg, Oral, Q6H [address], Tori Meehan PA-C, 500 mg at 11/25/22 1140  •  naloxone (NARCAN) injection 0 1 mg, 0 1 mg, Intravenous, Q3 min PRN, HUEY Juárez-C  •  ondansetron (ZOFRAN) injection 4 mg, 4 mg, Intravenous, Q6H PRN, Tori Meehan PA-C  •  pravastatin (PRAVACHOL) tablet 10 mg, 10 mg, Oral, Daily With Dinner, HUEY Juárez-C, 10 mg at 11/24/22 1657  •  senna (SENOKOT) tablet 8 6 mg, 1 tablet, Oral, Daily, Tori Meehan PA-C, 8 6 mg at 11/25/22 4648    SKIN INTEGRITY:   Back incision    PRIOR LEVEL OF FUNCTION:  He lives in a(n) single family home  [place] is  and lives with his spouse and son  Self Care:  Independent, Indoor Mobility: Ambulated with an AD when in home and with SPC in the community, Stairs (in/outdoor): Independent and Cognition: Independent    FALLS IN THE LAST 6 MONTHS: 0    HOME ENVIRONMENT:  The living area: [address] lives in a multilevel home  Sleeps on the couch on 1st floor  There are 1 step to enter the home  The patient will not have 24 hour supervision/physical assistance available upon discharge  PREVIOUS DME:  Equipment in home (previous DME): Shower Chair, Grab Bars, Single [employer] and walker, reacher    FUNCTIONAL STATUS:  Physical Therapy Occupational Therapy Speech Therapy   11/25/22 0748    PT Last Visit   PT Visit Date 11/25/22   Note Type   Note Type Treatment   Pain Assessment   Pain Assessment Tool 0-10   Pain Score 6   Pain Location/Orientation Orientation: Mid;Location: Back   Pain Onset/Description Onset: Ongoing; Onset: Sudden;Frequency: Constant/Continuous   Patient's Stated Pain Goal No pain   Hospital Pain Intervention(s) Repositioned; Ambulation/increased activity   Restrictions/Precautions   Weight Bearing Precautions Per Order No   Braces or Orthoses TLSO   Other Precautions Chair Alarm; Bed Alarm;Pain; Fall Risk   General   Chart Reviewed Yes   Response to Previous Treatment Patient with no complaints from previous session  Family/Caregiver Present No   Cognition   Overall Cognitive Status WFL   Arousal/Participation Alert; Responsive; Cooperative   Attention Attends with cues to redirect   Orientation Level Oriented X4   Memory Decreased recall of precautions   Following Commands Follows one step commands without difficulty   Comments Pt agreeable to PT   Bed Mobility   Supine to Sit 5  Supervision   Additional items Assist x 1;HOB elevated; Bedrails; Increased time required;Verbal cues   Additional Comments Pt received at start of session supine in bed with HOB elevated   Following session, pt remained in recliner with needs met, alarm activated and call bel within reach   Transfers   Sit to Stand 4  Minimal assistance   Additional items Assist x 2;Armrests; Increased time required;Verbal cues   Stand to Sit 4  Minimal assistance   Additional items Assist x 2; Increased time required;Verbal cues;Armrests   Additional Comments with use of RW, VCs for proper hand placements   Ambulation/Elevation   Gait pattern Decreased foot clearance; Inconsistent osvaldo; Short stride;Decreased heel strike;Knees flexed   Gait Assistance 4  Minimal assist   Additional items Assist x 1;Verbal cues   Assistive Device Rolling walker   Distance 20' then 3' from bed to recliner   Stair Management Assistance Not tested   Balance   Static Sitting Fair +   Dynamic Sitting Fair   Static Standing Poor +   Dynamic Standing Poor   Ambulatory Poor   Endurance Deficit   Endurance Deficit Yes   Endurance Deficit Description decreased activity tolerance   Activity Tolerance   Activity Tolerance Patient limited by fatigue;Patient limited by pain   Medical Staff Made Aware ELIAZAR Stokes Kishan   Nurse Made Aware JENI Rinaldi   Exercises   Hip Flexion Sitting;10 reps;AROM; Bilateral   Hip Adduction Sitting;20 reps;AROM; Bilateral   Knee AROM Long Arc Quad Sitting;10 reps;AROM; Bilateral   Ankle Pumps Sitting;20 reps;AROM; Bilateral   Assessment   Prognosis Good   Problem List Decreased strength;Decreased endurance;Decreased mobility; Impaired balance;Pain;Orthopedic restrictions   Assessment Pt seen for PT treatment session this date with interventions consisting of gait training w/ emphasis on improving pt's ability to ambulate level surfaces x 20' then 3' with min A of 2 provided by therapist with RW, Therapeutic exercise consisting of: BLE exercises performed seated in recliner and therapeutic activity consisting of training: bed mobility, supine<>sit transfers, sit<>stand transfers and vc and tactile cues for static standing posture faciliation  Pt agreeable to PT treatment session upon arrival, pt found supine in bed w/ HOB elevated, in no apparent distress and responsive   In comparison to previous session, pt with improvements in activity tolerance however continues to be limited by increased pain  Post session: pt returned BTB, bed alarm engaged, all needs in reach and RN notified of session findings/recommendations  Continue to recommend post acute rehabilitation services at time of d/c in order to maximize pt's functional independence and safety w/ mobility  Pt continues to be functioning below baseline level, and remains limited 2* factors listed above and including continued need for medical management and monitoring, decreased strength and balance resulting in an increased risk for falls, decreased endurance and activity tolerance limiting mobility, increased pain  PT will continue to see pt during current hospitalization in order to address the deficits listed above and provide interventions consistent w/ POC in effort to achieve STGs       11/25/22 0814   OT Last Visit   OT Visit Date 11/25/22   Note Type   Note Type Treatment   Pain Assessment   Pain Assessment Tool 0-10   Pain Score 6  (at rest; increases c mobility)   Pain Location/Orientation Location: Back   Pain Onset/Description Onset: Ongoing;Frequency: Constant/Continuous; Descriptor: Aching;Descriptor: Discomfort   Hospital Pain Intervention(s) Ambulation/increased activity;Repositioned   Restrictions/Precautions   Weight Bearing Precautions Per Order No   Braces or Orthoses TLSO  (donned while seated at EOB)   Other Precautions Bed Alarm; Chair Alarm; Fall Risk;Pain;Spinal precautions   Lifestyle   Autonomy Patient reporting being independent with ADLs/IADLs, ambulatory with no AD or SPC, and lives with family in a two story house   Reciprocal Relationships Supportive family   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   Intrinsic Gratification Enjoys playing in garage   ADL   Where Assessed Edge of bed   Grooming Assistance 5  Supervision/Setup   Grooming Deficit Setup; Increased time to complete   Grooming Comments Pt combed hair and completed oral hyigene while seated in recliner   UB Dressing Assistance 3  Moderate Assistance   UB Dressing Deficit Setup;Verbal cueing;Supervision/safety; Increased time to complete; Thread RUE; Thread LUE;Pull over head;Pull down in back   UB Dressing Comments Pt donned overhead T-shirt  LB Dressing Assistance 2  Maximal Assistance   LB Dressing Deficit Setup;Steadying; Requires assistive device for steadying;Verbal cueing; Increased time to complete;Supervision/safety; Thread RLE into pants; Thread LLE into pants;Pull up over hips   LB Dressing Comments Pt donned sweatpants   Bed Mobility   Supine to Sit 5  Supervision   Additional items Assist x 1;HOB elevated; Bedrails; Increased time required;Verbal cues   Sit to Supine 5  Supervision   Additional items Assist x 1;HOB elevated; Bedrails; Increased time required;Verbal cues   Additional Comments Log-roll technique utilized for bed mobility to maintain spinal precautions   Transfers   Sit to Stand 4  Minimal assistance   Additional items Assist x 2;Bedrails; Increased time required;Verbal cues   Stand to Sit 4  Minimal assistance   Additional items Assist x 2; Increased time required;Verbal cues;Armrests   Functional Mobility   Functional Mobility 4  Minimal assistance   Additional Comments Pt ambulated short distance in room with no overt SOB  Pt grossly unsteady and limited d/t pain     Additional items Rolling walker   Therapeutic Exercise - ROM   UE-ROM Yes  (Pt completed BUE exercises to increase strength and endurance to improve independence with ADLs)   ROM- Right Upper Extremities   R Shoulder AROM  (Protraction/Retraction)   R Elbow AROM;Elbow flexion;Elbow extension   R Position Seated;Against gravity   R Weight/Reps/Sets 1 set x 10 reps   ROM - Left Upper Extremities    L Shoulder AROM  (Protraction/Retraction)   L Elbow AROM;Elbow flexion;Elbow extension   L Position Seated;Against gravity   L Weight/Reps/Sets 1 set x 10 reps each   LUE ROM Comment Pt limited by pain   Cognition   Overall Cognitive Status [facility]   Arousal/Participation Alert; Responsive; Cooperative   Attention Attends with cues to redirect   Orientation Level Oriented X4   Memory Decreased recall of precautions   Following Commands Follows one step commands without difficulty   Comments Pt agreeable to OT session   Activity Tolerance   Activity Tolerance Patient limited by pain; Patient limited by fatigue   Medical Staff Made Aware This session, pt required and most appropriately benefited from skilled OT/PT co-treat due to significant regression from functional baseline and decreased activity tolerance  OT and PT goals were addressed separately as seen in documentation  Assessment   Assessment Patient participated in Skilled OT session this date with interventions consisting of ADL re training with the use of correct body mechnaics, therapeutic exercise to: increase functional use of BUEs, increase BUE muscle strength ,  therapeutic activities to: increase activity tolerance and increase dynamic sit/ stand balance during functional activity    Patient agreeable to OT treatment session, upon arrival patient was found supine in bed and in no apparent distress  Patient completed bed mobility with supervision and functional transfers with min assist of 2  Pt ambulated short distance with min assist utilizing RW  While seated at EOB, pt required mod assist for UB dressing and max assist for LB ADLs  Pt completed grooming task with sup/set-up  While seated in recliner, pt completed 1 set x 10 reps of 2 BUE exercises  In comparison to previous session, patient with improvements in functional mobility, transfers and balance  Patient requiring one step directives, frequent rest periods and ocassional safety reminders  Patient continues to be functioning below baseline level, occupational performance remains limited secondary to factors listed above and increased risk for falls and injury     From OT standpoint, recommendation at time of d/c would be Post acute rehabilitation services  Patient to benefit from continued Occupational Therapy treatment while in the hospital to address deficits as defined above and maximize level of functional independence with ADLs and functional mobility  11/23/22 0811    OT Last Visit   OT Visit Date 11/23/22   Note Type   Note type Evaluation   Additional Comments Pt agreeable to OT session  Upon arrival pt supine in bed with HOB elevated  Pain Assessment   Pain Assessment Tool 0-10   Pain Score 9   Pain Location/Orientation Orientation: Mid;Location: Back   Pain Onset/Description Onset: Ongoing;Frequency: Constant/Continuous; Descriptor: [address] Pain Intervention(s) Ambulation/increased activity;Repositioned;Medication (See MAR)   Restrictions/Precautions   Weight Bearing Precautions Per Order No   Braces or Orthoses TLSO  (donned while seated at EOB)   Other Precautions Bed Alarm; Fall Risk;Multiple lines;O2;Spinal precautions  (2 L O2 via NC- not used at baseline)   Home Living   Type of [address] [place] [address] Two level;Performs ADLs on one level; Able to live on main level with bedroom/bathroom;Stairs to enter with rails  (1 BRENDA; sleeps on couch on 1st floor)   Bathroom Shower/Tub Walk-in shower   Bathroom Toilet Raised   Bathroom Equipment Shower chair;Grab bars around toilet   [address] Cane;Walker;Reacher  (no AD used in house; [facility])   Prior Function   Level of [place] Independent with ADLs; Independent with functional mobility   Lives With Spouse; Son   [facility] Help From Family   IADLs Independent with driving; Independent with meal prep; Independent with medication management   Falls in the last 6 months 0   Vocational On disability   Lifestyle   Autonomy Patient reporting being independent with ADLs/IADLs, ambulatory with no AD or SPC, and lives with family in a two story house   Reciprocal Relationships Supportive family   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   Intrinsic Gratification Enjoys playing in garage   ADL   Eating Assistance 5  Supervision/Setup   Grooming Assistance 5  Supervision/Setup   UB Bathing Assistance 4  Minimal Assistance   LB [address] 3  Moderate Assistance   [address] 3  Moderate Assistance    [facility] [address] 2  Maximal [address]  2  Maximal Assistance   Bed Mobility   Supine to Sit 3  Moderate assistance   Additional items Assist x 2;HOB elevated; Bedrails; Increased time required;Verbal cues;LE management   Sit to Supine 4  Minimal assistance   Additional items Assist x 2;Bedrails; Increased time required;Verbal cues;LE management   Additional Comments Log-roll technique utilized for bed mobility to maintain spinal precautions   Transfers   Sit to Stand 4  Minimal assistance   Additional items Assist x 2; Increased time required;Verbal cues; Bedrails   Stand to Sit 4  Minimal assistance   Additional items Assist x 2; Increased time required;Verbal cues   Additional Comments Pt reported (+) dizziness with transitional movements  BP while seated at EOB: 145/83  Pt reported symtpoms decreased once supine in bed at end of session  Functional Mobility   Functional Mobility 4  Minimal assistance  (Assist of 2)   Additional Comments Pt ambulated side-steps towards HOB  Pt unsteady and reported feeling weak  Pt reported (+) dizziness and requested to return BTB  Additional items Rolling walker   Balance   Static Sitting Fair +   Dynamic Sitting Fair -   Static Standing Poor +   Dynamic Standing Poor   Activity Tolerance   Activity Tolerance Patient limited by pain   Medical Staff Made Aware Pt seen as a co-eval with PT Jennifer Styles due to the patient's co-morbidities, clinically unstable presentation, and present impairments which are a regression from the patient's baseline     RUE Assessment   RUE Assessment WFL  (grossly 3+/5 MMT)   LUE Assessment   LUE Assessment WFL  (grossly 3+/5 MMT)   Hand Function   Gross Motor Coordination Functional   Fine Motor Coordination Functional   Sensation   Light Touch No apparent deficits   Vision-Basic Assessment   Current Vision Wears glasses only for reading   Cognition   Overall Cognitive Status [facility]   Arousal/Participation Alert; Responsive; Cooperative   Attention Attends with cues to redirect   Orientation Level Oriented X4   Memory Decreased recall of precautions  (Pt did not recall log-roll technique or spinal precautions from previous surgery)   Following Commands Follows one step commands without difficulty   Assessment   Limitation Decreased ADL status; Decreased UE strength;Decreased endurance;Decreased self-care trans;Decreased high-level ADLs; Decreased cognition   Prognosis Good   Assessment Patient is a 78 y o  male seen for OT evaluation s/p admit to [facility]  on 11/22/2022 w/Status post lumbar spinal fusion  Commorbidities affecting patient's functional performance at time of assessment include: HLD, HTN, chronic pain, and flatback syndrome  Orders placed for OT evaluation and treatment and ambulate patient  Performed at least two patient identifiers during session including name and wristband  Prior to admission, Patient reporting being independent with ADLs/IADLs, ambulatory with no AD or SPC, and lives with family in a two story house  Personal factors affecting patient at time of initial evaluation include: steps to enter, difficulty performing ADLs and difficulty performing IADLs  Upon evaluation, patient requires minimal  and moderate assist for UB ADLs, moderate and maximal assist for LB ADLs, transfers and functional ambulation in room and bathroom with min assist of 2, with the use of Rolling Walker  Patient is oriented x 4    Occupational performance is affected by the following deficits: decreased muscle strength, dynamic sit/ stand balance deficit with poor standing tolerance time for self care and functional mobility, decreased activity tolerance, impaired memory, increased pain, orthopedic restrictions and delayed righting and equilibrium reactions  Patient to benefit from continued Occupational Therapy treatment while in the hospital to address deficits as defined above and maximize level of functional independence with ADLs and functional mobility  Occupational Performance areas to address include: grooming , bathing/ shower, dressing, toilet hygiene, transfer to all surfaces, functional ambulation, medication routine/ management, IADLS: Household maintenance, IADLs: safety procedures and IADLs: meal prep/ clean up  From OT standpoint, recommendation at time of d/c would be Post acute rehabilitation services  CARE SCORES:  Self Care:  Eatin: Supervision or touching  assistance  Oral hygiene: 04: Supervision or touching  assistance  Toilet hygiene: 02: Substantial/maximal assistance  Shower/bathing self: 03: Partial/moderate assistance  Upper body dressin: Partial/moderate assistance  Lower body dressin: Substantial/maximal assistance  Putting on/taking off footwear: 09: Not applicable  Transfers:  Roll left and right: 09: Not applicable  Sit to lyin: Not applicable  Lying to sitting on side of bed: 04: Supervision or touching  assistance  Sit to stand: 01: Dependent (min x 2)  Chair/bed to chair transfer: 01: Dependent (min x 2)  Toilet transfer: 09: Not applicable  Mobility:  Walk 10 ft: 03: Partial/moderate assistance  Walk 50 ft with two turns: 09: Not applicable  Walk 683HB: 09: Not applicable    CURRENT GAP IN FUNCTION  Prior to Admission: Functional Status: Patient was independent with his ADLs PTA    He used a SPC when ambulating in the community and no AD when ambulating in his home    Estimated length of stay: 10 to 14 days    Anticipated Post-Discharge Disposition/Treatment  Disposition: Return to previous home/apartment  Outpatient Services: Physical Therapy (PT) and Occupational Therapy (OT)    BARRIERS TO DISCHARGE  Home Accessibility,Decreased strength, Decreased endurance, Decreased mobility, Impaired balance, Pain, Orthopedic restrictions, Decreased ADL status,  Decreased self-care trans, Decreased high-level ADLs    INTERVENTIONS FOR DISCHARGE  ADL retraining, Functional transfer training, UE strengthening/ROM, Endurance training, Patient/family training, Equipment evaluation/education, Compensatory technique education, LE strengthening/ROM, ADL retraining, Therapeutic exercise, Bed mobility, Gait training    REQUIRED THERAPY:  Patient will require PT and OT 90 minutes each per day, five days per week to achieve rehab goals  REQUIRED FUNCTIONAL AND MEDICAL MANAGEMENT FOR INPATIENT REHABILITATION:  Skin:  There are no pressure sores currently, Patient requires close monitoring of back incision for any complications and well as close monitoring of skin for any breakdown secondary to decreased mobility, Pain Management: Overall pain is moderately controlled, Deep Vein Thrombosis (DVT) Prophylaxis:  SCD's while in bed and  mg daily, PT and OT interventions, any labs, consults, imaging studies and medication adjustments patient may need while on ARC    RECOMMENDED LEVEL OF CARE:   This is a 60-year-old male with a h/o scoliosis and Flatback syndrome who is s/p removal of hardware L1-L3, posterior thoracic fusion T7-L1, thoracic osteotomy T11/12, revision instrumentation T8 to pelvis, and thoracic laminotomy T8-9, T9-10, and T10-11 on 11/22/22  Patient c/o slightly blurry vision after procedure but it improved  He is WBAT and has a TLSO brace to be worn when OOB  Pain was well managed with PCA pump for approximately 16 hours after surgery  PCA pump was discontinued on POD#1  Pain regimen including IV and oral pain medication was adjusted on POD#1    Duplex venous ultrasound of bilateral lower extremities was ordered on POD#3 that ruled out acute DVT postoperatively  He was ordered SCDs and  mg daily for DVT prophylaxis  He was placed on soft diet on 11/24/22 due to mild abdominal distention  Distention resolved as of 11/25 and diet was advanced to regular  Patient worked with PT and OT and recommended for rehab following his hospital stay  He has demonstrated that he can tolerate three hours of therapy, five days a week and is now medically stable for discharge to the [facility]  PTA, patient was independent with his ADLs  He used a [facility] when ambulating in the community and no AD when ambulating in his home  He is now functioning below his baseline, requiring supervision to max assistance with his ADLs  With PT, he is supervision for supine to sit and min x 2 assist for sit to stand and stand to sit transfers  He ambulated 20 feet then 3 feet from bed to recliner with RW and min x 1 assistance  Gait pattern: decreased foot clearance, inconsistent foot clearance, short stride, decreased heel strike and knees flexed  Patient requires close oversight by a physician, PM&R management, 24/7 rehabilitation nursing care and specialized interdisciplinary therapy services in preparation for discharge which can only be provided in the inpatient acute rehabilitation setting  He requires close monitoring of VS, I/O, back incision, skin, pain level, labs and his overall condition  Inpatient acute rehabilitation is recommended for this patient to maximize his overall strength, endurance, self care and mobility upon discharge home with the support of his family

## 2022-11-25 NOTE — PLAN OF CARE
Problem: OCCUPATIONAL THERAPY ADULT  Goal: Performs self-care activities at highest level of function for planned discharge setting  See evaluation for individualized goals  Description: Treatment Interventions: ADL retraining, Functional transfer training, Endurance training, Patient/family training, UE strengthening/ROM          See flowsheet documentation for full assessment, interventions and recommendations  Outcome: Progressing  Note: Limitation: Decreased ADL status, Decreased UE strength, Decreased endurance, Decreased self-care trans, Decreased high-level ADLs, Decreased cognition  Prognosis: Good  Assessment: Patient participated in Skilled OT session this date with interventions consisting of ADL re training with the use of correct body mechnaics, therapeutic exercise to: increase functional use of BUEs, increase BUE muscle strength ,  therapeutic activities to: increase activity tolerance and increase dynamic sit/ stand balance during functional activity    Patient agreeable to OT treatment session, upon arrival patient was found supine in bed and in no apparent distress  Patient completed bed mobility with supervision and functional transfers with min assist of 2  Pt ambulated short distance with min assist utilizing RW  While seated at EOB, pt required mod assist for UB dressing and max assist for LB ADLs  Pt completed grooming task with sup/set-up  While seated in recliner, pt completed 1 set x 10 reps of 2 BUE exercises  In comparison to previous session, patient with improvements in functional mobility, transfers and balance  Patient requiring one step directives, frequent rest periods and ocassional safety reminders  Patient continues to be functioning below baseline level, occupational performance remains limited secondary to factors listed above and increased risk for falls and injury  From OT standpoint, recommendation at time of d/c would be Post acute rehabilitation services     Patient to benefit from continued Occupational Therapy treatment while in the hospital to address deficits as defined above and maximize level of functional independence with ADLs and functional mobility       OT Discharge Recommendation: Post acute rehabilitation services        Dickson HAWKINS

## 2022-11-25 NOTE — PROGRESS NOTES
Orthopedics   Ericka Ache 78 y o  male MRN: [id number]  Unit/Bed#: -01    Subjective:  78 y o male POD#3 s/p removal of hardware L1-L3, posterior thoracic fusion T7-L1, thoracic osteotomy T11/12, revision instrumentation T8 to pelvis, and thoracic laminotomy T8-9, T9-10, and T10-11  Patient states he is doing "okay" this morning  Patient reports he just finished working with physical therapy and was able to walk around the room  Patient reports pain in his back is 5/7 currently  Patient denies any radiation of pain into lower extremities  Patient denies any numbness or tingling in lower extremities  Patient reports he had significant pain in his back last night, and Toradol injection did not help  Patient feels pain is well managed currently  Patient denies any shortness of breath, chest pain, lightheadedness, dizziness, nausea, and vomiting  Patient denies any fevers or chills  Patient offers no additional complaints at this time       Labs:  0   Lab Value Date/Time    HCT 37 5 11/25/2022 0508    HCT 36 5 11/24/2022 0500    HCT 38 2 11/23/2022 0532    HGB 12 2 11/25/2022 0508    HGB 12 1 11/24/2022 0500    HGB 12 3 11/23/2022 0532    INR 1 02 05/24/2022 0849    WBC 10 18 (H) 11/25/2022 0508    WBC 12 36 (H) 11/24/2022 0500    WBC 12 53 (H) 11/23/2022 0532       Meds:    Current Facility-Administered Medications:   •  aspirin tablet 325 mg, 325 mg, Oral, Daily, HUEY Juárez-C, 325 mg at 11/24/22 0827  •  docusate sodium (COLACE) capsule 100 mg, 100 mg, Oral, BID, HUEY Juárez-C, 100 mg at 11/24/22 1700  •  ferrous sulfate tablet 325 mg, 325 mg, Oral, Daily With Breakfast, HUEY Juárez-C, 325 mg at 11/24/22 0827  •  HYDROcodone-acetaminophen (NORCO) 5-325 mg per tablet 1 tablet, 1 tablet, Oral, Q6H PRN, HUEY Juárez-C, 1 tablet at 11/24/22 1657  •  HYDROcodone-acetaminophen (NORCO) 5-325 mg per tablet 2 tablet, 2 tablet, Oral, Q6H PRN, Tori Meehan PA-C, 2 tablet at 11/25/22 0507  •  ketorolac (TORADOL) injection 15 mg, 15 mg, Intravenous, Q6H PRN, Tori Meehan, PA-C, 15 mg at 11/25/22 0259  •  lisinopril (ZESTRIL) tablet 5 mg, 5 mg, Oral, Daily, Tori Meehan, PA-C, 5 mg at 11/24/22 0827  •  magnesium hydroxide (MILK OF MAGNESIA) oral suspension 30 mL, 30 mL, Oral, Daily PRN, Tori Meehan PA-C  •  methocarbamol (ROBAXIN) tablet 500 mg, 500 mg, Oral, Q6H [address], Tori Schwabp, PA-C, 500 mg at 11/25/22 0508  •  naloxone (NARCAN) injection 0 1 mg, 0 1 mg, Intravenous, Q3 min PRN, Tori Meehan, PA-C  •  ondansetron (ZOFRAN) injection 4 mg, 4 mg, Intravenous, Q6H PRN, Tori Meehan, PA-C  •  pravastatin (PRAVACHOL) tablet 10 mg, 10 mg, Oral, Daily With Dinner, Tori Meehan, PA-C, 10 mg at 11/24/22 1657  •  senna (SENOKOT) tablet 8 6 mg, 1 tablet, Oral, Daily, Tori Meehan, PA-C, 8 6 mg at 11/24/22 0827    Blood Culture:   No results found for: BLOODCX    Wound Culture:   No results found for: WOUNDCULT    Ins and Outs:  I/O last 24 hours: In: -   Out: 300 [Urine:300]          Physical:  Vitals:    11/25/22 0729   BP: 155/94   Pulse: 83   Resp: 18   Temp: 98 5 °F (36 9 °C)   SpO2: 95%     Lumbar spine:  · Patient is a 80-year-old male laying in hospital bed in no acute distress  · Dressings clean, dry, and intact without soilage or strike through present  · Neurovascularly intact L2-S1 bilateral lower extremities  · Motor intact from L2-S1  · Able to perform straight leg raise bilaterally  · Able to perform active ROM in knees from 0 to 120 bilaterally   · 2+ DP pulse bilateral lower extremities  · Calfs supple and nontender    Assessment:  67 y o male POD#3 s/p removal of hardware L1-L3, posterior thoracic fusion T7-L1, thoracic osteotomy T11/12, revision instrumentation T8 to pelvis, and thoracic laminotomy T8-9, T9-10, and T10-11       Plan:  · Weight Bearing as tolerated all extremities  · Up and out of bed  · TLSO brace to be worn at all times out of bed  · DVT prophylaxis: mechanical and aspirin 325mg daily   · Analgesics  · PT/OT  · Abdominal distention resolved  Ordered regular diet  · Patient noted to have acute blood loss anemia due to a drop in Hbg of > 2 0g from preop levels, will monitor vital signs and resuscitate with IV fluids as needed  Hgb noted to be 12 2 this morning  · Medical team for management of medical conditions including hypertension and hyperlipidemia  · Will order bilateral lower extremity venous duplex ultrasound for evaluation of DVT postop  · Dispo: Awaiting rehab placement  Discharge planning  See above for additional details       Ivan Greene PA-C

## 2022-11-25 NOTE — OCCUPATIONAL THERAPY NOTE
Occupational Therapy Treatment Note     Patient Name: Kolby Villa  [id number] Date: 11/25/2022  Problem List  Principal Problem:    Status post lumbar spinal fusion  Active Problems:    Hyperlipidemia    Hypertension    Back pain    Chronic pain    Flatback syndrome        11/25/22 0814   OT Last Visit   OT Visit Date 11/25/22   Note Type   Note Type Treatment   Pain Assessment   Pain Assessment Tool 0-10   Pain Score 6  (at rest; increases c mobility)   Pain Location/Orientation Location: Back   Pain Onset/Description Onset: Ongoing;Frequency: Constant/Continuous; Descriptor: Aching;Descriptor: Discomfort   Hospital Pain Intervention(s) Ambulation/increased activity;Repositioned   Restrictions/Precautions   Weight Bearing Precautions Per Order No   Braces or Orthoses TLSO  (donned while seated at EOB)   Other Precautions Bed Alarm; Chair Alarm; Fall Risk;Pain;Spinal precautions   Lifestyle   Autonomy Patient reporting being independent with ADLs/IADLs, ambulatory with no AD or SPC, and lives with family in a two story house   Reciprocal Relationships Supportive family   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   Intrinsic Gratification Enjoys playing in garage   ADL   Where Assessed Edge of bed   Grooming Assistance 5  Supervision/Setup   Grooming Deficit Setup; Increased time to complete   Grooming Comments Pt combed hair and completed oral hyigene while seated in recliner   UB Dressing Assistance 3  Moderate Assistance   UB Dressing Deficit Setup;Verbal cueing;Supervision/safety; Increased time to complete; Thread RUE; Thread LUE;Pull over head;Pull down in back   UB Dressing Comments Pt donned overhead T-shirt  LB Dressing Assistance 2  Maximal Assistance   LB Dressing Deficit Setup;Steadying; Requires assistive device for steadying;Verbal cueing; Increased time to complete;Supervision/safety; Thread RLE into pants; Thread LLE into pants;Pull up over hips   LB Dressing Comments Pt donned sweatpants Bed Mobility   Supine to Sit 5  Supervision   Additional items Assist x 1;HOB elevated; Bedrails; Increased time required;Verbal cues   Sit to Supine 5  Supervision   Additional items Assist x 1;HOB elevated; Bedrails; Increased time required;Verbal cues   Additional Comments Log-roll technique utilized for bed mobility to maintain spinal precautions   Transfers   Sit to Stand 4  Minimal assistance   Additional items Assist x 2;Bedrails; Increased time required;Verbal cues   Stand to Sit 4  Minimal assistance   Additional items Assist x 2; Increased time required;Verbal cues;Armrests   Functional Mobility   Functional Mobility 4  Minimal assistance   Additional Comments Pt ambulated short distance in room with no overt SOB  Pt grossly unsteady and limited d/t pain  Additional items Rolling walker   Therapeutic Exercise - ROM   UE-ROM Yes  (Pt completed BUE exercises to increase strength and endurance to improve independence with ADLs)   ROM- Right Upper Extremities   R Shoulder AROM  (Protraction/Retraction)   R Elbow AROM;Elbow flexion;Elbow extension   R Position Seated;Against gravity   R Weight/Reps/Sets 1 set x 10 reps   ROM - Left Upper Extremities    L Shoulder AROM  (Protraction/Retraction)   L Elbow AROM;Elbow flexion;Elbow extension   L Position Seated;Against gravity   L Weight/Reps/Sets 1 set x 10 reps each   LUE ROM Comment Pt limited by pain   Cognition   Overall Cognitive Status WFL   Arousal/Participation Alert; Responsive; Cooperative   Attention Attends with cues to redirect   Orientation Level Oriented X4   Memory Decreased recall of precautions   Following Commands Follows one step commands without difficulty   Comments Pt agreeable to OT session   Activity Tolerance   Activity Tolerance Patient limited by pain; Patient limited by fatigue   Medical Staff Made Aware This session, pt required and most appropriately benefited from skilled OT/PT co-treat due to significant regression from functional baseline and decreased activity tolerance  OT and PT goals were addressed separately as seen in documentation  Assessment   Assessment Patient participated in Skilled OT session this date with interventions consisting of ADL re training with the use of correct body mechnaics, therapeutic exercise to: increase functional use of BUEs, increase BUE muscle strength ,  therapeutic activities to: increase activity tolerance and increase dynamic sit/ stand balance during functional activity    Patient agreeable to OT treatment session, upon arrival patient was found supine in bed and in no apparent distress  Patient completed bed mobility with supervision and functional transfers with min assist of 2  Pt ambulated short distance with min assist utilizing RW  While seated at EOB, pt required mod assist for UB dressing and max assist for LB ADLs  Pt completed grooming task with sup/set-up  While seated in recliner, pt completed 1 set x 10 reps of 2 BUE exercises  In comparison to previous session, patient with improvements in functional mobility, transfers and balance  Patient requiring one step directives, frequent rest periods and ocassional safety reminders  Patient continues to be functioning below baseline level, occupational performance remains limited secondary to factors listed above and increased risk for falls and injury  From OT standpoint, recommendation at time of d/c would be Post acute rehabilitation services  Patient to benefit from continued Occupational Therapy treatment while in the hospital to address deficits as defined above and maximize level of functional independence with ADLs and functional mobility  Plan   Treatment Interventions ADL retraining;Functional transfer training; Endurance training;Patient/family training;UE strengthening/ROM   Goal Expiration Date 12/13/22   OT Treatment Day 1   OT Frequency 3-5x/wk   Recommendation   OT Discharge Recommendation Post acute rehabilitation services Additional Comments  The patient's raw score on the AM-PAC Daily Activity inpatient short form is 12, standardized score is 30 6, less than 39 4  Patients at this level are likely to benefit from discharge to post-acute rehabilitation services  Please refer to the recommendation of the Occupational Therapist for safe discharge planning  AM-PAC Daily Activity Inpatient   Lower Body Dressing 1   Bathing 2   Toileting 1   Upper Body Dressing 2   Grooming 3   Eating 3   Daily Activity Raw Score 12   Daily Activity Standardized Score (Calc for Raw Score >=11) 30 6   AM-[facility] Applied Cognition Inpatient   Following a Speech/Presentation 4   Understanding Ordinary Conversation 4   Taking Medications 3   Remembering Where Things Are Placed or Put Away 2   Remembering List of 4-5 Errands 3   Taking Care of Complicated Tasks 3   Applied Cognition Raw Score 19   Applied Cognition Standardized Score 39 77   End of Consult   Patient Position at End of Consult Supine;Bed/Chair alarm activated; All needs within reach  (Pt declined sitting in recliner d/t significant back pain while seated )   Nurse Communication Nurse aware of consult     Bong HAWKINS

## 2022-11-25 NOTE — ASSESSMENT & PLAN NOTE
Hx of prior back surgeries with hx of scoliosis, pseudoarthrosis, and flat back deformity  11/22: elective removal of hardware L1-L3, posterior thoracic/lumbar instrumentation from T8 to pelvis, thoracic osteotomy T11/T12, thoracic laminotomy T8/T9, T9/T10, T10/T11, posterior thoracic fusion T7-L1, revision instrumentation T8 to pelvis performed by Dr Blayne Stout (Orthopedics)  Continue ASA 325mg daily for DVT ppx  TLSO brace when getting OOB  Ensure adequate pain control  Neurovascular checks Q shift  2 week follow-up with repeat thoracolumbar spine XRs (12/6)  Primary team following  PT/OT

## 2022-11-25 NOTE — ASSESSMENT & PLAN NOTE
Continue home lisinopril 5mg daily  Monitor BP with routine VS     Complaints of lightheadedness when getting OOB  Obtain orthostatics prior to therapies

## 2022-11-25 NOTE — ASSESSMENT & PLAN NOTE
Mildly elevated at 67 on 11/25  Pt states that this is chronic and that he had hepatitis as a teenager (unsure type)  Asymptomatic  Monitor weekly CMP with Norco administration

## 2022-11-25 NOTE — PHYSICAL THERAPY NOTE
Physical Therapy Treatment Note    Patient's Name: Twan Rodríguez    Admitting Diagnosis  Scoliosis, unspecified scoliosis type, unspecified spinal region [M41 9]  Flatback syndrome [M40 30]    Problem List  Patient Active Problem List   Diagnosis    Status post lumbar spinal fusion    Hyperlipidemia    Hypertension    Back pain    Chronic pain    Flatback syndrome        11/25/22 0748   PT Last Visit   PT Visit Date 11/25/22   Note Type   Note Type Treatment   Pain Assessment   Pain Assessment Tool 0-10   Pain Score 6   Pain Location/Orientation Orientation: Mid;Location: Back   Pain Onset/Description Onset: Ongoing; Onset: Sudden;Frequency: Constant/Continuous   Patient's Stated Pain Goal No pain   Hospital Pain Intervention(s) Repositioned; Ambulation/increased activity   Restrictions/Precautions   Weight Bearing Precautions Per Order No   Braces or Orthoses TLSO   Other Precautions Chair Alarm; Bed Alarm;Pain; Fall Risk   General   Chart Reviewed Yes   Response to Previous Treatment Patient with no complaints from previous session  Family/Caregiver Present No   Cognition   Overall Cognitive Status WFL   Arousal/Participation Alert; Responsive; Cooperative   Attention Attends with cues to redirect   Orientation Level Oriented X4   Memory Decreased recall of precautions   Following Commands Follows one step commands without difficulty   Comments Pt agreeable to PT   Bed Mobility   Supine to Sit 5  Supervision   Additional items Assist x 1;HOB elevated; Bedrails; Increased time required;Verbal cues   Additional Comments Pt received at start of session supine in bed with HOB elevated  Following session, pt remained in recliner with needs met, alarm activated and call bel within reach   Transfers   Sit to Stand 4  Minimal assistance   Additional items Assist x 2;Armrests; Increased time required;Verbal cues   Stand to Sit 4  Minimal assistance   Additional items Assist x 2; Increased time required;Verbal cues;Armrests Additional Comments with use of RW, VCs for proper hand placements   Ambulation/Elevation   Gait pattern Decreased foot clearance; Inconsistent osvaldo; Short stride;Decreased heel strike;Knees flexed   Gait Assistance 4  Minimal assist   Additional items Assist x 1;Verbal cues   Assistive Device Rolling walker   Distance 20' then 3' from bed to recliner   Stair Management Assistance Not tested   Balance   Static Sitting Fair +   Dynamic Sitting Fair   Static Standing Poor +   Dynamic Standing Poor   Ambulatory Poor   Endurance Deficit   Endurance Deficit Yes   Endurance Deficit Description decreased activity tolerance   Activity Tolerance   Activity Tolerance Patient limited by fatigue;Patient limited by pain   Medical Staff Made Aware ELIAZAR Shen   Nurse Made Aware RN Nimco   Exercises   Hip Flexion Sitting;10 reps;AROM; Bilateral   Hip Adduction Sitting;20 reps;AROM; Bilateral   Knee AROM Long Arc Quad Sitting;10 reps;AROM; Bilateral   Ankle Pumps Sitting;20 reps;AROM; Bilateral   Assessment   Prognosis Good   Problem List Decreased strength;Decreased endurance;Decreased mobility; Impaired balance;Pain;Orthopedic restrictions   Assessment Pt seen for PT treatment session this date with interventions consisting of gait training w/ emphasis on improving pt's ability to ambulate level surfaces x 20' then 3' with min A of 2 provided by therapist with RW, Therapeutic exercise consisting of: BLE exercises performed seated in recliner and therapeutic activity consisting of training: bed mobility, supine<>sit transfers, sit<>stand transfers and vc and tactile cues for static standing posture faciliation  Pt agreeable to PT treatment session upon arrival, pt found supine in bed w/ HOB elevated, in no apparent distress and responsive  In comparison to previous session, pt with improvements in activity tolerance however continues to be limited by increased pain   Post session: pt returned BTB, bed alarm engaged, all needs in reach and RN notified of session findings/recommendations  Continue to recommend post acute rehabilitation services at time of d/c in order to maximize pt's functional independence and safety w/ mobility  Pt continues to be functioning below baseline level, and remains limited 2* factors listed above and including continued need for medical management and monitoring, decreased strength and balance resulting in an increased risk for falls, decreased endurance and activity tolerance limiting mobility, increased pain  PT will continue to see pt during current hospitalization in order to address the deficits listed above and provide interventions consistent w/ POC in effort to achieve STGs  Barriers to Discharge Inaccessible home environment;Decreased caregiver support   Goals   Patient Goals To have less pain   STG Expiration Date 12/03/22   PT Treatment Day 1   Plan   Treatment/Interventions Functional transfer training;LE strengthening/ROM;ADL retraining; Therapeutic exercise; Endurance training;Patient/family training;Bed mobility;Gait training; Compensatory technique education;Spoke to nursing;OT   Progress Progressing toward goals   PT Frequency 3-5x/wk   Recommendation   PT Discharge Recommendation Post acute rehabilitation services   AM-PAC Basic Mobility Inpatient   Turning in Bed Without Bedrails 3   Lying on Back to Sitting on Edge of Flat Bed 3   Moving Bed to Chair 2   Standing Up From Chair 2   Walk in Room 2   Climb 3-5 Stairs 1   Basic Mobility Inpatient Raw Score 13   Basic Mobility Standardized Score 33 99   Highest Level Of Mobility   JH-HLM Goal 4: Move to chair/commode   JH-HLM Achieved 6: Walk 10 steps or more   Education   Education Provided Mobility training;Assistive device   Patient Reinforcement needed   End of Consult   Patient Position at End of Consult Supine;Bed/Chair alarm activated; All needs within reach       Ade Phillips, PT    Time In: 07:48  Time Out: 08:14  Total Time: 26 mins

## 2022-11-25 NOTE — PLAN OF CARE
Problem: PHYSICAL THERAPY ADULT  Goal: Performs mobility at highest level of function for planned discharge setting  See evaluation for individualized goals  Description: Treatment/Interventions: Functional transfer training, LE strengthening/ROM, ADL retraining, Therapeutic exercise, Endurance training, Patient/family training, Bed mobility, Gait training, Compensatory technique education, Spoke to nursing, OT          See flowsheet documentation for full assessment, interventions and recommendations  Outcome: Progressing  Note: Prognosis: Good  Problem List: Decreased strength, Decreased endurance, Decreased mobility, Impaired balance, Pain, Orthopedic restrictions  Assessment: Pt seen for PT treatment session this date with interventions consisting of gait training w/ emphasis on improving pt's ability to ambulate level surfaces x 20' then 3' with min A of 2 provided by therapist with RW, Therapeutic exercise consisting of: BLE exercises performed seated in recliner and therapeutic activity consisting of training: bed mobility, supine<>sit transfers, sit<>stand transfers and vc and tactile cues for static standing posture faciliation  Pt agreeable to PT treatment session upon arrival, pt found supine in bed w/ HOB elevated, in no apparent distress and responsive  In comparison to previous session, pt with improvements in activity tolerance however continues to be limited by increased pain  Post session: pt returned BTB, bed alarm engaged, all needs in reach and RN notified of session findings/recommendations  Continue to recommend post acute rehabilitation services at time of d/c in order to maximize pt's functional independence and safety w/ mobility   Pt continues to be functioning below baseline level, and remains limited 2* factors listed above and including continued need for medical management and monitoring, decreased strength and balance resulting in an increased risk for falls, decreased endurance and activity tolerance limiting mobility, increased pain  PT will continue to see pt during current hospitalization in order to address the deficits listed above and provide interventions consistent w/ POC in effort to achieve STGs  Barriers to Discharge: Inaccessible home environment, Decreased caregiver support     PT Discharge Recommendation: Post acute rehabilitation services    See flowsheet documentation for full assessment

## 2022-11-25 NOTE — ASSESSMENT & PLAN NOTE
Continue pravastatin 10mg daily as substitute for non-formulary lovastatin 10mg daily  No recent lipid panel on file

## 2022-11-25 NOTE — DISCHARGE SUMMARY
ORTHOPEDICS DISCHARGE SUMMARY  Altagracia Carranza 78 y o  male MRN: [id number]  Unit/Bed#: -01    Attending Physician: Dr Ana Mcdonald    Admitting diagnosis: Scoliosis, unspecified scoliosis type, unspecified spinal region [M41 9]  Flatback syndrome [M40 30]    Discharge diagnosis: Scoliosis, unspecified scoliosis type, unspecified spinal region [M41 9]  Flatback syndrome [M40 30]    Date of admission: 11/22/2022    Date of discharge: 11/25/22    Procedure: Removal of hardware L1-L3  Posterior thoracic/ lumbar instrumentation from T8 to pelvis  Thoracic osteotomy T11/12  Thoracic laminotomy T8/9, T9/10 and T10/11  Posterior thoracic fusion T7-L1  Revision instrumentation T8 to pelvis  HPI:  This is a 78y o  year old male that presented to the office with signs and symptoms of scoliosis, flatback syndrome, thoracic kyphosis, and thoracic spine pain  They tried and failed conservative treatment measures and wished to proceed with surgical intervention  The risks, benefits, and complications of the procedure were discussed with the patient and informed consent was obtained  Hospital Course: The patient was admitted to the hospital on 11/22/2022 and underwent an uncomplicated removal of hardware L1-L3, posterior thoracic fusion T7-L1, thoracic osteotomy T11/12, revision instrumentation T8 to pelvis, and thoracic laminotomy T8-9, T9-10, and T10-11  They were transferred to the floor after a brief stay in the post-anesthesia care unit  Their pain was well managed with PCA pump for approximately 16 hours after surgery  PCA pump was discontinued on POD#1  Pain regimen including IV and oral pain medication was adjusted on POD#1  They began therapy on post operative day #1  Patient continued to work with PT/OT on a daily basis  Duplex venous ultrasound of bilateral lower extremities was ordered on POD#3 that ruled out acute DVT postoperatively    Daily discussion was had with the patient, nursing staff, orthopaedic team, and family members if present  On discharge date, patient was cleared for discharge to acute rehab by medical team  All questions were answered to the patients satisfaction  0   Lab Value Date/Time    HGB 12 2 11/25/2022 0508    HGB 12 1 11/24/2022 0500    HGB 12 3 11/23/2022 0532    HGB 12 6 11/22/2022 1430    HGB 13 3 11/22/2022 1248    HGB 13 3 11/22/2022 1021    HGB 14 3 10/18/2022 0902    HGB 11 8 (L) 06/29/2022 0942    HGB 10 2 (L) 06/09/2022 0748    HGB 9 5 (L) 05/31/2022 0555    HGB 8 9 (L) 05/27/2022 0433    HGB 9 3 (L) 05/26/2022 0526    HGB 10 0 (L) 05/25/2022 0441    HGB 10 2 (L) 05/24/2022 1732    HGB 10 9 (L) 05/24/2022 1642    HGB 12 6 05/24/2022 1452    HGB 13 3 05/24/2022 1300    HGB 14 8 05/03/2022 0800    HGB 15 8 02/13/2020 0945    HGB 14 1 08/16/2017 1718       Greater than 2 gram drop which qualifies for diagnosis of acute blood loss anemia  Vital signs remained stable and pt was resuscitated with IVF as needed   Body mass index is 31 38 kg/m²  mildly obese  Recommend behavior modifications  Discharge Instructions: The patient was discharged weight bearing as tolerated to all extremities  Take pain medications as instructed  Patient must keep TLSO brace on when out of bed  Take aspirin 325mg daily for postop DVT prophylaxis  May remove postop dressing in 24 hours  Follow-up at 2 weeks postop for suture removal and xrays of thoracolumbar spine  Discharge Medications: For the complete list of discharge medications, please refer to the patient's medication reconciliation

## 2022-11-25 NOTE — CASE MANAGEMENT
Case Management Discharge Planning Note    Patient name Ariana [place]  Location /-21 MRN [id number]  : 1955 Date 2022       Current Admission Date: 2022  Current Admission Diagnosis:Status post lumbar spinal fusion   Patient Active Problem List    Diagnosis Date Noted   • Back pain 2022   • Chronic pain 2022   • Flatback syndrome 2022   • Status post lumbar spinal fusion 2022   • Hyperlipidemia    • Hypertension       LOS (days): 3  Geometric Mean LOS (GMLOS) (days): 2 80  Days to GMLOS:0     OBJECTIVE:  Risk of Unplanned Readmission Score: 5 92         Current admission status: Inpatient   Preferred Pharmacy:   [address] [id number]   [facility] [address] [id number]  Phone: [phone] Fax: [phone]    PATIENT/FAMILY REPORTS NO PREFERRED PHARMACY  No address on file      Primary Care Provider: Ashley Guillermo MD    Primary Insurance: MEDICARE  [facility]    DISCHARGE DETAILS:    Discharge planning discussed with[de-identified] Patient at bedside  Freedom of Choice: Yes  Comments - Freedom of Choice: CM discussed freedom of choice as it pertains to discharge planning  Patient decided on [facility] due to availablity  CM contacted family/caregiver?: No- see comments (Patient declined)  Were Treatment Team discharge recommendations reviewed with patient/caregiver?: Yes  Did patient/caregiver verbalize understanding of patient care needs?: Yes  Were patient/caregiver advised of the risks associated with not following Treatment Team discharge recommendations?: Yes         [facility]         Is the patient interested in [address] at discharge?: No    DME Referral Provided  Referral made for DME?: No    Other Referral/Resources/Interventions Provided:  Interventions: Acute Rehab  Referral Comments: Patient was accepted at [facility]   Patient agreed to it and transport has been set for 1pm    Would you like to participate in our [address] service program?  : No - Declined    Treatment Team Recommendation: Acute Rehab  Discharge Destination Plan[de-identified] Acute Rehab  Transport at Discharge : [facility] Ambulance     Number/Name of Dispatcher: TBD  Transported by [employer] and Unit #): TBD  ETA of Transport (Date): 11/25/22        Transfer Mode: Stretcher

## 2022-11-25 NOTE — DISCHARGE INSTRUCTIONS
Spoke to Dr Jayesh Beard to follow-up next week in his office with follow-up x-rays prior to appointment - contact office to schedule    Dr Gogo Miner and Instructions  Thoracic and Lumbar Fusion    1  Follow-up  - You should return to the office for your follow-up appointment approximately 2 weeks post-op  This should have been scheduled prior to your surgery  If you do not have an appointment scheduled, please call [phone] to do so as soon as possible  - At the first post op appointment we will check your incision, remove any sutures, and make sure that you are healing well  - X-rays will be taken at this appointment to evaluate your hardware  - If you need any refills on your pain medications, this will be addressed at your follow-up appointment  - You can expect to have post-op appointments at 2 weeks, 6 weeks, 3 months, 6 months and 1 year after surgery  After that time, we will continue to follow you yearly to monitor your fusion  2  Incision Care:  - You will have a surgical dressing over the area  You may remove this dressing 48 hours after surgery  It is placed in a sterile environment and is important to allow the wound to begin healing in a sterile environment   - Once the initial dressing is removed, you may leave the incision open to air   - You may shower on the day that your dressing is removed  Your incision may get wet in the shower, but DO NOT submerge your incision (bath, hot tub, pool, lake, etc ) until you have been cleared to do so  - The majority of your sutures are buried beneath the skin and will dissolve with time  You may have a small knot on either end of the incision which will be removed at your post-op appointment  The incision may be raised initially, although this will improve as the sutures dissolve  - You will have steri-strips over the incision - these look like little white pieces of tape    Please do not remove these as they will fall off on their own or be removed at your first post-op appointment  - Please do not apply any ointments or salves to the incision unless instructed to do so  - If you notice any drainage, foul odor, increased redness, swelling or pain of the incision, please call our office immediately  These may be signs of an infection and should be evaluated  3  Medications  - You will be given a prescription for pain medication when you are discharged from the hospital   - It is recommended that you take the medication as prescribed for the first 24-48 hours after surgery, even if your pain is minimal   It is much easier to control your pain when you stay on top of the medications than if you wait to take them until your pain is severe  - After the first few days, you should try to take the pain medication less often, and only when needed  - You may take Tylenol in place of, not in addition to, your pain medication if you wish  - Do not take NSAIDs (Advil, Aleve, Ibuprofen, Motrin, Naproxen, etc ) for 3 months following your surgery  This may impede the healing of your fusion  - DO NOT drive while you are taking narcotic pain medications  - If there is an issue with any of your discharge medications, please call our office at [phone] to discuss  - It is very common for surgery and narcotic pain medications to cause constipation  It is very important that you eat a high fiber diet, drink lots of water, and also consider taking a stool softener while taking pain medications to help with this  It is not uncommon for you to go several days without a bowel movement after surgery  If you are constipated and it is accompanied by severe abdominal pain, nausea and vomiting, inability to keep food or drink down, and/or a fever, please call our office as these may be signs of a more serious medical condition  4  Activity:  - You should have been given a prescription to get fitted for a lumbar brace    You may remove this for hygeine (showering, etc ), and for sleeping  The brace should otherwise be worn for the first 3 months post-operatively  If you have any issues with the fit of your brace, you can call [facility] at [phone]   - You should avoid any bending, twisting  If you must bend over, please use your knees  - You CANNOT lift greater than 10lbs, or a gallon of milk, until instructed  - You should try to avoid sitting for greater than 20 minutes at a time as this will cause your muscles to stiffen and spasm, making you more uncomfortable  - You will start PT (physical therapy) around 3 months after surgery   - You may resume your normal daily activities as tolerated  - Walking is the best exercise and you should try to walk up to 1 mile throughout the day as you are recovering   - You may resume sexual activity as tolerated if desired  - You should NOT smoke following spinal fusion as this will impede your healing  5  Diet:  - It is important to eat a well balanced diet to help your body heal   - You should make sure that you are drinking plenty of fluids - water is best    6  Return to Work:  - You can expect to be out of work for at least 6 weeks up to 3 months  We will discuss your return to work status at your post-op appointments, but please do not hesitate to call if you have any questions or concerns  - Please make sure that any short-term disability paperwork is dropped off at the   Please call our office if you have any of the following after surgery:  - Persistent fever greater than 100 5 degrees  - Foul odor, drainage, redness, swelling or increased pain around your incision site  - A headache that is worse with standing or sitting, and is alleviated with lying flat on your back    - New onset arm or leg weakness, numbness or tingling  - Significantly increased pain that is not alleviated with pain medications, rest and ice  - New-onset calf pain    If you experience any chest pain or shortness of breath after you are discharged, call 911 immediately  DISCHARGE INSTRUCTIONS: Tess Strickland 66 22    Bring these instructions with you to your Outpatient Physician appointments so they can order and follow-up any additional lab work or imaging recommended at time of discharge  Resume follow-up with all your prior providers that you have established care prior to this hospitalization  Discuss with primary care physician (PCP) if you have additional questions  It  is you or your caregivers responsibility to obtain follow-up MEDICATION REFILLS  As indicated through your Primary Care Physician (PCP) and other outpatient specialty provider(s) after discharge  Please follow-up with your PCP as soon as possible after discharge to set-up follow-up management and when appropriate refills  - Your risk of fall has decreased since admission to acute rehab  Caregiver training has been completed with our staff  - Continue skilled therapy as discussed after discharge to further decrease this risk    If you (or your health care proxy) have any questions or concerns regarding your acute rehabilitation stay including issues with medications, rehabilitation, and follow-up plan, please call:          [facility] Acute Rehabilitation Unit at [facility] at [phone]    Should you develop fevers, chills, new weakness, changes in sensation, difficulty speaking, facial weakness, confusion, shortness of breath, chest pain, or other concerning symptoms please call 911 and/or obtain transportation to nearest ER immediately  Should you develop worsening pain, swelling, or drainage notify your surgeon right away or obtain transportation to nearest ER for evaluation  Should you develop feelings of significant hopelessness, severe depression, severe anxiety, or suicide you should call 911 or obtain transportation to nearest ER      [employer] suicide and crisis lifeline call "65"  [address] [facility] is [phone] and is available 24 hrs/7 days a week  [facility] [phone] is available 24 hrs/7 days for mental health  [facility] ([facility]) AT [place] [phone] is available 24 hrs/7 days for mental health   [address] BEHAVIORAL MEDICINE AT [place] [address] [phone]    PHYSICIANS to see:  Please see your doctors listed in the follow up providers section of your discharge paperwork, and take the discharge paperwork with you to your appointments  LAB WORK recommended after discharge: Follow-up lab work at discretion of your outpatient physicians to be determined at time of your future appointments  Also, obtain the following lab orders and follow-up management through primary care physician and outpatient specialists  CBC and CMP to monitor hemoglobin, white blood cell count, electrolytes and kidney function at next visit within 2 weeks to specifically monitor recent low sodium, and history of elevated liver function tests     IMAGING, ADDITIONAL FINDINGS and ISSUES to follow-up:  Check under the "DISCHARGE PROVIDER" section of these DISCHARGE INSTRUCTIONS for provider contact information and specific issues as well  Schedule follow-up appointment and imaging next week with Orthopedic Spine Surgery    SKIN CARE INSTRUCTIONS to follow:  Monitor incision(s) for increased redness, swelling, pain/tenderness, discharge/pus and promptly notify your surgeon should these develop  - Should you develop significant pain, swelling, or drainage obtain transportation to nearest emergency room for immediate evaluation if unable to reach your surgeon promptly   - Should you develop uncontrolled pain, fever, chills, sweats, changes in strength, sensation, or color of this area obtain transportation to nearest emergency room for immediate evaluation          WEIGHTBEARING/ACTIVITY PRECAUTIONS to follow:    Weightbearing as tolerated  Follow post-surgical restrictions as outlined by your surgeon  Thoracic-lumbar spinal precautions  No pushing/pulling/lifting greater than 5-10 lbs until cleared by your surgeons  Please wear your brace when out of bed and if instructed in bed by your surgeon until told otherwise by your surgeon  Driving restrictions: You are recommended against driving until cleared by an outpatient physician  **You should NOT operate a motor vehicle while under the influence of an opiate medication, muscle relaxant, or other sedative  Doing so may lead to an accident resulting in serious injury or death to yourself or others  You have agreed to avoid driving when under the influence of this medication  Work restrictions: You should NOT return to work (if working) until cleared by an outpatient physician  You should not operate heavy machinery (if applicable) until cleared by an outpatient physician  Alcohol restrictions: You are recommended to not drink alcohol at this time unless cleared by an outpatient physician  Drinking alcohol in your current functional condition can increase your risk of injury which could be severe  Drinking alcohol given your current health problems can lead to increased medical complications which could be severe  Combining alcohol with your current medications can increase your risk of injury which could be severe  Smoking restrictions: You are recommended to not smoke nicotine  Smoking increases your risk of heart attack, stroke, emphysema/COPD, and lung cancer  Cannabis restrictions: You are recommended to not smoke/ingest/consume/use cannabis/marijuana at this time unless cleared by an outpatient physician  Cannabis use/intoxication in your current functional condition can increase your risk of injury which could be severe    Cannabis use/intoxication given your current health problems may lead to increased medical complications and sub-optimal functional recovery    MEDICATIONS:  Please see a full list of your medications outlined in the After Visit Summary that is attached to these Discharge Instructions  Please note changes may have been made to your medications please refer to your discharge paperwork for your current medications and take this list with you to all your doctors appointments for your doctors to review  Please do not resume a home medication unless the medication reconciliation sheet indicates to do so, please do not assume that a medication that you were given a prescription for is the same as a medication you have at home based on both medications having the same name as dosages and frequency may have changed  Unless specifically noted in your medication list provided to you in your discharge paper work do not resume prior vitamins, minerals, or supplements you may have been taking prior to your hospitalization unless instructed by an outpatient physician in the future  Discuss with your primary care at next visit if applicable  NSAID Warning:  Please avoid NSAID (including but not limited to advil, aleve, motrin, naproxen, ibuprofen, mobic, meloxicam, diclofenac etc) medications as NSAID medications may increase your risk of stroke and potentially delay bone healing  Aspirin instructions  TAKE aspirin 81mg daily unless instructed otherwise by a doctor as recommended by internal medicine with stroke history and atherosclerotic disease risk - follow-up with PCP after discharge to confirm PCP wants this continued     This medication will need to be managed by your outpatient physician(s) after discharge  Follow-up with the appropriate provider as soon as possible to ensure appropriate use  This is a blood thinner  It is being recommended for use by you (or your family) to decrease the risk of clotting which can be severely disabling and even life-threatening    Even when provided as recommended it can cause severe disabling and even life-threatening bleeding  Too much or too little of this blood thinner further increases your risk of this medication causing serious and even life-threatening complications such as severe bleeding, clots, strokes, and death  With that said, at this time based on best available evidence and consensus agreement, your physicians recommend you take aspirin based on your overall risks and benefits in your specific medical situation  If you (or your family/caregiver) notice black stools, bloody stools, vomit blood, develop new weakness, slurred speech, confusion or have any other concerning symptoms call 911 or obtain transportation to nearest emergency room immediately  Sedating Medications with increased risk of complications: Your goal will be to wean off of opiate medications and then onto acetaminophen only and then off of acetaminophen as well if possible  There are risks associated with opioid medications, including dependence, addiction and tolerance  The patient understands and agrees to use these medications only as prescribed  Potential side effects of the medications include, but are not limited to, constipation, drowsiness, addiction, impaired judgment and risk of fatal overdose if not taken as prescribed  You should not drive after taking this medication  The patient was warned against driving while taking sedation medications  Sharing medications is a felony  At this point in time, the patient is showing no signs of addiction, abuse, diversion or suicidal ideation  Hydromorphone (opiate) pain medication has been used to help your acute pain  You tolerated this medication adequately during your recent hospital stay  You will be given a limited supply of opiate pain medications on discharge; should you require additional refills/medications, your PCP and/or surgeon may prescribe at his/her discretion     This can be quite helpful particularly for severe acute pain  Starting with Hydromorphone 2-3mg tab - take up to 4 times per day as needed for moderate-severe pain > no more than 1 week; decrease to to 2-3 times per day as soon as possible followed by 1-2 times per day and stop; if needed than can go back to chronic pain meds if needed; follow-up with orthopedic spine, PCP and other pain providers if needed     There are risks associated with opioid medications  They can increase your risk of falling, injury, and breathing difficulties which can be severe and even life-threatening  Note it is advisable to limit use of opiate pain medications as they can become habit forming and lead to addiction  Other potential side effects of the medication include, but are not limited to, constipation, drowsiness, impaired judgment and risk of fatal overdose if not taken as prescribed  You should not drive while taking this medication  The patient was warned against driving while taking sedation medications  Sharing medications is a felony  At this point in time, the patient is showing no signs of addiction, abuse, diversion or suicidal ideation  The patient (you/or relevant caregiver) understands and agrees to use this medication only as prescribed  Baclofen pain/muscle spasm medication has been used to help your acute pain and spasms  You tolerated this medication adequately during your recent hospital stay  - Do not take with alcohol (or marijuana/cannabis) while on this medication as this can cause increased confusion, breathing problems, falls, and severe injury  - Follow-up with your primary care physician within 1-2 weeks as well as relevant specialty for additional management of your medical conditions, potential refills, or adjustments of this medication          You will be given a very limited supply of both opiate pain and muscle relaxant medications both of which can increase your risk of fall and severe and even life-threatening injury  Taking both medications together further increase your risk of fall and even life-threatening injury as well as respiratory depression (slowing and stopping of breathing)  Physicians at times will carefully use these medications in combination but this must be done with close oversight  You have been carefully monitored during your rehab course on these medications without signs of increased confusion, respiratory depression, or worsening balance  Nevertheless, this risk remains  Should you develop confusion, difficulty staying awake, imbalance or other concerning symptoms do NOT take these medications and notify your physician right away or obtain transportation to nearest emergency room  You have been discussed risks and benefits of these medications and at this time have agreed to risks associated with these medications  Gabapentin has been used to help pain  You tolerated this medication adequately during your recent hospital stay  - Take 300 mg 3 times per day  - Do not stop this medication abruptly as this can cause seizures  - If stopping medication I would decrease by 300mg every 5 days   - Do not take with alcohol (or marijuana/cannabis) while on this medication as this can cause increased confusion, breathing problems, falls, and severe injury  - This medication can increase risk of confusion, fall, and injury although you did tolerate adequately during rehab course  - Follow-up with your primary care physician and orthopedic surgery within 1 week as well as  for additional management of your medical conditions, potential refills, or adjustments of this medication            MEDICAL MANAGEMENT AT HOME specific to you:      Hypertension Management:  Only take the medications prescribed for you at time of discharge - overly high or low blood pressure increases your risk for health complications    Follow-up with PCP/family doctor regularly to ensure blood pressure remains adequately controlled  Bowel management (constipation risk):  - Ideally you would have 1 well formed BM every 1-2 days  Adjust bowel regimen based on that goal or as close to that as possible  - Start with taking miralax 1 time per day and senna twice daily if you become constipated   - If still constipated you can increase miralax to twice per day and if needed take either oral or by rectum dulcolax (but not at the same time)  - If unable to go for more than 4 days notify your physician for additional recommendations    - If you develop significant abdominal pain, nausea/vomiting, or other concerns seek medical attention right away  Please note a summary of your hospital stay with relevant information for your doctors will try to be sent to them  Please confirm with your doctors at your follow up visits that they have received this summary and have them contact [address] [place] [address] if they have not received them along with any other medical records they may require       Luis Garcia Phone Number:  [phone]

## 2022-11-25 NOTE — CONSULTS
[address] [place] [address] 1955, 78 y o  male MRN: [id number]  Unit/Bed#: [facility] 775-40 Encounter: [id number]  Primary Care Provider: Vega Whitmore MD   Date and time admitted to hospital: 11/25/2022  2:14 PM    Inpatient consult to Internal Medicine  Consult performed by: BAYRON Gusman  Consult ordered by: Marco Barrios MD          Leukocytosis  Assessment & Plan  WBC count currently 10  18  No active s/s of infection  Continue to trend with routine CBC  Elevated AST (SGOT)  Assessment & Plan  Mildly elevated at 66 on 11/25  Pt states that this is chronic and that he had hepatitis as a teenager (unsure type)  Asymptomatic  Monitor weekly CMP with [place] administration  Hypertension  Assessment & Plan  Continue home lisinopril 5mg daily  Monitor BP with routine VS     Complaints of lightheadedness when getting OOB  Obtain orthostatics prior to therapies  Hyperlipidemia  Assessment & Plan  Continue pravastatin 10mg daily as substitute for non-formulary lovastatin 10mg daily  No recent lipid panel on file  * Status post lumbar spinal fusion  Assessment & Plan  Hx of prior back surgeries with hx of scoliosis, pseudoarthrosis, and flat back deformity  11/22: elective removal of hardware L1-L3, posterior thoracic/lumbar instrumentation from T8 to pelvis, thoracic osteotomy T11/T12, thoracic laminotomy T8/T9, T9/T10, T10/T11, posterior thoracic fusion T7-L1, revision instrumentation T8 to pelvis performed by Dr Tabitha Connolly (Orthopedics)  Continue ASA 325mg daily for DVT ppx  TLSO brace when getting OOB  Ensure adequate pain control  Neurovascular checks Q shift  2 week follow-up with repeat thoracolumbar spine XRs (12/6)  Primary team following  PT/OT        History of Present Illness:    Aj Saleem is a 78 y o  male with a PMH of HTN and HLD who originally presented to [facility] for an elective removal of hardware L1-L3, posterior thoracic/lumbar instrumentation from T8 to pelvis, thoracic osteotomy T11/T12, thoracic laminotomy T8/T9, T9/T10, and T10/T11, post thoracic fusion T7-L1, a revision instrumentation of T8 to pelvis performed by Dr Suzanne Hill  Postoperatively patient had a PCA pump which was discontinued on 11/23  Patient was noted to have bilateral lower extremity edema, venous duplex was completed and was negative  Patient is to continue aspirin 325 mg daily for DVT prophylaxis, continue to wear TLSO brace when out of bed, and will require 2 week follow-up with repeat thoracic lumbar spine x-rays  Admitted to [address] [place] [address] on 11/25/2022; we are consulted for medical clearance  Patient examined while patient lying in bed in patient room  Currently complaining of 8/10 mid back pain, aching, improves with pain medication  States that his pain level has been around 8 and that has not been getting worse  Had been taking tramadol prior to his surgery  Denies any numbness/tingling to the bilateral upper and lower extremities  Denies any headaches, shortness of breath, palpitations, chest pain, abdominal pain  Has been having lightheadedness when getting out of bed for the past 2 days  Will obtain orthostatic vitals  States that he has been eating and drinking adequately  Complains of constipation, last bowel movement was on 11/22  Denies any abdominal pain, cramping, nausea, vomiting  Denies any urinary complaints  Currently denies any lower extremity edema  Lives at home with his wife  States that they are both retired and that she can help him as needed  AST noted to be mildly elevated, per patient this is chronic due to a diagnosis of hepatitis when he was younger  Reviewed medications, patient is only taking a statin, lisinopril, and pain medication at home  Review of Systems:    Review of Systems   Constitutional: Negative for appetite change, chills, fatigue and fever     HENT: Negative for trouble swallowing  Eyes: Negative for visual disturbance  Respiratory: Negative for cough, chest tightness, shortness of breath, wheezing and stridor  Cardiovascular: Negative for chest pain, palpitations and leg swelling  Gastrointestinal: Positive for constipation  Negative for abdominal distention, abdominal pain, diarrhea, nausea and vomiting  LBM 11/22, denies nausea, vomiting, abdominal pain  Genitourinary: Negative for difficulty urinating, dysuria, frequency, hematuria and urgency  Musculoskeletal: Positive for back pain (mid back pain, aching, 8/10, improves with pain medication, pain has been stable per patient)  Negative for arthralgias and gait problem  Neurological: Positive for light-headedness (lightheadedness when getting OOB the past 2 days)  Negative for dizziness, facial asymmetry, speech difficulty, weakness, numbness and headaches  Psychiatric/Behavioral: Negative for dysphoric mood and sleep disturbance  The patient is not nervous/anxious  All other systems reviewed and are negative        Past Medical and Surgical History:     Past Medical History:   Diagnosis Date   • Ambulates with cane     "long distance"   • Arthritis    • Back pain    • Chronic pain disorder    • Dental crowns present    • Exercise involving walking     daily -short walks   • History of hepatitis C     per pt "went away on its own" age 13"   • History of transfusion    • Hyperlipidemia    • Hypertension    • Leg pain     and foot pain   • Limb alert care status     per pt NO IV's LEFT UPPER ARM due to old injury   • Risk for falls    • Spinal stenosis    • Stroke ([facility] [address] )     per pt in 1996-and no lasting problems   • Wears glasses     readers       Past Surgical History:   Procedure Laterality Date   • BACK SURGERY      with implants   • COLONOSCOPY     • LUMBAR FUSION N/A 5/24/2022    Procedure: L1-S1 revision of segmental hardware, L5-S1 osteotomy, posterior spinal fusion L4-S1, pelvic instrumentation, Transforaminal lumbar interbody fusion L5S1, Cage L5S1, instrumentation L1-pelvis; Surgeon: Kasandra Mendoza MD;  Location: [facility];  Service: Orthopedics   • CT ARTHRODESIS POSTERIOR/POSTEROLATERAL THORACIC N/A 11/22/2022    Procedure: removal of hardware L1-L3  Posterior thoracic/ lumbar instrumentation from T8 to pelvis  Thoracic osteotomy T11/12  Thoracic laminotomy T8/9, T9/10 and T10/11  Posterior thoracic fusion T7-L1  Revision instrumentation T8 to pelvis ;  Surgeon: Kasandra Mendoza MD;  Location: [facility];  Service: Orthopedics   • WISDOM TOOTH EXTRACTION         Meds/Allergies:    all medications and allergies reviewed    Allergies: No Known Allergies    Social History:     Marital Status: /Civil Union    Substance Use History:   Social History     Substance and Sexual Activity   Alcohol Use Yes   • Alcohol/week: 3 0 standard drinks   • Types: 3 Cans of beer per week    Comment: drinks once a month     Social History     Tobacco Use   Smoking Status Former   • Types: Cigarettes   • Quit date: 2012   • Years since quitting: 10 9   Smokeless Tobacco Never     Social History     Substance and Sexual Activity   Drug Use No       Family History:  Reviewed    Physical Exam:     Vitals:   Blood Pressure: 138/66 (11/25/22 1440)  Pulse: 82 (11/25/22 1440)  Temperature: 98 6 °F (37 °C) (11/25/22 1440)  Temp Source: Tympanic (11/25/22 1440)  Respirations: 18 (11/25/22 1440)  Height: 5' 10" (177 8 cm) (11/25/22 1440)  Weight - Scale: 98 8 kg (217 lb 12 8 oz) (11/25/22 1440)  SpO2: 95 % (11/25/22 1440)    Physical Exam  Vitals and nursing note reviewed  Constitutional:       General: He is not in acute distress  Appearance: Normal appearance  He is obese  He is not ill-appearing  HENT:      Head: Normocephalic and atraumatic  Cardiovascular:      Rate and Rhythm: Normal rate and regular rhythm  Pulses: Normal pulses  Heart sounds: Normal heart sounds  No murmur heard  No friction rub  Pulmonary:      Effort: Pulmonary effort is normal  No respiratory distress  Breath sounds: Normal breath sounds  No wheezing or rhonchi  Abdominal:      General: Abdomen is flat  Bowel sounds are normal  There is no distension  Palpations: Abdomen is soft  There is no mass  Tenderness: There is no abdominal tenderness  There is no guarding or rebound  Hernia: No hernia is present  Musculoskeletal:      Cervical back: Normal range of motion and neck supple  No tenderness  Right lower leg: No edema  Left lower leg: No edema  Skin:     General: Skin is warm and dry  Capillary Refill: Capillary refill takes less than 2 seconds  Comments: Mid back incision with steri-strips  Well-approximated  No drainage, erythema, or edema present  Neurological:      Mental Status: He is alert and oriented to person, place, and time  Psychiatric:         Mood and Affect: Mood normal          Behavior: Behavior normal            Additional Data:     Labs and imaging reviewed  EKG Reviewed - last EKG on 10/18/22 showed NSR with QTc of 418  M*Modal software was used to dictate this note  It may contain errors with dictating incorrect words or incorrect spelling  Please contact the provider directly with any questions

## 2022-11-25 NOTE — DISCHARGE INSTRUCTIONS
Dr Melissa Kumar and Instructions  Thoracic and Lumbar Fusion    1  Follow-up  - You should return to the office for your follow-up appointment approximately 2 weeks post-op  This should have been scheduled prior to your surgery  If you do not have an appointment scheduled, please call [phone] to do so as soon as possible  - At the first post op appointment we will check your incision, remove any sutures, and make sure that you are healing well  - X-rays will be taken at this appointment to evaluate your hardware  - If you need any refills on your pain medications, this will be addressed at your follow-up appointment  - You can expect to have post-op appointments at 2 weeks, 6 weeks, 3 months, 6 months and 1 year after surgery  After that time, we will continue to follow you yearly to monitor your fusion  2  Incision Care:  - You will have a surgical dressing over the area  You may remove this dressing 48 hours after surgery  It is placed in a sterile environment and is important to allow the wound to begin healing in a sterile environment   - Once the initial dressing is removed, you may leave the incision open to air   - You may shower on the day that your dressing is removed  Your incision may get wet in the shower, but DO NOT submerge your incision (bath, hot tub, pool, lake, etc ) until you have been cleared to do so  - The majority of your sutures are buried beneath the skin and will dissolve with time  You may have a small knot on either end of the incision which will be removed at your post-op appointment  The incision may be raised initially, although this will improve as the sutures dissolve  - You will have steri-strips over the incision - these look like little white pieces of tape  Please do not remove these as they will fall off on their own or be removed at your first post-op appointment    - Please do not apply any ointments or salves to the incision unless instructed to do so  - If you notice any drainage, foul odor, increased redness, swelling or pain of the incision, please call our office immediately  These may be signs of an infection and should be evaluated  3  Medications  - You will be given a prescription for pain medication when you are discharged from the hospital   - It is recommended that you take the medication as prescribed for the first 24-48 hours after surgery, even if your pain is minimal   It is much easier to control your pain when you stay on top of the medications than if you wait to take them until your pain is severe  - After the first few days, you should try to take the pain medication less often, and only when needed  - You may take Tylenol in place of, not in addition to, your pain medication if you wish  - Do not take NSAIDs (Advil, Aleve, Ibuprofen, Motrin, Naproxen, etc ) for 3 months following your surgery  This may impede the healing of your fusion  - DO NOT drive while you are taking narcotic pain medications  - If there is an issue with any of your discharge medications, please call our office at [phone] to discuss  - It is very common for surgery and narcotic pain medications to cause constipation  It is very important that you eat a high fiber diet, drink lots of water, and also consider taking a stool softener while taking pain medications to help with this  It is not uncommon for you to go several days without a bowel movement after surgery  If you are constipated and it is accompanied by severe abdominal pain, nausea and vomiting, inability to keep food or drink down, and/or a fever, please call our office as these may be signs of a more serious medical condition  4  Activity:  - You should have been given a prescription to get fitted for a lumbar brace  You may remove this for hygeine (showering, etc ), and for sleeping    The brace should otherwise be worn for the first 3 months post-operatively  If you have any issues with the fit of your brace, you can call [facility] at [phone]   - You should avoid any bending, twisting  If you must bend over, please use your knees  - You CANNOT lift greater than 10lbs, or a gallon of milk, until instructed  - You should try to avoid sitting for greater than 20 minutes at a time as this will cause your muscles to stiffen and spasm, making you more uncomfortable  - You will start PT (physical therapy) around 3 months after surgery   - You may resume your normal daily activities as tolerated  - Walking is the best exercise and you should try to walk up to 1 mile throughout the day as you are recovering   - You may resume sexual activity as tolerated if desired  - You should NOT smoke following spinal fusion as this will impede your healing  5  Diet:  - It is important to eat a well balanced diet to help your body heal   - You should make sure that you are drinking plenty of fluids - water is best    6  Return to Work:  - You can expect to be out of work for at least 6 weeks up to 3 months  We will discuss your return to work status at your post-op appointments, but please do not hesitate to call if you have any questions or concerns  - Please make sure that any short-term disability paperwork is dropped off at the   Please call our office if you have any of the following after surgery:  - Persistent fever greater than 100 5 degrees  - Foul odor, drainage, redness, swelling or increased pain around your incision site  - A headache that is worse with standing or sitting, and is alleviated with lying flat on your back  - New onset arm or leg weakness, numbness or tingling  - Significantly increased pain that is not alleviated with pain medications, rest and ice  - New-onset calf pain    If you experience any chest pain or shortness of breath after you are discharged, call [phone] immediately

## 2022-11-26 PROBLEM — R52 DIFFICULTY COPING WITH PAIN: Status: ACTIVE | Noted: 2022-11-26

## 2022-11-26 PROBLEM — K59.00 CONSTIPATION: Status: ACTIVE | Noted: 2022-11-26

## 2022-11-26 RX ORDER — GABAPENTIN 100 MG/1
200 CAPSULE ORAL 2 TIMES DAILY
Status: DISCONTINUED | OUTPATIENT
Start: 2022-11-26 | End: 2022-11-27

## 2022-11-26 RX ORDER — OXYCODONE HYDROCHLORIDE 5 MG/1
5 TABLET ORAL ONCE
Status: COMPLETED | OUTPATIENT
Start: 2022-11-26 | End: 2022-11-26

## 2022-11-26 RX ORDER — METHOCARBAMOL 750 MG/1
750 TABLET, FILM COATED ORAL EVERY 6 HOURS SCHEDULED
Status: DISCONTINUED | OUTPATIENT
Start: 2022-11-26 | End: 2022-11-29

## 2022-11-26 RX ORDER — ACETAMINOPHEN 325 MG/1
650 TABLET ORAL EVERY 6 HOURS PRN
Status: DISCONTINUED | OUTPATIENT
Start: 2022-11-26 | End: 2022-11-27

## 2022-11-26 RX ADMIN — OXYCODONE HYDROCHLORIDE 10 MG: 10 TABLET ORAL at 03:29

## 2022-11-26 RX ADMIN — LISINOPRIL 5 MG: 5 TABLET ORAL at 08:26

## 2022-11-26 RX ADMIN — OXYCODONE HYDROCHLORIDE 15 MG: 10 TABLET ORAL at 20:29

## 2022-11-26 RX ADMIN — SENNOSIDES 8.6 MG: 8.6 TABLET, FILM COATED ORAL at 08:26

## 2022-11-26 RX ADMIN — METHOCARBAMOL 750 MG: 750 TABLET, FILM COATED ORAL at 23:48

## 2022-11-26 RX ADMIN — OXYCODONE HYDROCHLORIDE 15 MG: 10 TABLET ORAL at 14:28

## 2022-11-26 RX ADMIN — SENNOSIDES 8.6 MG: 8.6 TABLET, FILM COATED ORAL at 18:28

## 2022-11-26 RX ADMIN — GABAPENTIN 200 MG: 100 CAPSULE ORAL at 18:28

## 2022-11-26 RX ADMIN — OXYCODONE HYDROCHLORIDE 15 MG: 10 TABLET ORAL at 08:26

## 2022-11-26 RX ADMIN — METHOCARBAMOL 500 MG: 500 TABLET, FILM COATED ORAL at 05:32

## 2022-11-26 RX ADMIN — DOCUSATE SODIUM 100 MG: 100 CAPSULE, LIQUID FILLED ORAL at 08:26

## 2022-11-26 RX ADMIN — FERROUS SULFATE TAB 325 MG (65 MG ELEMENTAL FE) 325 MG: 325 (65 FE) TAB at 08:26

## 2022-11-26 RX ADMIN — METHOCARBAMOL 750 MG: 750 TABLET, FILM COATED ORAL at 18:28

## 2022-11-26 RX ADMIN — ASPIRIN 325 MG ORAL TABLET 325 MG: 325 PILL ORAL at 08:26

## 2022-11-26 RX ADMIN — OXYCODONE HYDROCHLORIDE 5 MG: 5 TABLET ORAL at 04:55

## 2022-11-26 RX ADMIN — DOCUSATE SODIUM 100 MG: 100 CAPSULE, LIQUID FILLED ORAL at 18:28

## 2022-11-26 RX ADMIN — PRAVASTATIN SODIUM 10 MG: 20 TABLET ORAL at 16:36

## 2022-11-26 RX ADMIN — GABAPENTIN 200 MG: 100 CAPSULE ORAL at 08:26

## 2022-11-26 RX ADMIN — METHOCARBAMOL 750 MG: 750 TABLET, FILM COATED ORAL at 12:06

## 2022-11-26 NOTE — ASSESSMENT & PLAN NOTE
Resolved 12/5; statin resumed   Monitor intermittently  Hx of hepatitis and teenager  Patient would like to avoid standing APAP

## 2022-11-26 NOTE — H&P
PHYSICAL MEDICINE AND REHABILITATION H&P/ADMISSION NOTE  Katlin Blankenship 78 y o  male MRN: [id number]  Unit/Bed#: [facility] 266-01 Encounter: [id number]     Rehab Diagnosis: Orthopedic Disorders:  08 9  Other Orthopedic Scoliosis and Flatback syndrome    Etiologic Diagnosis:  Scoliosis and Flatback syndrome s/p thoracic and lumbar revision and fusion sx    History of Present Illness:   45-year-old male with a past medical history of scoliosis, flat back syndrome, neurogenic claudication hypertension, hyperlipidemia, obesity who had stage I of revision surgery in May of this past year who is now status post stage II revision 11/22  He underwent removal of L1-L3 hardware, posterior thoracic/lumbar instrumentation from T8 to pelvis, thoracic osteotomy at T11/12, thoracic laminotomy T8/T9, T9/T10 and T10/11, posterior thoracic fusion T7 through left L1 and revision instrumentation T8 to pelvis by Dr Ariadne Meyer  Postop course notable for significant pain and decline in ADLs and mobility  He did have bilateral lower extremity venous ultrasound which was negative for DVT  Patient also noted to have significant constipation  Patient was evaluated by skilled therapies and was found to have significant decline in ADLs and ambulation and appears appropriate for admission to [place] [id number]  Chief complaint:   back pain     Subjective:   on eval, patient reports moderate to severe back pain at times more severe with turning in bed and moving  Patient reports some chronic pain down legs worse as he walks and the more he walks  He denies acute worsening of strength or sensation since the surgery  He denies urinary problems  Patient reports constipation with last bowel movement prior to surgery  Patient notes some abdominal fullness but is having some gas at times with mild abdominal discomfort but without nausea or vomiting  He denies fever, chills, sweats    He reports occasional lightheadedness when he was getting out of bed earlier but not currently  He denies other new complaints  Review of Systems: A 10 point ROS was performed; negative except as noted above  * Status post lumbar spinal fusion  Assessment & Plan  S/P removal of hardware L1-L3  Posterior thoracic/ lumbar instrumentation from T8 to pelvis  Thoracic osteotomy T11/12  Thoracic laminotomy T8/9, T9/10 and T10/11  Posterior thoracic fusion T7-L1  Revision instrumentation T8 to pelvis by Dr Elzie Buerger on 11/22  Monitor incision (14 days post-sx Tue 12/6) - healing well on admission  - Follow-up at 2 weeks postop for suture removal and xrays of thoracolumbar spine  Can shower now with dressing removed but do not submerge based on dc instructions   - Do not take NSAIDs (Advil, Aleve, Ibuprofen, Motrin, Naproxen, etc ) for 3 months following your surgery  This may impede the healing of your fusion  Thoracic/lumbar spine precautions   Custom-molded LSO brace from [employer] from pre-op  If not, please fit patient with [place] LSO brace  Pt may don brace in a sitting position and should have brace on at all times when ambulating and working with PT  Follow-up with spine surgery after d/c from [facility] and if needed during ARC course    P/w significant back pain somatic back with spasms/tightness  Optimal pain mgmt  - Recommend acute comprehensive interdisciplinary inpatient rehabilitation to include intensive skilled therapies (PT, OT) as outlined with oversight and management by rehabilitation physician as well as inpatient rehab level nursing, case management and weekly interdisciplinary team meetings           Pain  Assessment & Plan  Significant on admission  Strength intact, has some chronic pain in legs worse with ambulating   Sub-optimally controlled; patient was on Norco-APAP combo but would like to avoid standing APAP    Change to APAP 650mg Q6H PRN   Oxycodone 5-10mg Q4H PRN   Robaxin 750mg Q6H for spasms   Gabapentin 200mg BID for leg and adjuvant pain control   Per d/c instructions - - Do not take NSAIDs (Advil, Aleve, Ibuprofen, Motrin, Naproxen, etc ) for 3 months following your surgery  This may impede the healing of your fusion  Monitor for oversedation, AMS/delirium, and respiratory depression   If being administered - hold opiates, muscle relaxants, benzodiazepines, and gabapentin for oversedation, AMS, or RR<12  Counseled on and continue to encourage deep breathing/relaxation/behavioral pain management techniques      Constipation  Assessment & Plan  LBM prior to admission   - Hold standing iron supplement for now   - Dulcolax supp x1   - Daily miralax for now  - Colace/Senna BID  - PRN bowel regimen (Miralax PRN/Dulcolax supp PRN)  - monitor for signs and symptoms of obstruction/ileus > not currently; hold stimulant lax if occurs  - Limiting constipating medications if possible  - Ensure adequate hydration       Difficulty coping with pain  Assessment & Plan  Supportive counseling  Consider Psychology consult while in [place] on and continue to encourage deep breathing/relaxation/behavioral management techniques:     Deep breathin seconds in, 5 seconds out, 5 times per hour when awake and PRN when experiencing pain or anxiety    Avoid holding breath and tightening muscles and instead breathe slowly and deeply      Leukocytosis  Assessment & Plan  Trending down   Internal medicine consult and management during ARC course  Patient afebrile, other vitals unremarkable > continue to monitor   No clear signs or symptoms of infection or VTE but will continue to monitor  Monitor CBC intermittently       Elevated AST (SGOT)  Assessment & Plan  Mild at 66 on , chronic per patient  Hx of hepatitis and teenager  Patient would like to avoid standing APAP    At risk for venous thromboembolism (VTE)  Assessment & Plan  SCDs, ambulation, and aspirin 325mg qday per sx   Venous doppler negative for VTE       Hypertension  Assessment & Plan  Internal medicine consulted and co-management with their service  Monitor vitals with and without activity; monitor for orthostasis  Monitor hemoglobin, electrolytes, kidney function, hydration status   Current meds: lisinopril       Hyperlipidemia  Assessment & Plan  Statin       Disposition:   Home with estimated length of stay of 10 days from admission    Follow-up:   PCP   Orthopedic surgery    CODE: Level 1: Full Code    Restrictions include:   Fall precautions, thoracic lumbar spinal precautions    ---------------------------------------------------------------------------------------------------------------------      OBJECTIVE:    Physical Exam:  11/25/22 1440 98 6 °F (37 °C) 82 18 138/66 -- 95 % None (Room air)       General: Awake, alert in discomfort at times  HENT: NCAT, MMM  Neck: Supple, no lymphadenopathy  Respiratory: Unlabored breathing, breath sounds equal, Lungs CTA, no wheezes, rales, or rhonchi  Cardiovascular: Regular rate and rhythm, no murmurs, rubs, or gallops  Gastrointestinal: Soft, non-tender, non-distended, normoactive bowel sounds  Genitourinary: No randh[place]  SkiN/MSK/Extremities:    Thoracic lumbar spinal incision without significant discharge, erythema, or breakdown  Distal extremities appropriately warm, normal cap refill distally, no cyanosis or calf edema, no calf tenderness to palpation  Neurologic/Psych:   MENTAL STATUS: awake, oriented to person, place, time, and situation  Affect: Euthymic   CN II-XII: grossly intact   Strength/MMT:  full throughout  Right  Left  Site  Right  Left  Site    5 5  S Ab: Shoulder Abductors  5  5  HF: Hip Flexors    5 5  EF: Elbow Flexors  5  5 KF: Knee Flexors    5  5  EE: Elbow Extensors  5  5  KE: Knee Extensors    5  5  WE: Wrist Extensors  5  5  DR: Dorsi Flexors    5  5  FF: Finger Flexors  5  5  PF: Plantar Flexors    5  5  HI: Hand Intrinsics  5  5  EHL: Extensor Hallucis Longus     Laboratory:    Results from last 7 days   Lab Units 11/25/22  0508 11/24/22  0500 11/23/22  0532   HEMOGLOBIN g/dL 12 2 12 1 12 3   HEMATOCRIT % 37 5 36 5 38 2   WBC Thousand/uL 10 18* 12 36* 12 53*     Results from last 7 days   Lab Units 11/25/22  0508 11/24/22  0500 11/23/22  0532 11/22/22  1430 11/22/22  1248 11/22/22  1021   BUN mg/dL 17 17 15  --   --   --    POTASSIUM mmol/L 4 2 4 1 4 2  --   --   --    CHLORIDE mmol/L 102 101 103  --   --   --    GLUCOSE, ISTAT mg/dl  --   --   --  149* 143* 137   CREATININE mg/dL 0 76 0 83 0 84  --   --   --    AST U/L 66* 71* 80*  --   --   --    ALT U/L 32 33 43  --   --   --             Wt Readings from Last 1 Encounters:   11/25/22 98 8 kg (217 lb 12 8 oz)     Estimated body mass index is 31 25 kg/m² as calculated from the following:    Height as of this encounter: 5' 10" (1 778 m)  Weight as of this encounter: 98 8 kg (217 lb 12 8 oz)  Imaging: reviewed    Per EMR:  Past Medical History:   Past Surgical History:   Family History:   Social history:   Past Medical History:   Diagnosis Date   • Ambulates with cane     "long distance"   • Arthritis    • Back pain    • Chronic pain disorder    • Dental crowns present    • Exercise involving walking     daily -short walks   • History of hepatitis C     per pt "went away on its own" age 13"   • History of transfusion    • Hyperlipidemia    • Hypertension    • Leg pain     and foot pain   • Limb alert care status     per pt NO IV's LEFT UPPER ARM due to old injury   • Risk for falls    • Spinal stenosis    • Stroke ([address] )     per pt in 1996-and no lasting problems   • Wears glasses     readers    Past Surgical History:   Procedure Laterality Date   • BACK SURGERY      with implants   • COLONOSCOPY     • LUMBAR FUSION N/A 5/24/2022    Procedure: L1-S1 revision of segmental hardware, L5-S1 osteotomy, posterior spinal fusion L4-S1, pelvic instrumentation, Transforaminal lumbar interbody fusion L5S1, Cage L5S1, instrumentation L1-pelvis;   Surgeon: Fabiola Marie MD;  Location: [facility];  Service: Orthopedics   • [place] ARTHRODESIS POSTERIOR/POSTEROLATERAL THORACIC N/A 11/22/2022    Procedure: removal of hardware L1-L3  Posterior thoracic/ lumbar instrumentation from T8 to pelvis  Thoracic osteotomy T11/12  Thoracic laminotomy T8/9, T9/10 and T10/11  Posterior thoracic fusion T7-L1  Revision instrumentation T8 to pelvis ;  Surgeon: Suad Acuña MD;  Location: [facility];  Service: Orthopedics   • WISDOM TOOTH EXTRACTION       No family history on file  Social History     Socioeconomic History   • Marital status: /Civil Union     Spouse name: Not on file   • Number of children: Not on file   • Years of education: Not on file   • Highest education level: Not on file   Occupational History   • Not on file   Tobacco Use   • Smoking status: Former     Types: Cigarettes     Quit date: 2012     Years since quitting: 10 9   • Smokeless tobacco: Never   Vaping Use   • Vaping Use: Never used   Substance and Sexual Activity   • Alcohol use: Yes     Alcohol/week: 3 0 standard drinks     Types: 3 Cans of beer per week     Comment: drinks once a month   • Drug use: No   • Sexual activity: Not on file     Comment: defer   Other Topics Concern   • Not on file   Social History Narrative   • Not on file     Social Determinants of Health     Financial Resource Strain: Not on file   Food Insecurity: No Food Insecurity   • Worried About Running Out of Food in the Last Year: Never true   • Ran Out of Food in the Last Year: Never true   Transportation Needs: No Transportation Needs   • Lack of Transportation (Medical): No   • Lack of Transportation (Non-Medical):  No   Physical Activity: Not on file   Stress: Not on file   Social Connections: Not on file   Intimate Partner Violence: Not on file   Housing Stability: Low Risk    • Unable to Pay for Housing in the Last Year: No   • Number of Places Lived in the Last Year: 1   • Unstable Housing in the Last Year: No          Current Medical Diagnosis Medications Allergies   Patient Active Problem List   Diagnosis   • Status post lumbar spinal fusion   • Hyperlipidemia   • Hypertension   • Back pain   • Chronic pain   • Flatback syndrome   • Pain   • At risk for venous thromboembolism (VTE)   • Elevated AST (SGOT)   • Leukocytosis   • Constipation   • Difficulty coping with pain      Current Facility-Administered Medications:   •  acetaminophen (TYLENOL) tablet 650 mg, 650 mg, Oral, Q6H PRN, Talia Altamirano MD  •  aspirin tablet 325 mg, 325 mg, Oral, Daily, Talia Altamirano MD, 325 mg at 11/26/22 8518  •  bisacodyl (DULCOLAX) rectal suppository 10 mg, 10 mg, Rectal, Daily PRN, Talia Altamirano MD, 10 mg at 11/25/22 2213  •  docusate sodium (COLACE) capsule 100 mg, 100 mg, Oral, BID, Talia Altamirano MD, 100 mg at 11/26/22 1940  •  gabapentin (NEURONTIN) capsule 200 mg, 200 mg, Oral, BID, Leidy Martinez MD, 200 mg at 11/26/22 8019  •  lisinopril (ZESTRIL) tablet 5 mg, 5 mg, Oral, Daily, Talia Altamirano MD, 5 mg at 11/26/22 8580  •  methocarbamol (ROBAXIN) tablet 750 mg, 750 mg, Oral, Q6H [address], Leidy Martinez MD  •  naloxone (NARCAN) 0 04 mg/mL syringe 0 04 mg, 0 04 mg, Intravenous, Q1MIN PRN, Talia Altamirano MD  •  ondansetron (ZOFRAN-ODT) dispersible tablet 4 mg, 4 mg, Oral, Q8H PRN, Talia Altamirano MD  •  oxyCODONE (ROXICODONE) IR tablet 15 mg, 15 mg, Oral, Q4H PRN, Leidy Martinez MD, 15 mg at 11/26/22 2329  •  oxyCODONE (ROXICODONE) split tablet 7 5 mg, 7 5 mg, Oral, Q4H PRN, Leidy Martinez MD  •  polyethylene glycol (MIRALAX) packet 17 g, 17 g, Oral, Daily PRN, Talia Altamirano MD  •  polyethylene glycol (MIRALAX) packet 17 g, 17 g, Oral, Daily, Talia Altamirano MD, 17 g at 11/25/22 1745  •  pravastatin (PRAVACHOL) tablet 10 mg, 10 mg, Oral, Daily With Yenni Rodriguez MD, 10 mg at 11/25/22 1745  •  senna (SENOKOT) tablet 8 6 mg, 1 tablet, Oral, BID, Talia Altamirano MD, 8 6 mg at 11/26/22 6843 No Known Allergies         Prior Level of Function and social history:    Patient has been off of work since initial back problems over 10 years ago  Patient lives with spouse and son in single family and has 1 step to enter and sleeps on 1st floor couch    Current Level of Function:    Mobility:  Transfers Min assist x2  Ambulation/Mobility can not 80 ft Min assist    ADLs:  Max/Significant assist lower body dressing  Mod assist upper body dressing    Potential Barriers to Discharge:  Co-morbidities (see above), new functional deficits, pain, brace    Tolerance for three hours of therapy per therapy day: adequate     Rehabilitation Prognosis: good       Rehabilitation Plan/Therapy Interventions: This patient will have close medical and rehabilitation oversight from a physical medicine and rehabilitation physician and if needed an internal medicine physician to manage the complexity of medical issues to optimize health, ability to participate in therapy, quality of life, and functional outcomes  This patient requires 24 hour rehabilitation nursing to address bowel and bladder management, (pain management if listed below), medication administration, positioning/skin monitoring, fall/injury prevention, and VTE prophylaxis  Physical and occupational therapy: Patient requires PT, OT to improve functional mobility, transfers, upper and lower body strengthening, conditioning, balance, and gait training with appropriate assistive device  PT and OT will be provided approximately 5 times per week for 90 minutes per day  Skilled therapists and nursing will also provide patient/family safety education and training  / will work to ensure proper communication between patient (+/- family) and staff regarding the overall rehabilitation and medical process while patient is in the acute rehabilitation center  / will work with patient (and if applicable family and community resources) to optimize safe discharge      Discharge Planning:    Estimated length of stay:   10-12 days  Family/Patient Goals:  Patient/family's goals: Return to previous home/apartment  Mobility Goals:   Bed Mobility: Moderate [place]  Bed to wheelchair transfer: Moderate [place]  Sit to stand: Moderate [place]  Ambulation: Moderate [place]  Stairs: Supervision  Manual wheelchair: Moderate [place]    Activities of Daily Living (ADLs) Goals:    Eating: Moderate [place]  Hygiene and Grooming: Moderate [place]  Bathing: Stand by Assist  Upper Extremity Dressing: Moderate [place]  Diet / Swallowing: Moderate [place]  Lower Extremity Dressing: Moderate [place]  Tub/shower Transfers: Stand by Assist  Toilet Transfers: Moderate [place]  Toileting / Hygiene: Moderate [place]    Rehabilitation and discharge goals discussed with the patient and/or family  Case Managment and Social Work to review patient/family resources and to coordinate Discharge Planning  Patient and Family Education and Training:  Rehabilitation and discharge goals discussed with the patient and/or family  Patient/family education/training needs to be discussed in weekly team meeting  Other equipment: To be determined    Medical Necessity Criteria for ARC Admission:  The preadmission screen, post-admission physical evaluation, overall plan of care and admissions order demonstrate a reasonable expectation that the following criteria were met at the time of admission to the [facility]  (See "Specific areas of management and oversight in ARC setting" for additional details on medical necessity as outlined below)  1  The patient requires active and ongoing therapeutic intervention of multiple therapy disciplines (physical therapy, occupational therapy, speech-language pathology, or prosthetics/orthotics), one of which is physical or occupational therapy      2  Patient requires an intensive rehabilitation therapy program, as defined in Chapter 1, section 110 2 2 of the CMS Medicare Policy Manual  This intensive rehabilitation therapy program will consist of at least 3 hours of therapy per day at least 5 days per week or at least 15 hours of intensive rehabilitation therapy within a 7 consecutive day period, beginning with the date of admission to the [facility]  3  The patient is reasonably expected to actively participate in, and benefit significantly from, the intensive rehabilitation therapy program as defined in Chapter 1, section 110 2 2 of the CMS Medicare Policy Manual at this time of admission to the [facility]  He can reasonably be expected to make measurable improvement (that will be of practical value to improve the patient’s functional capacity or adaptation to impairments) as a result of the rehabilitation treatment, as defined in section 110 3, and such improvement can be expected to be made within the prescribed period of time  As noted in the CMS Medicare Policy Manual, the patient need not be expected to achieve complete independence in the domain of self-care nor be expected to return to his or her prior level of functioning in order to meet this standard  4  The patient must require physician supervision by a rehabilitation physician  As such, a rehabilitation physician will conduct face-to-face visits with the patient at least 3 days per week throughout the patient’s stay in the [facility] to assess the patient both medically and functionally, as well as to modify the course of treatment as needed to maximize the patient’s capacity to benefit from the rehabilitation process    5  The patient requires an intensive and coordinated interdisciplinary approach to providing rehabilitation, as defined in Chapter 1, section 110 2 5 of the CMS Medicare Policy Manual  This will be achieved through periodic team conferences, conducted at least once in a 7-day period, and comprising of an interdisciplinary team of medical professionals consisting of: a rehabilitation physician, registered nurse,  and/or , and a licensed/certified therapist from each therapy discipline involved in treating the patient  Changes Since Pre-admission Assessment: None -This patient's participation in rehab continues to be reasonable, necessary and appropriate  CMS Required Post-Admission Physician Evaluation Elements  History and Physical, including medical history, functional history and active comorbidities as in above text  Post-Admission Physician Evaluation:  The patient has the potential to make improvement and is in need of physical, occupational, and/or therapy services  The patient may also need nutritional services  Given the patient's complex medical condition and risk of further medical complications, rehabilitative services cannot be safely provided at a lower level of care, such as a skilled nursing facility  I have reviewed the patient's functional and medical status at the time of the preadmission screening and they are the same as on the day of this admission  I acknowledge that I have personally performed a full physical examination on this patient within 24 hours of admission  The patient demonstrated understanding the rehabilitation program and the discharge process after we discussed them  Agree in entirety: yes  Minor adaptions: none    Major changes: none    Specific areas of management and oversight in ARC setting:    Cardiopulmonary function: Ensure cardiopulmonary stability and optimize cardiopulmonary function not only at rest but with activity as patient's activity level significantly increases in acute rehab compared with prior to transfer in preparation for safe discharge from [facility]    Must closely and frequently monitor blood pressure and HR to ensure adequate cardiac output during ADLs and ambulation as patient is at increased risk for orthostatic hypotension/syncope and potential injury if not monitored for and managed adequately  Blood pressure management:    Frequent monitoring of blood pressure with appropriate adjustments in blood pressure medication management to optimize blood pressure control and prevent/limit renal complications  Monitoring impact of blood pressure and side-effects of blood pressure medications at rest and with activity  Pain management:  Pain will improve with frequent evaluation of pain, careful adjustments in medications, frequent re-evaluation of patient's pain and medical/neurologic status to ensure optimal pain control, avoidance of potential serious and even life-threatening side-effects and drug interactions, as well as weaning pain medications as soon as possible to decrease risk of short and long-term use  Orthopedic Disorder:  Back syndrome, scoliosis, status post extensive thoracic lumbar revision and fusion surgery causing impaired mobility, ADLs, and gait:  intensive skilled therapies with physical therapy and occupational therapy with close oversight and management by rehab specialized physician in acute rehabilitation setting to most expeditiously and effectively improve functional mobility, transfers, upper and lower body strengthening, conditioning, balance, and gait training with appropriate assistive device  Patient will have optimal supervision and management of patient's underlying orthopedic disorder with specialized rehabilitation physician during this period of recovery to ensure most expeditious and optimal recovery with decreased risks of fall/injury and other complications including acute worsening of ortho disorder, decrease risk of VTE, PNA, and skin ulceration  Inpatient rehabilitation education/teaching:   To be provided to patient and typically family/caregiver (if able to be identified) by all skilled therapists, rehab nursing, case management, and rehab specialized physician to ensure optimal recovery and decrease risks of complications in both acute rehabilitation setting as well as after discharge  Anxiety: Patient's anxiety and it's impact on therapy participation and functional recovery will improve during course with supportive counseling, relaxation/breathing techniques and if necessary medication management  Requires frequent re-assessment and close management to ensure anxiety/depression management during acute rehab course with planning for appropriate outpatient management to ensure optimal mental health and functional recovery  Bowel dysfunction: Appropriate bowel management with appropriate toileting program from rehab nursing and staff with oversight management by rehabilitation physicians which include adjustments in medications to optimize appropriate bowel function and prevent/decrease risk of constipation and bowel obstruction  Obesity:  Close monitoring of nutrition status, nutrition specialist with adjustments in diet   on appropriate short and long term nutrition and activity  Obesity increases complexity of patient's overall condition and causes unique challenges during this part of patient's recovery process  Supervise and if necessary make adjustments in rehab nursing care and skilled therapy care to ensure appropriate toileting, bed mobility, other ADLs, and ambulation to decrease risk of falls/injuries, VTE, skin breakdown/ulceration and optimize functional recovery  Skin management: Increased risk of skin wounds/breakdown/rashes due to recent immobility and co-morbidities  Appropriate skin checks from nursing and as needed physician  Frequent turning, application of appropriate barrier creams/dressings, cushions  Patient/caregiver education  Optimal bowel/bladder hygiene and mobilization  ** Please Note: Fluency Direct voice to text software may have been used in the creation of this document  **    Total time spent:  75 minutes (at least) with more than 50% spent counseling/coordinating care   Counseling includes extended discussion with patient (+/- family/relevant historian) re: history, symptoms, medications, functional issues, mood, medical management (which in part specifically includes management of recent back sx ) and rehabilitation management plan, and disposition  Additional time spent with thorough chart review in EMR, reviewing recent medications, labs, imaging, and management plan  This was followed by formulating a comprehensive inpatient medical/rehabilitation management plan and coordinating with pertinent specialists as indicated  I personally performed the required components and examined the patient myself in person on 11/25/22          Suellen Baez MD, [address] [facility]  Physical Medicine and Rehabilitation  Brain Injury Medicine

## 2022-11-26 NOTE — ASSESSMENT & PLAN NOTE
Resolved  Internal medicine consult and management during ARC course  Patient afebrile, other vitals unremarkable > continue to monitor   No clear signs or symptoms of infection or VTE but will continue to monitor  Monitor CBC intermittently

## 2022-11-26 NOTE — ASSESSMENT & PLAN NOTE
Internal medicine consulted and co-management with their service  Monitor vitals with and without activity; monitor for orthostasis  Monitor hemoglobin, electrolytes, kidney function, hydration status   Current meds: lisinopril 10mg qday per IM

## 2022-11-26 NOTE — ASSESSMENT & PLAN NOTE
SCDs, ambulation (will be on aspirin 81mg qday)    - Can stop aspirin 325mg qday per spine sx   11/25 Venous doppler negative for VTE

## 2022-11-26 NOTE — PROGRESS NOTES
11/26/22 1500   Pain Assessment   Pain Assessment Tool 0-10   Pain Score 7  (but no spasms)   Restrictions/Precautions   Precautions Pain;Spinal precautions; Fall Risk   Cognition   Overall Cognitive Status WFL   Subjective   Subjective "I feel like i can do more now, i'm just sleepy"   Sit to Lying   Type of Assistance Needed Incidental touching   Sit to Lying CARE Score 4   Lying to Sitting on Side of Bed   Type of Assistance Needed Incidental touching   Lying to Sitting on Side of Bed CARE Score 4   Sit to Stand   Type of Assistance Needed Physical assistance; Adaptive equipment   Physical Assistance Level 25% or less   Comment RW for balance   Sit to Stand CARE Score 3   Bed-Chair Transfer   Type of Assistance Needed Physical assistance; Adaptive equipment   Physical Assistance Level 25% or less   Comment with RW   Chair/Bed-to-Chair Transfer CARE Score 3   Therapeutic Interventions   Strengthening Seated LAQ 3 x 10 with 5 sc holds; Standing hIp flex, ABd & Ext 2 x 10   Assessment   Treatment Assessment Pt seen this afternoon to attempt mobility and there ex again  Medications ajusted by MD, alowing pt to participate much better  Per RN had Gabapentin; Robaxin and Oxy 1 hr prior  Pt sleepy, but no c/o being lightheaded or dizzy and ableto follow commands well  Tolerated standing x 5 min while performing leg ex's  Tolerated sitting EOB x 10 min while performing leg ex's and for dressing change by RN  Will continue to progress as tolerated     Recommendation   PT Discharge Recommendation Home with outpatient rehabilitation   Equipment Recommended Walker   PT Therapy Minutes   PT Time In 1500   PT Time Out 1530   PT Total Time (minutes) 30   PT Mode of treatment - Individual (minutes) 30   PT Mode of treatment - Concurrent (minutes) 0   PT Mode of treatment - Group (minutes) 0   PT Mode of treatment - Co-treat (minutes) 0   PT Mode of Treatment - Total time(minutes) 30 minutes   PT Cumulative Minutes 90   Therapy Time missed   Time missed?  No

## 2022-11-26 NOTE — PCC NURSING
Laura Marie is a 78 y o  male with a PMH of HTN and HLD who originally presented to [facility] for an elective removal of hardware L1-L3, posterior thoracic/lumbar instrumentation from T8 to pelvis, thoracic osteotomy T11/T12, thoracic laminotomy T8/T9, T9/T10, and T10/T11, post thoracic fusion T7-L1, a revision instrumentation of T8 to pelvis performed by Dr Andrea León  Postoperatively patient had a PCA pump which was discontinued on 11/23  Patient was noted to have bilateral lower extremity edema, venous duplex was completed and was negative  Patient is to continue aspirin 325 mg daily for DVT prophylaxis, continue to wear TLSO brace when out of bed, and will require 2 week follow-up with repeat thoracic lumbar spine x-rays  Admitted to [address] [place] [address]  11/25/22  HTN managed with Lisinopril 10 mg daily  HLD managed with Pravastatin 10mg daily S/p lumbar spinal fusion managed with ASA 325mg daily for DVT ppx  TLSO brace OOB  Neurovascular checks every shift  Adequate pain control  Pain managed with Oxy IR 5mg q4h PRN for breakthrough pain, Dilaudid 3mg every 4 hrs PRN for pain, Baclofen 5 mg TID and Gabapentin 300 mg BID and 400 mg @ HS  We will monitor pt's vital signs & lab results  Pt will have adequate pain control to fully participate in therapies  Pt will be educated on importance of T/R & off loading while in bed/chair to prevent pressure injury  Pt educated for thoracic spinal precaution  Pt will have safe transfers with the use of call bell & hourly rounding to prevent falls  Pt will be educated on energy conservation & encouraged independence with ADL's

## 2022-11-26 NOTE — TREATMENT PLAN
Individualized Plan of [facility]  78 y o  male MRN: [id number]  Unit/Bed#: -37 Encounter: [id number]     PATIENT INFORMATION  ADMISSION DATE: 11/25/2022  2:14 PM [facility] CATEGORY:Orthopedic Disorders:  08 9  Other Orthopedic Flat Back syndrome and scoliosis   ADMISSION DIAGNOSIS:  Scoliosis and Flatback syndrome s/p thoracic and lumbar revision and fusion sx    EXPECTED LOS: 10 days     MEDICAL/FUNCTIONAL PROGNOSIS  Pre-admit screens and post-admit evaluations reviewed and are consistent  Based on my assessment of the patient's medical conditions and current functional status, the prognosis for attaining medical and functional goals or the IRF stay is:  Good  Patient is appropriate for acute rehabilitation  Medical Goals:   Patient will be medically stable for discharge back to community setting upon completion or acute rehab program and patient/family will be able to manage medical conditions and comorbid conditions with medications and appropriate follow up upon completion of rehab program       Cardiopulmonary function: Ensure cardiopulmonary stability and optimize cardiopulmonary function not only at rest but with activity as patient's activity level significantly increases in acute rehab compared with prior to transfer in preparation for safe discharge from [facility]  Must closely and frequently monitor blood pressure and HR to ensure adequate cardiac output during ADLs and ambulation as patient is at increased risk for orthostatic hypotension/syncope and potential injury if not monitored for and managed adequately  Blood pressure management:    Frequent monitoring of blood pressure with appropriate adjustments in blood pressure medication management to optimize blood pressure control and prevent/limit renal complications  Monitoring impact of blood pressure and side-effects of blood pressure medications at rest and with activity    Pain management:  Pain will improve with frequent evaluation of pain, careful adjustments in medications, frequent re-evaluation of patient's pain and medical/neurologic status to ensure optimal pain control, avoidance of potential serious and even life-threatening side-effects and drug interactions, as well as weaning pain medications as soon as possible to decrease risk of short and long-term use  Orthopedic Disorder:  Back syndrome, scoliosis, status post extensive thoracic lumbar revision and fusion surgery causing impaired mobility, ADLs, and gait:  intensive skilled therapies with physical therapy and occupational therapy with close oversight and management by rehab specialized physician in acute rehabilitation setting to most expeditiously and effectively improve functional mobility, transfers, upper and lower body strengthening, conditioning, balance, and gait training with appropriate assistive device  Patient will have optimal supervision and management of patient's underlying orthopedic disorder with specialized rehabilitation physician during this period of recovery to ensure most expeditious and optimal recovery with decreased risks of fall/injury and other complications including acute worsening of ortho disorder, decrease risk of VTE, PNA, and skin ulceration  Inpatient rehabilitation education/teaching: To be provided to patient and typically family/caregiver (if able to be identified) by all skilled therapists, rehab nursing, case management, and rehab specialized physician to ensure optimal recovery and decrease risks of complications in both acute rehabilitation setting as well as after discharge  Anxiety: Patient's anxiety and it's impact on therapy participation and functional recovery will improve during course with supportive counseling, relaxation/breathing techniques and if necessary medication management    Requires frequent re-assessment and close management to ensure anxiety/depression management during acute rehab course with planning for appropriate outpatient management to ensure optimal mental health and functional recovery  Bowel dysfunction: Appropriate bowel management with appropriate toileting program from rehab nursing and staff with oversight management by rehabilitation physicians which include adjustments in medications to optimize appropriate bowel function and prevent/decrease risk of constipation and bowel obstruction  Obesity:  Close monitoring of nutrition status, nutrition specialist with adjustments in diet   on appropriate short and long term nutrition and activity  Obesity increases complexity of patient's overall condition and causes unique challenges during this part of patient's recovery process  Supervise and if necessary make adjustments in rehab nursing care and skilled therapy care to ensure appropriate toileting, bed mobility, other ADLs, and ambulation to decrease risk of falls/injuries, VTE, skin breakdown/ulceration and optimize functional recovery  Skin management: Increased risk of skin wounds/breakdown/rashes due to recent immobility and co-morbidities  Appropriate skin checks from nursing and as needed physician  Frequent turning, application of appropriate barrier creams/dressings, cushions  Patient/caregiver education  Optimal bowel/bladder hygiene and mobilization  ANTICIPATED DISCHARGE DISPOSITION AND SERVICES  COMMUNITY SETTING:    Previous community setting  ANTICIPATED FOLLOW-UP SERVICE:   Outpatient Therapy PT, OT     DISCIPLINE SPECIFIC PLANS:  Required Disciplines & Services: PT, OT, Rehabilitation Physician, Rehabillitation Nursing, Case Management, Dietary    REQUIRED THERAPY (expected):   Therapy Hours per Day Days per Week Tx Days (Days in ARF)   Physical Therapy 1 5 5 10   Occupational Therapy 1 5 5 10   NOTE: Additional therapy time(s) may be added as appropriate to meet patient needs and to achieve functional goals     ANTICIPATED FUNCTIONAL OUTCOMES:  ADL: Patient will be largely modified independent with ADLs except possible bathing and LB dressing which may need some assist   Bladder/Bowel: Patient will be modified independent with bladder/bowel management upon completion of rehab program   Transfers: Patient will be modified independent with transfers upon completion of rehab program   Locomotion: Patient will be at or near modified independent with locomotion (wheelchair or ambulation) upon completion of rehab program     DISCHARGE PLANNING NEEDS  Equipment needs: To be determined at team conference  REHAB ANTICIPATED PARTICIPATION RESTRICTIONS:  Uncertain at this time  To be determined closer to discharge

## 2022-11-26 NOTE — PROGRESS NOTES
PM&R PROGRESS NOTE:  Jeny Okeefe 78 y o  male MRN: [id number]  Unit/Bed#: [facility] 131-59 Encounter: [id number]        Rehabilitation Diagnosis: Impairment of mobility, safety and Activities of Daily Living (ADLs) due to Orthopedic Disorders:    Other Orthopedic Scoliosis and Flatback syndrome  Etiologic Diagnosis:  Scoliosis and Flatback syndrome s/p thoracic and lumbar revision and fusion sx    SUBJECTIVE: Patient seen face to face  Complaining this morning of severe pain and discussed new pain regimen with patient with his agreement  Patient was refusing to participate at one point, but with increased regimen was able to have a good afternoon session  ASSESSMENT: Stable, progressing      PLAN:    Rehabilitation  • Functional deficits: Impaired mobility and self care, lumbar precautions  • Continue current rehabilitation plan of care to maximize function  • Functional update:   liana ann in progress  • Estimated Discharge: TBD      Difficulty coping with pain  Assessment & Plan  Supportive counseling  Consider Psychology consult while in ARC   Counseled on and continue to encourage deep breathing/relaxation/behavioral management techniques:     Deep breathin seconds in, 5 seconds out, 5 times per hour when awake and PRN when experiencing pain or anxiety    Avoid holding breath and tightening muscles and instead breathe slowly and deeply      Constipation  Assessment & Plan  LBM prior to admission   - Hold standing iron supplement for now   - Dulcolax supp x1   - Daily miralax for now  - Colace/Senna BID  - PRN bowel regimen (Miralax PRN/Dulcolax supp PRN)  - monitor for signs and symptoms of obstruction/ileus > not currently; hold stimulant lax if occurs  - Limiting constipating medications if possible  - Ensure adequate hydration       Leukocytosis  Assessment & Plan  Trending down   Internal medicine consult and management during ARC course  Patient afebrile, other vitals unremarkable > continue to monitor   No clear signs or symptoms of infection or VTE but will continue to monitor  Monitor CBC intermittently       Elevated AST (SGOT)  Assessment & Plan  Mild at 66 on 11/25, chronic per patient  Hx of hepatitis and teenager  Patient would like to avoid standing APAP    At risk for venous thromboembolism (VTE)  Assessment & Plan  SCDs, ambulation, and aspirin 325mg qday per sx  11/25 Venous doppler negative for VTE       Pain  Assessment & Plan  Significant on admission  Strength intact, has some chronic pain in legs worse with ambulating   Sub-optimally controlled; patient was on Norco-APAP combo but would like to avoid standing APAP    Change to APAP 650mg Q6H PRN   Increase Oxycodone IR to  7 5-15mg Q4H PRN on 11/26/22 (has had 1 dose with improvement)  Robaxin 750mg Q6H for spasms   Gabapentin 200mg BID for leg and adjuvant pain control   Per d/c instructions - - Do not take NSAIDs (Advil, Aleve, Ibuprofen, Motrin, Naproxen, etc ) for 3 months following your surgery  This may impede the healing of your fusion  Monitor for oversedation, AMS/delirium, and respiratory depression   If being administered - hold opiates, muscle relaxants, benzodiazepines, and gabapentin for oversedation, AMS, or RR<12  Counseled on and continue to encourage deep breathing/relaxation/behavioral pain management techniques      Hypertension  Assessment & Plan  Internal medicine consulted and co-management with their service  Monitor vitals with and without activity; monitor for orthostasis  Monitor hemoglobin, electrolytes, kidney function, hydration status   Current meds: lisinopril       Hyperlipidemia  Assessment & Plan  Statin     * Status post lumbar spinal fusion  Assessment & Plan  S/P removal of hardware L1-L3  Posterior thoracic/ lumbar instrumentation from T8 to pelvis  Thoracic osteotomy T11/12  Thoracic laminotomy T8/9, T9/10 and T10/11  Posterior thoracic fusion T7-L1    Revision instrumentation T8 to pelvis by Dr Shon Holley on 11/22  Monitor incision (14 days post-sx Tue 12/6) - healing well on admission  - Follow-up at 2 weeks postop for suture removal and xrays of thoracolumbar spine  Can shower now with dressing removed but do not submerge based on dc instructions   - Do not take NSAIDs (Advil, Aleve, Ibuprofen, Motrin, Naproxen, etc ) for 3 months following your surgery  This may impede the healing of your fusion  Thoracic/lumbar spine precautions   Custom-molded LSO brace from [employer] from pre-op  If not, please fit patient with [place] LSO brace  Pt may don brace in a sitting position and should have brace on at all times when ambulating and working with PT  Follow-up with spine surgery after d/c from [facility] and if needed during ARC course    P/w significant back pain somatic back with spasms/tightness  Optimal pain mgmt  - Recommend acute comprehensive interdisciplinary inpatient rehabilitation to include intensive skilled therapies (PT, OT) as outlined with oversight and management by rehabilitation physician as well as inpatient rehab level nursing, case management and weekly interdisciplinary team meetings  Appreciate IM consultants medical co-management  Labs, medications, and imaging personally reviewed  ROS:  A ten point review of systems was completed on 11/26/22 and pertinent positives are listed in subjective section  All other systems reviewed were negative  OBJECTIVE:   /78 (BP Location: Right arm)   Pulse 83   Temp 99 °F (37 2 °C) (Tympanic)   Resp 20   Ht 5' 10" (1 778 m)   Wt 98 8 kg (217 lb 12 8 oz)   SpO2 92%   BMI 31 25 kg/m²     Physical Exam  Vitals and nursing note reviewed  Constitutional:       General: He is not in acute distress  HENT:      Head: Normocephalic and atraumatic        Nose: Nose normal       Mouth/Throat:      Mouth: Mucous membranes are moist    Eyes:      Conjunctiva/sclera: Conjunctivae normal    Cardiovascular:      Rate and Rhythm: Normal rate and regular rhythm  Pulses: Normal pulses  Pulmonary:      Effort: Pulmonary effort is normal       Breath sounds: Normal breath sounds  No wheezing or rales  Abdominal:      General: Bowel sounds are normal  There is no distension  Palpations: Abdomen is soft  Tenderness: There is no abdominal tenderness  Musculoskeletal:         General: No swelling  Cervical back: Neck supple  Skin:     General: Skin is warm  Comments: Incision clean and intact - no drainage   Neurological:      Mental Status: He is alert and oriented to person, place, and time     Psychiatric:         Mood and Affect: Mood normal           Lab Results   Component Value Date    WBC 10 18 (H) 11/25/2022    HGB 12 2 11/25/2022    HCT 37 5 11/25/2022    MCV 88 11/25/2022     11/25/2022     Lab Results   Component Value Date    SODIUM 135 11/25/2022    K 4 2 11/25/2022     11/25/2022    CO2 27 11/25/2022    BUN 17 11/25/2022    CREATININE 0 76 11/25/2022    GLUC 111 11/25/2022    CALCIUM 8 6 11/25/2022     Lab Results   Component Value Date    INR 1 02 05/24/2022    INR 1 00 08/16/2017    PROTIME 13 0 05/24/2022    PROTIME 10 5 08/16/2017           Current Facility-Administered Medications:   •  acetaminophen (TYLENOL) tablet 650 mg, 650 mg, Oral, Q6H PRN, Kemi Shipley MD  •  aspirin tablet 325 mg, 325 mg, Oral, Daily, Kemi Shipley MD, 325 mg at 11/26/22 9239  •  bisacodyl (DULCOLAX) rectal suppository 10 mg, 10 mg, Rectal, Daily PRN, Kemi Shipley MD, 10 mg at 11/25/22 2213  •  docusate sodium (COLACE) capsule 100 mg, 100 mg, Oral, BID, Kemi Shipley MD, 100 mg at 11/26/22 9277  •  gabapentin (NEURONTIN) capsule 200 mg, 200 mg, Oral, BID, Alfonso Graham MD, 200 mg at 11/26/22 2507  •  lisinopril (ZESTRIL) tablet 5 mg, 5 mg, Oral, Daily, Kemi Shipley MD, 5 mg at 11/26/22 6800  •  methocarbamol (ROBAXIN) tablet 750 mg, 750 mg, Oral, Q6H [facility], Alfonso Graham MD, 750 mg at 11/26/22 1206  •  naloxone (NARCAN) 0 04 mg/mL syringe 0 04 mg, 0 04 mg, Intravenous, Q1MIN PRN, Juan F Melgar MD  •  ondansetron (ZOFRAN-ODT) dispersible tablet 4 mg, 4 mg, Oral, Q8H PRN, Juan F Melgar MD  •  oxyCODONE (ROXICODONE) IR tablet 15 mg, 15 mg, Oral, Q4H PRN, Audrey Braun MD, 15 mg at 11/26/22 1428  •  oxyCODONE (ROXICODONE) split tablet 7 5 mg, 7 5 mg, Oral, Q4H PRN, Audrey Braun MD  •  polyethylene glycol (MIRALAX) packet 17 g, 17 g, Oral, Daily PRN, Juan F Melgar MD  •  polyethylene glycol (MIRALAX) packet 17 g, 17 g, Oral, Daily, Juan F Melgar MD, 17 g at 11/25/22 1745  •  pravastatin (PRAVACHOL) tablet 10 mg, 10 mg, Oral, Daily With Dinner, Juan F Melgar MD, 10 mg at 11/25/22 1745  •  senna (SENOKOT) tablet 8 6 mg, 1 tablet, Oral, BID, Juan F Melgar MD, 8 6 mg at 11/26/22 6755    Past Medical History:   Diagnosis Date   • Ambulates with cane     "long distance"   • Arthritis    • Back pain    • Chronic pain disorder    • Dental crowns present    • Exercise involving walking     daily -short walks   • History of hepatitis C     per pt "went away on its own" age 13"   • History of transfusion    • Hyperlipidemia    • Hypertension    • Leg pain     and foot pain   • Limb alert care status     per pt NO IV's LEFT UPPER ARM due to old injury   • Risk for falls    • Spinal stenosis    • Stroke [facility])     per pt in 1996-and no lasting problems   • Wears glasses     readers       Patient Active Problem List    Diagnosis Date Noted   • Constipation 11/26/2022   • Difficulty coping with pain 11/26/2022   • Pain 11/25/2022   • At risk for venous thromboembolism (VTE) 11/25/2022   • Elevated AST (SGOT) 11/25/2022   • Leukocytosis 11/25/2022   • Back pain 11/22/2022   • Chronic pain 11/22/2022   • Flatback syndrome 11/22/2022   • Status post lumbar spinal fusion 05/25/2022   • Hyperlipidemia    • Hypertension           Audrey Braun MD    Total time spent:  35 minutes with more than 50% spent counseling/coordinating care  Counseling includes discussion with patient re: progress and discussion with patient of his/her current medical/functional state/information  Coordination of patient's care was performed in conjunction with consulting services  Time invested included review of patient's labs, vitals, and management of their comorbidities with continued monitoring  The care of the patient was extensively discussed and appropriate treatment plan was formulated unique for this patient  ** Please Note:  voice to text software may have been used in the creation of this document   Although proof errors in transcription or interpretation are a potential of such software**

## 2022-11-26 NOTE — PROGRESS NOTES
OT Long-Term Goals       11/26/22 0700   Rehab Team Goals   ADL Team Goal Patient will require supervision with ADLs with least restrictive device upon completion of rehab program   Rehab Team Interventions   OT Interventions Self Care;Home Management; Therapeutic Exercise;Community Reintegration; Energy Conservation;Patient/Family Education   Eating Goal   Eating Goal 06  Independent - Patient completes the activity by him/herself with no assistance from a helper  Status Ongoing; Target goal - two weeks   Interventions Optimal Position   Grooming Goal   Oral Hygiene Goal 06  Independent - Patient completes the activity by him/herself with no assistance from a helper  Task Wash/Dry Face;Wash/Dry Hands;Brush Teeth;Comb Hair   Status Ongoing; Target goal - two weeks   Tub/Shower Transfer Goal   Method Shower Stall  (SUP with LRAD)   Assist Device Seat with Back;Grab Bar   Status Ongoing; Target goal - two weeks   Interventions ADL Training;Assistive Device; Neuromuscular Education   Bathing Goal   Shower/bathe self Goal 04  Supervision or touching assistance- [place] provides VERBAL CUES or supervision throughout activity  Environment Shower   Status Ongoing; Target goal - two weeks   Intervention ADL Training;Assistive Device; Neuromuscular Education; Therapeutic Exercise   Upper Body Dressing Goal   Upper body dressing Goal 06  Independent - Patient completes the activity by him/herself with no assistance from a helper  Task Upper Body;Arms in/out; Over Head   Environment Seated   Status Ongoing; Target goal - two weeks   Intervention Balance Work; Therapeutic Exercise; Tolerance Work   Lower Body Dressing Goal   Lower body dressing Goal 06  Independent - Patient completes the activity by him/herself with no assistance from a helper  Putting on/taking off footwear Goal 06  Independent - Patient completes the activity by him/herself with no assistance from a helper  Task Socks;Pants; Undergarment   Adaptive Equipment (LHAE PRN)   Environment Seated;Standing   Status Ongoing; Target goal - two weeks   Intervention Assistive Device;Balance Work; Therapeutic Exercise; Tolerance Work   Toileting Transfer Goal   Toilet transfer Goal 06  Independent - Patient completes the activity by him/herself with no assistance from a helper  Assistive Device   (LRAD)   Status Ongoing; Target goal - two weeks   Intervention ADL Training;Balance Work;Assistive Device   Toileting Goal   Toileting hygiene Goal 06  Independent - Patient completes the activity by him/herself with no assistance from a helper  Task Pants Up;Pants Down;Hygiene   Status Ongoing; Target goal - two weeks   Intervention ADL Training;Balance Work;Assistive Device   Meal Prep and Kitchen Mobility   Assist Level Supervision   Status Ongoing; Target goal - two weeks   Medication Management   Assist Level Independent   Status Ongoing; Target goal - two weeks   Laundry   Assist Level Supervision   Status Ongoing; Target goal - two weeks

## 2022-11-26 NOTE — PROGRESS NOTES
Physical Therapy Initial Evaluation       11/26/22 1000   Patient Data   Rehab Impairment Impairment of mobility, safety and Activities of Daily Living (ADLs) due to Orthopedic Disorders:  08 9  Other Orthopedic scoliosis and Flatback syndrome   Etiologic Diagnosis Scoliosis and Flatback syndrome   Date of Onset 11/22/22   Support System   Name Kelin Hernandez   Relationship Spouse   Able to provide 24 hour supervision No   Able to provide physical help? Yes   Home Setup   Type of Home Multi Level   Method of Entry Curb  (x2)   Number of Stairs 2   Number of Stairs in Home 12   In Home Hand Rail Bilateral   First Floor Setup Available Yes  (has been sleeping on couch)   Available Equipment Roller Walker;Single [employer]; Shower Chair;Bedside Commode   Prior Level of Function   Indoor-Mobility (Ambulation) 3  Independent - Patient completed the activities by him/herself, with or without an assistive device, with no assistance from a helper  Stairs 3  Independent - Patient completed the activities by him/herself, with or without an assistive device, with no assistance from a helper   (for in/out of home only)   Functional Cognition 3  Independent - Patient completed the activities by him/herself, with or without an assistive device, with no assistance from a helper  Prior Device Used JEREMIAH Jeffrey  ([place] mainly in community)   Falls in the Last Year   Number of falls in the past 12 months 0   Patient Preference   Nickname (Patient Preference) Carlos   Psychosocial   Psychosocial (WDL) WDL   Restrictions/Precautions   Precautions Spinal precautions; Fall Risk;Pain   Pain Assessment   Pain Assessment Tool 0-10   Pain Score 7   Pain Location/Orientation Orientation: Bilateral;Location: Back   Pain Onset/Description Frequency: Constant/Continuous  (decreased to 3/10 when sidelying hugging pillows)   Hospital Pain Intervention(s) Rest;Repositioned   Transfer Bed/Chair/Wheelchair   Positioning Concerns Skin Integrity; Other  (Spinal precautions)   Limitations Noted In LE Strength;Balance;Pain Management; Endurance   Adaptive Equipment Roller Walker   Findings (S)  should use pillow to hug with arms and between knees for proper spinal alignment   Type of Assistance Needed Physical assistance; Adaptive equipment   Physical Assistance Level 26%-50%   Comment SPT with RW, relying heavily on UE support due to pain in back and LE weakness   Chair/Bed-to-Chair Transfer CARE Score 3   Roll Left and Right   Type of Assistance Needed Physical assistance   Physical Assistance Level 25% or less   Comment needs extar time, Awith clearing hips, poor tolerance for lying on his back flat at this time   Roll Left and Right CARE Score 3   Sit to Lying   Type of Assistance Needed Incidental touching   Physical Assistance Level No physical assistance   Comment extra time given, performs log roll himself   Sit to Lying CARE Score 4   Lying to Sitting on Side of Bed   Type of Assistance Needed Incidental touching   Physical Assistance Level No physical assistance   Comment extra time given, performs log roll himself   Lying to Sitting on Side of Bed CARE Score 4   Sit to Stand   Type of Assistance Needed Physical assistance; Adaptive equipment   Physical Assistance Level 26%-50%   Comment Needs RW for balance and UE support upon standing   Sit to Stand CARE Score 3   Picking Up Object   Comment (S)  poor standing tolerance on eval, will need to assess   Reason if not Attempted Safety concerns   Picking Up Object CARE Score 88   Car Transfer   Type of Assistance Needed Physical assistance; Adaptive equipment   Physical Assistance Level 26%-50%   Comment Simulated setup   Car Transfer CARE Score 3   Ambulation   Primary Mode of Locomotion Prior to Admission Walk   Distance Walked (feet) 10 ft   Assist Device Roller Walker   Gait Pattern Decreased foot clearance; Slow Leisa; Step to; Forward Flexion   Limitations Noted In Strength; Heel Strike; Endurance;Balance;Posture Provided Assistance with: Balance;Trunk Support   Findings Reporting B LE weakness, increased Pain from side lying in bed to stting to standing   Walk 10 Feet   Type of Assistance Needed Physical assistance; Adaptive equipment   Physical Assistance Level 26%-50%   Walk 10 Feet CARE Score 3   Walk 50 Feet with Two Turns   Comment Limted by B LE weakness and back Pain   Reason if not Attempted Safety concerns   Walk 50 Feet with Two Turns CARE Score 88   Walk 150 Feet   Comment Limted by B LE weakness and back Pain   Reason if not Attempted Safety concerns   Walk 150 Feet CARE Score 88   Walking 10 Feet on Uneven Surfaces   Comment Limted by B LE weakness and back Pain   Reason if not Attempted Safety concerns   Walking 10 Feet on Uneven Surfaces CARE Score 88   Wheel 50 Feet with Two Turns   Reason if not Attempted Activity not applicable   Wheel 50 Feet with Two Turns CARE Score 9   Wheel 150 Feet   Reason if not Attempted Activity not applicable   Wheel 232 Feet CARE Score 9   Curb or Single Stair   Comment Poor tolerance on eval for sitting or standing   Reason if not Attempted Safety concerns   1 Step (Curb) CARE Score 88   4 Steps   Comment Poor tolerance on eval for sitting or standing   Reason if not Attempted Safety concerns   4 Steps CARE Score 88   12 Steps   Comment (S)  has been staying on 1st floor for months, but ultimate goal is to be able to wlak up the stairs if strong enough   Reason if not Attempted Safety concerns   12 Steps CARE Score 88   Comprehension   QI: Comprehension 4  Undestands: Clear comprehension without cues or repetitions   Expression   QI: Expression 4   Express complex messages without difficulty and with speech that is clear and easy to [place]   RLE Assessment   RLE Assessment X  (tight HS, Hip flexors and Quads)   Strength RLE   R Hip Flexion 4-/5   R Hip Extension 3/5   R Hip ABduction 3+/5   R Hip ADduction 4-/5   R Knee Flexion 4-/5   R Knee Extension 4-/5   R Ankle Dorsiflexion 4/5   R Ankle Plantar Flexion 4/5   LLE Assessment   LLE Assessment X   Strength LLE   L Hip Flexion 4/5   L Hip Extension 3+/5   L Hip ABduction 3+/5   L Hip ADduction 4/5   L Knee Flexion 4/5   L Knee Extension 4/5   L Ankle Dorsiflexion 4+/5   L Ankle Plantar Flexion 4+/5   Coordination   Movements are Fluid and Coordinated 1   Sensation   Light Touch No apparent deficits   Cognition   Overall Cognitive Status WFL   Therapeutic Exercise   Therapeutic Exercise/Activity In bed sidelying(pt's position of prefernce due to pain) performd passive Hip flexor/Quad stretch B LE; Clam shells; AA Hip abd & ext  Reviewed use of pillows in bed for propr support and spinal alignment   Discharge Information   Patient's Discharge Plan To return home with his wife   Patient's Rehab Expectations To get stronger, have less pain and be as Ind as possibe again   Barriers to Discharge Home Pain; Safety Considerations;Decreased Endurance;Decreased Strength;Limited Family Support   Impressions Pt is a 77 y/o male admitted to [facility] to address decline in function after having extensive back surgery from T7-pelivis  Pt has Back Pack TLSO when OOB, spinal precautions  Pt has had previous back surgery and rehab and is use dto the routine, knows his precautions and performs log roll very well  Pt limited currently by pain/spasm in back  Very poor tolerance for lying flat on his back; sitting or standing currently, ess than 1 minute  Requires use of RW for mobility for U E support du eto weakness B LE and postural muscles  Had used SPC at home prior  Pt also had been staying on 1st floor at home and sleeping on the couch  Very good rehab candidate to make functional progress and return home at Mod I level witH RW for mobility  Goal will be for FF of stairs to access 2nd floor if he is able to make progress     PT Therapy Minutes   PT Time In 1000   PT Time Out 1100   PT Total Time (minutes) 60   PT Mode of treatment - Individual (minutes) 60   PT Mode of treatment - Concurrent (minutes) 0   PT Mode of treatment - Group (minutes) 0   PT Mode of treatment - Co-treat (minutes) 0   PT Mode of Treatment - Total time(minutes) 60 minutes   PT Cumulative Minutes 60   Cumulative Minutes   Cumulative therapy minutes 60

## 2022-11-26 NOTE — ASSESSMENT & PLAN NOTE
Improved   Supportive counseling  Psychology consult while in [facility]   Adjustment Disorder with Mixed Emotional Features  RECOMMENDATIONS:   I will follow Mr  Severiano Civil during his stay to provide the following interventions:    • Supportive psychotherapy, utilizing CBT and mindfulness strategies  • DBT distress tolerance techniques  to improve coping and mood  • Meditation and relaxation training  Counseled on and continue to encourage deep breathing/relaxation/behavioral management techniques:     Deep breathin seconds in, 5 seconds out, 5 times per hour when awake and PRN when experiencing pain or anxiety    Avoid holding breath and tightening muscles and instead breathe slowly and deeply

## 2022-11-26 NOTE — PROGRESS NOTES
OT Initial Evaluation       11/26/22 0700   Patient Data   Rehab Impairment Impairment of mobility, safety and Activities of Daily Living (ADLs) due to Orthopedic Disorders:  08 9  Other Orthopedic scoliosis and Flatback syndrome   Etiologic Diagnosis Scoliosis and Flatback syndrome   Date of Onset 11/22/22   Support System   Name Yulissa Mattheww   Relationship spouse   Able to provide 24 hour supervision Yes   Able to provide physical help? Yes   Home Setup   Type of Home Multi Level   Method of Entry Curb  (x2)   Number of Stairs 2   Number of Stairs in Home 12   In Home Hand Rail Bilateral   First Floor Bathroom Full; Shower;Grab Bars   First Floor Bathroom Accessibility Grab bars by toilet; Shower chair;Raised toilet seat   First Floor Setup Available Yes  (sleeps on couch)   Available Equipment [place]; Shower Chair   Baseline Information   Vocation   (previously worked as respiratory therapist; on disability x10yrs)   Transportation    Prior Device(s) Used   (without AD inside home, used SPC in community)   Prior IADL Participation   Money Management Identify Money;Estimate Costs;Estimate Change;Combine Bills;Manage Checkbook   Meal Preparation Full Participation  (shares responsibilities with wife)   Laundry Full Participation  (shares responsibilities with wife)   Home Cleaning Partial Participation  (shares responsibilities with wife)   Prior Level of Function   Self-Care 3  Independent - Patient completed the activities by him/herself, with or without an assistive device, with no assistance from a helper  Indoor-Mobility (Ambulation) 3  Independent - Patient completed the activities by him/herself, with or without an assistive device, with no assistance from a helper  Stairs 3  Independent - Patient completed the activities by him/herself, with or without an assistive device, with no assistance from a helper  Functional Cognition 3   Independent - Patient completed the activities by him/herself, with or without an assistive device, with no assistance from a helper  Prior Device Used Z  None of the above  (used SPC in community)   Falls in the Last Year   Number of falls in the past 12 months 0   Patient Preference   Nickname (Patient Preference) Carlos   Psychosocial   Psychosocial (WDL) WDL   Restrictions/Precautions   Precautions Spinal precautions; Fall Risk;Pain  (ortho BP)   Braces or Orthoses (S)  TLSO  (wear OOB; don sitting EOB)   Pain Assessment   Pain Assessment Tool 0-10   Pain Score 8  (8/10 sidelying at rest; 10/10 in sitting)   Pain Location/Orientation Location: Back   Pain Onset/Description Onset: Ongoing;Frequency: Constant/Continuous; Descriptor: [facility] Pain Intervention(s) Repositioned; Rest  (pt received pain meds prior to session)   Eating Assessment   Type of Assistance Needed Independent   Physical Assistance Level No physical assistance   Eating CARE Score 6   Oral Hygiene   Type of Assistance Needed Physical assistance   Physical Assistance Level Total assistance   Comment Completed in stance PTA; unable to complete in stance 2* pain, would require Total A with task  Pt completing task sitting EOB with CGA   Oral Hygiene CARE Score 1   Grooming   Able To Wash/Dry Face;Brush/Clean Teeth   Findings CGA/SUP sitting up in bed and EOB sitting   Tub/Shower Transfer   Reason Not Assessed Safety;Sponge Bath  (increased back pain)   Shower/Bathe Self   Type of Assistance Needed Physical assistance   Physical Assistance Level 51%-75%   Comment Pt unable to tolerate task seated EOB this session 2* pain   Pt completing task sidelying and supine in bed with maxA   Shower/Bathe Self CARE Score 2   Dressing/Undressing Clothing   Remove UB Clothes Pullover Shirt  (TLSO)   Don UB Clothes Pullover Shirt  (TLSO)   Type of Assistance Needed Physical assistance   Physical Assistance Level Total assistance   Comment Pt unable to tolerate sitting EOB without UE support 2* pain, requiring TA to doff/don tshirt and TLSO   Upper Body Dressing CARE Score 1   Remove LB Clothes Undergarment;Pants;Socks   Don LB Clothes Pants;Socks   Type of Assistance Needed Physical assistance   Physical Assistance Level Total assistance   Comment Pt requiring TA to doff/don brief, pants bed level; unable to tolerate seated EOB 2* pain   Lower Body Dressing CARE Score 1   Putting On/Taking Off Footwear   Type of Assistance Needed Physical assistance   Physical Assistance Level Total assistance   Comment TA to doff/don socks, unable to attempt 2* pain   Putting On/Taking Off Footwear CARE Score 1   Kurt 78 TBA next session as able   Reason if not Attempted Environmental limitations  (activity did not occur this session)   Carlos Iyer 83 Score 10   Toilet Transfer   Reason if not Attempted Safety concerns  (unable to complete this session 2* increased pain)   Toilet Transfer CARE Score 88   Transfer Bed/Chair/Wheelchair   Reason if not Attempted Safety concerns  (unable to complete this session 2* increased pain)   Chair/Bed-to-Chair Transfer CARE Score 88   Roll Left and Right   Type of Assistance Needed Physical assistance   Physical Assistance Level 25% or less   Comment log roll with increased time, effort   Roll Left and Right CARE Score 3   Sit to Lying   Type of Assistance Needed Incidental touching   Physical Assistance Level No physical assistance   Comment log roll with increased time, effort   Sit to Lying CARE Score 4   Lying to Sitting on Side of Bed   Type of Assistance Needed Physical assistance   Physical Assistance Level 26%-50%   Comment log roll with modA required at trunk, increased pain   Lying to Sitting on Side of Bed CARE Score 3   Sit to Stand   Type of Assistance Needed Physical assistance   Physical Assistance Level 51%-75%   Comment without AD   Sit to Stand CARE Score 2   Comprehension   QI: Comprehension 4   Undestands: Clear comprehension without cues or repetitions   Expression   QI: Expression 4  Express complex messages without difficulty and with speech that is clear and easy to [place]   RUE Assessment   RUE Assessment WFL  (for ADL completion)   LUE Assessment   LUE Assessment WFL  (for ADL completion)   Sensation   Light Touch No apparent deficits   Cognition   Overall Cognitive Status WFL   Arousal/Participation Alert; Cooperative   Attention Within functional limits   Orientation Level Oriented X4   Memory Within functional limits   Following Commands Follows one step commands without difficulty   Vision   Vision Comments wears glasses for reading   Discharge [address] Retired/not working   Patient's Discharge Plan To return home with wife and son   Patient's Rehab Expectations To complete toileting independently   Barriers to Discharge Home Decreased Strength;Decreased Endurance;Pain; Safety Considerations   Impressions 60-year-old M with a h/o scoliosis and Flatback syndrome who is s/p removal of hardware L1-L3, posterior thoracic fusion T7-L1, thoracic osteotomy T11/12, revision instrumentation T8 to pelvis, and thoracic laminotomy T8-9, T9-10, and T10-11 on 11/22/22  He is WBAT and has a TLSO brace to be worn when OOB  Pt lives with wife and son in [facility] with first floor setup available, walk-in shower with shower chair, and raised toilet seat  Pt reports his son works for [employer] but his wife is retired and can provide [employer] upon d/c if needed  PTA pt completed B/IADLs independently including driving, cooking, laundry, med management, and completed functional mobility within his home without AD and in community with [facility]  Upon initial evaluation, pt most limited by back pain  Pt unable to tolerate positions other than sidelying in bed, and unable to tolerate more than 1-2min seated EOB due to pain  Unable to assess functional transfers or toileting this session due to pain, please assess next session as able   Pt currently requiring TotalAx1 with most ADL tasks 2* pain, completing bed mobility with modA, and sit->Stand with mod/maxA  Pt presenting with increased pain, decreased sitting/standing tolerance and balance, decreased endurance, decreased functional strength, and ROM limitations 2* spinal precautions that is impacting his ability to complete ADLs and functional transfers/mobility independently  Pt is functioning well below PLOF and would benefit from 2 week ELOS to achieve SUP/mod(I) goals     OT Therapy Minutes   OT Time In 0700   OT Time Out 0830   OT Total Time (minutes) 90   OT Mode of treatment - Individual (minutes) 90   OT Mode of treatment - Concurrent (minutes) 0   OT Mode of treatment - Group (minutes) 0   OT Mode of treatment - Co-treat (minutes) 0   OT Mode of Treatment - Total time(minutes) 90 minutes   OT Cumulative Minutes 90   Cumulative Minutes   Cumulative therapy minutes 90

## 2022-11-26 NOTE — ASSESSMENT & PLAN NOTE
Improved -   No s/s of oversedation/resp depression   Weaning opiates   D/c on Dilauidid 2-3mg max QID PRN without oxy disp#42 (2mg tabs)  Baclofen 5mg TID for spasms/spasticity (switched from Robaxin earlier) > helpful but patient wants to stop > will hold but I suspect he made need this in future   Gabapentin 300mg TID > patient would like to wean off and states he has plenty at home change to 300mg BID x5 days and then 300mg HS x5 days and off  [place] PDMP website checked and no concerning Rx's or Rx patterns noted    - Patient had tramadol 50mg 60 tabs filled 11/11 but that will not be strong enough initially but can transition back over coming weeks   - OP follow-up with prior pain mgmt provider     Per d/c instructions - - Do not take NSAIDs (Advil, Aleve, Ibuprofen, Motrin, Naproxen, etc ) for 3 months following your surgery  This may impede the healing of your fusion  Monitor for oversedation, AMS/delirium, and respiratory depression   If being administered - hold opiates, muscle relaxants, benzodiazepines, and gabapentin for oversedation, AMS, or RR<12    Counseled on and continue to encourage deep breathing/relaxation/behavioral pain management techniques

## 2022-11-26 NOTE — ASSESSMENT & PLAN NOTE
S/P removal of hardware L1-L3  Posterior thoracic/ lumbar instrumentation from T8 to pelvis  Thoracic osteotomy T11/12  Thoracic laminotomy T8/9, T9/10 and T10/11  Posterior thoracic fusion T7-L1  Revision instrumentation T8 to pelvis by Dr Cheryl Wells on 11/22  - p/w severe pain and impaired function > improving   - Incision healing well prior to d/c   - Pain improved and controlled on current regimen  - Strength BLE intact, function improved   - 12/7  Communicated with Dr Cheryl Wells - ok to follow-up in his office next week and obtain x-rays at that time; he was ok with stopping full dose aspirin 325mg qday at that time - called ortho office and they will call him back to schedule f/u next week   - 12/7 CG training completed   - OP PT   Xray 11/29: IMPRESSION: Interval postsurgical changes without evidence of hardware complication  No acute osseous abnormality   - Do not take NSAIDs (Advil, Aleve, Ibuprofen, Motrin, Naproxen, etc ) for 3 months following your surgery  This may impede the healing of your fusion  Thoracic/lumbar spine precautions   Custom-molded LSO brace from [facility] from pre-op  If not, please fit patient with [place] LSO brace  Pt may don brace in a sitting position and should have brace on at all times when ambulating and working with PT  Follow-up with spine surgery after d/c from [facility] and if needed during [facility] course    - Completed acute comprehensive interdisciplinary inpatient rehabilitation include intensive skilled therapies (PT, OT) as previously outlined with oversight and management by rehabilitation physician, inpatient rehab level nursing, case management and weekly interdisciplinary team meetings

## 2022-11-27 RX ORDER — HYDROMORPHONE HYDROCHLORIDE 2 MG/1
2 TABLET ORAL EVERY 4 HOURS PRN
Status: DISCONTINUED | OUTPATIENT
Start: 2022-11-27 | End: 2022-12-01

## 2022-11-27 RX ORDER — OXYCODONE HYDROCHLORIDE 5 MG/1
5 TABLET ORAL EVERY 4 HOURS PRN
Status: DISCONTINUED | OUTPATIENT
Start: 2022-11-27 | End: 2022-11-27

## 2022-11-27 RX ORDER — LIDOCAINE 50 MG/G
2 PATCH TOPICAL DAILY
Status: DISCONTINUED | OUTPATIENT
Start: 2022-11-27 | End: 2022-12-10 | Stop reason: HOSPADM

## 2022-11-27 RX ORDER — GABAPENTIN 300 MG/1
300 CAPSULE ORAL 3 TIMES DAILY
Status: DISCONTINUED | OUTPATIENT
Start: 2022-11-27 | End: 2022-11-29

## 2022-11-27 RX ORDER — ACETAMINOPHEN 325 MG/1
975 TABLET ORAL EVERY 8 HOURS SCHEDULED
Status: DISCONTINUED | OUTPATIENT
Start: 2022-11-27 | End: 2022-11-28

## 2022-11-27 RX ORDER — OXYCODONE HYDROCHLORIDE 5 MG/1
5 TABLET ORAL EVERY 4 HOURS PRN
Status: DISCONTINUED | OUTPATIENT
Start: 2022-11-27 | End: 2022-12-10 | Stop reason: HOSPADM

## 2022-11-27 RX ADMIN — ACETAMINOPHEN 975 MG: 325 TABLET ORAL at 13:56

## 2022-11-27 RX ADMIN — GABAPENTIN 300 MG: 300 CAPSULE ORAL at 16:44

## 2022-11-27 RX ADMIN — PRAVASTATIN SODIUM 10 MG: 20 TABLET ORAL at 16:44

## 2022-11-27 RX ADMIN — SENNOSIDES 8.6 MG: 8.6 TABLET, FILM COATED ORAL at 08:46

## 2022-11-27 RX ADMIN — METHOCARBAMOL 750 MG: 750 TABLET, FILM COATED ORAL at 23:58

## 2022-11-27 RX ADMIN — HYDROMORPHONE HYDROCHLORIDE 2 MG: 2 TABLET ORAL at 22:00

## 2022-11-27 RX ADMIN — LISINOPRIL 5 MG: 5 TABLET ORAL at 08:46

## 2022-11-27 RX ADMIN — OXYCODONE HYDROCHLORIDE 15 MG: 10 TABLET ORAL at 08:46

## 2022-11-27 RX ADMIN — HYDROMORPHONE HYDROCHLORIDE 2 MG: 2 TABLET ORAL at 17:52

## 2022-11-27 RX ADMIN — GABAPENTIN 200 MG: 100 CAPSULE ORAL at 08:46

## 2022-11-27 RX ADMIN — OXYCODONE HYDROCHLORIDE 15 MG: 10 TABLET ORAL at 04:43

## 2022-11-27 RX ADMIN — OXYCODONE HYDROCHLORIDE 15 MG: 10 TABLET ORAL at 00:47

## 2022-11-27 RX ADMIN — DOCUSATE SODIUM 100 MG: 100 CAPSULE, LIQUID FILLED ORAL at 17:52

## 2022-11-27 RX ADMIN — GABAPENTIN 300 MG: 300 CAPSULE ORAL at 22:00

## 2022-11-27 RX ADMIN — METHOCARBAMOL 750 MG: 750 TABLET, FILM COATED ORAL at 17:52

## 2022-11-27 RX ADMIN — METHOCARBAMOL 750 MG: 750 TABLET, FILM COATED ORAL at 06:37

## 2022-11-27 RX ADMIN — SENNOSIDES 8.6 MG: 8.6 TABLET, FILM COATED ORAL at 17:52

## 2022-11-27 RX ADMIN — ACETAMINOPHEN 975 MG: 325 TABLET ORAL at 21:58

## 2022-11-27 RX ADMIN — DOCUSATE SODIUM 100 MG: 100 CAPSULE, LIQUID FILLED ORAL at 08:46

## 2022-11-27 RX ADMIN — HYDROMORPHONE HYDROCHLORIDE 2 MG: 2 TABLET ORAL at 13:56

## 2022-11-27 RX ADMIN — LIDOCAINE 2 PATCH: 50 PATCH CUTANEOUS at 13:57

## 2022-11-27 RX ADMIN — METHOCARBAMOL 750 MG: 750 TABLET, FILM COATED ORAL at 11:46

## 2022-11-27 RX ADMIN — ASPIRIN 325 MG ORAL TABLET 325 MG: 325 PILL ORAL at 08:46

## 2022-11-27 NOTE — PROGRESS NOTES
11/27/22 1045   Pain Assessment   Pain Assessment Tool 0-10   Pain Score 8   Pain Location/Orientation Location: Back   Pain Onset/Description Onset: Ongoing;Frequency: Intermittent   Restrictions/Precautions   Precautions Pain;Spinal precautions; Fall Risk   Braces or Orthoses TLSO   Cognition   Overall Cognitive Status WFL   Arousal/Participation Alert; Cooperative   Attention Within functional limits   Orientation Level Oriented X4   Memory Within functional limits   Following Commands Follows one step commands without difficulty   Subjective   Subjective "there has to be something more I can take for the pain"   Sit to Lying   Type of Assistance Needed Incidental touching   Sit to Lying CARE Score 4   Lying to Sitting on Side of Bed   Type of Assistance Needed Incidental touching; Adaptive equipment   Comment use of bed rail   Lying to Sitting on Side of Bed CARE Score 4   Sit to Stand   Type of Assistance Needed Physical assistance   Physical Assistance Level 25% or less   Comment CGAmin A for balance when reaching for RW   Sit to Stand CARE Score 3   Bed-Chair Transfer   Type of Assistance Needed Physical assistance   Physical Assistance Level 25% or less   Comment stand pivot with RW  Chair/Bed-to-Chair Transfer CARE Score 3   Walk 10 Feet   Type of Assistance Needed Physical assistance   Physical Assistance Level 25% or less   Comment min  with RW,   Walk 10 Feet CARE Score 3   Ambulation   Primary Mode of Locomotion Prior to Admission Walk   Distance Walked (feet) 15 ft  (x2, 1x30)   Assist Device Roller Walker   Gait Pattern Decreased foot clearance; Forward Flexion;Narrow KAMALA;Shuffle;Step to; Improper weight shift   Limitations Noted In Endurance; Heel Strike;Posture;Speed;Strength;Swing   Provided Assistance with: Balance   Walk Assist Level Contact Guard;Minimum Assist   Findings heavy use on UE, dec B step length, very apprehensive of pain  Dec B LE trust due to weakness  Does the patient walk? 2   Yes Therapeutic Interventions   Strengthening seated B LE TE: Aps, LAQ, red tband abd and ball squeeze x30 each  Flexibility gentle manual passive DF stretch B LE 6x10 sec each  Modalities Cold pack to Lspine in bed at end of session in L sidelying  x20 min   Assessment   Treatment Assessment Pt engaged in 45 min skilled PT intervention in bed upon entry, c/o severe pain at rest  Asked for pain meds but is not due yet  Pt frustrated with "PRN" meds feeling he should get them as needed, however not accounting for the "every 4 hours " Education provided and updated pt would be due for pain meds around 1245 and robaxin around noon  Donned TLSO at EOB with A  Pt moves slow but puts forth good effort  Apprehenisve on pain onset  Improved amb antonella and tolerated x45 min of gentle activity including sitting in WC for session  Limited by pain and B LE weakness  Needs slow transitions cues to breath with transfers  Suggest no more than 60 min sessions unless ADL, would be best to schedule therapy around pain meds if possible  Trialed cold pack at end of session, pt denies he even felt it  Problem List Decreased strength;Decreased endurance;Decreased range of motion; Impaired balance; Impaired sensation;Orthopedic restrictions;Pain   Barriers to Discharge Inaccessible home environment;Decreased caregiver support   PT Barriers   Physical Impairment Decreased strength;Decreased range of motion;Decreased endurance; Impaired balance;Decreased mobility;Pain;Orthopedic restrictions   Functional Limitation Car transfers; Ramp negotiation;Stair negotiation;Standing;Transfers; Walking   Plan   Treatment/Interventions Functional transfer training;LE strengthening/ROM; Therapeutic exercise; Endurance training;Gait training   Progress Slow progress, decreased activity tolerance   Recommendation   PT Discharge Recommendation Home with outpatient rehabilitation   Equipment Recommended Walker   PT Therapy Minutes   PT Time In 6879   PT Time Out 1130   PT Total Time (minutes) 45   PT Mode of treatment - Individual (minutes) 45   PT Mode of treatment - Concurrent (minutes) 0   PT Mode of treatment - Group (minutes) 0   PT Mode of treatment - Co-treat (minutes) 0   PT Mode of Treatment - Total time(minutes) 45 minutes   PT Cumulative Minutes 135   Therapy Time missed   Time missed?  No

## 2022-11-27 NOTE — PROGRESS NOTES
PM&R PROGRESS NOTE:  Robson Hong 78 y o  male MRN: [id number]  Unit/Bed#: -47 Encounter: [id number]        Rehabilitation Diagnosis: Impairment of mobility, safety and Activities of Daily Living (ADLs) due to Orthopedic Disorders:  08 9  Other Orthopedic Scoliosis and Flatback syndrome  Etiologic Diagnosis:  Scoliosis and Flatback syndrome s/p thoracic and lumbar revision and fusion sx    SUBJECTIVE: Patient seen face to face today  Wife is at the bedside  Patient reports his pain this morning was severe and despite a good therapy session and stable vital signs continues to state his pain is severe  He thinks the oxycodone is helping but not lasting long enough  Wife is suggesting Dilaudid as he had this medication remotely  Will trial this for severe pain  Educated that this medication is highly addictive therefore would not recommend this long term PRN  Patient agreeable  Also adding topical Lidoderm patches, increasing Neurontin, and scheduling Tylenol  ASSESSMENT: Stable, progressing      PLAN:    Rehabilitation  • Functional deficits: Impaired mobility and self care, lumbar precautions  • Continue current rehabilitation plan of care to maximize function  • Functional update:   o Ambulating today 30 feet Min A level with RW, Transfers are Min A with RW  • Estimated Discharge: TBD      * Status post lumbar spinal fusion  Assessment & Plan  S/P removal of hardware L1-L3  Posterior thoracic/ lumbar instrumentation from T8 to pelvis  Thoracic osteotomy T11/12  Thoracic laminotomy T8/9, T9/10 and T10/11  Posterior thoracic fusion T7-L1  Revision instrumentation T8 to pelvis by Dr Taniya Morel on 11/22  Monitor incision (14 days post-sx Tue 12/6) - healing well on admission  - Follow-up at 2 weeks postop for suture removal and xrays of thoracolumbar spine      Can shower now with dressing removed but do not submerge based on dc instructions   - Do not take NSAIDs (Advil, Aleve, Ibuprofen, Motrin, Naproxen, etc ) for 3 months following your surgery  This may impede the healing of your fusion  Thoracic/lumbar spine precautions   Custom-molded LSO brace from [employer] from pre-op  If not, please fit patient with Jeff LSO brace  Pt may don brace in a sitting position and should have brace on at all times when ambulating and working with PT  Follow-up with spine surgery after d/c from [facility] and if needed during ARC course    P/w significant back pain somatic back with spasms/tightness  Optimal pain mgmt  - Recommend acute comprehensive interdisciplinary inpatient rehabilitation to include intensive skilled therapies (PT, OT) as outlined with oversight and management by rehabilitation physician as well as inpatient rehab level nursing, case management and weekly interdisciplinary team meetings  Pain  Assessment & Plan  Has been rating as moderate-severe on average  Better than yesterday and is participating in therapies  Strength intact, has some chronic pain in legs worse with ambulating   New Regimen as follows after discussion with patient and his wife:  Dilaudid 2 mg Q4 hours for severe pain  Oxycodone IR 5 mg Q4 hours for breakthrough pain  Robaxin 750mg Q6H for spasms   Gabapentin 300mg TID  Topical Lidoderm patches x 2   Scheduled Tylenol 975 mg Q8 hours  Continue topical ICE    Per d/c instructions - - Do not take NSAIDs (Advil, Aleve, Ibuprofen, Motrin, Naproxen, etc ) for 3 months following your surgery  This may impede the healing of your fusion  Monitor for oversedation, AMS/delirium, and respiratory depression   If being administered - hold opiates, muscle relaxants, benzodiazepines, and gabapentin for oversedation, AMS, or RR<12    Counseled on and continue to encourage deep breathing/relaxation/behavioral pain management techniques      Difficulty coping with pain  Assessment & Plan  Supportive counseling  Consider Psychology consult while in [facility] on and continue to encourage deep breathing/relaxation/behavioral management techniques:     Deep breathin seconds in, 5 seconds out, 5 times per hour when awake and PRN when experiencing pain or anxiety  Avoid holding breath and tightening muscles and instead breathe slowly and deeply      Constipation  Assessment & Plan  LBM prior to admission   - Hold standing iron supplement for now   - Dulcolax supp x1   - Daily miralax for now  - Colace/Senna BID  - PRN bowel regimen (Miralax PRN/Dulcolax supp PRN)  - monitor for signs and symptoms of obstruction/ileus > not currently; hold stimulant lax if occurs  - Limiting constipating medications if possible  - Ensure adequate hydration       Leukocytosis  Assessment & Plan  Trending down   Internal medicine consult and management during ARC course  Patient afebrile, other vitals unremarkable > continue to monitor   No clear signs or symptoms of infection or VTE but will continue to monitor  Monitor CBC intermittently       Elevated AST (SGOT)  Assessment & Plan  Mild at 66 on , chronic per patient  Hx of hepatitis and teenager  Patient would like to avoid standing APAP    At risk for venous thromboembolism (VTE)  Assessment & Plan  SCDs, ambulation, and aspirin 325mg qday per sx   Venous doppler negative for VTE       Hypertension  Assessment & Plan  Internal medicine consulted and co-management with their service  Monitor vitals with and without activity; monitor for orthostasis  Monitor hemoglobin, electrolytes, kidney function, hydration status   Current meds: lisinopril       Hyperlipidemia  Assessment & Plan  Statin         Appreciate IM consultants medical co-management  Labs, medications, and imaging personally reviewed  ROS:  A ten point review of systems was completed on 22 and pertinent positives are listed in subjective section  All other systems reviewed were negative         OBJECTIVE:   /75 (BP Location: Right arm)   Pulse 82   Temp 98 4 °F (36 9 °C) (Tympanic)   Resp 20   Ht 5' 10" (1 778 m)   Wt 98 8 kg (217 lb 12 8 oz)   SpO2 92%   BMI 31 25 kg/m²     Physical Exam  Vitals and nursing note reviewed  Constitutional:       General: He is not in acute distress  Appearance: He is well-developed and well-nourished  HENT:      Head: Normocephalic  Nose: Nose normal    Eyes:      Extraocular Movements: EOM normal       Conjunctiva/sclera: Conjunctivae normal    Cardiovascular:      Pulses: Normal pulses and intact distal pulses  Pulmonary:      Effort: Pulmonary effort is normal       Breath sounds: No wheezing  Abdominal:      General: There is no distension  Palpations: Abdomen is soft  Musculoskeletal:         General: No edema  Cervical back: Neck supple  Skin:     General: Skin is warm  Comments: Incision with steristrips - no erythema or drainage   Neurological:      Mental Status: He is alert and oriented to person, place, and time        Comments: Seen doing a supine to sit transfer overall with CGA    Psychiatric:         Mood and Affect: Mood and affect normal       Comments: Anxious           Lab Results   Component Value Date    WBC 10 18 (H) 11/25/2022    HGB 12 2 11/25/2022    HCT 37 5 11/25/2022    MCV 88 11/25/2022     11/25/2022     Lab Results   Component Value Date    SODIUM 135 11/25/2022    K 4 2 11/25/2022     11/25/2022    CO2 27 11/25/2022    BUN 17 11/25/2022    CREATININE 0 76 11/25/2022    GLUC 111 11/25/2022    CALCIUM 8 6 11/25/2022     Lab Results   Component Value Date    INR 1 02 05/24/2022    INR 1 00 08/16/2017    PROTIME 13 0 05/24/2022    PROTIME 10 5 08/16/2017           Current Facility-Administered Medications:   •  acetaminophen (TYLENOL) tablet 975 mg, 975 mg, Oral, Q8H [address], Halima Ann MD  •  aspirin tablet 325 mg, 325 mg, Oral, Daily, Benito Diop MD, 325 mg at 11/27/22 0846  •  bisacodyl (DULCOLAX) rectal suppository 10 mg, 10 mg, Rectal, Daily PRN, Melisa Mcnamara MD, 10 mg at 11/25/22 2213  •  docusate sodium (COLACE) capsule 100 mg, 100 mg, Oral, BID, Melisa Mcnamara MD, 100 mg at 11/27/22 0343  •  gabapentin (NEURONTIN) capsule 300 mg, 300 mg, Oral, TID, Monisha Taylor MD  •  HYDROmorphone (DILAUDID) tablet 2 mg, 2 mg, Oral, Q4H PRN, Monisha Taylor MD  •  lidocaine (LIDODERM) 5 % patch 2 patch, 2 patch, Topical, Daily, Monisha Taylor MD  •  lisinopril (ZESTRIL) tablet 5 mg, 5 mg, Oral, Daily, Melisa Mcnamara MD, 5 mg at 11/27/22 0846  •  methocarbamol (ROBAXIN) tablet 750 mg, 750 mg, Oral, Q6H [facility], Monisha Taylor MD, 750 mg at 11/27/22 1146  •  naloxone (NARCAN) 0 04 mg/mL syringe 0 04 mg, 0 04 mg, Intravenous, Q1MIN PRN, Melisa Mcnamara MD  •  ondansetron (ZOFRAN-ODT) dispersible tablet 4 mg, 4 mg, Oral, Q8H PRN, Melisa Mcnamara MD  •  oxyCODONE (ROXICODONE) IR tablet 5 mg, 5 mg, Oral, Q4H PRN, Monisha Taylor MD  •  polyethylene glycol (MIRALAX) packet 17 g, 17 g, Oral, Daily PRN, Melisa Mcnamara MD  •  polyethylene glycol (MIRALAX) packet 17 g, 17 g, Oral, Daily, Melisa Mcnamara MD, 17 g at 11/25/22 1745  •  pravastatin (PRAVACHOL) tablet 10 mg, 10 mg, Oral, Daily With Dinner, Melisa Mcnamara MD, 10 mg at 11/26/22 1636  •  senna (SENOKOT) tablet 8 6 mg, 1 tablet, Oral, BID, Melisa Mcnamara MD, 8 6 mg at 11/27/22 5315    Past Medical History:   Diagnosis Date   • Ambulates with cane     "long distance"   • Arthritis    • Back pain    • Chronic pain disorder    • Dental crowns present    • Exercise involving walking     daily -short walks   • History of hepatitis C     per pt "went away on its own" age 13"   • History of transfusion    • Hyperlipidemia    • Hypertension    • Leg pain     and foot pain   • Limb alert care status     per pt NO IV's LEFT UPPER ARM due to old injury   • Risk for falls    • Spinal stenosis    • Stroke [facility])     per pt in 1996-and no lasting problems   • Wears glasses     readers       Patient Active Problem List Diagnosis Date Noted   • Status post lumbar spinal fusion 05/25/2022   • Pain 11/25/2022   • Constipation 11/26/2022   • Difficulty coping with pain 11/26/2022   • At risk for venous thromboembolism (VTE) 11/25/2022   • Elevated AST (SGOT) 11/25/2022   • Leukocytosis 11/25/2022   • Back pain 11/22/2022   • Chronic pain 11/22/2022   • Flatback syndrome 11/22/2022   • Hyperlipidemia    • Hypertension           Bhavna Hooper MD    Total time spent:  35 minutes with more than 50% spent counseling/coordinating care  Counseling includes discussion with patient re: progress and discussion with patient of his/her current medical/functional state/information  Coordination of patient's care was performed in conjunction with consulting services  Time invested included review of patient's labs, vitals, and management of their comorbidities with continued monitoring  The care of the patient was extensively discussed and appropriate treatment plan was formulated unique for this patient  ** Please Note:  voice to text software may have been used in the creation of this document   Although proof errors in transcription or interpretation are a potential of such software** no

## 2022-11-27 NOTE — PLAN OF CARE
Problem: PAIN - ADULT  Goal: Verbalizes/displays adequate comfort level or baseline comfort level  Description: Interventions:  - Encourage patient to monitor pain and request assistance  - Assess pain using appropriate pain scale  - Administer analgesics based on type and severity of pain and evaluate response  - Implement non-pharmacological measures as appropriate and evaluate response  - Consider cultural and social influences on pain and pain management  - Notify physician/advanced practitioner if interventions unsuccessful or patient reports new pain  Outcome: Progressing     Problem: INFECTION - ADULT  Goal: Absence or prevention of progression during hospitalization  Description: INTERVENTIONS:  - Assess and monitor for signs and symptoms of infection  - Monitor lab/diagnostic results  - Monitor all insertion sites, i e  indwelling lines, tubes, and drains  - Monitor endotracheal if appropriate and nasal secretions for changes in amount and color  - [place] appropriate cooling/warming therapies per order  - Administer medications as ordered  - Instruct and encourage patient and family to use good hand hygiene technique  - Identify and instruct in appropriate isolation precautions for identified infection/condition  Outcome: Progressing     Problem: MUSCULOSKELETAL - ADULT  Goal: Maintain or return mobility to safest level of function  Description: INTERVENTIONS:  - Assess patient's ability to carry out ADLs; assess patient's baseline for ADL function and identify physical deficits which impact ability to perform ADLs (bathing, care of mouth/teeth, toileting, grooming, dressing, etc )  - Assess/evaluate cause of self-care deficits   - Assess range of motion  - Assess patient's mobility  - Assess patient's need for assistive devices and provide as appropriate  - Encourage maximum independence but intervene and supervise when necessary  - Involve family in performance of ADLs  - Assess for home care needs following discharge   - Consider OT consult to assist with ADL evaluation and planning for discharge  - Provide patient education as appropriate  Outcome: Progressing     Problem: Knowledge Deficit  Goal: Patient/family/caregiver demonstrates understanding of disease process, treatment plan, medications, and discharge instructions  Description: Complete learning assessment and assess knowledge base    Interventions:  - Provide teaching at level of understanding  - Provide teaching via preferred learning methods  Outcome: Progressing     Problem: DISCHARGE PLANNING  Goal: Discharge to home or other facility with appropriate resources  Description: INTERVENTIONS:  - Identify barriers to discharge w/patient and caregiver  - Arrange for needed discharge resources and transportation as appropriate  - Identify discharge learning needs (meds, wound care, etc )  - Arrange for interpretive services to assist at discharge as needed  - Refer to Case Management Department for coordinating discharge planning if the patient needs post-hospital services based on physician/advanced practitioner order or complex needs related to functional status, cognitive ability, or social support system  Outcome: Progressing

## 2022-11-27 NOTE — NURSING NOTE
Pt alert and oriented times 4  Moaning in pain  Medicated with oxycodone 10 mg for 10/10 pain  Pt also c/o stomach not feeling right  He agreed to take the supposotory  Soon after assisted with assist of 2 and Brace on and he had a large bowel movement  Pain level came down to a 8  Call bell with in reach, continue to monitor

## 2022-11-27 NOTE — PLAN OF CARE
Problem: NEUROSENSORY - ADULT  Goal: Achieves stable or improved neurological status  Description: INTERVENTIONS  - Monitor and report changes in neurological status  - Monitor vital signs such as temperature, blood pressure, glucose, and any other labs ordered   - Initiate measures to prevent increased intracranial pressure  - Monitor for seizure activity and implement precautions if appropriate      11/27/2022 0358 by Alba Ann RN  Outcome: Progressing

## 2022-11-28 PROBLEM — Z86.73 HISTORY OF STROKE: Status: ACTIVE | Noted: 2022-11-28

## 2022-11-28 LAB
25(OH)D3 SERPL-MCNC: 24.2 NG/ML (ref 30–100)
ALBUMIN SERPL BCP-MCNC: 3.4 G/DL (ref 3.5–5)
ALP SERPL-CCNC: 80 U/L (ref 43–122)
ALT SERPL W P-5'-P-CCNC: 71 U/L
ANION GAP SERPL CALCULATED.3IONS-SCNC: 6 MMOL/L (ref 5–14)
AST SERPL W P-5'-P-CCNC: 79 U/L (ref 17–59)
BASOPHILS # BLD AUTO: 0.02 THOUSANDS/ÂΜL (ref 0–0.1)
BASOPHILS NFR BLD AUTO: 0 % (ref 0–1)
BILIRUB SERPL-MCNC: 0.6 MG/DL (ref 0.2–1)
BUN SERPL-MCNC: 19 MG/DL (ref 5–25)
CALCIUM ALBUM COR SERPL-MCNC: 9.6 MG/DL (ref 8.3–10.1)
CALCIUM SERPL-MCNC: 9.1 MG/DL (ref 8.4–10.2)
CHLORIDE SERPL-SCNC: 100 MMOL/L (ref 96–108)
CHOLEST SERPL-MCNC: 151 MG/DL
CO2 SERPL-SCNC: 27 MMOL/L (ref 21–32)
CREAT SERPL-MCNC: 0.82 MG/DL (ref 0.7–1.5)
EOSINOPHIL # BLD AUTO: 0.33 THOUSAND/ÂΜL (ref 0–0.61)
EOSINOPHIL NFR BLD AUTO: 5 % (ref 0–6)
ERYTHROCYTE [DISTWIDTH] IN BLOOD BY AUTOMATED COUNT: 13.8 % (ref 11.6–15.1)
GFR SERPL CREATININE-BSD FRML MDRD: 92 ML/MIN/1.73SQ M
GLUCOSE P FAST SERPL-MCNC: 98 MG/DL (ref 70–99)
GLUCOSE SERPL-MCNC: 98 MG/DL (ref 70–99)
HCT VFR BLD AUTO: 37.7 % (ref 36.5–49.3)
HDLC SERPL-MCNC: 30 MG/DL
HGB BLD-MCNC: 11.8 G/DL (ref 12–17)
IMM GRANULOCYTES # BLD AUTO: 0.06 THOUSAND/UL (ref 0–0.2)
IMM GRANULOCYTES NFR BLD AUTO: 1 % (ref 0–2)
LDLC SERPL CALC-MCNC: 102 MG/DL
LYMPHOCYTES # BLD AUTO: 1.22 THOUSANDS/ÂΜL (ref 0.6–4.47)
LYMPHOCYTES NFR BLD AUTO: 18 % (ref 14–44)
MCH RBC QN AUTO: 27.6 PG (ref 26.8–34.3)
MCHC RBC AUTO-ENTMCNC: 31.3 G/DL (ref 31.4–37.4)
MCV RBC AUTO: 88 FL (ref 82–98)
MONOCYTES # BLD AUTO: 0.5 THOUSAND/ÂΜL (ref 0.17–1.22)
MONOCYTES NFR BLD AUTO: 7 % (ref 4–12)
NEUTROPHILS # BLD AUTO: 4.71 THOUSANDS/ÂΜL (ref 1.85–7.62)
NEUTS SEG NFR BLD AUTO: 69 % (ref 43–75)
NONHDLC SERPL-MCNC: 121 MG/DL
NRBC BLD AUTO-RTO: 0 /100 WBCS
PLATELET # BLD AUTO: 341 THOUSANDS/UL (ref 149–390)
PMV BLD AUTO: 8.4 FL (ref 8.9–12.7)
POTASSIUM SERPL-SCNC: 4 MMOL/L (ref 3.5–5.3)
PROT SERPL-MCNC: 6.5 G/DL (ref 6.4–8.4)
RBC # BLD AUTO: 4.28 MILLION/UL (ref 3.88–5.62)
SODIUM SERPL-SCNC: 133 MMOL/L (ref 135–147)
TRIGL SERPL-MCNC: 94 MG/DL
WBC # BLD AUTO: 6.84 THOUSAND/UL (ref 4.31–10.16)

## 2022-11-28 RX ORDER — MELATONIN
2000 DAILY
Status: DISCONTINUED | OUTPATIENT
Start: 2022-11-28 | End: 2022-12-10 | Stop reason: HOSPADM

## 2022-11-28 RX ADMIN — POLYETHYLENE GLYCOL 3350 17 G: 17 POWDER, FOR SOLUTION ORAL at 08:04

## 2022-11-28 RX ADMIN — ASPIRIN 325 MG ORAL TABLET 325 MG: 325 PILL ORAL at 08:05

## 2022-11-28 RX ADMIN — DOCUSATE SODIUM 100 MG: 100 CAPSULE, LIQUID FILLED ORAL at 17:23

## 2022-11-28 RX ADMIN — LIDOCAINE 2 PATCH: 50 PATCH CUTANEOUS at 08:05

## 2022-11-28 RX ADMIN — HYDROMORPHONE HYDROCHLORIDE 2 MG: 2 TABLET ORAL at 16:04

## 2022-11-28 RX ADMIN — SENNOSIDES 8.6 MG: 8.6 TABLET, FILM COATED ORAL at 17:23

## 2022-11-28 RX ADMIN — GABAPENTIN 300 MG: 300 CAPSULE ORAL at 16:04

## 2022-11-28 RX ADMIN — HYDROMORPHONE HYDROCHLORIDE 2 MG: 2 TABLET ORAL at 08:05

## 2022-11-28 RX ADMIN — METHOCARBAMOL 750 MG: 750 TABLET, FILM COATED ORAL at 12:04

## 2022-11-28 RX ADMIN — METHOCARBAMOL 750 MG: 750 TABLET, FILM COATED ORAL at 05:52

## 2022-11-28 RX ADMIN — SENNOSIDES 8.6 MG: 8.6 TABLET, FILM COATED ORAL at 08:05

## 2022-11-28 RX ADMIN — LISINOPRIL 5 MG: 5 TABLET ORAL at 08:05

## 2022-11-28 RX ADMIN — GABAPENTIN 300 MG: 300 CAPSULE ORAL at 21:34

## 2022-11-28 RX ADMIN — DOCUSATE SODIUM 100 MG: 100 CAPSULE, LIQUID FILLED ORAL at 08:05

## 2022-11-28 RX ADMIN — HYDROMORPHONE HYDROCHLORIDE 2 MG: 2 TABLET ORAL at 12:06

## 2022-11-28 RX ADMIN — CHOLECALCIFEROL TAB 25 MCG (1000 UNIT) 2000 UNITS: 25 TAB at 16:04

## 2022-11-28 RX ADMIN — GABAPENTIN 300 MG: 300 CAPSULE ORAL at 08:05

## 2022-11-28 RX ADMIN — HYDROMORPHONE HYDROCHLORIDE 2 MG: 2 TABLET ORAL at 04:00

## 2022-11-28 RX ADMIN — METHOCARBAMOL 750 MG: 750 TABLET, FILM COATED ORAL at 17:23

## 2022-11-28 RX ADMIN — OXYCODONE HYDROCHLORIDE 5 MG: 5 TABLET ORAL at 09:34

## 2022-11-28 RX ADMIN — HYDROMORPHONE HYDROCHLORIDE 2 MG: 2 TABLET ORAL at 20:08

## 2022-11-28 RX ADMIN — ACETAMINOPHEN 975 MG: 325 TABLET ORAL at 06:00

## 2022-11-28 NOTE — ASSESSMENT & PLAN NOTE
AST 79, ALT 71 on 11/28  Per patient - hx of mild elevations due to diagnosis of hepatitis C as a teenager  Tylenol and statin discontinued at this time  Continue to trend routine CMP

## 2022-11-28 NOTE — ASSESSMENT & PLAN NOTE
Continue pravastatin 10mg daily as substitute for non-formulary lovastatin 10mg daily  No recent lipid panel on file  Discontinued due to elevated liver enzymes - lipid panel added on to labs from this AM   Pending

## 2022-11-28 NOTE — NURSING NOTE
Pt alert and oriented times 4  Appears to be resting better  Medicated for 9/10 pain at back with dilaudid 2mg  Call bell with in reach, continue to monitor

## 2022-11-28 NOTE — PROGRESS NOTES
PM&R PROGRESS NOTE:  Christopher Collier 78 y o  male MRN: [id number]  Unit/Bed#: -93 Encounter: [id number]        Rehabilitation Diagnosis: Impairment of mobility, safety and Activities of Daily Living (ADLs) due to Orthopedic Disorders:  08 9  Other Orthopedic Scoliosis and Flatback syndrome  Etiologic Diagnosis:  Scoliosis and Flatback syndrome s/p thoracic and lumbar revision and fusion sx    SUBJECTIVE: Patient seen face to face today in therapies - participating  Slept better last night  Feels Dilaudid is working better  Also reminded he has the Oxycodone IR for breakthrough  +BM 2 days ago  No incontinence on urine  ASSESSMENT: Stable, progressing      PLAN:    Rehabilitation  • Functional deficits: Impaired mobility and self care, lumbar precautions  • Continue current rehabilitation plan of care to maximize function  • Functional update:   o Ambulating today 30 feet Min A level with RW, Transfers are Min A with RW  • Estimated Discharge: TBD      * Status post lumbar spinal fusion  Assessment & Plan  S/P removal of hardware L1-L3  Posterior thoracic/ lumbar instrumentation from T8 to pelvis  Thoracic osteotomy T11/12  Thoracic laminotomy T8/9, T9/10 and T10/11  Posterior thoracic fusion T7-L1  Revision instrumentation T8 to pelvis by Dr Kelin Maynard on 11/22  Monitor incision (14 days post-sx Tue 12/6) - healing well on admission  - Follow-up at 2 weeks postop for suture removal and xrays of thoracolumbar spine  Can shower now with dressing removed but do not submerge based on dc instructions   - Do not take NSAIDs (Advil, Aleve, Ibuprofen, Motrin, Naproxen, etc ) for 3 months following your surgery  This may impede the healing of your fusion  Thoracic/lumbar spine precautions   Custom-molded LSO brace from [employer] from pre-op  If not, please fit patient with [employer] LSO brace    Pt may don brace in a sitting position and should have brace on at all times when ambulating and working with PT  Follow-up with spine surgery after d/c from [facility] and if needed during ARC course    P/w significant back pain somatic back with spasms/tightness  Optimal pain mgmt  - Recommend acute comprehensive interdisciplinary inpatient rehabilitation to include intensive skilled therapies (PT, OT) as outlined with oversight and management by rehabilitation physician as well as inpatient rehab level nursing, case management and weekly interdisciplinary team meetings  Pain  Assessment & Plan  Has been rating as moderate-severe on average  Better than yesterday and is participating in therapies  Strength intact, has some chronic pain in legs worse with ambulating   New Regimen as follows after discussion with patient and his wife:  Dilaudid 2 mg Q4 hours for severe pain  Oxycodone IR 5 mg Q4 hours for breakthrough pain  Robaxin 750mg Q6H for spasms   Gabapentin 300mg TID  Topical Lidoderm patches x 2     Continue topical ICE    Per d/c instructions - - Do not take NSAIDs (Advil, Aleve, Ibuprofen, Motrin, Naproxen, etc ) for 3 months following your surgery  This may impede the healing of your fusion  Monitor for oversedation, AMS/delirium, and respiratory depression   If being administered - hold opiates, muscle relaxants, benzodiazepines, and gabapentin for oversedation, AMS, or RR<12  Counseled on and continue to encourage deep breathing/relaxation/behavioral pain management techniques      Difficulty coping with pain  Assessment & Plan  Supportive counseling  Consider Psychology consult while in [place] on and continue to encourage deep breathing/relaxation/behavioral management techniques:     Deep breathin seconds in, 5 seconds out, 5 times per hour when awake and PRN when experiencing pain or anxiety    Avoid holding breath and tightening muscles and instead breathe slowly and deeply      Constipation  Assessment & Plan  LBM prior to admission   - Hold standing iron supplement for now   - Dulcolax supp x1   - Daily miralax for now  - Colace/Senna BID  - PRN bowel regimen (Miralax PRN/Dulcolax supp PRN)  - monitor for signs and symptoms of obstruction/ileus > not currently; hold stimulant lax if occurs  - Limiting constipating medications if possible  - Ensure adequate hydration       Leukocytosis  Assessment & Plan  Trending down   Internal medicine consult and management during ARC course  Patient afebrile, other vitals unremarkable > continue to monitor   No clear signs or symptoms of infection or VTE but will continue to monitor  Monitor CBC intermittently       Transaminitis  Assessment & Plan  Monitor  Elevated therefore discontinued all Tylenol and Statin  Hx of hepatitis and teenager  Patient would like to avoid standing APAP    At risk for venous thromboembolism (VTE)  Assessment & Plan  SCDs, ambulation, and aspirin 325mg qday per sx  11/25 Venous doppler negative for VTE       Hypertension  Assessment & Plan  Internal medicine consulted and co-management with their service  Monitor vitals with and without activity; monitor for orthostasis  Monitor hemoglobin, electrolytes, kidney function, hydration status   Current meds: lisinopril       Hyperlipidemia  Assessment & Plan  Discontinued Statin due to increase in LFTs        Appreciate IM consultants medical co-management  Labs, medications, and imaging personally reviewed  ROS:  A ten point review of systems was completed on 11/28/22 and pertinent positives are listed in subjective section  All other systems reviewed were negative  OBJECTIVE:   /77 (BP Location: Left arm)   Pulse 83   Temp 98 7 °F (37 1 °C) (Tympanic)   Resp 18   Ht 5' 10" (1 778 m)   Wt 98 8 kg (217 lb 12 8 oz)   SpO2 94%   BMI 31 25 kg/m²     Physical Exam  Vitals and nursing note reviewed  Constitutional:       General: He is not in acute distress  HENT:      Head: Normocephalic and atraumatic        Nose: Nose normal       Mouth/Throat:      Mouth: Mucous membranes are moist    Eyes:      Conjunctiva/sclera: Conjunctivae normal    Cardiovascular:      Rate and Rhythm: Normal rate and regular rhythm  Pulses: Normal pulses  Pulmonary:      Effort: Pulmonary effort is normal       Breath sounds: Normal breath sounds  No wheezing or rales  Abdominal:      General: Bowel sounds are normal  There is no distension  Palpations: Abdomen is soft  Tenderness: There is no abdominal tenderness  Musculoskeletal:         General: No swelling  Cervical back: Neck supple  Skin:     General: Skin is warm  Comments: Incision clean and intact - no drainage  +Steristrips   Neurological:      Mental Status: He is alert and oriented to person, place, and time        Comments: Seen doing a sit to stand transfer needing Min A    Psychiatric:         Mood and Affect: Mood normal           Lab Results   Component Value Date    WBC 6 84 11/28/2022    HGB 11 8 (L) 11/28/2022    HCT 37 7 11/28/2022    MCV 88 11/28/2022     11/28/2022     Lab Results   Component Value Date    SODIUM 133 (L) 11/28/2022    K 4 0 11/28/2022     11/28/2022    CO2 27 11/28/2022    BUN 19 11/28/2022    CREATININE 0 82 11/28/2022    GLUC 98 11/28/2022    CALCIUM 9 1 11/28/2022     Lab Results   Component Value Date    INR 1 02 05/24/2022    INR 1 00 08/16/2017    PROTIME 13 0 05/24/2022    PROTIME 10 5 08/16/2017           Current Facility-Administered Medications:   •  aspirin tablet 325 mg, 325 mg, Oral, Daily, Navdeep Jimenez MD, 325 mg at 11/28/22 0805  •  bisacodyl (DULCOLAX) rectal suppository 10 mg, 10 mg, Rectal, Daily PRN, Navdeep Jimenez MD, 10 mg at 11/25/22 2213  •  docusate sodium (COLACE) capsule 100 mg, 100 mg, Oral, BID, Navdeep Jimenez MD, 100 mg at 11/28/22 0805  •  gabapentin (NEURONTIN) capsule 300 mg, 300 mg, Oral, TID, Prabhu Bolaños MD, 300 mg at 11/28/22 0805  •  HYDROmorphone (DILAUDID) tablet 2 mg, 2 mg, Oral, Q4H PRN, Prabhu Bolaños MD, 2 mg at 11/28/22 0805  •  lidocaine (LIDODERM) 5 % patch 2 patch, 2 patch, Topical, Daily, Elly Conley MD, 2 patch at 11/28/22 0805  •  lisinopril (ZESTRIL) tablet 5 mg, 5 mg, Oral, Daily, Marco Barrios MD, 5 mg at 11/28/22 0805  •  methocarbamol (ROBAXIN) tablet 750 mg, 750 mg, Oral, Q6H [facility], Elly Conley MD, 750 mg at 11/28/22 0218  •  naloxone (NARCAN) 0 04 mg/mL syringe 0 04 mg, 0 04 mg, Intravenous, Q1MIN PRN, Marco Barrios MD  •  ondansetron (ZOFRAN-ODT) dispersible tablet 4 mg, 4 mg, Oral, Q8H PRN, Marco Barrios MD  •  oxyCODONE (ROXICODONE) IR tablet 5 mg, 5 mg, Oral, Q4H PRN, Elly Conley MD, 5 mg at 11/28/22 5818  •  polyethylene glycol (MIRALAX) packet 17 g, 17 g, Oral, Daily PRN, Marco Barrios MD  •  polyethylene glycol (MIRALAX) packet 17 g, 17 g, Oral, Daily, Marco Barrios MD, 17 g at 11/28/22 0804  •  senna (SENOKOT) tablet 8 6 mg, 1 tablet, Oral, BID, Marco Barrios MD, 8 6 mg at 11/28/22 9138    Past Medical History:   Diagnosis Date   • Ambulates with cane     "long distance"   • Arthritis    • Back pain    • Chronic pain disorder    • Dental crowns present    • Exercise involving walking     daily -short walks   • History of hepatitis C     per pt "went away on its own" age 13"   • History of transfusion    • Hyperlipidemia    • Hypertension    • Leg pain     and foot pain   • Limb alert care status     per pt NO IV's LEFT UPPER ARM due to old injury   • Risk for falls    • Spinal stenosis    • Stroke [facility])     per pt in 1996-and no lasting problems   • Wears glasses     readers       Patient Active Problem List    Diagnosis Date Noted   • Status post lumbar spinal fusion 05/25/2022   • Pain 11/25/2022   • Constipation 11/26/2022   • Difficulty coping with pain 11/26/2022   • At risk for venous thromboembolism (VTE) 11/25/2022   • Transaminitis 11/25/2022   • Leukocytosis 11/25/2022   • Back pain 11/22/2022   • Chronic pain 11/22/2022   • Flatback syndrome 11/22/2022 • Hyperlipidemia    • Hypertension           Yecenia Mcdonald MD    Total time spent:  35 minutes with more than 50% spent counseling/coordinating care  Counseling includes discussion with patient re: progress and discussion with patient of his/her current medical/functional state/information  Coordination of patient's care was performed in conjunction with consulting services  Time invested included review of patient's labs, vitals, and management of their comorbidities with continued monitoring  The care of the patient was extensively discussed and appropriate treatment plan was formulated unique for this patient  ** Please Note:  voice to text software may have been used in the creation of this document   Although proof errors in transcription or interpretation are a potential of such software**

## 2022-11-28 NOTE — PROGRESS NOTES
11/28/22 1230   Pain Assessment   Pain Assessment Tool 0-10   Pain Score 5   Pain Location/Orientation Location: Back   Restrictions/Precautions   Precautions Spinal precautions; Fall Risk;Pain   Braces or Orthoses TLSO   Subjective   Subjective "i'm starting to feel a little better"   Sit to Lying   Type of Assistance Needed Supervision   Sit to Lying CARE Score 4   Lying to Sitting on Side of Bed   Type of Assistance Needed Supervision   Lying to Sitting on Side of Bed CARE Score 4   Sit to Stand   Type of Assistance Needed Physical assistance   Physical Assistance Level 25% or less   Comment Improved this afternoon, pt with less pain and spasms   Sit to Stand CARE Score 3   Bed-Chair Transfer   Type of Assistance Needed Physical assistance; Adaptive equipment   Physical Assistance Level 25% or less   Comment SPT with RW   Chair/Bed-to-Chair Transfer CARE Score 3   Walk 10 Feet   Type of Assistance Needed Physical assistance; Adaptive equipment   Physical Assistance Level 25% or less   Comment Improved balance and strength this session   Walk 10 Feet CARE Score 3   Walk 50 Feet with Two Turns   Reason if not Attempted Safety concerns   Walk 50 Feet with Two Turns CARE Score 88   Walk 150 Feet   Reason if not Attempted Safety concerns   Walk 150 Feet CARE Score 88   Ambulation   Primary Mode of Locomotion Prior to Admission Walk   Distance Walked (feet) 25 ft  (25x2, 20x 4)   Assist Device Roller Walker   Limitations Noted In Endurance;Balance;Swing;Strength;Speed   Findings Improved use of RW, staying closer and performing safe turns when approaching chair   Does the patient walk? 2  Yes   Toilet Transfer   Type of Assistance Needed Physical assistance; Adaptive equipment   Physical Assistance Level 26%-50%   Comment using grab bar and RW, was able to walk from bed to/from bathroom   Attempted to have BM, but only had gas   Toilet Transfer CARE Score 3   Assessment   Treatment Assessment Pt seen after lunch for 30 min session  Continues to require A with TLSO on/off  Transfers and ambulation slowly improving  Increased wlaking this afternoon with less physical assist, however still relies heavily on B UE  PT Therapy Minutes   PT Time In 1230   PT Time Out 1300   PT Total Time (minutes) 30   PT Mode of treatment - Individual (minutes) 30   PT Mode of treatment - Concurrent (minutes) 0   PT Mode of treatment - Group (minutes) 0   PT Mode of treatment - Co-treat (minutes) 0   PT Mode of Treatment - Total time(minutes) 30 minutes   PT Cumulative Minutes 225   Therapy Time missed   Time missed?  No

## 2022-11-28 NOTE — ASSESSMENT & PLAN NOTE
Hx of prior back surgeries with hx of scoliosis, pseudoarthrosis, and flat back deformity  11/22: elective removal of hardware L1-L3, posterior thoracic/lumbar instrumentation from T8 to pelvis, thoracic osteotomy T11/T12, thoracic laminotomy T8/T9, T9/T10, T10/T11, posterior thoracic fusion T7-L1, revision instrumentation T8 to pelvis performed by Dr Emily Osuna (Orthopedics)  Continue ASA 325mg daily for DVT ppx  TLSO brace when getting OOB  Ensure adequate pain control  Neurovascular checks Q shift  2 week follow-up with repeat thoracolumbar spine XRs (12/6)  Primary team following  PT/OT

## 2022-11-28 NOTE — PROGRESS NOTES
[facility]  Progress Note - Christopher Collier 1955, 78 y o  male MRN: [id number]  Unit/Bed#: -13 Encounter: [id number]  Primary Care Provider: Marleen Quesada MD   Date and time admitted to hospital: 11/25/2022  2:14 PM    History of stroke  Assessment & Plan  Per patient - hx of CVA in 1996  Statin discontinued due to elevated liver enzymes; consider restarting as able  Continue ASA for DVT ppx - would benefit from low dose preventative ASA afterwards  Transaminitis  Assessment & Plan  AST 79, ALT 71 on 11/28  Per patient - hx of mild elevations due to diagnosis of hepatitis C as a teenager  Tylenol and statin discontinued at this time  Continue to trend routine CMP  Hypertension  Assessment & Plan  Continue home lisinopril 5mg daily  Monitor BP with routine VS     Complaints of lightheadedness when getting OOB  Obtain orthostatics prior to therapies  Hyperlipidemia  Assessment & Plan  Continue pravastatin 10mg daily as substitute for non-formulary lovastatin 10mg daily  No recent lipid panel on file  Discontinued due to elevated liver enzymes - lipid panel added on to labs from this AM   Pending  * Status post lumbar spinal fusion  Assessment & Plan  Hx of prior back surgeries with hx of scoliosis, pseudoarthrosis, and flat back deformity  11/22: elective removal of hardware L1-L3, posterior thoracic/lumbar instrumentation from T8 to pelvis, thoracic osteotomy T11/T12, thoracic laminotomy T8/T9, T9/T10, T10/T11, posterior thoracic fusion T7-L1, revision instrumentation T8 to pelvis performed by Dr Kelin Maynard (Orthopedics)  Continue ASA 325mg daily for DVT ppx  TLSO brace when getting OOB  Ensure adequate pain control  Neurovascular checks Q shift  2 week follow-up with repeat thoracolumbar spine XRs (12/6)  Primary team following  PT/OT      VTE Pharmacologic Prophylaxis:   Pharmacologic: ASA 325mg per Ortho  Mechanical VTE Prophylaxis in Place: Yes - sequential compression devices  Current Length of Stay: 3 day(s)    Current Patient Status: Inpatient Rehab     Discharge Plan: As per primary team     Code Status: Level 1 - Full Code    Subjective:   Pt examined while pt lying in bed in pt room  Complaints of mid back pain, 7/10, pins/needles, improving with adjusted pain regimen  Denies any numbness/tingling to B/L upper and lower extremities  LBM was on , denies any abdominal pain  Feels that he will have a BM shortly, but will increase bowel regimen if no BM later today  Encouraged to increase PO fluid intake  Denies any lightheadedness, dizziness, SOB, or palpitations  Sleeping well at night  Has no other concerns or complaints at this time  Objective:     Vitals:   Temp (24hrs), Av °F (37 2 °C), Min:98 6 °F (37 °C), Max:99 6 °F (37 6 °C)    Temp:  [98 6 °F (37 °C)-99 6 °F (37 6 °C)] 98 7 °F (37 1 °C)  HR:  [68-89] 83  Resp:  [18-20] 18  BP: (112-153)/(61-82) 149/77  SpO2:  [92 %-94 %] 94 %  Body mass index is 31 25 kg/m²  Review of Systems   Constitutional: Negative for appetite change, chills, fatigue and fever  HENT: Negative for trouble swallowing  Eyes: Negative for visual disturbance  Respiratory: Negative for cough, shortness of breath, wheezing and stridor  Cardiovascular: Negative for chest pain, palpitations and leg swelling  Gastrointestinal: Positive for constipation  Negative for abdominal distention, abdominal pain, diarrhea, nausea and vomiting  LBM , feels that he will have a BM shortly, denies abdominal pain, passing gas   Genitourinary: Negative for difficulty urinating  Musculoskeletal: Positive for arthralgias (Mid back pain, 10, pins/needles, improving with changes in pain medications)  Negative for back pain and myalgias  Neurological: Negative for dizziness, weakness, light-headedness, numbness and headaches     Psychiatric/Behavioral: Negative for dysphoric mood and sleep disturbance  The patient is not nervous/anxious  All other systems reviewed and are negative  Input and Output Summary (last 24 hours): Intake/Output Summary (Last 24 hours) at 11/28/2022 1312  Last data filed at 11/28/2022 1246  Gross per 24 hour   Intake 480 ml   Output 440 ml   Net 40 ml       Physical Exam:     Physical Exam  Vitals and nursing note reviewed  Constitutional:       General: He is not in acute distress  Appearance: Normal appearance  He is obese  He is not ill-appearing  HENT:      Head: Normocephalic and atraumatic  Cardiovascular:      Rate and Rhythm: Normal rate and regular rhythm  Pulses: Normal pulses  Heart sounds: Normal heart sounds  No murmur heard  No friction rub  Pulmonary:      Effort: Pulmonary effort is normal  No respiratory distress  Breath sounds: Normal breath sounds  No wheezing or rhonchi  Abdominal:      General: Abdomen is flat  Bowel sounds are normal  There is no distension  Palpations: Abdomen is soft  There is no mass  Tenderness: There is no abdominal tenderness  There is no guarding or rebound  Hernia: No hernia is present  Musculoskeletal:      Cervical back: Normal range of motion and neck supple  No tenderness  Right lower leg: No edema  Left lower leg: No edema  Skin:     General: Skin is warm and dry  Capillary Refill: Capillary refill takes less than 2 seconds  Comments: Mid back incision with steri-strips  Well-approximated  No drainage, erythema, or edema present  Neurological:      Mental Status: He is alert and oriented to person, place, and time     Psychiatric:         Mood and Affect: Mood normal          Behavior: Behavior normal          Additional Data:     Labs:    Results from last 7 days   Lab Units 11/28/22  0817   WBC Thousand/uL 6 84   HEMOGLOBIN g/dL 11 8*   HEMATOCRIT % 37 7   PLATELETS Thousands/uL 341   NEUTROS PCT % 69   LYMPHS PCT % 18   MONOS PCT % 7   EOS PCT % 5     Results from last 7 days   Lab Units 11/28/22  0817   SODIUM mmol/L 133*   POTASSIUM mmol/L 4 0   CHLORIDE mmol/L 100   CO2 mmol/L 27   BUN mg/dL 19   CREATININE mg/dL 0 82   ANION GAP mmol/L 6   CALCIUM mg/dL 9 1   ALBUMIN g/dL 3 4*   TOTAL BILIRUBIN mg/dL 0 60   ALK PHOS U/L 80   ALT U/L 71*   AST U/L 79*   GLUCOSE RANDOM mg/dL 98         Results from last 7 days   Lab Units 11/22/22  0659   POC GLUCOSE mg/dl 108               Labs reviewed    Imaging:    Imaging reviewed    Recent Cultures (last 7 days):           Last 24 Hours Medication List:   Current Facility-Administered Medications   Medication Dose Route Frequency Provider Last Rate   • aspirin  325 mg Oral Daily Santiago Hogan MD     • bisacodyl  10 mg Rectal Daily PRN Santiago Hogan MD     • docusate sodium  100 mg Oral BID Santiago Hogan MD     • gabapentin  300 mg Oral TID Zoë Fernandez MD     • HYDROmorphone  2 mg Oral Q4H PRN Zoë Fernandez MD     • lidocaine  2 patch Topical Daily Zoë Fernandez MD     • lisinopril  5 mg Oral Daily Santiago Hogan MD     • methocarbamol  750 mg Oral Q6H Sami Mcnulty MD     • naloxone  0 04 mg Intravenous Q1MIN PRN Santiago Hogan MD     • ondansetron  4 mg Oral Q8H PRN Santiago Hogan MD     • oxyCODONE  5 mg Oral Q4H PRN Zoë Fernandez MD     • polyethylene glycol  17 g Oral Daily PRN Santiago Hogan MD     • polyethylene glycol  17 g Oral Daily Santiago Hogan MD     • senna  1 tablet Oral BID Santiago Hogan MD          [employer] software was used to dictate this note  It may contain errors with dictating incorrect words or incorrect spelling  Please contact the provider directly with any questions

## 2022-11-28 NOTE — PROGRESS NOTES
11/28/22 1000   Pain Assessment   Pain Assessment Tool 0-10   Pain Score 7   Pain Location/Orientation Orientation: Lower;Orientation: Mid;Orientation: Bilateral;Location: Back   Pain Onset/Description Onset: Ongoing;Frequency: Constant/Continuous  (when comfortable in bed or no spasms when up reports pain 4/10 up to 7-8/10 with spasms)   Restrictions/Precautions   Precautions Pain;Spinal precautions; Fall Risk   Braces or Orthoses TLSO   Cognition   Overall Cognitive Status WFL   Following Commands Follows one step commands with increased time or repetition   Subjective   Subjective Reports break through pain meds have helped, ready for therapy, still law snot trust his legs   Roll Left and Right   Type of Assistance Needed Supervision; Adaptive equipment   Comment using bedrails with extra time given to perform   Roll Left and Right CARE Score 4   Sit to Lying   Type of Assistance Needed Supervision   Physical Assistance Level No physical assistance   Sit to Lying CARE Score 4   Lying to Sitting on Side of Bed   Type of Assistance Needed Incidental touching   Physical Assistance Level No physical assistance   Lying to Sitting on Side of Bed CARE Score 4   Sit to Stand   Type of Assistance Needed Physical assistance   Physical Assistance Level 26%-50%   Comment transfers varying from CGA to MOD A depending on fatigue, body mechanics and spasms in his back   Sit to Stand CARE Score 3   Bed-Chair Transfer   Type of Assistance Needed Physical assistance; Adaptive equipment;Verbal cues   Physical Assistance Level 26%-50%   Comment SPT using RW, cueing for turns to keep RW closer   Chair/Bed-to-Chair Transfer CARE Score 3   Walk 10 Feet   Type of Assistance Needed Physical assistance;Verbal cues; Adaptive equipment   Physical Assistance Level 26%-50%   Comment Assist with balance, Support while turning with RW to approach chair   Tendency to push walker too far head   Walk 10 Feet CARE Score 3   Walk 50 Feet with Two Turns   Reason if not Attempted Safety concerns   Walk 50 Feet with Two Turns CARE Score 88   Walk 150 Feet   Comment limited distance walked by fatigue and safety   Reason if not Attempted Safety concerns   Walk 150 Feet CARE Score 88   Walking 10 Feet on Uneven Surfaces   Comment limited distance walked by fatigue and safety   Reason if not Attempted Safety concerns   Walking 10 Feet on Uneven Surfaces CARE Score 88   Ambulation   Primary Mode of Locomotion Prior to Admission Walk   Distance Walked (feet) 12 ft  (x5)   Assist Device Roller Walker   Gait Pattern Decreased foot clearance; Slow Leisa; Step to; Forward Flexion   Limitations Noted In Strength;Balance; Safety   Does the patient walk? 2  Yes   Curb or Single Stair   Style negotiated Single stair   Type of Assistance Needed Physical assistance   Physical Assistance Level 51%-75%   Comment B LE weakness and increased pain with wt bearing affecting ability   1 Step (Curb) CARE Score 2   4 Steps   Reason if not Attempted Safety concerns   4 Steps CARE Score 88   12 Steps   Reason if not Attempted Safety concerns   12 Steps CARE Score 88   Therapeutic Interventions   Strengthening B LE LAQ 5 x 5 with 3 sec hold; STS 4 x 3 reps; Standing hip flex 2 x 5   Assessment   Treatment Assessment Pt Participating more today  Still only ambulating short distances  Worked on LE strengthening in sitting and standing  Flexed posture in standing relying heavily on RW for support  Fall risk with transfers and walking at this time  PT Therapy Minutes   PT Time In 1000   PT Time Out 1100   PT Total Time (minutes) 60   PT Mode of treatment - Individual (minutes) 60   PT Mode of treatment - Concurrent (minutes) 0   PT Mode of treatment - Group (minutes) 0   PT Mode of treatment - Co-treat (minutes) 0   PT Mode of Treatment - Total time(minutes) 60 minutes   PT Cumulative Minutes 195   Therapy Time missed   Time missed?  No

## 2022-11-28 NOTE — CASE MANAGEMENT
Cm met with pt at bedside to introduce role and to complete cm open:    Pt lives with wife Jeremy Falcon in 2 story home, 1 + 3 BRENDA  Prior to admission, pt reports being independent with ADL IADLs including driving  Pt has 4 children, 1 this at home, the other 3 are not local  Pt reports having r/w, shower chair and w/c in home but did not utilize equipment  Pt reports in May 2022 he was admitted to [facility], and has hx of OP Therapy in [facility]  Pt reports no hx of HHC  Preferred pharmacy is [facility] in [facility], PCP is Dr Tang Lee  The patient was educated on the rehabilitation process including therapy program, the interdisciplinary team, and weekly team meetings  Estimated length of stay was reviewed with the patient as well as expectations of discussions of discharge planning  The role of the  was reviewed including providing care coordination, discharge planning and discharge facilitation  IMM was reviewed with the patient and a copy was provided for their reference  The patient verbalized understanding of the information provided and denied any further questions at this time  CM will continue to follow and assist the patient throughout their rehabilitation stay

## 2022-11-28 NOTE — PROGRESS NOTES
11/28/22 1300   Pain Assessment   Pain Assessment Tool 0-10   Pain Score 9   Pain Location/Orientation Orientation: Lower; Location: Back   Pain Radiating Towards none   Pain Onset/Description Onset: Ongoing  (spasms)   Effect of Pain on Daily Activities limits engagement in therapeutic activity   Patient's Stated Pain Goal No pain   Hospital Pain Intervention(s) Repositioned; Rest   Restrictions/Precautions   Precautions Pain;Spinal precautions; Fall Risk   Braces or Orthoses TLSO   Lower Body Dressing   Type of Assistance Needed Physical assistance   Physical Assistance Level 26%-50%   Comment Engaged pt in LB dressing activity with focus on long handled equipment  Pt reports has a reacher however has not needed it for dressing  Educated pt on reacher to assist with LB dressing and maintain spinal precautions  Pt able to correctly identify all precautions  Pt able to thread john LE with increased time using reacher  Pt min/mod A in stance for CM 2* increased pain and spasm  Pt utilizing dressing stick to doff pants seated in WC  Lower Body Dressing CARE Score 3   Sit to Lying   Type of Assistance Needed Supervision   Physical Assistance Level No physical assistance   Comment Seated EOB pt using bed rail to obtain side lying position  Plan to assess with HOB flat to simulate home environment   Sit to Lying CARE Score 4   Lying to Sitting on Side of Bed   Type of Assistance Needed Supervision   Physical Assistance Level No physical assistance   Comment Pt completed with increased time and use of bed rails  Lying to Sitting on Side of Bed CARE Score 4   Sit to Stand   Type of Assistance Needed Physical assistance   Physical Assistance Level 26%-50%   Comment min/mod A with RW 2*increased pain and back spasm     Sit to Stand CARE Score 3   Bed-Chair Transfer   Type of Assistance Needed Physical assistance   Physical Assistance Level 26%-50%   Comment min/mod A stand pivot transfer with RW   Chair/Bed-to-Chair Transfer CARE Score 3   Transfer Bed/Chair/Wheelchair   Limitations Noted In Balance;LE Strength;Pain Management   Adaptive Equipment Roller Walker   Functional Standing Tolerance   Time Pt completed 6 5 minutes total   Activity nuts and bolts in stance at table top   Comments Pt engaged in two trials; trial one 5 minuts, trial two 1 minute 30 seconds  During first trial, pt observed leaning back, therapist providing vc and pt able to correct  Overall tolerated well, however reporting significant pain upon completion  Pt provided with rest break  Pt agreeing to complete second trial  Decreased standing tolerance observed throughout second trial   Cognition   Overall Cognitive Status [facility]   Arousal/Participation Alert; Cooperative   Attention Within functional limits   Orientation Level Oriented X4   Memory Within functional limits   Following Commands Follows one step commands with increased time or repetition   Activity Tolerance   Activity Tolerance Patient limited by pain   Assessment   Treatment Assessment Pt engaged in 60 minute skilled OT services with focus on functional standing balance and LB dressing with long handled equiptment  Pt engaged in table top activity with focus on standing balance this session  Pt requiring min/mod A to maintain balance in stance  Therapist observed pt leaning back as pt fatigued  Pt able to correct positioning with vc  Also engaged pt on reacher and dressing stick this session  Pt reports he has a reacher at home however did not utilize for dressing  Pt able to don pants using reacher to thread john LE with increased time, utilizing dressing stick to doff  Overall pt requiring min/mod A in stance for CM  Pt fatigued and reporting increased pain upon completion, requesting return to bed  Pt wife present during session  Therapist communicating with pt and wife regarding home set up  Pt lives in multilevel home with 2 BRENDA, however reports first floor setup available   Pt wife reports there is a room where they may be able to put a bed however pt reports will continue to sleep on couch  Pt currently has a raised toilet seat with GB on R, a  walk in shower with a shower chair with arm rests, a walker and a wc  Pt will continue to benefit from skilled OT services wioth focus on ADL performance with long handled equipment, balance, strength, and functional transfers/mobility to increase participation in ADL/IADLs  Prognosis Good   Problem List Decreased strength;Decreased endurance;Decreased range of motion; Impaired balance; Impaired sensation;Orthopedic restrictions;Pain   Barriers to Discharge Inaccessible home environment;Decreased caregiver support   Plan   Treatment/Interventions ADL retraining;Functional transfer training; Therapeutic exercise; Endurance training;Patient/family training;Equipment eval/education; Bed mobility; Compensatory technique education   Progress Slow progress, decreased activity tolerance   Recommendation   OT Discharge Recommendation   (pending progress)   OT Therapy Minutes   OT Time In 1300   OT Time Out 1400   OT Total Time (minutes) 60   OT Mode of treatment - Individual (minutes) 60   OT Mode of treatment - Concurrent (minutes) 0   OT Mode of treatment - Group (minutes) 0   OT Mode of treatment - Co-treat (minutes) 0   OT Mode of Treatment - Total time(minutes) 60 minutes   OT Cumulative Minutes 180   Therapy Time missed   Time missed?  No

## 2022-11-28 NOTE — PLAN OF CARE
Problem: NEUROSENSORY - ADULT  Goal: Achieves stable or improved neurological status  Description: INTERVENTIONS  - Monitor and report changes in neurological status  - Monitor vital signs such as temperature, blood pressure, glucose, and any other labs ordered   - Initiate measures to prevent increased intracranial pressure  - Monitor for seizure activity and implement precautions if appropriate      Outcome: Progressing     Problem: PAIN - ADULT  Goal: Verbalizes/displays adequate comfort level or baseline comfort level  Description: Interventions:  - Encourage patient to monitor pain and request assistance  - Assess pain using appropriate pain scale  - Administer analgesics based on type and severity of pain and evaluate response  - Implement non-pharmacological measures as appropriate and evaluate response  - Consider cultural and social influences on pain and pain management  - Notify physician/advanced practitioner if interventions unsuccessful or patient reports new pain  Outcome: Progressing

## 2022-11-28 NOTE — PROGRESS NOTES
11/28/22 2696   Pain Assessment   Pain Assessment Tool 0-10   Pain Score 9   Pain Location/Orientation Orientation: Lower; Location: Back   Pain Radiating Towards none   Pain Onset/Description Onset: Ongoing   Effect of Pain on Daily Activities limits activity during therapy   Patient's Stated Pain Goal No pain   Hospital Pain Intervention(s) Rest;Repositioned   Multiple Pain Sites No   Restrictions/Precautions   Precautions Fall Risk;Pain;Spinal precautions   Braces or Orthoses TLSO  (can don when seated EOB)   Oral Hygiene   Type of Assistance Needed Supervision   Comment 2* to pain, task modification completed seated to dec pain andinc fx performance   Oral Hygiene CARE Score 4   Upper Body Dressing   Type of Assistance Needed Physical assistance   Physical Assistance Level 26%-50%   Comment modA for TLSO mngmnt   Upper Body Dressing CARE Score 3   Lying to Sitting on Side of Bed   Type of Assistance Needed Incidental touching   Comment pt with G carryover of log rolling technique, CGA for trunk mngmnt   Lying to Sitting on Side of Bed CARE Score 4   Sit to Stand   Type of Assistance Needed Physical assistance   Physical Assistance Level 25% or less   Comment Benito/CGA with Rw 2* to pain   Sit to Stand CARE Score 3   Bed-Chair Transfer   Type of Assistance Needed Physical assistance   Physical Assistance Level 25% or less   Comment Benito SPT with RW   Chair/Bed-to-Chair Transfer CARE Score 3   Toileting Hygiene   Type of Assistance Needed Physical assistance   Physical Assistance Level 51%-75%   Comment maxA for hyigne/CM 2* pain  modA for balance 2* to dec balance during bimanual release   Toileting Hygiene CARE Score 2   Toilet Transfer   Type of Assistance Needed Physical assistance   Physical Assistance Level 26%-50%   Comment Benito/modA SPT with Rw   Toilet Transfer CARE Score 3   Toilet Transfer   Surface Assessed Standard Commode   Transfer Technique Stand Pivot   Limitations Noted In Balance; Endurance;UE Strength;LE Strength   Adaptive Equipment Walker   Cognition   Overall Cognitive Status WFL   Arousal/Participation Alert; Cooperative   Attention Within functional limits   Orientation Level Oriented X4   Memory Within functional limits   Following Commands Follows one step commands without difficulty   Activity Tolerance   Activity Tolerance Patient limited by pain  (requires frequent rest breaks and slow movt)   Medical Staff Made Aware ortho BP supine 153/70; sit 132/82; stand 149/77, no c/o dizziness   Assessment   Treatment Assessment Engaged pt in brief 30mins of skilled OT services with focus on bed mobility, toilet transfer/hygiene and shrt fx mobility  Upon therapist arrival pt side lying in bed reporting 9/10 pain in back  Agreeable to particiapte in OT session this Am  Pt req CGA for bed mobility, BP assessed see above, pt performed short fx mobility bedroom>bathroom Benito/CGA no BLE buckling observed  Engaged in seated oral hygiene 2* pain  End of session pt attempting to sit up in wc to inc activity [place], educ if too painful to ring to get placed back into bed and positioning  Pt can cont ot benefit from further skilled OT services with focus on activity tolerance, pain mngmnt, TLSO mngmnt, UE TE, sitting/standing tolerance to inc fx performance  Prognosis Good   Problem List Decreased strength;Decreased endurance;Decreased range of motion; Impaired balance; Impaired sensation;Orthopedic restrictions;Pain   Barriers to Discharge Inaccessible home environment;Decreased caregiver support   Plan   Treatment/Interventions ADL retraining;Functional transfer training; Therapeutic exercise; Endurance training;Patient/family training;Bed mobility; Compensatory technique education   Progress Slow progress, decreased activity tolerance  (inc pain)   Recommendation   OT Discharge Recommendation   (pending progress home vs OP)   OT Therapy Minutes   OT Time In 0830   OT Time Out 0900   OT Total Time (minutes) 30 OT Mode of treatment - Individual (minutes) 30   OT Mode of treatment - Concurrent (minutes) 0   OT Mode of treatment - Group (minutes) 0   OT Mode of treatment - Co-treat (minutes) 0   OT Mode of Treatment - Total time(minutes) 30 minutes   OT Cumulative Minutes 120   Therapy Time missed   Time missed?  No

## 2022-11-29 ENCOUNTER — APPOINTMENT (OUTPATIENT)
Dept: RADIOLOGY | Facility: HOSPITAL | Age: 67
End: 2022-11-29

## 2022-11-29 PROBLEM — E55.9 VITAMIN D INSUFFICIENCY: Status: ACTIVE | Noted: 2022-11-29

## 2022-11-29 RX ORDER — BISACODYL 10 MG
10 SUPPOSITORY, RECTAL RECTAL ONCE
Status: COMPLETED | OUTPATIENT
Start: 2022-11-29 | End: 2022-11-29

## 2022-11-29 RX ORDER — METHOCARBAMOL 750 MG/1
750 TABLET, FILM COATED ORAL
Status: DISCONTINUED | OUTPATIENT
Start: 2022-11-29 | End: 2022-12-02

## 2022-11-29 RX ORDER — GABAPENTIN 400 MG/1
400 CAPSULE ORAL
Status: DISCONTINUED | OUTPATIENT
Start: 2022-11-29 | End: 2022-12-09

## 2022-11-29 RX ORDER — METHOCARBAMOL 500 MG/1
1000 TABLET, FILM COATED ORAL
Status: DISCONTINUED | OUTPATIENT
Start: 2022-11-29 | End: 2022-12-02

## 2022-11-29 RX ORDER — GABAPENTIN 300 MG/1
300 CAPSULE ORAL 2 TIMES DAILY
Status: DISCONTINUED | OUTPATIENT
Start: 2022-11-29 | End: 2022-12-09

## 2022-11-29 RX ADMIN — LISINOPRIL 5 MG: 5 TABLET ORAL at 08:58

## 2022-11-29 RX ADMIN — CHOLECALCIFEROL TAB 25 MCG (1000 UNIT) 2000 UNITS: 25 TAB at 08:58

## 2022-11-29 RX ADMIN — HYDROMORPHONE HYDROCHLORIDE 2 MG: 2 TABLET ORAL at 10:43

## 2022-11-29 RX ADMIN — HYDROMORPHONE HYDROCHLORIDE 2 MG: 2 TABLET ORAL at 21:38

## 2022-11-29 RX ADMIN — GABAPENTIN 400 MG: 400 CAPSULE ORAL at 21:37

## 2022-11-29 RX ADMIN — GABAPENTIN 300 MG: 300 CAPSULE ORAL at 15:20

## 2022-11-29 RX ADMIN — METHOCARBAMOL 1000 MG: 500 TABLET, FILM COATED ORAL at 21:37

## 2022-11-29 RX ADMIN — BISACODYL 10 MG: 10 SUPPOSITORY RECTAL at 17:45

## 2022-11-29 RX ADMIN — OXYCODONE HYDROCHLORIDE 5 MG: 5 TABLET ORAL at 03:14

## 2022-11-29 RX ADMIN — HYDROMORPHONE HYDROCHLORIDE 2 MG: 2 TABLET ORAL at 05:55

## 2022-11-29 RX ADMIN — HYDROMORPHONE HYDROCHLORIDE 2 MG: 2 TABLET ORAL at 00:21

## 2022-11-29 RX ADMIN — HYDROMORPHONE HYDROCHLORIDE 2 MG: 2 TABLET ORAL at 16:46

## 2022-11-29 RX ADMIN — METHOCARBAMOL 750 MG: 750 TABLET, FILM COATED ORAL at 05:55

## 2022-11-29 RX ADMIN — DOCUSATE SODIUM 100 MG: 100 CAPSULE, LIQUID FILLED ORAL at 17:45

## 2022-11-29 RX ADMIN — METHOCARBAMOL 750 MG: 750 TABLET, FILM COATED ORAL at 12:01

## 2022-11-29 RX ADMIN — LIDOCAINE 2 PATCH: 50 PATCH CUTANEOUS at 08:58

## 2022-11-29 RX ADMIN — GABAPENTIN 300 MG: 300 CAPSULE ORAL at 08:58

## 2022-11-29 RX ADMIN — SENNOSIDES 8.6 MG: 8.6 TABLET, FILM COATED ORAL at 17:45

## 2022-11-29 RX ADMIN — SENNOSIDES 8.6 MG: 8.6 TABLET, FILM COATED ORAL at 08:58

## 2022-11-29 RX ADMIN — METHOCARBAMOL 750 MG: 750 TABLET, FILM COATED ORAL at 00:21

## 2022-11-29 RX ADMIN — ASPIRIN 325 MG ORAL TABLET 325 MG: 325 PILL ORAL at 08:58

## 2022-11-29 RX ADMIN — DOCUSATE SODIUM 100 MG: 100 CAPSULE, LIQUID FILLED ORAL at 08:58

## 2022-11-29 RX ADMIN — POLYETHYLENE GLYCOL 3350 17 G: 17 POWDER, FOR SOLUTION ORAL at 08:58

## 2022-11-29 NOTE — ASSESSMENT & PLAN NOTE
Continue home lisinopril 5mg daily  Monitor BP with routine VS     Complaints of lightheadedness when getting OOB  Obtain orthostatics prior to therapies - WNL

## 2022-11-29 NOTE — PLAN OF CARE
Problem: SKIN/TISSUE INTEGRITY - ADULT  Goal: Incision(s), wounds(s) or drain site(s) healing without S/S of infection  Description: INTERVENTIONS  - Assess and document dressing, incision, wound bed, drain sites and surrounding tissue  - Provide patient and family education  - Perform skin care/dressing changes every day  Outcome: Progressing     Problem: PAIN - ADULT  Goal: Verbalizes/displays adequate comfort level or baseline comfort level  Description: Interventions:  - Encourage patient to monitor pain and request assistance  - Assess pain using appropriate pain scale  - Administer analgesics based on type and severity of pain and evaluate response  - Implement non-pharmacological measures as appropriate and evaluate response  - Consider cultural and social influences on pain and pain management  - Notify physician/advanced practitioner if interventions unsuccessful or patient reports new pain  Outcome: Progressing     Problem: SAFETY ADULT  Goal: Patient will remain free of falls  Description: INTERVENTIONS:  - Educate patient/family on patient safety including physical limitations  - Instruct patient to call for assistance with activity   - Consult OT/PT to assist with strengthening/mobility   - Keep Call bell within reach  - Keep bed low and locked with side rails adjusted as appropriate  - Keep care items and personal belongings within reach  - Initiate and maintain comfort rounds  - Make Fall Risk Sign visible to staff  - Initiate/Maintain bed/chair alarm  - Obtain necessary fall risk management equipment: wc  - Apply yellow socks and bracelet for high fall risk patients  - Consider moving patient to room near nurses station  Outcome: Progressing     Problem: Knowledge Deficit  Goal: Patient/family/caregiver demonstrates understanding of disease process, treatment plan, medications, and discharge instructions  Description: Complete learning assessment and assess knowledge base    Interventions:  - Provide teaching at level of understanding  - Provide teaching via preferred learning methods  Outcome: Progressing

## 2022-11-29 NOTE — ASSESSMENT & PLAN NOTE
Per patient - hx of CVA in 1996  Statin discontinued due to elevated liver enzymes; consider restarting as able  Continue ASA for DVT ppx - would benefit from low dose preventative ASA afterwards

## 2022-11-29 NOTE — PROGRESS NOTES
PM&R PROGRESS NOTE:  Ronal Rosario 78 y o  male MRN: [id number]  Unit/Bed#: -63 Encounter: [id number]        Rehabilitation Diagnosis: Impairment of mobility, safety and Activities of Daily Living (ADLs) due to Orthopedic Disorders:  08 9  Other Orthopedic Scoliosis and Flatback syndrome  Etiologic Diagnosis:  Scoliosis and Flatback syndrome s/p thoracic and lumbar revision and fusion sx    SUBJECTIVE: Patient seen this am   Complaining of pain worst when he wakes up  Located in back, severe this AM   No radiation  He is concerned about hardware migration as this occurred remotely in the past during his first surgery  X-ray to be obtained  Also will adjust medication timing at night of gabapentin and Robaxin with his agreement  ASSESSMENT: Stable, progressing      PLAN:    Rehabilitation  • Functional deficits: Impaired mobility and self care, lumbar precautions  • Continue current rehabilitation plan of care to maximize function  • Functional update:   o Ambulating 27 feet Min A level with RW, Transfers are Min A with RW  • Estimated Discharge: TBD      * Status post lumbar spinal fusion  Assessment & Plan  S/P removal of hardware L1-L3  Posterior thoracic/ lumbar instrumentation from T8 to pelvis  Thoracic osteotomy T11/12  Thoracic laminotomy T8/9, T9/10 and T10/11  Posterior thoracic fusion T7-L1  Revision instrumentation T8 to pelvis by Dr Patrick Russ on 11/22  Monitor incision (14 days post-sx Tue 12/6) - healing well   - Increased pain morning of 11/29/22    Xray obtained:   IMPRESSION: Interval postsurgical changes without evidence of hardware complication  No acute osseous abnormality   -Adjustments made to pain regimen  - Follow-up at 2 weeks postop for suture removal and xrays of thoracolumbar spine      Can shower now with dressing removed but do not submerge based on dc instructions   - Do not take NSAIDs (Advil, Aleve, Ibuprofen, Motrin, Naproxen, etc ) for 3 months following your surgery  This may impede the healing of your fusion  Thoracic/lumbar spine precautions   Custom-molded LSO brace from [employer] from pre-op  If not, please fit patient with [place] LSO brace  Pt may don brace in a sitting position and should have brace on at all times when ambulating and working with PT  Follow-up with spine surgery after d/c from [facility] and if needed during [facility] course    - Recommend acute comprehensive interdisciplinary inpatient rehabilitation to include intensive skilled therapies (PT, OT) as outlined with oversight and management by rehabilitation physician as well as inpatient rehab level nursing, case management and weekly interdisciplinary team meetings  Pain  Assessment & Plan  Has been rating as moderate-severe on average  Better than yesterday and is participating in therapies  Strength intact, has some chronic pain in legs worse with ambulating   New Regimen as follows after discussion with patient and his wife:  Dilaudid 2 mg Q4 hours for severe pain  Oxycodone IR 5 mg Q4 hours for breakthrough pain  Robaxin 750mg BID and 1000 mg QHS for spasms  (changed 22)  Gabapentin 300mg BID and 400 mg QHS (changed 22)  Topical Lidoderm patches x 2     Continue topical ICE    Per d/c instructions - - Do not take NSAIDs (Advil, Aleve, Ibuprofen, Motrin, Naproxen, etc ) for 3 months following your surgery  This may impede the healing of your fusion  Monitor for oversedation, AMS/delirium, and respiratory depression   If being administered - hold opiates, muscle relaxants, benzodiazepines, and gabapentin for oversedation, AMS, or RR<12    Counseled on and continue to encourage deep breathing/relaxation/behavioral pain management techniques      Difficulty coping with pain  Assessment & Plan  Supportive counseling  Consider Psychology consult while in [place] on and continue to encourage deep breathing/relaxation/behavioral management techniques:     Deep breathin seconds in, 5 seconds out, 5 times per hour when awake and PRN when experiencing pain or anxiety  Avoid holding breath and tightening muscles and instead breathe slowly and deeply      Constipation  Assessment & Plan  LBM prior to admission   - Hold standing iron supplement for now   - Dulcolax supp x1   - Daily miralax for now  - Colace/Senna BID  - PRN bowel regimen (Miralax PRN/Dulcolax supp PRN)  - monitor for signs and symptoms of obstruction/ileus > not currently; hold stimulant lax if occurs  - Limiting constipating medications if possible  - Ensure adequate hydration       Leukocytosis  Assessment & Plan  Trending down   Internal medicine consult and management during [facility] course  Patient afebrile, other vitals unremarkable > continue to monitor   No clear signs or symptoms of infection or VTE but will continue to monitor  Monitor CBC intermittently       Transaminitis  Assessment & Plan  Monitor  Elevated therefore discontinued all Tylenol and Statin  Hx of hepatitis and teenager  Patient would like to avoid standing APAP    At risk for venous thromboembolism (VTE)  Assessment & Plan  SCDs, ambulation, and aspirin 325mg qday per sx  11/25 Venous doppler negative for VTE       Hypertension  Assessment & Plan  Internal medicine consulted and co-management with their service  Monitor vitals with and without activity; monitor for orthostasis  Monitor hemoglobin, electrolytes, kidney function, hydration status   Current meds: lisinopril       Hyperlipidemia  Assessment & Plan  Discontinued Statin due to increase in LFTs        Appreciate IM consultants medical co-management  Labs, medications, and imaging personally reviewed  ROS:  A ten point review of systems was completed on 11/29/22 and pertinent positives are listed in subjective section  All other systems reviewed were negative         OBJECTIVE:   /66 (BP Location: Left arm)   Pulse 84   Temp 97 8 °F (36 6 °C) (Tympanic)   Resp 16   Ht 5' 10" (1 778 m)   Wt 98 8 kg (217 lb 12 8 oz)   SpO2 95%   BMI 31 25 kg/m²     Physical Exam  Vitals and nursing note reviewed  Constitutional:       General: He is not in acute distress  HENT:      Head: Normocephalic and atraumatic  Nose: Nose normal       Mouth/Throat:      Mouth: Mucous membranes are moist    Eyes:      Conjunctiva/sclera: Conjunctivae normal    Cardiovascular:      Rate and Rhythm: Normal rate and regular rhythm  Pulses: Normal pulses  Pulmonary:      Effort: Pulmonary effort is normal       Breath sounds: Normal breath sounds  No wheezing or rales  Abdominal:      General: Bowel sounds are normal  There is no distension  Palpations: Abdomen is soft  Tenderness: There is no abdominal tenderness  Musculoskeletal:         General: No swelling  Cervical back: Neck supple  Skin:     General: Skin is warm  Comments: Incision clean and intact - no drainage  +Steristrips   Neurological:      Mental Status: He is alert and oriented to person, place, and time        Comments: Seen working in PT at wheelchair level today   Psychiatric:         Mood and Affect: Mood normal           Lab Results   Component Value Date    WBC 6 84 11/28/2022    HGB 11 8 (L) 11/28/2022    HCT 37 7 11/28/2022    MCV 88 11/28/2022     11/28/2022     Lab Results   Component Value Date    SODIUM 133 (L) 11/28/2022    K 4 0 11/28/2022     11/28/2022    CO2 27 11/28/2022    BUN 19 11/28/2022    CREATININE 0 82 11/28/2022    GLUC 98 11/28/2022    CALCIUM 9 1 11/28/2022     Lab Results   Component Value Date    INR 1 02 05/24/2022    INR 1 00 08/16/2017    PROTIME 13 0 05/24/2022    PROTIME 10 5 08/16/2017           Current Facility-Administered Medications:   •  aspirin tablet 325 mg, 325 mg, Oral, Daily, Shiela Pedraza MD, 325 mg at 11/29/22 5569  •  bisacodyl (DULCOLAX) rectal suppository 10 mg, 10 mg, Rectal, Daily PRN, Shiela Pedraza MD, 10 mg at 11/25/22 6030  • cholecalciferol (VITAMIN D3) tablet 2,000 Units, 2,000 Units, Oral, Daily, BAYRON Kate, 2,000 Units at 11/29/22 0858  •  docusate sodium (COLACE) capsule 100 mg, 100 mg, Oral, BID, Melisa Mcnamara MD, 100 mg at 11/29/22 1745  •  gabapentin (NEURONTIN) capsule 300 mg, 300 mg, Oral, BID, Monisha Taylor MD, 300 mg at 11/29/22 1520  •  gabapentin (NEURONTIN) capsule 400 mg, 400 mg, Oral, HS, Monisha Taylor MD  •  HYDROmorphone (DILAUDID) tablet 2 mg, 2 mg, Oral, Q4H PRN, Monisha Taylor MD, 2 mg at 11/29/22 1646  •  lidocaine (LIDODERM) 5 % patch 2 patch, 2 patch, Topical, Daily, Monisha Taylor MD, 2 patch at 11/29/22 0858  •  lisinopril (ZESTRIL) tablet 5 mg, 5 mg, Oral, Daily, Melisa Mcnamara MD, 5 mg at 11/29/22 9770  •  methocarbamol (ROBAXIN) tablet 1,000 mg, 1,000 mg, Oral, HS, Monisha Taylor MD  •  methocarbamol (ROBAXIN) tablet 750 mg, 750 mg, Oral, BID after meals, Monisha Taylor MD  •  naloxone (NARCAN) 0 04 mg/mL syringe 0 04 mg, 0 04 mg, Intravenous, Q1MIN PRN, Melisa Mcnamara MD  •  ondansetron (ZOFRAN-ODT) dispersible tablet 4 mg, 4 mg, Oral, Q8H PRN, Melisa Mcnamara MD  •  oxyCODONE (ROXICODONE) IR tablet 5 mg, 5 mg, Oral, Q4H PRN, Monisha Taylor MD, 5 mg at 11/29/22 4313  •  polyethylene glycol (MIRALAX) packet 17 g, 17 g, Oral, Daily PRN, Melisa Mcnamara MD  •  polyethylene glycol (MIRALAX) packet 17 g, 17 g, Oral, Daily, Melisa Mcnamara MD, 17 g at 11/29/22 7493  •  senna (SENOKOT) tablet 8 6 mg, 1 tablet, Oral, BID, Melisa Mcnamara MD, 8 6 mg at 11/29/22 1285    Past Medical History:   Diagnosis Date   • Ambulates with cane     "long distance"   • Arthritis    • Back pain    • Chronic pain disorder    • Dental crowns present    • Exercise involving walking     daily -short walks   • History of hepatitis C     per pt "went away on its own" age 13"   • History of transfusion    • Hyperlipidemia    • Hypertension    • Leg pain     and foot pain   • Limb alert care status per pt NO IV's LEFT UPPER ARM due to old injury   • Risk for falls    • Spinal stenosis    • Stroke [facility])     per pt in 1996-and no lasting problems   • Wears glasses     readers       Patient Active Problem List    Diagnosis Date Noted   • Status post lumbar spinal fusion 05/25/2022   • Pain 11/25/2022   • Vitamin D insufficiency 11/29/2022   • History of stroke 11/28/2022   • Constipation 11/26/2022   • Difficulty coping with pain 11/26/2022   • At risk for venous thromboembolism (VTE) 11/25/2022   • Transaminitis 11/25/2022   • Leukocytosis 11/25/2022   • Back pain 11/22/2022   • Chronic pain 11/22/2022   • Flatback syndrome 11/22/2022   • Hyperlipidemia    • Hypertension           Audrey Braun MD    Total time spent:  40 minutes with more than 50% spent counseling/coordinating care  Counseling includes discussion with patient re: progress and discussion with patient of his/her current medical/functional state/information  Coordination of patient's care was performed in conjunction with consulting services  Time invested included review of patient's labs, vitals, and management of their comorbidities with continued monitoring  The care of the patient was extensively discussed and appropriate treatment plan was formulated unique for this patient  Update provided to his wife Marina Appiah today and answered questions  ** Please Note:  voice to text software may have been used in the creation of this document   Although proof errors in transcription or interpretation are a potential of such software**

## 2022-11-29 NOTE — PROGRESS NOTES
11/29/22 1045   Pain Assessment   Pain Assessment Tool 0-10   Pain Score 10 - Worst Possible Pain   Other Comments   Assessment Pt refused 0700 ADL OT session d/t extreme pain  JENI Alexander reports medicine for pain provided at 0600  Pt states, "The doctor told me yesterday that if my pain is worse tomorrow that they will do more imaging  I think we need the imagining " Reported pt's statement and pain level to RN  Imaging completed and following review states, "Interval postsurgical changes without evidence of hardware complication  No acute osseous abnormality " OT session again attempted at 1045 pt found side lying in bed reporting 10/10 pain and that today is "the worst pain I've had here " Reported current pain to JENI Bowman who will follow up with additional medicine for pain  When educated on need for therapy and therapy benefits pt refuses OT session at this time  Reminded pt of 3hr rule and the need to follow up with him later to attempt session  Therapy Time missed   Time missed?  Yes   Amount of time missed 90   Reason for time missed   (Extreme pain)   Time(s) multiple attempts made 0700, 1045

## 2022-11-29 NOTE — PCC CARE MANAGEMENT
11/29- Pt lives with wife Ara Case in 2 story home, 1 + 3 BRENDA  Prior to admission, pt reports being independent with ADL IADLs including driving  Pt has 4 children, 1 this at home, the other 3 are not local  Pt reports having r/w, shower chair and w/c in home but did not utilize equipment  Pt reports in May 2022 he was admitted to [facility], and has hx of OP Therapy in [facility]  Pt reports no hx of C  Preferred pharmacy is [facility] in [facility], PCP is Dr Kitty Mendiola  12/8- Pt made good progress in rehab program and therapies  Pt on track to dc Saturday 12/10 w/ OP PT  Pt and wife scheduling OP PT in [place] through [address] [place] [address]

## 2022-11-29 NOTE — PROGRESS NOTES
11/29/22 1330   Pain Assessment   Pain Assessment Tool 0-10   Pain Score 8  (constand 5/10, spasms intermittently with sitting or standing to 8/10)   Pain Location/Orientation Orientation: Lower; Location: Back   Restrictions/Precautions   Precautions Pain; Fall Risk;Spinal precautions;Supervision on toilet/commode   Braces or Orthoses TLSO   Cognition   Overall Cognitive Status WFL   Arousal/Participation Lethargic;Cooperative   Following Commands Follows one step commands with increased time or repetition   Subjective   Subjective reports still having spasms, asking how long he has to be out of bed   Roll Left and Right   Type of Assistance Needed Adaptive equipment; Independent   Comment using bed rails and given time to perform on his own   Roll Left and Right CARE Score 6   Sit to Lying   Type of Assistance Needed Supervision   Sit to Lying CARE Score 4   Lying to Sitting on Side of Bed   Type of Assistance Needed Supervision   Lying to Sitting on Side of Bed CARE Score 4   Sit to Stand   Type of Assistance Needed Verbal cues; Physical assistance   Physical Assistance Level 25% or less   Comment Cueing to slow down, control balance by squeezing his gluteals during hand transition form chair to RW   Sit to Stand CARE Score 3   Bed-Chair Transfer   Type of Assistance Needed Physical assistance;Verbal cues; Adaptive equipment   Physical Assistance Level 25% or less   Comment SPt with RW, cueing again to keep walker close especially with turning   Chair/Bed-to-Chair Transfer CARE Score 3   Walk 10 Feet   Type of Assistance Needed Physical assistance;Verbal cues; Adaptive equipment   Physical Assistance Level 25% or less   Comment with RW   Walk 10 Feet CARE Score 3   Walk 50 Feet with Two Turns   Reason if not Attempted Safety concerns   Walk 50 Feet with Two Turns CARE Score 88   Walk 150 Feet   Reason if not Attempted Safety concerns   Walk 150 Feet CARE Score 88   Ambulation   Primary Mode of Locomotion Prior to Admission Walk   Distance Walked (feet) 10 ft   Assist Device Roller Walker   Gait Pattern Decreased foot clearance; Slow Leisa; Forward Flexion; Step to   Does the patient walk? 2  Yes   Therapeutic Interventions   Strengthening Calmshells; Sidelying hip Abd w/ Assist; LAQ; Standing hip flexion in P bars, Mini squats; static standing in bars x 3 bouts(45-60 sec tolerance)   Flexibility passive stretch B quad and hip flexors in sidelying   Assessment   Treatment Assessment Pt cooperative and participating, but continues to have poor tolerance for out of bed activity  Poor ability to ambulate any distance due to pain and weakness  Decreased problem solving and coordination with delay in following commands  Possibly related to mediucations? Spoke witH MD   PT Therapy Minutes   PT Time In 1330   PT Time Out 1430   PT Total Time (minutes) 60   PT Mode of treatment - Individual (minutes) 60   PT Mode of treatment - Concurrent (minutes) 0   PT Mode of treatment - Group (minutes) 0   PT Mode of treatment - Co-treat (minutes) 0   PT Mode of Treatment - Total time(minutes) 60 minutes   PT Cumulative Minutes 285   Therapy Time missed   Time missed?  Yes

## 2022-11-29 NOTE — ASSESSMENT & PLAN NOTE
Continue pravastatin 10mg daily as substitute for non-formulary lovastatin 10mg daily  Discontinued due to elevated liver enzymes  Lipid panel on 11/28: Cholesterol 151, Triglycerides 94, HDL 30 and   Restart statin if repeat CMP is stable

## 2022-11-29 NOTE — PROGRESS NOTES
11/29/22 1230   Pain Assessment   Pain Assessment Tool 0-10   Pain Score 8   Pain Location/Orientation Orientation: Mid;Location: Back   Restrictions/Precautions   Precautions Fall Risk;Pain;Spinal precautions   Braces or Orthoses TLSO  (may be donned EOB)   Lifestyle   Autonomy "Let's do it while I'm feeling ok  Vale Ozone Vale Ozone The spasms have stopped and thats the difference "   Oral Hygiene   Type of Assistance Needed Supervision   Physical Assistance Level No physical assistance   Comment seated d/t high pain   Oral Hygiene CARE Score 4   Shower/Bathe Self   Type of Assistance Needed Physical assistance   Physical Assistance Level 51%-75%   Comment Shower completed from seated position on tub bench  Pt noted tense t/o most of shower keeping at least one hand tight on GB at all times  Assist for LB and to wash buttocks using WS technique  Pt was able to wash UB and hair on own w/ extended time  Shower/Bathe Self CARE Score 2   Bathing   Assessed Bath Style Shower   Tub/Shower Transfer   Findings ModA SPT RW to GB and tub bench  Dec problem solving and required cues,dec execution to cues resulting in "plop" onto tub bench and increased pain  Transferred out of wet shower via sit pivot to [facility] w/ GB which pt executed more safely w/ Lucia     Upper Body Dressing   Type of Assistance Needed Physical assistance   Physical Assistance Level 26%-50%   Comment A to fully pull shirt over trunk and partial assist for TLSO don/doff   Upper Body Dressing CARE Score 3   Lower Body Dressing   Type of Assistance Needed Physical assistance   Physical Assistance Level 51%-75%   Comment use of LHR to don underwear and pants, d/t high pain pt did continues to require partial assist    Lower Body Dressing CARE Score 2   Putting On/Taking Off Footwear   Type of Assistance Needed Physical assistance   Physical Assistance Level 26%-50%   Comment modA w/ sock aid, assist required d/t pain and pt not taking his time   Putting On/Taking Off Footwear CARE Score 3   Lying to Sitting on Side of Bed   Type of Assistance Needed Supervision   Physical Assistance Level No physical assistance   Comment log roll tech, HOB slightly elevated and use of bed rail   Lying to Sitting on Side of Bed CARE Score 4   Sit to Stand   Type of Assistance Needed Physical assistance   Physical Assistance Level 26%-50%   Comment min-mod to RW   Sit to Stand CARE Score 3   Bed-Chair Transfer   Type of Assistance Needed Physical assistance   Physical Assistance Level 26%-50%   Comment min-mod w/ RW SPT   Chair/Bed-to-Chair Transfer CARE Score 3   Cognition   Overall Cognitive Status WFL   Arousal/Participation Alert; Cooperative   Attention Within functional limits   Orientation Level Oriented X4   Memory Within functional limits   Following Commands Follows one step commands with increased time or repetition   Comments At times high pain can impact pt's decision making noted rushing through shower transfer   Activity Tolerance   Activity Tolerance Patient limited by pain   Assessment   Treatment Assessment OT session focusing on shower completion  Pt reports pain is low enough now that spasms have stopped for the time being  Pt agreeable to shower trial and reports feeling better following  Pt continues to require skilled hands on assist in order to maintain his safety  Pt continues to be most limited by high pain which affects his ability to participate  Pt requires up to [address] [place] to complete basic ADLs  Problem List Decreased strength;Decreased range of motion;Decreased endurance; Impaired balance;Decreased mobility; Decreased coordination;Decreased cognition; Impaired judgement;Decreased safety awareness; Impaired sensation;Decreased skin integrity;Orthopedic restrictions;Pain   Plan   Treatment/Interventions ADL retraining;Functional transfer training; Therapeutic exercise; Endurance training;Patient/family training;Equipment eval/education; Compensatory technique education;Continued evaluation;Spoke to nursing   Progress Slow progress, decreased activity tolerance   OT Therapy Minutes   OT Time In 1230   OT Time Out 1315   OT Total Time (minutes) 45   OT Mode of treatment - Individual (minutes) 45   OT Mode of treatment - Concurrent (minutes) 0   OT Mode of treatment - Group (minutes) 0   OT Mode of treatment - Co-treat (minutes) 0   OT Mode of Treatment - Total time(minutes) 45 minutes   OT Cumulative Minutes 225   Therapy Time missed   Time missed?  Yes   Amount of time missed 75  (Missing a total of 75 scheduled OT min this day)

## 2022-11-29 NOTE — PROGRESS NOTES
[facility]  Progress Note - Scarlet Don 1955, 78 y o  male MRN: [id number]  Unit/Bed#: [facility] 509-31 Encounter: [id number]  Primary Care Provider: Balaji Mercedes MD   Date and time admitted to hospital: 11/25/2022  2:14 PM    Vitamin D insufficiency  Assessment & Plan  Vitamin D level 24 5 on 11/28  Started on Vitamin D 2000u daily  History of stroke  Assessment & Plan  Per patient - hx of CVA in 1996  Statin discontinued due to elevated liver enzymes; consider restarting as able  Continue ASA for DVT ppx - would benefit from low dose preventative ASA afterwards  Transaminitis  Assessment & Plan  AST 79, ALT 71 on 11/28  Per patient - hx of mild elevations due to diagnosis of hepatitis C as a teenager  Tylenol and statin discontinued at this time  Continue to trend routine CMP  Hypertension  Assessment & Plan  Continue home lisinopril 5mg daily  Monitor BP with routine VS     Complaints of lightheadedness when getting OOB  Obtain orthostatics prior to therapies - WNL  Hyperlipidemia  Assessment & Plan  Continue pravastatin 10mg daily as substitute for non-formulary lovastatin 10mg daily  Discontinued due to elevated liver enzymes  Lipid panel on 11/28: Cholesterol 151, Triglycerides 94, HDL 30 and   Restart statin if repeat CMP is stable  * Status post lumbar spinal fusion  Assessment & Plan  Hx of prior back surgeries with hx of scoliosis, pseudoarthrosis, and flat back deformity  11/22: elective removal of hardware L1-L3, posterior thoracic/lumbar instrumentation from T8 to pelvis, thoracic osteotomy T11/T12, thoracic laminotomy T8/T9, T9/T10, T10/T11, posterior thoracic fusion T7-L1, revision instrumentation T8 to pelvis performed by Dr Gilda Diaz (Orthopedics)  Continue ASA 325mg daily for DVT ppx  TLSO brace when getting OOB  Ensure adequate pain control  Neurovascular checks Q shift      2 week follow-up with repeat thoracolumbar spine XRs ()  Repeat XRs obtained on  due to increasing pain: interval post-surgical changes without evidence of hardware complication  No acute osseous abnormalities  Primary team following  PT/OT  VTE Pharmacologic Prophylaxis:   Pharmacologic:  per Ortho  Mechanical VTE Prophylaxis in Place: Yes - sequential compression devices  Current Length of Stay: 4 day(s)    Current Patient Status: Inpatient Rehab     Discharge Plan: As per primary team     Code Status: Level 1 - Full Code    Subjective:   Pt examined while pt lying in bed in pt room  Complaints of mid back pain, 3-4/10, aching, improves with pain medication  States that his back spasms were so severe this morning that he could not get OOB  Therefore he missed his therapy session this morning  Encouraged to increase his movement so that the spasticity is less severe  Acknowledged understanding  Denies any lightheadedness or dizziness today  Currently experiencing constipation, LBM was on , denies any abdominal pain and is passing gas  Plans for suppository later this evening and pt is agreeable  Slept well last night  Reviewed labs - states that he was taking vitamin D at home, unsure of dose, but had stopped a week before admission  Objective:     Vitals:   Temp (24hrs), Av 5 °F (36 9 °C), Min:97 7 °F (36 5 °C), Max:99 °F (37 2 °C)    Temp:  [97 7 °F (36 5 °C)-99 °F (37 2 °C)] 97 7 °F (36 5 °C)  HR:  [82-89] 85  Resp:  [18] 18  BP: (122-148)/(58-72) 126/71  SpO2:  [93 %-100 %] 93 %  Body mass index is 31 25 kg/m²  Review of Systems   Constitutional: Negative for appetite change, chills, fatigue and fever  HENT: Negative for trouble swallowing  Eyes: Negative for visual disturbance  Respiratory: Negative for cough, shortness of breath, wheezing and stridor  Cardiovascular: Negative for chest pain, palpitations and leg swelling     Gastrointestinal: Negative for abdominal distention, abdominal pain, constipation, diarrhea, nausea and vomiting  LBM 11/26, denies abdominal pain, passing gas, plans for suppository later today   Genitourinary: Negative for difficulty urinating  Musculoskeletal: Positive for back pain (Mid back pain, 3-4/10, aching/throbbing, improves with pain medication)  Negative for arthralgias  + severe back spasms this morning, difficulty getting OOB   Neurological: Negative for dizziness, weakness, light-headedness, numbness and headaches  Psychiatric/Behavioral: Negative for dysphoric mood and sleep disturbance  The patient is not nervous/anxious  All other systems reviewed and are negative  Input and Output Summary (last 24 hours): Intake/Output Summary (Last 24 hours) at 11/29/2022 1059  Last data filed at 11/29/2022 0800  Gross per 24 hour   Intake 1320 ml   Output 550 ml   Net 770 ml       Physical Exam:     Physical Exam  Vitals and nursing note reviewed  Constitutional:       General: He is not in acute distress  Appearance: Normal appearance  He is obese  He is not ill-appearing  HENT:      Head: Normocephalic and atraumatic  Cardiovascular:      Rate and Rhythm: Normal rate and regular rhythm  Pulses: Normal pulses  Heart sounds: Normal heart sounds  No murmur heard  No friction rub  Pulmonary:      Effort: Pulmonary effort is normal  No respiratory distress  Breath sounds: Normal breath sounds  No wheezing or rhonchi  Abdominal:      General: Abdomen is flat  Bowel sounds are normal  There is no distension  Palpations: Abdomen is soft  There is no mass  Tenderness: There is no abdominal tenderness  There is no guarding or rebound  Hernia: No hernia is present  Musculoskeletal:      Cervical back: Normal range of motion and neck supple  No tenderness  Right lower leg: No edema  Left lower leg: No edema  Skin:     General: Skin is warm and dry        Capillary Refill: Capillary refill takes less than 2 seconds  Neurological:      Mental Status: He is alert and oriented to person, place, and time     Psychiatric:         Mood and Affect: Mood normal          Behavior: Behavior normal          Additional Data:     Labs:    Results from last 7 days   Lab Units 11/28/22  0817   WBC Thousand/uL 6 84   HEMOGLOBIN g/dL 11 8*   HEMATOCRIT % 37 7   PLATELETS Thousands/uL 341   NEUTROS PCT % 69   LYMPHS PCT % 18   MONOS PCT % 7   EOS PCT % 5     Results from last 7 days   Lab Units 11/28/22  0817   SODIUM mmol/L 133*   POTASSIUM mmol/L 4 0   CHLORIDE mmol/L 100   CO2 mmol/L 27   BUN mg/dL 19   CREATININE mg/dL 0 82   ANION GAP mmol/L 6   CALCIUM mg/dL 9 1   ALBUMIN g/dL 3 4*   TOTAL BILIRUBIN mg/dL 0 60   ALK PHOS U/L 80   ALT U/L 71*   AST U/L 79*   GLUCOSE RANDOM mg/dL 98                       Labs reviewed    Imaging:    Imaging reviewed    Recent Cultures (last 7 days):           Last 24 Hours Medication List:   Current Facility-Administered Medications   Medication Dose Route Frequency Provider Last Rate   • aspirin  325 mg Oral Daily Carolyn Newsome MD     • bisacodyl  10 mg Rectal Daily PRN Carolyn Newsome MD     • bisacodyl  10 mg Rectal Once ABYRON Enriquez     • cholecalciferol  2,000 Units Oral Daily BAYRON Enriquez     • docusate sodium  100 mg Oral BID Carolyn Newsome MD     • gabapentin  300 mg Oral TID Александр Fung MD     • HYDROmorphone  2 mg Oral Q4H PRN Александр Fung MD     • lidocaine  2 patch Topical Daily Александр Fung MD     • lisinopril  5 mg Oral Daily Carolyn Newsome MD     • methocarbamol  750 mg Oral Q6H [address] Александр Fung MD     • naloxone  0 04 mg Intravenous Q1MIN PRN Carolyn Newsome MD     • ondansetron  4 mg Oral Q8H PRN Carolyn Newsome MD     • oxyCODONE  5 mg Oral Q4H PRN Александр Fung MD     • polyethylene glycol  17 g Oral Daily PRN Carolyn Newsome MD     • polyethylene glycol  17 g Oral Daily Carolyn Newsome MD     • senna  1 tablet Oral BID [facility] Donaldo Reynolds MD          M*Modal software was used to dictate this note  It may contain errors with dictating incorrect words or incorrect spelling  Please contact the provider directly with any questions

## 2022-11-29 NOTE — ASSESSMENT & PLAN NOTE
Hx of prior back surgeries with hx of scoliosis, pseudoarthrosis, and flat back deformity  11/22: elective removal of hardware L1-L3, posterior thoracic/lumbar instrumentation from T8 to pelvis, thoracic osteotomy T11/T12, thoracic laminotomy T8/T9, T9/T10, T10/T11, posterior thoracic fusion T7-L1, revision instrumentation T8 to pelvis performed by Dr Blayne Stout (Orthopedics)  Continue ASA 325mg daily for DVT ppx  TLSO brace when getting OOB  Ensure adequate pain control  Neurovascular checks Q shift  2 week follow-up with repeat thoracolumbar spine XRs (12/6)  Repeat XRs obtained on 11/29 due to increasing pain: interval post-surgical changes without evidence of hardware complication  No acute osseous abnormalities  Primary team following  PT/OT

## 2022-11-30 RX ORDER — POLYETHYLENE GLYCOL 3350 17 G/17G
17 POWDER, FOR SOLUTION ORAL 2 TIMES DAILY
Status: DISCONTINUED | OUTPATIENT
Start: 2022-11-30 | End: 2022-12-03

## 2022-11-30 RX ADMIN — HYDROMORPHONE HYDROCHLORIDE 2 MG: 2 TABLET ORAL at 11:31

## 2022-11-30 RX ADMIN — METHOCARBAMOL 750 MG: 750 TABLET, FILM COATED ORAL at 13:40

## 2022-11-30 RX ADMIN — HYDROMORPHONE HYDROCHLORIDE 2 MG: 2 TABLET ORAL at 04:26

## 2022-11-30 RX ADMIN — POLYETHYLENE GLYCOL 3350 17 G: 17 POWDER, FOR SOLUTION ORAL at 08:46

## 2022-11-30 RX ADMIN — LIDOCAINE 2 PATCH: 50 PATCH CUTANEOUS at 08:46

## 2022-11-30 RX ADMIN — LISINOPRIL 5 MG: 5 TABLET ORAL at 08:46

## 2022-11-30 RX ADMIN — HYDROMORPHONE HYDROCHLORIDE 2 MG: 2 TABLET ORAL at 19:54

## 2022-11-30 RX ADMIN — GABAPENTIN 400 MG: 400 CAPSULE ORAL at 21:36

## 2022-11-30 RX ADMIN — DOCUSATE SODIUM 100 MG: 100 CAPSULE, LIQUID FILLED ORAL at 08:46

## 2022-11-30 RX ADMIN — ASPIRIN 325 MG ORAL TABLET 325 MG: 325 PILL ORAL at 08:46

## 2022-11-30 RX ADMIN — DOCUSATE SODIUM 100 MG: 100 CAPSULE, LIQUID FILLED ORAL at 17:17

## 2022-11-30 RX ADMIN — GABAPENTIN 300 MG: 300 CAPSULE ORAL at 15:04

## 2022-11-30 RX ADMIN — SENNOSIDES 8.6 MG: 8.6 TABLET, FILM COATED ORAL at 17:18

## 2022-11-30 RX ADMIN — HYDROMORPHONE HYDROCHLORIDE 2 MG: 2 TABLET ORAL at 15:47

## 2022-11-30 RX ADMIN — METHOCARBAMOL 1000 MG: 500 TABLET, FILM COATED ORAL at 21:35

## 2022-11-30 RX ADMIN — OXYCODONE HYDROCHLORIDE 5 MG: 5 TABLET ORAL at 05:48

## 2022-11-30 RX ADMIN — CHOLECALCIFEROL TAB 25 MCG (1000 UNIT) 2000 UNITS: 25 TAB at 08:46

## 2022-11-30 RX ADMIN — OXYCODONE HYDROCHLORIDE 5 MG: 5 TABLET ORAL at 22:16

## 2022-11-30 RX ADMIN — POLYETHYLENE GLYCOL 3350 17 G: 17 POWDER, FOR SOLUTION ORAL at 20:18

## 2022-11-30 RX ADMIN — GABAPENTIN 300 MG: 300 CAPSULE ORAL at 06:34

## 2022-11-30 RX ADMIN — SENNOSIDES 8.6 MG: 8.6 TABLET, FILM COATED ORAL at 08:46

## 2022-11-30 NOTE — PLAN OF CARE
Problem: SKIN/TISSUE INTEGRITY - ADULT  Goal: Incision(s), wounds(s) or drain site(s) healing without S/S of infection  Description: INTERVENTIONS  - Assess and document dressing, incision, wound bed, drain sites and surrounding tissue  - Provide patient and family education  - Perform skin care/dressing changes every day  Outcome: Progressing     Problem: PAIN - ADULT  Goal: Verbalizes/displays adequate comfort level or baseline comfort level  Description: Interventions:  - Encourage patient to monitor pain and request assistance  - Assess pain using appropriate pain scale  - Administer analgesics based on type and severity of pain and evaluate response  - Implement non-pharmacological measures as appropriate and evaluate response  - Consider cultural and social influences on pain and pain management  - Notify physician/advanced practitioner if interventions unsuccessful or patient reports new pain  Outcome: Progressing     Problem: DISCHARGE PLANNING  Goal: Discharge to home or other facility with appropriate resources  Description: INTERVENTIONS:  - Identify barriers to discharge w/patient and caregiver  - Arrange for needed discharge resources and transportation as appropriate  - Identify discharge learning needs (meds, wound care, etc )  - Arrange for interpretive services to assist at discharge as needed  - Refer to Case Management Department for coordinating discharge planning if the patient needs post-hospital services based on physician/advanced practitioner order or complex needs related to functional status, cognitive ability, or social support system  Outcome: Progressing

## 2022-11-30 NOTE — PROGRESS NOTES
11/30/22 1400   Pain Assessment   Pain Assessment Tool 0-10   Pain Score 5   Pain Location/Orientation Orientation: Lower; Location: Back   Hospital Pain Intervention(s) Rest;Repositioned   Restrictions/Precautions   Precautions Pain;Spinal precautions;Supervision on toilet/commode; Fall Risk   Braces or Orthoses TLSO   Subjective   Subjective states he feels like he is making progress now today   Lying to Sitting on Side of Bed   Type of Assistance Needed Set-up / clean-up   Lying to Sitting on Side of Bed CARE Score 5   Sit to Stand   Type of Assistance Needed Incidental touching; Adaptive equipment   Comment RW   Sit to Stand CARE Score 4   Bed-Chair Transfer   Type of Assistance Needed Physical assistance; Adaptive equipment   Physical Assistance Level 25% or less   Comment with RW   Chair/Bed-to-Chair Transfer CARE Score 3   Walk 10 Feet   Type of Assistance Needed Physical assistance; Adaptive equipment   Physical Assistance Level 25% or less   Comment with RW   Walk 10 Feet CARE Score 3   Walk 50 Feet with Two Turns   Comment walked 100ft straight line   Reason if not Attempted Safety concerns   Walk 50 Feet with Two Turns CARE Score 88   Walk 150 Feet   Reason if not Attempted Safety concerns   Walk 150 Feet CARE Score 88   Walking 10 Feet on Uneven Surfaces   Reason if not Attempted Safety concerns   Walking 10 Feet on Uneven Surfaces CARE Score 88   Ambulation   Primary Mode of Locomotion Prior to Admission Walk   Distance Walked (feet) 100 ft  (100x 1; 25ft x 4)   Assist Device Roller Walker   Gait Pattern Slow Leisa; Forward Flexion; Step through;Trendelenburg   Limitations Noted In Posture;Strength; Endurance;Device Management;Balance   Provided Assistance with: Balance   Findings improved continuous gait in straight line path, no LOB but relying heavily on B UE support to offload weakness B LE   Does the patient walk? 2  Yes   Wheelchair mobility   Does the patient use a wheelchair? 0   No   Curb or Single Stair   Style negotiated Single stair   Type of Assistance Needed Physical assistance   Physical Assistance Level 25% or less   Comment using B HR leading with left LE   1 Step (Curb) CARE Score 3   4 Steps   Type of Assistance Needed Physical assistance   Physical Assistance Level 25% or less   Comment using B HR leading with left LE, fwd up & bkwd down   4 Steps CARE Score 3   12 Steps   Comment 10 steps before fatiguing, leading with each leg for 5 steps   Reason if not Attempted Safety concerns   12 Steps CARE Score 88   Picking Up Object   Reason if not Attempted Safety concerns   Picking Up Object CARE Score 88   Toilet Transfer   Findings stood in front of toilet with RW and CGA to urinate, washed hands at sink standing wit RW and CGA   Other Comments   Comments remained out of bed at bedside in chair at end of session  Assessment   [facility]  Much improved participation and function this session  Was able to ambulate with improved balance/control and longer distance for first time  Able to work on stairs for strengthening and ability to enter his home  Continues to reqyuire A for balance and in case knees buckle due to weakness  Recommend continue with shorter session at this time until activity tolerance improves  Only c/o muscle spasm once during 45 min session  Plan   Treatment/Interventions Therapeutic exercise;LE strengthening/ROM; Functional transfer training;Patient/family training; Endurance training;Gait training;Bed mobility   Progress Progressing toward goals   Recommendation   PT Discharge Recommendation Home with outpatient rehabilitation   Equipment Recommended Walker   PT Therapy Minutes   PT Time In 1400   PT Time Out 1445   PT Total Time (minutes) 45   PT Mode of treatment - Individual (minutes) 45   PT Mode of treatment - Concurrent (minutes) 0   PT Mode of treatment - Group (minutes) 0   PT Mode of treatment - Co-treat (minutes) 0   PT Mode of Treatment - Total time(minutes) 45 minutes   PT Cumulative Minutes 375   Therapy Time missed   Time missed?  No

## 2022-11-30 NOTE — PROGRESS NOTES
11/30/22 9473   Pain Assessment   Pain Assessment Tool 0-10   Pain Score 7   Pain Location/Orientation Location: Back   Restrictions/Precautions   Precautions Fall Risk;Pain;Spinal precautions   Braces or Orthoses TLSO   Cognition   Following Commands Follows one step commands with increased time or repetition   Comments Pain affecting his ability to focus at times   Subjective   Subjective "My back feels unstable and weak today   Roll Left and Right   Type of Assistance Needed Independent; Adaptive equipment   Physical Assistance Level No physical assistance   Comment using bedrails   Roll Left and Right CARE Score 6   Sit to Lying   Type of Assistance Needed Supervision   Sit to Lying CARE Score 4   Lying to Sitting on Side of Bed   Type of Assistance Needed Supervision   Lying to Sitting on Side of Bed CARE Score 4   Sit to Stand   Type of Assistance Needed Physical assistance; Adaptive equipment   Physical Assistance Level 25% or less   Comment needs RW for balance, improves with practice, but then with fatigue he requires help   Sit to Stand CARE Score 3   Bed-Chair Transfer   Type of Assistance Needed Physical assistance; Adaptive equipment   Physical Assistance Level 25% or less   Comment SPT with RW, improved postioning with RW today   Chair/Bed-to-Chair Transfer CARE Score 3   Walk 10 Feet   Type of Assistance Needed Physical assistance; Adaptive equipment   Physical Assistance Level 25% or less   Comment RW   Walk 10 Feet CARE Score 3   Walk 50 Feet with Two Turns   Reason if not Attempted Safety concerns   Walk 50 Feet with Two Turns CARE Score 88   Walk 150 Feet   Reason if not Attempted Safety concerns   Walk 150 Feet CARE Score 88   Walking 10 Feet on Uneven Surfaces   Reason if not Attempted Safety concerns   Walking 10 Feet on Uneven Surfaces CARE Score 88   Ambulation   Primary Mode of Locomotion Prior to Admission Walk   Distance Walked (feet) 20 ft  (20ft x 3)   Assist Device Roller Walker   Gait Pattern Forward Flexion;Decreased foot clearance; Slow Leisa; Step to; Step through   Limitations Noted In Posture;Swing;Strength;Speed;Balance; Endurance; Heel Strike   Does the patient walk? 2  Yes   Curb or Single Stair   Reason if not Attempted Safety concerns   1 Step (Curb) CARE Score 88   4 Steps   Reason if not Attempted Safety concerns   4 Steps CARE Score 88   12 Steps   Reason if not Attempted Safety concerns   12 Steps CARE Score 88   Therapeutic Interventions   Strengthening LAQ 5 x 5; Standing hip flexion 4 bouts 3 -5 reps alternating to fatigue with seated rest breaks between   Assessment   Treatment Assessment Pt able to get out of bed this morning, go into bathroom, get dressed and ready for therapy all by 730  However asking to lie down for 20 min in bed vs remaining up in chair  Therapy session working on short bouts of ther ex and walking to tolerance  Attempted scooting fwd backward on bed to improve hip hike and wt shift for hygiene on toilet  Pt unable to tolerate lateral wt shift and relys heavily on use of B UE  Session ended at 45 min vs 60 deu to fatigue  Pt remained in w/c at bedside to eat breakfast    PT Barriers   Physical Impairment Pain;Decreased mobility; Impaired balance;Decreased endurance;Decreased strength;Decreased safety awareness   Functional Limitation Walking;Transfers;Stair negotiation;Ramp negotiation;Car transfers   Plan   Treatment/Interventions Gait training;Patient/family training; Endurance training; Therapeutic exercise;LE strengthening/ROM; Functional transfer training;Bed mobility   Progress Slow progress, decreased activity tolerance   PT Therapy Minutes   PT Time In 0730   PT Time Out 0815   PT Total Time (minutes) 45   PT Mode of treatment - Individual (minutes) 45   PT Mode of treatment - Concurrent (minutes) 0   PT Mode of treatment - Group (minutes) 0   PT Mode of treatment - Co-treat (minutes) 0   PT Mode of Treatment - Total time(minutes) 45 minutes   PT Cumulative Minutes 330   Therapy Time missed   Time missed?  No

## 2022-11-30 NOTE — PCC OCCUPATIONAL THERAPY
11/30/22  Pt lives with wife and son in [facility] with first floor setup available, walk-in shower with shower chair, and raised toilet seat with grab bar  Pt reports his son works for [employer] but his wife is retired and can provide [employer] upon d/c if needed  PTA pt completed B/IADLs independently including driving, cooking, laundry, med management, and completed functional mobility within his home without AD and in community with [facility]  Pt barriers include pain, spinal precautions, dec activity/standing tolerance, dec fxnl standing balance, dec performance with AD/IADL's  OT plan to continue addressing above noted barriers in order to progress towards OT goals and dec caregiver burden upon D/C  Plan to re-team     12/7/22  Pt lives with wife and son in [facility] with first floor setup available, walk-in shower with shower chair, and raised toilet seat with grab bar  Pt reports his son works for [employer] but his wife is retired and can provide [employer] upon d/c if needed  PTA pt completed B/IADLs independently including driving, cooking, laundry, med management, and completed functional mobility within his home without AD and in community with [facility]  Pt's CLOF is grossly supervision (exception of footwear) with VC's and use of LHAE in order to adhere to spinal precautions  Pt barriers include pain, spinal precautions, dec activity/standing tolerance, dec fxnl standing balance, dec performance with AD/IADL's  OT plan to continue addressing above noted barriers in order to progress towards OT goals and dec caregiver burden upon D/C  Pt scheduled for family training this afternoon with pt and spouse focusing on CLOF and home safety recommendations  Pt with scheduled d/c date: Saturday, 12/10/22 with first floor set up and support/assist from spouse as needed

## 2022-11-30 NOTE — ASSESSMENT & PLAN NOTE
Hx of prior back surgeries with hx of scoliosis, pseudoarthrosis, and flat back deformity  11/22: elective removal of hardware L1-L3, posterior thoracic/lumbar instrumentation from T8 to pelvis, thoracic osteotomy T11/T12, thoracic laminotomy T8/T9, T9/T10, T10/T11, posterior thoracic fusion T7-L1, revision instrumentation T8 to pelvis performed by Dr Meliton Kiran (Orthopedics)  Continue ASA 325mg daily for DVT ppx  TLSO brace when getting OOB  Ensure adequate pain control  Neurovascular checks Q shift  2 week follow-up with repeat thoracolumbar spine XRs (12/6)  Repeat XRs obtained on 11/29 due to increasing pain: interval post-surgical changes without evidence of hardware complication  No acute osseous abnormalities  Primary team following  PT/OT

## 2022-11-30 NOTE — PROGRESS NOTES
[facility]  Progress Note - Paty Roque 1955, 78 y o  male MRN: [id number]  Unit/Bed#: [facility] 153-55 Encounter: [id number]  Primary Care Provider: Lian Castillo MD   Date and time admitted to hospital: 11/25/2022  2:14 PM    Vitamin D insufficiency  Assessment & Plan  Vitamin D level 24 5 on 11/28  Started on Vitamin D 2000u daily  History of stroke  Assessment & Plan  Per patient - hx of CVA in 1996  Statin discontinued due to elevated liver enzymes; consider restarting as able  Continue ASA for DVT ppx - would benefit from low dose preventative ASA afterwards  Transaminitis  Assessment & Plan  AST 79, ALT 71 on 11/28  Per patient - hx of mild elevations due to diagnosis of hepatitis C as a teenager  Tylenol and statin discontinued at this time  Continue to trend routine CMP  Hypertension  Assessment & Plan  Continue home lisinopril 5mg daily  Monitor BP with routine VS     Complaints of lightheadedness when getting OOB  Obtain orthostatics prior to therapies - WNL  Hyperlipidemia  Assessment & Plan  Continue pravastatin 10mg daily as substitute for non-formulary lovastatin 10mg daily  Discontinued due to elevated liver enzymes  Lipid panel on 11/28: Cholesterol 151, Triglycerides 94, HDL 30 and   Restart statin if repeat CMP is stable  * Status post lumbar spinal fusion  Assessment & Plan  Hx of prior back surgeries with hx of scoliosis, pseudoarthrosis, and flat back deformity  11/22: elective removal of hardware L1-L3, posterior thoracic/lumbar instrumentation from T8 to pelvis, thoracic osteotomy T11/T12, thoracic laminotomy T8/T9, T9/T10, T10/T11, posterior thoracic fusion T7-L1, revision instrumentation T8 to pelvis performed by Dr Marguerite Driver (Orthopedics)  Continue ASA 325mg daily for DVT ppx  TLSO brace when getting OOB  Ensure adequate pain control  Neurovascular checks Q shift      2 week follow-up with repeat thoracolumbar spine XRs ()  Repeat XRs obtained on  due to increasing pain: interval post-surgical changes without evidence of hardware complication  No acute osseous abnormalities  Primary team following  PT/OT  VTE Pharmacologic Prophylaxis:   Pharmacologic: ASA per Ortho  Mechanical VTE Prophylaxis in Place: Yes - sequential compression devices  Current Length of Stay: 5 day(s)    Current Patient Status: Inpatient Rehab     Discharge Plan: As per primary team     Code Status: Level 1 - Full Code    Subjective:   Pt examined while pt lying in bed in pt room  Mid-back pain, pins/needles, 6/10, improves with pain medication  Feels that his pain and spasms are improved since yesterday  Denies any numbness/tingling to B/L upper and lower extremities  Denies any bowel or bladder incontinence  Had a small BM after suppository last night  Encouraged to drink Miralax daily as he has refused it in the past   Denies any abdominal pain and is passing gas  Objective:     Vitals:   Temp (24hrs), Av 7 °F (36 5 °C), Min:97 4 °F (36 3 °C), Max:97 8 °F (36 6 °C)    Temp:  [97 4 °F (36 3 °C)-97 8 °F (36 6 °C)] 97 4 °F (36 3 °C)  HR:  [79-91] 88  Resp:  [16-20] 20  BP: (118-132)/(62-69) 118/62  SpO2:  [95 %-100 %] 100 %  Body mass index is 31 25 kg/m²  Review of Systems   Constitutional: Negative for appetite change, chills, fatigue and fever  HENT: Negative for trouble swallowing  Eyes: Negative for visual disturbance  Respiratory: Negative for cough, shortness of breath, wheezing and stridor  Cardiovascular: Negative for chest pain, palpitations and leg swelling  Gastrointestinal: Negative for abdominal distention, abdominal pain, constipation, diarrhea, nausea and vomiting  LBM , small after suppository   Genitourinary: Negative for difficulty urinating  Musculoskeletal: Positive for back pain (Mid back pain, 6/10, pins/needles, improves with pain medication)   Negative for arthralgias and gait problem  Back spasms less severe today but still present  Neurological: Negative for dizziness, weakness, light-headedness, numbness and headaches  Psychiatric/Behavioral: Negative for dysphoric mood and sleep disturbance  The patient is not nervous/anxious  All other systems reviewed and are negative  Input and Output Summary (last 24 hours): Intake/Output Summary (Last 24 hours) at 11/30/2022 0948  Last data filed at 11/30/2022 0443  Gross per 24 hour   Intake 700 ml   Output 500 ml   Net 200 ml       Physical Exam:     Physical Exam  Vitals and nursing note reviewed  Constitutional:       General: He is not in acute distress  Appearance: Normal appearance  He is obese  He is not ill-appearing  HENT:      Head: Normocephalic and atraumatic  Cardiovascular:      Rate and Rhythm: Normal rate and regular rhythm  Pulses: Normal pulses  Heart sounds: Normal heart sounds  No murmur heard  No friction rub  Pulmonary:      Effort: Pulmonary effort is normal  No respiratory distress  Breath sounds: Normal breath sounds  No wheezing or rhonchi  Abdominal:      General: Abdomen is flat  Bowel sounds are normal  There is no distension  Palpations: Abdomen is soft  There is no mass  Tenderness: There is no abdominal tenderness  There is no guarding or rebound  Hernia: No hernia is present  Musculoskeletal:      Cervical back: Normal range of motion and neck supple  No tenderness  Right lower leg: No edema  Left lower leg: No edema  Skin:     General: Skin is warm and dry  Capillary Refill: Capillary refill takes less than 2 seconds  Comments: Mid back incision covered with DSD  Dressing CDI  Neurological:      Mental Status: He is alert and oriented to person, place, and time     Psychiatric:         Mood and Affect: Mood normal          Behavior: Behavior normal          Additional Data:     Labs:    Results from last 7 days   Lab Units 11/28/22  0817   WBC Thousand/uL 6 84   HEMOGLOBIN g/dL 11 8*   HEMATOCRIT % 37 7   PLATELETS Thousands/uL 341   NEUTROS PCT % 69   LYMPHS PCT % 18   MONOS PCT % 7   EOS PCT % 5     Results from last 7 days   Lab Units 11/28/22  0817   SODIUM mmol/L 133*   POTASSIUM mmol/L 4 0   CHLORIDE mmol/L 100   CO2 mmol/L 27   BUN mg/dL 19   CREATININE mg/dL 0 82   ANION GAP mmol/L 6   CALCIUM mg/dL 9 1   ALBUMIN g/dL 3 4*   TOTAL BILIRUBIN mg/dL 0 60   ALK PHOS U/L 80   ALT U/L 71*   AST U/L 79*   GLUCOSE RANDOM mg/dL 98                       Labs reviewed    Imaging:    Imaging reviewed    Recent Cultures (last 7 days):           Last 24 Hours Medication List:   Current Facility-Administered Medications   Medication Dose Route Frequency Provider Last Rate   • aspirin  325 mg Oral Daily Shiela Pedraza MD     • bisacodyl  10 mg Rectal Daily PRN Shiela Pedraza MD     • cholecalciferol  2,000 Units Oral Daily BAYRON Roth     • docusate sodium  100 mg Oral BID Shiela Pedraza MD     • gabapentin  300 mg Oral BID Yaneli Mccarty MD     • gabapentin  400 mg Oral HS Yaneli Mccarty MD     • HYDROmorphone  2 mg Oral Q4H PRN Yaneli Mccarty MD     • lidocaine  2 patch Topical Daily Yaneli Mccarty MD     • lisinopril  5 mg Oral Daily Shiela Pedraza MD     • methocarbamol  1,000 mg Oral HS Yaneli Mccarty MD     • methocarbamol  750 mg Oral BID after meals Yaneli Mccarty MD     • naloxone  0 04 mg Intravenous Q1MIN PRN Shiela Pedraza MD     • ondansetron  4 mg Oral Q8H PRN Shiela Pedraza MD     • oxyCODONE  5 mg Oral Q4H PRN Yaneli Mccarty MD     • polyethylene glycol  17 g Oral Daily PRN Shiela Pedraza MD     • polyethylene glycol  17 g Oral BID Shiela Pedraza MD     • senna  1 tablet Oral BID Shiela Pedraza MD          M*Modal software was used to dictate this note  It may contain errors with dictating incorrect words or incorrect spelling   Please contact the provider directly with any questions

## 2022-11-30 NOTE — PROGRESS NOTES
11/30/22 1230   Pain Assessment   Pain Assessment Tool 0-10   Pain Score 7   Pain Location/Orientation Orientation: Mid;Orientation: Lower; Location: Back   Pain Onset/Description Frequency: Constant/Continuous   Effect of Pain on Daily Activities Tolerated with rest breaks   Patient's Stated Pain Goal No pain   Hospital Pain Intervention(s) Rest   Multiple Pain Sites No   Restrictions/Precautions   Precautions Fall Risk;Pain;Spinal precautions   Weight Bearing Restrictions No   ROM Restrictions Yes  (spinal precautions)   Braces or Orthoses TLSO   Lifestyle   Autonomy Pt agreeable to participate in OT Tx session  Upper Body Dressing   Type of Assistance Needed Supervision   Physical Assistance Level No physical assistance   Comment S seated EOB to don/doff TLSO brace   Upper Body Dressing CARE Score 4   Sit to Lying   Type of Assistance Needed Supervision   Physical Assistance Level No physical assistance   Comment HOB flat   Sit to Lying CARE Score 4   Lying to Sitting on Side of Bed   Type of Assistance Needed Supervision   Physical Assistance Level No physical assistance   Comment HOB flat to complete log rolling technique   Lying to Sitting on Side of Bed CARE Score 4   Sit to Stand   Type of Assistance Needed Physical assistance; Adaptive equipment   Physical Assistance Level 25% or less   Comment RW   Sit to Stand CARE Score 3   Bed-Chair Transfer   Type of Assistance Needed Physical assistance; Adaptive equipment   Physical Assistance Level 26%-50%   Comment Repetitive fxnl transfers EOB - standard chair with arm rests - W/C - recliner  Overall Benito with RW; LLE knee buckled x2 req increased A to stabilize   Chair/Bed-to-Chair Transfer CARE Score 3   Toilet Transfer   Type of Assistance Needed Physical assistance; Adaptive equipment   Physical Assistance Level 25% or less   Comment Trialed to raised toilet seat with use of grab bar to simulate home routine   Benito with RW   Toilet Transfer CARE Score 3 Cognition   Overall Cognitive Status WFL   Arousal/Participation Alert; Cooperative   Attention Within functional limits   Orientation Level Oriented X4   Memory Within functional limits   Following Commands Follows one step commands with increased time or repetition   Activity Tolerance   Activity Tolerance Patient tolerated treatment well   Assessment   Treatment Assessment Pt participated in brief 30 min skilled OT Tx session with focus on fxnl transfer's and TLSO management  See above fo rfurther Tx details  Pt is demonstrating progress with fxnl transfer's and short distance fxnl mobility, req overall Benito with RW  Pt continues to be limited by pain in mid/low back, however, agreeable to participate and tolerated Tx with rest breaks  Pt demo's ability to don/doff TLSO at S level this session  OT plan to engage pt in shower ADL during upcoming Tx session utilizing [address] [facility]  Pt would continue to benefit from skilled OT services to improve fxnl standing balance, improve standing/activity tolerance, and increase IND with ADL/IADL participation in order to progress towards OT goals and dec caregiver burden upon D/C  Prognosis Good   Problem List Decreased strength;Decreased range of motion;Decreased endurance; Impaired balance;Decreased mobility; Decreased coordination;Orthopedic restrictions;Pain   Barriers to Discharge Inaccessible home environment   Plan   Treatment/Interventions ADL retraining;Functional transfer training; Therapeutic exercise; Endurance training;Patient/family training   Progress Progressing toward goals   Recommendation   OT Discharge Recommendation   (Pending progress)   Equipment Recommended   (TBD)   OT Therapy Minutes   OT Time In 1230   OT Time Out 1300   OT Total Time (minutes) 30   OT Mode of treatment - Individual (minutes) 30   OT Mode of treatment - Concurrent (minutes) 0   OT Mode of treatment - Group (minutes) 0   OT Mode of treatment - Co-treat (minutes) 0   OT Mode of Treatment - Total time(minutes) 30 minutes   OT Cumulative Minutes 315   Therapy Time missed   Time missed?  No

## 2022-11-30 NOTE — PROGRESS NOTES
11/30/22 1030   Pain Assessment   Pain Assessment Tool 0-10   Pain Score 6   Pain Location/Orientation Orientation: Mid;Orientation: Lower; Location: Back   Pain Onset/Description Frequency: Constant/Continuous   Effect of Pain on Daily Activities Tolerated with rest breaks   Patient's Stated Pain Goal No pain   Hospital Pain Intervention(s) Rest;Repositioned  (Notified RN for pain medication)   Multiple Pain Sites No   Restrictions/Precautions   Precautions Fall Risk;Pain;Spinal precautions   Weight Bearing Restrictions No   ROM Restrictions Yes  (Spinal precautions)   Braces or Orthoses TLSO   Lifestyle   Autonomy Pt agreeable to participate in OT Tx session  Upper Body Dressing   Type of Assistance Needed Physical assistance   Physical Assistance Level 25% or less   Comment Assist to position TLSO with pt demonstrating ability to manage abdominal piece and adjust B/L pulleys  Upper Body Dressing CARE Score 3   Sit to Lying   Type of Assistance Needed Supervision   Physical Assistance Level No physical assistance   Comment Increased time, maintaining spinal precautions   Sit to Lying CARE Score 4   Lying to Sitting on Side of Bed   Type of Assistance Needed Supervision   Physical Assistance Level No physical assistance   Comment HOB flat performing log rolling technique   Lying to Sitting on Side of Bed CARE Score 4   Sit to Stand   Type of Assistance Needed Physical assistance; Adaptive equipment   Physical Assistance Level 25% or less   Comment Benito with RW   Sit to Stand CARE Score 3   Bed-Chair Transfer   Type of Assistance Needed Physical assistance; Adaptive equipment   Physical Assistance Level 25% or less   Comment Benito SPT with RW; Performed short distance fxnl mobility in room environment utilizing RW   Chair/Bed-to-Chair Transfer CARE Score 3   [address] [facility] for steadying as pt performs CM down  No hygiene performed this session   Benito for steadying in stance at [facility] as pt initiates CM up over hips  Introduction to toilet aide with simulated demo provided on technique  Pt reports interest in obtaining device for increased IND with rear hygiene  Handouts provided for pt to IND order item with plan on wife to bring device in for OT to practice technique during upcoming Tx sessions  Toilet Transfer   Type of Assistance Needed Physical assistance; Adaptive equipment   Physical Assistance Level 25% or less   Comment Benito with RW and use of over-the-toilet PF commode  Pt reports having raised toilet seat with grab bar for D/C  Reports RW will fit in 1st flr BR  Toilet Transfer CARE Score 3   Functional Standing Tolerance   Time 3 mins x2   Activity Fxnl standing balance/tolerance   Comments Engaged in card matching activity requiring pt to stand at [facility] and perform repetitive unilateral release to match cards  Pt overall req CGA with no LOB during activity  Tolerated activity in stance x 3 mins, x2, with seated rest break between each stance 2* to pain in mid/lower back  EDU provided to pt on focus of task to improve pt's fxnl standing balance/tolerance for improved performance with ADL/IADL's  Pt receptive  Cognition   Overall Cognitive Status WFL   Arousal/Participation Alert; Cooperative   Attention Within functional limits   Orientation Level Oriented X4   Memory Within functional limits   Following Commands Follows one step commands with increased time or repetition   Activity Tolerance   Activity Tolerance Patient tolerated treatment well   Assessment   Treatment Assessment Pt participated in 60 min skilled OT Tx session with focus on ADL of toileting, fxnl standing balance, and improving overall activity tolerance  See above for further Tx details  Pt is dmeonstrating improvement with overall standing/activity tolerance, tolerating activity stance x 3 mins at at time  Pt is overall req Benito for fxnl transfer's and short distance fxnl mobility   Introduction to toilet aide with focus on improving IND with toilet hygiene  Pt expressed interest in purchasing item  Handout provided for pt to order IND with plan for wife to bring in item to utilize during OT sessions  Pt would continue to benefit from skilled oT services to improve fxnl standing balance, improve ADL transfer's and mobility, and increase IND with ADL/IADL performance in order to progress towards OT goals and dec caregiver burden upon D/C  Prognosis Good   Problem List Decreased strength;Decreased range of motion;Decreased endurance; Impaired balance;Decreased mobility; Decreased coordination;Orthopedic restrictions;Pain   Barriers to Discharge Inaccessible home environment;Decreased caregiver support   Plan   Treatment/Interventions ADL retraining;Functional transfer training; Therapeutic exercise; Endurance training;Patient/family training   Progress Progressing toward goals   Recommendation   OT Discharge Recommendation   (Pending progress)   Equipment Recommended   (toilet aide - handout provided)   OT Therapy Minutes   OT Time In 1030   OT Time Out 1130   OT Total Time (minutes) 60   OT Mode of treatment - Individual (minutes) 60   OT Mode of treatment - Concurrent (minutes) 0   OT Mode of treatment - Group (minutes) 0   OT Mode of treatment - Co-treat (minutes) 0   OT Mode of Treatment - Total time(minutes) 60 minutes   OT Cumulative Minutes 285   Therapy Time missed   Time missed?  No

## 2022-12-01 PROBLEM — D75.839 THROMBOCYTOSIS: Status: ACTIVE | Noted: 2022-12-01

## 2022-12-01 LAB
ALBUMIN SERPL BCP-MCNC: 3.7 G/DL (ref 3.5–5)
ALP SERPL-CCNC: 99 U/L (ref 43–122)
ALT SERPL W P-5'-P-CCNC: 78 U/L
ANION GAP SERPL CALCULATED.3IONS-SCNC: 6 MMOL/L (ref 5–14)
AST SERPL W P-5'-P-CCNC: 72 U/L (ref 17–59)
BASOPHILS # BLD AUTO: 0.03 THOUSANDS/ÂΜL (ref 0–0.1)
BASOPHILS NFR BLD AUTO: 0 % (ref 0–1)
BILIRUB SERPL-MCNC: 0.63 MG/DL (ref 0.2–1)
BUN SERPL-MCNC: 13 MG/DL (ref 5–25)
CALCIUM SERPL-MCNC: 9.5 MG/DL (ref 8.4–10.2)
CHLORIDE SERPL-SCNC: 100 MMOL/L (ref 96–108)
CO2 SERPL-SCNC: 26 MMOL/L (ref 21–32)
CREAT SERPL-MCNC: 0.79 MG/DL (ref 0.7–1.5)
EOSINOPHIL # BLD AUTO: 0.2 THOUSAND/ÂΜL (ref 0–0.61)
EOSINOPHIL NFR BLD AUTO: 3 % (ref 0–6)
ERYTHROCYTE [DISTWIDTH] IN BLOOD BY AUTOMATED COUNT: 13.6 % (ref 11.6–15.1)
GFR SERPL CREATININE-BSD FRML MDRD: 93 ML/MIN/1.73SQ M
GLUCOSE P FAST SERPL-MCNC: 112 MG/DL (ref 70–99)
GLUCOSE SERPL-MCNC: 112 MG/DL (ref 70–99)
HCT VFR BLD AUTO: 38 % (ref 36.5–49.3)
HGB BLD-MCNC: 12.6 G/DL (ref 12–17)
IMM GRANULOCYTES # BLD AUTO: 0.08 THOUSAND/UL (ref 0–0.2)
IMM GRANULOCYTES NFR BLD AUTO: 1 % (ref 0–2)
LYMPHOCYTES # BLD AUTO: 1.43 THOUSANDS/ÂΜL (ref 0.6–4.47)
LYMPHOCYTES NFR BLD AUTO: 18 % (ref 14–44)
MCH RBC QN AUTO: 28.5 PG (ref 26.8–34.3)
MCHC RBC AUTO-ENTMCNC: 33.2 G/DL (ref 31.4–37.4)
MCV RBC AUTO: 86 FL (ref 82–98)
MONOCYTES # BLD AUTO: 0.49 THOUSAND/ÂΜL (ref 0.17–1.22)
MONOCYTES NFR BLD AUTO: 6 % (ref 4–12)
NEUTROPHILS # BLD AUTO: 5.64 THOUSANDS/ÂΜL (ref 1.85–7.62)
NEUTS SEG NFR BLD AUTO: 72 % (ref 43–75)
NRBC BLD AUTO-RTO: 0 /100 WBCS
PLATELET # BLD AUTO: 489 THOUSANDS/UL (ref 149–390)
PMV BLD AUTO: 8.3 FL (ref 8.9–12.7)
POTASSIUM SERPL-SCNC: 4.1 MMOL/L (ref 3.5–5.3)
PROT SERPL-MCNC: 6.9 G/DL (ref 6.4–8.4)
RBC # BLD AUTO: 4.42 MILLION/UL (ref 3.88–5.62)
SODIUM SERPL-SCNC: 132 MMOL/L (ref 135–147)
WBC # BLD AUTO: 7.87 THOUSAND/UL (ref 4.31–10.16)

## 2022-12-01 RX ORDER — HYDROMORPHONE HYDROCHLORIDE 2 MG/1
3 TABLET ORAL EVERY 4 HOURS PRN
Status: DISCONTINUED | OUTPATIENT
Start: 2022-12-01 | End: 2022-12-10 | Stop reason: HOSPADM

## 2022-12-01 RX ORDER — HYDROMORPHONE HYDROCHLORIDE 2 MG/1
2 TABLET ORAL EVERY 4 HOURS PRN
Status: DISCONTINUED | OUTPATIENT
Start: 2022-12-01 | End: 2022-12-10 | Stop reason: HOSPADM

## 2022-12-01 RX ORDER — PRAVASTATIN SODIUM 20 MG
10 TABLET ORAL
Status: DISCONTINUED | OUTPATIENT
Start: 2022-12-01 | End: 2022-12-10 | Stop reason: HOSPADM

## 2022-12-01 RX ADMIN — SENNOSIDES 8.6 MG: 8.6 TABLET, FILM COATED ORAL at 08:31

## 2022-12-01 RX ADMIN — OXYCODONE HYDROCHLORIDE 5 MG: 5 TABLET ORAL at 09:42

## 2022-12-01 RX ADMIN — METHOCARBAMOL 750 MG: 750 TABLET, FILM COATED ORAL at 12:05

## 2022-12-01 RX ADMIN — CHOLECALCIFEROL TAB 25 MCG (1000 UNIT) 2000 UNITS: 25 TAB at 08:31

## 2022-12-01 RX ADMIN — GABAPENTIN 300 MG: 300 CAPSULE ORAL at 16:17

## 2022-12-01 RX ADMIN — BISACODYL 10 MG RECTAL SUPPOSITORY 10 MG: at 10:42

## 2022-12-01 RX ADMIN — METHOCARBAMOL 750 MG: 750 TABLET, FILM COATED ORAL at 08:31

## 2022-12-01 RX ADMIN — GABAPENTIN 300 MG: 300 CAPSULE ORAL at 06:23

## 2022-12-01 RX ADMIN — METHOCARBAMOL 1000 MG: 500 TABLET, FILM COATED ORAL at 21:06

## 2022-12-01 RX ADMIN — LIDOCAINE 2 PATCH: 50 PATCH CUTANEOUS at 08:31

## 2022-12-01 RX ADMIN — SENNOSIDES 8.6 MG: 8.6 TABLET, FILM COATED ORAL at 16:41

## 2022-12-01 RX ADMIN — DOCUSATE SODIUM 100 MG: 100 CAPSULE, LIQUID FILLED ORAL at 16:41

## 2022-12-01 RX ADMIN — GABAPENTIN 400 MG: 400 CAPSULE ORAL at 21:06

## 2022-12-01 RX ADMIN — DOCUSATE SODIUM 100 MG: 100 CAPSULE, LIQUID FILLED ORAL at 08:31

## 2022-12-01 RX ADMIN — ASPIRIN 325 MG ORAL TABLET 325 MG: 325 PILL ORAL at 08:31

## 2022-12-01 RX ADMIN — HYDROMORPHONE HYDROCHLORIDE 2 MG: 2 TABLET ORAL at 16:41

## 2022-12-01 RX ADMIN — HYDROMORPHONE HYDROCHLORIDE 2 MG: 2 TABLET ORAL at 08:31

## 2022-12-01 RX ADMIN — PRAVASTATIN SODIUM 10 MG: 20 TABLET ORAL at 16:17

## 2022-12-01 RX ADMIN — LISINOPRIL 5 MG: 5 TABLET ORAL at 08:32

## 2022-12-01 RX ADMIN — HYDROMORPHONE HYDROCHLORIDE 2 MG: 2 TABLET ORAL at 00:12

## 2022-12-01 RX ADMIN — HYDROMORPHONE HYDROCHLORIDE 2 MG: 2 TABLET ORAL at 04:32

## 2022-12-01 RX ADMIN — HYDROMORPHONE HYDROCHLORIDE 2 MG: 2 TABLET ORAL at 12:30

## 2022-12-01 RX ADMIN — HYDROMORPHONE HYDROCHLORIDE 3 MG: 2 TABLET ORAL at 21:07

## 2022-12-01 RX ADMIN — POLYETHYLENE GLYCOL 3350 17 G: 17 POWDER, FOR SOLUTION ORAL at 08:31

## 2022-12-01 NOTE — ASSESSMENT & PLAN NOTE
Continue pravastatin 10mg daily as substitute for non-formulary lovastatin 10mg daily  Discontinued due to elevated liver enzymes  AST and ALT unchanged after stopping statin - may resume statin at this time (12/1)  Lipid panel on 11/28: Cholesterol 151, Triglycerides 94, HDL 30 and

## 2022-12-01 NOTE — ASSESSMENT & PLAN NOTE
Hx of prior back surgeries with hx of scoliosis, pseudoarthrosis, and flat back deformity  11/22: elective removal of hardware L1-L3, posterior thoracic/lumbar instrumentation from T8 to pelvis, thoracic osteotomy T11/T12, thoracic laminotomy T8/T9, T9/T10, T10/T11, posterior thoracic fusion T7-L1, revision instrumentation T8 to pelvis performed by Dr Joan Patton (Orthopedics)  Continue ASA 325mg daily for DVT ppx  TLSO brace when getting OOB  Ensure adequate pain control  Neurovascular checks Q shift  2 week follow-up with repeat thoracolumbar spine XRs (12/6)  Repeat XRs obtained on 11/29 due to increasing pain: interval post-surgical changes without evidence of hardware complication  No acute osseous abnormalities  Primary team following  PT/OT

## 2022-12-01 NOTE — PROGRESS NOTES
12/01/22 1231   Pain Assessment   Pain Assessment Tool 0-10   Pain Score 7   Pain Location/Orientation Orientation: Lower;Orientation: Mid;Location: Back   Hospital Pain Intervention(s) Repositioned; Emotional support;Rest;Relaxation technique   Restrictions/Precautions   Precautions Pain;Spinal precautions;Supervision on toilet/commode; Fall Risk   Weight Bearing Restrictions No   ROM Restrictions Yes  (Spinal precautions)   Braces or Orthoses TLSO   Cognition   Overall Cognitive Status WFL   Arousal/Participation Alert; Cooperative   Attention Within functional limits   Orientation Level Oriented X4   Memory Within functional limits   Following Commands Follows one step commands with increased time or repetition   Comments Pain affecting pt's ability to focus at times   Subjective   Subjective "I overdid it yesterday, I know I did  I don't want to do that again " Pt refused OT session earlier today due to severity of LBP  Agreeable to PT at this time however was unable to tolerate entire one hour session due to ongoing pain  Sit to Lying   Type of Assistance Needed Supervision   Physical Assistance Level No physical assistance   Comment HOB flat   Sit to Lying CARE Score 4   Lying to Sitting on Side of Bed   Type of Assistance Needed Supervision   Physical Assistance Level No physical assistance   Comment HOB flat, inc time to complete due to pain   Lying to Sitting on Side of Bed CARE Score 4   Sit to Stand   Type of Assistance Needed Incidental touching; Adaptive equipment   Physical Assistance Level No physical assistance   Comment RW, TLSO   Sit to Stand CARE Score 4   Bed-Chair Transfer   Type of Assistance Needed Incidental touching; Adaptive equipment   Physical Assistance Level No physical assistance   Comment RW, TLSO   Chair/Bed-to-Chair Transfer CARE Score 4   Transfer Bed/Chair/Wheelchair   Limitations Noted In Balance;Confidence; Endurance;Pain Management;LE Strength   Adaptive Equipment Jason Catalan Walk 10 Feet   Type of Assistance Needed Incidental touching;Verbal cues; Adaptive equipment   Physical Assistance Level No physical assistance   Comment RW   Walk 10 Feet CARE Score 4   Walk 50 Feet with Two Turns   Comment Distance limited due to pain   Reason if not Attempted Safety concerns   Walk 50 Feet with Two Turns CARE Score 88   Walk 150 Feet   Reason if not Attempted Safety concerns   Walk 150 Feet CARE Score 88   Walking 10 Feet on Uneven Surfaces   Reason if not Attempted Safety concerns   Walking 10 Feet on Uneven Surfaces CARE Score 88   Ambulation   Primary Mode of Locomotion Prior to Admission Walk   Distance Walked (feet) 25 ft   Assist Device Roller Walker   Gait Pattern Inconsistant Leisa; Slow Leisa;Decreased foot clearance; Forward Flexion; Shuffle;Decreased R stance;Decreased L stance; Improper weight shift   Limitations Noted In Endurance;Posture; Safety;Speed;Strength;Swing   Walk Assist Level Contact Guard   Findings 25ft x2, extremely slow pace, guarded posture  Pt politely refused inc amb distance today due to severity of pain  Was agreeable to short dist amb (from w/c to therapy mat)  Conts with heavy reliance on UE on RW for support  Does the patient walk? 2  Yes   Wheel 50 Feet with Two Turns   Reason if not Attempted Activity not applicable   Wheel 50 Feet with Two Turns CARE Score 9   Wheel 150 Feet   Reason if not Attempted Activity not applicable   Wheel 807 Feet CARE Score 9   Picking Up Object   Reason if not Attempted Safety concerns   Picking Up Object CARE Score 88   Therapeutic Interventions   Strengthening Slow performance of seated TE, up to 20 reps each as able: LAQ, hip flex (partial range), hip abd red theraband (10 reps only), hamstring curl red theraband  Seated EOM bilat UE raise with 2# dowel with focus on abd bracing/core support  Attempted standing however pt too painful to attempt exercises in stance     Modalities Offered cold pack to Lspine however pt declined  Attempted semi-reclined position or sidelying on mat however pt too painful and prefered sitting upright in w/c or edge of mat/bed  Assessment   Treatment Assessment Pt participated in 50 minute PT session with focus on seated TE as tolerated and transfers with RW  Attempted semi-reclined/sidelying and standing TE as well however pt too painful and requested to stay seated in w/c or edge of bed  Pt agreeable to short distance amb only this session stating he believes he "really over did it" in yesterday's session  Pt refused OT earlier today due to severity of pain  He was able to participated in 50 minutes of PT prior to requesting to return to room due to unbearable "shooting" pains through back (upper right side and low back most severe)  Offered cold pack for added relief, pt refused  Pt aware he has an additional PT session scheduled for later this PM and is willing to "try" to participate at that time  He will cont to benefit from skilled PT to further improve activity tolerance and functional indep with all mobility prior to d/c  Per pt/RN, pt up to date with pain meds prior to session  Family/Caregiver Present No   Problem List Decreased strength;Decreased range of motion;Decreased endurance; Impaired balance;Decreased mobility; Decreased coordination;Orthopedic restrictions;Pain   Barriers to Discharge Inaccessible home environment   PT Barriers   Physical Impairment Decreased strength;Decreased range of motion;Decreased endurance; Impaired balance;Decreased mobility; Decreased coordination;Orthopedic restrictions;Pain   Functional Limitation Car transfers; Ramp negotiation;Stair negotiation;Standing;Transfers; Walking   Plan   Treatment/Interventions Functional transfer training;LE strengthening/ROM; Elevations; Therapeutic exercise; Endurance training;Patient/family training;Equipment eval/education; Bed mobility;Gait training; Compensatory technique education   Progress Slow progress, decreased activity tolerance   Recommendation   PT Discharge Recommendation Home with outpatient rehabilitation   Equipment Recommended Walker   PT Therapy Minutes   PT Time In 1230   PT Time Out 1320   PT Total Time (minutes) 50   PT Mode of treatment - Individual (minutes) 50   PT Mode of treatment - Concurrent (minutes) 0   PT Mode of treatment - Group (minutes) 0   PT Mode of treatment - Co-treat (minutes) 0   PT Mode of Treatment - Total time(minutes) 50 minutes   PT Cumulative Minutes 425   Therapy Time missed   Time missed? Yes   Amount of time missed 10   Reason for time missed Extreme fatigue; Illness  (Pain)   Time(s) multiple attempts made yes

## 2022-12-01 NOTE — PROGRESS NOTES
12/01/22 1400   Pain Assessment   Pain Assessment Tool 0-10   Pain Score 7   Pain Location/Orientation Orientation: Lower; Location: Back   Restrictions/Precautions   Precautions Pain;Spinal precautions   ROM Restrictions Yes  (spine precautions)   Braces or Orthoses TLSO   Lying to Sitting on Side of Bed   Type of Assistance Needed Supervision   Comment used log roll,  inc time and effort   Lying to Sitting on Side of Bed CARE Score 4   Sit to Stand   Type of Assistance Needed Incidental touching   Comment CG with RW   Sit to Stand CARE Score 4   Bed-Chair Transfer   Type of Assistance Needed Physical assistance   Physical Assistance Level 25% or less   Comment Min A with RW   Chair/Bed-to-Chair Transfer CARE Score 3   Walk 10 Feet   Type of Assistance Needed Physical assistance   Physical Assistance Level 25% or less   Comment Min A with RW   Walk 10 Feet CARE Score 3   Walk 50 Feet with Two Turns   Reason if not Attempted Safety concerns   Walk 50 Feet with Two Turns CARE Score 88   Ambulation   Primary Mode of Locomotion Prior to Admission Walk   Distance Walked (feet) 30 ft  (10x5)   Assist Device Roller Walker   Provided Assistance with: Balance   Walk Assist Level Minimum Assist   Findings Pt amb CG/ min A with RW- flexed posutre and slight unsteadiness with turning for chair - Ed pt not to turn to look behind for chair, back up until feel chair on legs   Does the patient walk? 2   Yes   Curb or Single Stair   Style negotiated Curb   Type of Assistance Needed Physical assistance   Physical Assistance Level 25% or less   Comment 4inch curb style step - Ed pt on performance/ technique,  needed Min A during walker transition   1 Step (Curb) CARE Score 3   4 Steps   Reason if not Attempted Safety concerns   4 Steps CARE Score 88   12 Steps   Reason if not Attempted Safety concerns   12 Steps CARE Score 88   Assessment   Treatment Assessment 30 min session focused on reps of just short distance with chair approach and ed on curb style step performance  Pt is having much pain but was able to perform bed mobility, short walking, and curb step  Need to focus on strengthening legs and core and limit pain increase  Plan   Progress Slow progress, decreased activity tolerance   PT Therapy Minutes   PT Time In 1400   PT Time Out 1430   PT Total Time (minutes) 30   PT Mode of treatment - Individual (minutes) 30   PT Mode of treatment - Concurrent (minutes) 0   PT Mode of treatment - Group (minutes) 0   PT Mode of treatment - Co-treat (minutes) 0   PT Mode of Treatment - Total time(minutes) 30 minutes   PT Cumulative Minutes 455   Therapy Time missed   Time missed?  No

## 2022-12-01 NOTE — PROGRESS NOTES
12/01/22 0900   Cognition   Orientation Level Oriented X4   Therapy Time missed   Time missed? Yes   Amount of time missed 90   Reason for time missed Pt approached at 0900 for Tx session, reporting 10/10 pain  RN reports administering pain meds 40 mins prior to Tx session  Pt re-approached at 0930 with encouragement to participate, however, continues to report 10/10 pain  RN administered break through medication  Pt re-approached at 1050 for Tx session, pt declining 2* to rectal suppository being administered     Time(s) multiple attempts made 3  (0900, 0930, 1050)

## 2022-12-01 NOTE — OCCUPATIONAL THERAPY NOTE
Therapist contacted pts wife Cherrie Chatman to setup FT, wife in agreement to attend Wed 12/7 230pm  Wife with no further questions     -Bogdan Moody MS, OTR/L, CSRS

## 2022-12-01 NOTE — PROGRESS NOTES
[facility]  Progress Note - Arianna Lee 1955, 78 y o  male MRN: [id number]  Unit/Bed#: [facility] 338-86 Encounter: [id number]  Primary Care Provider: Varsha Gao MD   Date and time admitted to hospital: 11/25/2022  2:14 PM    Thrombocytosis  Assessment & Plan  Plt count currently 489,000  Likely reactive post-operatively  Continue to trend with routine CBC  Vitamin D insufficiency  Assessment & Plan  Vitamin D level 24 5 on 11/28  Started on Vitamin D 2000u daily  History of stroke  Assessment & Plan  Per patient - hx of CVA in 1996  Continue pravastatin 10mg daily as substitute for non-formulary lovastatin  Continue ASA for DVT ppx - would benefit from low dose preventative ASA afterwards  Transaminitis  Assessment & Plan  AST 72, ALT 78 on 12/1  Per patient - hx of mild elevations due to diagnosis of hepatitis C as a teenager  Tylenol discontinued  Statin initially discontinued with no change in levels - restarted statin on 12/1  Continue to trend routine CMP  Hypertension  Assessment & Plan  Continue home lisinopril 5mg daily  Monitor BP with routine VS     Complaints of lightheadedness when getting OOB  Obtain orthostatics prior to therapies - WNL  Hyperlipidemia  Assessment & Plan  Continue pravastatin 10mg daily as substitute for non-formulary lovastatin 10mg daily  Discontinued due to elevated liver enzymes  AST and ALT unchanged after stopping statin - may resume statin at this time (12/1)  Lipid panel on 11/28: Cholesterol 151, Triglycerides 94, HDL 30 and   * Status post lumbar spinal fusion  Assessment & Plan  Hx of prior back surgeries with hx of scoliosis, pseudoarthrosis, and flat back deformity    11/22: elective removal of hardware L1-L3, posterior thoracic/lumbar instrumentation from T8 to pelvis, thoracic osteotomy T11/T12, thoracic laminotomy T8/T9, T9/T10, T10/T11, posterior thoracic fusion T7-L1, revision instrumentation T8 to pelvis performed by Dr Patrick Russ (Orthopedics)  Continue ASA 325mg daily for DVT ppx  TLSO brace when getting OOB  Ensure adequate pain control  Neurovascular checks Q shift  2 week follow-up with repeat thoracolumbar spine XRs ()  Repeat XRs obtained on  due to increasing pain: interval post-surgical changes without evidence of hardware complication  No acute osseous abnormalities  Primary team following  PT/OT  VTE Pharmacologic Prophylaxis:   Pharmacologic:  per Ortho  Mechanical VTE Prophylaxis in Place: Yes - sequential compression devices  Current Length of Stay: 6 day(s)    Current Patient Status: Inpatient Rehab     Discharge Plan: As per primary team     Code Status: Level 1 - Full Code    Subjective:   Pt examined while pt sitting in bed in pt room  Had difficulty participating in therapy this morning due to increasing pain and spasms  Currently feels a little bit better and rates his pain 6/10  States that it is very hard to get OOB in the mornings due to the pain  Admits to working harder during therapy session yesterday  Denies any numbness/tingling to upper/lower extremities  Denies any bowel or bladder incontinence  Had a suppository with + results this morning and feels much better  Feels that having the BM also helped take pressure off of his back  Objective:     Vitals:   Temp (24hrs), Av 8 °F (36 6 °C), Min:97 °F (36 1 °C), Max:98 3 °F (36 8 °C)    Temp:  [97 °F (36 1 °C)-98 3 °F (36 8 °C)] 97 °F (36 1 °C)  HR:  [77-86] 77  Resp:  [18] 18  BP: (118-146)/(59-69) 129/65  SpO2:  [95 %] 95 %  Body mass index is 31 25 kg/m²  Review of Systems   Constitutional: Negative for appetite change, chills, fatigue and fever  HENT: Negative for trouble swallowing  Respiratory: Negative for cough and shortness of breath  Cardiovascular: Negative for chest pain, palpitations and leg swelling     Gastrointestinal: Positive for constipation (large BM today after receiving suppository, improvement in back pain)  Negative for abdominal distention, abdominal pain, diarrhea, nausea and vomiting  Genitourinary: Negative for difficulty urinating  Musculoskeletal: Positive for back pain (Mid back pain, 6/10, pins/needles/aching, improves with pain medication and improved after BM today)  Negative for arthralgias and gait problem  Neurological: Negative for dizziness, weakness, light-headedness, numbness and headaches  Psychiatric/Behavioral: Negative for dysphoric mood and sleep disturbance  The patient is not nervous/anxious  All other systems reviewed and are negative  Input and Output Summary (last 24 hours): Intake/Output Summary (Last 24 hours) at 12/1/2022 1022  Last data filed at 12/1/2022 0800  Gross per 24 hour   Intake 420 ml   Output 500 ml   Net -80 ml       Physical Exam:     Physical Exam  Vitals and nursing note reviewed  Constitutional:       General: He is not in acute distress  Appearance: Normal appearance  He is obese  He is not ill-appearing  HENT:      Head: Normocephalic and atraumatic  Cardiovascular:      Rate and Rhythm: Normal rate and regular rhythm  Pulses: Normal pulses  Heart sounds: Normal heart sounds  No murmur heard  No friction rub  Pulmonary:      Effort: Pulmonary effort is normal  No respiratory distress  Breath sounds: Normal breath sounds  No wheezing or rhonchi  Abdominal:      General: Abdomen is flat  Bowel sounds are normal  There is no distension  Palpations: Abdomen is soft  There is no mass  Tenderness: There is no abdominal tenderness  There is no guarding or rebound  Hernia: No hernia is present  Musculoskeletal:      Cervical back: Normal range of motion and neck supple  No tenderness  Right lower leg: No edema  Left lower leg: No edema  Skin:     General: Skin is warm and dry        Comments: Mid back incision covered with DSD  Dressing CDI  Neurological:      Mental Status: He is alert and oriented to person, place, and time     Psychiatric:         Mood and Affect: Mood normal          Behavior: Behavior normal          Additional Data:     Labs:    Results from last 7 days   Lab Units 12/01/22  0515   WBC Thousand/uL 7 87   HEMOGLOBIN g/dL 12 6   HEMATOCRIT % 38 0   PLATELETS Thousands/uL 489*   NEUTROS PCT % 72   LYMPHS PCT % 18   MONOS PCT % 6   EOS PCT % 3     Results from last 7 days   Lab Units 12/01/22  0515   SODIUM mmol/L 132*   POTASSIUM mmol/L 4 1   CHLORIDE mmol/L 100   CO2 mmol/L 26   BUN mg/dL 13   CREATININE mg/dL 0 79   ANION GAP mmol/L 6   CALCIUM mg/dL 9 5   ALBUMIN g/dL 3 7   TOTAL BILIRUBIN mg/dL 0 63   ALK PHOS U/L 99   ALT U/L 78*   AST U/L 72*   GLUCOSE RANDOM mg/dL 112*                       Labs reviewed    Imaging:    Imaging reviewed    Recent Cultures (last 7 days):           Last 24 Hours Medication List:   Current Facility-Administered Medications   Medication Dose Route Frequency Provider Last Rate   • aspirin  325 mg Oral Daily Marco Barrios MD     • bisacodyl  10 mg Rectal Daily PRN Marco Barrios MD     • cholecalciferol  2,000 Units Oral Daily Monisha Nancy, CRNP     • docusate sodium  100 mg Oral BID Marco Barrios MD     • gabapentin  300 mg Oral BID Elly Conley MD     • gabapentin  400 mg Oral HS Elly Conley MD     • HYDROmorphone  2 mg Oral Q4H PRN Elly Conley MD     • lidocaine  2 patch Topical Daily Elly Conley MD     • lisinopril  5 mg Oral Daily Marco Barrios MD     • methocarbamol  1,000 mg Oral HS Elly Conley MD     • methocarbamol  750 mg Oral BID after meals Elly Conley MD     • naloxone  0 04 mg Intravenous Q1MIN PRN Marco Barrios MD     • ondansetron  4 mg Oral Q8H PRN Marco Barrios MD     • oxyCODONE  5 mg Oral Q4H PRN Elly Conley MD     • polyethylene glycol  17 g Oral Daily PRN Marco Barrios MD     • polyethylene glycol  17 g Oral BID Jazmin Aceves MD     • pravastatin  10 mg Oral Daily With [address], CRNP     • senna  1 tablet Oral BID Jazmin Aceves MD          M*Modal software was used to dictate this note  It may contain errors with dictating incorrect words or incorrect spelling  Please contact the provider directly with any questions

## 2022-12-01 NOTE — TEAM CONFERENCE
Acute RehabilitationTeam Conference Note  Date: 12/1/2022   Time: 12:57 PM       Patient Name:  March Axon       Medical Record Number: [id number]   YOB: 1955  Sex: Male          Room/Bed:  [facility] 266/[facility] 266-01  Payor Info:  Payor: Karl Walter / Plan: MEDICARE A AND B / Product Type: Medicare A & B Fee for Service /      Admitting Diagnosis: S/P lumbar spinal fusion [Z98 1]   Admit Date/Time:  11/25/2022  2:14 PM  Admission Comments: No comment available     Primary Diagnosis:  Status post lumbar spinal fusion  Principal Problem: Status post lumbar spinal fusion    Patient Active Problem List    Diagnosis Date Noted   • Thrombocytosis 12/01/2022   • Vitamin D insufficiency 11/29/2022   • History of stroke 11/28/2022   • Constipation 11/26/2022   • Difficulty coping with pain 11/26/2022   • Pain 11/25/2022   • At risk for venous thromboembolism (VTE) 11/25/2022   • Transaminitis 11/25/2022   • Leukocytosis 11/25/2022   • Back pain 11/22/2022   • Chronic pain 11/22/2022   • Flatback syndrome 11/22/2022   • Status post lumbar spinal fusion 05/25/2022   • Hyperlipidemia    • Hypertension        Physical Therapy:         No notes on file    Occupational Therapy:  Eating: Independent  Grooming: Supervision  Bathing: Moderate Assistance  Bathing: Moderate Assistance  Upper Body Dressing: Minimal Assistance  Lower Body Dressing: Moderate Assistance  Toileting: Moderate Assistance  Tub/Shower Transfer: Moderate Assistance  Toilet Transfer: Minimal Assistance  Cognition: Within Defined Limits  Orientation: Person, Place, Time, Situation  Discharge Recommendations: Home with:  [address] with[de-identified] Family Support, First Floor Setup, Home Occupational Therapy (Pending progress; Pt reports plan for 1st flr S/U)       11/30/22  Pt lives with wife and son in [facility] with first floor setup available, walk-in shower with shower chair, and raised toilet seat with grab bar   Pt reports his son works for [employer] but his wife is retired and can provide [employer] upon d/c if needed  PTA pt completed B/IADLs independently including driving, cooking, laundry, med management, and completed functional mobility within his home without AD and in community with [facility]  Pt barriers include pain, spinal precautions, dec activity/standing tolerance, dec fxnl standing balance, dec performance with AD/IADL's  OT plan to continue addressing above noted barriers in order to progress towards OT goals and dec caregiver burden upon D/C  Plan to re-team       Speech Therapy:           No notes on file    Nursing Notes:  Appetite: Good  Diet Type: Regular/House, Thin Liquids                                                                     Pain Location/Orientation: Orientation: Lower, Location: Back  Pain Score: 7                       Hospital Pain Intervention(s): Medication (See MAR), Repositioned          Jeny Okeefe is a 78 y o  male with a PMH of HTN and HLD who originally presented to [facility] for an elective removal of hardware L1-L3, posterior thoracic/lumbar instrumentation from T8 to pelvis, thoracic osteotomy T11/T12, thoracic laminotomy T8/T9, T9/T10, and T10/T11, post thoracic fusion T7-L1, a revision instrumentation of T8 to pelvis performed by Dr Marilyn Stockton  Postoperatively patient had a PCA pump which was discontinued on 11/23  Patient was noted to have bilateral lower extremity edema, venous duplex was completed and was negative  Patient is to continue aspirin 325 mg daily for DVT prophylaxis, continue to wear TLSO brace when out of bed, and will require 2 week follow-up with repeat thoracic lumbar spine x-rays  Admitted to [address] [place] [address] Str  11/25/22  HTN managed with Lisinopril 5mg daily  HLD managed with pravastatin 10mg daily was discontinued due to elevated liver enzyme  S/p lumbar spinal fusion managed with ASA 325mg daily for DVT ppx  TLSO brace OOB  Neurovascular checks every shift  Adequate pain control   Pain managed with Oxy IR 5mg q4h PRN for breakthrough pain, Dilaudid 2 mg every 4 hrs PRN for pain, Robaxin 750 mg BID and 1000 mg @ HS for spasm  We will monitor pt's vital signs & lab results  Pt will have adequate pain control to fully participate in therapies  Pt will be educated on importance of T/R & off loading while in bed/chair to prevent pressure injury  Pt educated for thoracic spinal precaution  Pt will have safe transfers with the use of call bell & hourly rounding to prevent falls  Pt will be educated on energy conservation & encouraged independence with ADL's  Case Management:     Discharge Planning  Living Arrangements: Lives w/ Spouse/significant other, Lives w/ Children  Support Systems: Spouse/significant other  Assistance Needed: to be determined  Type of Current Residence: Private residence  Current Home Care Services: No  11/29- Pt lives with wife Christina Castano in 2 story home, 1 + 3 BRENDA  Prior to admission, pt reports being independent with ADL IADLs including driving  Pt has 4 children, 1 this at home, the other 3 are not local  Pt reports having r/w, shower chair and w/c in home but did not utilize equipment  Pt reports in May 2022 he was admitted to [facility], and has hx of OP Therapy in [facility]  Pt reports no hx of HHC  Preferred pharmacy is [facility] in [facility], PCP is Dr Chandrika Montez  Is the patient actively participating in therapies? yes  List any modifications to the treatment plan: None    Barriers Interventions   Pain Med management, therapy scheduling, deep breathing techniques   Constipation Adjusted bowel meds, increased mobility   Spinal precautions TSLO brace, education   Activity tolerance Education   BRENDA Stair training      Is the patient making expected progress toward goals?  yes  List any update or changes to goals: None    Medical Goals: Patient will be medically stable for discharge to [facility] upon completion of rehab program and Patient will be able to manage medical conditions and comorbid conditions with medications and follow up upon completion of rehab program    Weekly Team Goals:   Rehab Team Goals  ADL Team Goal: Patient will require supervision with ADLs with least restrictive device upon completion of rehab program  Bowel/Bladder Team Goal: Patient will return to premorbid level for bladder/bowel management upon completion of rehab program  Transfer Team Goal: Patient will be independent with transfers with least restrictive device upon completion of rehab program  Locomotion Team Goal: Patient will be independent with locomotion with least restrictive device upon completion of rehab program    Discussion: Pt is participating in therapies and making progress when pain controlled  Pt is contact guard w/ r/w  Min upper body, mod lower body  Min mod toileting  Team recommending further time in rehab program to focus on achieving functional and mobility goals  Team recommending Home with RN PT OT  Anticipated Discharge Date:  D/c Saturday 12/10   [facility] Team Members Present: The following team members are supervising care for this patient and were present during this Weekly Team Conference      Physician: Dr Avni Ortiz MD  : Rayburn Barthel, MSW  Registered Nurse: Jacklyn Avery, RN, BSN, CRRN  Physical Therapist: Frederic Gatica, STEWART  Occupational Therapist: Jone Painting MS, OTR/L  Speech Therapist:

## 2022-12-01 NOTE — PROGRESS NOTES
Physical Medicine and Rehabilitation Progress Note  Charisse Card 78 y o  male MRN: [id number]  Unit/Bed#: -35 Encounter: [id number]      Assessment & Plan:     Decline in ADLs and mobility: Functional assessment - improving         FIM  Care Score  Admit Score Recent Score    Total assist  1-100% or 2p    Tot UBD, LBD     Max assist 2-51-75%    Sub To hygiene, bathing LBD, bathing, to hygiene   Mod assist 3- 26-50%  Par     Min assist 3- 25% or < Par     CG assist 4  TA     Sup/Setup 4-5 Sup  UBD   Mod-I/Indep 6 MI      Transfers  Mod-total assist  Min assist     Ambulation   10 ft mod assist  10 ft min assist     Stairs   Total assist/NT      Goal: Mod indep except possible slight assist for bathing, LBD  Major barriers:  Pain, brace, deconditioning  Dispo & ELOS: Home with ELOS 7-10 additional days      * Status post lumbar spinal fusion  Assessment & Plan  S/P removal of hardware L1-L3  Posterior thoracic/ lumbar instrumentation from T8 to pelvis  Thoracic osteotomy T11/12  Thoracic laminotomy T8/9, T9/10 and T10/11  Posterior thoracic fusion T7-L1  Revision instrumentation T8 to pelvis by Dr Aditya Martin on 11/22  Monitor incision (14 days post-sx Tue 12/6)  - 11/30 improved pain compared to yesterday; incision healing well; strength stable  - Increased pain morning of 11/29/22    Xray 11/29: IMPRESSION: Interval postsurgical changes without evidence of hardware complication  No acute osseous abnormality   - Follow-up at 2 weeks postop for suture removal and xrays of thoracolumbar spine  Can shower now with dressing removed but do not submerge based on dc instructions   - Do not take NSAIDs (Advil, Aleve, Ibuprofen, Motrin, Naproxen, etc ) for 3 months following your surgery  This may impede the healing of your fusion  Thoracic/lumbar spine precautions   Custom-molded LSO brace from [employer] from pre-op  If not, please fit patient with Gregory LSO brace    Pt may don brace in a sitting position and should have brace on at all times when ambulating and working with PT  Follow-up with spine surgery after d/c from [facility] and if needed during [facility] course    - Continue acute comprehensive interdisciplinary inpatient rehabilitation to include intensive skilled therapies (PT, OT) as outlined with oversight and management by rehabilitation physician as well as inpatient rehab level nursing, case management and weekly interdisciplinary team meetings  Pain  Assessment & Plan  Somewhat better today; continue recently adjustment regiment  Strength intact, has some chronic pain in legs worse with ambulating   New Regimen as follows after discussion with patient and his wife:  Dilaudid 2 mg Q4 hours for severe pain  Oxycodone IR 5 mg Q4 hours for breakthrough pain  Robaxin 750mg BID and 1000 mg QHS for spasms  (changed 11/29/22)  Gabapentin 300mg BID and 400 mg QHS (changed 11/29/22)  Topical Lidoderm patches x 2     Continue topical ICE    Per d/c instructions - - Do not take NSAIDs (Advil, Aleve, Ibuprofen, Motrin, Naproxen, etc ) for 3 months following your surgery  This may impede the healing of your fusion  Monitor for oversedation, AMS/delirium, and respiratory depression   If being administered - hold opiates, muscle relaxants, benzodiazepines, and gabapentin for oversedation, AMS, or RR<12    Counseled on and continue to encourage deep breathing/relaxation/behavioral pain management techniques      Constipation  Assessment & Plan  Patient feels like he had somewhat larger BM yesterday than documented but still suspected to be constipated on opioids  - Hold standing iron supplement for now   - Increase miralax to BID  - Colace/Senna BID  - PRN bowel regimen (Miralax PRN/Dulcolax supp PRN)  - monitor for signs and symptoms of obstruction/ileus > not currently; hold stimulant lax if occurs  - Limiting constipating medications if possible  - Ensure adequate hydration       Vitamin D insufficiency  Assessment & Plan  D3 2000 units daily     Difficulty coping with pain  Assessment & Plan  Supportive counseling  Consider Psychology consult while in [place] on and continue to encourage deep breathing/relaxation/behavioral management techniques:     Deep breathin seconds in, 5 seconds out, 5 times per hour when awake and PRN when experiencing pain or anxiety  Avoid holding breath and tightening muscles and instead breathe slowly and deeply      Leukocytosis  Assessment & Plan  Trending down   Internal medicine consult and management during [facility] course  Patient afebrile, other vitals unremarkable > continue to monitor   No clear signs or symptoms of infection or VTE but will continue to monitor  Monitor CBC intermittently       Transaminitis  Assessment & Plan  Monitor intermittently  Denies abdominal pain, nausea or other new complaints  Elevated therefore discontinued all Tylenol and Statin  Hx of hepatitis and teenager  Patient would like to avoid standing APAP      At risk for venous thromboembolism (VTE)  Assessment & Plan  SCDs, ambulation, and aspirin 325mg qday per sx  - Patient declines SCDs - discussed directly with patient risks   Venous doppler negative for VTE       Hypertension  Assessment & Plan  Internal medicine consulted and co-management with their service  Monitor vitals with and without activity; monitor for orthostasis  Monitor hemoglobin, electrolytes, kidney function, hydration status   Current meds: lisinopril       Hyperlipidemia  Assessment & Plan  Discontinued Statin due to increase in LFTs      Other Medical Issues:  • None    Follow-up providers and other issues to be followed up after discharge  •     CODE: Level 1: Full Code    Restrictions include: Fall precautions    Objective:     Allergies per EMR  Diagnostic Studies: Reviewed  XR spine thoracolumbar 2 vw   Final Result by Sydnie Rocha MD ( [id number])      Interval postsurgical changes without evidence of hardware complication  No acute osseous abnormality  Workstation performed: [id number]           See above as well    Laboratory: Labs reviewed  Results from last 7 days   Lab Units 11/28/22  0817 11/25/22  0508 11/24/22  0500   HEMOGLOBIN g/dL 11 8* 12 2 12 1   HEMATOCRIT % 37 7 37 5 36 5   WBC Thousand/uL 6 84 10 18* 12 36*     Results from last 7 days   Lab Units 11/28/22  0817 11/25/22  0508 11/24/22  0500   BUN mg/dL 19 17 17   SODIUM mmol/L 133* 135 136   POTASSIUM mmol/L 4 0 4 2 4 1   CHLORIDE mmol/L 100 102 101   CREATININE mg/dL 0 82 0 76 0 83   AST U/L 79* 66* 71*   ALT U/L 71* 32 33            Drug regimen reviewed, all potential adverse effects identified and addressed:    Scheduled Meds:  Current Facility-Administered Medications   Medication Dose Route Frequency Provider Last Rate   • aspirin  325 mg Oral Daily Kemi Shipley MD     • bisacodyl  10 mg Rectal Daily PRN Kemi Shipley MD     • cholecalciferol  2,000 Units Oral Daily BAYRON Marroquin     • docusate sodium  100 mg Oral BID Kemi Shipley MD     • gabapentin  300 mg Oral BID Alfonso Graham MD     • gabapentin  400 mg Oral HS Alfonso Graham MD     • HYDROmorphone  2 mg Oral Q4H PRN Alfonso Graham MD     • lidocaine  2 patch Topical Daily Alfonso Graham MD     • lisinopril  5 mg Oral Daily Kemi Shipley MD     • methocarbamol  1,000 mg Oral HS Alfonso Graham MD     • methocarbamol  750 mg Oral BID after meals Alfonso Graham MD     • naloxone  0 04 mg Intravenous Q1MIN PRN Kemi Shipley MD     • ondansetron  4 mg Oral Q8H PRN Kemi Shipley MD     • oxyCODONE  5 mg Oral Q4H PRN Alfonso Graham MD     • polyethylene glycol  17 g Oral Daily PRN Kemi Shipley MD     • polyethylene glycol  17 g Oral BID Kemi Shipley MD     • senna  1 tablet Oral BID Kemi Shipley MD         Chief Complaints:  Back pain      Subjective:      On eval, patient reports back pain a little bit better compared to yesterday  He feels like yesterday had more spasms  Patient reports sleeping okay overnight  He denies worsening strength or sensation in the legs  Patient feels like he had a little bit bigger bowel movement yesterday  He reports some but decreased gas  Denies abdominal pain, nausea, fever, chills, sweats, lightheadedness, or other new complaints  ROS: A 10 point ROS was performed; negative except as noted above  Physical Exam:  Temp:  [97 4 °F (36 3 °C)-98 3 °F (36 8 °C)] 98 3 °F (36 8 °C)  HR:  [78-88] 78  Resp:  [18-20] 18  BP: (118-146)/(59-69) 146/67  SpO2:  [95 %-100 %] 95 %    GEN:  Lying in bed in NAD   HEENT/NECK: Normocephalic, atraumatic, moist mucous membranes   CARDIAC: Regular rate rhythm, no murmers, no rubs, no gallops  LUNGS:  clear to auscultation, no wheezes, rales, or rhonchi  ABDOMEN: Soft, nontender, nondistended, bowel sounds present but somewhat decreased  EXTREMITIES/SKIN:  no calf edema, no calf tenderness to palpation and incision healing adequately without signs of infection or significant breakdown  NEURO:   MENTAL STATUS:  Appropriate wakefulness and interaction  and Strength/MMT:  5/5 bilateral lower extremity  PSYCH:  Affect:  Euthymic     HPI:  78-year-old male with a past medical history of scoliosis, flat back syndrome, neurogenic claudication hypertension, hyperlipidemia, obesity who had stage I of revision surgery in May of this past year who is now status post stage II revision 11/22  He underwent removal of L1-L3 hardware, posterior thoracic/lumbar instrumentation from T8 to pelvis, thoracic osteotomy at T11/12, thoracic laminotomy T8/T9, T9/T10 and T10/11, posterior thoracic fusion T7 through left L1 and revision instrumentation T8 to pelvis by Dr Mendez Lajohn  Postop course notable for significant pain and decline in ADLs and mobility  He did have bilateral lower extremity venous ultrasound which was negative for DVT    Patient also noted to have significant constipation  Patient was evaluated by skilled therapies and was found to have significant decline in ADLs and ambulation and appears appropriate for admission to [facility]        ** Please Note: Fluency Direct voice to text software may have been used in the creation of this document  **    I personally performed the required components and examined the patient myself in person on 11/30/22

## 2022-12-01 NOTE — ASSESSMENT & PLAN NOTE
Per patient - hx of CVA in 1996  Continue pravastatin 10mg daily as substitute for non-formulary lovastatin  Continue ASA for DVT ppx - would benefit from low dose preventative ASA afterwards

## 2022-12-01 NOTE — CASE MANAGEMENT
Cm spoke w/ wife for team update, pt was asleep when cm attempted to update pt  Pt's wife Dennise Mei is agreeable of Saturday 12/10 dc and plans on scheduling family training next week to see how pt is improving and if she is able to care for him at home

## 2022-12-01 NOTE — OCCUPATIONAL THERAPY NOTE
Upon entering pt's room, pt's wife Evelin Cleveland) present and requesting to communicate privately  Pt's wife communicating concern with pt returning home, stating "I cannot care for him like this " Wife also states that pt can be difficulty and uncooperative at home  Education provided that pt had made fxnl gains since [facility] admission, however, is limited by pain today and declining OT Tx session  OTR Bev Adams) made aware of concerns  Communicated to MD Adis Haas)  RN notified to administer break through pain medication, however, pt still not agreeable to participate in  OT ADL Tx session today        Iris Dove OMER/L

## 2022-12-01 NOTE — ASSESSMENT & PLAN NOTE
AST 72, ALT 78 on 12/1  Per patient - hx of mild elevations due to diagnosis of hepatitis C as a teenager  Tylenol discontinued  Statin initially discontinued with no change in levels - restarted statin on 12/1  Continue to trend routine CMP

## 2022-12-02 PROBLEM — R25.2 SPASM: Status: ACTIVE | Noted: 2022-12-02

## 2022-12-02 PROBLEM — E87.1 HYPONATREMIA: Status: ACTIVE | Noted: 2022-12-02

## 2022-12-02 RX ORDER — POLYETHYLENE GLYCOL 3350 17 G/17G
17 POWDER, FOR SOLUTION ORAL DAILY PRN
Status: DISCONTINUED | OUTPATIENT
Start: 2022-12-02 | End: 2022-12-10 | Stop reason: HOSPADM

## 2022-12-02 RX ORDER — SENNOSIDES 8.6 MG
1 TABLET ORAL 2 TIMES DAILY PRN
Status: DISCONTINUED | OUTPATIENT
Start: 2022-12-02 | End: 2022-12-10 | Stop reason: HOSPADM

## 2022-12-02 RX ORDER — BACLOFEN 10 MG/1
5 TABLET ORAL 3 TIMES DAILY
Status: DISCONTINUED | OUTPATIENT
Start: 2022-12-02 | End: 2022-12-09

## 2022-12-02 RX ADMIN — HYDROMORPHONE HYDROCHLORIDE 3 MG: 2 TABLET ORAL at 01:07

## 2022-12-02 RX ADMIN — DOCUSATE SODIUM 100 MG: 100 CAPSULE, LIQUID FILLED ORAL at 08:31

## 2022-12-02 RX ADMIN — METHOCARBAMOL 750 MG: 750 TABLET, FILM COATED ORAL at 08:31

## 2022-12-02 RX ADMIN — HYDROMORPHONE HYDROCHLORIDE 3 MG: 2 TABLET ORAL at 13:33

## 2022-12-02 RX ADMIN — HYDROMORPHONE HYDROCHLORIDE 3 MG: 2 TABLET ORAL at 09:30

## 2022-12-02 RX ADMIN — GABAPENTIN 400 MG: 400 CAPSULE ORAL at 21:31

## 2022-12-02 RX ADMIN — LISINOPRIL 5 MG: 5 TABLET ORAL at 08:31

## 2022-12-02 RX ADMIN — LIDOCAINE 2 PATCH: 50 PATCH CUTANEOUS at 08:31

## 2022-12-02 RX ADMIN — GABAPENTIN 300 MG: 300 CAPSULE ORAL at 06:14

## 2022-12-02 RX ADMIN — SENNOSIDES 8.6 MG: 8.6 TABLET, FILM COATED ORAL at 08:31

## 2022-12-02 RX ADMIN — BACLOFEN 5 MG: 10 TABLET ORAL at 21:31

## 2022-12-02 RX ADMIN — POLYETHYLENE GLYCOL 3350 17 G: 17 POWDER, FOR SOLUTION ORAL at 08:30

## 2022-12-02 RX ADMIN — GABAPENTIN 300 MG: 300 CAPSULE ORAL at 16:00

## 2022-12-02 RX ADMIN — PRAVASTATIN SODIUM 10 MG: 20 TABLET ORAL at 16:42

## 2022-12-02 RX ADMIN — CHOLECALCIFEROL TAB 25 MCG (1000 UNIT) 2000 UNITS: 25 TAB at 08:31

## 2022-12-02 RX ADMIN — ASPIRIN 325 MG ORAL TABLET 325 MG: 325 PILL ORAL at 08:31

## 2022-12-02 RX ADMIN — HYDROMORPHONE HYDROCHLORIDE 3 MG: 2 TABLET ORAL at 05:32

## 2022-12-02 RX ADMIN — HYDROMORPHONE HYDROCHLORIDE 3 MG: 2 TABLET ORAL at 17:34

## 2022-12-02 RX ADMIN — HYDROMORPHONE HYDROCHLORIDE 3 MG: 2 TABLET ORAL at 21:32

## 2022-12-02 RX ADMIN — METHOCARBAMOL 750 MG: 750 TABLET, FILM COATED ORAL at 13:32

## 2022-12-02 NOTE — PROGRESS NOTES
Physical Medicine and Rehabilitation Progress Note  Marden Dec 78 y o  male MRN: [id number]  Unit/Bed#: -75 Encounter: [id number]      Assessment & Plan:     Decline in ADLs and mobility: Functional assessment - improving         FIM  Care Score  Admit Score Recent Score    Total assist  1-100% or 2p    Tot UBD, LBD     Max assist 2-51-75%    Sub To hygiene, bathing    Mod assist 3- 26-50%  Par     Min assist 3- 25% or < Par  LBD   CG assist 4  TA  Bathing   Sup/Setup 4-5 Sup  UBD   Mod-I/Indep 6 MI      Transfers  Mod-total assist  Min assist     Ambulation   10 ft mod assist  10 ft min assist     Stairs   Total assist/NT      Goal: Mod indep except possible slight assist for bathing, LBD  Major barriers:  Pain, brace, deconditioning  Dispo & ELOS: Home with ELOS Sat 12/10     * Status post lumbar spinal fusion  Assessment & Plan  S/P removal of hardware L1-L3  Posterior thoracic/ lumbar instrumentation from T8 to pelvis  Thoracic osteotomy T11/12  Thoracic laminotomy T8/9, T9/10 and T10/11  Posterior thoracic fusion T7-L1  Revision instrumentation T8 to pelvis by Dr Jb Krishnan on 11/22  Monitor incision (14 days post-sx Tue 12/6)  - 12/2 pain and sleep better overnight but still with fair amount of spasms - noted spasticity likely chronic    - Adjust Robaxin to Baclofen starting 5mg TID  - 12/1 pain fairly severe with significant spasms at times; incision stable, exam stable, vitals stable; sub-optimal participation in rehab 2/2 pain    - Cautiously increased hydromorphone to 2-3mg Q4H PRN (continue oxy PRN for BTP) with holding parameters - pt/wife discussed risks/benefits   - Monitor pain, neuro/cardiopul, and GI exam closely    - Recommend 900 min program   - 11/30 improved pain compared to yesterday; incision healing well; strength stable  - Increased pain morning of 11/29/22    Xray 11/29: IMPRESSION: Interval postsurgical changes without evidence of hardware complication   No acute osseous abnormality   - Follow-up at 2 weeks postop for suture removal and xrays of thoracolumbar spine  Can shower now with dressing removed but do not submerge based on dc instructions   - Do not take NSAIDs (Advil, Aleve, Ibuprofen, Motrin, Naproxen, etc ) for 3 months following your surgery  This may impede the healing of your fusion  Thoracic/lumbar spine precautions   Custom-molded LSO brace from [employer] from pre-op  If not, please fit patient with [employer] LSO brace  Pt may don brace in a sitting position and should have brace on at all times when ambulating and working with PT  Follow-up with spine surgery after d/c from [facility] and if needed during [facility] course    - Continue acute comprehensive interdisciplinary inpatient rehabilitation to include intensive skilled therapies (PT, OT) as outlined with oversight and management by rehabilitation physician as well as inpatient rehab level nursing, case management and weekly interdisciplinary team meetings  Pain  Assessment & Plan  Improving control  Continue Dilaudid 2-3 mg Q4 hours for mod-severe pain  Continue Oxycodone IR 5 mg Q4 hours for breakthrough pain  Stop Robaxin and transition to Baclofen 5mg TID for spasms/spasticity   Gabapentin 300mg BID and 400 mg QHS (changed 11/29/22)  Hold sedating meds for oversedation, AMS, or RR<12  Topical Lidoderm patches x 2     Continue topical ICE    Per d/c instructions - - Do not take NSAIDs (Advil, Aleve, Ibuprofen, Motrin, Naproxen, etc ) for 3 months following your surgery  This may impede the healing of your fusion  Monitor for oversedation, AMS/delirium, and respiratory depression   If being administered - hold opiates, muscle relaxants, benzodiazepines, and gabapentin for oversedation, AMS, or RR<12    Counseled on and continue to encourage deep breathing/relaxation/behavioral pain management techniques      Spasm  Assessment & Plan  Back spasms, leg tightness, hyperreflexia, spasticity - patient also with hx of CVA with prior known hx of spine sx's  - Patient denies acute worsening and leg tightness, strength sensation at recent baseline  - Trial baclofen 5mg TID > uptitrated if indicated  - Monitor exam closely     Constipation  Assessment & Plan  Improved with good BM  after supp  - Monitor closely on opiates - hold relistor with good BM    - Hold standing iron supplement for now   - Miralax to BID  - Colace/Senna BID  - PRN bowel regimen (Miralax PRN/Dulcolax supp PRN)  - monitor for signs and symptoms of obstruction/ileus > not currently; hold stimulant lax if occurs  - Limiting constipating medications if possible  - Ensure adequate hydration       Hyponatremia  Assessment & Plan  Stable at 132 may be SIADH from pain   Asymptomatic   Monitor     Vitamin D insufficiency  Assessment & Plan  D3 2000 units daily     Difficulty coping with pain  Assessment & Plan  Supportive counseling  Recommend Psychology consult while in [place] on and continue to encourage deep breathing/relaxation/behavioral management techniques:     Deep breathin seconds in, 5 seconds out, 5 times per hour when awake and PRN when experiencing pain or anxiety  Avoid holding breath and tightening muscles and instead breathe slowly and deeply      Leukocytosis  Assessment & Plan  Trending down   Internal medicine consult and management during [facility] course  Patient afebrile, other vitals unremarkable > continue to monitor   No clear signs or symptoms of infection or VTE but will continue to monitor  Monitor CBC intermittently       Transaminitis  Assessment & Plan  Monitor intermittently  Denies abdominal pain, nausea or other new complaints      Elevated therefore discontinued all Tylenol and Statin  Hx of hepatitis and teenager  Patient would like to avoid standing APAP      At risk for venous thromboembolism (VTE)  Assessment & Plan  SCDs, ambulation, and aspirin 325mg qday per sx  - Patient agrees to SCDs after urging again today given risk of VTE   11/25 Venous doppler negative for VTE       Hypertension  Assessment & Plan  Internal medicine consulted and co-management with their service  Monitor vitals with and without activity; monitor for orthostasis  Monitor hemoglobin, electrolytes, kidney function, hydration status   Current meds: lisinopril       Hyperlipidemia  Assessment & Plan  Discontinued Statin due to increase in LFTs      Other Medical Issues:  • None    Follow-up providers and other issues to be followed up after discharge  •     CODE: Level 1: Full Code    Restrictions include: Fall precautions    Objective: Allergies per EMR  Diagnostic Studies: Reviewed  XR spine thoracolumbar 2 vw   Final Result by Bhavna Morley MD (11/29 3033)      Interval postsurgical changes without evidence of hardware complication  No acute osseous abnormality        Workstation performed: [id number]           See above as well    Laboratory: Labs reviewed  Results from last 7 days   Lab Units 12/01/22  0515 11/28/22  0817   HEMOGLOBIN g/dL 12 6 11 8*   HEMATOCRIT % 38 0 37 7   WBC Thousand/uL 7 87 6 84     Results from last 7 days   Lab Units 12/01/22  0515 11/28/22  0817   BUN mg/dL 13 19   SODIUM mmol/L 132* 133*   POTASSIUM mmol/L 4 1 4 0   CHLORIDE mmol/L 100 100   CREATININE mg/dL 0 79 0 82   AST U/L 72* 79*   ALT U/L 78* 71*            Drug regimen reviewed, all potential adverse effects identified and addressed:    Scheduled Meds:  Current Facility-Administered Medications   Medication Dose Route Frequency Provider Last Rate   • aspirin  325 mg Oral Daily Daniele Biswas MD     • baclofen  5 mg Oral TID Daniele Biswas MD     • bisacodyl  10 mg Rectal Daily PRN Daniele Biswas MD     • cholecalciferol  2,000 Units Oral Daily BAYRON Esquivel     • docusate sodium  100 mg Oral BID Daniele Biswas MD     • gabapentin  300 mg Oral BID Sukhwinder Maldonado MD     • gabapentin  400 mg Oral HS Sukhwinder Maldonado MD     • HYDROmorphone  2 mg Oral Q4H PRN Evgeny Llamas MD     • HYDROmorphone  3 mg Oral Q4H PRN Evgeny Llamas MD     • lidocaine  2 patch Topical Daily Virgen Nash MD     • lisinopril  5 mg Oral Daily Evgeny Llamas MD     • naloxone  0 04 mg Intravenous Q1MIN PRN Evgeny Llamas MD     • ondansetron  4 mg Oral Q8H PRN Evgeny Llamas MD     • oxyCODONE  5 mg Oral Q4H PRN Virgen Nash MD     • polyethylene glycol  17 g Oral Daily PRN Evgeny Llamas MD     • polyethylene glycol  17 g Oral BID Evgeny Llamas MD     • pravastatin  10 mg Oral Daily With BAYRON Ellis     • senna  1 tablet Oral BID Evgeny Llamas MD         Chief Complaints:  Back pain      Subjective: On eval, patient reports sleeping better overnight with some improvements in back pain  He reports stable spasms at times  He reports chronic tight muscles and denies increased weakness, sensory changes, worsening balance, constipation, urinary problems  Patient denies fever, chills, sweats, or other new complaints  ROS: A 10 point ROS was performed; negative except as noted above         Physical Exam:  Temp:  [97 4 °F (36 3 °C)-97 8 °F (36 6 °C)] 97 8 °F (36 6 °C)  HR:  [75-86] 75  Resp:  [18-20] 18  BP: (116-143)/(65-82) 116/70  SpO2:  [94 %-100 %] 96 %    GEN:  Sitting in NAD  and More well appearing  HEENT/NECK: MMM  CARDIAC: Regular rate rhythm, no murmers, no rubs, no gallops  LUNGS:  clear to auscultation, no wheezes, rales, or rhonchi  ABDOMEN: Soft, non-tender, non-distended, normal active bowel sounds  EXTREMITIES/SKIN:  no calf edema, no calf tenderness to palpation  NEURO:   MENTAL STATUS:  Appropriate wakefulness and interaction , Strength/MMT:  Full throughout  and 3+ reflexes BUE, +Art, 3+ prox BLE; no clonus; some increased BLE muscle tightness  PSYCH:  Affect:  Euthymic     HPI:  21-year-old male with a past medical history of scoliosis, flat back syndrome, neurogenic claudication hypertension, hyperlipidemia, obesity who had stage I of revision surgery in May of this past year who is now status post stage II revision 11/22  He underwent removal of L1-L3 hardware, posterior thoracic/lumbar instrumentation from T8 to pelvis, thoracic osteotomy at T11/12, thoracic laminotomy T8/T9, T9/T10 and T10/11, posterior thoracic fusion T7 through left L1 and revision instrumentation T8 to pelvis by Dr Silvina Barnes  Postop course notable for significant pain and decline in ADLs and mobility  He did have bilateral lower extremity venous ultrasound which was negative for DVT  Patient also noted to have significant constipation  Patient was evaluated by skilled therapies and was found to have significant decline in ADLs and ambulation and [facility] [address]        ** Please Note: Fluency Direct voice to text software may have been used in the creation of this document   **

## 2022-12-02 NOTE — PROGRESS NOTES
12/02/22 1000   Pain Assessment   Pain Assessment Tool 0-10   Pain Score 5   Pain Location/Orientation Orientation: Mid;Orientation: Lower; Location: Back   Pain Onset/Description Onset: Ongoing   Effect of Pain on Daily Activities Tolerated   Patient's Stated Pain Goal No pain   Hospital Pain Intervention(s) Shower/Bath   Multiple Pain Sites No   Restrictions/Precautions   Precautions Fall Risk;Pain;Spinal precautions   Weight Bearing Restrictions No   ROM Restrictions Yes  (Spinal precautions)   Braces or Orthoses TLSO   Lifestyle   Autonomy Pt agreeable to participate in ADL session with OT  Oral Hygiene   Type of Assistance Needed Supervision   Physical Assistance Level No physical assistance   Comment Seated at sink to perform oral hygiene routine 2* to pain   Oral Hygiene CARE Score 4   Grooming   Able To Shave;Wash/Dry Face;Wash/Dry Hands   Findings Completed seated at sink top at S level, as pt reports prolonged stance increases pain  Shower/Bathe Self   Type of Assistance Needed Incidental touching; Adaptive equipment;Verbal cues   Physical Assistance Level No physical assistance   Comment Completed shower ADL with pt demonstrating ability to bathe 10/10 parts with use of LHAE in order to maintain spinal precautions  Use of LHR with wash cloth to bathe LE's to feet  Required 2 vc's to remain seated for 100% of ADL task 2* to TLSO being doffed  Initiated seated lateral wt shifts for buttocks bathing, however, pt trialing reaching from front to back through LE's to thoroughly bathe buttocks and groin area  CGA provided for task  Shower/Bathe Self CARE Score 4   Bathing   Assessed Bath Style Shower   Anticipated D/C Bath Style Tub; Shower   Able to [place] No   Able to [employer] Temperature Yes   Able to Wash/Rinse/Dry (body part) Left Arm;Right Arm;L Upper Leg;R Upper Leg;L Lower Leg/Foot;R Lower Leg/Foot;Chest;Abdomen;Perineal Area; Buttocks   Limitations Noted in ROM; Strength   Positioning Seated   Adaptive Equipment Longhand Reacher;Tub Bench; Shower Bars   Findings  Overall CGA while seated on tub bench and utilizing LHAE  Pt would benefit from [facility] for D/C, reports having LHR at home  Tub/Shower Transfer   Limitations Noted In Balance;LE Strength   Adaptive Equipment Grab Bars;Transfer Bench   Assessed Shower   Findings Benito with RW and use of grab bar to sit on edge of tub bench and swivel LE's in/out of shower environment  Pt reports having tub at home and utilizing shower chair PTA  Upper Body Dressing   Type of Assistance Needed Supervision   Physical Assistance Level No physical assistance   Comment S seated on tub bench to doff/don TLSO and [place] shirt   Upper Body Dressing CARE Score 4   Lower Body Dressing   Type of Assistance Needed Physical assistance; Adaptive equipment   Physical Assistance Level 25% or less   Comment Seated utilizing LHR to doff/don pants  [place] [facility] [place] steadying as pt performs CM task up over hips  Lower Body Dressing CARE Score 3   Putting On/Taking Off Footwear   Type of Assistance Needed Supervision; Adaptive equipment   Physical Assistance Level No physical assistance   Comment Seated utilizing LHAE to doff/don slipper socks  Pt reports he does not have sock aide at home   Putting On/Taking Off Footwear CARE Score 4   Lying to Sitting on Side of Bed   Type of Assistance Needed Supervision   Physical Assistance Level No physical assistance   Comment log rolling technique   Lying to Sitting on Side of Bed CARE Score 4   Sit to Stand   Type of Assistance Needed Incidental touching; Adaptive equipment   Physical Assistance Level No physical assistance   Comment CGA with RW   Sit to Stand CARE Score 4   Bed-Chair Transfer   Type of Assistance Needed Physical assistance; Adaptive equipment   Physical Assistance Level 25% or less   Comment Benito with RW   Chair/Bed-to-Chair Transfer CARE Score 3   [address] for steadying while in stance at RW for CM down/up  No hygiene completed this session   Toilet Transfer   Type of Assistance Needed Physical assistance; Adaptive equipment   Physical Assistance Level 25% or less   Comment Benito with RW to raised toilet seat  Pt progressed to walk into BR with Benito x1 with RW; Communicated to Nursing   Toilet Transfer CARE Score 3   Health Management   Health Management Initiated medication management task, providing pt with current list of scheduled medications  Pt reports taking muscle relazer, BP and cholesterol medications PTA  EDU provided on each medications purpose and frequencies, as pt does appear to have 5+ new medications that he was not taking PTA  Pt did present with dec recall of atleast 25% of new medications  Reports taking medications directly from pill bottles, however, pt may benefit from trialing AM/PM pill organizer in order to maximize IND with safely managing medications  OT plan to re-visit task utilizing AM/PM pill organizer prior to pt's D/C  Cognition   Overall Cognitive Status WFL   Arousal/Participation Alert; Cooperative   Attention Within functional limits   Orientation Level Oriented X4   Memory Within functional limits   Following Commands Follows one step commands with increased time or repetition   Comments Required 2 vc's to maintain spinal precautions during fxnl tasks  Activity Tolerance   Activity Tolerance Patient tolerated treatment well   Other Comments   Assessment Ortho BP's completed; 138/69 (supine), 127/71 (seated), 116/70 (stance)  No c/o of dizziness  Assessment   Treatment Assessment Pt participated in 90 min skilled OT Tx session with focus on ADL performance, ADL transfer's, and initiating medication management task  See above for further Tx details  Pt is demonstrating improvement with ADL performance, progressing with ADL of bathing from modA to [address]  Improvement noted with LB dressing, req overall Benito with LHAE   Pt reports having LHR at home, would benefit from [facility] and sock aide 2* to spinal precautions  Pt did req 2 vc's to recall spinal precautions throughout ADL performance, attempting to stand x2 in shower environment w/o TLSO donned  Initiated medication management task with pt demonstrating dec recall of new medications  Pt would benefit from engaging in simulate dmed management task utilizing AM/PM pill organizer prior to D/C  Pt would continue to benefit from skilled OT services to improve IND with ADL transfer's, improve fxnl standing balance, engage in IADL management, complete FT with pt's wife Keren Miranda), and increase IND with ADL performance in order to progress towards OT goals and dec caregiver burden upon D/C  Prognosis Good   Problem List Decreased strength;Decreased range of motion;Decreased endurance; Impaired balance;Decreased mobility;Orthopedic restrictions;Pain   Barriers to Discharge Inaccessible home environment   Plan   Treatment/Interventions ADL retraining;Functional transfer training; Therapeutic exercise; Endurance training;Patient/family training   Progress Progressing toward goals   Recommendation   OT Discharge Recommendation Home with home health rehabilitation   Equipment Recommended   (LHS, possible tub bench and sock aide)   OT Therapy Minutes   OT Time In 1000   OT Time Out 1130   OT Total Time (minutes) 90   OT Mode of treatment - Individual (minutes) 90   OT Mode of treatment - Concurrent (minutes) 0   OT Mode of treatment - Group (minutes) 0   OT Mode of treatment - Co-treat (minutes) 0   OT Mode of Treatment - Total time(minutes) 90 minutes   OT Cumulative Minutes 405   Therapy Time missed   Time missed?  No

## 2022-12-02 NOTE — PROGRESS NOTES
12/02/22 1230   Pain Assessment   Pain Assessment Tool 0-10   Pain Score 7   Pain Location/Orientation Location: Back   Restrictions/Precautions   Precautions Fall Risk;Pain;Spinal precautions   ROM Restrictions Yes  (spinal precautions)   Braces or Orthoses TLSO   Sit to Lying   Type of Assistance Needed Supervision   Sit to Lying CARE Score 4   Lying to Sitting on Side of Bed   Type of Assistance Needed Supervision   Lying to Sitting on Side of Bed CARE Score 4   Sit to Stand   Type of Assistance Needed Incidental touching   Comment CG to RW   Sit to Stand CARE Score 4   Bed-Chair Transfer   Type of Assistance Needed Physical assistance   Physical Assistance Level 25% or less   Comment Min A with RW   Chair/Bed-to-Chair Transfer CARE Score 3   Walk 10 Feet   Type of Assistance Needed Physical assistance   Physical Assistance Level 25% or less   Comment Min A with RW   Walk 10 Feet CARE Score 3   Walk 50 Feet with Two Turns   Comment (S)  Trial over the weekend   Reason if not Attempted Safety concerns   Walk 50 Feet with Two Turns CARE Score 88   Walk 150 Feet   Reason if not Attempted Safety concerns   Walk 150 Feet CARE Score 88   Walking 10 Feet on Uneven Surfaces   Reason if not Attempted Safety concerns   Walking 10 Feet on Uneven Surfaces CARE Score 88   Ambulation   Primary Mode of Locomotion Prior to Admission Walk   Distance Walked (feet) 35 ft  (x2  15x2)   Assist Device Roller Walker   Gait Pattern Ataxic; Inconsistant Leisa;Decreased foot clearance; Forward Flexion   Provided Assistance with: Balance   Walk Assist Level Minimum Assist   Findings Pt relies heavy on UE and walker for support,  noteable ataxic foot placement sometimes specially with turns-  needs cues to keep walker close to body due to times pushing it way too far out infront   Does the patient walk? 2   Yes   Wheel 50 Feet with Two Turns   Reason if not Attempted Activity not applicable   Wheel 50 Feet with Two Turns CARE Score 9 Wheel 150 Feet   Reason if not Attempted Activity not applicable   Wheel 871 Feet CARE Score 9   Curb or Single Stair   Style negotiated Curb   Type of Assistance Needed Physical assistance   Physical Assistance Level 25% or less   Comment Needs balance assist during walker placement , used standing scale for 4inch curb - will work on higher step when pt is stronger   1 Step (Curb) CARE Score 3   4 Steps   Type of Assistance Needed Physical assistance   Physical Assistance Level 25% or less   Comment Min A with bilat HRs, retro descent-  stepped up with RLE   4 Steps CARE Score 3   12 Steps   Reason if not Attempted Safety concerns   12 Steps CARE Score 88   Therapeutic Interventions   Strengthening LAQ 2#,  Hip flex march arom,   Flexibility gentle gastroc and hamstring stretch 2min ea   Assessment   Treatment Assessment 60 min session focused on short gait distances which pt was more comfortable performing slightly longer then yesterdays  Ed pt that each day need to progress distances but not over do it  Pt slightly unsteady when turning for chair approach  Pt performed steps again today ,and curb step which is a barrier for home  Performed LE strengthening which pt will benefit from cont strengthening over the weekend  Pt in bathroom fairly unsteady when letting go of RW to manage pants , and was unsteady when unsupported placing walker up curb  Pt high fall risk and needs to cont therapy to reach LTGs  Had set back earlier in week from doing to much in therapy so pace the session and gradually progress each day     Barriers to Discharge Inaccessible home environment;Decreased caregiver support  (Pt reports wifes health isnt great)   Plan   Progress Progressing toward goals   Recommendation   PT Discharge Recommendation Home with outpatient rehabilitation   PT Therapy Minutes   PT Time In 1230   PT Time Out 1330   PT Total Time (minutes) 60   PT Mode of treatment - Individual (minutes) 60   PT Mode of treatment - Concurrent (minutes) 0   PT Mode of treatment - Group (minutes) 0   PT Mode of treatment - Co-treat (minutes) 0   PT Mode of Treatment - Total time(minutes) 60 minutes   PT Cumulative Minutes 515   Therapy Time missed   Time missed?  No

## 2022-12-02 NOTE — ASSESSMENT & PLAN NOTE
Hx of prior back surgeries with hx of scoliosis, pseudoarthrosis, and flat back deformity  11/22: elective removal of hardware L1-L3, posterior thoracic/lumbar instrumentation from T8 to pelvis, thoracic osteotomy T11/T12, thoracic laminotomy T8/T9, T9/T10, T10/T11, posterior thoracic fusion T7-L1, revision instrumentation T8 to pelvis performed by Dr Yasmeen Bridges (Orthopedics)  Continue ASA 325mg daily for DVT ppx  TLSO brace when getting OOB  Ensure adequate pain control  Neurovascular checks Q shift  2 week follow-up with repeat thoracolumbar spine XRs (12/6)  Repeat XRs obtained on 11/29 due to increasing pain: interval post-surgical changes without evidence of hardware complication  No acute osseous abnormalities  Primary team following  PT/OT

## 2022-12-02 NOTE — PROGRESS NOTES
[facility]  Progress Note - Cynthia Olvera 1955, 78 y o  male MRN: [id number]  Unit/Bed#: -02 Encounter: [id number]  Primary Care Provider: Jose E Sosa MD   Date and time admitted to hospital: 11/25/2022  2:14 PM    Hyponatremia  Assessment & Plan  Mild  Na+ currently 132  Asymptomatic  Likely pain induced  Continue to trend with routine BMP  Thrombocytosis  Assessment & Plan  Plt count currently 489,000  Likely reactive post-operatively  Continue to trend with routine CBC  Vitamin D insufficiency  Assessment & Plan  Vitamin D level 24 5 on 11/28  Started on Vitamin D 2000u daily  History of stroke  Assessment & Plan  Per patient - hx of CVA in 1996  Continue pravastatin 10mg daily as substitute for non-formulary lovastatin  Continue ASA for DVT ppx - would benefit from low dose preventative ASA afterwards  Transaminitis  Assessment & Plan  AST 72, ALT 78 on 12/1  Per patient - hx of mild elevations due to diagnosis of hepatitis C as a teenager  Tylenol discontinued  Statin initially discontinued with no change in levels - restarted statin on 12/1  Continue to trend routine CMP  Hypertension  Assessment & Plan  Continue home lisinopril 5mg daily  Monitor BP with routine VS     Complaints of lightheadedness when getting OOB  Obtain orthostatics prior to therapies - WNL  Hyperlipidemia  Assessment & Plan  Continue pravastatin 10mg daily as substitute for non-formulary lovastatin 10mg daily  Discontinued due to elevated liver enzymes  AST and ALT unchanged after stopping statin - may resume statin at this time (12/1)  Lipid panel on 11/28: Cholesterol 151, Triglycerides 94, HDL 30 and   * Status post lumbar spinal fusion  Assessment & Plan  Hx of prior back surgeries with hx of scoliosis, pseudoarthrosis, and flat back deformity    11/22: elective removal of hardware L1-L3, posterior thoracic/lumbar instrumentation from T8 to pelvis, thoracic osteotomy T11/T12, thoracic laminotomy T8/T9, T9/T10, T10/T11, posterior thoracic fusion T7-L1, revision instrumentation T8 to pelvis performed by Dr Joan Patton (Orthopedics)  Continue ASA 325mg daily for DVT ppx  TLSO brace when getting OOB  Ensure adequate pain control  Neurovascular checks Q shift  2 week follow-up with repeat thoracolumbar spine XRs ()  Repeat XRs obtained on  due to increasing pain: interval post-surgical changes without evidence of hardware complication  No acute osseous abnormalities  Primary team following  PT/OT  VTE Pharmacologic Prophylaxis:   Pharmacologic: ASA per Ortho instruction  Mechanical VTE Prophylaxis in Place: Yes - sequential compression devices  Current Length of Stay: 7 day(s)    Current Patient Status: Inpatient Rehab     Discharge Plan: As per primary team     Code Status: Level 1 - Full Code    Subjective:   Pt examined while pt sitting in recliner in pt room  Complaints of mid back pain, 6/10, aching, pins/needles, improves with pain medication  Pain was better controlled during therapy session this morning when compared to yesterday  Only had 1 muscle spasm thus far this morning  Slept well last night  Denies any numbness/tingling to upper/lower extremities  Denies any bowel or bladder incontinence  Had a BM yesterday  Has no other concerns or complaints at this time  Objective:     Vitals:   Temp (24hrs), Av 6 °F (36 4 °C), Min:97 4 °F (36 3 °C), Max:97 8 °F (36 6 °C)    Temp:  [97 4 °F (36 3 °C)-97 8 °F (36 6 °C)] 97 8 °F (36 6 °C)  HR:  [75-86] 75  Resp:  [18-20] 18  BP: (124-143)/(65-82) 143/82  SpO2:  [94 %-100 %] 96 %  Body mass index is 31 25 kg/m²  Review of Systems   Constitutional: Negative for appetite change, chills, fatigue and fever  HENT: Negative for trouble swallowing  Eyes: Negative for visual disturbance     Respiratory: Negative for cough, chest tightness, shortness of breath, wheezing and stridor  Cardiovascular: Negative for chest pain, palpitations and leg swelling  Gastrointestinal: Negative for abdominal distention, abdominal pain, constipation, diarrhea, nausea and vomiting  LBM 12/1   Genitourinary: Negative for difficulty urinating  Musculoskeletal: Positive for back pain (mid back pain, 6/10, aching/pins/needles, improves with pain medication)  Negative for arthralgias  + back spasms, less severe today, only occurred once thus far   Neurological: Negative for dizziness, weakness, light-headedness, numbness and headaches  Psychiatric/Behavioral: Negative for dysphoric mood and sleep disturbance  The patient is not nervous/anxious  All other systems reviewed and are negative  Input and Output Summary (last 24 hours): Intake/Output Summary (Last 24 hours) at 12/2/2022 0944  Last data filed at 12/2/2022 0815  Gross per 24 hour   Intake 780 ml   Output 550 ml   Net 230 ml       Physical Exam:     Physical Exam  Vitals and nursing note reviewed  Constitutional:       General: He is not in acute distress  Appearance: Normal appearance  He is obese  He is not ill-appearing  HENT:      Head: Normocephalic and atraumatic  Cardiovascular:      Rate and Rhythm: Normal rate and regular rhythm  Pulses: Normal pulses  Heart sounds: Normal heart sounds  No murmur heard  No friction rub  Pulmonary:      Effort: Pulmonary effort is normal  No respiratory distress  Breath sounds: Normal breath sounds  No wheezing or rhonchi  Abdominal:      General: Abdomen is flat  Bowel sounds are normal  There is no distension  Palpations: Abdomen is soft  There is no mass  Tenderness: There is no abdominal tenderness  There is no guarding or rebound  Hernia: No hernia is present  Musculoskeletal:      Cervical back: Normal range of motion and neck supple  Right lower leg: No edema  Left lower leg: No edema  Skin:     General: Skin is warm and dry  Comments: Mid back incision covered with DSD  Neurological:      Mental Status: He is alert and oriented to person, place, and time     Psychiatric:         Mood and Affect: Mood normal          Behavior: Behavior normal          Additional Data:     Labs:    Results from last 7 days   Lab Units 12/01/22  0515   WBC Thousand/uL 7 87   HEMOGLOBIN g/dL 12 6   HEMATOCRIT % 38 0   PLATELETS Thousands/uL 489*   NEUTROS PCT % 72   LYMPHS PCT % 18   MONOS PCT % 6   EOS PCT % 3     Results from last 7 days   Lab Units 12/01/22  0515   SODIUM mmol/L 132*   POTASSIUM mmol/L 4 1   CHLORIDE mmol/L 100   CO2 mmol/L 26   BUN mg/dL 13   CREATININE mg/dL 0 79   ANION GAP mmol/L 6   CALCIUM mg/dL 9 5   ALBUMIN g/dL 3 7   TOTAL BILIRUBIN mg/dL 0 63   ALK PHOS U/L 99   ALT U/L 78*   AST U/L 72*   GLUCOSE RANDOM mg/dL 112*                       Labs reviewed    Imaging:    Imaging reviewed    Recent Cultures (last 7 days):           Last 24 Hours Medication List:   Current Facility-Administered Medications   Medication Dose Route Frequency Provider Last Rate   • aspirin  325 mg Oral Daily Navdeep Jimenez MD     • bisacodyl  10 mg Rectal Daily PRN Navdeep Jimenez MD     • cholecalciferol  2,000 Units Oral Daily BAYRON Garcia     • docusate sodium  100 mg Oral BID Navdeep Jimenez MD     • gabapentin  300 mg Oral BID Prabhu Bolaños MD     • gabapentin  400 mg Oral HS Prabhu Bolaños MD     • HYDROmorphone  2 mg Oral Q4H PRN Navdeep Jimenez MD     • HYDROmorphone  3 mg Oral Q4H PRN Navdeep Jimenez MD     • lidocaine  2 patch Topical Daily Prabhu Bolaños MD     • lisinopril  5 mg Oral Daily Navdeep Jimenez MD     • methocarbamol  1,000 mg Oral HS Prabhu Bolaños MD     • methocarbamol  750 mg Oral BID after meals Prabhu Bolaños MD     • naloxone  0 04 mg Intravenous Q1MIN PRN Navdeep Jimenez MD     • ondansetron  4 mg Oral Q8H PRN Navdeep Jimenez MD     • oxyCODONE  5 mg Oral Q4H PRN Sukhwinder Maldonado MD     • polyethylene glycol  17 g Oral Daily PRN Daniele Biswas MD     • polyethylene glycol  17 g Oral BID Daniele Biswas MD     • pravastatin  10 mg Oral Daily With [address], CRNP     • senna  1 tablet Oral BID Daniele Biswas MD          M*Modal software was used to dictate this note  It may contain errors with dictating incorrect words or incorrect spelling  Please contact the provider directly with any questions

## 2022-12-02 NOTE — ASSESSMENT & PLAN NOTE
Improved on baclofen but patient wants to hold which was d/c   Back spasms, leg tightness, hyperreflexia, spasticity - patient also with hx of CVA with prior known hx of spine sx's  - Patient denies acute worsening and leg tightness, strength sensation at recent baseline  - Hold baclofen 5mg TID per pt preference > consider resuming if needed   - Follow-up with OP providers

## 2022-12-02 NOTE — PROGRESS NOTES
Physical Medicine and Rehabilitation Progress Note  Marden Dec 78 y o  male MRN: [id number]  Unit/Bed#: LOUISE 663-65 Encounter: [id number]      Assessment & Plan:     Decline in ADLs and mobility: Functional assessment - improving         FIM  Care Score  Admit Score Recent Score    Total assist  1-100% or 2p    Tot UBD, LBD     Max assist 2-51-75%    Sub To hygiene, bathing LBD, bathing, to hygiene   Mod assist 3- 26-50%  Par     Min assist 3- 25% or < Par     CG assist 4  TA     Sup/Setup 4-5 Sup  UBD   Mod-I/Indep 6 MI      Transfers  Mod-total assist  Min assist     Ambulation   10 ft mod assist  10 ft min assist     Stairs   Total assist/NT      Goal: Mod indep except possible slight assist for bathing, LBD  Major barriers:  Pain, brace, deconditioning  Dispo & ELOS: Home with ELOS Sat 12/10     * Status post lumbar spinal fusion  Assessment & Plan  S/P removal of hardware L1-L3  Posterior thoracic/ lumbar instrumentation from T8 to pelvis  Thoracic osteotomy T11/12  Thoracic laminotomy T8/9, T9/10 and T10/11  Posterior thoracic fusion T7-L1  Revision instrumentation T8 to pelvis by Dr Jb Krishnan on 11/22  Monitor incision (14 days post-sx Tue 12/6)  - 12/1 pain fairly severe with significant spasms at times; incision stable, exam stable, vitals stable; sub-optimal participation in rehab 2/2 pain    - Cautiously increased hydromorphone to 2-3mg Q4H PRN (continue oxy PRN for BTP) with holding parameters - pt/wife discussed risks/benefits   - Monitor pain, neuro/cardiopul, and GI exam closely    - Recommend 900 min program   - 11/30 improved pain compared to yesterday; incision healing well; strength stable  - Increased pain morning of 11/29/22    Xray 11/29: IMPRESSION: Interval postsurgical changes without evidence of hardware complication  No acute osseous abnormality   - Follow-up at 2 weeks postop for suture removal and xrays of thoracolumbar spine      Can shower now with dressing removed but do not submerge based on dc instructions   - Do not take NSAIDs (Advil, Aleve, Ibuprofen, Motrin, Naproxen, etc ) for 3 months following your surgery  This may impede the healing of your fusion  Thoracic/lumbar spine precautions   Custom-molded LSO brace from [employer] from pre-op  If not, please fit patient with [place] LSO brace  Pt may don brace in a sitting position and should have brace on at all times when ambulating and working with PT  Follow-up with spine surgery after d/c from [facility] and if needed during [facility] course    - Continue acute comprehensive interdisciplinary inpatient rehabilitation to include intensive skilled therapies (PT, OT) as outlined with oversight and management by rehabilitation physician as well as inpatient rehab level nursing, case management and weekly interdisciplinary team meetings  Pain  Assessment & Plan  Poorly controlled; impairing function  Cautiously increase to Dilaudid 2-3 mg Q4 hours for mod-severe pain  Continue Oxycodone IR 5 mg Q4 hours for breakthrough pain  Robaxin 750mg BID and 1000 mg QHS for spasms  (changed 11/29/22)  Gabapentin 300mg BID and 400 mg QHS (changed 11/29/22)  Hold sedating meds for oversedation, AMS, or RR<12  Topical Lidoderm patches x 2     Continue topical ICE    Per d/c instructions - - Do not take NSAIDs (Advil, Aleve, Ibuprofen, Motrin, Naproxen, etc ) for 3 months following your surgery  This may impede the healing of your fusion  Monitor for oversedation, AMS/delirium, and respiratory depression   If being administered - hold opiates, muscle relaxants, benzodiazepines, and gabapentin for oversedation, AMS, or RR<12    Counseled on and continue to encourage deep breathing/relaxation/behavioral pain management techniques      Constipation  Assessment & Plan  Improved with good BM 12/1 after supp  - Monitor closely on opiates - hold relistor with good BM 12/1   - Hold standing iron supplement for now   - Miralax to BID  - Colace/Senna BID  - PRN bowel regimen (Miralax PRN/Dulcolax supp PRN)  - monitor for signs and symptoms of obstruction/ileus > not currently; hold stimulant lax if occurs  - Limiting constipating medications if possible  - Ensure adequate hydration       Vitamin D insufficiency  Assessment & Plan  D3 2000 units daily     Difficulty coping with pain  Assessment & Plan  Supportive counseling  Recommend Psychology consult while in [place] on and continue to encourage deep breathing/relaxation/behavioral management techniques:     Deep breathin seconds in, 5 seconds out, 5 times per hour when awake and PRN when experiencing pain or anxiety  Avoid holding breath and tightening muscles and instead breathe slowly and deeply      Leukocytosis  Assessment & Plan  Trending down   Internal medicine consult and management during [facility] course  Patient afebrile, other vitals unremarkable > continue to monitor   No clear signs or symptoms of infection or VTE but will continue to monitor  Monitor CBC intermittently       Transaminitis  Assessment & Plan  Monitor intermittently  Denies abdominal pain, nausea or other new complaints      Elevated therefore discontinued all Tylenol and Statin  Hx of hepatitis and teenager  Patient would like to avoid standing APAP      At risk for venous thromboembolism (VTE)  Assessment & Plan  SCDs, ambulation, and aspirin 325mg qday per sx  - Patient agrees to SCDs after urging again today given risk of VTE    Venous doppler negative for VTE       Hypertension  Assessment & Plan  Internal medicine consulted and co-management with their service  Monitor vitals with and without activity; monitor for orthostasis  Monitor hemoglobin, electrolytes, kidney function, hydration status   Current meds: lisinopril       Hyperlipidemia  Assessment & Plan  Discontinued Statin due to increase in LFTs      Other Medical Issues:  • None    Follow-up providers and other issues to be followed up after discharge  •     CODE: Level 1: Full Code    Restrictions include: Fall precautions    Objective: Allergies per EMR  Diagnostic Studies: Reviewed  XR spine thoracolumbar 2 vw   Final Result by Krystal Coffman MD (11/29 8857)      Interval postsurgical changes without evidence of hardware complication  No acute osseous abnormality        Workstation performed: [id number]           See above as well    Laboratory: Labs reviewed  Results from last 7 days   Lab Units 12/01/22  0515 11/28/22  0817 11/25/22  0508   HEMOGLOBIN g/dL 12 6 11 8* 12 2   HEMATOCRIT % 38 0 37 7 37 5   WBC Thousand/uL 7 87 6 84 10 18*     Results from last 7 days   Lab Units 12/01/22  0515 11/28/22  0817 11/25/22  0508   BUN mg/dL 13 19 17   SODIUM mmol/L 132* 133* 135   POTASSIUM mmol/L 4 1 4 0 4 2   CHLORIDE mmol/L 100 100 102   CREATININE mg/dL 0 79 0 82 0 76   AST U/L 72* 79* 66*   ALT U/L 78* 71* 32            Drug regimen reviewed, all potential adverse effects identified and addressed:    Scheduled Meds:  Current Facility-Administered Medications   Medication Dose Route Frequency Provider Last Rate   • aspirin  325 mg Oral Daily Evgeny Llamas MD     • bisacodyl  10 mg Rectal Daily PRN Evgeny Llamas MD     • cholecalciferol  2,000 Units Oral Daily BAYRON Thomas     • docusate sodium  100 mg Oral BID Evgeny Llamas MD     • gabapentin  300 mg Oral BID Virgen Nash MD     • gabapentin  400 mg Oral HS Virgen Nash MD     • HYDROmorphone  2 mg Oral Q4H PRN Evgeny Llamas MD     • HYDROmorphone  3 mg Oral Q4H PRN Evgeny Llamas MD     • lidocaine  2 patch Topical Daily Virgen Nash MD     • lisinopril  5 mg Oral Daily Evgeny Llamas MD     • methocarbamol  1,000 mg Oral HS Virgne Nash MD     • methocarbamol  750 mg Oral BID after meals Virgen Nash MD     • naloxone  0 04 mg Intravenous Q1MIN PRN Evgeny Llamas MD     • ondansetron  4 mg Oral Q8H PRN Evgeny Llamas MD     • oxyCODONE  5 mg Oral Q4H PRN Alfonso Graham MD     • polyethylene glycol  17 g Oral Daily PRN Kemi Shipley MD     • polyethylene glycol  17 g Oral BID Kemi Shipley MD     • pravastatin  10 mg Oral Daily With [address], BAYRON     • senna  1 tablet Oral BID Kemi Shipley MD         Chief Complaints:  Back pain      Subjective: On eval, patient reports intermittent severe back pain and spasms that make it hard for him to mobilize and do therapy or get out of bed  He did better later in day  He noted constipation in morning but improved with BM later  He denies lightheadedness, fever, chills, worsening strength, bladder function issues or other new complaints  ROS: A 10 point ROS was performed; negative except as noted above  Physical Exam:  Temp:  [97 °F (36 1 °C)-98 3 °F (36 8 °C)] 97 4 °F (36 3 °C)  HR:  [77-80] 80  Resp:  [18-20] 20  BP: (129-146)/(65-79) 137/79  SpO2:  [95 %-100 %] 100 %    GEN:  Lying in bed in pain at times  HEENT/NECK: MMM  CARDIAC: Regular rate rhythm, no murmers, no rubs, no gallops  LUNGS:  clear to auscultation, no wheezes, rales, or rhonchi  ABDOMEN: Soft, non-tender, non-distended, normal active bowel sounds  EXTREMITIES/SKIN:  no calf edema, no calf tenderness to palpation and incision healing adequately without signs of infection or significant breakdown  NEURO:   MENTAL STATUS:  Appropriate wakefulness and interaction  and Strength/MMT:  5/5 bilateral lower extremity  PSYCH:  Affect:  Euthymic     HPI:  31-year-old male with a past medical history of scoliosis, flat back syndrome, neurogenic claudication hypertension, hyperlipidemia, obesity who had stage I of revision surgery in May of this past year who is now status post stage II revision 11/22    He underwent removal of L1-L3 hardware, posterior thoracic/lumbar instrumentation from T8 to pelvis, thoracic osteotomy at T11/12, thoracic laminotomy T8/T9, T9/T10 and T10/11, posterior thoracic fusion T7 through left L1 and revision instrumentation T8 to pelvis by Dr Gerri Smith  Postop course notable for significant pain and decline in ADLs and mobility  He did have bilateral lower extremity venous ultrasound which was negative for DVT  Patient also noted to have significant constipation  Patient was evaluated by skilled therapies and was found to have significant decline in ADLs and ambulation and appears appropriate for admission to [place] [address]        ** Please Note: Fluency Direct voice to text software may have been used in the creation of this document  **    Total time spent:  35 minutes, with more than 50% spent counseling/coordinating care  Counseling includes discussion with patient re: progress in therapies, functional issues observed by therapy staff, and discussion with patient his/her current medical state/wellbeing  Coordination of patient's care was performed in conjunction with Internal Medicine service to monitor patient's labs, vitals, and management of their comorbidities  In addition, this patient was discussed by the interdisciplinary team in weekly case conference today  The care of the patient was extensively discussed with all care providers and an appropriate rehabilitation plan was formulated unique for this patient  Barriers were identified preventing progression of therapy and appropriate interventions were discussed with each discipline  Please see the team note for input from all disciplines regarding barriers, intervention, and discharge planning

## 2022-12-02 NOTE — PROGRESS NOTES
12/02/22 1530   Pain Assessment   Pain Assessment Tool 0-10   Pain Score 7   Pain Location/Orientation Orientation: Lower; Location: Back   Restrictions/Precautions   Precautions Fall Risk;Pain;Spinal precautions   ROM Restrictions Yes  (back precautions)   Braces or Orthoses TLSO   Sit to Stand   Type of Assistance Needed Incidental touching   Comment CG with RW   Sit to Stand CARE Score 4   Bed-Chair Transfer   Type of Assistance Needed Physical assistance   Physical Assistance Level 25% or less   Comment Min a for balance with RW   Chair/Bed-to-Chair Transfer CARE Score 3   Walk 10 Feet   Type of Assistance Needed Physical assistance   Physical Assistance Level 25% or less   Comment Min a with RW   Walk 10 Feet CARE Score 3   Walk 50 Feet with Two Turns   Reason if not Attempted Safety concerns   Walk 50 Feet with Two Turns CARE Score 88   Walk 150 Feet   Reason if not Attempted Safety concerns   Walk 150 Feet CARE Score 88   Ambulation   Primary Mode of Locomotion Prior to Admission Walk   Distance Walked (feet) 15 ft  (x2)   Assist Device Roller Walker   Gait Pattern Ataxic; Slow Leisa; Forward Flexion   Provided Assistance with: Balance   Walk Assist Level Minimum Assist   Findings no significant change in gt,  cues to stay with walker   Does the patient walk? 2   Yes   Wheel 50 Feet with Two Turns   Reason if not Attempted Activity not applicable   Wheel 50 Feet with Two Turns CARE Score 9   Wheel 150 Feet   Reason if not Attempted Activity not applicable   Wheel 972 Feet CARE Score 9   Picking Up Object   Comment assess in near future   Toilet Transfer   Type of Assistance Needed Physical assistance   Physical Assistance Level 25% or less   Toilet Transfer CARE Score 3   Therapeutic Interventions   Strengthening lumbar stabalization with hip add isometric 10x3   Equipment Use   NuStep 8mins for gentle ROM   Assessment   Treatment Assessment 30 min session focused on transfers, trialed Nustep which pt did well on stated he liked the machine and movement  Started lumbar stabalization which pt will need to strengthen core to support much structual work done in his back  Pt still needs cues to stay with walker with transfers and walking (times of ataxic quick movements makes pt appear unsteady  Problem List Decreased strength;Decreased range of motion;Decreased endurance; Impaired balance;Decreased mobility;Orthopedic restrictions;Pain   Barriers to Discharge Inaccessible home environment   Plan   Progress Progressing toward goals   PT Therapy Minutes   PT Time In 1500   PT Time Out 1530   PT Total Time (minutes) 30   PT Mode of treatment - Individual (minutes) 30   PT Mode of treatment - Concurrent (minutes) 0   PT Mode of treatment - Group (minutes) 0   PT Mode of treatment - Co-treat (minutes) 0   PT Mode of Treatment - Total time(minutes) 30 minutes   PT Cumulative Minutes 485   Therapy Time missed   Time missed?  No

## 2022-12-03 RX ORDER — POLYETHYLENE GLYCOL 3350 17 G/17G
17 POWDER, FOR SOLUTION ORAL DAILY
Status: DISCONTINUED | OUTPATIENT
Start: 2022-12-04 | End: 2022-12-05

## 2022-12-03 RX ADMIN — HYDROMORPHONE HYDROCHLORIDE 3 MG: 2 TABLET ORAL at 14:38

## 2022-12-03 RX ADMIN — ASPIRIN 325 MG ORAL TABLET 325 MG: 325 PILL ORAL at 08:05

## 2022-12-03 RX ADMIN — GABAPENTIN 300 MG: 300 CAPSULE ORAL at 06:25

## 2022-12-03 RX ADMIN — BACLOFEN 5 MG: 10 TABLET ORAL at 06:24

## 2022-12-03 RX ADMIN — HYDROMORPHONE HYDROCHLORIDE 3 MG: 2 TABLET ORAL at 10:40

## 2022-12-03 RX ADMIN — LIDOCAINE 2 PATCH: 50 PATCH CUTANEOUS at 08:05

## 2022-12-03 RX ADMIN — HYDROMORPHONE HYDROCHLORIDE 3 MG: 2 TABLET ORAL at 06:28

## 2022-12-03 RX ADMIN — BACLOFEN 5 MG: 10 TABLET ORAL at 14:16

## 2022-12-03 RX ADMIN — CHOLECALCIFEROL TAB 25 MCG (1000 UNIT) 2000 UNITS: 25 TAB at 08:05

## 2022-12-03 RX ADMIN — GABAPENTIN 300 MG: 300 CAPSULE ORAL at 14:16

## 2022-12-03 RX ADMIN — HYDROMORPHONE HYDROCHLORIDE 3 MG: 2 TABLET ORAL at 18:30

## 2022-12-03 RX ADMIN — PRAVASTATIN SODIUM 10 MG: 20 TABLET ORAL at 17:34

## 2022-12-03 RX ADMIN — BACLOFEN 5 MG: 10 TABLET ORAL at 21:50

## 2022-12-03 RX ADMIN — HYDROMORPHONE HYDROCHLORIDE 3 MG: 2 TABLET ORAL at 02:32

## 2022-12-03 RX ADMIN — LISINOPRIL 5 MG: 5 TABLET ORAL at 08:05

## 2022-12-03 RX ADMIN — GABAPENTIN 400 MG: 400 CAPSULE ORAL at 21:50

## 2022-12-03 NOTE — PROGRESS NOTES
12/03/22 1430   Pain Assessment   Pain Assessment Tool 0-10   Pain Score 8   Pain Location/Orientation Location: Back   Pain Onset/Description Onset: Ongoing   Effect of Pain on Daily Activities tolerated   Patient's Stated Pain Goal No pain   Hospital Pain Intervention(s) Repositioned; Ambulation/increased activity; Emotional support;Relaxation technique   Multiple Pain Sites No   Pain Rating: FLACC (Rest) - Face 0   Pain Rating: FLACC (Rest) - Legs 0   Pain Rating: FLACC (Rest) - Activity 0   Pain Rating: FLACC (Rest) - Cry 0   Pain Rating: FLACC (Rest) - Consolability 0   Score: FLACC (Rest) 0   Restrictions/Precautions   Precautions Fall Risk;Pain;Spinal precautions   Weight Bearing Restrictions No   ROM Restrictions Yes  (Spinal Precautions)   Braces or Orthoses TLSO  (OOB)   Cognition   Overall Cognitive Status WFL   Arousal/Participation Alert; Cooperative   Attention Within functional limits   Orientation Level Oriented X4   Memory Within functional limits   Following Commands Follows one step commands with increased time or repetition   Subjective   Subjective "You're back already"   Roll Left and Right   Type of Assistance Needed Independent; Adaptive equipment   Physical Assistance Level No physical assistance   Comment use of BR   Roll Left and Right CARE Score 6   Sit to Lying   Type of Assistance Needed Independent; Adaptive equipment   Physical Assistance Level No physical assistance   Comment use of BR   Sit to Lying CARE Score 6   Lying to Sitting on Side of Bed   Type of Assistance Needed Supervision   Physical Assistance Level No physical assistance   Comment Log rolling performed no VC's required   Lying to Sitting on Side of Bed CARE Score 4   Sit to Stand   Type of Assistance Needed Verbal cues; Incidental touching   Physical Assistance Level No physical assistance   Comment CGA with RW   Sit to Stand CARE Score 4   Bed-Chair Transfer   Type of Assistance Needed Incidental touching   Physical Assistance Level No physical assistance   Comment CGA with use of RW   Chair/Bed-to-Chair Transfer CARE Score 4   Walk 10 Feet   Type of Assistance Needed Incidental touching   Physical Assistance Level 25% or less   Comment CG with RW   Walk 10 Feet CARE Score 3   Ambulation   Primary Mode of Locomotion Prior to Admission Walk   Distance Walked (feet) 10 ft  (x2)   Assist Device Roller Walker   Gait Pattern Ataxic; Inconsistant Leisa; Slow Leisa;Decreased foot clearance;L foot drag; Forward Flexion   Limitations Noted In Coordination; Endurance; Heel Strike   Provided Assistance with: Balance   Does the patient walk? 2  Yes   Therapeutic Interventions   Strengthening Seated performed with B/L 2lb ankle weights on:Hip marches 15 x 5 sets, LAQ 15 reps x 2 sets, Hamstring pullback w/ Black Tband 5 reps ea side   Neuromuscular Re-Education FWD/BCK walking in // bars: 5 cycles down/back ea, side stepping in // bars 4 cycles total (wore B/L 2# ankle weights except the last two cycles of side stepping due to some fatigue)   Assessment   Treatment Assessment Patient illustrated increased fatigue at the start of the session  Patient was napping prior to visit and reports subjectively being "wiped out"  Modified session to address overall deficits with fair tolerance from patient  He requires verbal cues to slow down as he "overdoes" it at times  Family/Caregiver Present No   Problem List Decreased strength;Decreased range of motion;Decreased endurance; Impaired balance;Decreased mobility;Orthopedic restrictions;Pain   Barriers to Discharge Inaccessible home environment   PT Barriers   Physical Impairment Decreased strength;Decreased range of motion;Decreased endurance; Impaired balance;Decreased mobility; Decreased coordination;Orthopedic restrictions;Pain   Functional Limitation Car transfers; Ramp negotiation;Stair negotiation;Standing;Transfers; Walking   Plan   Treatment/Interventions LE strengthening/ROM; Functional transfer training;Elevations; Therapeutic exercise; Endurance training;Patient/family training;Equipment eval/education; Bed mobility;Gait training;Spoke to nursing   Progress Progressing toward goals   Recommendation   PT Discharge Recommendation Home with outpatient rehabilitation   Equipment Recommended Walker   PT Therapy Minutes   PT Time In 1430   PT Time Out 1530   PT Total Time (minutes) 60   PT Mode of treatment - Individual (minutes) 60   PT Mode of treatment - Concurrent (minutes) 0   PT Mode of treatment - Group (minutes) 0   PT Mode of treatment - Co-treat (minutes) 0   PT Mode of Treatment - Total time(minutes) 60 minutes   PT Cumulative Minutes 665

## 2022-12-03 NOTE — PROGRESS NOTES
12/03/22 1230   Pain Assessment   Pain Assessment Tool 0-10   Pain Score 7   Pain Location/Orientation Location: Back   Pain Radiating Towards none   Pain Onset/Description Onset: Ongoing   Effect of Pain on Daily Activities Tolerated   Patient's Stated Pain Goal No pain   Hospital Pain Intervention(s) Repositioned; Ambulation/increased activity; Emotional support; Rest   Multiple Pain Sites No   Pain Rating: FLACC (Rest) - Face 0   Pain Rating: FLACC (Rest) - Legs 0   Pain Rating: FLACC (Rest) - Activity 0   Pain Rating: FLACC (Rest) - Cry 0   Pain Rating: FLACC (Rest) - Consolability 0   Score: FLACC (Rest) 0   Restrictions/Precautions   Precautions Fall Risk;Pain;Spinal precautions   Weight Bearing Restrictions No   ROM Restrictions Yes  (Spinal Precautions)   Braces or Orthoses TLSO  (OOB)   Cognition   Overall Cognitive Status WFL   Arousal/Participation Alert; Cooperative   Attention Within functional limits   Orientation Level Oriented X4   Memory Within functional limits   Following Commands Follows one step commands with increased time or repetition   Subjective   Subjective Patient reports "lets go"   Sit to Lying   Type of Assistance Needed Supervision   Physical Assistance Level No physical assistance   Comment   (HOB [place])   Sit to Lying CARE Score 4   Lying to Sitting on Side of Bed   Type of Assistance Needed Supervision   Physical Assistance Level No physical assistance   Comment Log rolling performed no VC's   Lying to Sitting on Side of Bed CARE Score 4   Sit to Stand   Type of Assistance Needed Incidental touching   Physical Assistance Level No physical assistance   Comment CGA to RW   Sit to Stand CARE Score 4   Bed-Chair Transfer   Type of Assistance Needed Physical assistance   Physical Assistance Level 25% or less   Comment Occasional Lucia wtih RW, CG at times   Chair/Bed-to-Chair Transfer CARE Score 3   Transfer Bed/Chair/Wheelchair   Limitations Noted In Balance;Confidence   Adaptive Equipment Roller [employer] 10 Feet   Type of Assistance Needed Incidental touching   Physical Assistance Level 25% or less   Comment CG with RW   Walk 10 Feet CARE Score 3   Walk 50 Feet with Two Turns   Type of Assistance Needed Physical assistance   Physical Assistance Level 26%-50%   Comment ModA with RW   Walk 50 Feet with Two Turns CARE Score 3   Walk 150 Feet   Reason if not Attempted Safety concerns   Walk 150 Feet CARE Score 88   Walking 10 Feet on Uneven Surfaces   Reason if not Attempted Safety concerns   Walking 10 Feet on Uneven Surfaces CARE Score 88   Ambulation   Primary Mode of Locomotion Prior to Admission Walk   Distance Walked (feet) 52 ft  (x 45reps (w/ rest break b/w))   Assist Device Roller Walker   Gait Pattern Ataxic; Slow Leisa;L foot drag; Forward Flexion   Limitations Noted In Coordination; Endurance;Posture; Safety; Sequencing;Speed;Strength   Provided Assistance with: Balance   Walk Assist Level Minimum Assist   Findings VC's for walker management   Does the patient walk? 2  Yes   Therapeutic Interventions   Balance Static balance: FA EO up to 20 sec w/o UE support, static balance with head turns/head nods (no trunk ROT), Standing 1 UE marches in // bars: about 25 reps ea side   Neuromuscular Re-Education Focus on Gait training and endurance trainin ft x 5 cycles   Assessment   Treatment Assessment Patient overall good tolerance of first of two sessions  The focus of the first session was on ambulating up to 50 feet with turns in between and static balance  Patient does have occaisonal instances of near LOB, but able to recover with Lucia  Patient limited in distance secondary to endurance deficits  Also limited in L LE proprioceptive awareness  Family/Caregiver Present No   Problem List Decreased strength;Decreased range of motion;Decreased endurance; Impaired balance;Decreased mobility;Orthopedic restrictions;Pain   Barriers to Discharge Inaccessible home environment   PT Barriers   Physical Impairment Decreased strength;Decreased range of motion;Decreased endurance; Impaired balance;Decreased mobility; Decreased coordination;Orthopedic restrictions;Pain   Functional Limitation Car transfers; Ramp negotiation;Stair negotiation;Standing;Transfers; Walking   Plan   Treatment/Interventions Functional transfer training;LE strengthening/ROM; Elevations; Therapeutic exercise; Endurance training;Bed mobility; Equipment eval/education;Gait training;Spoke to nursing;OT   PT Therapy Minutes   PT Time In 1230   PT Time Out 1330   PT Total Time (minutes) 60   PT Mode of treatment - Individual (minutes) 60   PT Mode of treatment - Concurrent (minutes) 0   PT Mode of treatment - Group (minutes) 0   PT Mode of treatment - Co-treat (minutes) 0   PT Mode of Treatment - Total time(minutes) 60 minutes   PT Cumulative Minutes 605

## 2022-12-03 NOTE — PROGRESS NOTES
OT Treatment Note       12/03/22 1030   Pain Assessment   Pain Assessment Tool 0-10   Pain Score 8   Restrictions/Precautions   Precautions Fall Risk;Pain;Spinal precautions   Weight Bearing Restrictions No   ROM Restrictions Yes  (spinal precautions)   Braces or Orthoses TLSO  (OOB)   Lifestyle   Autonomy "I know I need a few more days, but I wanna go home "   Upper Body Dressing   Comment OT noting TLSO velcro waist band twisted, assisting pt to don properly with SUP, vc's   Lying to Sitting on Side of Bed   Type of Assistance Needed Supervision   Physical Assistance Level No physical assistance   Comment long rolling technique   Lying to Sitting on Side of Bed CARE Score 4   Sit to Stand   Type of Assistance Needed Incidental touching   Physical Assistance Level No physical assistance   Comment CGA to RW   Sit to Stand CARE Score 4   Bed-Chair Transfer   Type of Assistance Needed Physical assistance   Physical Assistance Level 25% or less   Comment SPT fluctuating Benito/CGA using RW   Chair/Bed-to-Chair Transfer CARE Score 3   Functional Standing Tolerance   Time max stand 3 5minutes   Comments Pt participating in standing tolerance tasks in prep for B/IADLs  Pt with tendency to lean trunk against standing surface i e  table to steady himself in stance to complete functional reaching  Pt completing functional reaching task with unilateral UE support and brief periods without UE support, with ant/post sway, requiring min/modA to steady in stance and vc's for technique to maintain upright posture and core engagement  Max stand 3 5minutes   Exercise Tools   Other Exercise Tool 1 Pt participating in BUE therex seated and standing for increased activity tolerance, balance for ADLs using 2# db completed unilaterally: chest press, shoulder flexion (0-90*) with unilateral UE support on RW with CGA; seated rows 10x3 reps; rest breaks as needed, pt denying any increased pain with exercises     Cognition   Overall Cognitive Status WFL   Arousal/Participation Alert; Cooperative   Attention Within functional limits   Orientation Level Oriented X4   Memory Within functional limits   Following Commands Follows one step commands with increased time or repetition   Additional Activities   Additional Activities Comments In prep for LBD/toileting tasks, pt completing functional reaching task around trunk/waist and to upper thighs to retrieve/place resistive clips, using unilateral UE support on RW  Pt completing task with CGA and no LOB noted  Pt participating in standing balnace tasks in prep for household mobility/functional transfers: alternating tapping 4" step, then cones with BUE support on RW with Benito; OT noting ataxic foot placement LLE with tasks  Pt functionally mobilizing gym door->bedroom door with Benito using RW and chair follow for safety  Activity Tolerance   Activity Tolerance Patient tolerated treatment well   Assessment   Treatment Assessment Pt seen for 60 min OT session focusing on standing balance, standing tolerance, UE strengthening, and functional transfers/mobility  Pt tolerating session with multiple extended rest breaks  Pt completing functional transfers at Benito/CGA level today using RW  OT to continue POC to address standing balance with decreased reliance on external UE support, standing tolerance/endurance, and strengthening to maximize functional (I) and safety prior to d/c; also to focus on IADLs i e  kitchen mobility, med management tasks  Prognosis Good   Problem List Decreased strength;Decreased range of motion;Decreased endurance; Impaired balance;Decreased mobility;Orthopedic restrictions;Pain   Barriers to Discharge Inaccessible home environment   Plan   Treatment/Interventions ADL retraining;Functional transfer training; Therapeutic exercise; Endurance training;Patient/family training   Progress Progressing toward goals   Recommendation   OT Discharge Recommendation Home with home health rehabilitation OT Therapy Minutes   OT Time In 1030   OT Time Out 1130   OT Total Time (minutes) 60   OT Mode of treatment - Individual (minutes) 60   OT Mode of treatment - Concurrent (minutes) 0   OT Mode of treatment - Group (minutes) 0   OT Mode of treatment - Co-treat (minutes) 0   OT Mode of Treatment - Total time(minutes) 60 minutes   OT Cumulative Minutes 465   Therapy Time missed   Time missed?  No

## 2022-12-03 NOTE — PLAN OF CARE
Problem: NEUROSENSORY - ADULT  Goal: Achieves stable or improved neurological status  Description: INTERVENTIONS  - Monitor and report changes in neurological status  - Monitor vital signs such as temperature, blood pressure, glucose, and any other labs ordered   - Initiate measures to prevent increased intracranial pressure  - Monitor for seizure activity and implement precautions if appropriate      Outcome: Progressing     Problem: RESPIRATORY - ADULT  Goal: Achieves optimal ventilation and oxygenation  Description: INTERVENTIONS:  - Assess for changes in respiratory status  - Assess for changes in mentation and behavior  - Position to facilitate oxygenation and minimize respiratory effort  - Oxygen administered by appropriate delivery if ordered  - Initiate smoking cessation education as indicated  - Encourage broncho-pulmonary hygiene including cough, deep breathe, Incentive Spirometry  - Assess the need for suctioning and aspirate as needed  - Assess and instruct to report SOB or any respiratory difficulty  - Respiratory Therapy support as indicated  Outcome: Progressing     Problem: GASTROINTESTINAL - ADULT  Goal: Maintains or returns to baseline bowel function  Description: INTERVENTIONS:  - Assess bowel function  - Encourage oral fluids to ensure adequate hydration  - Administer IV fluids if ordered to ensure adequate hydration  - Administer ordered medications as needed  - Encourage mobilization and activity  - Consider nutritional services referral to assist patient with adequate nutrition and appropriate food choices  Outcome: Progressing  Goal: Maintains adequate nutritional intake  Description: INTERVENTIONS:  - Monitor percentage of each meal consumed  - Identify factors contributing to decreased intake, treat as appropriate  - Assist with meals as needed  - Monitor I&O, weight, and lab values if indicated  - Obtain nutrition services referral as needed  Outcome: Progressing     Problem: GENITOURINARY - ADULT  Goal: Maintains or returns to baseline urinary function  Description: INTERVENTIONS:  - Assess urinary function  - Encourage oral fluids to ensure adequate hydration if ordered  - Administer IV fluids as ordered to ensure adequate hydration  - Administer ordered medications as needed  - Offer frequent toileting  - Follow urinary retention protocol if ordered  Outcome: Progressing

## 2022-12-04 RX ADMIN — HYDROMORPHONE HYDROCHLORIDE 3 MG: 2 TABLET ORAL at 18:40

## 2022-12-04 RX ADMIN — HYDROMORPHONE HYDROCHLORIDE 3 MG: 2 TABLET ORAL at 01:31

## 2022-12-04 RX ADMIN — PRAVASTATIN SODIUM 10 MG: 20 TABLET ORAL at 16:55

## 2022-12-04 RX ADMIN — BACLOFEN 5 MG: 10 TABLET ORAL at 13:43

## 2022-12-04 RX ADMIN — CHOLECALCIFEROL TAB 25 MCG (1000 UNIT) 2000 UNITS: 25 TAB at 08:10

## 2022-12-04 RX ADMIN — GABAPENTIN 300 MG: 300 CAPSULE ORAL at 16:55

## 2022-12-04 RX ADMIN — HYDROMORPHONE HYDROCHLORIDE 3 MG: 2 TABLET ORAL at 21:33

## 2022-12-04 RX ADMIN — ASPIRIN 325 MG ORAL TABLET 325 MG: 325 PILL ORAL at 08:10

## 2022-12-04 RX ADMIN — BACLOFEN 5 MG: 10 TABLET ORAL at 06:35

## 2022-12-04 RX ADMIN — HYDROMORPHONE HYDROCHLORIDE 3 MG: 2 TABLET ORAL at 08:11

## 2022-12-04 RX ADMIN — LIDOCAINE 2 PATCH: 50 PATCH CUTANEOUS at 08:10

## 2022-12-04 RX ADMIN — HYDROMORPHONE HYDROCHLORIDE 3 MG: 2 TABLET ORAL at 13:43

## 2022-12-04 RX ADMIN — GABAPENTIN 400 MG: 400 CAPSULE ORAL at 21:33

## 2022-12-04 RX ADMIN — LISINOPRIL 5 MG: 5 TABLET ORAL at 08:10

## 2022-12-04 RX ADMIN — GABAPENTIN 300 MG: 300 CAPSULE ORAL at 06:35

## 2022-12-04 RX ADMIN — BACLOFEN 5 MG: 10 TABLET ORAL at 21:33

## 2022-12-04 NOTE — PROGRESS NOTES
12/04/22 1400   Pain Assessment   Pain Assessment Tool 0-10   Pain Score 8   Pain Location/Orientation Location: Back   Pain Onset/Description Onset: Ongoing;Frequency: Constant/Continuous   Effect of Pain on Daily Activities premedicated by RN prior to PT, pt reports improved mid session to 5/10   Hospital Pain Intervention(s) Repositioned   Restrictions/Precautions   Precautions Fall Risk;Pain;Spinal precautions   Weight Bearing Restrictions No   ROM Restrictions Yes  (spinal)   Braces or Orthoses TLSO  (OOB)   Cognition   Overall Cognitive Status WFL   Arousal/Participation Alert; Cooperative   Attention Within functional limits   Orientation Level Oriented X4   Memory Within functional limits   Following Commands Follows one step commands without difficulty   Sit to Lying   Type of Assistance Needed Independent   Physical Assistance Level No physical assistance   Comment rail   Sit to Lying CARE Score 6   Lying to Sitting on Side of Bed   Type of Assistance Needed Independent   Physical Assistance Level No physical assistance   Comment rail   Lying to Sitting on Side of Bed CARE Score 6   Sit to Stand   Type of Assistance Needed Incidental touching   Physical Assistance Level No physical assistance   Comment CGA RW   Sit to Stand CARE Score 4   Bed-Chair Transfer   Type of Assistance Needed Incidental touching   Physical Assistance Level No physical assistance   Comment CGA RW   Chair/Bed-to-Chair Transfer CARE Score 4   Transfer Bed/Chair/Wheelchair   Limitations Noted In Balance;Confidence   Adaptive Equipment Roller Walker   Sit to [employer]   Stand to [employer]   Type of Assistance Needed Physical assistance   Physical Assistance Level 25% or less   Comment simulated set up nu step   Car Transfer CARE Score 3   Walk 10 Feet   Type of Assistance Needed Incidental touching   Physical Assistance Level No physical assistance   Walk 10 Feet CARE Score 4   Walk 50 Feet with Two Turns   Type of Assistance Needed Physical assistance   Physical Assistance Level 25% or less   Comment Benito with RW and turning   Walk 50 Feet with Two Turns CARE Score 3   Walking 10 Feet on Uneven Surfaces   Type of Assistance Needed Physical assistance   Physical Assistance Level 26%-50%   Comment ramp with RW poor eccentric control LLE descending   Walking 10 Feet on Uneven Surfaces CARE Score 3   Ambulation   Primary Mode of Locomotion Prior to Admission Walk   Distance Walked (feet) 55 ft  (x2)   Assist Device Roller Walker   Gait Pattern Ataxic; Inconsistant Leisa;Decreased foot clearance; Forward Flexion;L foot drag   Limitations Noted In Balance; Coordination; Endurance; Heel Strike   Provided Assistance with: Balance   Walk Assist Level Minimum Assist;Contact Guard   Does the patient walk? 2  Yes   Wheelchair mobility   Does the patient use a wheelchair? 0  No   Curb or Single Stair   Style negotiated Curb   Type of Assistance Needed Physical assistance   Physical Assistance Level 25% or less   Comment verbal cues for ascending/descending backwards with RW   1 Step (Curb) CARE Score 3   4 Steps   Type of Assistance Needed Physical assistance   Physical Assistance Level 25% or less   Comment verbal cues for ascending/descending backwards with RW   4 Steps CARE Score 3   Stairs   Type Curb   # of Steps 4  (1 (6")curb step x4 trials back to back)   Therapeutic Interventions   Strengthening side stepping in // bars with 1 5# cuff weight bilaterally 10 ft x6 reps; standing hamstring curls with 1 5# cuff weights 2 sets of 10  Neuromuscular Re-Education forward / backwar walking in // bars with 1 5# cuff weights and CGA   Equipment Use   NuStep 10 min nu step with BUE and BLE for gentle ROM   Assessment   Treatment Assessment Casetito Fadumo initially complaining of 8/10 back pain, was premediated by nursing prior to therapy and during session reports improved pain to 5/10   Sesssion focused on gait and elevations training  Pt required up to mod assist on ramp secondary to poor eccentric control of LLE and noted buckling however able to remain upright with therapist assist  Practiced curb negotiaiton with RW and min assist, verbal cues for gait as well secondary to impulsivity with turning  RW adjusted 1 notch lower with patient reporting improved subjective report of ambulation  Continue to focus on standing balance, gait and elevations quality as well as decreasing risk of falls  Pt would benefit from continued skilled PT  Problem List Decreased endurance;Decreased strength;Decreased mobility; Impaired balance;Orthopedic restrictions;Pain;Decreased coordination   Barriers to Discharge Inaccessible home environment   PT Barriers   Functional Limitation Car transfers;Stair negotiation;Ramp negotiation;Transfers;Standing;Walking   Plan   Treatment/Interventions Functional transfer training;LE strengthening/ROM;ADL retraining; Endurance training; Therapeutic exercise;Elevations; Bed mobility;Gait training;Patient/family training;Equipment eval/education   Progress Progressing toward goals   Recommendation   PT Discharge Recommendation Home with outpatient rehabilitation   Equipment Recommended Walker   PT Therapy Minutes   PT Time In 1400   PT Time Out 1500   PT Total Time (minutes) 60   PT Mode of treatment - Individual (minutes) 60   PT Mode of treatment - Concurrent (minutes) 0   PT Mode of treatment - Group (minutes) 0   PT Mode of treatment - Co-treat (minutes) 0   PT Mode of Treatment - Total time(minutes) 60 minutes   PT Cumulative Minutes 755   Therapy Time missed   Time missed?  No

## 2022-12-04 NOTE — PLAN OF CARE
Problem: NEUROSENSORY - ADULT  Goal: Achieves stable or improved neurological status  Description: INTERVENTIONS  - Monitor and report changes in neurological status  - Monitor vital signs such as temperature, blood pressure, glucose, and any other labs ordered   - Initiate measures to prevent increased intracranial pressure  - Monitor for seizure activity and implement precautions if appropriate      Outcome: Progressing     Problem: NEUROSENSORY - ADULT  Goal: Achieves maximal functionality and self care  Description: INTERVENTIONS  - Monitor swallowing and airway patency with patient fatigue and changes in neurological status  - Encourage and assist patient to increase activity and self care     - Encourage visually impaired, hearing impaired and aphasic patients to use assistive/communication devices  Outcome: Progressing     Problem: SKIN/TISSUE INTEGRITY - ADULT  Goal: Incision(s), wounds(s) or drain site(s) healing without S/S of infection  Description: INTERVENTIONS  - Assess and document dressing, incision, wound bed, drain sites and surrounding tissue  - Provide patient and family education  - Perform skin care/dressing changes every   Outcome: Progressing     Problem: MUSCULOSKELETAL - ADULT  Goal: Maintain or return mobility to safest level of function  Description: INTERVENTIONS:  - Assess patient's ability to carry out ADLs; assess patient's baseline for ADL function and identify physical deficits which impact ability to perform ADLs (bathing, care of mouth/teeth, toileting, grooming, dressing, etc )  - Assess/evaluate cause of self-care deficits   - Assess range of motion  - Assess patient's mobility  - Assess patient's need for assistive devices and provide as appropriate  - Encourage maximum independence but intervene and supervise when necessary  - Involve family in performance of ADLs  - Assess for home care needs following discharge   - Consider OT consult to assist with ADL evaluation and planning for discharge  - Provide patient education as appropriate  Outcome: Progressing

## 2022-12-04 NOTE — PROGRESS NOTES
12/04/22 0900   Pain Assessment   Pain Assessment Tool 0-10   Pain Score 8   Pain Location/Orientation Location: Back   Pain Onset/Description Onset: Ongoing   Hospital Pain Intervention(s) Repositioned   Restrictions/Precautions   Precautions Fall Risk;Pain;Spinal precautions   Weight Bearing Restrictions No   ROM Restrictions Yes  (spinal)   Braces or Orthoses TLSO  (OOB)   Cognition   Overall Cognitive Status WFL   Arousal/Participation Alert; Cooperative   Attention Within functional limits   Orientation Level Oriented X4   Memory Within functional limits   Following Commands Follows one step commands with increased time or repetition   Subjective   Subjective "Not too bad" (regarding pain)   Sit to Stand   Type of Assistance Needed Incidental touching   Physical Assistance Level No physical assistance   Comment CGA RW   Sit to Stand CARE Score 4   Bed-Chair Transfer   Type of Assistance Needed Incidental touching   Physical Assistance Level No physical assistance   Comment CGA RW   Chair/Bed-to-Chair Transfer CARE Score 4   Transfer Bed/Chair/Wheelchair   Limitations Noted In Balance;Confidence   Adaptive Equipment Roller Walker   Sit to [employer]   Stand to [facility]   Walk 10 Feet   Type of Assistance Needed Incidental touching   Physical Assistance Level No physical assistance   Comment CGA with RW   Walk 10 Feet CARE Score 4   Walk 50 Feet with Two Turns   Type of Assistance Needed Physical assistance   Physical Assistance Level 25% or less   Comment Benito with RW   Walk 50 Feet with Two Turns CARE Score 3   Walk 150 Feet   Comment limited by back pain   Ambulation   Primary Mode of Locomotion Prior to Admission Walk   Distance Walked (feet) 50 ft  (x2)   Assist Device Roller Walker   Gait Pattern Ataxic; Inconsistant Leisa;Decreased foot clearance; Forward Flexion;L foot drag   Limitations Noted In Balance; Coordination; Endurance; Heel Strike   Provided Assistance with: Balance   Walk Assist Level Minimum Assist   Findings verbal cues to keep RW close   Does the patient walk? 2  Yes   Wheelchair mobility   Does the patient use a wheelchair? 0  No   Therapeutic Interventions   Strengthening side stepping in // bars with 1 5# cuff weight bilaterally 10 ft x6 reps; standing hamstring curls with 1 5# cuff weights 2 sets of 10  Step Taps with BUE support on RW alternating 10 on each side onto 4" step   Assessment   Treatment Assessment Gio goldberg improved ambulation distance and quality but still limited by back pain and decreased endurance  CGA with RW for short ambulation, able to ambulate 50 ft with RW but required min assist this session for steadying and verbal cues for RW management  Completed standing LE ther ex to improve strength for transfers and gait as well as endurance  Pt needed verbal cues to remain upright during standing exerices and not flex forward  Pt would benefit from continued skilled PT to maximize independence and decrease risk of falls  Family/Caregiver Present no   Problem List Decreased strength;Decreased endurance; Impaired balance;Decreased mobility;Orthopedic restrictions;Pain   Barriers to Discharge Inaccessible home environment   PT Barriers   Functional Limitation Car transfers; Ramp negotiation;Stair negotiation;Standing;Transfers; Walking   Plan   Treatment/Interventions ADL retraining;Functional transfer training;LE strengthening/ROM; Elevations; Therapeutic exercise; Endurance training;Patient/family training;Equipment eval/education; Bed mobility;Gait training; Compensatory technique education   Progress Progressing toward goals   Recommendation   PT Discharge Recommendation Home with outpatient rehabilitation   Equipment Recommended Walker   PT Therapy Minutes   PT Time In 0900   PT Time Out 0930   PT Total Time (minutes) 30   PT Mode of treatment - Individual (minutes) 30   PT Mode of treatment - Concurrent (minutes) 0   PT Mode of treatment - Group (minutes) 0 PT Mode of treatment - Co-treat (minutes) 0   PT Mode of Treatment - Total time(minutes) 30 minutes   PT Cumulative Minutes 695   Therapy Time missed   Time missed?  No

## 2022-12-04 NOTE — PROGRESS NOTES
OT Treatment Note       12/04/22 1030   Pain Assessment   Pain Assessment Tool 0-10   Pain Score 8  (decreasing to 5/10 after shower)   Pain Location/Orientation Location: Back   Pain Onset/Description Onset: Ongoing;Frequency: Constant/Continuous   Hospital Pain Intervention(s) Repositioned;Rest;Shower/Bath   Restrictions/Precautions   Precautions Fall Risk;Pain;Spinal precautions   Weight Bearing Restrictions No   ROM Restrictions Yes  (spinal precautions)   Braces or Orthoses TLSO  (OOB)   Lifestyle   Autonomy "I'm definitely better than yesterday "   Eating   Type of Assistance Needed Independent   Physical Assistance Level No physical assistance   Comment provided with lunch seated in WC at end of session   Eating CARE Score 6   Oral Hygiene   Comment reported already completing this morning   Grooming   Findings washing face seated in shower with setup   Shower/Bathe Self   Type of Assistance Needed Supervision;Verbal cues   Physical Assistance Level No physical assistance   Comment Pt completing shower seated on tub bench for 100% of task with SUP  Per orders, incision left uncovered during shower, abd pads reapplied following shower for comfort with TLSO brace  Pt requiring 3 vc's during shower to maintain spinal precautions (attempting to bend forward) and not to stand to wash/rinse buttocks and instead complete lateral weightshifts   Pt using reacher with washcloth to wash B/L feet   Shower/Bathe Self CARE Score 4   Tub/Shower Transfer   Findings Pt functionally mobilizing to shower RW<->tub bench using grab bars with CGA and min vc's for technique   Upper Body Dressing   Type of Assistance Needed Set-up / clean-up   Physical Assistance Level No physical assistance   Comment for OH shirt and TLSO brace   Upper Body Dressing CARE Score 5   Lower Body Dressing   Type of Assistance Needed Physical assistance   Physical Assistance Level 25% or less   Comment Pt using reacher to thread BLE through pant legs, requiring Benito to fully thread feet through pant legs following shower; CGA in stance to hike pants over hips   Lower Body Dressing CARE Score 3   Putting On/Taking Off Footwear   Type of Assistance Needed Supervision; Adaptive equipment   Physical Assistance Level No physical assistance   Comment seated to doff/don socks using reacher and sock aide with min vc's for technique   Putting On/Taking Off Footwear CARE Score 4   Sit to Stand   Type of Assistance Needed Incidental touching   Physical Assistance Level No physical assistance   Comment CGA/close SUP using RW; also completing 5reps without UE support with CGA   Sit to Stand CARE Score 4   Bed-Chair Transfer   Type of Assistance Needed Incidental touching   Physical Assistance Level No physical assistance   Comment CGA using RW   Chair/Bed-to-Chair Transfer CARE Score 4   Toileting Hygiene   Type of Assistance Needed Incidental touching   Physical Assistance Level No physical assistance   Comment urinating seated on toilet; CGA for CM in stance with RW   Toileting Hygiene CARE Score 4   Toilet Transfer   Type of Assistance Needed Incidental touching   Physical Assistance Level No physical assistance   Comment functionally mobilizing to toilet using RW/GB with CGA   Toilet Transfer CARE Score 4   Light Housekeeping   Light Housekeeping For increased dynamic standing balance/tolerance and decreased reliance on UE during standing tasks, pt participating in task of folding laundry standing at table  Pt able to tolerate ~3minutes standing with primarily no UE support  Pt requiring vc's to not lean trunk against table to steady himself, and vc's to maintain upright posture   Pt with occasional anterior LOB requiring Benito to correct   Exercise Tools   Other Exercise Tool 1 Pt participating in BUE therex seated and standing for increased activity tolerance & balance for ADLs using 2# db completed unilaterally: chest press, shoulder flexion (0-90*) with unilateral UE support on RW with CGA; seated rows and bicep curls 10x3 reps; rest breaks as needed, pt denying any increased pain with exercises  Cognition   Overall Cognitive Status WFL   Arousal/Participation Alert; Cooperative   Attention Within functional limits   Orientation Level Oriented X4   Memory Within functional limits   Following Commands Follows one step commands without difficulty   Additional Activities   Additional Activities Comments For increased dynamic standing balance in prep for household mobility and functional transfers, pt standing and alternating stepping forward/back with each LE with 1 5# ankle weight LLE for increased proprioceptive input; visual targets also used for LLE foot placement  Pt completing with CGA with BUE support on RW, Benito with unilateral UE support on RW  Pt completing tapping each LE on 4" step with 1 5#ankle weight donned LLE and visual targets for L foot placement, CGA with BUE support on RW, Benito to steady in stance for unilateral UE support  Functional mobility short household distances ~25' with CGA using RW  Activity Tolerance   Activity Tolerance Patient tolerated treatment well   Assessment   Treatment Assessment Pt seen for 90 min OT session focusing on dynamic standing balance and decreasing reliance on BUE in stance, activity tolerance, strengthening, and ADL routine including functional transfers/mobility  Pt tolerating session well with rest breaks, requires min vc's for pacing throughout  Pt demonstrating increased (I) with functional transfers today CGA/close SUP with STS and CGA with SPT using RW  Pt also demonstrating increased (I) with toileting and bathing tasks today, although he still required vc's to maintain spinal precautions throughout shower   OT to continue POC to focus on standing balance and decreased reliance on BUE, activity tolerance, strengthening, IADL tasks (kitchen mobility, medication management), and maximizing (I) and safety with BADLs and functional transfers/mobility  Prognosis Good   Problem List Decreased strength;Decreased range of motion;Decreased endurance; Impaired balance;Decreased mobility; Decreased coordination;Orthopedic restrictions;Pain   Barriers to Discharge Inaccessible home environment   Plan   Treatment/Interventions ADL retraining;Functional transfer training; Therapeutic exercise; Endurance training;Patient/family training;Equipment eval/education;Gait training; Compensatory technique education;Spoke to nursing   Progress Progressing toward goals   Recommendation   OT Discharge Recommendation Home with home health rehabilitation   OT Therapy Minutes   OT Time In 1030   OT Time Out 1200   OT Total Time (minutes) 90   OT Mode of treatment - Individual (minutes) 90   OT Mode of treatment - Concurrent (minutes) 0   OT Mode of treatment - Group (minutes) 0   OT Mode of treatment - Co-treat (minutes) 0   OT Mode of Treatment - Total time(minutes) 90 minutes   OT Cumulative Minutes 555   Therapy Time missed   Time missed?  No

## 2022-12-05 LAB
ALBUMIN SERPL BCP-MCNC: 3.8 G/DL (ref 3.5–5)
ALP SERPL-CCNC: 116 U/L (ref 43–122)
ALT SERPL W P-5'-P-CCNC: 37 U/L
ANION GAP SERPL CALCULATED.3IONS-SCNC: 4 MMOL/L (ref 5–14)
AST SERPL W P-5'-P-CCNC: 38 U/L (ref 17–59)
BASOPHILS # BLD AUTO: 0.03 THOUSANDS/ÂΜL (ref 0–0.1)
BASOPHILS NFR BLD AUTO: 0 % (ref 0–1)
BILIRUB SERPL-MCNC: 0.67 MG/DL (ref 0.2–1)
BUN SERPL-MCNC: 14 MG/DL (ref 5–25)
CALCIUM SERPL-MCNC: 9.8 MG/DL (ref 8.4–10.2)
CHLORIDE SERPL-SCNC: 101 MMOL/L (ref 96–108)
CO2 SERPL-SCNC: 29 MMOL/L (ref 21–32)
CREAT SERPL-MCNC: 0.84 MG/DL (ref 0.7–1.5)
EOSINOPHIL # BLD AUTO: 0.22 THOUSAND/ÂΜL (ref 0–0.61)
EOSINOPHIL NFR BLD AUTO: 3 % (ref 0–6)
ERYTHROCYTE [DISTWIDTH] IN BLOOD BY AUTOMATED COUNT: 13.2 % (ref 11.6–15.1)
GFR SERPL CREATININE-BSD FRML MDRD: 91 ML/MIN/1.73SQ M
GLUCOSE P FAST SERPL-MCNC: 95 MG/DL (ref 70–99)
GLUCOSE SERPL-MCNC: 95 MG/DL (ref 70–99)
HCT VFR BLD AUTO: 40.8 % (ref 36.5–49.3)
HGB BLD-MCNC: 12.9 G/DL (ref 12–17)
IMM GRANULOCYTES # BLD AUTO: 0.06 THOUSAND/UL (ref 0–0.2)
IMM GRANULOCYTES NFR BLD AUTO: 1 % (ref 0–2)
LYMPHOCYTES # BLD AUTO: 1.78 THOUSANDS/ÂΜL (ref 0.6–4.47)
LYMPHOCYTES NFR BLD AUTO: 23 % (ref 14–44)
MCH RBC QN AUTO: 28 PG (ref 26.8–34.3)
MCHC RBC AUTO-ENTMCNC: 31.6 G/DL (ref 31.4–37.4)
MCV RBC AUTO: 89 FL (ref 82–98)
MONOCYTES # BLD AUTO: 0.54 THOUSAND/ÂΜL (ref 0.17–1.22)
MONOCYTES NFR BLD AUTO: 7 % (ref 4–12)
NEUTROPHILS # BLD AUTO: 5.05 THOUSANDS/ÂΜL (ref 1.85–7.62)
NEUTS SEG NFR BLD AUTO: 66 % (ref 43–75)
NRBC BLD AUTO-RTO: 0 /100 WBCS
PLATELET # BLD AUTO: 548 THOUSANDS/UL (ref 149–390)
PMV BLD AUTO: 8.5 FL (ref 8.9–12.7)
POTASSIUM SERPL-SCNC: 4.3 MMOL/L (ref 3.5–5.3)
PROT SERPL-MCNC: 7.2 G/DL (ref 6.4–8.4)
RBC # BLD AUTO: 4.6 MILLION/UL (ref 3.88–5.62)
SODIUM SERPL-SCNC: 134 MMOL/L (ref 135–147)
WBC # BLD AUTO: 7.68 THOUSAND/UL (ref 4.31–10.16)

## 2022-12-05 RX ORDER — LISINOPRIL 10 MG/1
10 TABLET ORAL DAILY
Status: DISCONTINUED | OUTPATIENT
Start: 2022-12-06 | End: 2022-12-10 | Stop reason: HOSPADM

## 2022-12-05 RX ADMIN — GABAPENTIN 300 MG: 300 CAPSULE ORAL at 06:16

## 2022-12-05 RX ADMIN — BACLOFEN 5 MG: 10 TABLET ORAL at 21:57

## 2022-12-05 RX ADMIN — HYDROMORPHONE HYDROCHLORIDE 3 MG: 2 TABLET ORAL at 18:17

## 2022-12-05 RX ADMIN — LISINOPRIL 5 MG: 5 TABLET ORAL at 08:46

## 2022-12-05 RX ADMIN — BACLOFEN 5 MG: 10 TABLET ORAL at 06:16

## 2022-12-05 RX ADMIN — GABAPENTIN 300 MG: 300 CAPSULE ORAL at 14:15

## 2022-12-05 RX ADMIN — HYDROMORPHONE HYDROCHLORIDE 3 MG: 2 TABLET ORAL at 14:16

## 2022-12-05 RX ADMIN — PRAVASTATIN SODIUM 10 MG: 20 TABLET ORAL at 16:55

## 2022-12-05 RX ADMIN — GABAPENTIN 400 MG: 400 CAPSULE ORAL at 21:57

## 2022-12-05 RX ADMIN — ASPIRIN 325 MG ORAL TABLET 325 MG: 325 PILL ORAL at 08:46

## 2022-12-05 RX ADMIN — LIDOCAINE 2 PATCH: 50 PATCH CUTANEOUS at 08:46

## 2022-12-05 RX ADMIN — OXYCODONE HYDROCHLORIDE 5 MG: 5 TABLET ORAL at 07:36

## 2022-12-05 RX ADMIN — HYDROMORPHONE HYDROCHLORIDE 3 MG: 2 TABLET ORAL at 05:01

## 2022-12-05 RX ADMIN — BACLOFEN 5 MG: 10 TABLET ORAL at 14:16

## 2022-12-05 RX ADMIN — CHOLECALCIFEROL TAB 25 MCG (1000 UNIT) 2000 UNITS: 25 TAB at 08:46

## 2022-12-05 NOTE — PROGRESS NOTES
[facility]  Progress Note - Lois Ureña 1955, 78 y o  male MRN: [id number]  Unit/Bed#: -09 Encounter: [id number]  Primary Care Provider: Braden Musa MD   Date and time admitted to hospital: 11/25/2022  2:14 PM    Hyponatremia  Assessment & Plan  Mild  Na+ currently 134  Asymptomatic  Likely pain induced  Continue to trend with routine BMP  Thrombocytosis  Assessment & Plan  Plt count currently 548,000  Likely reactive post-operatively  Continue to trend with routine CBC  Vitamin D insufficiency  Assessment & Plan  Vitamin D level 24 5 on 11/28  Started on Vitamin D 2000u daily  History of stroke  Assessment & Plan  Per patient - hx of CVA in 1996  Continue pravastatin 10mg daily as substitute for non-formulary lovastatin  Continue ASA for DVT ppx - would benefit from low dose preventative ASA afterwards  Transaminitis  Assessment & Plan  Improved, AST 38 and ALT 37   Per patient - hx of mild elevations due to diagnosis of hepatitis C as a teenager  Tylenol discontinued  Statin initially discontinued with no change in levels - restarted statin on 12/1  Continue to trend routine CMP  Hypertension  Assessment & Plan  Continue home lisinopril 5mg daily  Increase lisinopril to 10mg daily on 12/5  Monitor BP with routine VS     Hyperlipidemia  Assessment & Plan  Continue pravastatin 10mg daily as substitute for non-formulary lovastatin 10mg daily  Discontinued due to elevated liver enzymes  AST and ALT unchanged after stopping statin - may resume statin at this time (12/1)  Lipid panel on 11/28: Cholesterol 151, Triglycerides 94, HDL 30 and   * Status post lumbar spinal fusion  Assessment & Plan  Hx of prior back surgeries with hx of scoliosis, pseudoarthrosis, and flat back deformity    11/22: elective removal of hardware L1-L3, posterior thoracic/lumbar instrumentation from T8 to pelvis, thoracic osteotomy T11/T12, thoracic laminotomy T8/T9, T9/T10, T10/T11, posterior thoracic fusion T7-L1, revision instrumentation T8 to pelvis performed by Dr Kelin Maynard (Orthopedics)  Continue ASA 325mg daily for DVT ppx  TLSO brace when getting OOB  Ensure adequate pain control  Neurovascular checks Q shift  2 week follow-up with repeat thoracolumbar spine XRs ()  Repeat XRs obtained on  due to increasing pain: interval post-surgical changes without evidence of hardware complication  No acute osseous abnormalities  Primary team following  PT/OT  VTE Pharmacologic Prophylaxis:   Pharmacologic: ASA per Ortho  Mechanical VTE Prophylaxis in Place: Yes - sequential compression devices  Current Length of Stay: 10 day(s)    Current Patient Status: Inpatient Rehab     Discharge Plan: As per primary team     Code Status: Level 1 - Full Code    Subjective:   Pt examined while pt lying in bed in pt room  Currently denies any pain  Feels that his pain has been more tolerable over the weekend  Denies any spasms today or yesterday  Had a BM yesterday without any issues  Slept well last night  Feels that he is doing well in therapy  Has no other concerns or complaints at this time  Objective:     Vitals:   Temp (24hrs), Av 9 °F (36 6 °C), Min:97 7 °F (36 5 °C), Max:98 °F (36 7 °C)    Temp:  [97 7 °F (36 5 °C)-98 °F (36 7 °C)] 97 7 °F (36 5 °C)  HR:  [77-88] 88  Resp:  [16-18] 16  BP: (119-149)/(74-90) 149/90  SpO2:  [97 %-100 %] 97 %  Body mass index is 31 25 kg/m²  Review of Systems   Constitutional: Negative for appetite change, chills, fatigue and fever  HENT: Negative for trouble swallowing  Eyes: Negative for visual disturbance  Respiratory: Negative for cough, shortness of breath, wheezing and stridor  Cardiovascular: Negative for chest pain, palpitations and leg swelling  Gastrointestinal: Negative for abdominal distention, abdominal pain, constipation, diarrhea, nausea and vomiting          LBM 12/4 Genitourinary: Negative for difficulty urinating  Musculoskeletal: Negative for arthralgias, back pain and gait problem  Neurological: Negative for dizziness, weakness, light-headedness, numbness and headaches  Psychiatric/Behavioral: Negative for dysphoric mood and sleep disturbance  The patient is not nervous/anxious  All other systems reviewed and are negative  Input and Output Summary (last 24 hours): Intake/Output Summary (Last 24 hours) at 12/5/2022 0923  Last data filed at 12/5/2022 0001  Gross per 24 hour   Intake 620 ml   Output 1200 ml   Net -580 ml       Physical Exam:     Physical Exam  Vitals and nursing note reviewed  Constitutional:       General: He is not in acute distress  Appearance: Normal appearance  He is obese  He is not ill-appearing  HENT:      Head: Normocephalic and atraumatic  Cardiovascular:      Rate and Rhythm: Normal rate and regular rhythm  Pulses: Normal pulses  Heart sounds: Normal heart sounds  No murmur heard  No friction rub  Pulmonary:      Effort: Pulmonary effort is normal  No respiratory distress  Breath sounds: Normal breath sounds  No wheezing or rhonchi  Abdominal:      General: Abdomen is flat  Bowel sounds are normal  There is no distension  Palpations: Abdomen is soft  Tenderness: There is no abdominal tenderness  Musculoskeletal:      Cervical back: Normal range of motion and neck supple  Right lower leg: No edema  Left lower leg: No edema  Skin:     General: Skin is warm and dry  Comments: Back incision with steri-strips in place  Well-approximated  No drainage, erythema, or edema present  Neurological:      Mental Status: He is alert and oriented to person, place, and time     Psychiatric:         Mood and Affect: Mood normal          Behavior: Behavior normal          Additional Data:     Labs:    Results from last 7 days   Lab Units 12/05/22  0503   WBC Thousand/uL 7 68 HEMOGLOBIN g/dL 12 9   HEMATOCRIT % 40 8   PLATELETS Thousands/uL 548*   NEUTROS PCT % 66   LYMPHS PCT % 23   MONOS PCT % 7   EOS PCT % 3     Results from last 7 days   Lab Units 12/05/22  0503   SODIUM mmol/L 134*   POTASSIUM mmol/L 4 3   CHLORIDE mmol/L 101   CO2 mmol/L 29   BUN mg/dL 14   CREATININE mg/dL 0 84   ANION GAP mmol/L 4*   CALCIUM mg/dL 9 8   ALBUMIN g/dL 3 8   TOTAL BILIRUBIN mg/dL 0 67   ALK PHOS U/L 116   ALT U/L 37   AST U/L 38   GLUCOSE RANDOM mg/dL 95                       Labs reviewed    Imaging:    Imaging reviewed    Recent Cultures (last 7 days):           Last 24 Hours Medication List:   Current Facility-Administered Medications   Medication Dose Route Frequency Provider Last Rate   • aspirin  325 mg Oral Daily Apryl Mcmahon MD     • baclofen  5 mg Oral TID Apryl Mcmahon MD     • bisacodyl  10 mg Rectal Daily PRN Apryl Mcmahon MD     • cholecalciferol  2,000 Units Oral Daily BAYRON Rahman     • docusate sodium  100 mg Oral BID Apryl Mcmahon MD     • gabapentin  300 mg Oral BID Dina Benítez MD     • gabapentin  400 mg Oral HS Dina Benítez MD     • HYDROmorphone  2 mg Oral Q4H PRN Apryl Mcmahon MD     • HYDROmorphone  3 mg Oral Q4H PRN Apryl Mcmahon MD     • lidocaine  2 patch Topical Daily Dina Benítez MD     • [START ON 12/6/2022] lisinopril  10 mg Oral Daily BAYRON Rahman     • naloxone  0 04 mg Intravenous Q1MIN PRN Apryl Mcmahon MD     • ondansetron  4 mg Oral Q8H PRN Apryl Mcmahon MD     • oxyCODONE  5 mg Oral Q4H PRN Dina Benítez MD     • polyethylene glycol  17 g Oral Daily PRN Apryl Mcmahon MD     • polyethylene glycol  17 g Oral Daily Apryl Mcmahon MD     • pravastatin  10 mg Oral Daily With BAYRON Palacios     • senna  1 tablet Oral BID PRN Sydney Vallejo DO          M*Modal software was used to dictate this note  It may contain errors with dictating incorrect words or incorrect spelling   Please contact the provider directly with any questions

## 2022-12-05 NOTE — PROGRESS NOTES
12/05/22 2809   Pain Assessment   Pain Assessment Tool 0-10   Pain Score 8   Pain Location/Orientation Orientation: Bilateral;Location: Back   Pain Onset/Description Onset: Ongoing;Frequency: Constant/Continuous   Hospital Pain Intervention(s) Emotional support;Repositioned  (nsg present to provide breakthrough pain med)   Restrictions/Precautions   Precautions Fall Risk;Pain;Spinal precautions   Weight Bearing Restrictions No   ROM Restrictions Yes  (spinal precautions)   Braces or Orthoses TLSO   Eating   Type of Assistance Needed Independent   Physical Assistance Level No physical assistance   Comment seated in w/c for breakfast meal at end of OT session   Eating CARE Score 6   Oral Hygiene   Type of Assistance Needed Set-up / clean-up   Physical Assistance Level No physical assistance   Comment S/U A while seated in w/c at sink   Oral Hygiene CARE Score 5   Grooming   Able To Initiate Tasks; Wash/Dry Face;Wash/Dry Hands;Brush/Clean Teeth   Findings S/U A for all grooming tasks while seated in w/c at sink   Shower/Bathe Self   Type of Assistance Needed Supervision;Verbal cues   Physical Assistance Level No physical assistance   Comment Pt engaged in sponge bath while seated in w/c at sink, able to wash 10/10 parts at Sup level with min vc's to maintain spinal precautions (to avoid bending forward)  Pt washed UB and BLEs while seated, using LHR w/washcloth to wash B/L lower legs/ feet  In stance with TLSO donned and CS, pt washed nila/rear, no LOB  Shower/Bathe Self CARE Score 4   Bathing   Assessed Bath Style Sponge Bath   Anticipated D/C Bath Style Shower; Tub   Able to Gather/Transport No   Able to [employer] Temperature Yes   Able to Wash/Rinse/Dry (body part) Left Arm;Right Arm;L Upper Leg;R Upper Leg;L Lower Leg/Foot;R Lower Leg/Foot;Chest;Abdomen;Perineal Area; Buttocks   Limitations Noted in ROM; Strength;Balance   Positioning Seated;Standing   Adaptive Equipment Longhand Reacher   Tub/Shower Transfer Reason Not Assessed Sponge Bath;Patient refusal  (offered shower, pt refused and requested sponge bath instead)   Upper Body Dressing   Type of Assistance Needed Set-up / clean-up   Physical Assistance Level No physical assistance   Comment for [place] shirt and TLSO brace while seated   Upper Body Dressing CARE Score 5   Lower Body Dressing   Type of Assistance Needed Physical assistance; Adaptive equipment;Verbal cues   Physical Assistance Level 25% or less   Comment Seated using LHR to thread LEs through pants requiring Lucia to fully thread feet through, CS in stance for CM up/down over hips, no LOB   Lower Body Dressing CARE Score 3   Putting On/Taking Off Footwear   Type of Assistance Needed Supervision; Adaptive equipment   Physical Assistance Level No physical assistance   Comment seated to doff socks using dressing stick, and don socks using sock aide   Putting On/Taking Off Footwear CARE Score 4   Picking Up Object   Type of Assistance Needed Incidental touching;Verbal cues; Adaptive equipment   Physical Assistance Level No physical assistance   Comment Pt completed fxl mob w/RW at CGA-CS level around OT gym utilizing [facility] to retrieve small items from floor level and placing into RW basket to transport  Focus was on dynamic standing balance, standing tolerance, RW mgmt/spinal precaution carryover, and endurance  Pt tolerated well overall, no LOB, but most limited by endurance, requiring several seated rest breaks to manage fatigue in legs and back  Pt demo G RW mgmt/safety and LHR technique  Pt then completed fxl mob w/RW and basket around OT gym to retrieve cones from overhead, using LHR when needed, with min vc's required to maintain spinal precautions  Picking Up Object CARE Score 4   Dressing/Undressing Clothing   Remove UB Clothes Pullover Shirt  (TLSO)   Don UB Clothes Pullover Shirt  (TLSO)   Remove LB Clothes Pants; Undergarment;Socks   Don LB Clothes Pants;Socks   Limitations Noted In Balance; Endurance;Strength;ROM; Timeliness   Adaptive Equipment Reacher;Dressing Stick; Sock Aide   Positioning Supported Sit;Standing   Lying to Sitting on Side of Bed   Type of Assistance Needed Independent   Physical Assistance Level No physical assistance   Lying to Sitting on Side of Bed CARE Score 6   Sit to Stand   Type of Assistance Needed Supervision; Adaptive equipment   Physical Assistance Level No physical assistance   Comment RW   Sit to Stand CARE Score 4   Bed-Chair Transfer   Type of Assistance Needed Incidental touching; Adaptive equipment   Physical Assistance Level No physical assistance   Comment CGA-CS fxl mob w/RW, no LOB   Chair/Bed-to-Chair Transfer CARE Score 4   Exercise Tools   Exercise Tools Yes   Other Exercise Tool 1 Seated w/Sup, 3x77mqvh each of BUE  chest press, bicep curls, shoulder flexion, horiz ABD, and prograde/retrograde rowing, using 2# tbar for BUE strengthening for increased safety and independence during sit<>stands and fxl transfers  Pt tolerated well, with brief rest breaks between sets to manage c/o min proximal BUE fatigue, with increased back pain reported during horiz ABD  Cognition   Overall Cognitive Status WFL   Arousal/Participation Alert; Cooperative   Attention Within functional limits   Orientation Level Oriented X4   Memory Within functional limits   Following Commands Follows one step commands without difficulty   Activity Tolerance   Activity Tolerance Patient tolerated treatment well   Assessment   Treatment Assessment Pt seen for 90min skilled OT session focused on ADL skills retraining (sponge bath, LB dressing w/LHAE), carryover of spinal precautions, BUE strengthening, LHR item retrieval w/RW, endurance, and standing balance/tolerance, for increased independence w/ADLs/IADLs and decreased caregiver burden  See detailed descriptions of fxl performance above   Pt tolerated session well but continues to be limited by spinal precautions, decreased endurance, pain, and standing balance  Pt would benefit from continued skilled OT focused on ADL retraining, light IADL retraining, UE strengthening, endurance, standing balance/tolerance,  activity tolerance, kitchen mobility, med mgmt, and D/C planning  Prognosis Good   Problem List Decreased strength;Decreased range of motion;Decreased endurance; Impaired balance;Decreased mobility; Decreased coordination;Orthopedic restrictions;Pain   Barriers to Discharge Inaccessible home environment   Plan   Treatment/Interventions ADL retraining;Functional transfer training; Endurance training;Equipment eval/education;Patient/family training; Compensatory technique education;Spoke to nursing   Progress Progressing toward goals   Recommendation   OT Discharge Recommendation Home with home health rehabilitation   OT Therapy Minutes   OT Time In 0730   OT Time Out 0900   OT Total Time (minutes) 90   OT Mode of treatment - Individual (minutes) 90   OT Mode of treatment - Concurrent (minutes) 0   OT Mode of treatment - Group (minutes) 0   OT Mode of treatment - Co-treat (minutes) 0   OT Mode of Treatment - Total time(minutes) 90 minutes   OT Cumulative Minutes 645   Therapy Time missed   Time missed?  No

## 2022-12-05 NOTE — PROGRESS NOTES
Physical Medicine and Rehabilitation Progress Note  Joelle Powers 78 y o  male MRN: [id number]  Unit/Bed#: -63 Encounter: [id number]      Assessment & Plan:     Decline in ADLs and mobility: Functional assessment - improving         FIM  Care Score  Admit Score Recent Score    Total assist  1-100% or 2p    Tot UBD, LBD     Max assist 2-51-75%    Sub To hygiene, bathing    Mod assist 3- 26-50%  Par     Min assist 3- 25% or < Par  LBD   CG assist 4  TA  To hygiene   Sup/Setup 4-5 Sup  UBD, bathing    Mod-I/Indep 6 MI      Transfers  Mod-total assist  CG assist     Ambulation   10 ft mod assist  10 ft CG assist; 50 ft min assist     Stairs   Total assist/NT 4 steps min assist      Goal: Mod indep except possible slight assist for bathing, LBD  Major barriers:  Pain, brace, deconditioning  Dispo & ELOS: Home with [facility]OS Sat 12/10     * Status post lumbar spinal fusion  Assessment & Plan  S/P removal of hardware L1-L3  Posterior thoracic/ lumbar instrumentation from T8 to pelvis  Thoracic osteotomy T11/12  Thoracic laminotomy T8/9, T9/10 and T10/11  Posterior thoracic fusion T7-L1    Revision instrumentation T8 to pelvis by Dr Silvina Barnes on 11/22  Monitor incision (14 days post-sx Tue 12/6)  - 12/5 function improving; acceptable pain control on current regimen; improved spasms; incision healing well; strength intact; continue pain meds as ordered   - 12/2 pain and sleep better overnight but still with fair amount of spasms - noted spasticity likely chronic    - Adjust Robaxin to Baclofen starting 5mg TID  - 12/1 pain fairly severe with significant spasms at times; incision stable, exam stable, vitals stable; sub-optimal participation in rehab 2/2 pain    - Cautiously increased hydromorphone to 2-3mg Q4H PRN (continue oxy PRN for BTP) with holding parameters - pt/wife discussed risks/benefits   - Monitor pain, neuro/cardiopul, and GI exam closely    - Recommend 900 min program   - 11/30 improved pain compared to yesterday; incision healing well; strength stable  - Increased pain morning of 11/29/22    Xray 11/29: IMPRESSION: Interval postsurgical changes without evidence of hardware complication  No acute osseous abnormality   - Follow-up at 2 weeks postop for suture removal and xrays of thoracolumbar spine  Can shower now with dressing removed but do not submerge based on dc instructions   - Do not take NSAIDs (Advil, Aleve, Ibuprofen, Motrin, Naproxen, etc ) for 3 months following your surgery  This may impede the healing of your fusion  Thoracic/lumbar spine precautions   Custom-molded LSO brace from [employer] from pre-op  If not, please fit patient with [place] LSO brace  Pt may don brace in a sitting position and should have brace on at all times when ambulating and working with PT  Follow-up with spine surgery after d/c from [facility] and if needed during [facility] course    - Continue acute comprehensive interdisciplinary inpatient rehabilitation to include intensive skilled therapies (PT, OT) as outlined with oversight and management by rehabilitation physician as well as inpatient rehab level nursing, case management and weekly interdisciplinary team meetings  Pain  Assessment & Plan  Adequate control  Continue Dilaudid 2-3 mg Q4 hours for mod-severe pain  Continue Oxycodone IR 5 mg Q4 hours for breakthrough pain  Baclofen 5mg TID for spasms/spasticity (switched from Robaxin earlier)   Gabapentin 300mg BID and 400 mg QHS (changed 11/29/22)  Hold sedating meds for oversedation, AMS, or RR<12  Topical Lidoderm patches x 2     Continue topical ICE    Per d/c instructions - - Do not take NSAIDs (Advil, Aleve, Ibuprofen, Motrin, Naproxen, etc ) for 3 months following your surgery  This may impede the healing of your fusion    Monitor for oversedation, AMS/delirium, and respiratory depression   If being administered - hold opiates, muscle relaxants, benzodiazepines, and gabapentin for oversedation, AMS, or RR<12  Counseled on and continue to encourage deep breathing/relaxation/behavioral pain management techniques      Spasm  Assessment & Plan  Improving   Back spasms, leg tightness, hyperreflexia, spasticity - patient also with hx of CVA with prior known hx of spine sx's  - Patient denies acute worsening and leg tightness, strength sensation at recent baseline  - Continue baclofen 5mg TID > uptitrated if indicated  - Monitor exam closely     Constipation  Assessment & Plan  Resolved now with loose stools   - Monitor closely on opiates  - Hold standing miralax qday/Senna/colace BID for now but low threshold to resume while on opiates  - PRN bowel regimen (Miralax PRN/Dulcolax supp PRN)  - monitor for signs and symptoms of obstruction/ileus > not currently; hold stimulant lax if occurs  - Limiting constipating medications if possible  - Ensure adequate hydration       Hyponatremia  Assessment & Plan  Improved to 134 on   Asymptomatic   Monitor     Vitamin D insufficiency  Assessment & Plan  D3 2000 units daily     Difficulty coping with pain  Assessment & Plan  Supportive counseling  Recommend Psychology consult while in [place] on and continue to encourage deep breathing/relaxation/behavioral management techniques:     Deep breathin seconds in, 5 seconds out, 5 times per hour when awake and PRN when experiencing pain or anxiety  Avoid holding breath and tightening muscles and instead breathe slowly and deeply      Leukocytosis  Assessment & Plan  Resolved  Internal medicine consult and management during [facility] course  Patient afebrile, other vitals unremarkable > continue to monitor   No clear signs or symptoms of infection or VTE but will continue to monitor  Monitor CBC intermittently       Transaminitis  Assessment & Plan  Monitor intermittently  Denies abdominal pain, nausea or other new complaints      Elevated therefore discontinued all Tylenol and Statin  Hx of hepatitis and teenager  Patient would like to avoid standing APAP      At risk for venous thromboembolism (VTE)  Assessment & Plan  SCDs, ambulation, and aspirin 325mg qday per sx  11/25 Venous doppler negative for VTE       Hypertension  Assessment & Plan  Internal medicine consulted and co-management with their service  Monitor vitals with and without activity; monitor for orthostasis  Monitor hemoglobin, electrolytes, kidney function, hydration status   Current meds: lisinopril 10mg qday per IM       Hyperlipidemia  Assessment & Plan  Discontinued Statin due to increase in LFTs      Other Medical Issues:  • None    Follow-up providers and other issues to be followed up after discharge  • PCP  • Spine Sx    CODE: Level 1: Full Code    Restrictions include: Fall precautions    Objective: Allergies per EMR  Diagnostic Studies: Reviewed  XR spine thoracolumbar 2 vw   Final Result by Kranthi Chiu MD (11/29 0664)      Interval postsurgical changes without evidence of hardware complication  No acute osseous abnormality        Workstation performed: [id number]           See above as well    Laboratory: Labs reviewed  Results from last 7 days   Lab Units 12/05/22  0503 12/01/22  0515   HEMOGLOBIN g/dL 12 9 12 6   HEMATOCRIT % 40 8 38 0   WBC Thousand/uL 7 68 7 87     Results from last 7 days   Lab Units 12/05/22  0503 12/01/22  0515   BUN mg/dL 14 13   SODIUM mmol/L 134* 132*   POTASSIUM mmol/L 4 3 4 1   CHLORIDE mmol/L 101 100   CREATININE mg/dL 0 84 0 79   AST U/L 38 72*   ALT U/L 37 78*            Drug regimen reviewed, all potential adverse effects identified and addressed:    Scheduled Meds:  Current Facility-Administered Medications   Medication Dose Route Frequency Provider Last Rate   • aspirin  325 mg Oral Daily Kat Mayo MD     • baclofen  5 mg Oral TID Kat Mayo MD     • bisacodyl  10 mg Rectal Daily PRN Kat Mayo MD     • cholecalciferol  2,000 Units Oral Daily BAYRON Nolasco     • gabapentin  300 mg Oral BID Kimberli Wilson MD     • gabapentin  400 mg Oral HS Kimberli Wilson MD     • HYDROmorphone  2 mg Oral Q4H PRN Tip Oneill MD     • HYDROmorphone  3 mg Oral Q4H PRN Tpi Oneill MD     • lidocaine  2 patch Topical Daily Kimberli Wilson MD     • [START ON 12/6/2022] lisinopril  10 mg Oral Daily Geroge Khadar, KEVINNP     • naloxone  0 04 mg Intravenous Q1MIN PRN Tip Oneill MD     • ondansetron  4 mg Oral Q8H PRN Tip Oneill MD     • oxyCODONE  5 mg Oral Q4H PRN Kimberli Wilson MD     • polyethylene glycol  17 g Oral Daily PRN Tip Oneill MD     • pravastatin  10 mg Oral Daily With [address], B[place]     • senna  1 tablet Oral BID PRN Manuel Damian DO         Chief Complaints:  Rehab follow-up     Subjective: On eval, patient reports back pain better than last week  He reports less spasms and sleeping well  Patient denies SOB, lightheadedness, increased cough, constipation and notes some loose BMs  He denies abdominal pain, worsening strength, sensation or other new complaints  ROS: A 10 point ROS was performed; negative except as noted above         Physical Exam:  Temp:  [97 7 °F (36 5 °C)-98 °F (36 7 °C)] 97 7 °F (36 5 °C)  HR:  [77-88] 88  Resp:  [16-18] 16  BP: (131-149)/(82-90) 149/90  SpO2:  [97 %-100 %] 97 %    GEN:  Sitting in NAD   HEENT/NECK: MMM  CARDIAC: Regular rate rhythm, no murmers, no rubs, no gallops  LUNGS:  clear to auscultation, no wheezes, rales, or rhonchi  ABDOMEN: Soft, non-tender, non-distended, normal active bowel sounds  EXTREMITIES/SKIN:  no calf edema, no calf tenderness to palpation and Incision healing well without signs of infection or dehiscence  NEURO:   MENTAL STATUS:  Appropriate wakefulness and interaction , Strength/MMT:  Full throughout  and Less tone  PSYCH:  Affect:  Euthymic     HPI:  15-year-old male with a past medical history of scoliosis, flat back syndrome, neurogenic claudication hypertension, hyperlipidemia, obesity who had stage I of revision surgery in May of this past year who is now status post stage II revision 11/22  He underwent removal of L1-L3 hardware, posterior thoracic/lumbar instrumentation from T8 to pelvis, thoracic osteotomy at T11/12, thoracic laminotomy T8/T9, T9/T10 and T10/11, posterior thoracic fusion T7 through left L1 and revision instrumentation T8 to pelvis by Dr Silvina Barnes  Postop course notable for significant pain and decline in ADLs and mobility  He did have bilateral lower extremity venous ultrasound which was negative for DVT  Patient also noted to have significant constipation  Patient was evaluated by skilled therapies and was found to have significant decline in ADLs and ambulation and appears appropriate for admission to [place] [address]        ** Please Note: [employer] voice to text software may have been used in the creation of this document   **

## 2022-12-05 NOTE — PROGRESS NOTES
12/05/22 1430   Restrictions/Precautions   Precautions Fall Risk;Spinal precautions;Pain   ROM Restrictions Yes  (back precautions)   Braces or Orthoses TLSO   Sit to Lying   Type of Assistance Needed Independent   Sit to Lying CARE Score 6   Lying to Sitting on Side of Bed   Type of Assistance Needed Independent   Lying to Sitting on Side of Bed CARE Score 6   Sit to Stand   Type of Assistance Needed Supervision   Sit to Stand CARE Score 4   Bed-Chair Transfer   Type of Assistance Needed Supervision   Comment CS with RW   Chair/Bed-to-Chair Transfer CARE Score 4   Car Transfer   Type of Assistance Needed Incidental touching   Comment CG stand pivot with RW   Car Transfer CARE Score 4   Walk 10 Feet   Type of Assistance Needed Supervision   Comment CS with RW   Walk 10 Feet CARE Score 4   Walk 50 Feet with Two Turns   Type of Assistance Needed Supervision   Comment CS with RW   Walk 50 Feet with Two Turns CARE Score 4   Walk 150 Feet   Comment Focusing on more household level and slowly increase distances each day   Reason if not Attempted Safety concerns   Walk 150 Feet CARE Score 88   Walking 10 Feet on Uneven Surfaces   Type of Assistance Needed Physical assistance   Physical Assistance Level 25% or less   Comment Pt with slight L knee buckle during descent   Walking 10 Feet on Uneven Surfaces CARE Score 3   Ambulation   Primary Mode of Locomotion Prior to Admission Walk   Distance Walked (feet) 100 ft  (50x2)   Assist Device Roller Walker   Walk Assist Level Close Supervision;Contact Guard   Findings CS on straight path and CG at times with turns due to ataxic LLE and hasty chair approach at times-  pt appears much more steadier when slowing down which is also better body mechanics to protect back   Does the patient walk? 2   Yes   Wheel 50 Feet with Two Turns   Reason if not Attempted Activity not applicable   Wheel 50 Feet with Two Turns CARE Score 9   Wheel 150 Feet   Reason if not Attempted Activity not applicable   Wheel 028 Feet CARE Score 9   Wheelchair mobility   Does the patient use a wheelchair? 0  No   Therapeutic Interventions   Strengthening LAQ 3 75#s,  Hip flex march 3#,  Hip abd green Tband,  Hip ext leg press MRE min to mod resisted 10x3   Equipment Use   NuStep 8 mins for gentle ROM and warm up   Assessment   Treatment Assessment 60 min session focused on slightly inc gait distances , ramp, Nustep ,and LE strengthening  Focused on educating pt to be aware of L foot placement and ensure keeping leg extended on ramp due to knee buckle  Pt will benefit from cont quad and glute strengthening  As pt having slightly less pain start assessing balance and functional tasks  Barriers to Discharge Inaccessible home environment   PT Barriers   Physical Impairment Decreased strength;Decreased range of motion; Impaired balance;Decreased mobility; Decreased coordination;Decreased safety awareness; Impaired sensation;Orthopedic restrictions;Pain   Plan   Progress Progressing toward goals   Recommendation   PT Discharge Recommendation Home with outpatient rehabilitation   PT Therapy Minutes   PT Time In 1430   PT Time Out 1530   PT Total Time (minutes) 60   PT Mode of treatment - Individual (minutes) 60   PT Mode of treatment - Concurrent (minutes) 0   PT Mode of treatment - Group (minutes) 0   PT Mode of treatment - Co-treat (minutes) 0   PT Mode of Treatment - Total time(minutes) 60 minutes   PT Cumulative Minutes 845   Therapy Time missed   Time missed?  No

## 2022-12-05 NOTE — PROGRESS NOTES
12/05/22 1000   Pain Assessment   Pain Assessment Tool 0-10   Pain Score 8   Pain Location/Orientation Location: Back   Restrictions/Precautions   Precautions Spinal precautions;Pain; Fall Risk   ROM Restrictions Yes  (spinal precautions)   Braces or Orthoses TLSO   Sit to Stand   Type of Assistance Needed Supervision   Comment close S to RW   Sit to Stand CARE Score 4   Bed-Chair Transfer   Type of Assistance Needed Incidental touching   Comment CG/ CS with RW   Chair/Bed-to-Chair Transfer CARE Score 4   Walk 10 Feet   Type of Assistance Needed Incidental touching   Comment CG with RW   Walk 10 Feet CARE Score 4   Walk 50 Feet with Two Turns   Type of Assistance Needed Physical assistance   Physical Assistance Level 25% or less   Comment Min A with RW   Walk 50 Feet with Two Turns CARE Score 3   Walk 150 Feet   Reason if not Attempted Safety concerns   Walk 150 Feet CARE Score 88   Ambulation   Primary Mode of Locomotion Prior to Admission Walk   Distance Walked (feet) 75 ft  (55)   Assist Device Roller Walker   Gait Pattern Ataxic;Decreased foot clearance; Forward Flexion;Narrow KAMALA   Limitations Noted In Balance; Coordination;Strength; Safety   Provided Assistance with: Balance   Walk Assist Level Contact Guard;Minimum Assist   Findings Needs cues to not stride too far because L foot adducts and creates very narrow KAMALA-  straight path pt CG / CS but with turns can be hasty with poor chair approach needs safety Min a   Does the patient walk? 2   Yes   Wheel 50 Feet with Two Turns   Reason if not Attempted Activity not applicable   Wheel 50 Feet with Two Turns CARE Score 9   Wheel 150 Feet   Reason if not Attempted Activity not applicable   Wheel 900 Feet CARE Score 9   Curb or Single Stair   Style negotiated Curb   Type of Assistance Needed Physical assistance   Physical Assistance Level 25% or less   Comment performed full 8inch curb Pt unsteady during walker placement needing balance assist   1 Step (Curb) CARE Score 3   4 Steps   Type of Assistance Needed Physical assistance   Physical Assistance Level 25% or less   Comment bilat HRs- step to pattern   4 Steps CARE Score 3   12 Steps   Reason if not Attempted Safety concerns   12 Steps CARE Score 88   Assessment   Treatment Assessment 30 min session foucsed on amb bouts which pt was able to perform slight inc in distance showing progress  Pt also was able to perform steps again and curb step  Pt will need to continue on strengthening and curb step as this is large barrier for home and pt was fairly unsteady on  Cont skilled PT toward LTGs   Barriers to Discharge Inaccessible home environment   PT Barriers   Physical Impairment Decreased strength;Decreased range of motion;Decreased endurance; Impaired balance;Decreased mobility; Decreased coordination;Orthopedic restrictions;Pain   Plan   Progress Progressing toward goals   Recommendation   PT Discharge Recommendation Home with outpatient rehabilitation   PT Therapy Minutes   PT Time In 1000   PT Time Out 1030   PT Total Time (minutes) 30   PT Mode of treatment - Individual (minutes) 30   PT Mode of treatment - Concurrent (minutes) 0   PT Mode of treatment - Group (minutes) 0   PT Mode of treatment - Co-treat (minutes) 0   PT Mode of Treatment - Total time(minutes) 30 minutes   PT Cumulative Minutes 785   Therapy Time missed   Time missed?  No

## 2022-12-05 NOTE — ASSESSMENT & PLAN NOTE
Improved, AST 38 and ALT 37   Per patient - hx of mild elevations due to diagnosis of hepatitis C as a teenager  Tylenol discontinued  Statin initially discontinued with no change in levels - restarted statin on 12/1  Continue to trend routine CMP

## 2022-12-05 NOTE — ASSESSMENT & PLAN NOTE
Continue home lisinopril 5mg daily  Increase lisinopril to 10mg daily on 12/5    Monitor BP with routine VS

## 2022-12-05 NOTE — PLAN OF CARE
Problem: NEUROSENSORY - ADULT  Goal: Achieves stable or improved neurological status  Description: INTERVENTIONS  - Monitor and report changes in neurological status  - Monitor vital signs such as temperature, blood pressure, glucose, and any other labs ordered   - Initiate measures to prevent increased intracranial pressure  - Monitor for seizure activity and implement precautions if appropriate      Outcome: Progressing     Problem: SAFETY ADULT  Goal: Patient will remain free of falls  Description: INTERVENTIONS:  - Educate patient/family on patient safety including physical limitations  - Instruct patient to call for assistance with activity   - Consult OT/PT to assist with strengthening/mobility   - Keep Call bell within reach  - Keep bed low and locked with side rails adjusted as appropriate  - Keep care items and personal belongings within reach  - Initiate and maintain comfort rounds  - Make Fall Risk Sign visible to staff  - Offer Toileting every  Hours, in advance of need  - Initiate/Maintain alarm  - Obtain necessary fall risk management equipment: - Apply yellow socks and bracelet for high fall risk patients  - Consider moving patient to room near nurses station  Outcome: Progressing

## 2022-12-06 RX ADMIN — LIDOCAINE 2 PATCH: 50 PATCH CUTANEOUS at 08:48

## 2022-12-06 RX ADMIN — OXYCODONE HYDROCHLORIDE 5 MG: 5 TABLET ORAL at 05:41

## 2022-12-06 RX ADMIN — BACLOFEN 5 MG: 10 TABLET ORAL at 21:21

## 2022-12-06 RX ADMIN — BACLOFEN 5 MG: 10 TABLET ORAL at 06:11

## 2022-12-06 RX ADMIN — HYDROMORPHONE HYDROCHLORIDE 3 MG: 2 TABLET ORAL at 08:48

## 2022-12-06 RX ADMIN — GABAPENTIN 400 MG: 400 CAPSULE ORAL at 21:21

## 2022-12-06 RX ADMIN — CHOLECALCIFEROL TAB 25 MCG (1000 UNIT) 2000 UNITS: 25 TAB at 08:48

## 2022-12-06 RX ADMIN — HYDROMORPHONE HYDROCHLORIDE 3 MG: 2 TABLET ORAL at 20:28

## 2022-12-06 RX ADMIN — PRAVASTATIN SODIUM 10 MG: 20 TABLET ORAL at 17:36

## 2022-12-06 RX ADMIN — BACLOFEN 5 MG: 10 TABLET ORAL at 13:44

## 2022-12-06 RX ADMIN — ASPIRIN 325 MG ORAL TABLET 325 MG: 325 PILL ORAL at 08:47

## 2022-12-06 RX ADMIN — HYDROMORPHONE HYDROCHLORIDE 3 MG: 2 TABLET ORAL at 13:45

## 2022-12-06 RX ADMIN — LISINOPRIL 10 MG: 10 TABLET ORAL at 08:48

## 2022-12-06 RX ADMIN — HYDROMORPHONE HYDROCHLORIDE 3 MG: 2 TABLET ORAL at 01:27

## 2022-12-06 RX ADMIN — GABAPENTIN 300 MG: 300 CAPSULE ORAL at 15:23

## 2022-12-06 RX ADMIN — GABAPENTIN 300 MG: 300 CAPSULE ORAL at 06:11

## 2022-12-06 NOTE — PROGRESS NOTES
[facility]  Progress Note - Arianna Lee 1955, 78 y o  male MRN: [id number]  Unit/Bed#: -35 Encounter: [id number]  Primary Care Provider: Varsha Gao MD   Date and time admitted to hospital: 11/25/2022  2:14 PM    Hyponatremia  Assessment & Plan  Mild  Na+ currently 134  Asymptomatic  Likely pain induced  Continue to trend with routine BMP  Thrombocytosis  Assessment & Plan  Plt count currently 548,000  Likely reactive post-operatively  Continue to trend with routine CBC  Vitamin D insufficiency  Assessment & Plan  Vitamin D level 24 5 on 11/28  Started on Vitamin D 2000u daily  History of stroke  Assessment & Plan  Per patient - hx of CVA in 1996  Continue pravastatin 10mg daily as substitute for non-formulary lovastatin  Continue ASA for DVT ppx - would benefit from low dose preventative ASA afterwards  Transaminitis  Assessment & Plan  Improved, AST 38 and ALT 37   Per patient - hx of mild elevations due to diagnosis of hepatitis C as a teenager  Tylenol discontinued  Statin initially discontinued with no change in levels - restarted statin on 12/1  Continue to trend routine CMP  Hypertension  Assessment & Plan  Home regimen: lisinopril 5mg daily  Increased lisinopril to 10mg daily on 12/5  Monitor BP with routine VS     Hyperlipidemia  Assessment & Plan  Continue pravastatin 10mg daily as substitute for non-formulary lovastatin 10mg daily  Discontinued due to elevated liver enzymes  AST and ALT unchanged after stopping statin - may resume statin at this time (12/1)  Lipid panel on 11/28: Cholesterol 151, Triglycerides 94, HDL 30 and   * Status post lumbar spinal fusion  Assessment & Plan  Hx of prior back surgeries with hx of scoliosis, pseudoarthrosis, and flat back deformity    11/22: elective removal of hardware L1-L3, posterior thoracic/lumbar instrumentation from T8 to pelvis, thoracic osteotomy T11/T12, thoracic laminotomy T8/T9, T9/T10, T10/T11, posterior thoracic fusion T7-L1, revision instrumentation T8 to pelvis performed by Dr Taniya Morel (Orthopedics)  Continue ASA 325mg daily for DVT ppx  TLSO brace when getting OOB  Ensure adequate pain control  Neurovascular checks Q shift  2 week follow-up with repeat thoracolumbar spine XRs ()  Repeat XRs obtained on  due to increasing pain: interval post-surgical changes without evidence of hardware complication  No acute osseous abnormalities  Primary team following  PT/OT  VTE Pharmacologic Prophylaxis:   Pharmacologic: ASA per Ortho  Mechanical VTE Prophylaxis in Place: Yes - sequential compression devices  Current Length of Stay: 11 day(s)    Current Patient Status: Inpatient Rehab     Discharge Plan: As per primary team     Code Status: Level 1 - Full Code    Subjective:   Pt examined while pt sitting in bed in pt room  Currently complaining of back pain, 7/10, aching, improves with pain medication  States that it is worse right now because he just finished his therapy session  Denies any spasms today and states that he hasn't had any for a few days now  Had a BM yesterday without any issues  Has no other concerns or complaints currently  Objective:     Vitals:   Temp (24hrs), Av 7 °F (36 5 °C), Min:97 °F (36 1 °C), Max:98 3 °F (36 8 °C)    Temp:  [97 °F (36 1 °C)-98 3 °F (36 8 °C)] 97 °F (36 1 °C)  HR:  [75-88] 75  Resp:  [18] 18  BP: (126-152)/(70-89) 126/70  SpO2:  [96 %] 96 %  Body mass index is 31 25 kg/m²  Review of Systems   Constitutional: Negative for appetite change, chills, fatigue and fever  HENT: Negative for trouble swallowing  Eyes: Negative for visual disturbance  Respiratory: Negative for cough, chest tightness, shortness of breath, wheezing and stridor  Cardiovascular: Negative for chest pain, palpitations and leg swelling     Gastrointestinal: Negative for abdominal distention, abdominal pain, constipation, diarrhea, nausea and vomiting  LBM 12/5   Genitourinary: Negative for difficulty urinating  Musculoskeletal: Positive for back pain (Back pain, 7/10, aching, improves with pain medication)  Negative for arthralgias  Denies muscle spasms   Neurological: Negative for dizziness, weakness, light-headedness, numbness and headaches  Psychiatric/Behavioral: Negative for dysphoric mood and sleep disturbance  The patient is not nervous/anxious  All other systems reviewed and are negative  Input and Output Summary (last 24 hours): Intake/Output Summary (Last 24 hours) at 12/6/2022 1031  Last data filed at 12/6/2022 0800  Gross per 24 hour   Intake 360 ml   Output 2350 ml   Net -1990 ml       Physical Exam:     Physical Exam  Vitals and nursing note reviewed  Constitutional:       General: He is not in acute distress  Appearance: Normal appearance  He is obese  He is not ill-appearing  HENT:      Head: Normocephalic and atraumatic  Cardiovascular:      Rate and Rhythm: Normal rate and regular rhythm  Pulses: Normal pulses  Heart sounds: Normal heart sounds  No murmur heard  No friction rub  Pulmonary:      Effort: Pulmonary effort is normal  No respiratory distress  Breath sounds: Normal breath sounds  No wheezing or rhonchi  Abdominal:      General: Abdomen is flat  Bowel sounds are normal  There is no distension  Palpations: Abdomen is soft  There is no mass  Tenderness: There is no abdominal tenderness  There is no guarding or rebound  Hernia: No hernia is present  Musculoskeletal:      Cervical back: Normal range of motion and neck supple  No tenderness  Right lower leg: No edema  Left lower leg: No edema  Comments: Wearing TLSO brace  Skin:     General: Skin is warm and dry  Capillary Refill: Capillary refill takes less than 2 seconds     Neurological:      Mental Status: He is alert and oriented to person, place, and time  Psychiatric:         Mood and Affect: Mood normal          Behavior: Behavior normal          Additional Data:     Labs:    Results from last 7 days   Lab Units 12/05/22  0503   WBC Thousand/uL 7 68   HEMOGLOBIN g/dL 12 9   HEMATOCRIT % 40 8   PLATELETS Thousands/uL 548*   NEUTROS PCT % 66   LYMPHS PCT % 23   MONOS PCT % 7   EOS PCT % 3     Results from last 7 days   Lab Units 12/05/22  0503   SODIUM mmol/L 134*   POTASSIUM mmol/L 4 3   CHLORIDE mmol/L 101   CO2 mmol/L 29   BUN mg/dL 14   CREATININE mg/dL 0 84   ANION GAP mmol/L 4*   CALCIUM mg/dL 9 8   ALBUMIN g/dL 3 8   TOTAL BILIRUBIN mg/dL 0 67   ALK PHOS U/L 116   ALT U/L 37   AST U/L 38   GLUCOSE RANDOM mg/dL 95                       Labs reviewed    Imaging:    Imaging reviewed    Recent Cultures (last 7 days):           Last 24 Hours Medication List:   Current Facility-Administered Medications   Medication Dose Route Frequency Provider Last Rate   • aspirin  325 mg Oral Daily Fidel Mchugh MD     • baclofen  5 mg Oral TID Fidel Mchugh MD     • bisacodyl  10 mg Rectal Daily PRN Fidel Mchugh MD     • cholecalciferol  2,000 Units Oral Daily BAYRON Kern     • gabapentin  300 mg Oral BID Tang Rosado MD     • gabapentin  400 mg Oral HS Tang Rosado MD     • HYDROmorphone  2 mg Oral Q4H PRN Fidel Mchugh MD     • HYDROmorphone  3 mg Oral Q4H PRN Fidel Mchugh MD     • lidocaine  2 patch Topical Daily Tang Rosado MD     • lisinopril  10 mg Oral Daily BAYRON Kern     • naloxone  0 04 mg Intravenous Q1MIN PRN Fidel Mchugh MD     • ondansetron  4 mg Oral Q8H PRN Fidel Mchugh MD     • oxyCODONE  5 mg Oral Q4H PRN Tang Rosado MD     • polyethylene glycol  17 g Oral Daily PRN Fidel Mchugh MD     • pravastatin  10 mg Oral Daily With BAYRON Jaramillo     • senna  1 tablet Oral BID PRN DO SRAVAN Mcdaniel*Modal software was used to dictate this note    It may contain errors with dictating incorrect words or incorrect spelling  Please contact the provider directly with any questions

## 2022-12-06 NOTE — ASSESSMENT & PLAN NOTE
Home regimen: lisinopril 5mg daily  Increased lisinopril to 10mg daily on 12/5    Monitor BP with routine VS

## 2022-12-06 NOTE — PROGRESS NOTES
12/06/22 1230   Pain Assessment   Pain Assessment Tool 0-10   Pain Score 7   Pain Location/Orientation Location: Back;Orientation: Mid;Orientation: Lower   Hospital Pain Intervention(s) Repositioned; Emotional support; Rest   Restrictions/Precautions   Precautions Fall Risk;Pain;Spinal precautions   ROM Restrictions Yes  (spinal precautions)   Braces or Orthoses TLSO   Picking Up Object   Type of Assistance Needed Supervision; Adaptive equipment   Physical Assistance Level No physical assistance   Comment Sup with LHAE reacher   Picking Up Object CARE Score 4   Lying to Sitting on Side of Bed   Type of Assistance Needed Independent   Lying to Sitting on Side of Bed CARE Score 6   Sit to Stand   Type of Assistance Needed Supervision; Adaptive equipment   Physical Assistance Level No physical assistance   Comment Sup with RW   Sit to Stand CARE Score 4   Bed-Chair Transfer   Type of Assistance Needed Supervision; Adaptive equipment   Physical Assistance Level No physical assistance   Comment Sup with RW   Chair/Bed-to-Chair Transfer CARE Score 4   Kitchen Mobility   Kitchen-Mobility Level Walker   Kitchen Activity Retrieve items;Transport items  (transport with RW basket)   Kitchen Mobility Comments Sup with RW and walker basket  OTR and pt discussed transporting items and need for AE  Pt recommended to utilize basket (vs tray) at this time for ease of utilization  Cognition   Overall Cognitive Status WFL   Arousal/Participation Alert; Cooperative   Orientation Level Oriented X4   Assessment   Treatment Assessment Pt participated in 30 min OT session with fair activity tolerance with focus on ADL training specifically IADL task kitchen mobility- transporting and retrieving items  Pt participated in kitchen mobility at Sup level with RW  Pt able to utilize reacher as appropriate in kitchen with education provided on safety with [address] with pt able to adhere to spinal precautions 100% of the time   Pt participated in education with RW basket  Pt able to transport items around kitchen with RW at Sup level  Pt able to recall safety throughout OT session  Pt and OTR discussed kitchen modifications and keeping frequently utilized items at waist level with 100% recall noted  Pt at end of session side lying in bed with all needs within reach and no further OT needs  Pt would benefit from continued OT services to progress pt with UE strengthening, endurance, ADL training, standing balance/tolerance, med management, and d/c planning  Continue with established POC at this time  Problem List Decreased strength;Decreased endurance; Impaired balance;Decreased mobility;Orthopedic restrictions;Pain   Barriers to Discharge Inaccessible home environment   Plan   Treatment/Interventions ADL retraining;Functional transfer training; Therapeutic exercise; Endurance training;Patient/family training;Equipment eval/education; Bed mobility; Compensatory technique education   OT Therapy Minutes   OT Time In 1230   OT Time Out 1300   OT Total Time (minutes) 30   OT Mode of treatment - Individual (minutes) 30   OT Mode of treatment - Concurrent (minutes) 0   OT Mode of treatment - Group (minutes) 0   OT Mode of treatment - Co-treat (minutes) 0   OT Mode of Treatment - Total time(minutes) 30 minutes   OT Cumulative Minutes 735   Therapy Time missed   Time missed?  No

## 2022-12-06 NOTE — ASSESSMENT & PLAN NOTE
Hx of prior back surgeries with hx of scoliosis, pseudoarthrosis, and flat back deformity  11/22: elective removal of hardware L1-L3, posterior thoracic/lumbar instrumentation from T8 to pelvis, thoracic osteotomy T11/T12, thoracic laminotomy T8/T9, T9/T10, T10/T11, posterior thoracic fusion T7-L1, revision instrumentation T8 to pelvis performed by Dr Klaus Castillo (Orthopedics)  Continue ASA 325mg daily for DVT ppx  TLSO brace when getting OOB  Ensure adequate pain control  Neurovascular checks Q shift  2 week follow-up with repeat thoracolumbar spine XRs (12/6)  Repeat XRs obtained on 11/29 due to increasing pain: interval post-surgical changes without evidence of hardware complication  No acute osseous abnormalities  Primary team following  PT/OT

## 2022-12-06 NOTE — QUICK NOTE
Consult received  Will be onsite 12/7/22 to see pt  Thank you for this referral       Michael Soto, Ph D

## 2022-12-06 NOTE — PROGRESS NOTES
12/06/22 0901   Pain Assessment   Pain Assessment Tool 0-10   Pain Score 6  (reports no spasms)   Effect of Pain on Daily Activities able to participate   Hospital Pain Intervention(s)   (had medication prior to therapy)   Restrictions/Precautions   Precautions Fall Risk;Spinal precautions   Braces or Orthoses TLSO   Subjective   Subjective "I'm starting to feel stronger"   Roll Left and Right   Type of Assistance Needed Independent   Roll Left and Right CARE Score 6   Sit to Lying   Type of Assistance Needed Independent   Sit to Lying CARE Score 6   Lying to Sitting on Side of Bed   Type of Assistance Needed Independent   Lying to Sitting on Side of Bed CARE Score 6   Sit to Stand   Type of Assistance Needed Supervision   Comment CS with RW, remains impulsive   Sit to Stand CARE Score 4   Bed-Chair Transfer   Type of Assistance Needed Supervision; Adaptive equipment   Physical Assistance Level No physical assistance   Comment with RW, reminder to slow down   Chair/Bed-to-Chair Transfer CARE Score 4   Walk 10 Feet   Type of Assistance Needed Supervision; Adaptive equipment   Comment RW   Walk 10 Feet CARE Score 4   Walk 50 Feet with Two Turns   Type of Assistance Needed Incidental touching;Supervision; Adaptive equipment;Verbal cues   Comment RW,cueing to shorten stride for safety due to Weakness at hips for stance leg and narrow KAMALA clicking heels   Walk 50 Feet with Two Turns CARE Score 4   Walk 150 Feet   Type of Assistance Needed Incidental touching;Supervision; Adaptive equipment   Comment RW,cueing to shorten stride for safety due to Weakness at hips for stance leg and narrow KAMALA clicking heels   Walk 150 Feet CARE Score 4   Ambulation   Primary Mode of Locomotion Prior to Admission Walk   Distance Walked (feet) 150 ft  (x2)   Assist Device Roller Walker   Gait Pattern Narrow KAMALA; Inconsistant Leisa; Step through;Trendelenburg   Limitations Noted In Strength;Posture; Coordination;Balance   Walk Assist Level Contact Guard;Close Supervision   Findings wearing shoes today for first time in 2 weeks   Does the patient walk? 2  Yes   Therapeutic Interventions   Flexibility manual stretch B HS and calves x 3 min to start   Equipment Use   NuStep 10 min lvl 2, all extremities working SPM 55-75   Other Comments   Comments able to manage TLSO himself   Assessment   Treatment Assessment Pt overall activity tolerance, transfers and ability to ambulate improving  Remains fall risk despite using RW due to gluteal weakness, posture and safety awareness  Pt has tendency to continue to attempt walking when he catches his feet and walker gets too far ahead  Educated on need to shorten stride for safety  Able to perform after instruction, will need to assess carryover  Plan   Treatment/Interventions Gait training;Equipment eval/education;Patient/family training; Endurance training; Therapeutic exercise;LE strengthening/ROM; Functional transfer training;Elevations   Progress Progressing toward goals   Recommendation   PT Discharge Recommendation Home with outpatient rehabilitation   Equipment Recommended Walker   PT Therapy Minutes   PT Time In 0900   PT Time Out 0930   PT Total Time (minutes) 30   PT Mode of treatment - Individual (minutes) 30   PT Mode of treatment - Concurrent (minutes) 0   PT Mode of treatment - Group (minutes) 0   PT Mode of treatment - Co-treat (minutes) 0   PT Mode of Treatment - Total time(minutes) 30 minutes   PT Cumulative Minutes 875   Therapy Time missed   Time missed?  No

## 2022-12-06 NOTE — PROGRESS NOTES
12/06/22 1030   Pain Assessment   Pain Assessment Tool 0-10   Pain Score 7   Hospital Pain Intervention(s) Repositioned; Emotional support; Rest   Restrictions/Precautions   Precautions Fall Risk;Pain;Spinal precautions   ROM Restrictions Yes  (spinal precautions)   Braces or Orthoses TLSO   Lying to Sitting on Side of Bed   Type of Assistance Needed Independent   Lying to Sitting on Side of Bed CARE Score 6   Sit to Stand   Type of Assistance Needed Supervision; Adaptive equipment   Physical Assistance Level No physical assistance   Comment Sup with RW   Sit to Stand CARE Score 4   Bed-Chair Transfer   Type of Assistance Needed Supervision; Adaptive equipment   Physical Assistance Level No physical assistance   Comment Sup with RW   Chair/Bed-to-Chair Transfer CARE Score 4   Cognition   Overall Cognitive Status WFL   Arousal/Participation Alert; Cooperative   Orientation Level Oriented X4   Assessment   Treatment Assessment Pt participated in 60 min OT session with fair activity tolerance with focus on therapeutic activities, and therapeutic exercises  Pt side lying in bed upon therapist entering room  Pt agreeable to participate in OT session  Pt transferred IND utilizing log roll technique to sit on EOB  Pt able to don TLSO at EOB at Sup level  Pt participated in functional transfers at Sup level with RW  Pt transported to therapy gym in w/c  Pt participated in therapeutic activities with focus on standing balance/tolerance while folding laundry in stance at table top with fair- standing balance with pt recognizing need for seated rest break 1x throughout activity with no LOB, with standing tolerance of 8 minutes, and 9 5 minutes  Pt participated in standing card sort activity with fair- standing balance with x1 seated rest break with standing tolerance of 11 minutes, and then again 10 minutes with no LOB and fair- standing balance   Pt and OTR discussed energy conservation specifically pacing and prioritizing activities with pt able to recall with 75% integration into remainder of OT session with min verbal cues the remaining 25% of the time  Pt participated in bilateral UE strengthening exercises with 2# weight bar [id number] with focus on bicep curl, shoulder flexion, shoulder abduction with no increase in pain and no fatigue reported  Pt participated in SPT from w/c to EOB at Sup level with RW  Pt seated at EOB at end of therapy session in preparation for lunch meal  Pt would benefit from continued OT services to progress pt with ADL retraining, light IADL retraining, UE strength, endurance, standing balance/tolerance, activity tolerance, kitchen mobility, med management, and d/c planning  Continue with established POC at this time  Problem List Decreased strength;Decreased endurance; Impaired balance;Decreased mobility;Pain;Orthopedic restrictions   Barriers to Discharge Inaccessible home environment   Plan   Treatment/Interventions ADL retraining;Functional transfer training; Therapeutic exercise; Endurance training;Patient/family training;Equipment eval/education; Bed mobility; Compensatory technique education   Recommendation   OT Discharge Recommendation  Outpatient PT services only   OT Therapy Minutes   OT Time In 1030   OT Time Out 1130   OT Total Time (minutes) 60   OT Mode of treatment - Individual (minutes) 60   OT Mode of treatment - Concurrent (minutes) 0   OT Mode of treatment - Group (minutes) 0   OT Mode of treatment - Co-treat (minutes) 0   OT Mode of Treatment - Total time(minutes) 60 minutes   OT Cumulative Minutes 705   Therapy Time missed   Time missed?  No

## 2022-12-06 NOTE — PROGRESS NOTES
12/06/22 1400   Pain Assessment   Pain Assessment Tool 0-10   Pain Score 5   Pain Location/Orientation Location: Back;Orientation: Lower   Restrictions/Precautions   Precautions Spinal precautions; Fall Risk   Braces or Orthoses TLSO   Cognition   Overall Cognitive Status WFL   Subjective   Subjective Just waking up from nap   Sit to Stand   Type of Assistance Needed Supervision; Adaptive equipment   Comment RW   Sit to Stand CARE Score 4   Bed-Chair Transfer   Type of Assistance Needed Supervision; Adaptive equipment   Comment with RW   Chair/Bed-to-Chair Transfer CARE Score 4   Walk 10 Feet   Type of Assistance Needed Supervision; Adaptive equipment   Comment practiced repeated short walks in room with therapist watching from a distance  Pt demo good pacing, shortened stride and slower turns & no LOB   Walk 10 Feet CARE Score 4   Walk 50 Feet with Two Turns   Type of Assistance Needed Incidental touching;Supervision; Adaptive equipment   Comment CGA/CS with RW, initially when first waking up required CGA for safety   Walk 50 Feet with Two Turns CARE Score 4   Ambulation   Does the patient walk? 2  Yes   Curb or Single Stair   Style negotiated Curb   Type of Assistance Needed Physical assistance; Adaptive equipment   Physical Assistance Level 25% or less   Comment repeated x 3 with RW, leading with R LE up   1 Step (Curb) CARE Score 3   4 Steps   Type of Assistance Needed Physical assistance   Physical Assistance Level 25% or less   Comment B HR, still requires MiN A due to coordination and weakness, narrow KAMALA   4 Steps CARE Score 3   12 Steps   Reason if not Attempted Safety concerns   12 Steps CARE Score 88   Toilet Transfer   Type of Assistance Needed Incidental touching; Adaptive equipment   Physical Assistance Level No physical assistance   Toilet Transfer CARE Score 4   Therapeutic Interventions   Strengthening Repeated STS 3 x 7 w/o use of hands; LAQ w/ 5 sec hold 3 x 12; Standing hip Abd B LE 3 x 10   Balance static standing w/o UE support working on gluteal squeezes and knee ext   Neuromuscular Re-Education alt seated toe taps with eyes closed for coordination   Assessment   Treatment Assessment Pt ith improvd carryover of recommendation to slow pace down with awalking, decresaed stride and more controlled turns  Initiated walking across room with RW and distant supervision  Working toward TRISTA Lara I by discharge with RW  Plan for FT with his wife tomorrow  Plan   Progress Progressing toward goals   Recommendation   PT Discharge Recommendation Home with outpatient rehabilitation   PT Therapy Minutes   PT Time In 1400   PT Time Out 1500   PT Total Time (minutes) 60   PT Mode of treatment - Individual (minutes) 60   PT Mode of treatment - Concurrent (minutes) 0   PT Mode of treatment - Group (minutes) 0   PT Mode of treatment - Co-treat (minutes) 0   PT Mode of Treatment - Total time(minutes) 60 minutes   PT Cumulative Minutes 935   Therapy Time missed   Time missed?  No

## 2022-12-06 NOTE — PROGRESS NOTES
Physical Medicine and Rehabilitation Progress Note  Katlin Blankenship 78 y o  male MRN: [id number]  Unit/Bed#: LOUISE 277-98 Encounter: [id number]      Assessment & Plan:     Decline in ADLs and mobility: Functional assessment - improving mobility        FIM  Care Score  Admit Score Recent Score    Total assist  1-100% or 2p    Tot UBD, LBD     Max assist 2-51-75%    Sub To hygiene, bathing    Mod assist 3- 26-50%  Par     Min assist 3- 25% or < Par  LBD   CG assist 4  TA  To hygiene   Sup/Setup 4-5 Sup  UBD, bathing    Mod-I/Indep 6 MI      Transfers  Mod-total assist  CG assist-supervision    Ambulation   10 ft mod assist  150 ft CGA    Stairs   Total assist/NT 4 steps min assist      Goal: Mod indep except possible slight assist for bathing, LBD  Major barriers:  Pain, brace, deconditioning  Dispo & ELOS: Home with ELOS Sat 12/10     * Status post lumbar spinal fusion  Assessment & Plan  S/P removal of hardware L1-L3  Posterior thoracic/ lumbar instrumentation from T8 to pelvis  Thoracic osteotomy T11/12  Thoracic laminotomy T8/9, T9/10 and T10/11  Posterior thoracic fusion T7-L1    Revision instrumentation T8 to pelvis by Dr Ariadne Meyer on 11/22  Monitor incision (14 days post-sx Tue 12/6)  - 12/6 function improving further; adequate pain control  - 12/5 function improving; acceptable pain control on current regimen; improved spasms; incision healing well; strength intact; continue pain meds as ordered   - 12/2 pain and sleep better overnight but still with fair amount of spasms - noted spasticity likely chronic    - Adjust Robaxin to Baclofen starting 5mg TID  - 12/1 pain fairly severe with significant spasms at times; incision stable, exam stable, vitals stable; sub-optimal participation in rehab 2/2 pain    - Cautiously increased hydromorphone to 2-3mg Q4H PRN (continue oxy PRN for BTP) with holding parameters - pt/wife discussed risks/benefits   - Monitor pain, neuro/cardiopul, and GI exam closely    - Recommend 900 min program   - 11/30 improved pain compared to yesterday; incision healing well; strength stable  - Increased pain morning of 11/29/22    Xray 11/29: IMPRESSION: Interval postsurgical changes without evidence of hardware complication  No acute osseous abnormality   - Follow-up at 2 weeks postop for suture removal and xrays of thoracolumbar spine  Can shower now with dressing removed but do not submerge based on dc instructions   - Do not take NSAIDs (Advil, Aleve, Ibuprofen, Motrin, Naproxen, etc ) for 3 months following your surgery  This may impede the healing of your fusion  Thoracic/lumbar spine precautions   Custom-molded LSO brace from [employer] from pre-op  If not, please fit patient with [place] LSO brace  Pt may don brace in a sitting position and should have brace on at all times when ambulating and working with PT  Follow-up with spine surgery after d/c from [facility] and if needed during [facility] course    - Continue acute comprehensive interdisciplinary inpatient rehabilitation to include intensive skilled therapies (PT, OT) as outlined with oversight and management by rehabilitation physician as well as inpatient rehab level nursing, case management and weekly interdisciplinary team meetings  Pain  Assessment & Plan  Remains better controlled; no s/s of oversedation/resp depression   Continue Dilaudid 2-3 mg Q4 hours for mod-severe pain  Continue Oxycodone IR 5 mg Q4 hours for breakthrough pain  Baclofen 5mg TID for spasms/spasticity (switched from Robaxin earlier) > helfpul   Gabapentin 300mg BID and 400 mg QHS (changed 11/29/22)  Hold sedating meds for oversedation, AMS, or RR<12  Topical Lidoderm patches x 2     Continue topical ICE    Per d/c instructions - - Do not take NSAIDs (Advil, Aleve, Ibuprofen, Motrin, Naproxen, etc ) for 3 months following your surgery  This may impede the healing of your fusion    Monitor for oversedation, AMS/delirium, and respiratory depression   If being administered - hold opiates, muscle relaxants, benzodiazepines, and gabapentin for oversedation, AMS, or RR<12  Counseled on and continue to encourage deep breathing/relaxation/behavioral pain management techniques      Spasm  Assessment & Plan  Improving   Back spasms, leg tightness, hyperreflexia, spasticity - patient also with hx of CVA with prior known hx of spine sx's  - Patient denies acute worsening and leg tightness, strength sensation at recent baseline  - Continue baclofen 5mg TID > uptitrated if indicated  - Monitor exam closely     Constipation  Assessment & Plan  Improved  - Monitor closely on opiates  - Hold standing miralax qday/Senna/colace BID for now but low threshold to resume while on opiates  - PRN bowel regimen (Miralax PRN/Dulcolax supp PRN)  - monitor for signs and symptoms of obstruction/ileus > not currently; hold stimulant lax if occurs  - Limiting constipating medications if possible  - Ensure adequate hydration       Hyponatremia  Assessment & Plan  Improved to 134 on   Asymptomatic   Monitor     Vitamin D insufficiency  Assessment & Plan  D3 2000 units daily     Difficulty coping with pain  Assessment & Plan  Supportive counseling  Recommend Psychology consult while in [place] on and continue to encourage deep breathing/relaxation/behavioral management techniques:     Deep breathin seconds in, 5 seconds out, 5 times per hour when awake and PRN when experiencing pain or anxiety  Avoid holding breath and tightening muscles and instead breathe slowly and deeply      Leukocytosis  Assessment & Plan  Resolved  Internal medicine consult and management during [facility] course  Patient afebrile, other vitals unremarkable > continue to monitor   No clear signs or symptoms of infection or VTE but will continue to monitor  Monitor CBC intermittently       Transaminitis  Assessment & Plan  Monitor intermittently  Denies abdominal pain, nausea or other new complaints      Elevated therefore discontinued all Tylenol and Statin  Hx of hepatitis and teenager  Patient would like to avoid standing APAP      At risk for venous thromboembolism (VTE)  Assessment & Plan  SCDs, ambulation, and aspirin 325mg qday per sx  11/25 Venous doppler negative for VTE       Hypertension  Assessment & Plan  Internal medicine consulted and co-management with their service  Monitor vitals with and without activity; monitor for orthostasis  Monitor hemoglobin, electrolytes, kidney function, hydration status   Current meds: lisinopril 10mg qday per IM       Hyperlipidemia  Assessment & Plan  Discontinued Statin due to increase in LFTs      Other Medical Issues:  • None    Follow-up providers and other issues to be followed up after discharge  • PCP  • Spine Sx    CODE: Level 1: Full Code    Restrictions include: Fall precautions    Objective: Allergies per EMR  Diagnostic Studies: Reviewed  XR spine thoracolumbar 2 vw   Final Result by Silvia Jansen MD (11/29 0080)      Interval postsurgical changes without evidence of hardware complication  No acute osseous abnormality        Workstation performed: [id number]           See above as well    Laboratory: Labs reviewed  Results from last 7 days   Lab Units 12/05/22  0503 12/01/22  0515   HEMOGLOBIN g/dL 12 9 12 6   HEMATOCRIT % 40 8 38 0   WBC Thousand/uL 7 68 7 87     Results from last 7 days   Lab Units 12/05/22  0503 12/01/22  0515   BUN mg/dL 14 13   SODIUM mmol/L 134* 132*   POTASSIUM mmol/L 4 3 4 1   CHLORIDE mmol/L 101 100   CREATININE mg/dL 0 84 0 79   AST U/L 38 72*   ALT U/L 37 78*            Drug regimen reviewed, all potential adverse effects identified and addressed:    Scheduled Meds:  Current Facility-Administered Medications   Medication Dose Route Frequency Provider Last Rate   • aspirin  325 mg Oral Daily Santiago Hogan MD     • baclofen  5 mg Oral TID Santiago Hogan MD     • bisacodyl  10 mg Rectal Daily PRN Santiago Hogan MD     • cholecalciferol  2,000 Units Oral Daily BAYRON Landis     • gabapentin  300 mg Oral BID Yumiko Wise MD     • gabapentin  400 mg Oral HS Yumiko Wise MD     • HYDROmorphone  2 mg Oral Q4H PRN Tracie Painting MD     • HYDROmorphone  3 mg Oral Q4H PRN Tracie Painting MD     • lidocaine  2 patch Topical Daily Yumiko Wise MD     • lisinopril  10 mg Oral Daily BAYRON Landis     • naloxone  0 04 mg Intravenous Q1MIN PRN Tracie Painting MD     • ondansetron  4 mg Oral Q8H PRN Tracie Painting MD     • oxyCODONE  5 mg Oral Q4H PRN Yumiko Wise MD     • polyethylene glycol  17 g Oral Daily PRN Tracie Painting MD     • pravastatin  10 mg Oral Daily With [address], BAYRON     • senna  1 tablet Oral BID PRN Sydney Vallejo DO         Chief Complaints:  Rehab follow-up     Subjective:     Patient reports back pain stable today  He is feeling better overall  He denies worsening strength, sensation in legs, constipation, urinary issues, lightheadedness, or other new complaints  ROS: A 10 point ROS was performed; negative except as noted above         Physical Exam:  Temp:  [97 °F (36 1 °C)-98 3 °F (36 8 °C)] 97 °F (36 1 °C)  HR:  [75-88] 75  Resp:  [18] 18  BP: (126-152)/(70-89) 126/70  SpO2:  [96 %] 96 %    GEN:  Lying in bed in NAD   HEENT/NECK: MMM  CARDIAC: Regular rate rhythm, no murmers, no rubs, no gallops  LUNGS:  clear to auscultation, no wheezes, rales, or rhonchi  ABDOMEN: Soft, non-tender, non-distended, normal active bowel sounds  EXTREMITIES/SKIN:  no calf edema, no calf tenderness to palpation  NEURO:   MENTAL STATUS:  Appropriate wakefulness and interaction  and Strength/MMT:  Full throughout   PSYCH:  Affect:  Euthymic     HPI:  52-year-old male with a past medical history of scoliosis, flat back syndrome, neurogenic claudication hypertension, hyperlipidemia, obesity who had stage I of revision surgery in May of this past year who is now status post stage II revision 11/22  He underwent removal of L1-L3 hardware, posterior thoracic/lumbar instrumentation from T8 to pelvis, thoracic osteotomy at T11/12, thoracic laminotomy T8/T9, T9/T10 and T10/11, posterior thoracic fusion T7 through left L1 and revision instrumentation T8 to pelvis by Dr Aditya Martin  Postop course notable for significant pain and decline in ADLs and mobility  He did have bilateral lower extremity venous ultrasound which was negative for DVT  Patient also noted to have significant constipation  Patient was evaluated by skilled therapies and was found to have significant decline in ADLs and ambulation and appears appropriate for admission to [place] [id number]        ** Please Note: Fluency Direct voice to text software may have been used in the creation of this document   **

## 2022-12-07 ENCOUNTER — APPOINTMENT (OUTPATIENT)
Dept: RADIOLOGY | Facility: HOSPITAL | Age: 67
End: 2022-12-07

## 2022-12-07 ENCOUNTER — TELEPHONE (OUTPATIENT)
Dept: PHYSICAL THERAPY | Facility: CLINIC | Age: 67
End: 2022-12-07

## 2022-12-07 PROBLEM — I63.9 STROKE (HCC): Status: RESOLVED | Noted: 2022-12-07 | Resolved: 2022-12-07

## 2022-12-07 PROBLEM — I63.9 STROKE (HCC): Status: ACTIVE | Noted: 2022-12-07

## 2022-12-07 RX ORDER — ASPIRIN 81 MG/1
81 TABLET ORAL DAILY
Status: DISCONTINUED | OUTPATIENT
Start: 2022-12-08 | End: 2022-12-10 | Stop reason: HOSPADM

## 2022-12-07 RX ADMIN — ASPIRIN 325 MG ORAL TABLET 325 MG: 325 PILL ORAL at 08:13

## 2022-12-07 RX ADMIN — BACLOFEN 5 MG: 10 TABLET ORAL at 06:25

## 2022-12-07 RX ADMIN — OXYCODONE HYDROCHLORIDE 5 MG: 5 TABLET ORAL at 16:52

## 2022-12-07 RX ADMIN — GABAPENTIN 400 MG: 400 CAPSULE ORAL at 21:30

## 2022-12-07 RX ADMIN — OXYCODONE HYDROCHLORIDE 5 MG: 5 TABLET ORAL at 05:25

## 2022-12-07 RX ADMIN — CHOLECALCIFEROL TAB 25 MCG (1000 UNIT) 2000 UNITS: 25 TAB at 08:13

## 2022-12-07 RX ADMIN — BACLOFEN 5 MG: 10 TABLET ORAL at 14:16

## 2022-12-07 RX ADMIN — HYDROMORPHONE HYDROCHLORIDE 3 MG: 2 TABLET ORAL at 08:13

## 2022-12-07 RX ADMIN — LIDOCAINE 2 PATCH: 50 PATCH CUTANEOUS at 08:13

## 2022-12-07 RX ADMIN — LISINOPRIL 10 MG: 10 TABLET ORAL at 08:13

## 2022-12-07 RX ADMIN — BACLOFEN 5 MG: 10 TABLET ORAL at 21:30

## 2022-12-07 RX ADMIN — HYDROMORPHONE HYDROCHLORIDE 3 MG: 2 TABLET ORAL at 21:30

## 2022-12-07 RX ADMIN — GABAPENTIN 300 MG: 300 CAPSULE ORAL at 06:25

## 2022-12-07 RX ADMIN — PRAVASTATIN SODIUM 10 MG: 20 TABLET ORAL at 16:51

## 2022-12-07 RX ADMIN — GABAPENTIN 300 MG: 300 CAPSULE ORAL at 14:16

## 2022-12-07 NOTE — PROGRESS NOTES
12/07/22 1430   Restrictions/Precautions   Precautions Fall Risk;Pain;Spinal precautions   Weight Bearing Restrictions No   ROM Restrictions Yes  (spinal precautions)   Braces or Orthoses TLSO   Cognition   Overall Cognitive Status WFL   Arousal/Participation Alert; Cooperative   Attention Within functional limits   Orientation Level Oriented X4   Memory Within functional limits   Following Commands Follows one step commands without difficulty   Activity Tolerance   Activity Tolerance Patient tolerated treatment well   Assessment   Treatment Assessment pt engages in brief 20 minute skilled OT session focusing on family training with spouse, Tali Montiel  see below for full details regarding family training  pt remains limited by pain, bulk of TLSO, and impaired RW/general safety with decreased carryover of spinal precautions during self care tasks  recommend continued skilled care to focus on ADL retraining, func transfers, standing antonella/bal for self care tasks, UE Strengthening/endurance, med management and d/c planning, in order to decrease burden of care at d/c  of note, pt did not receive full 30 minute session of OT FT as Dr Chantelle Ruiz was present for first 20 minutes of OT session with PT followed immediately after OT session; pt is 900 minutes however did already meet all his minutes for this week  OT Family training done with: spouse, Jenna Dubon of family training pt and spouse engage in brief family training session focusing on role of OT, CLOF and home safety recommendations  spouse reports main concerns being pt's pain level as it was not as severe back in May during his previous back surgery--did discuss with spouse and pt that pt seems to be tolerating sessions "better" with current pain regimen and schedule and has been able to participate fully in therapy sessions this past week   spouse reports that she is OK with assisting pt with footwear management as pt has no plans on purchasing sock aid or using long handled shoe horn  discussed pt CLOF at [facility] for ADLs/transfers but mostly with VC's as pt noted to not fully maintain spinal precautions often noted to bend forward during LE self care  pt and spouse report no need for a BSC and so will not order one, both report currently having shower bench, and can use in first floor shower  discussed at length, sequencing of steps when showering (ie, transfer onto shower bench with TLSO DONNED, sit down, remove TLSO, shower fully seated, dry off, don shirt and TLSO, then exit shower)  edu both on placing TLSO over RW when showering so it remains within arms reach  plan to complete medication management tomorrow and will call spouse with recommendation of AM/PM vs 4x a day pill organizer  pt and spouse with no additional questions/concerns at this time  Prognosis Good   Problem List Decreased strength;Decreased endurance; Impaired balance;Decreased mobility;Orthopedic restrictions;Pain   Barriers to Discharge Inaccessible home environment   Plan   Treatment/Interventions ADL retraining;Functional transfer training; Therapeutic exercise; Endurance training;Patient/family training;Equipment eval/education; Compensatory technique education   OT Therapy Minutes   OT Time In 1450   OT Time Out 1510   OT Total Time (minutes) 20   OT Mode of treatment - Individual (minutes) 20   OT Mode of treatment - Concurrent (minutes) 0   OT Mode of treatment - Group (minutes) 0   OT Mode of treatment - Co-treat (minutes) 0   OT Mode of Treatment - Total time(minutes) 20 minutes   OT Cumulative Minutes 815   Therapy Time missed   Time missed?  No

## 2022-12-07 NOTE — ASSESSMENT & PLAN NOTE
Hx of prior back surgeries with hx of scoliosis, pseudoarthrosis, and flat back deformity  11/22: elective removal of hardware L1-L3, posterior thoracic/lumbar instrumentation from T8 to pelvis, thoracic osteotomy T11/T12, thoracic laminotomy T8/T9, T9/T10, T10/T11, posterior thoracic fusion T7-L1, revision instrumentation T8 to pelvis performed by Dr Hugo Nevarez (Orthopedics)  Continue ASA 325mg daily for DVT ppx  TLSO brace when getting OOB  Ensure adequate pain control  Neurovascular checks Q shift  2 week follow-up with repeat thoracolumbar spine XRs (12/6)  Repeat XRs obtained on 11/29 due to increasing pain: interval post-surgical changes without evidence of hardware complication  No acute osseous abnormalities  Primary team following  PT/OT

## 2022-12-07 NOTE — PROGRESS NOTES
Physical Medicine and Rehabilitation Progress Note  Cheryl Howard 78 y o  male MRN: [id number]  Unit/Bed#: [facility] 661-31 Encounter: [id number]      Assessment & Plan:     Decline in ADLs and mobility: Functional assessment - improving         FIM  Care Score  Admit Score Recent Score    Total assist  1-100% or 2p    Tot UBD, LBD     Max assist 2-51-75%    Sub To hygiene, bathing    Mod assist 3- 26-50%  Par     Min assist 3- 25% or < Par     CG assist 4  TA  To hygiene   Sup/Setup 4-5 Sup  LBD, UBD, bathing    Mod-I/Indep 6 MI      Transfers  Mod-total assist  CG assist-supervision    Ambulation   10 ft mod assist  150 ft CGA    Stairs   Total assist/NT 4 steps min assist      Goal: Mod indep except possible slight assist for bathing, LBD  Major barriers:  Pain, brace, deconditioning  Dispo & ELOS: Home with ELOS Sat 12/10     * Status post lumbar spinal fusion  Assessment & Plan  S/P removal of hardware L1-L3  Posterior thoracic/ lumbar instrumentation from T8 to pelvis  Thoracic osteotomy T11/12  Thoracic laminotomy T8/9, T9/10 and T10/11  Posterior thoracic fusion T7-L1    Revision instrumentation T8 to pelvis by Dr Tapan Nelson on 11/22  Monitor incision (14 days post-sx Tue 12/6) >   - 12/7 healing great - steri-strips - no sutures or staples   - 12/7 Communicated with Dr Tapan Nelson - ok to await follow-up spine appt and x-ray until after d/c next week; he stated ok to stop aspirin 325mg qday     - 12/7 function continues to improve; pain much better controlled, neuro exam stable; on track for d/c Saturday with OP PT   - 12/6 function improving further; adequate pain control  - 12/5 function improving; acceptable pain control on current regimen; improved spasms; incision healing well; strength intact; continue pain meds as ordered   - 12/2 pain and sleep better overnight but still with fair amount of spasms - noted spasticity likely chronic    - Adjust Robaxin to Baclofen starting 5mg TID  - 12/1 pain fairly severe with significant spasms at times; incision stable, exam stable, vitals stable; sub-optimal participation in rehab 2/2 pain    - Cautiously increased hydromorphone to 2-3mg Q4H PRN (continue oxy PRN for BTP) with holding parameters - pt/wife discussed risks/benefits   - Monitor pain, neuro/cardiopul, and GI exam closely    - Recommend 900 min program   - 11/30 improved pain compared to yesterday; incision healing well; strength stable  - Increased pain morning of 11/29/22    Xray 11/29: IMPRESSION: Interval postsurgical changes without evidence of hardware complication  No acute osseous abnormality   - Do not take NSAIDs (Advil, Aleve, Ibuprofen, Motrin, Naproxen, etc ) for 3 months following your surgery  This may impede the healing of your fusion  Thoracic/lumbar spine precautions   Custom-molded LSO brace from [employer] from pre-op  If not, please fit patient with [facility] LSO brace  Pt may don brace in a sitting position and should have brace on at all times when ambulating and working with PT  Follow-up with spine surgery after d/c from [facility] and if needed during [facility] course    - Continue acute comprehensive interdisciplinary inpatient rehabilitation to include intensive skilled therapies (PT, OT) as outlined with oversight and management by rehabilitation physician as well as inpatient rehab level nursing, case management and weekly interdisciplinary team meetings  Pain  Assessment & Plan  Improving; no s/s of oversedation/resp depression   Continue Dilaudid 2-3 mg Q4 hours for mod-severe pain > will d/c on max QID PRN   Continue Oxycodone IR 5 mg Q4 hours for breakthrough pain  Baclofen 5mg TID for spasms/spasticity (switched from Robaxin earlier) > helfpul > continue   Gabapentin 300mg BID and 400 mg QHS (changed 11/29/22) > may decrease back to 300mg TID at d/c for simplification   Hold sedating meds for oversedation, AMS, or RR<12    Topical Lidoderm patches x 2     Continue topical ICE    Per d/c instructions - - Do not take NSAIDs (Advil, Aleve, Ibuprofen, Motrin, Naproxen, etc ) for 3 months following your surgery  This may impede the healing of your fusion  Monitor for oversedation, AMS/delirium, and respiratory depression   If being administered - hold opiates, muscle relaxants, benzodiazepines, and gabapentin for oversedation, AMS, or RR<12  Counseled on and continue to encourage deep breathing/relaxation/behavioral pain management techniques      Spasm  Assessment & Plan  Improved  Back spasms, leg tightness, hyperreflexia, spasticity - patient also with hx of CVA with prior known hx of spine sx's  - Patient denies acute worsening and leg tightness, strength sensation at recent baseline  - Continue baclofen 5mg TID  - Monitor exam closely     Constipation  Assessment & Plan  Improved  - Monitor closely on opiates  - Hold standing miralax qday/Senna/colace BID for now but low threshold to resume while on opiates  - PRN bowel regimen (Miralax PRN/Dulcolax supp PRN)  - monitor for signs and symptoms of obstruction/ileus > not currently; hold stimulant lax if occurs  - Limiting constipating medications if possible  - Ensure adequate hydration       Hyponatremia  Assessment & Plan  Improved to 134 on   Asymptomatic   Monitor     Vitamin D insufficiency  Assessment & Plan  D3 2000 units daily     History of stroke  Assessment & Plan  Secondary stroke prevention  - Antithrombotic: IM recommends aspirin 81mg qday with stroke hx and ASCD risk score  - Statin  - Optimal management of blood pressure   - Follow-up PCP     Difficulty coping with pain  Assessment & Plan  Supportive counseling  Recommend Psychology consult while in [place] on and continue to encourage deep breathing/relaxation/behavioral management techniques:     Deep breathin seconds in, 5 seconds out, 5 times per hour when awake and PRN when experiencing pain or anxiety    Avoid holding breath and tightening muscles and instead breathe slowly and deeply      Leukocytosis  Assessment & Plan  Resolved  Internal medicine consult and management during AR[facility] course  Patient afebrile, other vitals unremarkable > continue to monitor   No clear signs or symptoms of infection or VTE but will continue to monitor  Monitor CBC intermittently       Transaminitis  Assessment & Plan  Improved; statin resumed   Monitor intermittently  Denies abdominal pain, nausea or other new complaints  Hx of hepatitis and teenager  Patient would like to avoid standing APAP      At risk for venous thromboembolism (VTE)  Assessment & Plan  SCDs, ambulation (will be on aspirin 81mg qday)    - Can stop aspirin 325mg qday per spine sx   11/25 Venous doppler negative for VTE       Hypertension  Assessment & Plan  Internal medicine consulted and co-management with their service  Monitor vitals with and without activity; monitor for orthostasis  Monitor hemoglobin, electrolytes, kidney function, hydration status   Current meds: lisinopril 10mg qday per IM       Hyperlipidemia  Assessment & Plan  Low dose statin resumed per IM       Other Medical Issues:  • None    Follow-up providers and other issues to be followed up after discharge  • PCP  • Spine Sx    CODE: Level 1: Full Code    Restrictions include: Fall precautions    Objective: Allergies per EMR  Diagnostic Studies: Reviewed  XR spine thoracolumbar 2 vw   Final Result by Bekah Sawyer MD (11/29 8250)      Interval postsurgical changes without evidence of hardware complication  No acute osseous abnormality        Workstation performed: [id number]         XR spine thoracolumbar 2 vw    (Results Pending)     See above as well    Laboratory: Labs reviewed  Results from last 7 days   Lab Units 12/05/22  0503 12/01/22  0515   HEMOGLOBIN g/dL 12 9 12 6   HEMATOCRIT % 40 8 38 0   WBC Thousand/uL 7 68 7 87     Results from last 7 days   Lab Units 12/05/22  0503 12/01/22  0515   BUN mg/dL 14 13   SODIUM mmol/L 134* 132*   POTASSIUM mmol/L 4 3 4 1   CHLORIDE mmol/L 101 100   CREATININE mg/dL 0 84 0 79   AST U/L 38 72*   ALT U/L 37 78*            Drug regimen reviewed, all potential adverse effects identified and addressed:    Scheduled Meds:  Current Facility-Administered Medications   Medication Dose Route Frequency Provider Last Rate   • [START ON 12/8/2022] aspirin  81 mg Oral Daily Santiago Hogan MD     • baclofen  5 mg Oral TID Santiago Hogan MD     • bisacodyl  10 mg Rectal Daily PRN Santiago Hogan MD     • cholecalciferol  2,000 Units Oral Daily BAYRON Thornton     • gabapentin  300 mg Oral BID Zoë Fernandez MD     • gabapentin  400 mg Oral HS Zoë Fernandez MD     • HYDROmorphone  2 mg Oral Q4H PRN Santiago Hogan MD     • HYDROmorphone  3 mg Oral Q4H PRN Santiago Hogan MD     • lidocaine  2 patch Topical Daily Zoë Fernandez MD     • lisinopril  10 mg Oral Daily BAYRON Thornton     • naloxone  0 04 mg Intravenous Q1MIN PRN Santiago Hogan MD     • ondansetron  4 mg Oral Q8H PRN Santiago Hogan MD     • oxyCODONE  5 mg Oral Q4H PRN Zoë Fernandez MD     • polyethylene glycol  17 g Oral Daily PRN Santiago Hogan MD     • pravastatin  10 mg Oral Daily With [address], CRNP     • senna  1 tablet Oral BID PRN Sydney Vallejo DO         Chief Complaints:  Rehab follow-up     Subjective:     Patient reports pain much better controlled and he continues to sleep better  He denies significant pain in legs but they still feel a little weak but improving  He denies lightheadedness, constipation, fever, chills, calf pain, or other new complaints  ROS: A 10 point ROS was performed; negative except as noted above         Physical Exam:  Temp:  [97 3 °F (36 3 °C)-98 °F (36 7 °C)] 98 °F (36 7 °C)  HR:  [72-73] 72  Resp:  [18] 18  BP: (128-150)/(73-87) 128/87  SpO2:  [94 %-97 %] 94 %    GEN:  Sitting in NAD   HEENT/NECK: MMM  CARDIAC: Regular rate rhythm, no murmers, no rubs, no gallops  LUNGS:  clear to auscultation, no wheezes, rales, or rhonchi  ABDOMEN: Soft, non-tender, non-distended, normal active bowel sounds  EXTREMITIES/SKIN:  no calf edema, no calf tenderness to palpation and incision healing well without dehiscence or signs of infection   NEURO:   MENTAL STATUS:  Appropriate wakefulness and interaction  and Strength/MMT:  Full throughout   PSYCH:  Affect:  Euthymic     HPI:  15-year-old male with a past medical history of scoliosis, flat back syndrome, neurogenic claudication hypertension, hyperlipidemia, obesity who had stage I of revision surgery in May of this past year who is now status post stage II revision 11/22  He underwent removal of L1-L3 hardware, posterior thoracic/lumbar instrumentation from T8 to pelvis, thoracic osteotomy at T11/12, thoracic laminotomy T8/T9, T9/T10 and T10/11, posterior thoracic fusion T7 through left L1 and revision instrumentation T8 to pelvis by Dr Caren Correia  Postop course notable for significant pain and decline in ADLs and mobility  He did have bilateral lower extremity venous ultrasound which was negative for DVT  Patient also noted to have significant constipation  Patient was evaluated by skilled therapies and was found to have significant decline in ADLs and ambulation and appears appropriate for admission to [place] [address]        ** Please Note: Fluency Direct voice to text software may have been used in the creation of this document   **

## 2022-12-07 NOTE — PROGRESS NOTES
[facility] [place]ITIAL CONSULT  NOTE  Freddie Tidwell 78 y o  [place] male MRN: [id number]  DOS:12/07/22   Unit/Bed#: -01 Encounter: [id number]      Requested by (Physician/Service): Kat Mayo MD  Reason for Consultation: Evaluate and treat impact of mood and coping on progress in rehabilitation  HPI:  Freddie Tidwell is a 70-year-old male with a past medical history of scoliosis, flat back syndrome, neurogenic claudication hypertension, hyperlipidemia, obesity who had stage I of revision surgery in May of this past year who is now status post stage II revision 11/22  He underwent removal of L1-L3 hardware, posterior thoracic/lumbar instrumentation from T8 to pelvis, thoracic osteotomy at T11/12, thoracic laminotomy T8/T9, T9/T10 and T10/11, posterior thoracic fusion T7 through left L1 and revision instrumentation T8 to pelvis by Dr Guillaume Dry  Postop course notable for significant pain and decline in ADLs and mobility  He did have bilateral lower extremity venous ultrasound which was negative for DVT  Patient also noted to have significant constipation  Patient was evaluated by skilled therapies and was found to have significant decline in ADLs and ambulation and appears appropriate for admission to [address] [address] 11/25/22        HISTORY:     Patient Active Problem List    Diagnosis Date Noted   • Hyponatremia 12/02/2022   • Spasm 12/02/2022   • Thrombocytosis 12/01/2022   • Vitamin D insufficiency 11/29/2022   • History of stroke 11/28/2022   • Constipation 11/26/2022   • Difficulty coping with pain 11/26/2022   • Pain 11/25/2022   • At risk for venous thromboembolism (VTE) 11/25/2022   • Transaminitis 11/25/2022   • Leukocytosis 11/25/2022   • Back pain 11/22/2022   • Chronic pain 11/22/2022   • Flatback syndrome 11/22/2022   • Status post lumbar spinal fusion 05/25/2022   • Hyperlipidemia    • Hypertension        Body mass index is 31 25 kg/m²      Past Medical History:     Past Medical History:   Diagnosis Date   • Ambulates with cane     "long distance"   • Arthritis    • Back pain    • Chronic pain disorder    • Dental crowns present    • Exercise involving walking     daily -short walks   • History of hepatitis C     per pt "went away on its own" age 13"   • History of transfusion    • Hyperlipidemia    • Hypertension    • Leg pain     and foot pain   • Limb alert care status     per pt NO IV's LEFT UPPER ARM due to old injury   • Risk for falls    • Spinal stenosis    • Stroke ([facility] [address] )     per pt in 1996-and no lasting problems   • Wears glasses     readers        Past Surgical History:     Past Surgical History:   Procedure Laterality Date   • BACK SURGERY      with implants   • COLONOSCOPY     • LUMBAR FUSION N/A 5/24/2022    Procedure: L1-S1 revision of segmental hardware, L5-S1 osteotomy, posterior spinal fusion L4-S1, pelvic instrumentation, Transforaminal lumbar interbody fusion L5S1, Cage L5S1, instrumentation L1-pelvis; Surgeon: Rosangela Ramos MD;  Location: [facility];  Service: Orthopedics   • TX ARTHRODESIS POSTERIOR/POSTEROLATERAL THORACIC N/A 11/22/2022    Procedure: removal of hardware L1-L3  Posterior thoracic/ lumbar instrumentation from T8 to pelvis  Thoracic osteotomy T11/12  Thoracic laminotomy T8/9, T9/10 and T10/11  Posterior thoracic fusion T7-L1    Revision instrumentation T8 to pelvis ;  Surgeon: Rosangela Ramos MD;  Location: [facility];  Service: Orthopedics   • WISDOM TOOTH EXTRACTION           Allergies:     No Known Allergies      Social History:    Social History     Socioeconomic History   • Marital status: /Civil Union     Spouse name: Not on file   • Number of children: Not on file   • Years of education: Not on file   • Highest education level: Not on file   Occupational History   • Not on file   Tobacco Use   • Smoking status: Former     Types: Cigarettes     Quit date: 2012     Years since quitting: 10 9   • Smokeless tobacco: Never   Vaping Use   • Vaping Use: Never used   Substance and Sexual Activity   • Alcohol use: Yes     Alcohol/week: 3 0 standard drinks     Types: 3 Cans of beer per week     Comment: drinks once a month   • Drug use: No   • Sexual activity: Not on file     Comment: defer   Other Topics Concern   • Not on file   Social History Narrative   • Not on file     Social Determinants of Health     Financial Resource Strain: Not on file   Food Insecurity: No Food Insecurity   • Worried About Running Out of Food in the Last Year: Never true   • Ran Out of Food in the Last Year: Never true   Transportation Needs: No Transportation Needs   • Lack of Transportation (Medical): No   • Lack of Transportation (Non-Medical): No   Physical Activity: Not on file   Stress: Not on file   Social Connections: Not on file   Intimate Partner Violence: Not on file   Housing Stability: Low Risk    • Unable to Pay for Housing in the Last Year: No   • Number of Places Lived in the Last Year: 1   • Unstable Housing in the Last Year: No        Family History:    No family history on file      Medications:     Current Facility-Administered Medications:   •  [START ON 12/8/2022] aspirin (ECOTRIN LOW STRENGTH) EC tablet 81 mg, 81 mg, Oral, Daily, Daniele Biswas MD  •  baclofen tablet 5 mg, 5 mg, Oral, TID, Daniele Biswas MD, 5 mg at 12/07/22 1416  •  bisacodyl (DULCOLAX) rectal suppository 10 mg, 10 mg, Rectal, Daily PRN, Daniele Biswas MD, 10 mg at 12/01/22 1042  •  cholecalciferol (VITAMIN D3) tablet 2,000 Units, 2,000 Units, Oral, Daily, BAYRON Esquivel, 2,000 Units at 12/07/22 0813  •  gabapentin (NEURONTIN) capsule 300 mg, 300 mg, Oral, BID, Sukhwinder Maldonado MD, 300 mg at 12/07/22 1416  •  gabapentin (NEURONTIN) capsule 400 mg, 400 mg, Oral, HS, Sukhwinder Maldonado MD, 400 mg at 12/06/22 2121  •  HYDROmorphone (DILAUDID) tablet 2 mg, 2 mg, Oral, Q4H PRN, Daniele Biswas MD  •  HYDROmorphone (DILAUDID) tablet 3 mg, 3 mg, Oral, Q4H PRN, Kat Mayo MD, 3 mg at 12/07/22 0813  •  lidocaine (LIDODERM) 5 % patch 2 patch, 2 patch, Topical, Daily, Mary Ellen Tsang MD, 2 patch at 12/07/22 0813  •  lisinopril (ZESTRIL) tablet 10 mg, 10 mg, Oral, Daily, BAYRON Nolasco, 10 mg at 12/07/22 0813  •  naloxone (NARCAN) 0 04 mg/mL syringe 0 04 mg, 0 04 mg, Intravenous, Q1MIN PRN, Kat Mayo MD  •  ondansetron (ZOFRAN-ODT) dispersible tablet 4 mg, 4 mg, Oral, Q8H PRN, Kat Mayo MD  •  oxyCODONE (ROXICODONE) IR tablet 5 mg, 5 mg, Oral, Q4H PRN, Mary Ellen Tsang MD, 5 mg at 12/07/22 9723  •  polyethylene glycol (MIRALAX) packet 17 g, 17 g, Oral, Daily PRN, Kat Mayo MD  •  pravastatin (PRAVACHOL) tablet 10 mg, 10 mg, Oral, Daily With BAYRON Barnard, 10 mg at 12/06/22 1736  •  senna (SENOKOT) tablet 8 6 mg, 1 tablet, Oral, BID PRN, Sydney Vallejo DO      _________________________________________________________________________________    ASSESSMENT:   Elijah Dunbar is a 78year old  male who was admitted to [facility] to gain strength and functioning after spinal surgery to correct scoliosis  Pt reports this is his third of three surgeries for this condition  He describes a supportive and positive relationship with his wife of over 25 years and they have 2 grown children, one of whom still lives in their home  Their older son lives 2 hours away in [place]  Mr Ama Dunbar reports feeling encouraged by his progress this week in rehab and feels ready to go home  He was seen with his wife present  He feels ready to go home and does not foresee any barriers to his successful discharge and continued recovery  However, his wife is more concerned about how she will manage being the sole provider during his recovery, as well as helping to care for her handicapped sister who lives nearby    Their son who lives in their home has not volunteered to help but would likely help if directly asked  Pt was ambivalent about asking anyone for help, stating "I've been through this 3 times before and will be fine "  Wife has survived 2 serious cancer treatments (multiple myeloma and hairy cell leukemia) and does not want to have any relapses  In addition, she reports she has not had success in the past with reliable in home caregivers  Pt  maintained good eye contact and engaged appropriately throughout the interview  He was seen in his room at bedside, presented as pleasant,  cooperative and established rapport without difficulty  Mood was positive with no evidence of overt depression, euphoria or emotional lability  Pt denies disturbances in appetite  No evidence of poor boundaries was present  Pt  denies incidents of losing time or flash backs  No pressured speech, repetitive or perseverative behavior is present  No obsessive-compulsive or associated rituals  Pt's conversational speech was fluent and articulate with no evidence of word finding difficulty  Pt may have tendency to minimize impact of functional limitations in an attempt to take pressure off spouse  This family may greatly benefit by additional home support post discharge  It may be helpful to engage the son in any caregiver training to help with discharge planning  In addition, any resources who can assist with in home care may be helpful for this family  Pt may benefit by supportive psychotherapy during rehab to help him verbalize needs and ask for assistance  CBT and DBT strategies will be utilized  DIAGNOSIS:  Adjustment Disorder with Mixed Emotional Features      RECOMMENDATIONS:   I will follow Mr Angie Siu during his stay to provide the following interventions:    · Supportive psychotherapy, utilizing CBT and mindfulness strategies  · DBT distress tolerance techniques  to improve coping and mood  · Meditation and relaxation training          Thank you for the opportunity to participate in Mr Sarah Tanner avery Bower, Ph D   Licensed Psychologist

## 2022-12-07 NOTE — PCC PHYSICAL THERAPY
12/7/22   Pt cont to be limited by pain to mid back from surgery, decreased LE strength, decreased ability to adhere to spinal precautions although he is able to resite them and decreased LE strength  Pt remains CS with gait HH distances and CGA on uneven surfaces and long distances  Pt required CGA on 8" curb with RW and VC's for sequencing and at times VC's for hand placement prior to sitting for safety  Pt anticipates FT to be completed with wife 12/7 in anticipation for discharge Saturday 12/10 with OPPT services  Pt states he remain on single level with full bath available and use of RW with S  Cont POC as tolerated  Goal is to progress to Mod I with RW for household mobility for discharge and continue with out patient therapy services

## 2022-12-07 NOTE — PROGRESS NOTES
[facility]  Progress Note - Arnoldo Nevarez 1955, 78 y o  male MRN: [id number]  Unit/Bed#: [facility] 407-32 Encounter: [id number]  Primary Care Provider: Carol Springer MD   Date and time admitted to hospital: 11/25/2022  2:14 PM    Hyponatremia  Assessment & Plan  Mild  Na+ currently 134  Asymptomatic  Likely pain induced  Continue to trend with routine BMP  Thrombocytosis  Assessment & Plan  Plt count currently 548,000  Likely reactive post-operatively  Continue to trend with routine CBC  Vitamin D insufficiency  Assessment & Plan  Vitamin D level 24 5 on 11/28  Started on Vitamin D 2000u daily  History of stroke  Assessment & Plan  Per patient - hx of CVA in 1996  Continue pravastatin 10mg daily as substitute for non-formulary lovastatin  Continue ASA for DVT ppx - would benefit from low dose preventative ASA afterwards  Transaminitis  Assessment & Plan  Improved, AST 38 and ALT 37   Per patient - hx of mild elevations due to diagnosis of hepatitis C as a teenager  Tylenol discontinued  Statin initially discontinued with no change in levels - restarted statin on 12/1  Continue to trend routine CMP  Hypertension  Assessment & Plan  Home regimen: lisinopril 5mg daily  Increased lisinopril to 10mg daily on 12/5  Monitor BP with routine VS     Hyperlipidemia  Assessment & Plan  Continue pravastatin 10mg daily as substitute for non-formulary lovastatin 10mg daily  Discontinued due to elevated liver enzymes  AST and ALT unchanged after stopping statin - may resume statin at this time (12/1)  Lipid panel on 11/28: Cholesterol 151, Triglycerides 94, HDL 30 and   * Status post lumbar spinal fusion  Assessment & Plan  Hx of prior back surgeries with hx of scoliosis, pseudoarthrosis, and flat back deformity    11/22: elective removal of hardware L1-L3, posterior thoracic/lumbar instrumentation from T8 to pelvis, thoracic osteotomy T11/T12, thoracic laminotomy T8/T9, T9/T10, T10/T11, posterior thoracic fusion T7-L1, revision instrumentation T8 to pelvis performed by Dr Andrea León (Orthopedics)  Continue ASA 325mg daily for DVT ppx  TLSO brace when getting OOB  Ensure adequate pain control  Neurovascular checks Q shift  2 week follow-up with repeat thoracolumbar spine XRs ()  Repeat XRs obtained on  due to increasing pain: interval post-surgical changes without evidence of hardware complication  No acute osseous abnormalities  Primary team following  PT/OT  VTE Pharmacologic Prophylaxis:   Pharmacologic: ASA per Ortho  Mechanical VTE Prophylaxis in Place: Yes - sequential compression devices  Current Length of Stay: 12 day(s)    Current Patient Status: Inpatient Rehab     Discharge Plan: As per primary team     Code Status: Level 1 - Full Code    Subjective:   Pt examined while pt sitting in bed in pt room  Currently has no complaints of pain  Denies any muscle spasms and states that he hasn't had any for a few days  Had a BM yesterday without any issues  Denies any urinary complaints  Denies any numbness or tingling  Therapy is going well  Plans for discharge on Saturday and has no concerns or questions in regards to his medications or discharge  Objective:     Vitals:   Temp (24hrs), Av 7 °F (36 5 °C), Min:97 3 °F (36 3 °C), Max:98 °F (36 7 °C)    Temp:  [97 3 °F (36 3 °C)-98 °F (36 7 °C)] 98 °F (36 7 °C)  HR:  [72-73] 72  Resp:  [18] 18  BP: (128-150)/(73-87) 128/87  SpO2:  [94 %-97 %] 94 %  Body mass index is 31 25 kg/m²  Review of Systems   Constitutional: Negative for appetite change, chills, fatigue and fever  HENT: Negative for trouble swallowing  Eyes: Negative for visual disturbance  Respiratory: Negative for cough, shortness of breath, wheezing and stridor  Cardiovascular: Negative for chest pain, palpitations and leg swelling     Gastrointestinal: Negative for abdominal distention, abdominal pain, constipation, diarrhea, nausea and vomiting  LBM 12/6   Genitourinary: Negative for difficulty urinating  Musculoskeletal: Negative for arthralgias, back pain, gait problem and myalgias  Denies muscle spasms  Neurological: Negative for dizziness, weakness, light-headedness, numbness and headaches  Psychiatric/Behavioral: Negative for sleep disturbance  All other systems reviewed and are negative  Input and Output Summary (last 24 hours): Intake/Output Summary (Last 24 hours) at 12/7/2022 0904  Last data filed at 12/7/2022 0843  Gross per 24 hour   Intake 660 ml   Output 2005 ml   Net -1345 ml       Physical Exam:     Physical Exam  Vitals and nursing note reviewed  Constitutional:       General: He is not in acute distress  Appearance: Normal appearance  He is obese  He is not ill-appearing  HENT:      Head: Normocephalic and atraumatic  Cardiovascular:      Rate and Rhythm: Normal rate and regular rhythm  Pulses: Normal pulses  Heart sounds: Normal heart sounds  No murmur heard  No friction rub  Pulmonary:      Effort: Pulmonary effort is normal  No respiratory distress  Breath sounds: Normal breath sounds  No wheezing or rhonchi  Abdominal:      General: Abdomen is flat  Bowel sounds are normal  There is no distension  Palpations: Abdomen is soft  There is no mass  Tenderness: There is no abdominal tenderness  There is no guarding or rebound  Hernia: No hernia is present  Musculoskeletal:      Cervical back: Normal range of motion and neck supple  No tenderness  Right lower leg: No edema  Left lower leg: No edema  Skin:     General: Skin is warm and dry  Capillary Refill: Capillary refill takes less than 2 seconds  Neurological:      Mental Status: He is alert and oriented to person, place, and time     Psychiatric:         Mood and Affect: Mood normal          Behavior: Behavior normal          Additional Data:     Labs:    Results from last 7 days   Lab Units 12/05/22  0503   WBC Thousand/uL 7 68   HEMOGLOBIN g/dL 12 9   HEMATOCRIT % 40 8   PLATELETS Thousands/uL 548*   NEUTROS PCT % 66   LYMPHS PCT % 23   MONOS PCT % 7   EOS PCT % 3     Results from last 7 days   Lab Units 12/05/22  0503   SODIUM mmol/L 134*   POTASSIUM mmol/L 4 3   CHLORIDE mmol/L 101   CO2 mmol/L 29   BUN mg/dL 14   CREATININE mg/dL 0 84   ANION GAP mmol/L 4*   CALCIUM mg/dL 9 8   ALBUMIN g/dL 3 8   TOTAL BILIRUBIN mg/dL 0 67   ALK PHOS U/L 116   ALT U/L 37   AST U/L 38   GLUCOSE RANDOM mg/dL 95                       Labs reviewed    Imaging:    Imaging reviewed    Recent Cultures (last 7 days):           Last 24 Hours Medication List:   Current Facility-Administered Medications   Medication Dose Route Frequency Provider Last Rate   • aspirin  325 mg Oral Daily Santiago Hogan MD     • baclofen  5 mg Oral TID Santiago Hogan MD     • bisacodyl  10 mg Rectal Daily PRN Santiago Hogan MD     • cholecalciferol  2,000 Units Oral Daily BAYRON Thornton     • gabapentin  300 mg Oral BID Zoë Fernandez MD     • gabapentin  400 mg Oral HS Zoë Fernandez MD     • HYDROmorphone  2 mg Oral Q4H PRN Santiago Hogan MD     • HYDROmorphone  3 mg Oral Q4H PRN aSntiago Hogan MD     • lidocaine  2 patch Topical Daily Zoë Fernandez MD     • lisinopril  10 mg Oral Daily BAYRON Thornton     • naloxone  0 04 mg Intravenous Q1MIN PRN Santiago Hogan MD     • ondansetron  4 mg Oral Q8H PRN Santiago Hogan MD     • oxyCODONE  5 mg Oral Q4H PRN Zoë Fernandez MD     • polyethylene glycol  17 g Oral Daily PRN Santiago Hogan MD     • pravastatin  10 mg Oral Daily With BAYRON Barrera     • senna  1 tablet Oral BID PRN DO SRAVAN Mcdaniel*[employer] software was used to dictate this note  It may contain errors with dictating incorrect words or incorrect spelling  Please contact the provider directly with any questions

## 2022-12-07 NOTE — PROGRESS NOTES
12/07/22 1030   Pain Assessment   Pain Assessment Tool 0-10   Pain Score 7   Pain Location/Orientation Orientation: Mid;Location: Back   Pain Onset/Description Onset: Ongoing;Frequency: Constant/Continuous   Patient's Stated Pain Goal No pain   Hospital Pain Intervention(s) Rest;Relaxation technique   Restrictions/Precautions   Precautions Fall Risk;Pain;Spinal precautions   ROM Restrictions Yes  (spinal precautions)   Braces or Orthoses TLSO   Cognition   Overall Cognitive Status WFL   Arousal/Participation Alert; Cooperative   Attention Within functional limits   Orientation Level Oriented X4   Memory Within functional limits   Following Commands Follows one step commands without difficulty   Sit to Lying   Type of Assistance Needed Independent   Sit to Lying CARE Score 6   Lying to Sitting on Side of Bed   Type of Assistance Needed Independent   Lying to Sitting on Side of Bed CARE Score 6   Sit to Stand   Type of Assistance Needed Supervision; Adaptive equipment   Comment with RW   Sit to Stand CARE Score 4   Bed-Chair Transfer   Type of Assistance Needed Supervision; Adaptive equipment   Comment with RW   Chair/Bed-to-Chair Transfer CARE Score 4   Transfer Bed/Chair/Wheelchair   Adaptive Equipment Roller Walker   Walk 10 Feet   Type of Assistance Needed Supervision   Comment S with RW   Walk 10 Feet CARE Score 4   Walk 50 Feet with Two Turns   Type of Assistance Needed Incidental touching;Supervision; Adaptive equipment   Comment CS/CGA with RW   Walk 50 Feet with Two Turns CARE Score 4   Walk 150 Feet   Type of Assistance Needed Incidental touching; Adaptive equipment   Comment CGA with RW, VC's to keep LLE inside of RW   Walk 150 Feet CARE Score 4   Walking 10 Feet on Uneven Surfaces   Type of Assistance Needed Incidental touching; Adaptive equipment   Comment on ramp with RW   Walking 10 Feet on Uneven Surfaces CARE Score 4   Ambulation   Primary Mode of Locomotion Prior to Admission [facility] (feet) 150 ft  (x4)   Assist Device Roller Walker   Gait Pattern Forward Flexion; Inconsistant Leisa; Wide KAMALA;Shuffle;Decreased L stance; Improper weight shift   Limitations Noted In Endurance;Midline Orientation;Posture; Safety;Speed;Strength;Swing   Provided Assistance with: Direction;Balance   Walk Assist Level Contact Guard;Supervision   Does the patient walk? 2  Yes   Wheel 50 Feet with Two Turns   Reason if not Attempted Activity not applicable   Wheel 50 Feet with Two Turns CARE Score 9   Wheel 150 Feet   Reason if not Attempted Activity not applicable   Wheel 992 Feet CARE Score 9   Wheelchair mobility   Does the patient use a wheelchair? 0  No   Curb or Single Stair   Style negotiated Curb   Type of Assistance Needed Incidental touching;Verbal cues; Adaptive equipment   Physical Assistance Level No physical assistance   Comment CGA on 8" step with RW, VC's for RLE leading  1 Step (Curb) CARE Score 4   12 Steps   Reason if not Attempted Safety concerns   12 Steps CARE Score 88   Therapeutic Interventions   Strengthening STS 2 x5 reps no UE support  Seated LAQ, hip flex and ankle DF/PF 2 x15 reps  Balance alt toe taps for coordination 2 x10 reps UE support  Equipment Use   NuStep x10 min L2   Assessment   Treatment Assessment 60 min PT session with increased focus on gait, functional transfers and increased LE strengthening  Pt cont to be limited by pain to mid back from surgery, decreased LE strength, decreased ability to adhere to spinal precautions although he is able to resite them and decreased LE strength  Pt remains CS with gait HH distances and CGA on uneven surfaces and long distances  Pt required CGA on 8" curb with RW and VC's for sequencing and at times VC's for hand placement prior to sitting for safety  Pt anticipates FT to be completed with wife 12/7 in anticipation for discharge Saturday 12/10 with OPPT services   Pt states he remain on single level with full bath available and use of RW with S  Cont POC as tolerated  Problem List Decreased strength;Decreased endurance; Impaired balance;Decreased mobility;Orthopedic restrictions;Pain   Barriers to Discharge Inaccessible home environment   PT Barriers   Functional Limitation Car transfers;Stair negotiation;Ramp negotiation;Transfers;Standing;Walking   Plan   Treatment/Interventions Functional transfer training;LE strengthening/ROM; Therapeutic exercise; Endurance training;Patient/family training;Gait training   Progress Progressing toward goals   Recommendation   PT Discharge Recommendation Home with outpatient rehabilitation   Equipment Recommended Walker   PT Therapy Minutes   PT Time In 1030   PT Time Out 1130   PT Total Time (minutes) 60   PT Mode of treatment - Individual (minutes) 60   PT Mode of treatment - Concurrent (minutes) 0   PT Mode of treatment - Group (minutes) 0   PT Mode of treatment - Co-treat (minutes) 0   PT Mode of Treatment - Total time(minutes) 60 minutes   PT Cumulative Minutes 995   Therapy Time missed   Time missed?  No

## 2022-12-07 NOTE — ASSESSMENT & PLAN NOTE
Secondary stroke prevention  - Antithrombotic: IM recommends aspirin 81mg qday with stroke hx and ASCVD risk score  - Statin  - Optimal management of blood pressure   - Follow-up PCP

## 2022-12-07 NOTE — TELEPHONE ENCOUNTER
Pt's  in the hospital called to set up OP PT for [facility] Has, she gave wife's number  No answer and LM to call back clinic to schedule IE       Pt is going to be seen for S/P lumbar spinal fusion per

## 2022-12-07 NOTE — PROGRESS NOTES
12/07/22 1510   Pain Assessment   Pain Assessment Tool 0-10   Pain Score 5   Pain Location/Orientation Orientation: Mid;Orientation: Lower; Location: Back   Pain Onset/Description Onset: Ongoing; Descriptor: Aching;Descriptor: Discomfort   Restrictions/Precautions   Precautions Fall Risk;Pain;Spinal precautions   Braces or Orthoses TLSO   Cognition   Overall Cognitive Status WFL   Arousal/Participation Alert; Cooperative   Attention Within functional limits   Orientation Level Oriented X4   Memory Within functional limits   Following Commands Follows one step commands without difficulty   Subjective   Subjective "Im doing better than I was a week ago, thats for sure"   Roll Left and Right   Type of Assistance Needed Independent   Roll Left and Right CARE Score 6   Sit to Lying   Type of Assistance Needed Independent   Sit to Lying CARE Score 6   Lying to Sitting on Side of Bed   Type of Assistance Needed Independent   Lying to Sitting on Side of Bed CARE Score 6   Sit to Stand   Type of Assistance Needed Supervision   Sit to Stand CARE Score 4   Bed-Chair Transfer   Type of Assistance Needed Supervision   Physical Assistance Level No physical assistance   Comment RW   Chair/Bed-to-Chair Transfer CARE Score 4   Car Transfer   Type of Assistance Needed Supervision   Physical Assistance Level No physical assistance   Comment simulated, spouse has mid-large SUV, with RW     Car Transfer CARE Score 4   Walk 10 Feet   Type of Assistance Needed Supervision   Physical Assistance Level No physical assistance   Walk 10 Feet CARE Score 4   Walk 50 Feet with Two Turns   Type of Assistance Needed Supervision   Physical Assistance Level No physical assistance   Walk 50 Feet with Two Turns CARE Score 4   Walk 150 Feet   Type of Assistance Needed Supervision   Physical Assistance Level No physical assistance   Walk 150 Feet CARE Score 4   Ambulation   Primary Mode of Locomotion Prior to Admission Walk   Distance Walked (feet) 200 ft Assist Device Roller Walker   Gait Pattern Forward Flexion; Inconsistant Leisa; Improper weight shift   Limitations Noted In Endurance;Speed;Strength   Walk Assist Level Close Supervision   Findings cues for pacing and reduced step length for inc B LE control  Does the patient walk? 2  Yes   Wheelchair mobility   Does the patient use a wheelchair? 0  No   Curb or Single Stair   Style negotiated Curb   Type of Assistance Needed Incidental touching;Verbal cues   Physical Assistance Level No physical assistance   Comment CGA with RW on 8inch curb  1 Step (Curb) CARE Score 4   Stairs   Type Curb   # of Steps 1  (x2)   Weight Bearing Precautions Back   Assist Devices Roller Walker   Findings pt only has 1 BRENDA approx 8 inches high  Assessment   Treatment Assessment Pt and spouse engaged in 40 min skilled PT intervention in prep for planned DC home saturday 12/10  Pts spouse Alvaro Solis) present for session, expressing some concern for steps and pts general mobility  Observed pt perform R sidelying to sit, sit to stand, curb step and amb around unit with RW  Edcuation to be with pt on curb/BRENDA and provide cues for pacing and reduced step length to improve LE control and indirectly posture  Pt currently at S level for gait and transfers  Educated on brace wearing and keeping it near pt (as well as RW) so he does not have to reach to obtain it when he is ready to move, ultimately breaking his spinal precuations  Prolonged review of spinal precautions and risk of re-injury it pt does too much to fast, not allowing for ample healing  Pt and spouse notified of Cm to schedule out pt PT visit per pts request, spouse may not be able to do monday or tuesday next week, will let pt know this PM which day does not work for her  Spouse pleased with pt progress and feels confident with DC home saturday     Family/Caregiver Present yes   PT Family training done with: spouse, Conchita Mensah   PT Therapy Minutes   PT Time In 0727   PT Time Out [id number] PT Total Time (minutes) 40   PT Mode of treatment - Individual (minutes) 40   PT Mode of treatment - Concurrent (minutes) 0   PT Mode of treatment - Group (minutes) 0   PT Mode of treatment - Co-treat (minutes) 0   PT Mode of Treatment - Total time(minutes) 40 minutes   PT Cumulative Minutes 1035   Therapy Time missed   Time missed?  No

## 2022-12-07 NOTE — PLAN OF CARE
Problem: GASTROINTESTINAL - ADULT  Goal: Maintains or returns to baseline bowel function  Description: INTERVENTIONS:  - Assess bowel function  - Encourage oral fluids to ensure adequate hydration  - Administer IV fluids if ordered to ensure adequate hydration  - Administer ordered medications as needed  - Encourage mobilization and activity  - Consider nutritional services referral to assist patient with adequate nutrition and appropriate food choices  Outcome: Progressing     Problem: SKIN/TISSUE INTEGRITY - ADULT  Goal: Incision(s), wounds(s) or drain site(s) healing without S/S of infection  Description: INTERVENTIONS  - Assess and document dressing, incision, wound bed, drain sites and surrounding tissue  - Provide patient and family education  - Perform skin care/dressing changes every day  Outcome: Progressing

## 2022-12-07 NOTE — PLAN OF CARE
Problem: SKIN/TISSUE INTEGRITY - ADULT  Goal: Incision(s), wounds(s) or drain site(s) healing without S/S of infection  Description: INTERVENTIONS  - Assess and document dressing, incision, wound bed, drain sites and surrounding tissue  - Provide patient and family education  - Perform skin care/dressing changes every day  Outcome: Progressing     Problem: PAIN - ADULT  Goal: Verbalizes/displays adequate comfort level or baseline comfort level  Description: Interventions:  - Encourage patient to monitor pain and request assistance  - Assess pain using appropriate pain scale  - Administer analgesics based on type and severity of pain and evaluate response  - Implement non-pharmacological measures as appropriate and evaluate response  - Consider cultural and social influences on pain and pain management  - Notify physician/advanced practitioner if interventions unsuccessful or patient reports new pain  Outcome: Progressing

## 2022-12-08 LAB
ANION GAP SERPL CALCULATED.3IONS-SCNC: 6 MMOL/L (ref 5–14)
BASOPHILS # BLD AUTO: 0.04 THOUSANDS/ÂΜL (ref 0–0.1)
BASOPHILS NFR BLD AUTO: 1 % (ref 0–1)
BUN SERPL-MCNC: 15 MG/DL (ref 5–25)
CALCIUM SERPL-MCNC: 9.7 MG/DL (ref 8.4–10.2)
CHLORIDE SERPL-SCNC: 103 MMOL/L (ref 96–108)
CO2 SERPL-SCNC: 27 MMOL/L (ref 21–32)
CREAT SERPL-MCNC: 0.82 MG/DL (ref 0.7–1.5)
EOSINOPHIL # BLD AUTO: 0.17 THOUSAND/ÂΜL (ref 0–0.61)
EOSINOPHIL NFR BLD AUTO: 3 % (ref 0–6)
ERYTHROCYTE [DISTWIDTH] IN BLOOD BY AUTOMATED COUNT: 13 % (ref 11.6–15.1)
GFR SERPL CREATININE-BSD FRML MDRD: 92 ML/MIN/1.73SQ M
GLUCOSE P FAST SERPL-MCNC: 101 MG/DL (ref 70–99)
GLUCOSE SERPL-MCNC: 101 MG/DL (ref 70–99)
HCT VFR BLD AUTO: 38.7 % (ref 36.5–49.3)
HGB BLD-MCNC: 12.4 G/DL (ref 12–17)
IMM GRANULOCYTES # BLD AUTO: 0.03 THOUSAND/UL (ref 0–0.2)
IMM GRANULOCYTES NFR BLD AUTO: 1 % (ref 0–2)
LYMPHOCYTES # BLD AUTO: 1.65 THOUSANDS/ÂΜL (ref 0.6–4.47)
LYMPHOCYTES NFR BLD AUTO: 27 % (ref 14–44)
MCH RBC QN AUTO: 28.2 PG (ref 26.8–34.3)
MCHC RBC AUTO-ENTMCNC: 32 G/DL (ref 31.4–37.4)
MCV RBC AUTO: 88 FL (ref 82–98)
MONOCYTES # BLD AUTO: 0.42 THOUSAND/ÂΜL (ref 0.17–1.22)
MONOCYTES NFR BLD AUTO: 7 % (ref 4–12)
NEUTROPHILS # BLD AUTO: 3.71 THOUSANDS/ÂΜL (ref 1.85–7.62)
NEUTS SEG NFR BLD AUTO: 61 % (ref 43–75)
NRBC BLD AUTO-RTO: 0 /100 WBCS
PLATELET # BLD AUTO: 520 THOUSANDS/UL (ref 149–390)
PMV BLD AUTO: 8.3 FL (ref 8.9–12.7)
POTASSIUM SERPL-SCNC: 4.1 MMOL/L (ref 3.5–5.3)
RBC # BLD AUTO: 4.39 MILLION/UL (ref 3.88–5.62)
SODIUM SERPL-SCNC: 136 MMOL/L (ref 135–147)
WBC # BLD AUTO: 6.02 THOUSAND/UL (ref 4.31–10.16)

## 2022-12-08 RX ADMIN — GABAPENTIN 300 MG: 300 CAPSULE ORAL at 14:50

## 2022-12-08 RX ADMIN — ASPIRIN 81 MG: 81 TABLET, COATED ORAL at 08:33

## 2022-12-08 RX ADMIN — OXYCODONE HYDROCHLORIDE 5 MG: 5 TABLET ORAL at 04:50

## 2022-12-08 RX ADMIN — BACLOFEN 5 MG: 10 TABLET ORAL at 21:16

## 2022-12-08 RX ADMIN — LIDOCAINE 2 PATCH: 50 PATCH CUTANEOUS at 08:33

## 2022-12-08 RX ADMIN — PRAVASTATIN SODIUM 10 MG: 20 TABLET ORAL at 16:23

## 2022-12-08 RX ADMIN — LISINOPRIL 10 MG: 10 TABLET ORAL at 08:33

## 2022-12-08 RX ADMIN — HYDROMORPHONE HYDROCHLORIDE 3 MG: 2 TABLET ORAL at 17:06

## 2022-12-08 RX ADMIN — GABAPENTIN 300 MG: 300 CAPSULE ORAL at 06:17

## 2022-12-08 RX ADMIN — CHOLECALCIFEROL TAB 25 MCG (1000 UNIT) 2000 UNITS: 25 TAB at 08:33

## 2022-12-08 RX ADMIN — BACLOFEN 5 MG: 10 TABLET ORAL at 06:17

## 2022-12-08 RX ADMIN — GABAPENTIN 400 MG: 400 CAPSULE ORAL at 21:16

## 2022-12-08 RX ADMIN — HYDROMORPHONE HYDROCHLORIDE 3 MG: 2 TABLET ORAL at 07:26

## 2022-12-08 RX ADMIN — BACLOFEN 5 MG: 10 TABLET ORAL at 13:32

## 2022-12-08 RX ADMIN — HYDROMORPHONE HYDROCHLORIDE 3 MG: 2 TABLET ORAL at 21:58

## 2022-12-08 RX ADMIN — OXYCODONE HYDROCHLORIDE 5 MG: 5 TABLET ORAL at 13:31

## 2022-12-08 NOTE — PROGRESS NOTES
[facility]  Progress Note - Efren Solorzano 1955, 78 y o  male MRN: [id number]  Unit/Bed#: -00 Encounter: [id number]  Primary Care Provider: David Valderrama MD   Date and time admitted to hospital: 11/25/2022  2:14 PM    Hyponatremia  Assessment & Plan  Mild  Na+ currently 134  Asymptomatic  Likely pain induced  Continue to trend with routine BMP  Thrombocytosis  Assessment & Plan  Plt count currently 520,000  Likely reactive post-operatively  Continue to trend with routine CBC  Vitamin D insufficiency  Assessment & Plan  Vitamin D level 24 5 on 11/28  Started on Vitamin D 2000u daily  History of stroke  Assessment & Plan  Per patient - hx of CVA in 1996  Continue pravastatin 10mg daily as substitute for non-formulary lovastatin  Started on ASA 81mg daily on 12/8 due to completing DVT ppx  Transaminitis  Assessment & Plan  Improved, AST 38 and ALT 37   Per patient - hx of mild elevations due to diagnosis of hepatitis C as a teenager  Tylenol discontinued  Statin initially discontinued with no change in levels - restarted statin on 12/1  Continue to trend routine CMP  Hypertension  Assessment & Plan  Home regimen: lisinopril 5mg daily  Increased lisinopril to 10mg daily on 12/5  Monitor BP with routine VS     Hyperlipidemia  Assessment & Plan  Continue pravastatin 10mg daily as substitute for non-formulary lovastatin 10mg daily  Discontinued due to elevated liver enzymes  AST and ALT unchanged after stopping statin - may resume statin at this time (12/1)  Lipid panel on 11/28: Cholesterol 151, Triglycerides 94, HDL 30 and   * Status post lumbar spinal fusion  Assessment & Plan  Hx of prior back surgeries with hx of scoliosis, pseudoarthrosis, and flat back deformity    11/22: elective removal of hardware L1-L3, posterior thoracic/lumbar instrumentation from T8 to pelvis, thoracic osteotomy T11/T12, thoracic laminotomy T8/T9, T9/T10, T10/T11, posterior thoracic fusion T7-L1, revision instrumentation T8 to pelvis performed by Dr Mayra Coelho (Orthopedics)  Continue ASA 325mg daily for DVT ppx - stopped per Ortho on   TLSO brace when getting OOB  Ensure adequate pain control  Neurovascular checks Q shift  2 week follow-up with repeat thoracolumbar spine XRs () - discussion with Ortho, no need for repeat XRs at this time and may discontinue   Repeat XRs obtained on  due to increasing pain: interval post-surgical changes without evidence of hardware complication  No acute osseous abnormalities  Primary team following  PT/OT  VTE Pharmacologic Prophylaxis:   Pharmacologic: discontinued per Ortho  Mechanical VTE Prophylaxis in Place: Yes - sequential compression devices  Current Length of Stay: 13 day(s)    Current Patient Status: Inpatient Rehab     Discharge Plan: As per primary team     Code Status: Level 1 - Full Code    Subjective:   Pt examined while pt sitting in bed in pt room  Currently complaining of back pain, aching, 7/10, worse after therapy, improves with pain medication and rest   Currently has no other complaints  Sleeping well and participating well in therapy  Had a BM on   Plans for discharge on Saturday - currently has no questions or concerns in regards to discharge  Objective:     Vitals:   Temp (24hrs), Av 8 °F (36 6 °C), Min:97 °F (36 1 °C), Max:98 7 °F (37 1 °C)    Temp:  [97 °F (36 1 °C)-98 7 °F (37 1 °C)] 97 1 °F (36 2 °C)  HR:  [74-77] 75  Resp:  [18] 18  BP: (111-158)/(67-89) 131/80  SpO2:  [94 %-97 %] 95 %  Body mass index is 31 25 kg/m²  Review of Systems   Constitutional: Negative for appetite change, chills, fatigue and fever  HENT: Negative for trouble swallowing  Eyes: Negative for visual disturbance  Respiratory: Negative for cough, shortness of breath, wheezing and stridor  Cardiovascular: Negative for chest pain, palpitations and leg swelling  Gastrointestinal: Negative for abdominal distention, abdominal pain, constipation, diarrhea, nausea and vomiting  LBM 12/6   Genitourinary: Negative for difficulty urinating  Musculoskeletal: Positive for back pain (back pain, aching, 7/10, worse after therapy, improves with rest and pain medication)  Negative for arthralgias  Neurological: Negative for dizziness, weakness, light-headedness, numbness and headaches  Psychiatric/Behavioral: Negative for dysphoric mood and sleep disturbance  The patient is not nervous/anxious  All other systems reviewed and are negative  Input and Output Summary (last 24 hours): Intake/Output Summary (Last 24 hours) at 12/8/2022 0948  Last data filed at 12/8/2022 0900  Gross per 24 hour   Intake 240 ml   Output 400 ml   Net -160 ml       Physical Exam:     Physical Exam  Vitals and nursing note reviewed  Constitutional:       General: He is not in acute distress  Appearance: Normal appearance  He is obese  He is not ill-appearing  HENT:      Head: Normocephalic and atraumatic  Cardiovascular:      Rate and Rhythm: Normal rate and regular rhythm  Pulses: Normal pulses  Heart sounds: Normal heart sounds  No murmur heard  No friction rub  Pulmonary:      Effort: Pulmonary effort is normal  No respiratory distress  Breath sounds: Normal breath sounds  No wheezing or rhonchi  Abdominal:      General: Abdomen is flat  Bowel sounds are normal  There is no distension  Palpations: Abdomen is soft  There is no mass  Tenderness: There is no abdominal tenderness  Hernia: No hernia is present  Musculoskeletal:      Cervical back: Normal range of motion and neck supple  No tenderness  Right lower leg: No edema  Left lower leg: No edema  Skin:     General: Skin is warm and dry  Capillary Refill: Capillary refill takes less than 2 seconds     Neurological:      Mental Status: He is alert and oriented to person, place, and time     Psychiatric:         Mood and Affect: Mood normal          Behavior: Behavior normal          Additional Data:     Labs:    Results from last 7 days   Lab Units 12/08/22  0440   WBC Thousand/uL 6 02   HEMOGLOBIN g/dL 12 4   HEMATOCRIT % 38 7   PLATELETS Thousands/uL 520*   NEUTROS PCT % 61   LYMPHS PCT % 27   MONOS PCT % 7   EOS PCT % 3     Results from last 7 days   Lab Units 12/08/22  0440 12/05/22  0503   SODIUM mmol/L 136 134*   POTASSIUM mmol/L 4 1 4 3   CHLORIDE mmol/L 103 101   CO2 mmol/L 27 29   BUN mg/dL 15 14   CREATININE mg/dL 0 82 0 84   ANION GAP mmol/L 6 4*   CALCIUM mg/dL 9 7 9 8   ALBUMIN g/dL  --  3 8   TOTAL BILIRUBIN mg/dL  --  0 67   ALK PHOS U/L  --  116   ALT U/L  --  37   AST U/L  --  38   GLUCOSE RANDOM mg/dL 101* 95                       Labs reviewed    Imaging:    Imaging reviewed    Recent Cultures (last 7 days):           Last 24 Hours Medication List:   Current Facility-Administered Medications   Medication Dose Route Frequency Provider Last Rate   • aspirin  81 mg Oral Daily Carolyn Newsome MD     • baclofen  5 mg Oral TID Carolyn Newsome MD     • bisacodyl  10 mg Rectal Daily PRN Carolyn Newsome MD     • cholecalciferol  2,000 Units Oral Daily BAYRON Enriquez     • gabapentin  300 mg Oral BID Александр Fung MD     • gabapentin  400 mg Oral HS Александр Fung MD     • HYDROmorphone  2 mg Oral Q4H PRN Carolny Newsome MD     • HYDROmorphone  3 mg Oral Q4H PRN Carolyn Newsome MD     • lidocaine  2 patch Topical Daily Александр Fung MD     • lisinopril  10 mg Oral Daily BAYRON Enriquez     • naloxone  0 04 mg Intravenous Q1MIN PRN Carolyn Newsome MD     • ondansetron  4 mg Oral Q8H PRN Carolyn Newsome MD     • oxyCODONE  5 mg Oral Q4H PRN Александр Fung MD     • polyethylene glycol  17 g Oral Daily PRN Carolyn Newsome MD     • pravastatin  10 mg Oral Daily With BAYRON Mckeon     • senna  1 tablet Oral BID PRN Sydney Vallejo DO M*Modal software was used to dictate this note  It may contain errors with dictating incorrect words or incorrect spelling  Please contact the provider directly with any questions

## 2022-12-08 NOTE — CASE MANAGEMENT
Cm spoke with pt's wife via telephone for team update discussed dc date of Saturday 12/10 with OP Pt at [facility]  Both parties are agreeable, cm spoke with PT at [facility] who already reached out to pt's wife to schedule  Pt and pt's wife June Feast agreeable to plan and have no further questions

## 2022-12-08 NOTE — PROGRESS NOTES
12/08/22 0830   Pain Assessment   Pain Assessment Tool 0-10   Pain Score 8   Pain Location/Orientation Orientation: Mid;Location: Back   Restrictions/Precautions   Precautions Fall Risk;Pain;Spinal precautions   Weight Bearing Restrictions No   ROM Restrictions Yes  (spinal precautions)   Braces or Orthoses TLSO   Lifestyle   Autonomy "I should've taken a picture and sent it to my wife so she can get me one" referring to pill organizer   Oral Hygiene   Type of Assistance Needed Independent   Physical Assistance Level No physical assistance   Comment in stance at sink   Oral Hygiene CARE Score 6   Putting On/Taking Off Footwear   Type of Assistance Needed Physical assistance   Physical Assistance Level Total assistance   Comment to don van- spouse to assist at time of d/c  Putting On/Taking Off Footwear CARE Score 1   Sit to Stand   Type of Assistance Needed Set-up / clean-up   Physical Assistance Level No physical assistance   Comment RW   Sit to Stand CARE Score 5   Bed-Chair Transfer   Type of Assistance Needed Set-up / clean-up   Physical Assistance Level No physical assistance   Comment RW   Chair/Bed-to-Chair Transfer CARE Score 5   Health Management   Health Management Level of Assistance Independent   Health Management reviewed current list of medication and use of 4x a day pill organizer  pt receptive to initial use of pill organizer due to inc in meds and meds that are taken 2-3 times a day  pt completes full medication management task with all pills, no errors  during OT session, pt took picture, sent to his spouse, Susan Falcon with pt relaying to Susan Falcon that he would need the 4x a day pill organizer  Cognition   Overall Cognitive Status WFL   Arousal/Participation Alert; Cooperative   Attention Within functional limits   Orientation Level Oriented X4   Memory Within functional limits   Following Commands Follows one step commands without difficulty   Activity Tolerance   Activity Tolerance Patient tolerated treatment well   Assessment   Treatment Assessment pt engages in 60 minute skilled OT session focusing on func transfers, med management  see above for full func details  pt demo's set up level for in room transfers with RW, discussed with PT regarding assessment of IRPs which PT is in agreeement  plan to trial next session  completed medication management using 4x day pill organizer with pt completing IND--receptive to have spouse purchase one  pt remains on track for d/c home saturday with support/assist from spouse  continue with OT POC to focus on D/C ADL, func transfers, standing antonella/bal, UE Strengthening/endurance, to decrease burden of care at d/c  Prognosis Good   Problem List Decreased strength;Decreased endurance; Impaired balance;Decreased mobility;Orthopedic restrictions;Pain   Barriers to Discharge Inaccessible home environment   Plan   Treatment/Interventions ADL retraining;Functional transfer training; Therapeutic exercise; Endurance training;Patient/family training;Equipment eval/education; Compensatory technique education   OT Therapy Minutes   OT Time In 0830   OT Time Out 0930   OT Total Time (minutes) 60   OT Mode of treatment - Individual (minutes) 60   OT Mode of treatment - Concurrent (minutes) 0   OT Mode of treatment - Group (minutes) 0   OT Mode of treatment - Co-treat (minutes) 0   OT Mode of Treatment - Total time(minutes) 60 minutes   OT Cumulative Minutes 875   Therapy Time missed   Time missed?  No

## 2022-12-08 NOTE — PROGRESS NOTES
12/08/22 1100   Pain Assessment   Pain Assessment Tool   (Pt did not provide number but states its not as bad)   Restrictions/Precautions   Precautions Fall Risk;Pain;Spinal precautions   ROM Restrictions Yes  (spinal precautions)   Braces or Orthoses TLSO   Roll Left and Right   Type of Assistance Needed Independent   Roll Left and Right CARE Score 6   Sit to Lying   Type of Assistance Needed Independent   Sit to Lying CARE Score 6   Lying to Sitting on Side of Bed   Type of Assistance Needed Independent   Lying to Sitting on Side of Bed CARE Score 6   Sit to Stand   Type of Assistance Needed Independent   Sit to Stand CARE Score 6   Bed-Chair Transfer   Type of Assistance Needed Independent   Comment RW   Chair/Bed-to-Chair Transfer CARE Score 6   Transfer Bed/Chair/Wheelchair   Findings Progressed Pt to in room independence with use of RW - encouraged pt to stay slow with turns   Walk 10 Feet   Type of Assistance Needed Independent; Adaptive equipment   Comment RW   Walk 10 Feet CARE Score 6   Walk 50 Feet with Two Turns   Type of Assistance Needed Supervision   Comment RW   Walk 50 Feet with Two Turns CARE Score 4   Walk 150 Feet   Type of Assistance Needed Supervision   Comment RW   Walk 150 Feet CARE Score 4   Ambulation   Primary Mode of Locomotion Prior to Admission Walk   Distance Walked (feet) 150 ft   Assist Device Roller Walker   Walk Assist Level Supervision   Findings Progressed to MI with walker for short distances,  S for longer distance   Does the patient walk? 2  Yes   Equipment Use   NuStep L3  10mins for ROM / general mobility   Assessment   Treatment Assessment 30 min session pt performed Nustep with inc level showing progress, and inc to independence in room to prepare for D/C    Next session perform steps/ ramp/ curb step and LE strengthening   Barriers to Discharge Inaccessible home environment   Plan   Progress Progressing toward goals   Recommendation   PT Discharge Recommendation Home with outpatient rehabilitation   PT Therapy Minutes   PT Time In 1100   PT Time Out 1130   PT Total Time (minutes) 30   PT Mode of treatment - Individual (minutes) 30   PT Mode of treatment - Concurrent (minutes) 0   PT Mode of treatment - Group (minutes) 0   PT Mode of treatment - Co-treat (minutes) 0   PT Mode of Treatment - Total time(minutes) 30 minutes   PT Cumulative Minutes 1065   Therapy Time missed   Time missed?  No

## 2022-12-08 NOTE — ASSESSMENT & PLAN NOTE
Hx of prior back surgeries with hx of scoliosis, pseudoarthrosis, and flat back deformity  11/22: elective removal of hardware L1-L3, posterior thoracic/lumbar instrumentation from T8 to pelvis, thoracic osteotomy T11/T12, thoracic laminotomy T8/T9, T9/T10, T10/T11, posterior thoracic fusion T7-L1, revision instrumentation T8 to pelvis performed by Dr Emily Osuna (Orthopedics)  Continue ASA 325mg daily for DVT ppx - stopped per Ortho on 12/7  TLSO brace when getting OOB  Ensure adequate pain control  Neurovascular checks Q shift  2 week follow-up with repeat thoracolumbar spine XRs (12/6) - discussion with Ortho, no need for repeat XRs at this time and may discontinue   Repeat XRs obtained on 11/29 due to increasing pain: interval post-surgical changes without evidence of hardware complication  No acute osseous abnormalities  Primary team following  PT/OT

## 2022-12-08 NOTE — PROGRESS NOTES
Physical Medicine and Rehabilitation Progress Note  Lata Tai 78 y o  male MRN: [id number]  Unit/Bed#: -32 Encounter: [id number]      Assessment & Plan:     Decline in ADLs and mobility: Functional assessment - improving         FIM  Care Score  Admit Score Recent Score    Total assist  1-100% or 2p    Tot UBD, LBD     Max assist 2-51-75%    Sub To hygiene, bathing    Mod assist 3- 26-50%  Par     Min assist 3- 25% or < Par     CG assist 4  TA  To hygiene   Sup/Setup 4-5 Sup  LBD, UBD, bathing    Mod-I/Indep 6 MI      Transfers  Mod-total assist  CG assist-supervision    Ambulation   10 ft mod assist  150 ft CGA    Stairs   Total assist/NT 4 steps min assist      Goal: Mod indep except possible slight assist for bathing, LBD  Major barriers:  Pain, brace, deconditioning  Dispo & [facility]: Home with [facility] Sat 12/10 with OP PT      * Status post lumbar spinal fusion  Assessment & Plan  S/P removal of hardware L1-L3  Posterior thoracic/ lumbar instrumentation from T8 to pelvis  Thoracic osteotomy T11/12  Thoracic laminotomy T8/9, T9/10 and T10/11  Posterior thoracic fusion T7-L1    Revision instrumentation T8 to pelvis by Dr Mayra Coelho on 11/22  Monitor incision (14 days post-sx Tue 12/6) >   - 12/7-8 continues to heal well  - steri-strips - no sutures or staples  - 12/7 Communicated with Dr Mayra Coelho - ok to await follow-up spine appt and x-ray until after d/c next week; he stated ok to stop aspirin 325mg qday   - 12/7 CG training completed     - 12/8 function improving well; pain improved; exam stable  - On track for d/c Saturday with OP PT   - 12/6 function improving further; adequate pain control  - 12/5 function improving; acceptable pain control on current regimen; improved spasms; incision healing well; strength intact; continue pain meds as ordered   - 12/2 pain and sleep better overnight but still with fair amount of spasms - noted spasticity likely chronic    - Adjust Robaxin to Baclofen starting 5mg TID  - 12/1 pain fairly severe with significant spasms at times; incision stable, exam stable, vitals stable; sub-optimal participation in rehab 2/2 pain    - Cautiously increased hydromorphone to 2-3mg Q4H PRN (continue oxy PRN for BTP) with holding parameters - pt/wife discussed risks/benefits   - Monitor pain, neuro/cardiopul, and GI exam closely    - Recommend 900 min program   - 11/30 improved pain compared to yesterday; incision healing well; strength stable  - Increased pain morning of 11/29/22    Xray 11/29: IMPRESSION: Interval postsurgical changes without evidence of hardware complication  No acute osseous abnormality   - Do not take NSAIDs (Advil, Aleve, Ibuprofen, Motrin, Naproxen, etc ) for 3 months following your surgery  This may impede the healing of your fusion  Thoracic/lumbar spine precautions   Custom-molded LSO brace from [employer] from pre-op  If not, please fit patient with [place] LSO brace  Pt may don brace in a sitting position and should have brace on at all times when ambulating and working with PT  Follow-up with spine surgery after d/c from [facility] and if needed during [facility] course    - Continue acute comprehensive interdisciplinary inpatient rehabilitation to include intensive skilled therapies (PT, OT) as outlined with oversight and management by rehabilitation physician as well as inpatient rehab level nursing, case management and weekly interdisciplinary team meetings  Pain  Assessment & Plan  Continue mgmt as outlined   Improving; no s/s of oversedation/resp depression   Continue Dilaudid 2-3 mg Q4 hours for mod-severe pain > will d/c on max QID PRN   Continue Oxycodone IR 5 mg Q4 hours for breakthrough pain  Baclofen 5mg TID for spasms/spasticity (switched from Robaxin earlier) > helfpul > continue   Gabapentin 300mg BID and 400 mg QHS (changed 11/29/22) > may decrease back to 300mg TID at d/c for simplification   Hold sedating meds for oversedation, AMS, or RR<12    Topical Lidoderm patches x 2     Continue topical ICE    Per d/c instructions - - Do not take NSAIDs (Advil, Aleve, Ibuprofen, Motrin, Naproxen, etc ) for 3 months following your surgery  This may impede the healing of your fusion  Monitor for oversedation, AMS/delirium, and respiratory depression   If being administered - hold opiates, muscle relaxants, benzodiazepines, and gabapentin for oversedation, AMS, or RR<12  Counseled on and continue to encourage deep breathing/relaxation/behavioral pain management techniques      Spasm  Assessment & Plan  Improved  Back spasms, leg tightness, hyperreflexia, spasticity - patient also with hx of CVA with prior known hx of spine sx's  - Patient denies acute worsening and leg tightness, strength sensation at recent baseline  - Continue baclofen 5mg TID  - Monitor exam closely     Constipation  Assessment & Plan  Improved  - Monitor closely on opiates  - Hold standing miralax qday/Senna/colace BID for now but low threshold to resume while on opiates  - PRN bowel regimen (Miralax PRN/Dulcolax supp PRN)  - monitor for signs and symptoms of obstruction/ileus > not currently; hold stimulant lax if occurs  - Limiting constipating medications if possible  - Ensure adequate hydration       Hyponatremia  Assessment & Plan  Resolved 12/8   Was likely SIADH related to recent sx and pain       Thrombocytosis  Assessment & Plan  Trending down     Vitamin D insufficiency  Assessment & Plan  D3 2000 units daily     History of stroke  Assessment & Plan  Secondary stroke prevention  - Antithrombotic: IM recommends aspirin 81mg qday with stroke hx and ASCVD risk score  - Statin  - Optimal management of blood pressure   - Follow-up PCP     Difficulty coping with pain  Assessment & Plan  Improved   Supportive counseling  Psychology consult while in [facility]   Adjustment Disorder with Mixed Emotional Features  RECOMMENDATIONS:   I will follow Mr Diane Brownlee during his stay to provide the following interventions:    • Supportive psychotherapy, utilizing CBT and mindfulness strategies  • DBT distress tolerance techniques  to improve coping and mood  • Meditation and relaxation training  Counseled on and continue to encourage deep breathing/relaxation/behavioral management techniques:     Deep breathin seconds in, 5 seconds out, 5 times per hour when awake and PRN when experiencing pain or anxiety  Avoid holding breath and tightening muscles and instead breathe slowly and deeply      Leukocytosis  Assessment & Plan  Resolved  Internal medicine consult and management during [facility] course  Patient afebrile, other vitals unremarkable > continue to monitor   No clear signs or symptoms of infection or VTE but will continue to monitor  Monitor CBC intermittently       Transaminitis  Assessment & Plan  Resolved ; statin resumed   Monitor intermittently  Hx of hepatitis and teenager  Patient would like to avoid standing APAP      At risk for venous thromboembolism (VTE)  Assessment & Plan  SCDs, ambulation (will be on aspirin 81mg qday)    - Can stop aspirin 325mg qday per spine sx    Venous doppler negative for VTE       Hypertension  Assessment & Plan  Internal medicine consulted and co-management with their service  Monitor vitals with and without activity; monitor for orthostasis  Monitor hemoglobin, electrolytes, kidney function, hydration status   Current meds: lisinopril 10mg qday per IM       Hyperlipidemia  Assessment & Plan  Low dose statin resumed per IM       Other Medical Issues:  • None    Follow-up providers and other issues to be followed up after discharge  • PCP  • Spine Sx    CODE: Level 1: Full Code    Restrictions include: Fall precautions    Objective: Allergies per EMR  Diagnostic Studies: Reviewed  XR spine thoracolumbar 2 vw   Final Result by Nagi Nieto MD ( [id number])      Interval postsurgical changes without evidence of hardware complication        No acute osseous abnormality  Workstation performed: [id number]           See above as well    Laboratory: Labs reviewed  Results from last 7 days   Lab Units 12/08/22  0440 12/05/22  0503   HEMOGLOBIN g/dL 12 4 12 9   HEMATOCRIT % 38 7 40 8   WBC Thousand/uL 6 02 7 68     Results from last 7 days   Lab Units 12/08/22  0440 12/05/22  0503   BUN mg/dL 15 14   SODIUM mmol/L 136 134*   POTASSIUM mmol/L 4 1 4 3   CHLORIDE mmol/L 103 101   CREATININE mg/dL 0 82 0 84   AST U/L  --  38   ALT U/L  --  37            Drug regimen reviewed, all potential adverse effects identified and addressed:    Scheduled Meds:  Current Facility-Administered Medications   Medication Dose Route Frequency Provider Last Rate   • aspirin  81 mg Oral Daily Talia Altamirano MD     • baclofen  5 mg Oral TID Talia Altamirano MD     • bisacodyl  10 mg Rectal Daily PRN Talia Altamirano MD     • cholecalciferol  2,000 Units Oral Daily BAYRON Valladares     • gabapentin  300 mg Oral BID Leidy Martinez MD     • gabapentin  400 mg Oral HS Leidy Martinez MD     • HYDROmorphone  2 mg Oral Q4H PRN Talia Altamirano MD     • HYDROmorphone  3 mg Oral Q4H PRN Talia Altamirano MD     • lidocaine  2 patch Topical Daily Leidy Martinez MD     • lisinopril  10 mg Oral Daily BAYRON Valladares     • naloxone  0 04 mg Intravenous Q1MIN PRN Talia Altamirano MD     • ondansetron  4 mg Oral Q8H PRN Talia Altamirano MD     • oxyCODONE  5 mg Oral Q4H PRN Leidy Martinez MD     • polyethylene glycol  17 g Oral Daily PRN Talia Altamirano MD     • pravastatin  10 mg Oral Daily With Blondell GoutyBAYRON     • senna  1 tablet Oral BID PRN Sydney Vallejo DO         Chief Complaints:  Rehab follow-up     Subjective:     Patient reports pain well controlled  He denies fever, chills, sweats, worsening leg pain, sensation, constipation, lightheadedness, or other new complaints  ROS: A 10 point ROS was performed; negative except as noted above         Physical Exam:  Temp: [97 °F (36 1 °C)-98 7 °F (37 1 °C)] 97 1 °F (36 2 °C)  HR:  [74-77] 75  Resp:  [18] 18  BP: (111-158)/(67-89) 131/80  SpO2:  [94 %-97 %] 95 %    GEN:  Sitting in NAD   HEENT/NECK: MMM  CARDIAC: Regular rate rhythm, no murmers, no rubs, no gallops  LUNGS:  clear to auscultation, no wheezes, rales, or rhonchi  ABDOMEN: Soft, non-tender, non-distended, normal active bowel sounds  EXTREMITIES/SKIN:  no calf edema, no calf tenderness to palpation and Patient continues to heal well without signs of infection or dehiscence with Steri-Strips in place  NEURO:   MENTAL STATUS:  Appropriate wakefulness and interaction  and Strength/MMT:  Intact bilateral lower extremity  PSYCH:  Affect:  Euthymic     HPI:  29-year-old male with a past medical history of scoliosis, flat back syndrome, neurogenic claudication hypertension, hyperlipidemia, obesity who had stage I of revision surgery in May of this past year who is now status post stage II revision 11/22  He underwent removal of L1-L3 hardware, posterior thoracic/lumbar instrumentation from T8 to pelvis, thoracic osteotomy at T11/12, thoracic laminotomy T8/T9, T9/T10 and T10/11, posterior thoracic fusion T7 through left L1 and revision instrumentation T8 to pelvis by Dr Ariadne Meyer  Postop course notable for significant pain and decline in ADLs and mobility  He did have bilateral lower extremity venous ultrasound which was negative for DVT  Patient also noted to have significant constipation  Patient was evaluated by skilled therapies and was found to have significant decline in ADLs and ambulation and appears appropriate for admission to [place] [id number]        ** Please Note: Fluency Direct voice to text software may have been used in the creation of this document  **      Total time spent:  35 minutes, with more than 50% spent counseling/coordinating care   Counseling includes discussion with patient re: progress in therapies, functional issues observed by therapy staff, and discussion with patient his/her current medical state/wellbeing  Coordination of patient's care was performed in conjunction with Internal Medicine service to monitor patient's labs, vitals, and management of their comorbidities  In addition, this patient was discussed by the interdisciplinary team in weekly case conference today  The care of the patient was extensively discussed with all care providers and an appropriate rehabilitation plan was formulated unique for this patient  Barriers were identified preventing progression of therapy and appropriate interventions were discussed with each discipline  Please see the team note for input from all disciplines regarding barriers, intervention, and discharge planning

## 2022-12-08 NOTE — ASSESSMENT & PLAN NOTE
Per patient - hx of CVA in 1996  Continue pravastatin 10mg daily as substitute for non-formulary lovastatin  Started on ASA 81mg daily on 12/8 due to completing DVT ppx

## 2022-12-08 NOTE — PROGRESS NOTES
12/08/22 1345   Restrictions/Precautions   Precautions Fall Risk;Pain;Spinal precautions   ROM Restrictions Yes  (spinal precautions)   Braces or Orthoses TLSO   Roll Left and Right   Type of Assistance Needed Independent   Roll Left and Right CARE Score 6   Sit to Lying   Type of Assistance Needed Independent   Sit to Lying CARE Score 6   Lying to Sitting on Side of Bed   Type of Assistance Needed Independent   Lying to Sitting on Side of Bed CARE Score 6   Sit to Stand   Type of Assistance Needed Independent   Sit to Stand CARE Score 6   Bed-Chair Transfer   Type of Assistance Needed Independent   Comment RW   Chair/Bed-to-Chair Transfer CARE Score 6   Walk 10 Feet   Type of Assistance Needed Independent   Comment RW   Walk 10 Feet CARE Score 6   Walk 50 Feet with Two Turns   Type of Assistance Needed Independent   Comment RW   Walk 50 Feet with Two Turns CARE Score 6   Walk 150 Feet   Type of Assistance Needed Supervision   Comment RW   Walk 150 Feet CARE Score 4   Walking 10 Feet on Uneven Surfaces   Type of Assistance Needed Supervision   Comment close justice- cues for shorter steps with descent due to risk for L knee buckle   Walking 10 Feet on Uneven Surfaces CARE Score 4   Wheel 50 Feet with Two Turns   Reason if not Attempted Activity not applicable   Wheel 50 Feet with Two Turns CARE Score 9   Wheel 150 Feet   Reason if not Attempted Activity not applicable   Wheel 560 Feet CARE Score 9   Curb or Single Stair   Style negotiated Curb   Type of Assistance Needed Supervision   Comment close justice up/down 8inch curb step with RW   1 Step (Curb) CARE Score 4   4 Steps   Type of Assistance Needed Physical assistance   Physical Assistance Level 25% or less   Comment Min A for balance with bilat HRs   4 Steps CARE Score 3   12 Steps   Type of Assistance Needed Physical assistance   Physical Assistance Level 25% or less   Comment Min A for balance,  ataxic LLE and weakness pt needed assistance at hips   12 Steps CARE Score 3   Therapeutic Interventions   Strengthening HEP - LAQ 3#,  Hip flex march, Hip abd red Tband, Hip add isometric , sidelying clamshell   Assessment   Treatment Assessment 60 min session focused on amb on curb step, steps, ramp which pt needs close S on elevations due to ataxia / weakness/ and dec balance  Finished session with HEP and provided handouts  Cont assessments with for care scores tomorrow pt is meeting goals and conts to progress with mobility  Pt to be D/C saturday  Plan   Progress Progressing toward goals   Recommendation   PT Discharge Recommendation Home with outpatient rehabilitation   PT Therapy Minutes   PT Time In 1345   PT Time Out 1445   PT Total Time (minutes) 60   PT Mode of treatment - Individual (minutes) 60   PT Mode of treatment - Concurrent (minutes) 0   PT Mode of treatment - Group (minutes) 0   PT Mode of treatment - Co-treat (minutes) 0   PT Mode of Treatment - Total time(minutes) 60 minutes   PT Cumulative Minutes 1125   Therapy Time missed   Time missed?  No

## 2022-12-08 NOTE — TEAM CONFERENCE
Acute RehabilitationTeam Conference Note  Date: 12/8/2022   Time: 10:16 AM       Patient Name:  Scarlet Don       Medical Record Number: [id number]   YOB: 1955  Sex: Male          Room/Bed:  [facility] 266/[facility] 266-01  Payor Info:  Payor: Renee Ko / Plan: MEDICARE A AND B / Product Type: Medicare A & B Fee for Service /      Admitting Diagnosis: S/P lumbar spinal fusion [Z98 1]   Admit Date/Time:  11/25/2022  2:14 PM  Admission Comments: No comment available     Primary Diagnosis:  Status post lumbar spinal fusion  Principal Problem: Status post lumbar spinal fusion    Patient Active Problem List    Diagnosis Date Noted   • Hyponatremia 12/02/2022   • Spasm 12/02/2022   • Thrombocytosis 12/01/2022   • Vitamin D insufficiency 11/29/2022   • History of stroke 11/28/2022   • Constipation 11/26/2022   • Difficulty coping with pain 11/26/2022   • Pain 11/25/2022   • At risk for venous thromboembolism (VTE) 11/25/2022   • Transaminitis 11/25/2022   • Leukocytosis 11/25/2022   • Back pain 11/22/2022   • Chronic pain 11/22/2022   • Flatback syndrome 11/22/2022   • Status post lumbar spinal fusion 05/25/2022   • Hyperlipidemia    • Hypertension        Physical Therapy:    Weight Bearing Status: Weight Bearing as Tolerated  Transfers: Supervision  Bed Mobility: Independent  Amulation Distance (ft): 150 feet  Ambulation: Supervision, Incidental Touching  Assistive Device for Ambulation: Roller Walker  Number of Stairs: 4  Assistive Device for Stairs: Walker (BHR's on 4 steps)  Stair Assistance: Incidental Touching  Ramp: Incidental Touching  Assistive Device for Ramp: Roller Walker  Discharge Recommendations: Home with:  [address] with[de-identified] First Floor Setup, Family Support, Outpatient Physical Therapy    12/7/22   Pt cont to be limited by pain to mid back from surgery, decreased LE strength, decreased ability to adhere to spinal precautions although he is able to resite them and decreased LE strength   Pt remains CS with gait HH distances and CGA on uneven surfaces and long distances  Pt required CGA on 8" curb with RW and VC's for sequencing and at times VC's for hand placement prior to sitting for safety  Pt anticipates FT to be completed with wife 12/7 in anticipation for discharge Saturday 12/10 with OPPT services  Pt states he remain on single level with full bath available and use of RW with S  Cont POC as tolerated  Goal is to progress to Mod I with RW for household mobility for discharge and continue with out patient therapy services  Occupational Therapy:  Eating: Independent  Grooming: Independent  Bathing: Supervision  Bathing: Supervision  Upper Body Dressing: Supervision  Lower Body Dressing: Minimal Assistance (for footwear)  Toileting: Incidental Touching  Tub/Shower Transfer: Moderate Assistance  Toilet Transfer: Incidental Touching  Cognition: Within Defined Limits  Orientation: Person, Place, Time, Situation  Discharge Recommendations: Home with:  [address] with[de-identified] Family Support, First Floor Setup       11/30/22  Pt lives with wife and son in [facility] with first floor setup available, walk-in shower with shower chair, and raised toilet seat with grab bar  Pt reports his son works for [employer] but his wife is retired and can provide [employer] upon d/c if needed  PTA pt completed B/IADLs independently including driving, cooking, laundry, med management, and completed functional mobility within his home without AD and in community with [facility]  Pt barriers include pain, spinal precautions, dec activity/standing tolerance, dec fxnl standing balance, dec performance with AD/IADL's  OT plan to continue addressing above noted barriers in order to progress towards OT goals and dec caregiver burden upon D/C  Plan to re-team     12/7/22  Pt lives with wife and son in [facility] with first floor setup available, walk-in shower with shower chair, and raised toilet seat with grab bar   Pt reports his son works for [employer] but his wife is retired and can provide [employer] upon d/c if needed  PTA pt completed B/IADLs independently including driving, cooking, laundry, med management, and completed functional mobility within his home without AD and in community with [address] [place] [address]  Pt's CLOF is grossly supervision (exception of footwear) with VC's and use of LHAE in order to adhere to spinal precautions  Pt barriers include pain, spinal precautions, dec activity/standing tolerance, dec fxnl standing balance, dec performance with AD/IADL's  OT plan to continue addressing above noted barriers in order to progress towards OT goals and dec caregiver burden upon D/C  Pt scheduled for family training this afternoon with pt and spouse focusing on CLOF and home safety recommendations  Pt with scheduled d/c date: Saturday, 12/10/22 with first floor set up and support/assist from spouse as needed  Speech Therapy:           No notes on file    Nursing Notes:  Appetite: Good  Diet Type: Regular/House                                                                     Pain Location/Orientation: Orientation: Mid, Location: Back  Pain Score: 8                       Hospital Pain Intervention(s): Medication (See MAR)          Katlin Blankenship is a 78 y o  male with a PMH of HTN and HLD who originally presented to [facility] for an elective removal of hardware L1-L3, posterior thoracic/lumbar instrumentation from T8 to pelvis, thoracic osteotomy T11/T12, thoracic laminotomy T8/T9, T9/T10, and T10/T11, post thoracic fusion T7-L1, a revision instrumentation of T8 to pelvis performed by Dr Ariadne Meyer  Postoperatively patient had a PCA pump which was discontinued on 11/23  Patient was noted to have bilateral lower extremity edema, venous duplex was completed and was negative    Patient is to continue aspirin 325 mg daily for DVT prophylaxis, continue to wear TLSO brace when out of bed, and will require 2 week follow-up with repeat thoracic lumbar spine x-rays  Admitted to [facility]  11/25/22  HTN managed with Lisinopril 10 mg daily  HLD managed with Pravastatin 10mg daily S/p lumbar spinal fusion managed with ASA 325mg daily for DVT ppx  TLSO brace OOB  Neurovascular checks every shift  Adequate pain control  Pain managed with Oxy IR 5mg q4h PRN for breakthrough pain, Dilaudid 3mg every 4 hrs PRN for pain, Baclofen 5 mg TID and Gabapentin 300 mg BID and 400 mg @ HS  We will monitor pt's vital signs & lab results  Pt will have adequate pain control to fully participate in therapies  Pt will be educated on importance of T/R & off loading while in bed/chair to prevent pressure injury  Pt educated for thoracic spinal precaution  Pt will have safe transfers with the use of call bell & hourly rounding to prevent falls  Pt will be educated on energy conservation & encouraged independence with ADL's  Case Management:     Discharge Planning  Living Arrangements: Lives w/ Spouse/significant other, Lives w/ Children  Support Systems: Spouse/significant other  Assistance Needed: to be determined  Type of Current Residence: Private residence  Current Home Care Services: No  11/29- Pt lives with wife Marquis Davies in 2 story home, 1 + 3 BREN[address]  Prior to admission, pt reports being independent with ADL IADLs including driving  Pt has 4 children, 1 this at home, the other 3 are not local  Pt reports having r/w, shower chair and w/c in home but did not utilize equipment  Pt reports in May 2022 he was admitted to [facility], and has hx of OP Therapy in [facility]  Pt reports no hx of [facility]  [facility], PCP is Dr Jo-Ann Dunne  12/8- Pt made good progress in rehab program and therapies  Pt on track to dc Saturday 12/10 w/ OP PT  Pt and wife scheduling OP PT in [place] through [facility]  Is the patient actively participating in therapies?  yes  List any modifications to the treatment plan: None    Barriers Interventions   Pain Med management Is the patient making expected progress toward goals? yes  List any update or changes to goals: None    Medical Goals: Patient will be medically stable for discharge to [facility] upon completion of rehab program and Patient will be able to manage medical conditions and comorbid conditions with medications and follow up upon completion of rehab program    Weekly Team Goals:   Rehab Team Goals  ADL Team Goal: Patient will require supervision with ADLs with least restrictive device upon completion of rehab program  Bowel/Bladder Team Goal: Patient will return to premorbid level for bladder/bowel management upon completion of rehab program  Transfer Team Goal: Patient will be independent with transfers with least restrictive device upon completion of rehab program  Locomotion Team Goal: Patient will be independent with locomotion with least restrictive device upon completion of rehab program    Discussion: Pt progressing well in therapies and rehab program  Pain is better controlled, functioning better  Pt dcing w/ OP PT through [facility]  No DME needed  Anticipated Discharge Date:  Dc Saturday 12/10  [facility] Team Members Present: The following team members are supervising care for this patient and were present during this Weekly Team Conference      Physician: Dr Adam Solis MD  : Taqueria Conroy MSW  Registered Nurse:  Physical Therapist: Zachariah Shen, PT  Occupational Therapist: Alethea Abreu Hermilo 88, OTR/L  Speech Therapist:

## 2022-12-08 NOTE — PLAN OF CARE
Problem: SKIN/TISSUE INTEGRITY - ADULT  Goal: Incision(s), wounds(s) or drain site(s) healing without S/S of infection  Description: INTERVENTIONS  - Assess and document dressing, incision, wound bed, drain sites and surrounding tissue  - Provide patient and family education  - Perform skin care/dressing changes every dasy  Outcome: Progressing     Problem: PAIN - ADULT  Goal: Verbalizes/displays adequate comfort level or baseline comfort level  Description: Interventions:  - Encourage patient to monitor pain and request assistance  - Assess pain using appropriate pain scale  - Administer analgesics based on type and severity of pain and evaluate response  - Implement non-pharmacological measures as appropriate and evaluate response  - Consider cultural and social influences on pain and pain management  - Notify physician/advanced practitioner if interventions unsuccessful or patient reports new pain  Outcome: Progressing     Problem: DISCHARGE PLANNING  Goal: Discharge to home or other facility with appropriate resources  Description: INTERVENTIONS:  - Identify barriers to discharge w/patient and caregiver  - Arrange for needed discharge resources and transportation as appropriate  - Identify discharge learning needs (meds, wound care, etc )  - Arrange for interpretive services to assist at discharge as needed  - Refer to Case Management Department for coordinating discharge planning if the patient needs post-hospital services based on physician/advanced practitioner order or complex needs related to functional status, cognitive ability, or social support system  Outcome: Progressing

## 2022-12-08 NOTE — PROGRESS NOTES
12/08/22 1030   Pain Assessment   Pain Assessment Tool 0-10   Pain Score 8   Pain Location/Orientation Orientation: Mid;Location: Back   Restrictions/Precautions   Precautions Fall Risk;Pain;Spinal precautions   Weight Bearing Restrictions No   ROM Restrictions Yes  (spinal precautions)   Braces or Orthoses TLSO   Sit to Stand   Type of Assistance Needed Independent   Physical Assistance Level No physical assistance   Comment RW   Sit to Stand CARE Score 6   Bed-Chair Transfer   Type of Assistance Needed Independent   Physical Assistance Level No physical assistance   Comment RW   Chair/Bed-to-Chair Transfer CARE Score 6   Toileting Hygiene   Type of Assistance Needed Independent   Physical Assistance Level No physical assistance   Comment for hygiene/CM   Toileting Hygiene CARE Score 6   Toilet Transfer   Type of Assistance Needed Independent   Physical Assistance Level No physical assistance   Comment RW to standard toilet   Toilet Transfer CARE Score 6   Exercise Tools   Theraband reviewed UE exercises using green theraband, 3x15 for each exercise with good tolerance/form noted  no c/o inc pain or discomfort  strengthening completed in order to inc strength/endurance for ADLs/transfers  good tolerance to exercises  Cognition   Overall Cognitive Status WFL   Arousal/Participation Alert; Cooperative   Attention Within functional limits   Orientation Level Oriented X4   Memory Within functional limits   Following Commands Follows one step commands without difficulty   Activity Tolerance   Activity Tolerance Patient tolerated treatment well   Medical Staff Made Aware RN, PCA, PT, PTA, OTR informed of progression to IRPS   Assessment   Treatment Assessment pt engages in brief 30 minute skilled OT session focusing on assessment of IRPs, UE strengthening  see above for full func details   pt room set up to simulate home set up; discussed need to ring for staff to assist with footwear as pt cannot complete and spouse will complete at time of d/c--pt can IND manage TLSO seated EOB  pt progressed to IRPs with RN/PCA informed of pt ringing for assist with footwear management  pt remains on track for d/c home saturday  continue with OT to focus on D/C ADL, func transfers, standing antonella/bal, UE strengthening/endurance, for ADLs/transfers in order to decrease burden of care at d/c  Prognosis Good   Problem List Decreased strength;Decreased endurance; Impaired balance;Decreased mobility;Orthopedic restrictions;Pain   Barriers to Discharge Inaccessible home environment   Plan   Treatment/Interventions ADL retraining;Functional transfer training; Therapeutic exercise; Endurance training;Patient/family training;Equipment eval/education; Compensatory technique education   OT Therapy Minutes   OT Time In 0830   OT Time Out 0930   OT Total Time (minutes) 60   OT Mode of treatment - Individual (minutes) 60   OT Mode of treatment - Concurrent (minutes) 0   OT Mode of treatment - Group (minutes) 0   OT Mode of treatment - Co-treat (minutes) 0   OT Mode of Treatment - Total time(minutes) 60 minutes   OT Cumulative Minutes 935   Therapy Time missed   Time missed?  No

## 2022-12-09 ENCOUNTER — TELEPHONE (OUTPATIENT)
Dept: OBGYN CLINIC | Facility: HOSPITAL | Age: 67
End: 2022-12-09

## 2022-12-09 RX ORDER — MELATONIN
2000 DAILY
Qty: 60 TABLET | Refills: 0 | Status: SHIPPED | OUTPATIENT
Start: 2022-12-09 | End: 2022-12-09 | Stop reason: SDUPTHER

## 2022-12-09 RX ORDER — LISINOPRIL 10 MG/1
10 TABLET ORAL DAILY
Qty: 30 TABLET | Refills: 0 | Status: SHIPPED | OUTPATIENT
Start: 2022-12-09

## 2022-12-09 RX ORDER — MELATONIN
2000 DAILY
Qty: 60 TABLET | Refills: 0
Start: 2022-12-09

## 2022-12-09 RX ORDER — BACLOFEN 10 MG/1
5 TABLET ORAL 3 TIMES DAILY
Qty: 45 TABLET | Refills: 0 | Status: SHIPPED | OUTPATIENT
Start: 2022-12-09 | End: 2022-12-09

## 2022-12-09 RX ORDER — GABAPENTIN 300 MG/1
300 CAPSULE ORAL 3 TIMES DAILY
Qty: 90 CAPSULE | Refills: 0 | Status: SHIPPED | OUTPATIENT
Start: 2022-12-09 | End: 2022-12-09

## 2022-12-09 RX ORDER — GABAPENTIN 300 MG/1
CAPSULE ORAL
Refills: 0
Start: 2022-12-09

## 2022-12-09 RX ORDER — HYDROMORPHONE HYDROCHLORIDE 2 MG/1
TABLET ORAL
Qty: 42 TABLET | Refills: 0 | Status: SHIPPED | OUTPATIENT
Start: 2022-12-09

## 2022-12-09 RX ORDER — ASPIRIN 81 MG/1
81 TABLET ORAL DAILY
Qty: 30 TABLET | Refills: 0 | Status: SHIPPED | OUTPATIENT
Start: 2022-12-09

## 2022-12-09 RX ORDER — GABAPENTIN 300 MG/1
300 CAPSULE ORAL 2 TIMES DAILY
Status: DISCONTINUED | OUTPATIENT
Start: 2022-12-09 | End: 2022-12-10 | Stop reason: HOSPADM

## 2022-12-09 RX ADMIN — OXYCODONE HYDROCHLORIDE 5 MG: 5 TABLET ORAL at 16:45

## 2022-12-09 RX ADMIN — PRAVASTATIN SODIUM 10 MG: 20 TABLET ORAL at 16:45

## 2022-12-09 RX ADMIN — HYDROMORPHONE HYDROCHLORIDE 3 MG: 2 TABLET ORAL at 22:28

## 2022-12-09 RX ADMIN — GABAPENTIN 300 MG: 300 CAPSULE ORAL at 06:11

## 2022-12-09 RX ADMIN — BACLOFEN 5 MG: 10 TABLET ORAL at 06:11

## 2022-12-09 RX ADMIN — GABAPENTIN 300 MG: 300 CAPSULE ORAL at 18:59

## 2022-12-09 RX ADMIN — CHOLECALCIFEROL TAB 25 MCG (1000 UNIT) 2000 UNITS: 25 TAB at 08:32

## 2022-12-09 RX ADMIN — ASPIRIN 81 MG: 81 TABLET, COATED ORAL at 08:32

## 2022-12-09 RX ADMIN — LIDOCAINE 2 PATCH: 50 PATCH CUTANEOUS at 08:32

## 2022-12-09 RX ADMIN — LISINOPRIL 10 MG: 10 TABLET ORAL at 08:32

## 2022-12-09 RX ADMIN — HYDROMORPHONE HYDROCHLORIDE 3 MG: 2 TABLET ORAL at 13:20

## 2022-12-09 RX ADMIN — HYDROMORPHONE HYDROCHLORIDE 3 MG: 2 TABLET ORAL at 06:11

## 2022-12-09 NOTE — PROGRESS NOTES
12/09/22 1000   Pain Assessment   Pain Assessment Tool 0-10   Pain Score 7   Pain Location/Orientation Orientation: Mid;Orientation: Lower; Location: Back   Pain Onset/Description Onset: Ongoing   Restrictions/Precautions   Precautions Fall Risk;Pain;Spinal precautions   Braces or Orthoses TLSO   Cognition   Overall Cognitive Status WFL   Arousal/Participation Alert; Cooperative   Attention Within functional limits   Orientation Level Oriented X4   Memory Within functional limits   Following Commands Follows one step commands with increased time or repetition   Subjective   Subjective "Its fine, just sore" Pt reports having pain meds around 6:30 this morning  Roll Left and Right   Type of Assistance Needed Independent   Roll Left and Right CARE Score 6   Sit to Lying   Type of Assistance Needed Independent   Sit to Lying CARE Score 6   Lying to Sitting on Side of Bed   Type of Assistance Needed Independent   Lying to Sitting on Side of Bed CARE Score 6   Sit to Stand   Type of Assistance Needed Independent   Sit to Stand CARE Score 6   Bed-Chair Transfer   Type of Assistance Needed Independent   Chair/Bed-to-Chair Transfer CARE Score 6   Walk 10 Feet   Type of Assistance Needed Independent   Walk 10 Feet CARE Score 6   Walk 50 Feet with Two Turns   Type of Assistance Needed Independent   Walk 50 Feet with Two Turns CARE Score 6   Walk 150 Feet   Type of Assistance Needed Independent   Walk 150 Feet CARE Score 6   Walking 10 Feet on Uneven Surfaces   Type of Assistance Needed Supervision   Physical Assistance Level No physical assistance   Comment CS for safety on 10 and 24ft indoor ramps with RW   Walking 10 Feet on Uneven Surfaces CARE Score 4   Ambulation   Primary Mode of Locomotion Prior to Admission Walk   Distance Walked (feet) 500 ft  (x2)   Assist Device Roller Walker   Gait Pattern Forward Flexion;R knee ladi;L knee ladi; Wide KAMALA   Limitations Noted In Strength   Walk Assist Level Supervision;Modified Independent   Findings S on ramps with RW   Does the patient walk? 2  Yes   Wheelchair mobility   Does the patient use a wheelchair? 0  No   Stairs   Findings to review in PM    Assessment   Treatment Assessment Pt participated in breif 30 min skilled PT intervention with main focus on longer distance amb with longer ramp trial with RW  Pt demonstrating I with donned TLSO and amb with RW, however S provided on ramp due to varied B LE buckling with pt self mgmt, no physical A needed to correct  To review car, steps, and HEP in PM    Recommendation   PT Discharge Recommendation Home with outpatient rehabilitation   PT Equipment ordered pt has RW   PT Therapy Minutes   PT Time In 1000   PT Time Out 1030   PT Total Time (minutes) 30   PT Mode of treatment - Individual (minutes) 30   PT Mode of treatment - Concurrent (minutes) 0   PT Mode of treatment - Group (minutes) 0   PT Mode of treatment - Co-treat (minutes) 0   PT Mode of Treatment - Total time(minutes) 30 minutes   PT Cumulative Minutes 1155   Therapy Time missed   Time missed?  No

## 2022-12-09 NOTE — PROGRESS NOTES
[facility]  Progress Note - Kayden Sy 1955, 78 y o  male MRN: [id number]  Unit/Bed#: [facility] 102-08 Encounter: [id number]  Primary Care Provider: Prem Elena MD   Date and time admitted to hospital: 11/25/2022  2:14 PM    Hyponatremia  Assessment & Plan  Mild  Na+ currently 134  Asymptomatic  Likely pain induced  Continue to trend with routine BMP  Thrombocytosis  Assessment & Plan  Plt count currently 520,000  Likely reactive post-operatively  Continue to trend with routine CBC  Vitamin D insufficiency  Assessment & Plan  Vitamin D level 24 5 on 11/28  Started on Vitamin D 2000u daily  History of stroke  Assessment & Plan  Per patient - hx of CVA in 1996  Continue pravastatin 10mg daily as substitute for non-formulary lovastatin  ASCVD risk 12%  Started on ASA 81mg daily on 12/8 due to completing DVT ppx  Transaminitis  Assessment & Plan  Improved, AST 38 and ALT 37   Per patient - hx of mild elevations due to diagnosis of hepatitis C as a teenager  Tylenol discontinued  Statin initially discontinued with no change in levels - restarted statin on 12/1  Continue to trend routine CMP  Hypertension  Assessment & Plan  Home regimen: lisinopril 5mg daily  Increased lisinopril to 10mg daily on 12/5  Monitor BP with routine VS     Hyperlipidemia  Assessment & Plan  Continue pravastatin 10mg daily as substitute for non-formulary lovastatin 10mg daily  Discontinued due to elevated liver enzymes  AST and ALT unchanged after stopping statin - may resume statin at this time (12/1)  Lipid panel on 11/28: Cholesterol 151, Triglycerides 94, HDL 30 and   * Status post lumbar spinal fusion  Assessment & Plan  Hx of prior back surgeries with hx of scoliosis, pseudoarthrosis, and flat back deformity    11/22: elective removal of hardware L1-L3, posterior thoracic/lumbar instrumentation from T8 to pelvis, thoracic osteotomy T11/T12, thoracic laminotomy T8/T9, T9/T10, T10/T11, posterior thoracic fusion T7-L1, revision instrumentation T8 to pelvis performed by Dr Meliton Kiran (Orthopedics)  Continue ASA 325mg daily for DVT ppx - stopped per Ortho on   TLSO brace when getting OOB  Ensure adequate pain control  Neurovascular checks Q shift  2 week follow-up with repeat thoracolumbar spine XRs () - discussion with Ortho, no need for repeat XRs at this time and may discontinue   Repeat XRs obtained on  due to increasing pain: interval post-surgical changes without evidence of hardware complication  No acute osseous abnormalities  Primary team following  PT/OT  VTE Pharmacologic Prophylaxis:   Pharmacologic: none, completed per Ortho  Mechanical VTE Prophylaxis in Place: Yes - sequential compression devices  Current Length of Stay: 14 day(s)    Current Patient Status: Inpatient Rehab     Discharge Plan: As per primary team     Code Status: Level 1 - Full Code    Subjective:   Pt examined while pt lying in bed in pt room  Currently complaining of back pain, 5/10, aching, improves with pain medication  Feels that it has improved greatly overall  Denies any spasms, numbness/tingling  Slept well last night  Had a BM yesterday without any issues  Plans for discharge tomorrow  Currently has no concerns or questions in regards to discharge or medications  Objective:     Vitals:   Temp (24hrs), Av 3 °F (36 8 °C), Min:97 9 °F (36 6 °C), Max:98 8 °F (37 1 °C)    Temp:  [97 9 °F (36 6 °C)-98 8 °F (37 1 °C)] 97 9 °F (36 6 °C)  HR:  [78-84] 84  Resp:  [18] 18  BP: (115-134)/(68-80) 129/80  SpO2:  [97 %-98 %] 98 %  Body mass index is 31 25 kg/m²  Review of Systems   Constitutional: Negative for appetite change, chills, fatigue and fever  HENT: Negative for trouble swallowing  Eyes: Negative for visual disturbance  Respiratory: Negative for cough, shortness of breath, wheezing and stridor      Cardiovascular: Negative for chest pain, palpitations and leg swelling  Gastrointestinal: Negative for abdominal distention, abdominal pain, constipation, diarrhea, nausea and vomiting  LBM 12/8   Genitourinary: Negative for difficulty urinating  Musculoskeletal: Positive for back pain (Back pain, 5/10, aching, improves with pain medication)  Negative for arthralgias  Neurological: Negative for dizziness, weakness, light-headedness, numbness and headaches  Psychiatric/Behavioral: Negative for dysphoric mood and sleep disturbance  The patient is not nervous/anxious  All other systems reviewed and are negative  Input and Output Summary (last 24 hours): Intake/Output Summary (Last 24 hours) at 12/9/2022 3454  Last data filed at 12/9/2022 0800  Gross per 24 hour   Intake 420 ml   Output 800 ml   Net -380 ml       Physical Exam:     Physical Exam  Vitals and nursing note reviewed  Constitutional:       General: He is not in acute distress  Appearance: Normal appearance  He is obese  He is not ill-appearing  HENT:      Head: Normocephalic and atraumatic  Cardiovascular:      Rate and Rhythm: Normal rate and regular rhythm  Pulses: Normal pulses  Heart sounds: Normal heart sounds  No murmur heard  No friction rub  Pulmonary:      Effort: Pulmonary effort is normal  No respiratory distress  Breath sounds: Normal breath sounds  No wheezing or rhonchi  Abdominal:      General: Abdomen is flat  Bowel sounds are normal  There is no distension  Palpations: Abdomen is soft  There is no mass  Tenderness: There is no abdominal tenderness  There is no guarding or rebound  Hernia: No hernia is present  Musculoskeletal:      Cervical back: Normal range of motion and neck supple  No tenderness  Right lower leg: No edema  Left lower leg: No edema  Skin:     General: Skin is warm and dry  Neurological:      Mental Status: He is alert and oriented to person, place, and time  Psychiatric:         Mood and Affect: Mood normal          Behavior: Behavior normal          Additional Data:     Labs:    Results from last 7 days   Lab Units 12/08/22  0440   WBC Thousand/uL 6 02   HEMOGLOBIN g/dL 12 4   HEMATOCRIT % 38 7   PLATELETS Thousands/uL 520*   NEUTROS PCT % 61   LYMPHS PCT % 27   MONOS PCT % 7   EOS PCT % 3     Results from last 7 days   Lab Units 12/08/22  0440 12/05/22  0503   SODIUM mmol/L 136 134*   POTASSIUM mmol/L 4 1 4 3   CHLORIDE mmol/L 103 101   CO2 mmol/L 27 29   BUN mg/dL 15 14   CREATININE mg/dL 0 82 0 84   ANION GAP mmol/L 6 4*   CALCIUM mg/dL 9 7 9 8   ALBUMIN g/dL  --  3 8   TOTAL BILIRUBIN mg/dL  --  0 67   ALK PHOS U/L  --  116   ALT U/L  --  37   AST U/L  --  38   GLUCOSE RANDOM mg/dL 101* 95                       Labs reviewed    Imaging:    Imaging reviewed    Recent Cultures (last 7 days):           Last 24 Hours Medication List:   Current Facility-Administered Medications   Medication Dose Route Frequency Provider Last Rate   • aspirin  81 mg Oral Daily Monica Calderon MD     • baclofen  5 mg Oral TID Monica Calderon MD     • bisacodyl  10 mg Rectal Daily PRN Monica Calderon MD     • cholecalciferol  2,000 Units Oral Daily BAYRON Gusman     • gabapentin  300 mg Oral BID Elly Conley MD     • gabapentin  400 mg Oral HS Elly Conley MD     • HYDROmorphone  2 mg Oral Q4H PRN Monica Calderon MD     • HYDROmorphone  3 mg Oral Q4H PRN Monica Calderon MD     • lidocaine  2 patch Topical Daily Elly Conley MD     • lisinopril  10 mg Oral Daily BAYRON Gusman     • naloxone  0 04 mg Intravenous Q1MIN PRN Monica Calderon MD     • ondansetron  4 mg Oral Q8H PRN Monica Calderon MD     • oxyCODONE  5 mg Oral Q4H PRN Elly Conley MD     • polyethylene glycol  17 g Oral Daily PRN Monica Calderon MD     • pravastatin  10 mg Oral Daily With BAYRON Stephens     • senna  1 tablet Oral BID PRN Pilo Ivy, DO          M*Modal software was used to dictate this note  It may contain errors with dictating incorrect words or incorrect spelling  Please contact the provider directly with any questions

## 2022-12-09 NOTE — PROGRESS NOTES
12/09/22 1230   Pain Assessment   Pain Assessment Tool 0-10   Pain Score 7   Pain Location/Orientation Orientation: Mid;Orientation: Lower   Pain Onset/Description Onset: Ongoing; Descriptor: Aching;Descriptor: Sore   Restrictions/Precautions   Precautions Fall Risk;Pain;Spinal precautions   Braces or Orthoses TLSO   Cognition   Overall Cognitive Status WFL   Arousal/Participation Alert; Cooperative   Attention Within functional limits   Orientation Level Oriented X4   Memory Within functional limits   Following Commands Follows one step commands with increased time or repetition   Subjective   Subjective "Im asking for pain meds after this"   Roll Left and Right   Type of Assistance Needed Independent   Roll Left and Right CARE Score 6   Sit to Lying   Type of Assistance Needed Independent   Sit to Lying CARE Score 6   Lying to Sitting on Side of Bed   Type of Assistance Needed Independent   Lying to Sitting on Side of Bed CARE Score 6   Sit to Stand   Type of Assistance Needed Independent   Sit to Stand CARE Score 6   Bed-Chair Transfer   Type of Assistance Needed Independent   Chair/Bed-to-Chair Transfer CARE Score 6   Car Transfer   Type of Assistance Needed Supervision   Physical Assistance Level No physical assistance   Comment simulated, self Use of UE to A LE   Car Transfer CARE Score 4   Walk 10 Feet   Type of Assistance Needed Independent   Walk 10 Feet CARE Score 6   Walk 50 Feet with Two Turns   Type of Assistance Needed Independent   Walk 50 Feet with Two Turns CARE Score 6   Walk 150 Feet   Type of Assistance Needed Independent   Walk 150 Feet CARE Score 6   Ambulation   Primary Mode of Locomotion Prior to Admission Walk   Distance Walked (feet) 150 ft  (x3)   Assist Device Roller Walker   Walk Assist Level Modified Independent   Does the patient walk? 2  Yes   Wheelchair mobility   Does the patient use a wheelchair? 0   No   Curb or Single Stair   Style negotiated Curb   Type of Assistance Needed Supervision   Physical Assistance Level No physical assistance   1 Step (Curb) CARE Score 4   4 Steps   Type of Assistance Needed Supervision   Physical Assistance Level No physical assistance   Comment CS with B HR   4 Steps CARE Score 4   Stairs   Type Curb;Stairs   # of Steps 4   Weight Bearing Precautions Back   Assist Devices Roller Walker   Findings Would benefit from S on steps for safety, pt reports only haivng 1 BRENDA and does not need to go upstairs at all  Picking Up Object   Type of Assistance Needed Independent   Physical Assistance Level No physical assistance   Comment RW and reacher  Picking Up Object CARE Score 6   Therapeutic Interventions   Strengthening HEP reviewed per handout  Assessment   Treatment Assessment Pt engaged in 55 min skilled PT intervention in prep for DC home next day  Currently pt has met all set goals, and S remains recommendation on steps  HEP handout review, no furhter questions at this time  Pt to start out pt PT wed 12/ 14  Encouraged hourly amb and HEP as outlined, education reminders for spinal precautions and not to over do it! Dc home tomorrow  RN notfied of pt request for pain meds at end of session     Plan   Progress Improving as expected   Recommendation   PT Discharge Recommendation Home with outpatient rehabilitation   PT Equipment ordered pt has RW   PT Therapy Minutes   PT Time In 1230   PT Time Out 1325   PT Total Time (minutes) 55   PT Mode of treatment - Individual (minutes) 55   PT Mode of treatment - Concurrent (minutes) 0   PT Mode of treatment - Group (minutes) 0   PT Mode of treatment - Co-treat (minutes) 0   PT Mode of Treatment - Total time(minutes) 55 minutes   PT Cumulative Minutes 1210

## 2022-12-09 NOTE — ASSESSMENT & PLAN NOTE
Per patient - hx of CVA in 1996  Continue pravastatin 10mg daily as substitute for non-formulary lovastatin  ASCVD risk 12%  Started on ASA 81mg daily on 12/8 due to completing DVT ppx

## 2022-12-09 NOTE — ASSESSMENT & PLAN NOTE
Hx of prior back surgeries with hx of scoliosis, pseudoarthrosis, and flat back deformity  11/22: elective removal of hardware L1-L3, posterior thoracic/lumbar instrumentation from T8 to pelvis, thoracic osteotomy T11/T12, thoracic laminotomy T8/T9, T9/T10, T10/T11, posterior thoracic fusion T7-L1, revision instrumentation T8 to pelvis performed by Dr Joan Patton (Orthopedics)  Continue ASA 325mg daily for DVT ppx - stopped per Ortho on 12/7  TLSO brace when getting OOB  Ensure adequate pain control  Neurovascular checks Q shift  2 week follow-up with repeat thoracolumbar spine XRs (12/6) - discussion with Ortho, no need for repeat XRs at this time and may discontinue   Repeat XRs obtained on 11/29 due to increasing pain: interval post-surgical changes without evidence of hardware complication  No acute osseous abnormalities  Primary team following  PT/OT

## 2022-12-09 NOTE — TELEPHONE ENCOUNTER
Hello,     Please advise if the following patient can be forced onto the schedule:     Patient: Milad Brady  : 1955  MRN:  [id number]  Call back:  [phone]  Reason for appointment: Post-op appt  Sx 22 Lumbar spinal fusion  Requested doctor/location: [facility] instructed the patient to make appt next week  Dr Billy Perez patient to make appt next week  No openings          Thank you,

## 2022-12-09 NOTE — PLAN OF CARE
Problem: GASTROINTESTINAL - ADULT  Goal: Maintains or returns to baseline bowel function  Description: INTERVENTIONS:  - Assess bowel function  - Encourage oral fluids to ensure adequate hydration  - Administer IV fluids if ordered to ensure adequate hydration  - Administer ordered medications as needed  - Encourage mobilization and activity  - Consider nutritional services referral to assist patient with adequate nutrition and appropriate food choices  Outcome: Progressing  Goal: Maintains adequate nutritional intake  Description: INTERVENTIONS:  - Monitor percentage of each meal consumed  - Identify factors contributing to decreased intake, treat as appropriate  - Assist with meals as needed  - Monitor I&O, weight, and lab values if indicated  - Obtain nutrition services referral as needed  Outcome: Progressing     Problem: GENITOURINARY - ADULT  Goal: Maintains or returns to baseline urinary function  Description: INTERVENTIONS:  - Assess urinary function  - Encourage oral fluids to ensure adequate hydration if ordered  - Administer IV fluids as ordered to ensure adequate hydration  - Administer ordered medications as needed  - Offer frequent toileting  - Follow urinary retention protocol if ordered  Outcome: Progressing  Goal: Absence of urinary retention  Description: INTERVENTIONS:  - Assess patient’s ability to void and empty bladder  - Monitor I/O  - Bladder scan as needed  - Discuss with physician/AP medications to alleviate retention as needed  - Discuss catheterization for long term situations as appropriate  Outcome: Progressing

## 2022-12-09 NOTE — DISCHARGE SUMMARY
This is a non-billable encounter    Discharge Summary - PMR   Christopher Collier 78 y o  male MRN: [id number]  Unit/Bed#: -11 Encounter: [id number]    Admission Date: 11/25/2022     Discharge Date: 12/10/2022    Rehabilitation/Etiologic Diagnosis:   Orthopedic Disorders:  08 9  Other Orthopedic Scoliosis and Flatback syndrome  Scoliosis and Flatback syndrome s/p thoracic and lumbar revision and fusion sx     Discharge Diagnoses:      Patient Active Problem List   Diagnosis   • Status post lumbar spinal fusion   • Hyperlipidemia   • Hypertension   • Back pain   • Chronic pain   • Pain   • Transaminitis   • Leukocytosis   • Constipation   • Difficulty coping with pain   • History of stroke   • Vitamin D insufficiency   • Thrombocytosis   • Hyponatremia   • Spasm     Acute Rehabilitation Center Course:      Patient participated in a comprehensive interdisciplinary inpatient rehabilitation program which included involvment of MD, therapies (PT, OT), RN, CM/SW, dietary  He made significant functional gains and was able to be advanced to a largely mod independent level of assist and is considered adequately safe for discharge home with family  Medical issues with comanagement from our internal medicine team as outlined below  Please see below for patient's hospital course and day to day management of medical needs with significant findings, complications (if applicable), treatment, and services provided in problem list format        Decline in ADLs and mobility: Functional assessment - improving                                                     FIM  Care Score   Admit Score Recent Score    Total assist  1-100% or 2p    Tot UBD, LBD      Max assist 2-51-75%    Sub To hygiene, bathing     Mod assist 3- 26-50%  Par       Min assist 3- 25% or < Par       CG assist 4  TA   To hygiene   Sup/Setup 4-5 Sup   LBD, UBD, bathing    Mod-I/Indep 6 MI         Transfers   Mod-total assist  CG assist-supervision     Ambulation    10 ft mod assist  150 ft CGA     Stairs    Total assist/NT 4 steps min assist       Goal: Mod indep except possible slight assist for bathing, LBD  Dispo & ELOS: Home wSat 12/10 with OP PT     * Status post lumbar spinal fusion  Assessment & Plan  S/P removal of hardware L1-L3  Posterior thoracic/ lumbar instrumentation from T8 to pelvis  Thoracic osteotomy T11/12  Thoracic laminotomy T8/9, T9/10 and T10/11  Posterior thoracic fusion T7-L1  Revision instrumentation T8 to pelvis by Dr Gilda Diaz on 11/22  - p/w severe pain and impaired function > improving   - Incision healing well prior to d/c   - Pain improved and controlled on current regimen  - Strength BLE intact, function improved   - 12/7  Communicated with Dr Gilda Diaz - ok to follow-up in his office next week and obtain x-rays at that time; he was ok with stopping full dose aspirin 325mg qday at that time - called ortho office and they will call him back to schedule f/u next week   - 12/7 CG training completed   - OP PT   Xray 11/29: IMPRESSION: Interval postsurgical changes without evidence of hardware complication  No acute osseous abnormality   - Do not take NSAIDs (Advil, Aleve, Ibuprofen, Motrin, Naproxen, etc ) for 3 months following your surgery  This may impede the healing of your fusion  Thoracic/lumbar spine precautions   Custom-molded LSO brace from [employer] from pre-op  If not, please fit patient with Closplint LSO brace  Pt may don brace in a sitting position and should have brace on at all times when ambulating and working with PT  Follow-up with spine surgery after d/c from [facility] and if needed during ARC course    - Completed acute comprehensive interdisciplinary inpatient rehabilitation include intensive skilled therapies (PT, OT) as previously outlined with oversight and management by rehabilitation physician, inpatient rehab level nursing, case management and weekly interdisciplinary team meetings           Pain  Assessment & Plan  Improved -   No s/s of oversedation/resp depression   Weaning opiates   D/c on Dilauidid 2-3mg max QID PRN without oxy disp#42 (2mg tabs)  Baclofen 5mg TID for spasms/spasticity (switched from Robaxin earlier) > helpful but patient wants to stop > will hold but I suspect he made need this in future   Gabapentin 300mg TID > patient would like to wean off and states he has plenty at home change to 300mg BID x5 days and then 300mg HS x5 days and off  [place] PDMP website checked and no concerning Rx's or Rx patterns noted    - Patient had tramadol 50mg 60 tabs filled 11/11 but that will not be strong enough initially but can transition back over coming weeks   - OP follow-up with prior pain mgmt provider     Per d/c instructions - - Do not take NSAIDs (Advil, Aleve, Ibuprofen, Motrin, Naproxen, etc ) for 3 months following your surgery  This may impede the healing of your fusion  Monitor for oversedation, AMS/delirium, and respiratory depression   If being administered - hold opiates, muscle relaxants, benzodiazepines, and gabapentin for oversedation, AMS, or RR<12    Counseled on and continue to encourage deep breathing/relaxation/behavioral pain management techniques      Spasm  Assessment & Plan  Improved on baclofen but patient wants to hold which was d/c   Back spasms, leg tightness, hyperreflexia, spasticity - patient also with hx of CVA with prior known hx of spine sx's  - Patient denies acute worsening and leg tightness, strength sensation at recent baseline  - Hold baclofen 5mg TID per pt preference > consider resuming if needed   - Follow-up with OP providers       Constipation  Assessment & Plan  Improved  - Monitor closely on opiates  - PRN bowel meds     Hyponatremia  Assessment & Plan  Resolved 12/8   Was likely SIADH related to recent sx and pain       Thrombocytosis  Assessment & Plan  Trending down     Vitamin D insufficiency  Assessment & Plan  D3 2000 units daily     History of stroke  Assessment & Plan  Secondary stroke prevention  - Antithrombotic: IM recommends aspirin 81mg qday with stroke hx and ASCVD risk score  - Statin  - Optimal management of blood pressure   - Follow-up PCP     Difficulty coping with pain  Assessment & Plan  Improved   Supportive counseling  Psychology consult while in ARC   Adjustment Disorder with Mixed Emotional Features  RECOMMENDATIONS:   I will follow Mr Castro Neville during his stay to provide the following interventions:    • Supportive psychotherapy, utilizing CBT and mindfulness strategies  • DBT distress tolerance techniques  to improve coping and mood  • Meditation and relaxation training  Counseled on and continue to encourage deep breathing/relaxation/behavioral management techniques:     Deep breathin seconds in, 5 seconds out, 5 times per hour when awake and PRN when experiencing pain or anxiety    Avoid holding breath and tightening muscles and instead breathe slowly and deeply      Leukocytosis  Assessment & Plan  Resolved  Internal medicine consult and management during ARC course  Patient afebrile, other vitals unremarkable > continue to monitor   No clear signs or symptoms of infection or VTE but will continue to monitor  Monitor CBC intermittently       Transaminitis  Assessment & Plan  Resolved ; statin resumed   Monitor intermittently  Hx of hepatitis and teenager  Patient would like to avoid standing APAP      At risk for venous thromboembolism (VTE)  Assessment & Plan  SCDs, ambulation (will be on aspirin 81mg qday)    - Can stop aspirin 325mg qday per spine sx    Venous doppler negative for VTE       Hypertension  Assessment & Plan  Internal medicine consulted and co-management with their service  Monitor vitals with and without activity; monitor for orthostasis  Monitor hemoglobin, electrolytes, kidney function, hydration status   Current meds: lisinopril 10mg qday per IM       Hyperlipidemia  Assessment & Plan  Low dose statin resumed per IM Follow-up providers (see above for specific issues to follow-up):  PCP  [facility]th[facility]     - See AVS (After visit summary) with discharge instructions, discharge medications, and other details  Imaging (See above as well):  XR spine thoracolumbar 2 vw   Final Result by Michael Claros MD (11/29 0332)      Interval postsurgical changes without evidence of hardware complication  No acute osseous abnormality  Workstation performed: [id number]             Pertinent Recent Labs (See Course above, EPIC EMR, and if needed request additional records):   Latest Reference Range & Units 12/08/22 04:40   Sodium 135 - 147 mmol/L 136   Potassium 3 5 - 5 3 mmol/L 4 1   Chloride 96 - 108 mmol/L 103   CO2 21 - 32 mmol/L 27   Anion Gap 5 - 14 mmol/L 6   BUN 5 - 25 mg/dL 15   Creatinine 0 70 - 1 50 mg/dL 0 82   Glucose, Random 70 - 99 mg/dL 101 (H)   GLUCOSE FASTING 70 - 99 mg/dL 101 (H)   Calcium 8 4 - 10 2 mg/dL 9 7   eGFR ml/min/1 73sq m 92   WBC 4 31 - 10 16 Thousand/uL 6 02   Red Blood Cell Count 3 88 - 5 62 Million/uL 4 39   Hemoglobin 12 0 - 17 0 g/dL 12 4   HCT 36 5 - 49 3 % 38 7   MCV 82 - 98 fL 88   MCH 26 8 - 34 3 pg 28 2   MCHC 31 4 - 37 4 g/dL 32 0   RDW 11 6 - 15 1 % 13 0   Platelet Count 667 - 390 Thousands/uL 520 (H)   (H): Data is abnormally high    Procedures Performed During ARC Admission: None    Discharge Vitals:         HPI:   52-year-old male with a past medical history of scoliosis, flat back syndrome, neurogenic claudication hypertension, hyperlipidemia, obesity who had stage I of revision surgery in May of this past year who is now status post stage II revision 11/22  He underwent removal of L1-L3 hardware, posterior thoracic/lumbar instrumentation from T8 to pelvis, thoracic osteotomy at T11/12, thoracic laminotomy T8/T9, T9/T10 and T10/11, posterior thoracic fusion T7 through left L1 and revision instrumentation T8 to pelvis by Dr Joan Patton    Postop course notable for significant pain and decline in ADLs and mobility  He did have bilateral lower extremity venous ultrasound which was negative for DVT  Patient also noted to have significant constipation  Patient was evaluated by skilled therapies and was found to have significant decline in ADLs and ambulation and appears appropriate for admission to [place] [id number]      Chief complaint:   back pain     Subjective:   on eval, patient reports moderate to severe back pain at times more severe with turning in bed and moving  Patient reports some chronic pain down legs worse as he walks and the more he walks  He denies acute worsening of strength or sensation since the surgery  He denies urinary problems  Patient reports constipation with last bowel movement prior to surgery  Patient notes some abdominal fullness but is having some gas at times with mild abdominal discomfort but without nausea or vomiting  He denies fever, chills, sweats  He reports occasional lightheadedness when he was getting out of bed earlier but not currently  He denies other new complaints      Review of Systems: A 10 point ROS was performed; negative except as noted above         Condition at Discharge: good     Discharge instructions/Information to patient and family:   See after visit summary for information provided to patient and family  Provisions for Follow-Up Care:  See after visit summary for information related to follow-up care and any pertinent home health orders  Disposition: Home with OP PT    Planned Readmission:  No    Discharge Statement:  Patient seen and examined day prior to discharge by MD with review of discharge plan  Patient stable from medical/functional level at that time  No reports of concerns from nursing of change in medical/functional status on day of discharge  Vitals stable and patient discharge with discharge instructions provide to him        Discharge Medications:  See after visit summary for reconciled discharge medications provided to patient and family

## 2022-12-09 NOTE — PROGRESS NOTES
12/09/22 0700   Pain Assessment   Pain Assessment Tool 0-10   Pain Score 6   Pain Location/Orientation Orientation: Mid;Orientation: Lower; Location: Back   Pain Onset/Description Onset: Ongoing   Effect of Pain on Daily Activities Tolerated   Patient's Stated Pain Goal No pain   Hospital Pain Intervention(s) Shower/Bath   Multiple Pain Sites No   Restrictions/Precautions   Precautions Fall Risk;Pain;Spinal precautions   Weight Bearing Restrictions No   ROM Restrictions Yes  (Spinal precautions)   Braces or Orthoses TLSO   Lifestyle   Autonomy "I'm going home tomorrow "   Eating   Type of Assistance Needed Independent   Physical Assistance Level No physical assistance   Comment Pt provided with breakfast tray following Tx session  Eating CARE Score 6   Oral Hygiene   Type of Assistance Needed Independent   Physical Assistance Level No physical assistance   Comment Marco in stance at sink top with RW   Oral Hygiene CARE Score 6   Shower/Bathe Self   Type of Assistance Needed Supervision; Adaptive equipment   Physical Assistance Level No physical assistance   Comment Completed shower ADL at S level with pt demonstrating ability to bathe 10/10 parts with use of LHAE to maintain spinal precautions  Pt reports having LHR at home, however, EDU also provided on benefit of utilizing LHS for LB bathing  Shower/Bathe Self CARE Score 4   Bathing   Assessed Bath Style Shower   Anticipated D/C Bath Style Shower;Tub;Sponge Bath   Able to Wash/Rinse/Dry (body part) Left Arm;Right Arm;L Upper Leg;R Upper Leg;L Lower Leg/Foot;R Lower Leg/Foot;Chest;Abdomen;Perineal Area; Buttocks   Limitations Noted in ROM; Strength   Positioning Seated   Adaptive Equipment Longhand Reacher;Tub Bench   Findings  Pt has achieved OT goal of S for bathing routine     Tub/Shower Transfer   Limitations Noted In Balance;LE Strength   Adaptive Equipment Grab Bars;Transfer Bench   Assessed Shower   Findings S side stepping in/out of wet shower environment with use of grab bar for support  TLSO donend for transfer  Upper Body Dressing   Type of Assistance Needed Independent   Physical Assistance Level No physical assistance   Comment IND seated to doff/don TLSO and OH shirt   Upper Body Dressing CARE Score 6   Lower Body Dressing   Type of Assistance Needed Independent; Adaptive equipment   Physical Assistance Level No physical assistance   Comment Seated utilizing LHR to assist with threading LE's into loose fitting pants  IND in stance at [facility] for CM up over hips with no LOB  Lower Body Dressing CARE Score 6   Putting On/Taking Off Footwear   Comment Assist with donning socks and sneakers, as pt's wife reports plan to A with task upon D/C, as she does not plan to purchase sock aide or LH shoe horn   Lying to Sitting on Side of Bed   Type of Assistance Needed Independent   Physical Assistance Level No physical assistance   Lying to Sitting on Side of Bed CARE Score 6   Sit to Stand   Type of Assistance Needed Independent; Adaptive equipment   Physical Assistance Level No physical assistance   Comment Marco with RW   Sit to Stand CARE Score 6   Bed-Chair Transfer   Type of Assistance Needed Independent; Adaptive equipment   Physical Assistance Level No physical assistance   Comment Marco with RW   Chair/Bed-to-Chair Transfer CARE Score 6   Functional Standing Tolerance   Time 6 mins total   Activity Fxnl standing balance/tolerance   Comments Engaged in car matching activity in stance at table top req pt to perform repetitive unilateral release from RW to engage in card matching activity  Pt observed heavily reliant on RW, often flexing B/L knees while in stance  No LOB observed with focus of activity to improve pt's fxnl standing balance, as well as standing tolerance for cont'd ADL participation upon D/C  Therapeutic Excerise-Strength   UE Strength Yes   Right Upper Extremity- Strength   R Shoulder Flexion;ABduction; Extension;Horizontal ABduction; External rotation; Internal rotation   R Elbow Elbow flexion;Elbow extension   R Position Seated   Equipment Theraband  (Yellow)   R Weight/Reps/Sets 3 sets of 10 reps   RUE Strength Comment Implemented UE HEP utilizing Y theraband to perform 3 sets of 10 reps of indicated planes  Utilized rest breaks between each set with no c/o of increased pain or discomfort throughout light TE  EDU provided on performing HEP 1-2 x's daily with focus on maximizing UE strength for ADL performance and transfer's  Left Upper Extremity-Strength   L Shoulder Flexion; Extension;ABduction;Horizontal ABduction; External rotation; Internal rotation   L Elbow Elbow flexion;Elbow extension   L Position Seated   Equipment Theraband  (Yellow)   L Weights/Reps/Sets 3 sets of 10 reps   LUE Strength Comment See above   Cognition   Overall Cognitive Status WFL   Arousal/Participation Alert; Cooperative   Attention Within functional limits   Orientation Level Oriented X4   Memory Within functional limits   Following Commands Follows one step commands with increased time or repetition   Additional Activities   Additional Activities Other (Comment)   Additional Activities Comments Engaged in fxnl standing balance activity req pt to stand with RW and engage in ball toss, req pt to perform bimanual release from RW  Pt tolerated activity in stance x 3-4 mins with 1 LOB, however, pt able to self correct  Focus of task to improve fxnl standing balance in order to dec risk of falls upon D/C  Activity Tolerance   Activity Tolerance Patient tolerated treatment well   Assessment   Treatment Assessment Pt engaged in 75 min skilled OT Tx session with focus on achieving OT goals for bathing/dressing routine, improving fxnl standing balance/tolerance, and implementing UE HEP in preparation for D/C  See above for further Tx details  Pt has achieved OT goal of S for ADL of bathing and Marco goal for ADL of dressing  Pt progressed to IRP's with RW during previous OT session  Tolerated UE HEP utilizing Y theraband to perform 3 sets of 10 reps with no c/o of increased pain/discomfort in pt's back  Pt agreeable to complete 1-2 x's daily upon D/C in order to maximize UE strength for cont'd ADL performance  DME includes RW, LHR, and tub bench  Pt's wife plans to A with footwear management and IADL's upon D/C  D/C scheduled for 12/10 with Outpatient PT services, no OT needs  Pt presents with no further questions for OT at this time  Prognosis Good   Problem List Decreased strength;Decreased range of motion; Impaired balance;Orthopedic restrictions   Recommendation   OT Discharge Recommendation Home with outpatient rehabilitation  (Outpatient PT - No OT needs)   Equipment Recommended   (Pt has tub bench, RW, and LHR)   OT Therapy Minutes   OT Time In 0700   OT Time Out 0815   OT Total Time (minutes) 75   OT Mode of treatment - Individual (minutes) 75   OT Mode of treatment - Concurrent (minutes) 0   OT Mode of treatment - Group (minutes) 0   OT Mode of treatment - Co-treat (minutes) 0   OT Mode of Treatment - Total time(minutes) 75 minutes   OT Cumulative Minutes 1010

## 2022-12-09 NOTE — TELEPHONE ENCOUNTER
Caller: Gio Chand    Doctor: Blayne Stout    Reason for call: Patient needed to reschedule his 12/12 post op appt  First available was 1/09  He was wondering if this was ok   Please advise     Call back#: [phone]

## 2022-12-09 NOTE — PROGRESS NOTES
Physical Medicine and Rehabilitation Progress Note  Aj Saleem 78 y o  male MRN: [id number]  Unit/Bed#: -71 Encounter: [id number]      Assessment & Plan:     Decline in ADLs and mobility: Functional assessment - improving         FIM  Care Score  Admit Score Recent Score    Total assist  1-100% or 2p    Tot UBD, LBD     Max assist 2-51-75%    Sub To hygiene, bathing    Mod assist 3- 26-50%  Par     Min assist 3- 25% or < Par     CG assist 4  TA     Sup/Setup 4-5 Sup  Bathing    Mod-I/Indep 6 MI  To hygiene, dressing    Transfers  Mod-total assist  CG assist-supervision    Ambulation   10 ft mod assist  150 ft CGA    Stairs   Total assist/NT 4 steps min assist      Goal: Mod indep except possible slight assist for bathing, LBD  Major barriers:  Pain, brace, deconditioning  Dispo & ELOS: Home with [facility]OS Sat 12/10 with OP PT      * Status post lumbar spinal fusion  Assessment & Plan  S/P removal of hardware L1-L3  Posterior thoracic/ lumbar instrumentation from T8 to pelvis  Thoracic osteotomy T11/12  Thoracic laminotomy T8/9, T9/10 and T10/11  Posterior thoracic fusion T7-L1  Revision instrumentation T8 to pelvis by Dr Lu Sessions on 11/22  - p/w severe pain and impaired function > improving   - Incision healing well 12/9  - Pain improved and controlled on current regimen  - Strength BLE intact, function improved   - 12/7  Communicated with Dr Lu Sessions - ok to follow-up in his office next week and obtain x-rays at that time; he was ok with stopping full dose aspirin 325mg qday at that time - called ortho office and they will call him back to schedule f/u next week   - 12/7 CG training completed   - On track for d/c Saturday with OP PT   Xray 11/29: IMPRESSION: Interval postsurgical changes without evidence of hardware complication  No acute osseous abnormality   - Do not take NSAIDs (Advil, Aleve, Ibuprofen, Motrin, Naproxen, etc ) for 3 months following your surgery    This may impede the healing of your fusion  Thoracic/lumbar spine precautions   Custom-molded LSO brace from [employer] from pre-op  If not, please fit patient with [employer] LSO brace  Pt may don brace in a sitting position and should have brace on at all times when ambulating and working with PT  Follow-up with spine surgery after d/c from [facility] and if needed during [facility] course    - Continue acute comprehensive interdisciplinary inpatient rehabilitation to include intensive skilled therapies (PT, OT) as outlined with oversight and management by rehabilitation physician as well as inpatient rehab level nursing, case management and weekly interdisciplinary team meetings  Pain  Assessment & Plan  Improved - try to wean down opiates - discussed with patient   No s/s of oversedation/resp depression   Continue Dilaudid 2-3 mg Q4 hours for mod-severe pain > will d/c on 2-3mg max QID PRN without oxy disp#42 (2mg tabs)  In interim, continue Oxycodone IR 5 mg Q4 hours for breakthrough pain  Baclofen 5mg TID for spasms/spasticity (switched from Robaxin earlier) > helfpul but patient wants to stop > will hold but I suspect he made need this in future   Gabapentin 300mg TID > patient would like to wean off and states he has plenty at home change to 300mg BID x5 days and then 300mg HS x5 days and off  Hold sedating meds for oversedation, AMS, or RR<12  Topical Lidoderm patches x 2     Continue topical ICE  [place] PDMP website checked and no concerning Rx's or Rx patterns noted    - Patient had tramadol 50mg 60 tabs filled 11/11 but that will not be strong enough initially but can transition back over coming weeks   - OP follow-up with prior pain mgmt provider     Per d/c instructions - - Do not take NSAIDs (Advil, Aleve, Ibuprofen, Motrin, Naproxen, etc ) for 3 months following your surgery  This may impede the healing of your fusion    Monitor for oversedation, AMS/delirium, and respiratory depression   If being administered - hold opiates, muscle relaxants, benzodiazepines, and gabapentin for oversedation, AMS, or RR<12  Counseled on and continue to encourage deep breathing/relaxation/behavioral pain management techniques      Spasm  Assessment & Plan  Improved on baclofen but patient wants to hold   Back spasms, leg tightness, hyperreflexia, spasticity - patient also with hx of CVA with prior known hx of spine sx's  - Patient denies acute worsening and leg tightness, strength sensation at recent baseline  - Hold baclofen 5mg TID > consider resuming if needed   - Monitor exam closely     Constipation  Assessment & Plan  Improved  - Monitor closely on opiates  - Hold standing miralax qday/Senna/colace BID for now but low threshold to resume while on opiates  - PRN bowel regimen (Miralax PRN/Dulcolax supp PRN)  - monitor for signs and symptoms of obstruction/ileus > not currently; hold stimulant lax if occurs  - Limiting constipating medications if possible  - Ensure adequate hydration       Hyponatremia  Assessment & Plan  Resolved 12/8   Was likely SIADH related to recent sx and pain       Thrombocytosis  Assessment & Plan  Trending down     Vitamin D insufficiency  Assessment & Plan  D3 2000 units daily     History of stroke  Assessment & Plan  Secondary stroke prevention  - Antithrombotic: IM recommends aspirin 81mg qday with stroke hx and ASCVD risk score  - Statin  - Optimal management of blood pressure   - Follow-up PCP     Difficulty coping with pain  Assessment & Plan  Improved   Supportive counseling  Psychology consult while in [facility]   Adjustment Disorder with Mixed Emotional Features  RECOMMENDATIONS:   I will follow Mr Roger Velez during his stay to provide the following interventions:    • Supportive psychotherapy, utilizing CBT and mindfulness strategies  • DBT distress tolerance techniques  to improve coping and mood  • Meditation and relaxation training  Counseled on and continue to encourage deep breathing/relaxation/behavioral management techniques: Deep breathin seconds in, 5 seconds out, 5 times per hour when awake and PRN when experiencing pain or anxiety  Avoid holding breath and tightening muscles and instead breathe slowly and deeply      Leukocytosis  Assessment & Plan  Resolved  Internal medicine consult and management during [facility] course  Patient afebrile, other vitals unremarkable > continue to monitor   No clear signs or symptoms of infection or VTE but will continue to monitor  Monitor CBC intermittently       Transaminitis  Assessment & Plan  Resolved ; statin resumed   Monitor intermittently  Hx of hepatitis and teenager  Patient would like to avoid standing APAP      At risk for venous thromboembolism (VTE)  Assessment & Plan  SCDs, ambulation (will be on aspirin 81mg qday)    - Can stop aspirin 325mg qday per spine sx    Venous doppler negative for VTE       Hypertension  Assessment & Plan  Internal medicine consulted and co-management with their service  Monitor vitals with and without activity; monitor for orthostasis  Monitor hemoglobin, electrolytes, kidney function, hydration status   Current meds: lisinopril 10mg qday per IM       Hyperlipidemia  Assessment & Plan  Low dose statin resumed per IM       Other Medical Issues:  • None    Follow-up providers and other issues to be followed up after discharge  • PCP  • Spine Sx    CODE: Level 1: Full Code    Restrictions include: Fall precautions    Objective: Allergies per EMR  Diagnostic Studies: Reviewed  XR spine thoracolumbar 2 vw   Final Result by Juhi Weir MD ( [id number])      Interval postsurgical changes without evidence of hardware complication  No acute osseous abnormality        Workstation performed: [id number]           See above as well    Laboratory: Labs reviewed  Results from last 7 days   Lab Units 22  0440 22  0503   HEMOGLOBIN g/dL 12 4 12 9   HEMATOCRIT % 38 7 40 8   WBC Thousand/uL 6 02 7 68     Results from last 7 days Lab Units 12/08/22  0440 12/05/22  0503   BUN mg/dL 15 14   SODIUM mmol/L 136 134*   POTASSIUM mmol/L 4 1 4 3   CHLORIDE mmol/L 103 101   CREATININE mg/dL 0 82 0 84   AST U/L  --  38   ALT U/L  --  37            Drug regimen reviewed, all potential adverse effects identified and addressed:    Scheduled Meds:  Current Facility-Administered Medications   Medication Dose Route Frequency Provider Last Rate   • aspirin  81 mg Oral Daily Rosales Webb MD     • bisacodyl  10 mg Rectal Daily PRN Rosales Webb MD     • cholecalciferol  2,000 Units Oral Daily Earnstine Maldonado CRNP     • gabapentin  300 mg Oral BID Rosales Webb MD     • HYDROmorphone  2 mg Oral Q4H PRN Rosales Webb MD     • HYDROmorphone  3 mg Oral Q4H PRN Rosales Webb MD     • lidocaine  2 patch Topical Daily Zoë Fernandez MD     • lisinopril  10 mg Oral Daily KEVIN ThorntonNP     • naloxone  0 04 mg Intravenous Q1MIN PRN Rosales Webb MD     • ondansetron  4 mg Oral Q8H PRN Rosales Webb MD     • oxyCODONE  5 mg Oral Q4H PRN Zoë Fernandez MD     • polyethylene glycol  17 g Oral Daily PRN Rosales Webb MD     • pravastatin  10 mg Oral Daily With [address], CRNP     • senna  1 tablet Oral BID PRN Sydney Vallejo DO         Chief Complaints:  Rehab follow-up     Subjective:     Patient reports back pain remains much better controlled  He would like to hold the baclofen and started to decrease gabapentin  He states he has plenty of gabapentin at home  He denies fever, chills, sweats, lightheadedness, increased leg weakness, constipation, or other new complaints  ROS: A 10 point ROS was performed; negative except as noted above         Physical Exam:  Temp:  [97 9 °F (36 6 °C)-98 8 °F (37 1 °C)] 97 9 °F (36 6 °C)  HR:  [78-84] 84  Resp:  [18] 18  BP: (115-134)/(68-80) 129/80  SpO2:  [97 %-98 %] 98 %    GEN:  Sitting in NAD   HEENT/NECK: MMM  CARDIAC: Regular rate rhythm, no murmers, no rubs, no gallops  LUNGS:  clear to auscultation, no wheezes, rales, or rhonchi  ABDOMEN: Soft, non-tender, non-distended, normal active bowel sounds  EXTREMITIES/SKIN:  no calf edema, no calf tenderness to palpation and Incision healing well without signs of infection or dehiscence  NEURO:   MENTAL STATUS:  Appropriate wakefulness and interaction  and Strength/MMT:  Intact bilateral lower extremity  PSYCH:  Affect:  Euthymic     HPI:  81-year-old male with a past medical history of scoliosis, flat back syndrome, neurogenic claudication hypertension, hyperlipidemia, obesity who had stage I of revision surgery in May of this past year who is now status post stage II revision 11/22  He underwent removal of L1-L3 hardware, posterior thoracic/lumbar instrumentation from T8 to pelvis, thoracic osteotomy at T11/12, thoracic laminotomy T8/T9, T9/T10 and T10/11, posterior thoracic fusion T7 through left L1 and revision instrumentation T8 to pelvis by Dr Cheryl Wells  Postop course notable for significant pain and decline in ADLs and mobility  He did have bilateral lower extremity venous ultrasound which was negative for DVT  Patient also noted to have significant constipation  Patient was evaluated by skilled therapies and was found to have significant decline in ADLs and ambulation and appears appropriate for admission to [place] [address]        ** Please Note: Fluency Direct voice to text software may have been used in the creation of this document  **    35 minutes or greater spent face-to-face with patient during this encounter and with coordination of care  Extended discussion today held with patient (+/- caregivers) in preparation for discharge regarding regarding current condition, medical/rehabilitation management, medications prescribed at discharge, functional limitations, safety, and follow-up/disposition

## 2022-12-10 VITALS
WEIGHT: 217.8 LBS | RESPIRATION RATE: 20 BRPM | HEIGHT: 70 IN | DIASTOLIC BLOOD PRESSURE: 73 MMHG | BODY MASS INDEX: 31.18 KG/M2 | HEART RATE: 79 BPM | TEMPERATURE: 97.2 F | SYSTOLIC BLOOD PRESSURE: 118 MMHG | OXYGEN SATURATION: 97 %

## 2022-12-10 RX ADMIN — HYDROMORPHONE HYDROCHLORIDE 3 MG: 2 TABLET ORAL at 07:24

## 2022-12-10 RX ADMIN — LISINOPRIL 10 MG: 10 TABLET ORAL at 08:53

## 2022-12-10 RX ADMIN — ASPIRIN 81 MG: 81 TABLET, COATED ORAL at 08:53

## 2022-12-10 RX ADMIN — GABAPENTIN 300 MG: 300 CAPSULE ORAL at 06:37

## 2022-12-10 RX ADMIN — CHOLECALCIFEROL TAB 25 MCG (1000 UNIT) 2000 UNITS: 25 TAB at 08:53

## 2022-12-10 NOTE — PLAN OF CARE
Problem: SAFETY ADULT  Goal: Patient will remain free of falls  Description: INTERVENTIONS:  - Educate patient/family on patient safety including physical limitations  - Instruct patient to call for assistance with activity   - Consult OT/PT to assist with strengthening/mobility   - Keep Call bell within reach  - Keep bed low and locked with side rails adjusted as appropriate  - Keep care items and personal belongings within reach  - Initiate and maintain comfort rounds  - Make Fall Risk Sign visible to staff  - Offer Toileting every 4Hours, in advance of need  -   - Obtain necessary fall risk management equipment: rw  - Apply yellow socks and bracelet for high fall risk patients  - Consider moving patient to room near nurses station  12/10/2022 0915 by Lucia Gallegos RN  Outcome: Adequate for Discharge  12/10/2022 0901 by Lucia Gallegos RN  Outcome: Progressing  Goal: Maintain or return to baseline ADL function  Description: INTERVENTIONS:  -  Assess patient's ability to carry out ADLs; assess patient's baseline for ADL function and identify physical deficits which impact ability to perform ADLs (bathing, care of mouth/teeth, toileting, grooming, dressing, etc )  - Assess/evaluate cause of self-care deficits   - Assess range of motion  - Assess patient's mobility; develop plan if impaired  - Assess patient's need for assistive devices and provide as appropriate  - Encourage maximum independence but intervene and supervise when necessary  - Involve family in performance of ADLs  - Assess for home care needs following discharge   - Consider OT consult to assist with ADL evaluation and planning for discharge  - Provide patient education as appropriate  12/10/2022 0915 by Lucia Gallegos RN  Outcome: Adequate for Discharge  12/10/2022 0901 by Lucia Gallegos RN  Outcome: Progressing  Goal: Maintains/Returns to pre admission functional level  Description: INTERVENTIONS:  - Perform BMAT or MOVE assessment daily  - Set and communicate daily mobility goal to care team and patient/family/caregiver  - Collaborate with rehabilitation services on mobility goals if consulted  - Perform Range of Motion 4times a day  - Reposition patient every 4 hours    -   - Stand patient 5 times a day  - Ambulate patient 4 times a day  - Out of bed to chair 4 times a day   - Out of bed for meals 3 times a day  - Out of bed for toileting  - Record patient progress and toleration of activity level   12/10/2022 0915 by Argenis Castillo RN  Outcome: Adequate for Discharge  12/10/2022 0901 by Argenis Castillo, RN  Outcome: Progressing

## 2022-12-10 NOTE — PLAN OF CARE
Problem: METABOLIC, FLUID AND ELECTROLYTES - ADULT  Goal: Glucose maintained within target range  Description: INTERVENTIONS:  - Monitor Blood Glucose as ordered  - Assess for signs and symptoms of hyperglycemia and hypoglycemia  - Administer ordered medications to maintain glucose within target range  - Assess nutritional intake and initiate nutrition service referral as needed  Outcome: Completed

## 2022-12-12 NOTE — PROGRESS NOTES
12/12/22 1244   Hello, [Guardian’s Name / Patient’s Bri Davison, this is [Caller Bri Davison from [facility], and our clinical care team wanted to check on you / your child after your recent visit to the hospital  It will only take 3-5 minutes  Is this a good time? Discharge Call Type/ Specific Diagnosis: General Call   General Discharge Phone Call   Were your/your child's discharge instructions clear and understandable? Please tell me in your own words how to care for yourself/your child now that you're home Patient understood instructions; Yes   Have you filled your/your child's new prescriptions yet? Yes   What questions do you have about those medications? No questions   Are you/your child having any unusual symptoms or problems? (Specific to problem- i e , dressing, pain, bruising or swelling, procedure, etc ) No reported symptoms/problems   Is there anything preventing you from keeping that appointment? No;Patient able to keep appointment   Are there any physicians, nurses, or hospital staff you would like us to recognize for doing a very good job? Global Team Acknowledgment   This call resulted in: No interventions needed   Call Complete   Attempted Number of Calls 1   Discharge phone call complete?  Complete

## 2022-12-13 NOTE — PHYSICAL THERAPY NOTE
[facility] PT DC SUMMARY    Pt is a 77 y/o male admitted to [facility] to address decline in function after having extensive back surgery from T7-pelvis  Pt has Back Pack TLSO when OOB, spinal precautions  Pt has had previous back surgery and rehab earlier this year  PTA pt lives with spouse in [facility] [address] [facility] home with first floor setup, 1 BRENDA  Use of SPC mainly, has RW  Initially pt with very slowed progress in acute rehab due to sig/uncontrollable pain  Once pain became managed, pt demonstrated mod I for gait and transfers with RW  Min A on steps however will have first floor setup, CS on curb step with Rw  Spouse participated in FT and aware of pts mobility and restrictions  Out pt PT recommended to follow  Pt cleared for DC home

## 2022-12-13 NOTE — OCCUPATIONAL THERAPY NOTE
OT DISCHARGE SUMMARY    Pt made good progress during stay on the [facility] following admission for s/p lumbar spine fusion  Pt presented upon IE with generalized weakness, decreased endurance, activity tolerance, and functional mobility  On evaluation, pt required maxA/TA to complete all ADLs and functional transfers  Pt was discharged to home with wife support  Pt currently functioning at Marco level for ADL, Sup for bathing and shower transfer, Marco level for transfers with RW  The following DME was recommended which pt already owns (tub bench, RW and LHR)  Family training occurred with spouse Adali Fanny) on 12/7/22  pt and spouse engage in brief family training session focusing on role of OT, CLOF and home safety recommendations  spouse reports main concerns being pt's pain level as it was not as severe back in May during his previous back surgery--did discuss with spouse and pt that pt seems to be tolerating sessions "better" with current pain regimen and schedule and has been able to participate fully in therapy sessions this past week  spouse reports that she is OK with assisting pt with footwear management as pt has no plans on purchasing sock aid or using long handled shoe horn  discussed pt CLOF at [facility] for ADLs/transfers but mostly with VC's as pt noted to not fully maintain spinal precautions often noted to bend forward during LE self care  pt and spouse report no need for a BSC and so will not order one, both report currently having shower bench, and can use in first floor shower  discussed at length, sequencing of steps when showering (ie, transfer onto shower bench with TLSO DONNED, sit down, remove TLSO, shower fully seated, dry off, don shirt and TLSO, then exit shower)  edu both on placing TLSO over RW when showering so it remains within arms reach  plan to complete medication management tomorrow and will call spouse with recommendation of AM/PM vs 4x a day pill organizer   pt and spouse with no additional questions/concerns at this time  No further skilled OT services warranted, pt to have OP PT only       Raisa Shannon MS, OTR/L, CSRS

## 2022-12-14 ENCOUNTER — EVALUATION (OUTPATIENT)
Dept: PHYSICAL THERAPY | Facility: CLINIC | Age: 67
End: 2022-12-14

## 2022-12-14 DIAGNOSIS — Z98.1 S/P LUMBAR FUSION: Primary | ICD-10-CM

## 2022-12-14 DIAGNOSIS — Z98.1 STATUS POST LUMBAR SPINAL FUSION: ICD-10-CM

## 2022-12-14 NOTE — PROGRESS NOTES
PT Evaluation     Today's date: 2022  Patient name: Lata Tai  : 1955  MRN: [id number]  Referring provider: Cheryl Turner MD  Dx:   Encounter Diagnosis     ICD-10-CM    1  S/P lumbar fusion  Z98 1                      Assessment  Assessment details: 2022: Lata Tai is a 78 y o  male who presents with s/p spinal fusion with pain, decreased strength, decreased ROM, decreased joint mobility, decreased sensation, impaired sensation, ambulatory dysfunction, postural dysfunction, balance dysfunction and healing longitudinal incision along mid thoracic through lower lumbar spine  Due to these impairments, patient has difficulty performing ADL's, recreational activities, engaging in social activities, ambulation, stair negotiation, lifting/carrying, transfers, reaching  Patient's clinical presentation is consistent with their referring diagnosis of S/P lumbar fusion  (primary encounter diagnosis)  Patient has been educated in post-op contraindications / precautions, home exercise program and plan of care  Patient would benefit from skilled physical therapy services to address their aforementioned functional limitations and progress towards prior level of function and independence with home exercise program and self symptom management/resolution  Impairments: abnormal or restricted ROM, activity intolerance, impaired physical strength, lacks appropriate home exercise program, pain with function, weight-bearing intolerance, poor posture  and poor body mechanics  Functional limitations: pt not allowed to bend, twist or lift>5#; fall riskUnderstanding of Dx/Px/POC: good   Prognosis: good    Goals  Short Term Goals: Target Date 2023  1  Initiate and advance HEP toward self symptom reduction/resolution  2  Improve postural awareness and demonstrate self postural correction  3  Improve LE strength by 1/2 MMT grade to prepare for ability to negotiate stairs reciprocally    4  Improve balance such that pt can stand w/o UE assist for 5 minutes or more  5  Pt to tolerate prolonged sitting/standing x 1 hour or more w/o c/o  Long Term Goals: Target Date 3/9/2023  1  Indep with HEP and self symptom prevention  2  Achieve LE strength of 4+/5 or better throughout such that pt can complete reciprocal stairs w/ rail  3  Improve balance such that HODGSE score reaches 50 or better, indicating reduced risk for falls  4  Pt to complete TUG w/ SPC or no AD in 17 seconds or less, indicating reduced risk for fall s  5  Pt to D/C RW for ambulation and progress to [facility] for long distances/outdoors  6  Improve LE strength, balance and endurance such that pt can 5x STS w/o assist of UE's in 18 seconds or less  Plan  Patient would benefit from: skilled PT and PT eval  Planned modality interventions: thermotherapy: hydrocollator packs and unattended electrical stimulation  Planned therapy interventions: joint mobilization, patient education, postural training, abdominal trunk stabilization, functional ROM exercises, home exercise program, neuromuscular re-education, strengthening, stretching, therapeutic activities and therapeutic exercise  Other planned therapy interventions: mechanical assessment  Frequency: 2x week  Plan of Care beginning date: 12/14/2022  Plan of Care expiration date: 3/9/2023  Treatment plan discussed with: patient        Subjective Evaluation    History of Present Illness  Date of surgery: 11/22/2022  Mechanism of injury: Subjective 12/14/2022: Pt is here for rehabilitation after his 2rd and hopefully final lumbar surgery  He was seen previously for physical therapy following lumbar fusion  This surgery was thoracic fusion  He was in acute care 4-5 days, then rehab center 7-8 days  He was using cane for long walks, but no AD for short distance/indoor ambulation  He is currently using a RW for ambulation     He reports no pain radiating into his arms or legs, but has constant pain in his back in varying locations  He reports numbness in the toes of his R foot since his lumbar fusion  Pain  At best pain ratin  At worst pain ratin  Relieving factors: medications and change in position  Exacerbated by: worst in the morning and end of the day; prolonged positions  Progression: no change    Social Support  Steps to enter house: yes (1 w/ rail)  Stairs in house: yes (pt staying on first floor temporarily)     Treatments  Previous treatment: physical therapy and immobilization  Current treatment: physical therapy  Patient Goals  Patient goals for therapy: increased strength and improved balance  Patient goal: progress away from rolling walker        Objective       Function:   2022: pt states he is indep w/ car/bed xfers; stairs are non reciprocal;  sleep disturbed by discomfort; Pt has been advised not to lift > 5# and not to bend, lift or twist  He reports he can bathe and dress himself  He is not driving due to his medications  Pt cannot walk w/o RW due to balance; standing tolerance estimated at less than a minute w/o AD  Pt does not lie supine due to incision still healing  Gait:   2022 - ambulates w/ RW w/ antalgic quality and flat footed gait  Remains flexed at his hips         DERMATOMAL TESTIN2022  L2 anterior thigh:  Dec R  L3 medial knee:  dec R  L4 medial maleolus:  dec R  L5 1st web space:  dec R  S1 lateral/sole foot:  WNL B/L  S2 poster calf:  WNL B/L    MYOTOMAL TESTIN2022         Right left  L2-3 Hip flexion:      4/5  4-/5  L3-4 Knee extension:      4-/5  4/5  L5 Great toe exten:      4+/5  5/5  L4 DF:           5/5  5/5  S1 toe walk/PF      4-/5  4+/5  S1 eversion      5/5  5/5  Ankle inver      5/5  5/5  S2 knee flex:       4/5  4/5  Hip abd sidelying     4-/5  4-/5  Hip exten sidelying     4-/5  4-/5    REFLEXES: 0= none; 1+ = slight; 2+ = brisk/normal; 3+= very brisk; 4+= clonus    2022  L3-4 Quadriceps: 2+ B/L  L5-S1 Achilles: 2+ B/L  Biceps:  2+ B/L  Brachioradialis:2+ B/L  Triceps:  2+ B/L    LUMBAR AROM: N/A - Fusion    OBSERVATION:  12/14/202 - incision healing well - still some scabbing - steristrips in place L5-T8 - pt covers loosely w/ guaze pads  FLEXIBILITY  12/14/2022 - Assessed in sitting - hams WFL B/L; hip ER mod ruby R>L; hip IR min ruby B/L; slump test (-) B/L    Outcome Measures Initial Eval  12/14/2022        5xSTS 31 14 sec w/ 5/10 RPE and use of B/L hands sec        TUG 27 37 sec w/ RW        Tandem bal EO R post 3 sec        Tandem bal EO L post 10 sec        HODGES 27/56        mCTSIB  - FTEO (firm)  - FTEC (firm)    - FTEO (foam)    - FTEC (foam)   60 sec w/ S  3 sec CS/LOB  50 sec mod sway  unable                                        Cut off scores: All data taken from [facility] Section or Rehab Measures    Hodges: <46/56=falls risk  MDC: 6 pts  Age Norms:  61-76: M - 54   F - 55  70-79: M - 47   F - 53  80-89: M - 48   F - 50 5xSTS: Logan et al 2010  MDC: 2 3 sec  Age Norms:  60-69: 11 1 sec  70-79: 12 6 sec  80-89: 14 8 sec   TUG  MDC: 4 14 sec  Cut off score:  >13 5 sec community dwelling adults  >32 2 sec frail elderly  <20 sec:  I for basic transfers  >30: dependent for transfers 10 Meter Walk Test Norms: Dian Miller and Jose shepard 2011  20-29: M - 1 35 m   F - 1 34 m  30-39: M - 1 43 m   F - 1 34 m  40-49: M - 1 43 m   F - 1 39 m  50-59: M - 1 43 m   F - 1 31 m  60-69: M - 1 34 m   F - 1 24 m  70-79: M - 1 26 m   F - 1 13 m  80-89: M - 0 97 m   F - 0 94 m   FGA  MCID: 4 pts  Geriatrics/community < 22/30 fall risk  Geriatrics/community < 20/30 unexplained falls DGI  MDC: vestibular - 4 pts  MDC: geriatric/community - 3 pts  Falls risk <19/24   6 Minute Walk Test  Age Norms  61-76: M - 1876 ft (571 80 m)  F - 1765 ft (537 98 m)  70-79: M - 1729 ft (527 00 m)  F - 1545 ft (470 92 m)  80-89: M - 1368 ft (416 97 m)  F - 1286 ft (391 97 m) mCTSIB  Norm: 20-60 yrs  Eyes open firm: norm sway 0  21-0 48  Eyes closed firm: norm sway 0 48-0 99  Eyes open foam: norm sway 0 38-0 71  Eyes closed foam: norm sway 0 70-2 22        Precautions: PT CANNOT LIE SUPINE - LONGITUDINAL INCISION STILL HEALING  Past Medical History:   Diagnosis Date   • Ambulates with cane     "long distance"   • Arthritis    • Back pain    • Chronic pain disorder    • Dental crowns present    • Exercise involving walking     daily -short walks   • History of hepatitis C     per pt "went away on its own" age 13"   • History of transfusion    • Hyperlipidemia    • Hypertension    • Leg pain     and foot pain   • Limb alert care status     per pt NO IV's LEFT UPPER ARM due to old injury   • Risk for falls    • Spinal stenosis    • Stroke ([facility] [address] )     per pt in 1996-and no lasting problems   • Wears glasses     readers     Access Code: [email or web address]  URL: [email or web address]  Date: 12/14/2022  Prepared by:  Kip Lou    Exercises  Standing Hip Extension with Counter Support - 1 x daily - 2 sets - 10 reps  Standing Hip Abduction with Counter Support - 1 x daily - 2 sets - 10 reps  Standing Heel Raises - 1 x daily - 2 sets - 10 reps  Sidelying Hip Abduction - 1 x daily - 2 sets - 10 reps  Reverse clamshell with resistance - 1 x daily - 2 sets - 10 reps  Seated Long Arc Quad with Hip Adduction - 1 x daily - 2 sets - 10 reps - 3 hold  Seated Hamstring Curl with Anchored Resistance - 1 x daily - 2 sets - 10 reps    SOC: 12/14/2022  FOTO: 12/14/2022  POC EXP: 3/9/2023  DAILY TREATMENT LOG:  date 12/14/2022       Visit #/auth 1                               Neuro Re-Ed        FT w/ EC CGA        FT w/ EO on foam        Step stance balance        Sidestep along rail        Alt taps to step                        Ther Ex        B/L HR        LAQ w/ add         TB hams                Hip abd sidelylng                                Ther Activity                        Gait Training                        Modalities

## 2022-12-21 ENCOUNTER — OFFICE VISIT (OUTPATIENT)
Dept: PHYSICAL THERAPY | Facility: CLINIC | Age: 67
End: 2022-12-21

## 2022-12-21 DIAGNOSIS — Z98.1 STATUS POST LUMBAR SPINAL FUSION: ICD-10-CM

## 2022-12-21 DIAGNOSIS — Z98.1 S/P LUMBAR FUSION: Primary | ICD-10-CM

## 2022-12-21 NOTE — PROGRESS NOTES
Daily Note     Today's date: 2022  Patient name: Orestes Andrews  : 1955  MRN: [id number]  Referring provider: Ajit Joy MD  Dx:   Encounter Diagnosis     ICD-10-CM    1  S/P lumbar fusion  Z98 1       2  Status post lumbar spinal fusion  Z98 1                      Subjective: Patient reports he was sore from the initial evaluation and HEP that he did not perform exercises the following day because he was sore  Patient notes strength is his biggest issue at this time  Objective: See treatment diary below      Assessment: Tolerated treatment well  Patient had a difficult time with all activities requiring standing without assistance, CGA required when on uneven surfaces and during step taps  Patient exhibits weakness in RLE most present when performing step taps and semi-tandem stance  Patient exhibited good technique with therapeutic exercises and would benefit from continued PT      Plan: Continue per plan of care  Precautions: PT CANNOT LIE SUPINE - LONGITUDINAL INCISION STILL HEALING  Past Medical History:   Diagnosis Date   • Ambulates with cane     "long distance"   • Arthritis    • Back pain    • Chronic pain disorder    • Dental crowns present    • Exercise involving walking     daily -short walks   • History of hepatitis C     per pt "went away on its own" age 13"   • History of transfusion    • Hyperlipidemia    • Hypertension    • Leg pain     and foot pain   • Limb alert care status     per pt NO IV's LEFT UPPER ARM due to old injury   • Risk for falls    • Spinal stenosis    • Stroke ([address] )     per pt in -and no lasting problems   • Wears glasses     readers     Access Code: [email or web address]  URL: [email or web address]  Date: 2022  Prepared by:  Otto Gibbs    Exercises  Standing Hip Extension with Counter Support - 1 x daily - 2 sets - 10 reps  Standing Hip Abduction with Counter Support - 1 x daily - 2 sets - 10 reps  Standing Heel Raises - 1 x daily - 2 sets - 10 reps  Sidelying Hip Abduction - 1 x daily - 2 sets - 10 reps  Reverse clamshell with resistance - 1 x daily - 2 sets - 10 reps  Seated Long Arc Quad with Hip Adduction - 1 x daily - 2 sets - 10 reps - 3 hold  Seated Hamstring Curl with Anchored Resistance - 1 x daily - 2 sets - 10 reps    SOC: 12/14/2022  FOTO: 12/14/2022  POC EXP: 3/9/2023  DAILY TREATMENT LOG:  date 12/14/2022 12/21/2022      Visit #/auth 1 2                              Neuro Re-Ed  25      FT w/ EC CGA  20" x 4      FT w/ EO on foam  20" x 4 with CGA      Step stance balance with EC  20" x 3 R/L      Sidestep along rail  3 laps with CS      Alt taps to step  4" 2x10                      Ther Ex  15      B/L HR  2x10       LAQ w/ add   2x10 R/L      TB hams  RTB 2x10 R/L      Standing step backs  2x10 R/L      Hip abd sidelylng  10x R/L                              Ther Activity                        Gait Training                        Modalities

## 2022-12-23 ENCOUNTER — OFFICE VISIT (OUTPATIENT)
Dept: PHYSICAL THERAPY | Facility: CLINIC | Age: 67
End: 2022-12-23

## 2022-12-23 DIAGNOSIS — Z98.1 S/P LUMBAR FUSION: Primary | ICD-10-CM

## 2022-12-23 DIAGNOSIS — Z98.1 STATUS POST LUMBAR SPINAL FUSION: ICD-10-CM

## 2022-12-23 NOTE — PROGRESS NOTES
Daily Note     Today's date: 2022  Patient name: Dez Dawn  : 1955  MRN: [id number]  Referring provider: Paige Martinez MD  Dx:   Encounter Diagnosis     ICD-10-CM    1  S/P lumbar fusion  Z98 1       2  Status post lumbar spinal fusion  Z98 1                      Subjective: pt reports 4/10 back pain today, no change w/ exercises better or worse, seems to be aggravated w/ self care (twisting to reach w/ toileting)  He ordered an adaptive reacher  Objective: See treatment diary below; updated HEP  Assessment: Tolerated treatment well and demonstrates improving strength and balance  He ambulates w/ SPC now to/from clinic and no AD within clinic  Gait remains antalgic  Wilmar Dock Patient would benefit from continued PT      Plan: Continue per plan of care        Precautions: PT CANNOT LIE SUPINE - LONGITUDINAL INCISION STILL HEALING  Past Medical History:   Diagnosis Date   • Ambulates with cane     "long distance"   • Arthritis    • Back pain    • Chronic pain disorder    • Dental crowns present    • Exercise involving walking     daily -short walks   • History of hepatitis C     per pt "went away on its own" age 13"   • History of transfusion    • Hyperlipidemia    • Hypertension    • Leg pain     and foot pain   • Limb alert care status     per pt NO IV's LEFT UPPER ARM due to old injury   • Risk for falls    • Spinal stenosis    • Stroke [facility])     per pt in -and no lasting problems   • Wears glasses     readers     SOC: 2022  FOTO: 2022  POC EXP: 3/9/2023  DAILY TREATMENT LOG:  date 2022     Visit #/auth 1 2 3                             Neuro Re-Ed  25 25'     FT w/ EC CGA  20" x 4 20"x4 CS     FT w/ EO on foam  20" x 4 with CGA FT EO w/ head turns 2x10 CGa     Step stance balance with EC  20" x 3 R/L      Sidestep along rail  3 laps with CS RTB @ knees 10'x3 R/L w/S     Alt taps to step  4" 2x10      FSU near rail   6" step 2x10 R/L CGA Retro walk near rail   RTB @ knees 10'x4 CS     Ther Ex  15 20'     B/L HR  2x10  2x10     LAQ w/ add   2x10 R/L 2# 3" 2x10     TB hams  RTB 2x10 R/L grn TB 2x10 R/L     Standing step backs  2x10 R/L      Hip abd sidelylng  10x R/L      Stand hip abd   RTB @ knees 2x10 R/L     Stand hip exten    RTB @ knees 2x10 R/L     STS from chair   2x10     HEP   updated     Ther Activity                        Gait Training                        Modalities                        Access Code: [email or web address]  URL: [email or web address]  Date: 12/23/2022  Prepared by:  Laura Lua    Exercises  Sidelying Hip Abduction - 1 x daily - 2 sets - 10 reps  Reverse clamshell with resistance - 1 x daily - 2 sets - 10 reps  Seated Long Arc Quad with Hip Adduction - 1 x daily - 2 sets - 10 reps - 3 hold  Seated Hamstring Curl with Anchored Resistance - 1 x daily - 2 sets - 10 reps  Standing Hip Abduction with Resistance at Ankles and Counter Support - 1 x daily - 2 sets - 10 reps  Hip Extension with Resistance Loop - 1 x daily - 2 sets - 10 reps  Side Stepping with Resistance at Ankles - 1 x daily - 3 sets - 10 reps  Standing Heel Raises - 1 x daily - 2 sets - 10 reps  Squat with Chair Touch - 1 x daily - 2 sets - 10 reps

## 2022-12-27 ENCOUNTER — OFFICE VISIT (OUTPATIENT)
Dept: PHYSICAL THERAPY | Facility: CLINIC | Age: 67
End: 2022-12-27

## 2022-12-27 DIAGNOSIS — Z98.1 S/P LUMBAR FUSION: Primary | ICD-10-CM

## 2022-12-27 DIAGNOSIS — Z98.1 STATUS POST LUMBAR SPINAL FUSION: ICD-10-CM

## 2022-12-27 NOTE — PROGRESS NOTES
Daily Note     Today's date: 2022  Patient name: Eliu Warren  : 1955  MRN: [id number]  Referring provider: Jaydon Evans MD  Dx:   Encounter Diagnosis     ICD-10-CM    1  S/P lumbar fusion  Z98 1       2  Status post lumbar spinal fusion  Z98 1                      Subjective: Pt reports his LE (muscles) were sore after last visit  He's just out of shape  Objective: See treatment diary below      Assessment: Tolerated treatment well and performs better w/ balance activities today vs last visit    Patient would benefit from continued PT and progression of balance, strength and LE flexibility  Limited flexibility limits functional mobility  Plan: Continue per plan of care        Precautions: PT CANNOT LIE SUPINE - LONGITUDINAL INCISION STILL HEALING  Past Medical History:   Diagnosis Date   • Ambulates with cane     "long distance"   • Arthritis    • Back pain    • Chronic pain disorder    • Dental crowns present    • Exercise involving walking     daily -short walks   • History of hepatitis C     per pt "went away on its own" age 13"   • History of transfusion    • Hyperlipidemia    • Hypertension    • Leg pain     and foot pain   • Limb alert care status     per pt NO IV's LEFT UPPER ARM due to old injury   • Risk for falls    • Spinal stenosis    • Stroke [facility])     per pt in -and no lasting problems   • Wears glasses     readers     SOC: 2022  FOTO: 2022  POC EXP: 3/9/2023  DAILY TREATMENT LOG:  date 2022    Visit #/auth 1 2 3 4                            Neuro Re-Ed  25 25' 25'    FT w/ EC CGA  20" x 4 20"x4 CS 30"x2 CGA    FT w/ EO on foam  20" x 4 with CGA FT EO w/ head turns 2x10 CGa FT EO w/ head turns 2x10 CS    Step stance balance with EC  20" x 3 R/L      Sidestep along rail  3 laps with CS RTB @ knees 10'x3 R/L w/S RTB @ knees 10'x3 R/L indep    Alt taps to step  4" 2x10  To cone CGA 1x10 R/L    FSU near rail   6" step 2x10 R/L CGA 6" step CGA 2x10 R/L    Retro walk near rail   RTB @ knees 10'x4 CS No TB retro walk 10'x4 w/ S    Ther Ex  15 20' 20'    B/L HR  2x10  2x10 2x10    LAQ w/ add   2x10 R/L 2# 3" 2x10 2# 3" 2x10 R/L    TB hams  RTB 2x10 R/L grn TB 2x10 R/L grn 2x10 R/L    Standing step backs  2x10 R/L      Hip abd sidelylng  10x R/L      Stand hip abd   RTB @ knees 2x10 R/L     Stand hip exten    RTB @ knees 2x10 R/L RTB @ knees 2x10 R/L    STS from chair   2x10 W/ B/L OH press 1# DB's 2x10    Long stepping along rail for LE ROM    4 laps    B/L UE ext vs tubing    grn 1x15    Sit modif fig 4 stretch w/ foot on stool    10"x5 R/L    HEP   updated     Ther Activity                        Gait Training                        Modalities                        Access Code: [email or web address]  URL: [email or web address]  Date: 12/23/2022  Prepared by:  Nat Enriquez    Exercises  Sidelying Hip Abduction - 1 x daily - 2 sets - 10 reps  Reverse clamshell with resistance - 1 x daily - 2 sets - 10 reps  Seated Long Arc Quad with Hip Adduction - 1 x daily - 2 sets - 10 reps - 3 hold  Seated Hamstring Curl with Anchored Resistance - 1 x daily - 2 sets - 10 reps  Standing Hip Abduction with Resistance at Ankles and Counter Support - 1 x daily - 2 sets - 10 reps  Hip Extension with Resistance Loop - 1 x daily - 2 sets - 10 reps  Side Stepping with Resistance at Ankles - 1 x daily - 3 sets - 10 reps  Standing Heel Raises - 1 x daily - 2 sets - 10 reps  Squat with Chair Touch - 1 x daily - 2 sets - 10 reps

## 2023-01-03 ENCOUNTER — OFFICE VISIT (OUTPATIENT)
Dept: PHYSICAL THERAPY | Facility: CLINIC | Age: 68
End: 2023-01-03

## 2023-01-03 DIAGNOSIS — Z98.1 S/P LUMBAR FUSION: Primary | ICD-10-CM

## 2023-01-03 NOTE — PROGRESS NOTES
Daily Note     Today's date: 1/3/2023  Patient name: Lois Ureña  : 1955  MRN: [id number]  Referring provider: Haylee Thapa MD  Dx:   Encounter Diagnosis     ICD-10-CM    1  S/P lumbar fusion  Z98 1                      Subjective: Pt enters c/o stabbing pain behind L shoulder blade 3/10  Objective: See treatment diary below  Shoulder blade pain dec/B w/ B/L UE wall slides  Assessment: Tolerated treatment well  Patient demonstrated fatigue post treatment, would benefit from continued PT and focus on functional ROM, strength progression and balance  He continues to need CGA w/ balance activities  Plan: Continue per plan of care        Precautions: PT CANNOT LIE SUPINE - LONGITUDINAL INCISION STILL HEALING  Past Medical History:   Diagnosis Date   • Ambulates with cane     "long distance"   • Arthritis    • Back pain    • Chronic pain disorder    • Dental crowns present    • Exercise involving walking     daily -short walks   • History of hepatitis C     per pt "went away on its own" age 13"   • History of transfusion    • Hyperlipidemia    • Hypertension    • Leg pain     and foot pain   • Limb alert care status     per pt NO IV's LEFT UPPER ARM due to old injury   • Risk for falls    • Spinal stenosis    • Stroke [facility])     per pt in -and no lasting problems   • Wears glasses     readers     SOC: 2022  FOTO: 2022  POC EXP: 3/9/2023  DAILY TREATMENT LOG:  date 2022 2022 2022 2022 1/3/2023   Visit #/auth 1 2 3 4 5                           Neuro Re-Ed  25 25' 25' 25'   FT w/ EC CGA  20" x 4 20"x4 CS 30"x2 CGA 30"x2 CGA   FT w/ EO on foam  20" x 4 with CGA FT EO w/ head turns 2x10 CGa FT EO w/ head turns 2x10 CS 2x10 R/L CGA   Step stance balance with EC  20" x 3 R/L   Tandem walk CGA 15'x3   Sidestep along rail  3 laps with CS RTB @ knees 10'x3 R/L w/S RTB @ knees 10'x3 R/L indep    Alt taps to step  4" 2x10  To cone CGA 1x10 R/L To cone CGA 1x10 R/L   FSU near rail   6" step 2x10 R/L CGA 6" step CGA 2x10 R/L 6" step CGA 2x10 R/L   Retro walk near rail   RTB @ knees 10'x4 CS No TB retro walk 10'x4 w/ S Away from rail 15'x3 CGA   Ther Ex  15 20' 20' 20'   B/L HR  2x10  2x10 2x10    LAQ w/ add   2x10 R/L 2# 3" 2x10 2# 3" 2x10 R/L    TB hams  RTB 2x10 R/L grn TB 2x10 R/L grn 2x10 R/L Blue 2x10 R/L   Standing step backs  2x10 R/L      Hip abd sidelylng  10x R/L      Stand hip abd   RTB @ knees 2x10 R/L  RTB @ knees 2x10 R/L   Stand hip exten    RTB @ knees 2x10 R/L RTB @ knees 2x10 R/L RTB @ knees 2x10 R/L   STS from chair   2x10 W/ B/L OH press 1# DB's 2x10 W/ B/L OH press 1# DB's 2x10   Long stepping along rail for LE ROM    4 laps 4 laps   B/L UE ext vs tubing    grn 1x15 yulisa 7# 2x15   Sit modif fig 4 stretch w/ foot on stool    10"x5 R/L 20"x3 R/L   B/L UE wall slides flexion     2x10   HEP   updated     Ther Activity                        Gait Training                        Modalities                        Access Code: [email or web address]  URL: [email or web address]  Date: 12/23/2022  Prepared by:  Jeremy Bradley    Exercises  Sidelying Hip Abduction - 1 x daily - 2 sets - 10 reps  Reverse clamshell with resistance - 1 x daily - 2 sets - 10 reps  Seated Long Arc Quad with Hip Adduction - 1 x daily - 2 sets - 10 reps - 3 hold  Seated Hamstring Curl with Anchored Resistance - 1 x daily - 2 sets - 10 reps  Standing Hip Abduction with Resistance at Ankles and Counter Support - 1 x daily - 2 sets - 10 reps  Hip Extension with Resistance Loop - 1 x daily - 2 sets - 10 reps  Side Stepping with Resistance at Ankles - 1 x daily - 3 sets - 10 reps  Standing Heel Raises - 1 x daily - 2 sets - 10 reps  Squat with Chair Touch - 1 x daily - 2 sets - 10 reps

## 2023-01-05 ENCOUNTER — OFFICE VISIT (OUTPATIENT)
Dept: PHYSICAL THERAPY | Facility: CLINIC | Age: 68
End: 2023-01-05

## 2023-01-05 DIAGNOSIS — Z98.1 STATUS POST LUMBAR SPINAL FUSION: ICD-10-CM

## 2023-01-05 DIAGNOSIS — Z98.1 S/P LUMBAR FUSION: Primary | ICD-10-CM

## 2023-01-09 ENCOUNTER — APPOINTMENT (OUTPATIENT)
Dept: RADIOLOGY | Facility: CLINIC | Age: 68
End: 2023-01-09

## 2023-01-09 ENCOUNTER — OFFICE VISIT (OUTPATIENT)
Dept: OBGYN CLINIC | Facility: CLINIC | Age: 68
End: 2023-01-09

## 2023-01-09 VITALS — WEIGHT: 215 LBS | BODY MASS INDEX: 30.78 KG/M2 | HEIGHT: 70 IN

## 2023-01-09 DIAGNOSIS — M41.9 SCOLIOSIS, UNSPECIFIED SCOLIOSIS TYPE, UNSPECIFIED SPINAL REGION: Primary | ICD-10-CM

## 2023-01-09 DIAGNOSIS — M40.30 FLATBACK SYNDROME: ICD-10-CM

## 2023-01-09 DIAGNOSIS — M41.9 SCOLIOSIS, UNSPECIFIED SCOLIOSIS TYPE, UNSPECIFIED SPINAL REGION: ICD-10-CM

## 2023-01-09 NOTE — PROGRESS NOTES
[id number] Daniel Walters MD  [address] [place] [id number]  [phone]    HISTORY OF PRESENT ILLNESS:    Jean Pierre Perry is a 78 y o  male who presents approximately 7 weeks status post removal of hardware L1-L3, posterior thoracic/ lumbar instrumentation from T8 to pelvis, thoracic osteotomy T11/12, thoracic laminotomy T8/9, T9/10 and T10/11,  posterior thoracic fusion T7-L1, revision instrumentation T8 to pelvis performed on 11/22/22  Following surgery, the patient stayed at an inpatient rehabilitation  The patient has been attending physical therapy  He has been utilizing his spinalogic device regularly  He notes a great improvement in his symptoms and function          ALLERGIES: No Known Allergies    MEDICATIONS:    Current Outpatient Medications:   •  cholecalciferol (VITAMIN D3) 1,000 units tablet, Take 2 tablets (2,000 Units total) by mouth daily, Disp: 60 tablet, Rfl: 0  •  gabapentin (NEURONTIN) 300 mg capsule, Take 1 capsule 300mg by mouth twice daily for 5 days and than transition to 1 capsule by mouth at bedtime for 5 days and stop, Disp: , Rfl: 0  •  lisinopril (ZESTRIL) 10 mg tablet, Take 1 tablet (10 mg total) by mouth daily, Disp: 30 tablet, Rfl: 0  •  lovastatin (MEVACOR) 10 MG tablet, Take 10 mg by mouth every evening, Disp: , Rfl:   •  aspirin (ECOTRIN LOW STRENGTH) 81 mg EC tablet, Take 1 tablet (81 mg total) by mouth daily, Disp: 30 tablet, Rfl: 0  •  HYDROmorphone (DILAUDID) 2 mg tablet, Take 1 tab (2mg) to 1 5 tab (3mg) by mouth up to 4 times per day (every 6 hours) as needed for moderate to severe pain , Disp: 42 tablet, Rfl: 0     PAST MEDICAL HISTORY:   Past Medical History:   Diagnosis Date   • Ambulates with cane     "long distance"   • Arthritis    • Back pain    • Chronic pain disorder    • Dental crowns present    • Exercise involving walking     daily -short walks   • History of hepatitis C     per pt "went away on its own" age 13"   • History of transfusion    • Hyperlipidemia    • Hypertension    • Leg pain     and foot pain   • Limb alert care status     per pt NO IV's LEFT UPPER ARM due to old injury   • Risk for falls    • Spinal stenosis    • Stroke ([facility] [address] )     per pt in -and no lasting problems   • Wears glasses     readers       PAST SURGICAL HISTORY:  Past Surgical History:   Procedure Laterality Date   • BACK SURGERY      with implants   • COLONOSCOPY     • LUMBAR FUSION N/A 2022    Procedure: L1-S1 revision of segmental hardware, L5-S1 osteotomy, posterior spinal fusion L4-S1, pelvic instrumentation, Transforaminal lumbar interbody fusion L5S1, Cage L5S1, instrumentation L1-pelvis; Surgeon: Shannon Mckeon MD;  Location: [facility];  Service: Orthopedics   • CO ARTHRODESIS POSTERIOR/PSTLAT TQ 1NTRSPC THORACIC N/A 2022    Procedure: removal of hardware L1-L3  Posterior thoracic/ lumbar instrumentation from T8 to pelvis  Thoracic osteotomy T11/12  Thoracic laminotomy T8/9, T9/10 and T10/11  Posterior thoracic fusion T7-L1  Revision instrumentation T8 to pelvis ;  Surgeon: Shannon Mckeon MD;  Location: [facility];  Service: Orthopedics   • WISDOM TOOTH EXTRACTION         SOCIAL HISTORY:  Social History     Tobacco Use   Smoking Status Former   • Types: Cigarettes   • Quit date:    • Years since quittin 0   Smokeless Tobacco Never        ROS:  Review of Systems   Constitutional: Negative for appetite change and unexpected weight change  HENT: Negative for congestion and trouble swallowing  Eyes: Negative for visual disturbance  Respiratory: Negative for cough and shortness of breath  Cardiovascular: Negative for chest pain and palpitations  Gastrointestinal: Negative for nausea and vomiting  Endocrine: Negative for cold intolerance and heat intolerance  Musculoskeletal: Negative for joint swelling and myalgias  Skin: Negative for rash  Neurological: Negative for numbness            PHYSICAL EXAM:  78 y o  male sitting comfortably on exam chair in no acute distress  Patient ambulates without normal gait, leaning slightly forward  The surgical scar is well healing  RADIOGRAPHIC STUDIES:  1  X-rays, scoli films, 12/13/2021:  Prior multilevel lumbar fusion with unknown instrumentation   Significant sagittal coronal balance   Adjacent segment kyphosis      2  MRI, lumbar spine, 12/21/2021:  Multilevel moderate to severe thoracic degenerative disc   Prior instrumentation from L1 to S1   Kyphosis at the thoracolumbar junction   No spinal cord compression or myelomalacia   Malposition right L1 screw      3  MRI, thoracic spine, 12/21/2021:  1 to S1 posterior instrumentation with kyphotic deformity at the upper lumbar spine   Flat back deformity   No significant central lateral recess stenosis   Malposition right L1 screw      4  Xrays, lumbar spine, 3/15/2022:  Status post L1-S1 posterior spinal fusion instrumentation   There was evidence of solid fusion from L2-S1   Screw cut-out is present at L1  Kianna Climes is also evidence of adjacent segment kyphosis and flat back deformity      5  CT lumbar and thoracic spine 3/18/2022: significant kyphosis of the lumbar and thoracic spine  Robust bony fusion of L1-L5, hardware intact  Screw penetration of the right lumbar spinal canal at L2 and L1 right pedicle screw cut out into the disk space   Failure of fusion of L5-S1      6  X-ray lumbar spine 4/18/2022:  intact hardware with lumbar and thoracic kyphosis      7  X-rays, Lumbar spine 6/6/22:  Stable fusion L1-Pelvis with no evidence of hardware failure   Interbody implant is in appropriate position  Kianna Climes is evidence of correction of sagittal deformity or proximally 10°       8  X-rays, lumbar spine, 07/05/2022:  Status post prior fusion from L1-S1   Pelvic instrumentation is in appropriate position  Kianna Climes is also hardware complication  Kianna Climes is no change in alignment of the spine when compared to prior films from 06/06/2022      9  X-rays, scoliosis films, 09/12/2022:  Status post L1-S1 revision instrumentation and fusion with pelvic screw instrumentation   No evidence of hardware complication   Alignment of the lumbar spine is maintained   There is significant kyphosis at the thoracolumbar junction      10  Xrays, lumbar spine, 10/10/2022:  Status post L1 to pelvis posterior spinal fusion instrumentation  There is also hardware complication  There was also significant deformity at the thoracolumbar junction  11  X-rays, L-spine, 1/9/23: Status post thoracolumbar fusion  Hardware is in appropriate position  There is also hardware complication  ASSESSMENT:  1  Scoliosis, unspecified scoliosis type, unspecified spinal region  -     XR spine thoracolumbar 2 vw; Future; Expected date: 01/09/2023    2  Flatback syndrome  -     XR spine thoracolumbar 2 vw; Future; Expected date: 01/09/2023      PLAN:  78 y o  male approximately 7 weeks status post removal of hardware L1-L3, posterior thoracic/ lumbar instrumentation from T8 to pelvis, thoracic osteotomy T11/12, thoracic laminotomy T8/9, T9/10 and T10/11,  posterior thoracic fusion T7-L1, revision instrumentation T8 to pelvis performed on 11/22/22  The patient's x-rays were reviewed today  The patient was advised to continue using his spinalogic device daily  He is to continue with his lifting restrictions of no more than 10 lbs  He will also continue use of his TSLO brace which was adjusted today to fit him more appropriately  I will see the patient back once he returns from [place]  He hopes to return the end of March          Scribe Attestation    I,:  Laxmi Estraad am acting as a scribe while in the presence of the attending physician :       I,:  Chika Duggan MD personally performed the services described in this documentation    as scribed in my presence :

## 2023-01-10 ENCOUNTER — OFFICE VISIT (OUTPATIENT)
Dept: PHYSICAL THERAPY | Facility: CLINIC | Age: 68
End: 2023-01-10

## 2023-01-10 DIAGNOSIS — Z98.1 S/P LUMBAR FUSION: Primary | ICD-10-CM

## 2023-01-10 DIAGNOSIS — Z98.1 STATUS POST LUMBAR SPINAL FUSION: ICD-10-CM

## 2023-01-12 ENCOUNTER — OFFICE VISIT (OUTPATIENT)
Dept: PHYSICAL THERAPY | Facility: CLINIC | Age: 68
End: 2023-01-12

## 2023-01-12 DIAGNOSIS — Z98.1 STATUS POST LUMBAR SPINAL FUSION: ICD-10-CM

## 2023-01-12 DIAGNOSIS — Z98.1 S/P LUMBAR FUSION: Primary | ICD-10-CM

## 2023-01-12 NOTE — PROGRESS NOTES
Daily Note     Today's date: 2023  Patient name: Kevin Nesbitt  : 1955  MRN: [id number]  Referring provider: Jeri Horne MD  Dx:   Encounter Diagnosis     ICD-10-CM    1  S/P lumbar fusion  Z98 1       2  Status post lumbar spinal fusion  Z98 1                      Subjective: Pt reports less pain today -3/10  Objective: See treatment diary below; continued w/ stretches during session as pain level is reduced  Assessment: Tolerated treatment well and demonstrates improving balance, but still limited  Patient would benefit from continued PT      Plan: Continue per plan of care        Precautions: PT CANNOT LIE SUPINE - LONGITUDINAL INCISION STILL HEALING  Past Medical History:   Diagnosis Date   • Ambulates with cane     "long distance"   • Arthritis    • Back pain    • Chronic pain disorder    • Dental crowns present    • Exercise involving walking     daily -short walks   • History of hepatitis C     per pt "went away on its own" age 13"   • History of transfusion    • Hyperlipidemia    • Hypertension    • Leg pain     and foot pain   • Limb alert care status     per pt NO IV's LEFT UPPER ARM due to old injury   • Risk for falls    • Spinal stenosis    • Stroke [facility])     per pt in -and no lasting problems   • Wears glasses     readers     SOC: 2022  FOTO: 2022  POC EXP: 3/9/2023  DAILY TREATMENT LOG:  date 2023 1/10/2023 2023     Visit #/auth [id number] [phone] [id number]                             Neuro Re-Ed 20'  15' 15'     FT w/ EC CGA 30"x2 CGA       FT w/ EO on foam FT EO w/ head turns 2x10 CS       Step stance balance with EC Tandem walk CGA 2 laps near rail   Tandem walk awy from rail 20'x4 CGA     Sidestep along rail RTB @ knees 3 laps near window CS        Monster walks along rail  RTB @ nears 3 laps near window CS        Alt taps to step To cone CGA 1x10 R/L  2 cones 1x10 CGA 2 cones 1x12 r/L CGA     FSU near rail CGA 6" step  8" step 2x10 R/L step to pattern no rail CS W/ knee hike w/ rail 2x10 R/L 8" step     Retro walk near rail 2 laps near rail CGA  2 laps near rail CS      Ther Ex 18'  30' 30'     B/L HR Off step 2x10 CGA Off step 2x15 Off step 2x15     Hip flexor stretch foot on step  8" step 5"x10 r/L 10" step 5"x10 R/L     Stand hip abd RTB @ knees 2x10 R/L RTB @ knees 2x10 R/L RTB @ knees 2x10 R/L     Stand hip exten  RTB @ knees 2x10 R/L RTB @ knees 2x10 R/L RTB @ knees 2x10 R/L     STS from chair W/ B/L OH press 1# DB's 2x10  TRX squat 1x10; STS low mat butt taps w/ OH press 1x10 2# DB's Chair w/ B/L OH press 2# DB's 2x10     Long stepping along rail for LE ROM  No rail 20'x2      B/L UE ext vs tubing yulisa 7# 2x15        Sit modif fig 4 stretch w/ foot on stool 20"x3  20"x3 R/L 20"x3 R/L     B/L UE wall slides flexion 2x10  1x10 1x10     pulleys  3' feels back stretch 3'     Ther Activity                        Gait Training                        Modalities                        Access Code: Marvin Fay  URL: [email or web address]  Date: 12/23/2022  Prepared by:  Pam Bustos    Exercises  Sidelying Hip Abduction - 1 x daily - 2 sets - 10 reps  Reverse clamshell with resistance - 1 x daily - 2 sets - 10 reps  Seated Long Arc Quad with Hip Adduction - 1 x daily - 2 sets - 10 reps - 3 hold  Seated Hamstring Curl with Anchored Resistance - 1 x daily - 2 sets - 10 reps  Standing Hip Abduction with Resistance at Ankles and Counter Support - 1 x daily - 2 sets - 10 reps  Hip Extension with Resistance Loop - 1 x daily - 2 sets - 10 reps  Side Stepping with Resistance at Ankles - 1 x daily - 3 sets - 10 reps  Standing Heel Raises - 1 x daily - 2 sets - 10 reps  Squat with Chair Touch - 1 x daily - 2 sets - 10 reps

## 2023-01-16 ENCOUNTER — TELEPHONE (OUTPATIENT)
Dept: GASTROENTEROLOGY | Facility: CLINIC | Age: 68
End: 2023-01-16

## 2023-01-16 NOTE — TELEPHONE ENCOUNTER
01/16/23  Screened by: Pamela Campos    Referring Provider Dr Tylor Stewart: There is no height or weight on file to calculate BMI  Has patient been referred for a routine screening Colonoscopy? yes  Is the patient between 39-70 years old? yes      Previous Colonoscopy yes   If yes:    Date: 15 years ago     Facility:     Reason:       SCHEDULING STAFF: If the patient is between 39yrs-47yrs, please advise patient to confirm benefits/coverage with their insurance company for a routine screening colonoscopy, some insurance carriers will only cover at [address] or older  If the patient is over 66years old, please schedule an office visit  Does the patient want to see a Gastroenterologist prior to their procedure OR are they having any GI symptoms? no    Has the patient been hospitalized or had abdominal surgery in the past 6 months? yes Back Surgery     Does the patient use supplemental oxygen? no    Does the patient take Coumadin, Lovenox, Plavix, Elliquis, Xarelto, or other blood thinning medication? no    Has the patient had a stroke, cardiac event, or stent placed in the past year? no    SCHEDULING STAFF: If patient answers NO to above questions, then schedule procedure  If patient answers YES to above questions, then schedule office appointment  Patient Failed OA  If patient is between 45yrs - 49yrs, please advise patient that we will have to confirm benefits & coverage with their insurance company for a routine screening colonoscopy

## 2023-01-17 ENCOUNTER — CONSULT (OUTPATIENT)
Dept: GASTROENTEROLOGY | Facility: CLINIC | Age: 68
End: 2023-01-17

## 2023-01-17 ENCOUNTER — OFFICE VISIT (OUTPATIENT)
Dept: PHYSICAL THERAPY | Facility: CLINIC | Age: 68
End: 2023-01-17

## 2023-01-17 VITALS
DIASTOLIC BLOOD PRESSURE: 90 MMHG | HEIGHT: 70 IN | WEIGHT: 214.6 LBS | HEART RATE: 87 BPM | BODY MASS INDEX: 30.72 KG/M2 | SYSTOLIC BLOOD PRESSURE: 144 MMHG | TEMPERATURE: 97.8 F

## 2023-01-17 DIAGNOSIS — R10.32 LLQ PAIN: Primary | ICD-10-CM

## 2023-01-17 DIAGNOSIS — Z98.1 S/P LUMBAR FUSION: Primary | ICD-10-CM

## 2023-01-17 DIAGNOSIS — Z98.1 STATUS POST LUMBAR SPINAL FUSION: ICD-10-CM

## 2023-01-17 DIAGNOSIS — Z12.11 COLON CANCER SCREENING: ICD-10-CM

## 2023-01-17 NOTE — PATIENT INSTRUCTIONS
Scheduled date of colonoscopy (as of today): 02/01/23  Physician performing colonoscopy: JOHN  Location of colonoscopy: [facility]  Bowel prep reviewed with patient: SADIA VELEZ [facility]  Instructions reviewed with patient by: VIRGINIA  Clearances: GERTRUDE

## 2023-01-17 NOTE — LETTER
January 17, 2023     MD Lora [place]    Patient: Guillermo Tello   YOB: 1955   Date of Visit: 1/17/2023       Dear Dr Eric Ceja: Thank you for referring Calderon Godoy to me for evaluation  Below are my notes for this consultation  If you have questions, please do not hesitate to call me  I look forward to following your patient along with you  Sincerely,        Eric Ceja MD        CC: No Recipients  Eric Ceja MD  1/17/2023  8:25 AM  Sign when Signing Visit  Consultation - [facility]  Guillermo Tello 78 y o  male MRN: [id number]  Unit/Bed#:  Encounter: [id number]        Consults    ASSESSMENT/PLAN:       1  Colon cancer screening-his colonoscopy was 15 years ago  No anemia, no change in bowel habits     -Patient is currently on aspirin 81 mg on daily basis only  -We will schedule average rescreening colonoscopy at this time   -Patient reports that his last colonoscopy was over 15 years ago  patient was explained about the risks and benefits of the procedure  Risks including but not limited to bleeding, infection, perforation were explained in detail  Also explained about less than 100% sensitivity with the exam and other alternatives  2   Intermittent left lower quadrant discomfort-likely musculoskeletal versus secondary to constipation as symptoms typically relieved with passage of stool  I do not suspect diverticulitis given benign exam although there was evidence of diverticulosis on a CAT scan in 2017   -Would recommend high-fiber diet       ______________________________________________________________________    Reason for Consult / Principal Problem: [unfilled]    HPI: Guilelrmo Tello is a 78y o  year old male history of CVA on aspirin, hypertension, recent spine surgery, recently discharged from inpatient rehab, and physical therapy presents for colon cancer screening evaluation  Patient denies any change in bowel habits, reports that he occasionally has left lower quadrant discomfort which is typically relieved with passage of stool  He reports that he has started noticing this over the past month  He denies any hematochezia  No  weight loss  No family history of colon cancer  He reports having had a colonoscopy over 15 years ago and thinks that he may have had a small polyp that was removed  He denies any upper GI symptoms including acid reflux, dysphagia, odynophagia, loss of appetite or early satiety  He takes aspirin 81 mg for CVA that occurred in his 25s  He does not have any residual weakness  Blood work is notable for normal renal function, normal liver enzymes, hemoglobin of 12 4, platelets 969  Review of Systems: The remainder of the review of systems was negative except for the pertinent positives noted in HPI  Historical Information   Past Medical History:   Diagnosis Date   • Ambulates with cane     "long distance"   • Arthritis    • Back pain    • Chronic pain disorder    • Dental crowns present    • Exercise involving walking     daily -short walks   • Hepatitis C    • History of hepatitis C     per pt "went away on its own" age 13"   • History of transfusion    • Hyperlipidemia    • Hypertension    • Infectious viral hepatitis 1972   • Leg pain     and foot pain   • Limb alert care status     per pt NO IV's LEFT UPPER ARM due to old injury   • Risk for falls    • Spinal stenosis    • Stroke ([facility] [address] )     per pt in 1996-and no lasting problems   • Wears glasses     readers     Past Surgical History:   Procedure Laterality Date   • BACK SURGERY      with implants   • COLONOSCOPY     • LUMBAR FUSION N/A 5/24/2022    Procedure: L1-S1 revision of segmental hardware, L5-S1 osteotomy, posterior spinal fusion L4-S1, pelvic instrumentation, Transforaminal lumbar interbody fusion L5S1, Cage L5S1, instrumentation L1-pelvis;   Surgeon: Irena Kamara MD;  Location: [facility];  Service: Orthopedics   • [place] ARTHRODESIS POSTERIOR/PSTLAT TQ 1NTRSPC THORACIC N/A 2022    Procedure: removal of hardware L1-L3  Posterior thoracic/ lumbar instrumentation from T8 to pelvis  Thoracic osteotomy T11/12  Thoracic laminotomy T8/9, T9/10 and T10/11  Posterior thoracic fusion T7-L1  Revision instrumentation T8 to pelvis ;  Surgeon: Johanna Saldaña MD;  Location: [facility];  Service: Orthopedics   • WISDOM TOOTH EXTRACTION       Social History   Social History     Substance and Sexual Activity   Alcohol Use Yes   • Alcohol/week: 4 0 standard drinks   • Types: 1 Glasses of wine, 3 Cans of beer per week    Comment: drinks once a month     Social History     Substance and Sexual Activity   Drug Use Not Currently     Social History     Tobacco Use   Smoking Status Former   • Packs/day: 0 25   • Years: 30 00   • Pack years: 7 50   • Types: Cigarettes   • Start date: 1974   • Quit date: 2000   • Years since quittin 0   Smokeless Tobacco Never   Tobacco Comments    only smoked  less than a pack a month     Family History   Problem Relation Age of Onset   • Hypertension Mother         dead   • Hypertension Father         dead       Meds/Allergies      (Not in a hospital admission)    No current facility-administered medications for this visit  No Known Allergies    Objective      Blood pressure 144/90, pulse 87, temperature 97 8 °F (36 6 °C), temperature source Temporal, height 5' 10" (1 778 m), weight 97 3 kg (214 lb 9 6 oz)  [unfilled]    PHYSICAL EXAM     GEN: well nourished, well developed, no acute distress  HEENT: anicteric, MMM, no cervical or supraclavicular lymphadenopathy  CV: RRR, no m/r/g  CHEST: CTA b/l, no WRR  ABD: +BS, soft, NT/ND, no hepatosplenomegaly  EXT: no c/c/e  SKIN: no rashes,  NEURO: aaox3    Lab Results:   No visits with results within 1 Day(s) from this visit     Latest known visit with results is:   Admission on 2022, Discharged on 12/10/2022   Component Date Value   • Sodium 11/28/2022 133 (L)    • Potassium 11/28/2022 4 0    • Chloride 11/28/2022 100    • CO2 11/28/2022 27    • ANION GAP 11/28/2022 6    • BUN 11/28/2022 19    • Creatinine 11/28/2022 0 82    • Glucose 11/28/2022 98    • Glucose, Fasting 11/28/2022 98    • Calcium 11/28/2022 9 1    • Corrected Calcium 11/28/2022 9 6    • AST 11/28/2022 79 (H)    • ALT 11/28/2022 71 (H)    • Alkaline Phosphatase 11/28/2022 80    • Total Protein 11/28/2022 6 5    • Albumin 11/28/2022 3 4 (L)    • Total Bilirubin 11/28/2022 0 60    • eGFR 11/28/2022 92    • WBC 11/28/2022 6 84    • RBC 11/28/2022 4 28    • Hemoglobin 11/28/2022 11 8 (L)    • Hematocrit 11/28/2022 37 7    • MCV 11/28/2022 88    • MCH 11/28/2022 27 6    • MCHC 11/28/2022 31 3 (L)    • RDW 11/28/2022 13 8    • MPV 11/28/2022 8 4 (L)    • Platelets 05/17/8179 341    • nRBC 11/28/2022 0    • Neutrophils Relative 11/28/2022 69    • Immat GRANS % 11/28/2022 1    • Lymphocytes Relative 11/28/2022 18    • Monocytes Relative 11/28/2022 7    • Eosinophils Relative 11/28/2022 5    • Basophils Relative 11/28/2022 0    • Neutrophils Absolute 11/28/2022 4 71    • Immature Grans Absolute 11/28/2022 0 06    • Lymphocytes Absolute 11/28/2022 1 22    • Monocytes Absolute 11/28/2022 0 50    • Eosinophils Absolute 11/28/2022 0 33    • Basophils Absolute 11/28/2022 0 02    • Vit D, 25-Hydroxy 11/28/2022 24 2 (L)    • Cholesterol 11/28/2022 151    • Triglycerides 11/28/2022 94    • HDL, Direct 11/28/2022 30 (L)    • LDL Calculated 11/28/2022 102    • Non-HDL-Chol (CHOL-HDL) 11/28/2022 121    • Sodium 12/01/2022 132 (L)    • Potassium 12/01/2022 4 1    • Chloride 12/01/2022 100    • CO2 12/01/2022 26    • ANION GAP 12/01/2022 6    • BUN 12/01/2022 13    • Creatinine 12/01/2022 0 79    • Glucose 12/01/2022 112 (H)    • Glucose, Fasting 12/01/2022 112 (H)    • Calcium 12/01/2022 9 5    • AST 12/01/2022 72 (H)    • ALT 12/01/2022 78 (H)    • Alkaline Phosphatase 12/01/2022 99    • Total Protein 12/01/2022 6 9    • Albumin 12/01/2022 3 7    • Total Bilirubin 12/01/2022 0 63    • eGFR 12/01/2022 93    • WBC 12/01/2022 7 87    • RBC 12/01/2022 4 42    • Hemoglobin 12/01/2022 12 6    • Hematocrit 12/01/2022 38 0    • MCV 12/01/2022 86    • MCH 12/01/2022 28 5    • MCHC 12/01/2022 33 2    • RDW 12/01/2022 13 6    • MPV 12/01/2022 8 3 (L)    • Platelets 74/32/5139 489 (H)    • nRBC 12/01/2022 0    • Neutrophils Relative 12/01/2022 72    • Immat GRANS % 12/01/2022 1    • Lymphocytes Relative 12/01/2022 18    • Monocytes Relative 12/01/2022 6    • Eosinophils Relative 12/01/2022 3    • Basophils Relative 12/01/2022 0    • Neutrophils Absolute 12/01/2022 5 64    • Immature Grans Absolute 12/01/2022 0 08    • Lymphocytes Absolute 12/01/2022 1 43    • Monocytes Absolute 12/01/2022 0 49    • Eosinophils Absolute 12/01/2022 0 20    • Basophils Absolute 12/01/2022 0 03    • Sodium 12/05/2022 134 (L)    • Potassium 12/05/2022 4 3    • Chloride 12/05/2022 101    • CO2 12/05/2022 29    • ANION GAP 12/05/2022 4 (L)    • BUN 12/05/2022 14    • Creatinine 12/05/2022 0 84    • Glucose 12/05/2022 95    • Glucose, Fasting 12/05/2022 95    • Calcium 12/05/2022 9 8    • AST 12/05/2022 38    • ALT 12/05/2022 37    • Alkaline Phosphatase 12/05/2022 116    • Total Protein 12/05/2022 7 2    • Albumin 12/05/2022 3 8    • Total Bilirubin 12/05/2022 0 67    • eGFR 12/05/2022 91    • WBC 12/05/2022 7 68    • RBC 12/05/2022 4 60    • Hemoglobin 12/05/2022 12 9    • Hematocrit 12/05/2022 40 8    • MCV 12/05/2022 89    • MCH 12/05/2022 28 0    • MCHC 12/05/2022 31 6    • RDW 12/05/2022 13 2    • MPV 12/05/2022 8 5 (L)    • Platelets 42/12/4838 548 (H)    • nRBC 12/05/2022 0    • Neutrophils Relative 12/05/2022 66    • Immat GRANS % 12/05/2022 1    • Lymphocytes Relative 12/05/2022 23    • Monocytes Relative 12/05/2022 7    • Eosinophils Relative 12/05/2022 3    • Basophils Relative 12/05/2022 0 • Neutrophils Absolute 12/05/2022 5 05    • Immature Grans Absolute 12/05/2022 0 06    • Lymphocytes Absolute 12/05/2022 1 78    • Monocytes Absolute 12/05/2022 0 54    • Eosinophils Absolute 12/05/2022 0 22    • Basophils Absolute 12/05/2022 0 03    • Sodium 12/08/2022 136    • Potassium 12/08/2022 4 1    • Chloride 12/08/2022 103    • CO2 12/08/2022 27    • ANION GAP 12/08/2022 6    • BUN 12/08/2022 15    • Creatinine 12/08/2022 0 82    • Glucose 12/08/2022 101 (H)    • Glucose, Fasting 12/08/2022 101 (H)    • Calcium 12/08/2022 9 7    • eGFR 12/08/2022 92    • WBC 12/08/2022 6 02    • RBC 12/08/2022 4 39    • Hemoglobin 12/08/2022 12 4    • Hematocrit 12/08/2022 38 7    • MCV 12/08/2022 88    • MCH 12/08/2022 28 2    • MCHC 12/08/2022 32 0    • RDW 12/08/2022 13 0    • MPV 12/08/2022 8 3 (L)    • Platelets 63/02/4839 520 (H)    • nRBC 12/08/2022 0    • Neutrophils Relative 12/08/2022 61    • Immat GRANS % 12/08/2022 1    • Lymphocytes Relative 12/08/2022 27    • Monocytes Relative 12/08/2022 7    • Eosinophils Relative 12/08/2022 3    • Basophils Relative 12/08/2022 1    • Neutrophils Absolute 12/08/2022 3 71    • Immature Grans Absolute 12/08/2022 0 03    • Lymphocytes Absolute 12/08/2022 1 65    • Monocytes Absolute 12/08/2022 0 42    • Eosinophils Absolute 12/08/2022 0 17    • Basophils Absolute 12/08/2022 0 04      Imaging Studies: I have personally reviewed pertinent films in PACS                Answers for HPI/ROS submitted by the patient on 1/16/2023  Chronicity: new  Onset: 1 to 4 weeks ago  Onset quality: sudden  Frequency: 2 to 4 times per day  Episode duration: 1 Hours  Progression since onset: unchanged  Pain location: suprapubic region  Pain - numeric: 4/10  Pain quality: sharp  Radiates to: periumbilical region  anorexia: No  arthralgias: No  belching: No  constipation: No  diarrhea: No  fever: No  flatus: No  frequency: Yes  headaches: No  hematochezia: No  hematuria: No  melena: No  nausea: No  weight loss: No  vomiting: No  Aggravated by: movement  Relieved by: recumbency  Diagnostic workup: ultrasound

## 2023-01-17 NOTE — PROGRESS NOTES
PT Re-Evaluation     Today's date: 2023  Patient name: Tiana West  : 1955  MRN: [id number]  Referring provider: Radha Javier MD  Dx:   Encounter Diagnosis     ICD-10-CM    1  S/P lumbar fusion  Z98 1       2  Status post lumbar spinal fusion  Z98 1                      Assessment  Assessment details: 2023: Pt has attended 10 skilled PT sessions and has made excellent gains in functional mobility, strength and balance  He lacks flexibility in pirif and  Hip flexors still which limit his functional mobility  L PF remains weak  Balance nearing PLOF  Pt will likely be ready for D/C as planned at the end of the month  2022: Tiana West is a 78 y o  male who presents with s/p spinal fusion with pain, decreased strength, decreased ROM, decreased joint mobility, decreased sensation, impaired sensation, ambulatory dysfunction, postural dysfunction, balance dysfunction and healing longitudinal incision along mid thoracic through lower lumbar spine  Due to these impairments, patient has difficulty performing ADL's, recreational activities, engaging in social activities, ambulation, stair negotiation, lifting/carrying, transfers, reaching  Patient's clinical presentation is consistent with their referring diagnosis of S/P lumbar fusion  (primary encounter diagnosis)  Patient has been educated in post-op contraindications / precautions, home exercise program and plan of care  Patient would benefit from skilled physical therapy services to address their aforementioned functional limitations and progress towards prior level of function and independence with home exercise program and self symptom management/resolution       Impairments: abnormal or restricted ROM, impaired balance, impaired physical strength, pain with function and poor posture   Functional limitations: pt not allowed to bend, twist or lift>5#; fall riskUnderstanding of Dx/Px/POC: good   Prognosis: good    Goals  Short Term Goals: Target Date 1/12/2023  1  Initiate and advance HEP toward self symptom reduction/resolution  - met  2  Improve postural awareness and demonstrate self postural correction  - met  3  Improve LE strength by 1/2 MMT grade to prepare for ability to negotiate stairs reciprocally  - met w/ rail  4  Improve balance such that pt can stand w/o UE assist for 5 minutes or more  - met  5  Pt to tolerate prolonged sitting/standing x 1 hour or more w/o c/o  - progressing toward      Long Term Goals: Target Date 3/9/2023 -  1  Indep with HEP and self symptom prevention  - continued updates  2  Achieve LE strength of 4+/5 or better throughout such that pt can complete reciprocal stairs w/ rail  - partially met  3  Improve balance such that HODGES score reaches 50 or better, indicating reduced risk for falls  - met  4  Pt to complete TUG w/ SPC or no AD in 17 seconds or less, indicating reduced risk for fall s - met  5  Pt to D/C RW for ambulation and progress to [facility] for long distances/outdoors  - improving  6  Improve LE strength, balance and endurance such that pt can 5x STS w/o assist of UE's in 18 seconds or less   - met    Plan  Patient would benefit from: skilled PT and PT eval  Planned modality interventions: thermotherapy: hydrocollator packs and unattended electrical stimulation  Planned therapy interventions: joint mobilization, patient education, postural training, abdominal trunk stabilization, functional ROM exercises, home exercise program, neuromuscular re-education, strengthening, stretching, therapeutic activities and therapeutic exercise  Other planned therapy interventions: mechanical assessment  Frequency: 2x week  Plan of Care beginning date: 12/14/2022  Plan of Care expiration date: 3/9/2023  Treatment plan discussed with: patient        Subjective Evaluation    History of Present Illness  Date of surgery: 11/22/2022  Mechanism of injury: Subjective   1/17/2023: Pt reports his pain is significantly reduced and his function and balance are improving  He is now sleeping in his bed  He has D/C AD for ambulation  His only function deficit is LE dressing ROM  He feels his balance is near his PLOF, still a little limited w/ walking down an incline  2022: Pt is here for rehabilitation after his 2rd and hopefully final lumbar surgery  He was seen previously for physical therapy following lumbar fusion  This surgery was thoracic fusion  He was in acute care 4-5 days, then rehab center 7-8 days  He was using cane for long walks, but no AD for short distance/indoor ambulation  He is currently using a RW for ambulation  He reports no pain radiating into his arms or legs, but has constant pain in his back in varying locations  He reports numbness in the toes of his R foot since his lumbar fusion  Pain  At best pain ratin  At worst pain rating: 3  Relieving factors: medications and change in position  Exacerbated by: worst in the morning and end of the day; prolonged positions  Progression: no change    Social Support  Steps to enter house: yes (1 w/ rail)  Stairs in house: yes (pt staying on first floor temporarily)     Treatments  Previous treatment: physical therapy and immobilization  Current treatment: physical therapy  Patient Goals  Patient goals for therapy: increased strength and improved balance  Patient goal: progress away from rolling walker        Objective       Function:   2023: Pt can perform R uni HR, but not L uni HR, nor can he perform L uni eccent HR (able B/L HR); He has DC AD for ambulation and is now sleeping in his bed  He struggles w/ ROM for socks/shoes/toileting  He reports some remaining limitation of weight bearing/walking/standing of 15 minutes  2022: pt states he is indep w/ car/bed xfers; stairs are non reciprocal;  sleep disturbed by discomfort; Pt has been advised not to lift > 5# and not to bend, lift or twist  He reports he can bathe and dress himself   He is not driving due to his medications  Pt cannot walk w/o RW due to balance; standing tolerance estimated at less than a minute w/o AD  Pt does not lie supine due to incision still healing  Gait:   2023 - ambulates w/ no AD, very mild flexion at hips due to hip flexor tightness  2022 - ambulates w/ RW w/ antalgic quality and flat footed gait  Remains flexed at his hips  DERMATOMAL TESTIN2022  L2 anterior thigh:  WNL B/L Dec R  L3 medial knee:  WNL B/L dec R  L4 medial maleolus:  WNL B/L dec R  L5 1st web space: WNL B/L dec R  S1 lateral/sole foot:  WNL B/L WNL B/L  S2 poster calf:  WNL B/L WNL B/L    MYOTOMAL TESTIN2022         Right Left  Right  Left     22  L2-3 Hip flexion:      4+/5  4+/5  4/5  4-/5  L3-4 Knee extension:      4+/5  4/5  4-/5  4/5  L5 Great toe exten:      5/5  5/5  4+/5  5/5  L4 DF:           5/5  5/5  5/5  5/5  S1 toe walk/PF     4+/5  4-/5  4+/5  4-/5  S1 eversion     5/5  5/5  5/5  5/5  Ankle inver     5/5  5/5  5/5  5/5  S2 knee flex:       5/5  4+/5  4/5  4/5  Hip abd sidelying    5/5  4+/5  4-/5  4-/5  Hip exten sidelying     5/5  4+/5  4-/5  4-/5    REFLEXES: 0= none; 1+ = slight; 2+ = brisk/normal; 3+= very brisk; 4+= clonus    2022  L3-4 Quadriceps: 2+ B/L  L5-S1 Achilles: 2+ B/L  Biceps:  2+ B/L  Brachioradialis:2+ B/L  Triceps:  2+ B/L    LUMBAR AROM: N/A - Fusion    OBSERVATION:  2023 - incision is healed; pt continues to wear back brace   - incision healing well - still some scabbing - steristrips in place L5-T8 - pt covers loosely w/ guaze pads      FLEXIBILITY  2023 - Mod ruby B/L hip flexors and hip ER/pirif; min ruby hams  2022 - Assessed in sitting - hams WFL B/L; hip ER mod ruby R>L; hip IR min ruby B/L; slump test (-) B/L    Outcome Measures Initial Eval  2023       5xSTS 31 14 sec w/ 5/10 RPE and use of B/L hands sec 11 28 no use UEs TUG 27 37 sec w/ RW 8 04 sec       Tandem bal EO R post 3 sec 30 sec+       Tandem bal EO L post 10 sec 30 sec+       HODGES 27/56 56/56       mCTSIB  - FTEO (firm)  - FTEC (firm)    - FTEO (foam)    - FTEC (foam)   60 sec w/ S  3 sec CS/LOB  50 sec mod sway  unable   60sec+  60 sec+    60 sec+  16 sec       SLS EO R  24 sec       SLS EO L  15 sec                     Cut off scores: All data taken from [facility] or Rehab Measures    Hodges: <46/56=falls risk  MDC: 6 pts  Age Norms:  61-76: M - 54   F - 55  70-79: M - 47   F - 53  80-89: M - 48   F - 50 5xSTS: Lgoan et al 2010  MDC: 2 3 sec  Age Norms:  60-69: 11 1 sec  70-79: 12 6 sec  80-89: 14 8 sec   TUG  MDC: 4 14 sec  Cut off score:  >13 5 sec community dwelling adults  >32 2 sec frail elderly  <20 sec:  I for basic transfers  >30: dependent for transfers 10 Meter Walk Test Norms: Lul Raines and Radha shepard 2011  20-29: M - 1 35 m   F - 1 34 m  30-39: M - 1 43 m   F - 1 34 m  40-49: M - 1 43 m   F - 1 39 m  50-59: M - 1 43 m   F - 1 31 m  60-69: M - 1 34 m   F - 1 24 m  70-79: M - 1 26 m   F - 1 13 m  80-89: M - 0 97 m   F - 0 94 m   FGA  MCID: 4 pts  Geriatrics/community < 22/30 fall risk  Geriatrics/community < 20/30 unexplained falls DGI  MDC: vestibular - 4 pts  MDC: geriatric/community - 3 pts  Falls risk <19/24   6 Minute Walk Test  Age Norms  61-76: M - 1876 ft (571 80 m)  F - 1765 ft (537 98 m)  70-79: M - 1729 ft (527 00 m)  F - 1545 ft (470 92 m)  80-89: M - 1368 ft (416 97 m)  F - 1286 ft (391 97 m) mCTSIB  Norm: 20-60 yrs  Eyes open firm: norm sway 0 21-0 48  Eyes closed firm: norm sway 0 48-0 99  Eyes open foam: norm sway 0 38-0 71  Eyes closed foam: norm sway 0 70-2 22        Precautions: PT CANNOT LIE SUPINE - LONGITUDINAL INCISION STILL HEALING  Past Medical History:   Diagnosis Date   • Ambulates with cane     "long distance"   • Arthritis    • Back pain    • Chronic pain disorder    • Dental crowns present    • Exercise involving walking     daily -short walks   • Hepatitis C    • History of hepatitis C     per pt "went away on its own" age 13"   • History of transfusion    • Hyperlipidemia    • Hypertension    • Infectious viral hepatitis 1972   • Leg pain     and foot pain   • Limb alert care status     per pt NO IV's LEFT UPPER ARM due to old injury   • Risk for falls    • Spinal stenosis    • Stroke [facility])     per pt in 1996-and no lasting problems   • Wears glasses     readers   [facility]: 12/14/2022  FOTO: 12/14/2022  POC EXP: 3/9/2023  DAILY TREATMENT LOG:  date 1/5/2023 1/10/2023 1/12/2023 1/17/2023    Visit #/auth [phone]                            Neuro Re-Ed 20'  15' 15' 25'    Balance & funct tests    15'    FT w/ EC CGA 30"x2 CGA       FT w/ EO on foam FT EO w/ head turns 2x10 CS       Step stance balance with EC Tandem walk CGA 2 laps near rail   Tandem walk awy from rail 20'x4 CGA Tandem walk away from rail 20'x2 ea fwd/bkwd    Sidestep along rail RTB @ knees 3 laps near window CS        Monster walks along rail  RTB @ knees nears 3 laps near window CS    RTB @ ankles 15'x2 fwd/bkwd    Alt taps to step To cone CGA 1x10 R/L  2 cones 1x10 CGA 2 cones 1x12 r/L CGA     FSU near rail CGA 6" step  8" step 2x10 R/L step to pattern no rail CS W/ knee hike w/ rail 2x10 R/L 8" step Non recip to 8" step+2" fian 1x10 R/L CGA    Retro walk near rail 2 laps near rail CGA  2 laps near rail CS      Ther Ex 18'  30' 30' 20'    B/L HR Off step 2x10 CGA Off step 2x15 Off step 2x15 2x15    Hip flexor stretch foot on step  8" step 5"x10 r/L 10" step 5"x10 R/L 10" step 10"x5 R/L    Stand hip abd RTB @ knees 2x10 R/L RTB @ knees 2x10 R/L RTB @ knees 2x10 R/L RTB @ ankles 2x10 R/L    Stand hip exten  RTB @ knees 2x10 R/L RTB @ knees 2x10 R/L RTB @ knees 2x10 R/L RTB @ ankles 2x10 R/L    STS from chair W/ B/L OH press 1# DB's 2x10  TRX squat 1x10; STS low mat butt taps w/ OH press 1x10 2# DB's Chair w/ B/L OH press 2# DB's 2x10     Long stepping along rail for LE ROM  No rail 20'x2      B/L UE ext vs tubing yulisa 7# 2x15        Sit modif fig 4 stretch w/ foot on stool 20"x3  20"x3 R/L 20"x3 R/L 20"x3 R/L    B/L UE wall slides flexion 2x10  1x10 1x10     pulleys  3' feels back stretch 3'     Ther Activity                        Gait Training                        Modalities                        Access Code: Alyssa Gardner  URL: [email or web address]  Date: 12/23/2022  Prepared by:  Laura Lua    Exercises  Sidelying Hip Abduction - 1 x daily - 2 sets - 10 reps  Reverse clamshell with resistance - 1 x daily - 2 sets - 10 reps  Seated Long Arc Quad with Hip Adduction - 1 x daily - 2 sets - 10 reps - 3 hold  Seated Hamstring Curl with Anchored Resistance - 1 x daily - 2 sets - 10 reps  Standing Hip Abduction with Resistance at Ankles and Counter Support - 1 x daily - 2 sets - 10 reps  Hip Extension with Resistance Loop - 1 x daily - 2 sets - 10 reps  Side Stepping with Resistance at Ankles - 1 x daily - 3 sets - 10 reps  Standing Heel Raises - 1 x daily - 2 sets - 10 reps  Squat with Chair Touch - 1 x daily - 2 sets - 10 reps

## 2023-01-17 NOTE — H&P (VIEW-ONLY)
Consultation - [facility]  Eliu Warren 78 y o  male MRN: [id number]  Unit/Bed#:  Encounter: [id number]        Consults    ASSESSMENT/PLAN:       1  Colon cancer screening-his colonoscopy was 15 years ago  No anemia, no change in bowel habits     -Patient is currently on aspirin 81 mg on daily basis only  -We will schedule average rescreening colonoscopy at this time   -Patient reports that his last colonoscopy was over 15 years ago  patient was explained about the risks and benefits of the procedure  Risks including but not limited to bleeding, infection, perforation were explained in detail  Also explained about less than 100% sensitivity with the exam and other alternatives  2   Intermittent left lower quadrant discomfort-likely musculoskeletal versus secondary to constipation as symptoms typically relieved with passage of stool  I do not suspect diverticulitis given benign exam although there was evidence of diverticulosis on a CAT scan in 2017   -Would recommend high-fiber diet       ______________________________________________________________________    Reason for Consult / Principal Problem: [unfilled]    HPI: Eliu Warren is a 78y o  year old male history of CVA on aspirin, hypertension, recent spine surgery, recently discharged from inpatient rehab, and physical therapy presents for colon cancer screening evaluation  Patient denies any change in bowel habits, reports that he occasionally has left lower quadrant discomfort which is typically relieved with passage of stool  He reports that he has started noticing this over the past month  He denies any hematochezia  No  weight loss  No family history of colon cancer  He reports having had a colonoscopy over 15 years ago and thinks that he may have had a small polyp that was removed  He denies any upper GI symptoms including acid reflux, dysphagia, odynophagia, loss of appetite or early satiety      He takes aspirin 81 mg for CVA that occurred in his 25s  He does not have any residual weakness  Blood work is notable for normal renal function, normal liver enzymes, hemoglobin of 12 4, platelets 195  Review of Systems: The remainder of the review of systems was negative except for the pertinent positives noted in HPI  Historical Information   Past Medical History:   Diagnosis Date   • Ambulates with cane     "long distance"   • Arthritis    • Back pain    • Chronic pain disorder    • Dental crowns present    • Exercise involving walking     daily -short walks   • Hepatitis C    • History of hepatitis C     per pt "went away on its own" age 13"   • History of transfusion    • Hyperlipidemia    • Hypertension    • Infectious viral hepatitis 1972   • Leg pain     and foot pain   • Limb alert care status     per pt NO IV's LEFT UPPER ARM due to old injury   • Risk for falls    • Spinal stenosis    • Stroke ([facility] [address] )     per pt in 1996-and no lasting problems   • Wears glasses     readers     Past Surgical History:   Procedure Laterality Date   • BACK SURGERY      with implants   • COLONOSCOPY     • LUMBAR FUSION N/A 5/24/2022    Procedure: L1-S1 revision of segmental hardware, L5-S1 osteotomy, posterior spinal fusion L4-S1, pelvic instrumentation, Transforaminal lumbar interbody fusion L5S1, Cage L5S1, instrumentation L1-pelvis; Surgeon: Loren Park MD;  Location: [facility];  Service: Orthopedics   • SC ARTHRODESIS POSTERIOR/PSTLAT TQ 1NTRSPC THORACIC N/A 11/22/2022    Procedure: removal of hardware L1-L3  Posterior thoracic/ lumbar instrumentation from T8 to pelvis  Thoracic osteotomy T11/12  Thoracic laminotomy T8/9, T9/10 and T10/11  Posterior thoracic fusion T7-L1    Revision instrumentation T8 to pelvis ;  Surgeon: Loren Park MD;  Location: [facility];  Service: Orthopedics   • WISDOM TOOTH EXTRACTION       Social History   Social History     Substance and Sexual Activity   Alcohol Use Yes   • Alcohol/week: 4 0 standard drinks   • Types: 1 Glasses of wine, 3 Cans of beer per week    Comment: drinks once a month     Social History     Substance and Sexual Activity   Drug Use Not Currently     Social History     Tobacco Use   Smoking Status Former   • Packs/day: 0 25   • Years: 30 00   • Pack years: 7 50   • Types: Cigarettes   • Start date: 1974   • Quit date: 2000   • Years since quittin 0   Smokeless Tobacco Never   Tobacco Comments    only smoked  less than a pack a month     Family History   Problem Relation Age of Onset   • Hypertension Mother         dead   • Hypertension Father         dead       Meds/Allergies     (Not in a hospital admission)    No current facility-administered medications for this visit  No Known Allergies    Objective     Blood pressure 144/90, pulse 87, temperature 97 8 °F (36 6 °C), temperature source Temporal, height 5' 10" (1 778 m), weight 97 3 kg (214 lb 9 6 oz)  [unfilled]    PHYSICAL EXAM     GEN: well nourished, well developed, no acute distress  HEENT: anicteric, MMM, no cervical or supraclavicular lymphadenopathy  CV: RRR, no m/r/g  CHEST: CTA b/l, no WRR  ABD: +BS, soft, NT/ND, no hepatosplenomegaly  EXT: no c/c/e  SKIN: no rashes,  NEURO: aaox3    Lab Results:   No visits with results within 1 Day(s) from this visit     Latest known visit with results is:   Admission on 2022, Discharged on 12/10/2022   Component Date Value   • Sodium 2022 133 (L)    • Potassium 2022 4 0    • Chloride 2022 100    • CO2 2022 27    • ANION GAP 2022 6    • BUN 2022 19    • Creatinine 2022 0 82    • Glucose 2022 98    • Glucose, Fasting 2022 98    • Calcium 2022 9 1    • Corrected Calcium 2022 9 6    • AST 2022 79 (H)    • ALT 2022 71 (H)    • Alkaline Phosphatase 2022 80    • Total Protein 2022 6 5    • Albumin 2022 3 4 (L)    • Total Bilirubin 2022 0 60    • eGFR 11/28/2022 92    • WBC 11/28/2022 6 84    • RBC 11/28/2022 4 28    • Hemoglobin 11/28/2022 11 8 (L)    • Hematocrit 11/28/2022 37 7    • MCV 11/28/2022 88    • MCH 11/28/2022 27 6    • MCHC 11/28/2022 31 3 (L)    • RDW 11/28/2022 13 8    • MPV 11/28/2022 8 4 (L)    • Platelets 36/22/8157 341    • nRBC 11/28/2022 0    • Neutrophils Relative 11/28/2022 69    • Immat GRANS % 11/28/2022 1    • Lymphocytes Relative 11/28/2022 18    • Monocytes Relative 11/28/2022 7    • Eosinophils Relative 11/28/2022 5    • Basophils Relative 11/28/2022 0    • Neutrophils Absolute 11/28/2022 4 71    • Immature Grans Absolute 11/28/2022 0 06    • Lymphocytes Absolute 11/28/2022 1 22    • Monocytes Absolute 11/28/2022 0 50    • Eosinophils Absolute 11/28/2022 0 33    • Basophils Absolute 11/28/2022 0 02    • Vit D, 25-Hydroxy 11/28/2022 24 2 (L)    • Cholesterol 11/28/2022 151    • Triglycerides 11/28/2022 94    • HDL, Direct 11/28/2022 30 (L)    • LDL Calculated 11/28/2022 102    • Non-HDL-Chol (CHOL-HDL) 11/28/2022 121    • Sodium 12/01/2022 132 (L)    • Potassium 12/01/2022 4 1    • Chloride 12/01/2022 100    • CO2 12/01/2022 26    • ANION GAP 12/01/2022 6    • BUN 12/01/2022 13    • Creatinine 12/01/2022 0 79    • Glucose 12/01/2022 112 (H)    • Glucose, Fasting 12/01/2022 112 (H)    • Calcium 12/01/2022 9 5    • AST 12/01/2022 72 (H)    • ALT 12/01/2022 78 (H)    • Alkaline Phosphatase 12/01/2022 99    • Total Protein 12/01/2022 6 9    • Albumin 12/01/2022 3 7    • Total Bilirubin 12/01/2022 0 63    • eGFR 12/01/2022 93    • WBC 12/01/2022 7 87    • RBC 12/01/2022 4 42    • Hemoglobin 12/01/2022 12 6    • Hematocrit 12/01/2022 38 0    • MCV 12/01/2022 86    • MCH 12/01/2022 28 5    • MCHC 12/01/2022 33 2    • RDW 12/01/2022 13 6    • MPV 12/01/2022 8 3 (L)    • Platelets 39/65/9063 489 (H)    • nRBC 12/01/2022 0    • Neutrophils Relative 12/01/2022 72    • Immat GRANS % 12/01/2022 1    • Lymphocytes Relative 12/01/2022 18    • Monocytes Relative 12/01/2022 6    • Eosinophils Relative 12/01/2022 3    • Basophils Relative 12/01/2022 0    • Neutrophils Absolute 12/01/2022 5 64    • Immature Grans Absolute 12/01/2022 0 08    • Lymphocytes Absolute 12/01/2022 1 43    • Monocytes Absolute 12/01/2022 0 49    • Eosinophils Absolute 12/01/2022 0 20    • Basophils Absolute 12/01/2022 0 03    • Sodium 12/05/2022 134 (L)    • Potassium 12/05/2022 4 3    • Chloride 12/05/2022 101    • CO2 12/05/2022 29    • ANION GAP 12/05/2022 4 (L)    • BUN 12/05/2022 14    • Creatinine 12/05/2022 0 84    • Glucose 12/05/2022 95    • Glucose, Fasting 12/05/2022 95    • Calcium 12/05/2022 9 8    • AST 12/05/2022 38    • ALT 12/05/2022 37    • Alkaline Phosphatase 12/05/2022 116    • Total Protein 12/05/2022 7 2    • Albumin 12/05/2022 3 8    • Total Bilirubin 12/05/2022 0 67    • eGFR 12/05/2022 91    • WBC 12/05/2022 7 68    • RBC 12/05/2022 4 60    • Hemoglobin 12/05/2022 12 9    • Hematocrit 12/05/2022 40 8    • MCV 12/05/2022 89    • MCH 12/05/2022 28 0    • MCHC 12/05/2022 31 6    • RDW 12/05/2022 13 2    • MPV 12/05/2022 8 5 (L)    • Platelets 72/75/2339 548 (H)    • nRBC 12/05/2022 0    • Neutrophils Relative 12/05/2022 66    • Immat GRANS % 12/05/2022 1    • Lymphocytes Relative 12/05/2022 23    • Monocytes Relative 12/05/2022 7    • Eosinophils Relative 12/05/2022 3    • Basophils Relative 12/05/2022 0    • Neutrophils Absolute 12/05/2022 5 05    • Immature Grans Absolute 12/05/2022 0 06    • Lymphocytes Absolute 12/05/2022 1 78    • Monocytes Absolute 12/05/2022 0 54    • Eosinophils Absolute 12/05/2022 0 22    • Basophils Absolute 12/05/2022 0 03    • Sodium 12/08/2022 136    • Potassium 12/08/2022 4 1    • Chloride 12/08/2022 103    • CO2 12/08/2022 27    • ANION GAP 12/08/2022 6    • BUN 12/08/2022 15    • Creatinine 12/08/2022 0 82    • Glucose 12/08/2022 101 (H)    • Glucose, Fasting 12/08/2022 101 (H)    • Calcium 12/08/2022 9 7    • eGFR 12/08/2022 92    • WBC 12/08/2022 6 02    • RBC 12/08/2022 4 39    • Hemoglobin 12/08/2022 12 4    • Hematocrit 12/08/2022 38 7    • MCV 12/08/2022 88    • MCH 12/08/2022 28 2    • MCHC 12/08/2022 32 0    • RDW 12/08/2022 13 0    • MPV 12/08/2022 8 3 (L)    • Platelets 69/06/4001 520 (H)    • nRBC 12/08/2022 0    • Neutrophils Relative 12/08/2022 61    • Immat GRANS % 12/08/2022 1    • Lymphocytes Relative 12/08/2022 27    • Monocytes Relative 12/08/2022 7    • Eosinophils Relative 12/08/2022 3    • Basophils Relative 12/08/2022 1    • Neutrophils Absolute 12/08/2022 3 71    • Immature Grans Absolute 12/08/2022 0 03    • Lymphocytes Absolute 12/08/2022 1 65    • Monocytes Absolute 12/08/2022 0 42    • Eosinophils Absolute 12/08/2022 0 17    • Basophils Absolute 12/08/2022 0 04      Imaging Studies: I have personally reviewed pertinent films in PACS                Answers for HPI/ROS submitted by the patient on 1/16/2023  Chronicity: new  Onset: 1 to 4 weeks ago  Onset quality: sudden  Frequency: 2 to 4 times per day  Episode duration: 1 Hours  Progression since onset: unchanged  Pain location: suprapubic region  Pain - numeric: 4/10  Pain quality: sharp  Radiates to: periumbilical region  anorexia: No  arthralgias: No  belching: No  constipation: No  diarrhea: No  fever: No  flatus: No  frequency: Yes  headaches: No  hematochezia: No  hematuria: No  melena: No  nausea:  No  weight loss: No  vomiting: No  Aggravated by: movement  Relieved by: recumbency  Diagnostic workup: ultrasound

## 2023-01-17 NOTE — PROGRESS NOTES
Consultation - [address] [facility]  Ruthie López 78 y o  male MRN: [id number]  Unit/Bed#:  Encounter: [id number]        Consults    ASSESSMENT/PLAN:       1  Colon cancer screening-his colonoscopy was 15 years ago  No anemia, no change in bowel habits     -Patient is currently on aspirin 81 mg on daily basis only  -We will schedule average rescreening colonoscopy at this time   -Patient reports that his last colonoscopy was over 15 years ago  patient was explained about the risks and benefits of the procedure  Risks including but not limited to bleeding, infection, perforation were explained in detail  Also explained about less than 100% sensitivity with the exam and other alternatives  2   Intermittent left lower quadrant discomfort-likely musculoskeletal versus secondary to constipation as symptoms typically relieved with passage of stool  I do not suspect diverticulitis given benign exam although there was evidence of diverticulosis on a CAT scan in 2017   -Would recommend high-fiber diet       ______________________________________________________________________    Reason for Consult / Principal Problem: [unfilled]    HPI: Ruthie López is a 78y o  year old male history of CVA on aspirin, hypertension, recent spine surgery, recently discharged from inpatient rehab, and physical therapy presents for colon cancer screening evaluation  Patient denies any change in bowel habits, reports that he occasionally has left lower quadrant discomfort which is typically relieved with passage of stool  He reports that he has started noticing this over the past month  He denies any hematochezia  No  weight loss  No family history of colon cancer  He reports having had a colonoscopy over 15 years ago and thinks that he may have had a small polyp that was removed  He denies any upper GI symptoms including acid reflux, dysphagia, odynophagia, loss of appetite or early satiety      He takes aspirin 81 mg for CVA that occurred in his 25s  He does not have any residual weakness  Blood work is notable for normal renal function, normal liver enzymes, hemoglobin of 12 4, platelets 671  Review of Systems: The remainder of the review of systems was negative except for the pertinent positives noted in HPI  Historical Information   Past Medical History:   Diagnosis Date   • Ambulates with cane     "long distance"   • Arthritis    • Back pain    • Chronic pain disorder    • Dental crowns present    • Exercise involving walking     daily -short walks   • Hepatitis C    • History of hepatitis C     per pt "went away on its own" age 13"   • History of transfusion    • Hyperlipidemia    • Hypertension    • Infectious viral hepatitis 1972   • Leg pain     and foot pain   • Limb alert care status     per pt NO IV's LEFT UPPER ARM due to old injury   • Risk for falls    • Spinal stenosis    • Stroke ([facility] [address] )     per pt in 1996-and no lasting problems   • Wears glasses     readers     Past Surgical History:   Procedure Laterality Date   • BACK SURGERY      with implants   • COLONOSCOPY     • LUMBAR FUSION N/A 5/24/2022    Procedure: L1-S1 revision of segmental hardware, L5-S1 osteotomy, posterior spinal fusion L4-S1, pelvic instrumentation, Transforaminal lumbar interbody fusion L5S1, Cage L5S1, instrumentation L1-pelvis; Surgeon: Sumit Plunkett MD;  Location: [facility];  Service: Orthopedics   • FL ARTHRODESIS POSTERIOR/PSTLAT TQ 1NTRSPC THORACIC N/A 11/22/2022    Procedure: removal of hardware L1-L3  Posterior thoracic/ lumbar instrumentation from T8 to pelvis  Thoracic osteotomy T11/12  Thoracic laminotomy T8/9, T9/10 and T10/11  Posterior thoracic fusion T7-L1    Revision instrumentation T8 to pelvis ;  Surgeon: Sumit Plunkett MD;  Location: [facility];  Service: Orthopedics   • WISDOM TOOTH EXTRACTION       Social History   Social History     Substance and Sexual Activity   Alcohol Use Yes   • Alcohol/week: 4 0 standard drinks   • Types: 1 Glasses of wine, 3 Cans of beer per week    Comment: drinks once a month     Social History     Substance and Sexual Activity   Drug Use Not Currently     Social History     Tobacco Use   Smoking Status Former   • Packs/day: 0 25   • Years: 30 00   • Pack years: 7 50   • Types: Cigarettes   • Start date: 1974   • Quit date: 2000   • Years since quittin 0   Smokeless Tobacco Never   Tobacco Comments    only smoked  less than a pack a month     Family History   Problem Relation Age of Onset   • Hypertension Mother         dead   • Hypertension Father         dead       Meds/Allergies     (Not in a hospital admission)    No current facility-administered medications for this visit  No Known Allergies    Objective     Blood pressure 144/90, pulse 87, temperature 97 8 °F (36 6 °C), temperature source Temporal, height 5' 10" (1 778 m), weight 97 3 kg (214 lb 9 6 oz)  [unfilled]    PHYSICAL EXAM     GEN: well nourished, well developed, no acute distress  HEENT: anicteric, MMM, no cervical or supraclavicular lymphadenopathy  CV: RRR, no m/r/g  CHEST: CTA b/l, no WRR  ABD: +BS, soft, NT/ND, no hepatosplenomegaly  EXT: no c/c/e  SKIN: no rashes,  NEURO: aaox3    Lab Results:   No visits with results within 1 Day(s) from this visit     Latest known visit with results is:   Admission on 2022, Discharged on 12/10/2022   Component Date Value   • Sodium 2022 133 (L)    • Potassium 2022 4 0    • Chloride 2022 100    • CO2 2022 27    • ANION GAP 2022 6    • BUN 2022 19    • Creatinine 2022 0 82    • Glucose 2022 98    • Glucose, Fasting 2022 98    • Calcium 2022 9 1    • Corrected Calcium 2022 9 6    • AST 2022 79 (H)    • ALT 2022 71 (H)    • Alkaline Phosphatase 2022 80    • Total Protein 2022 6 5    • Albumin 2022 3 4 (L)    • Total Bilirubin 2022 0 60    • eGFR 11/28/2022 92    • WBC 11/28/2022 6 84    • RBC 11/28/2022 4 28    • Hemoglobin 11/28/2022 11 8 (L)    • Hematocrit 11/28/2022 37 7    • MCV 11/28/2022 88    • MCH 11/28/2022 27 6    • MCHC 11/28/2022 31 3 (L)    • RDW 11/28/2022 13 8    • MPV 11/28/2022 8 4 (L)    • Platelets 86/86/0453 341    • nRBC 11/28/2022 0    • Neutrophils Relative 11/28/2022 69    • Immat GRANS % 11/28/2022 1    • Lymphocytes Relative 11/28/2022 18    • Monocytes Relative 11/28/2022 7    • Eosinophils Relative 11/28/2022 5    • Basophils Relative 11/28/2022 0    • Neutrophils Absolute 11/28/2022 4 71    • Immature Grans Absolute 11/28/2022 0 06    • Lymphocytes Absolute 11/28/2022 1 22    • Monocytes Absolute 11/28/2022 0 50    • Eosinophils Absolute 11/28/2022 0 33    • Basophils Absolute 11/28/2022 0 02    • Vit D, 25-Hydroxy 11/28/2022 24 2 (L)    • Cholesterol 11/28/2022 151    • Triglycerides 11/28/2022 94    • HDL, Direct 11/28/2022 30 (L)    • LDL Calculated 11/28/2022 102    • Non-HDL-Chol (CHOL-HDL) 11/28/2022 121    • Sodium 12/01/2022 132 (L)    • Potassium 12/01/2022 4 1    • Chloride 12/01/2022 100    • CO2 12/01/2022 26    • ANION GAP 12/01/2022 6    • BUN 12/01/2022 13    • Creatinine 12/01/2022 0 79    • Glucose 12/01/2022 112 (H)    • Glucose, Fasting 12/01/2022 112 (H)    • Calcium 12/01/2022 9 5    • AST 12/01/2022 72 (H)    • ALT 12/01/2022 78 (H)    • Alkaline Phosphatase 12/01/2022 99    • Total Protein 12/01/2022 6 9    • Albumin 12/01/2022 3 7    • Total Bilirubin 12/01/2022 0 63    • eGFR 12/01/2022 93    • WBC 12/01/2022 7 87    • RBC 12/01/2022 4 42    • Hemoglobin 12/01/2022 12 6    • Hematocrit 12/01/2022 38 0    • MCV 12/01/2022 86    • MCH 12/01/2022 28 5    • MCHC 12/01/2022 33 2    • RDW 12/01/2022 13 6    • MPV 12/01/2022 8 3 (L)    • Platelets 30/60/1626 489 (H)    • nRBC 12/01/2022 0    • Neutrophils Relative 12/01/2022 72    • Immat GRANS % 12/01/2022 1    • Lymphocytes Relative 12/01/2022 18    • Monocytes Relative 12/01/2022 6    • Eosinophils Relative 12/01/2022 3    • Basophils Relative 12/01/2022 0    • Neutrophils Absolute 12/01/2022 5 64    • Immature Grans Absolute 12/01/2022 0 08    • Lymphocytes Absolute 12/01/2022 1 43    • Monocytes Absolute 12/01/2022 0 49    • Eosinophils Absolute 12/01/2022 0 20    • Basophils Absolute 12/01/2022 0 03    • Sodium 12/05/2022 134 (L)    • Potassium 12/05/2022 4 3    • Chloride 12/05/2022 101    • CO2 12/05/2022 29    • ANION GAP 12/05/2022 4 (L)    • BUN 12/05/2022 14    • Creatinine 12/05/2022 0 84    • Glucose 12/05/2022 95    • Glucose, Fasting 12/05/2022 95    • Calcium 12/05/2022 9 8    • AST 12/05/2022 38    • ALT 12/05/2022 37    • Alkaline Phosphatase 12/05/2022 116    • Total Protein 12/05/2022 7 2    • Albumin 12/05/2022 3 8    • Total Bilirubin 12/05/2022 0 67    • eGFR 12/05/2022 91    • WBC 12/05/2022 7 68    • RBC 12/05/2022 4 60    • Hemoglobin 12/05/2022 12 9    • Hematocrit 12/05/2022 40 8    • MCV 12/05/2022 89    • MCH 12/05/2022 28 0    • MCHC 12/05/2022 31 6    • RDW 12/05/2022 13 2    • MPV 12/05/2022 8 5 (L)    • Platelets 23/51/6543 548 (H)    • nRBC 12/05/2022 0    • Neutrophils Relative 12/05/2022 66    • Immat GRANS % 12/05/2022 1    • Lymphocytes Relative 12/05/2022 23    • Monocytes Relative 12/05/2022 7    • Eosinophils Relative 12/05/2022 3    • Basophils Relative 12/05/2022 0    • Neutrophils Absolute 12/05/2022 5 05    • Immature Grans Absolute 12/05/2022 0 06    • Lymphocytes Absolute 12/05/2022 1 78    • Monocytes Absolute 12/05/2022 0 54    • Eosinophils Absolute 12/05/2022 0 22    • Basophils Absolute 12/05/2022 0 03    • Sodium 12/08/2022 136    • Potassium 12/08/2022 4 1    • Chloride 12/08/2022 103    • CO2 12/08/2022 27    • ANION GAP 12/08/2022 6    • BUN 12/08/2022 15    • Creatinine 12/08/2022 0 82    • Glucose 12/08/2022 101 (H)    • Glucose, Fasting 12/08/2022 101 (H)    • Calcium 12/08/2022 9 7    • eGFR 12/08/2022 92    • WBC 12/08/2022 6 02    • RBC 12/08/2022 4 39    • Hemoglobin 12/08/2022 12 4    • Hematocrit 12/08/2022 38 7    • MCV 12/08/2022 88    • MCH 12/08/2022 28 2    • MCHC 12/08/2022 32 0    • RDW 12/08/2022 13 0    • MPV 12/08/2022 8 3 (L)    • Platelets 58/17/5856 520 (H)    • nRBC 12/08/2022 0    • Neutrophils Relative 12/08/2022 61    • Immat GRANS % 12/08/2022 1    • Lymphocytes Relative 12/08/2022 27    • Monocytes Relative 12/08/2022 7    • Eosinophils Relative 12/08/2022 3    • Basophils Relative 12/08/2022 1    • Neutrophils Absolute 12/08/2022 3 71    • Immature Grans Absolute 12/08/2022 0 03    • Lymphocytes Absolute 12/08/2022 1 65    • Monocytes Absolute 12/08/2022 0 42    • Eosinophils Absolute 12/08/2022 0 17    • Basophils Absolute 12/08/2022 0 04      Imaging Studies: I have personally reviewed pertinent films in PACS                Answers for HPI/ROS submitted by the patient on 1/16/2023  Chronicity: new  Onset: 1 to 4 weeks ago  Onset quality: sudden  Frequency: 2 to 4 times per day  Episode duration: 1 Hours  Progression since onset: unchanged  Pain location: suprapubic region  Pain - numeric: 4/10  Pain quality: sharp  Radiates to: periumbilical region  anorexia: No  arthralgias: No  belching: No  constipation: No  diarrhea: No  fever: No  flatus: No  frequency: Yes  headaches: No  hematochezia: No  hematuria: No  melena: No  nausea:  No  weight loss: No  vomiting: No  Aggravated by: movement  Relieved by: recumbency  Diagnostic workup: ultrasound

## 2023-01-19 ENCOUNTER — OFFICE VISIT (OUTPATIENT)
Dept: PHYSICAL THERAPY | Facility: CLINIC | Age: 68
End: 2023-01-19

## 2023-01-19 DIAGNOSIS — Z98.1 STATUS POST LUMBAR SPINAL FUSION: ICD-10-CM

## 2023-01-19 DIAGNOSIS — Z98.1 S/P LUMBAR FUSION: Primary | ICD-10-CM

## 2023-01-19 NOTE — PROGRESS NOTES
Daily Note     Today's date: 2023  Patient name: Myron Perez  : 1955  MRN: [id number]  Referring provider: Belkis Jimenez MD  Dx:   Encounter Diagnosis     ICD-10-CM    1  S/P lumbar fusion  Z98 1       2  Status post lumbar spinal fusion  Z98 1                      Subjective: Pt reports continued limitation w/ JEFFREY position for LE dressing  He has minor continued LBP  Objective: See treatment diary below      Assessment: Tolerated treatment well and was able to progress program today for balance, coordination and strength  Patient is progressing very well  Plan: Continue per plan of care        Precautions: PT CANNOT LIE SUPINE - LONGITUDINAL INCISION STILL HEALING  Past Medical History:   Diagnosis Date   • Ambulates with cane     "long distance"   • Arthritis    • Back pain    • Chronic pain disorder    • Dental crowns present    • Exercise involving walking     daily -short walks   • Hepatitis C    • History of hepatitis C     per pt "went away on its own" age 13"   • History of transfusion    • Hyperlipidemia    • Hypertension    • Infectious viral hepatitis    • Leg pain     and foot pain   • Limb alert care status     per pt NO IV's LEFT UPPER ARM due to old injury   • Risk for falls    • Spinal stenosis    • Stroke [facility])     per pt in -and no lasting problems   • Wears glasses     readers   SOC: 2022  RE: 2023  FOTO: 2022  POC EXP: 3/9/2023  DAILY TREATMENT LOG:  date 2023 1/10/2023 2023 2023  RE 2023   Visit #/auth [id number] [phone] 9                            Neuro Re-Ed 20'  15' 15' 25' 25'   Balance & funct tests    15'    FT w/ EC CGA 30"x2 CGA       FT w/ EO on foam FT EO w/ head turns 2x10 CS       Step stance balance with EC Tandem walk CGA 2 laps near rail   Tandem walk awy from rail 20'x4 CGA Tandem walk away from rail 20'x2 ea fwd/bkwd Tandem walk 20'x2 ea fwd/bkwd   Sidestep along rail RTB @ knees 3 laps near window      [employer] crawl 20'x4 - mod diff w/ coordin   Monster walks along rail  RTB @ knees nears 3 laps near window CS    RTB @ ankles 15'x2 fwd/bkwd    Alt taps to step To cone CGA 1x10 R/L  2 cones 1x10 CGA 2 cones 1x12 r/L CGA     FSU near rail CGA 6" step  8" step 2x10 R/L step to pattern no rail CS W/ knee hike w/ rail 2x10 R/L 8" step Non recip to 8" step+2" fian 1x10 R/L CGA Non recip to 8"step+2" foam 2x10 R/L CGA   Retro walk near rail 2 laps near rail CGA  2 laps near rail CS      Steamboats w/ TB @ ankles     RTB no rial 2x10 R/L    Ther Ex 18'  30' 30' 20' 20'   B/L HR Off step 2x10 CGA Off step 2x15 Off step 2x15 2x15 2x15   Hip flexor stretch foot on step  8" step 5"x10 r/L 10" step 5"x10 R/L 10" step 10"x5 R/L 10" step 10"x5 R/L   Stand hip abd RTB @ knees 2x10 R/L RTB @ knees 2x10 R/L RTB @ knees 2x10 R/L RTB @ ankles 2x10 R/L    Stand hip exten  RTB @ knees 2x10 R/L RTB @ knees 2x10 R/L RTB @ knees 2x10 R/L RTB @ ankles 2x10 R/L    STS from chair W/ B/L OH press 1# DB's 2x10  TRX squat 1x10; STS low mat butt taps w/ OH press 1x10 2# DB's Chair w/ B/L OH press 2# DB's 2x10  Low mat; foam under feet 2# DB's 2x10 (NMReed)   Long stepping along rail for LE ROM  No rail 20'x2      B/L UE ext vs tubing yulisa 7# 2x15        Sit modif fig 4 stretch w/ foot on stool 20"x3  20"x3 R/L 20"x3 R/L 20"x3 R/L 20"x3 R/L   Upright uni leg press     30# 2x10 R/L   B/L UE wall slides flexion 2x10  1x10 1x10  2x10   pulleys  3' feels back stretch 3'  3'   Ther Activity                        Gait Training                        Modalities                        Access Code: Jama Yarbrough  URL: [email or web address]  Date: 12/23/2022  Prepared by:  Charly Whitehead    Exercises  Sidelying Hip Abduction - 1 x daily - 2 sets - 10 reps  Reverse clamshell with resistance - 1 x daily - 2 sets - 10 reps  Seated Long Arc Quad with Hip Adduction - 1 x daily - 2 sets - 10 reps - 3 hold  Seated Hamstring Curl with Anchored Resistance - 1 x daily - 2 sets - 10 reps  Standing Hip Abduction with Resistance at Ankles and Counter Support - 1 x daily - 2 sets - 10 reps  Hip Extension with Resistance Loop - 1 x daily - 2 sets - 10 reps  Side Stepping with Resistance at Ankles - 1 x daily - 3 sets - 10 reps  Standing Heel Raises - 1 x daily - 2 sets - 10 reps  Squat with Chair Touch - 1 x daily - 2 sets - 10 reps

## 2023-01-24 ENCOUNTER — OFFICE VISIT (OUTPATIENT)
Dept: PHYSICAL THERAPY | Facility: CLINIC | Age: 68
End: 2023-01-24

## 2023-01-24 DIAGNOSIS — Z98.1 S/P LUMBAR FUSION: Primary | ICD-10-CM

## 2023-01-24 DIAGNOSIS — Z98.1 STATUS POST LUMBAR SPINAL FUSION: ICD-10-CM

## 2023-01-24 NOTE — PROGRESS NOTES
Daily Note     Today's date: 2023  Patient name: Thu Azevedo  : 1955  MRN: [id number]  Referring provider: Ariel Turk MD  Dx:   Encounter Diagnosis     ICD-10-CM    1  S/P lumbar fusion  Z98 1       2  Status post lumbar spinal fusion  Z98 1                      Subjective: Pt reports he is leaving next weekend for [place] for extended time  Objective: See treatment diary below      Assessment: Tolerated treatment well and demonstrates improvement w/ cross crawl, tandem walk activities    Patient will be appropriate for D/C as planned  Plan: Continue per plan of care  Complete 1 more week of PT       Precautions: PT CANNOT LIE SUPINE - LONGITUDINAL INCISION STILL HEALING  Past Medical History:   Diagnosis Date   • Ambulates with cane     "long distance"   • Arthritis    • Back pain    • Chronic pain disorder    • Dental crowns present    • Exercise involving walking     daily -short walks   • Hepatitis C    • History of hepatitis C     per pt "went away on its own" age 13"   • History of transfusion    • Hyperlipidemia    • Hypertension    • Infectious viral hepatitis    • Leg pain     and foot pain   • Limb alert care status     per pt NO IV's LEFT UPPER ARM due to old injury   • Risk for falls    • Spinal stenosis    • Stroke [facility])     per pt in -and no lasting problems   • Wears glasses     readers   SOC: 2022  RE: 2023  FOTO: 2022  POC EXP: 3/9/2023  DAILY TREATMENT LOG:  date 2022  RE 2023   Visit #/auth 11  8 9                            Neuro Re-Ed 25'  15' 25' 25'   Balance & funct tests    15'    Tandem walk fwd/bkwd Backward only CS 20'x2  Tandem walk awy from rail 20'x4 CGA Tandem walk away from rail 20'x2 ea fwd/bkwd Tandem walk 20'x2 ea fwd/bkwd   Cross crawl 20'x4 CS    Cross crawl 20'x4 - mod diff w/ coordin   Monster walks along rail     RTB @ ankles 15'x2 fwd/bkwd    Alt taps to cones   2 cones 1x12 r/L CGA FSU near rail CGA 8" step +2" foam non  recip 2x10 R/L  W/ knee hike w/ rail 2x10 R/L 8" step Non recip to 8" step+2" fian 1x10 R/L CGA Non recip to 8"step+2" foam 2x10 R/L CGA   ambul w/ EC 20'x3 CS - veers to R       Steamboats w/ TB @ ankles RTB 2x10 r/L    RTB no rial 2x10 R/L    Ther Ex 20'  30' 20' 20'   Stand UBE retro L1x3'       B/L HR   Off step 2x15 2x15 2x15   Hip flexor stretch foot on step 10"x5 r/L  10" step 5"x10 R/L 10" step 10"x5 R/L 10" step 10"x5 R/L   Stand hip abd   RTB @ knees 2x10 R/L RTB @ ankles 2x10 R/L    Stand hip exten    RTB @ knees 2x10 R/L RTB @ ankles 2x10 R/L    STS from chair   Chair w/ B/L OH press 2# DB's 2x10  Low mat; foam under feet 2# DB's 2x10 (NMReed)   Long stepping along rail for LE ROM        B/L UE ext vs tubing        Sit modif fig 4 stretch w/ foot on stool 30"x3 R/L  20"x3 R/L 20"x3 R/L 20"x3 R/L   Upright uni leg press 35# 2x10 R/L    30# 2x10 R/L   B/L UE wall slides flexion 5"x10  1x10  2x10   pulleys 3'  3'  3'   Ther Activity                        Gait Training                        Modalities                        Access Code: Cameron Membreno  URL: [email or web address]  Date: 12/23/2022  Prepared by:  Mahin Herrera    Exercises  Sidelying Hip Abduction - 1 x daily - 2 sets - 10 reps  Reverse clamshell with resistance - 1 x daily - 2 sets - 10 reps  Seated Long Arc Quad with Hip Adduction - 1 x daily - 2 sets - 10 reps - 3 hold  Seated Hamstring Curl with Anchored Resistance - 1 x daily - 2 sets - 10 reps  Standing Hip Abduction with Resistance at Ankles and Counter Support - 1 x daily - 2 sets - 10 reps  Hip Extension with Resistance Loop - 1 x daily - 2 sets - 10 reps  Side Stepping with Resistance at Ankles - 1 x daily - 3 sets - 10 reps  Standing Heel Raises - 1 x daily - 2 sets - 10 reps  Squat with Chair Touch - 1 x daily - 2 sets - 10 reps

## 2023-01-26 ENCOUNTER — OFFICE VISIT (OUTPATIENT)
Dept: PHYSICAL THERAPY | Facility: CLINIC | Age: 68
End: 2023-01-26

## 2023-01-26 DIAGNOSIS — Z98.1 S/P LUMBAR FUSION: Primary | ICD-10-CM

## 2023-01-26 DIAGNOSIS — Z98.1 STATUS POST LUMBAR SPINAL FUSION: ICD-10-CM

## 2023-01-26 NOTE — PROGRESS NOTES
Daily Note     Today's date: 2023  Patient name: Ted Hidalgo  : 1955  MRN: [id number]  Referring provider: Lauren Ríos MD  Dx:   Encounter Diagnosis     ICD-10-CM    1  S/P lumbar fusion  Z98 1       2  Status post lumbar spinal fusion  Z98 1                      Subjective: Pt reports he continues to improve  He does remain limited w/ heavy activity, but is not supposed to do those things yet  Objective: See treatment diary below      Assessment: Tolerated treatment well and will be ready to D/C to HEP next visit; HEP updated today    Patient will review HEP for next visit which is his D/C  We have not progressed to heavy lifting due to p/o precautions  Plan: Continue per plan of care        Precautions: PT CANNOT LIE SUPINE - LONGITUDINAL INCISION STILL HEALING  Past Medical History:   Diagnosis Date   • Ambulates with cane     "long distance"   • Arthritis    • Back pain    • Chronic pain disorder    • Dental crowns present    • Exercise involving walking     daily -short walks   • Hepatitis C    • History of hepatitis C     per pt "went away on its own" age 13"   • History of transfusion    • Hyperlipidemia    • Hypertension    • Infectious viral hepatitis    • Leg pain     and foot pain   • Limb alert care status     per pt NO IV's LEFT UPPER ARM due to old injury   • Risk for falls    • Spinal stenosis    • Stroke [facility])     per pt in -and no lasting problems   • Wears glasses     [facility]  RE: 2023  FOTO: 2023  POC EXP: 3/9/2023  DAILY TREATMENT LOG:  date 2022  FOTO  2023  RE 2023   Visit #/auth 11 12  9                            Neuro Re-Ed 25' 15'  25' 25'   Balance & funct tests    15'    Tandem walk fwd/bkwd Backward only CS 20'x2   Tandem walk away from rail 20'x2 ea fwd/bkwd Tandem walk 20'x2 ea fwd/bkwd   Cross crawl 20'x4 CS    Cross crawl 20'x4 - mod diff w/ coordin   Monster walks along rail     RTB @ ankles 15'x2 fwd/bkwd    FSU near rail CGA 8" step +2" foam non  recip 2x10 R/L CGA 8" step +2" foam non recip 2x10 R/L CGA  Non recip to 8" step+2" fian 1x10 R/L CGA Non recip to 8"step+2" foam 2x10 R/L CGA   Steamboats w/ TB @ ankles RTB 2x10 r/L grn 2x10 R/L   RTB no rial 2x10 R/L    Ther Ex 20' 30'  20' 20'   Stand UBE retro L1x3' L1x3'      B/L HR    2x15 2x15   Hip flexor stretch foot on step 10"x5 r/L 10"x5 R/L  10" step 10"x5 R/L 10" step 10"x5 R/L   Stand hip abd    RTB @ ankles 2x10 R/L    Stand hip exten     RTB @ ankles 2x10 R/L    STS from chair w/ B/L OH press  3# DB's 2x10   Low mat; foam under feet 2# DB's 2x10 (NMReed)   Sit modif fig 4 stretch w/ foot on stool 30"x3 R/L 30"x3 R/L  20"x3 R/L 20"x3 R/L   Upright uni leg press 35# 2x10 R/L 35# 2x10 R/L   30# 2x10 R/L   B/L UE wall slides flexion 5"x10 5"X10   2x10   pulleys 3' 3'   3'   HEP   Update & review      Ther Activity                        Gait Training                        Modalities                          Access Code: Luis Bran 666  URL: [email or web address]  Date: 01/26/2023  Prepared by:  Hilarie Sacks    Exercises  Standing 3-Way Kick - 1 x daily - 4 x weekly - 10 reps  Mini Squat to Shoulder Press with Barbell - 4 x weekly - 2 sets - 10 reps  Standing shoulder flexion wall slides - 4 x weekly - 15 reps - 5 hold  Hip Flexor Stretch on Step - 4 x weekly - 5 reps - 10 hold  Seated Long Arc Quad with Hip Adduction - 1 x daily - 2 sets - 10 reps - 3 hold  Seated Figure 4 Piriformis Stretch - 4 x weekly - 30 reps - 3 hold

## 2023-01-31 ENCOUNTER — OFFICE VISIT (OUTPATIENT)
Dept: PHYSICAL THERAPY | Facility: CLINIC | Age: 68
End: 2023-01-31

## 2023-01-31 DIAGNOSIS — Z98.1 S/P LUMBAR FUSION: Primary | ICD-10-CM

## 2023-01-31 DIAGNOSIS — Z98.1 STATUS POST LUMBAR SPINAL FUSION: ICD-10-CM

## 2023-01-31 NOTE — PROGRESS NOTES
Daily Note     Today's date: 2023  Patient name: Remi Sanchez  : 1955  MRN: [id number]  Referring provider: Shayan Lugo MD  Dx:   Encounter Diagnosis     ICD-10-CM    1  S/P lumbar fusion  Z98 1       2  Status post lumbar spinal fusion  Z98 1                      Subjective: pt states in the past week he has been able to put on his socks/shoes w/ ease  Objective: See treatment diary below      Assessment: Tolerated treatment well  Patient is appropriate to d/c as planned next visit  Plan: Potential discharge next visit       Precautions: PT CANNOT LIE SUPINE - LONGITUDINAL INCISION STILL HEALING  Past Medical History:   Diagnosis Date   • Ambulates with cane     "long distance"   • Arthritis    • Back pain    • Chronic pain disorder    • Dental crowns present    • Exercise involving walking     daily -short walks   • Hepatitis C    • History of hepatitis C     per pt "went away on its own" age 13"   • History of transfusion    • Hyperlipidemia    • Hypertension    • Infectious viral hepatitis    • Leg pain     and foot pain   • Limb alert care status     per pt NO IV's LEFT UPPER ARM due to old injury   • Risk for falls    • Spinal stenosis    • Stroke [facility])     per pt in -and no lasting problems   • Wears glasses     readers   SOC: 2022  RE: 2023  FOTO: 2023  POC EXP: 3/9/2023  DAILY TREATMENT LOG:  date 2022  FOTO 2023   Visit #/auth 11 12 13                             Neuro Re-Ed 25' 15' 20'  25'   Balance & funct tests        Tandem walk fwd/bkwd Backward only CS 20'x2  Backward 15'x4 CS  Tandem walk 20'x2 ea fwd/bkwd   Cross crawl 20'x4 CS  20'x4 S  Cross crawl 20'x4 - mod diff w/ coordin   B/L UE ext from [facility]   8# 20x w/ scap set     FSU near rail CGA 8" step +2" foam non  recip 2x10 R/L CGA 8" step +2" foam non recip 2x10 R/L CGA 8" step +2" foam 2x10 R/L CGA  Non recip to 8"step+2" foam 2x10 R/L CGA   Steamboats w/ TB @ ankles RTB 2x10 r/L grn 2x10 R/L grn 1x10 R/L  RTB no rial 2x10 R/L    Ther Ex 20' 30' 25'  20'   Stand UBE retro L1x3' L1x3' L1x3'     B/L HR     2x15   Hip flexor stretch foot on step 10"x5 r/L 10"x5 R/L 10"x5 r/L  10" step 10"x5 R/L   Stand hip abd        Stand hip exten         STS from chair w/ B/L OH press  3# DB's 2x10 3# DB's 2x12  Low mat; foam under feet 2# DB's 2x10 (NMReed)   Sit modif fig 4 stretch w/ foot on stool 30"x3 R/L 30"x3 R/L 30"x3 r/L  20"x3 R/L   Upright uni leg press 35# 2x10 R/L 35# 2x10 R/L 35# 2x12 R/L  30# 2x10 R/L   B/L UE wall slides flexion 5"x10 5"X10 5"x10  2x10   pulleys 3' 3' 3'  3'   HEP   Update & review      Ther Activity                        Gait Training                        Modalities                          Access Code: [email or web address]  URL: [email or web address]  Date: 01/26/2023  Prepared by:  Luisito Saenz    Exercises  Standing 3-Way Kick - 1 x daily - 4 x weekly - 10 reps  Mini Squat to Shoulder Press with Barbell - 4 x weekly - 2 sets - 10 reps  Standing shoulder flexion wall slides - 4 x weekly - 15 reps - 5 hold  Hip Flexor Stretch on Step - 4 x weekly - 5 reps - 10 hold  Seated Long Arc Quad with Hip Adduction - 1 x daily - 2 sets - 10 reps - 3 hold  Seated Figure 4 Piriformis Stretch - 4 x weekly - 30 reps - 3 hold

## 2023-02-01 ENCOUNTER — ANESTHESIA (OUTPATIENT)
Dept: GASTROENTEROLOGY | Facility: AMBULARY SURGERY CENTER | Age: 68
End: 2023-02-01

## 2023-02-01 ENCOUNTER — HOSPITAL ENCOUNTER (OUTPATIENT)
Dept: GASTROENTEROLOGY | Facility: AMBULARY SURGERY CENTER | Age: 68
Setting detail: OUTPATIENT SURGERY
Discharge: HOME | End: 2023-02-01
Attending: INTERNAL MEDICINE

## 2023-02-01 ENCOUNTER — ANESTHESIA EVENT (OUTPATIENT)
Dept: GASTROENTEROLOGY | Facility: AMBULARY SURGERY CENTER | Age: 68
End: 2023-02-01

## 2023-02-01 VITALS
HEART RATE: 71 BPM | WEIGHT: 214 LBS | OXYGEN SATURATION: 95 % | BODY MASS INDEX: 29.96 KG/M2 | TEMPERATURE: 96.9 F | SYSTOLIC BLOOD PRESSURE: 125 MMHG | HEIGHT: 71 IN | DIASTOLIC BLOOD PRESSURE: 85 MMHG | RESPIRATION RATE: 18 BRPM

## 2023-02-01 DIAGNOSIS — Z12.11 COLON CANCER SCREENING: ICD-10-CM

## 2023-02-01 RX ORDER — PROPOFOL 10 MG/ML
INJECTION, EMULSION INTRAVENOUS AS NEEDED
Status: DISCONTINUED | OUTPATIENT
Start: 2023-02-01 | End: 2023-02-01

## 2023-02-01 RX ORDER — LIDOCAINE HYDROCHLORIDE 20 MG/ML
INJECTION, SOLUTION EPIDURAL; INFILTRATION; INTRACAUDAL; PERINEURAL AS NEEDED
Status: DISCONTINUED | OUTPATIENT
Start: 2023-02-01 | End: 2023-02-01

## 2023-02-01 RX ORDER — SODIUM CHLORIDE, SODIUM LACTATE, POTASSIUM CHLORIDE, CALCIUM CHLORIDE 600; 310; 30; 20 MG/100ML; MG/100ML; MG/100ML; MG/100ML
INJECTION, SOLUTION INTRAVENOUS CONTINUOUS PRN
Status: DISCONTINUED | OUTPATIENT
Start: 2023-02-01 | End: 2023-02-01

## 2023-02-01 RX ORDER — SODIUM CHLORIDE, SODIUM LACTATE, POTASSIUM CHLORIDE, CALCIUM CHLORIDE 600; 310; 30; 20 MG/100ML; MG/100ML; MG/100ML; MG/100ML
125 INJECTION, SOLUTION INTRAVENOUS CONTINUOUS
Status: DISCONTINUED | OUTPATIENT
Start: 2023-02-01 | End: 2023-02-05 | Stop reason: HOSPADM

## 2023-02-01 RX ORDER — PROPOFOL 10 MG/ML
INJECTION, EMULSION INTRAVENOUS CONTINUOUS PRN
Status: DISCONTINUED | OUTPATIENT
Start: 2023-02-01 | End: 2023-02-01

## 2023-02-01 RX ADMIN — LIDOCAINE HYDROCHLORIDE 50 MG: 20 INJECTION, SOLUTION EPIDURAL; INFILTRATION; INTRACAUDAL; PERINEURAL at 11:21

## 2023-02-01 RX ADMIN — PROPOFOL 120 MCG/KG/MIN: 10 INJECTION, EMULSION INTRAVENOUS at 11:21

## 2023-02-01 RX ADMIN — SODIUM CHLORIDE, SODIUM LACTATE, POTASSIUM CHLORIDE, AND CALCIUM CHLORIDE: .6; .31; .03; .02 INJECTION, SOLUTION INTRAVENOUS at 11:00

## 2023-02-01 RX ADMIN — SODIUM CHLORIDE, SODIUM LACTATE, POTASSIUM CHLORIDE, AND CALCIUM CHLORIDE 125 ML/HR: .6; .31; .03; .02 INJECTION, SOLUTION INTRAVENOUS at 11:05

## 2023-02-01 RX ADMIN — PROPOFOL 130 MG: 10 INJECTION, EMULSION INTRAVENOUS at 11:21

## 2023-02-01 NOTE — ANESTHESIA PREPROCEDURE EVALUATION
Procedure:  COLONOSCOPY    Relevant Problems   CARDIO   (+) Hyperlipidemia   (+) Hypertension      MUSCULOSKELETAL   (+) Back pain      NEURO/PSYCH   (+) Chronic pain   (+) History of stroke        Physical Exam    Airway    Mallampati score: II  TM Distance: >3 FB  Neck ROM: full     Dental   No notable dental hx     Cardiovascular  Cardiovascular exam normal    Pulmonary  Pulmonary exam normal     Other Findings        Anesthesia Plan  ASA Score- 2     Anesthesia Type- IV sedation with anesthesia with ASA Monitors  Additional Monitors:   Airway Plan:           Plan Factors-Exercise tolerance (METS): >4 METS  Chart reviewed  Imaging results reviewed  Existing labs reviewed  Patient summary reviewed  Patient is not a current smoker  Induction-     Postoperative Plan-     Informed Consent- Anesthetic plan and risks discussed with patient  I personally reviewed this patient with the CRNA  Discussed and agreed on the Anesthesia Plan with the CRNA  Mary Grace Lucero

## 2023-02-01 NOTE — PERIOPERATIVE NURSING NOTE
Pt awake & expressing understanding of discharge instructions  Pt declining beverage & snack   Pt getting dressed & waiting for [facility]

## 2023-02-01 NOTE — ANESTHESIA POSTPROCEDURE EVALUATION
Post-Op Assessment Note    CV Status:  Stable    Pain management: adequate     Mental Status:  Alert and awake   Hydration Status:  Euvolemic   PONV Controlled:  Controlled   Airway Patency:  Patent      Post Op Vitals Reviewed: Yes      Staff: Anesthesiologist         No notable events documented      /69 (02/01/23 1142)    Temp     Pulse 77 (02/01/23 1142)   Resp 16 (02/01/23 1142)    SpO2 98 % (02/01/23 1142)

## 2023-02-01 NOTE — INTERVAL H&P NOTE
H&P reviewed  After examining the patient I find no changes in the patients condition since the H&P had been written      Vitals:    02/01/23 1058   BP: 143/78   Pulse: 85   Resp: 18   Temp: (!) 96 9 °F (36 1 °C)   SpO2: 98%

## 2023-02-02 ENCOUNTER — OFFICE VISIT (OUTPATIENT)
Dept: PHYSICAL THERAPY | Facility: CLINIC | Age: 68
End: 2023-02-02

## 2023-02-02 DIAGNOSIS — Z98.1 STATUS POST LUMBAR SPINAL FUSION: ICD-10-CM

## 2023-02-02 DIAGNOSIS — Z98.1 S/P LUMBAR FUSION: Primary | ICD-10-CM

## 2023-02-02 NOTE — PROGRESS NOTES
PT Re-Evaluation     Today's date: 2023  Patient name: Gina Johnson  : 1955  MRN: [id number]  Referring provider: Sherrie Melchor MD  Dx:   Encounter Diagnosis     ICD-10-CM    1  S/P lumbar fusion  Z98 1       2  Status post lumbar spinal fusion  Z98 1                      Assessment  Assessment details: 2023: Pt has attended 15 skilled PT sessions and continues to report imrpovement in discomfort, endurance and functional activity ability and tolerance  He reports return to PLOF which includes some remaining LE stiffness making functional mobility mildly difficult for LE dressing  He is indep w/ HEP and tolerating self stretching well for remaining LE ROM limitations  Pt scores well w/ HODGES now, but has very mild remaining ataxic gait quality w/ vision eliminated (ie retro tandem)  Pt is appropriate to D/c to HEP  He is leaving for [place] for extended time tomorrow  2023: Pt has attended 10 skilled PT sessions and has made excellent gains in functional mobility, strength and balance  He lacks flexibility in pirif and  Hip flexors still which limit his functional mobility  L PF remains weak  Balance nearing PLOF  Pt will likely be ready for D/C as planned at the end of the month  2022: Gina Johnson is a 78 y o  male who presents with s/p spinal fusion with pain, decreased strength, decreased ROM, decreased joint mobility, decreased sensation, impaired sensation, ambulatory dysfunction, postural dysfunction, balance dysfunction and healing longitudinal incision along mid thoracic through lower lumbar spine  Due to these impairments, patient has difficulty performing ADL's, recreational activities, engaging in social activities, ambulation, stair negotiation, lifting/carrying, transfers, reaching   Patient's clinical presentation is consistent with their referring diagnosis of S/P lumbar fusion  (primary encounter diagnosis)  Patient has been educated in post-op contraindications / precautions, home exercise program and plan of care  Patient would benefit from skilled physical therapy services to address their aforementioned functional limitations and progress towards prior level of function and independence with home exercise program and self symptom management/resolution  Impairments: abnormal or restricted ROM and poor posture   Functional limitations: pt not allowed to bend, twist or lift>5#; fall riskUnderstanding of Dx/Px/POC: good   Prognosis: good    Goals  Short Term Goals: Target Date 1/12/2023  1  Initiate and advance HEP toward self symptom reduction/resolution  - met  2  Improve postural awareness and demonstrate self postural correction  - met  3  Improve LE strength by 1/2 MMT grade to prepare for ability to negotiate stairs reciprocally  - met w/ rail  4  Improve balance such that pt can stand w/o UE assist for 5 minutes or more  - met  5  Pt to tolerate prolonged sitting/standing x 1 hour or more w/o c/o  - met      Long Term Goals: Target Date 3/9/2023 -  1  Indep with HEP and self symptom prevention  - continued updates  2  Achieve LE strength of 4+/5 or better throughout such that pt can complete reciprocal stairs w/ rail - met except L PF  3  Improve balance such that HODGES score reaches 50 or better, indicating reduced risk for falls  - met  4  Pt to complete TUG w/ SPC or no AD in 17 seconds or less, indicating reduced risk for fall s - met  5  Pt to D/C RW for ambulation and progress to [employer] for long distances/outdoors  - improving  6  Improve LE strength, balance and endurance such that pt can 5x STS w/o assist of UE's in 18 seconds or less   - met    Plan  Planned therapy interventions: home exercise program  Other planned therapy interventions: mechanical assessment  Frequency: 2x week  Plan of Care beginning date: 12/14/2022  Plan of Care expiration date: 3/9/2023  Treatment plan discussed with: patient        Subjective Evaluation    History of Present Illness  Date of surgery: 2022  Mechanism of injury: Subjective   2023: Pt reports pain of 0-1/10 now  He has reached PLOF, still having some difficulty/stiffness w/ R LE dressing similar to PLOF  He states he is ready to D/C to HEP     2023: Pt reports his pain is significantly reduced and his function and balance are improving  He is now sleeping in his bed  He has D/C AD for ambulation  His only function deficit is LE dressing ROM  He feels his balance is near his PLOF, still a little limited w/ walking down an incline  2022: Pt is here for rehabilitation after his 2rd and hopefully final lumbar surgery  He was seen previously for physical therapy following lumbar fusion  This surgery was thoracic fusion  He was in acute care 4-5 days, then rehab center 7-8 days  He was using cane for long walks, but no AD for short distance/indoor ambulation  He is currently using a RW for ambulation  He reports no pain radiating into his arms or legs, but has constant pain in his back in varying locations  He reports numbness in the toes of his R foot since his lumbar fusion  Pain  At best pain ratin  At worst pain ratin  Exacerbated by: worst in the morning and end of the day; prolonged positions  Progression: resolved    Social Support  Steps to enter house: yes (1 w/ rail)  Stairs in house: yes (pt staying on first floor temporarily)     Treatments  Previous treatment: physical therapy and immobilization  Current treatment: physical therapy  Patient Goals  Patient goals for therapy: increased strength and improved balance  Patient goal: progress away from rolling walker        Objective       Function:   2023: Pt cannot yet perform L uni HR; He has minor remaining stiffness/difficulty w/ LE dressing (socks/shoes); tolerance to weight bearing activity no longer limited    2023: Pt can perform R uni HR, but not L uni HR, nor can he perform L uni eccent HR (able B/L HR); He has DC AD for ambulation and is now sleeping in his bed  He struggles w/ ROM for socks/shoes/toileting  He reports some remaining limitation of weight bearing/walking/standing of 15 minutes  2022: pt states he is indep w/ car/bed xfers; stairs are non reciprocal;  sleep disturbed by discomfort; Pt has been advised not to lift > 5# and not to bend, lift or twist  He reports he can bathe and dress himself  He is not driving due to his medications  Pt cannot walk w/o RW due to balance; standing tolerance estimated at less than a minute w/o AD  Pt does not lie supine due to incision still healing  Gait:   2023 - ambulates w/ no AD, very mild flexion at hips due to hip flexor tightness  2023 - ambulates w/ no AD, very mild flexion at hips due to hip flexor tightness  2022 - ambulates w/ RW w/ antalgic quality and flat footed gait  Remains flexed at his hips  DERMATOMAL TESTIN2022  L2 anterior thigh:  WNL B/L Dec R  L3 medial knee:  WNL B/L dec R  L4 medial maleolus:  WNL B/L dec R  L5 1st web space:   WNL B/L dec R  S1 lateral/sole foot:  WNL B/L WNL B/L  S2 poster calf:  WNL B/L WNL B/L    MYOTOMAL TESTIN2022      right  Left  Right  Left  Right  Left     22  L2-3 Hip flexion:      5/5  5/5  4+/5  4+/5  4/5  4-/5  L3-4 Knee extension:      5/5  5/5  4+/5  4/5  4-/5  4/5  L5 Great toe exten:     5/5  5/5   5/5  5/5  4+/5  5/5  L4 DF:           5/5  5/5  5/5  5/5  5/5  5/5  S1 toe walk/PF    4+/5  4/5  4+/5  4-/5  4+/5  4-/5  S1 eversion     5/5  5/5  5/5  5/5  5/5  5  Ankle inver       5  S2 knee flex:         4+/  Hip abd sidelying      4+/5  4-/  4-/  Hip exten sidelying       4+/  4-/  4-/    REFLEXES: 0= none; 1+ = slight; 2+ = brisk/normal; 3+= very brisk; 4+= clonus    2022  L3-4 Quadriceps: 2+ B/L  L5-S1 Achilles: 2+ B/L  Biceps:  2+ B/L  Brachioradialis:2+ B/L  Triceps:  2+ B/L    LUMBAR AROM: N/A - Fusion    OBSERVATION:  1/17/2023 - incision is healed; pt continues to wear back brace  12/14/202 - incision healing well - still some scabbing - steristrips in place L5-T8 - pt covers loosely w/ guaze pads  FLEXIBILITY  2/2/2023 - remains min/mod ruby b/L hip flexors and hip ER B/L - tolerates self stretching well  1/17/2023 - Mod ruby B/L hip flexors and hip ER/pirif; min ruby hams  12/14/2022 - Assessed in sitting - hams WFL B/L; hip ER mod ruby R>L; hip IR min ruby B/L; slump test (-) B/L    Outcome Measures Initial Eval  2/2/2023 1/17/2023 2/2/2023      5xSTS 31 14 sec w/ 5/10 RPE and use of B/L hands sec 11 28 no use UEs 10 9 sec no UE/s      TUG 27 37 sec w/ RW 8 04 sec 8 04 sec      Tandem bal EO R post 3 sec 30 sec+ NT      Tandem bal EO L post 10 sec 30 sec+ NT      HODGES 27/56 56/56 56/56      mCTSIB  - FTEO (firm)  - FTEC (firm)    - FTEO (foam)    - FTEC (foam)   60 sec w/ S  3 sec CS/LOB  50 sec mod sway  unable   60sec+  60 sec+    60 sec+  16 sec   60 sec+  60 sec+    60 sec+  30 sec      SLS EO R  24 sec 24 sec      SLS EO L  15 sec 15 sec                    Cut off scores: All data taken from [facility] or Rehab Measures    Hodges: <46/56=falls risk  MDC: 6 pts  Age Norms:  61-76: M - 54   F - 55  70-79: M - 47   F - 53  80-89: M - 48   F - 50 5xSTS: Logan et al 2010  MDC: 2 3 sec  Age Norms:  60-69: 11 1 sec  70-79: 12 6 sec  80-89: 14 8 sec   TUG  MDC: 4 14 sec  Cut off score:  >13 5 sec community dwelling adults  >32 2 sec frail elderly  <20 sec:  I for basic transfers  >30: dependent for transfers 10 Meter Walk Test Norms: Rubia Yip and Kvng shepard 2011  20-29: M - 1 35 m   F - 1 34 m  30-39: M - 1 43 m   F - 1 34 m  40-49: M - 1 43 m   F - 1 39 m  50-59: M - 1 43 m   F - 1 31 m  60-69: M - 1 34 m   F - 1 24 m  70-79: M - 1 26 m   F - 1 13 m  80-89: M - 0 97 m   F - 0 94 m FGA  MCID: 4 pts  Geriatrics/community < 22/30 fall risk  Geriatrics/community < 20/30 unexplained falls DGI  MDC: vestibular - 4 pts  MDC: geriatric/community - 3 pts  Falls risk <19/24   6 Minute Walk Test  Age Norms  61-76: M - 1876 ft (571 80 m)  F - 1765 ft (537 98 m)  70-79: M - 1729 ft (527 00 m)  F - 1545 ft (470 92 m)  80-89: M - 1368 ft (416 97 m)  F - 1286 ft (391 97 m) mCTSIB  Norm: 20-60 yrs  Eyes open firm: norm sway 0 21-0 48  Eyes closed firm: norm sway 0 48-0 99  Eyes open foam: norm sway 0 38-0 71  Eyes closed foam: norm sway 0 70-2 22        Precautions: PT CANNOT LIE SUPINE - LONGITUDINAL INCISION STILL HEALING  Past Medical History:   Diagnosis Date   • Ambulates with cane     "long distance"   • Arthritis    • Back pain    • Chronic pain disorder    • Dental crowns present    • Exercise involving walking     daily -short walks   • Hepatitis C    • History of hepatitis C     per pt "went away on its own" age 13"   • History of transfusion    • Hyperlipidemia    • Hypertension    • Infectious viral hepatitis 1972   • Leg pain     and foot pain   • Limb alert care status     per pt NO IV's LEFT UPPER ARM due to old injury   • Risk for falls    • Spinal stenosis    • Stroke [facility])     per pt in 1996-and no lasting problems   • Wears glasses     readers   SOC: 12/14/2022  RE: 1/17/2023  FOTO: 1/26/2023  POC EXP: 3/9/2023  DAILY TREATMENT LOG:  date 1/24/2022 1/26/2023  FOTO 1/31/2023 2/2/2023  RE    Visit #/auth 11 12 13 14                            Neuro Re-Ed 25' 15' 20' 25'    Balance & funct tests    TT 10'    Tandem walk fwd/bkwd Backward only CS 20'x2  Backward 15'x4 CS Backward 20'x3 CS    Cross crawl 20'x4 CS  20'x4 S 20'x3    B/L UE ext from [place]   8# 20x w/ scap set     FSU near rail CGA 8" step +2" foam non  recip 2x10 R/L CGA 8" step +2" foam non recip 2x10 R/L CGA 8" step +2" foam 2x10 R/L CGA 8" step +2" foam 2x10 CGA    Steamboats w/ TB @ ankles RTB 2x10 r/L grn 2x10 R/L grn 1x10 R/L grn 1x10 R/L    Ther Ex 20' 30' 25' 20'    Stand UBE retro L1x3' L1x3' L1x3'     B/L HR        Hip flexor stretch foot on step 10"x5 r/L 10"x5 R/L 10"x5 r/L 10"x5 R/L    STS from chair w/ B/L OH press  3# DB's 2x10 3# DB's 2x12 3# DB's 2x12    Sit modif fig 4 stretch w/ foot on stool 30"x3 R/L 30"x3 R/L 30"x3 r/L 30"x3 R/L    Upright uni leg press 35# 2x10 R/L 35# 2x10 R/L 35# 2x12 R/L 35# 2x12 R/L    B/L UE wall slides flexion 5"x10 5"X10 5"x10 5"x10    pulleys 3' 3' 3' 3'    ROM/MMt    5'    HEP   Update & review      Ther Activity                        Gait Training                        Modalities                          Access Code: [email or web address]  URL: [email or web address]  Date: 01/26/2023  Prepared by:  Kain Das  Standing 3-Way Kick - 1 x daily - 4 x weekly - 10 reps  Mini Squat to Shoulder Press with Barbell - 4 x weekly - 2 sets - 10 reps  Standing shoulder flexion wall slides - 4 x weekly - 15 reps - 5 hold  Hip Flexor Stretch on Step - 4 x weekly - 5 reps - 10 hold  Seated Long Arc Quad with Hip Adduction - 1 x daily - 2 sets - 10 reps - 3 hold  Seated Figure 4 Piriformis Stretch - 4 x weekly - 30 reps - 3 hold

## 2023-02-07 ENCOUNTER — TELEPHONE (OUTPATIENT)
Dept: GASTROENTEROLOGY | Facility: CLINIC | Age: 68
End: 2023-02-07

## 2023-03-18 PROBLEM — Z12.11 COLON CANCER SCREENING: Status: RESOLVED | Noted: 2023-01-17 | Resolved: 2023-03-18

## 2023-04-03 ENCOUNTER — APPOINTMENT (OUTPATIENT)
Dept: RADIOLOGY | Facility: CLINIC | Age: 68
End: 2023-04-03

## 2023-04-03 ENCOUNTER — OFFICE VISIT (OUTPATIENT)
Dept: OBGYN CLINIC | Facility: CLINIC | Age: 68
End: 2023-04-03

## 2023-04-03 VITALS — HEIGHT: 71 IN | WEIGHT: 215 LBS | BODY MASS INDEX: 30.1 KG/M2

## 2023-04-03 DIAGNOSIS — Z98.890 HISTORY OF LUMBAR SURGERY: ICD-10-CM

## 2023-04-03 DIAGNOSIS — M54.6 THORACIC SPINE PAIN: ICD-10-CM

## 2023-04-03 DIAGNOSIS — M40.30 FLATBACK SYNDROME: ICD-10-CM

## 2023-04-03 DIAGNOSIS — M41.9 SCOLIOSIS, UNSPECIFIED SCOLIOSIS TYPE, UNSPECIFIED SPINAL REGION: ICD-10-CM

## 2023-04-03 DIAGNOSIS — M51.35 DJD (DEGENERATIVE JOINT DISEASE), THORACOLUMBAR: ICD-10-CM

## 2023-04-03 DIAGNOSIS — Z98.890 HISTORY OF LUMBAR SURGERY: Primary | ICD-10-CM

## 2023-04-18 PROBLEM — D72.829 LEUKOCYTOSIS: Status: RESOLVED | Noted: 2022-11-25 | Resolved: 2023-04-18

## 2023-04-18 PROBLEM — E87.1 HYPONATREMIA: Status: RESOLVED | Noted: 2022-12-02 | Resolved: 2023-04-18

## 2023-07-31 ENCOUNTER — TELEPHONE (OUTPATIENT)
Dept: OBGYN CLINIC | Facility: HOSPITAL | Age: 68
End: 2023-07-31

## 2023-07-31 NOTE — TELEPHONE ENCOUNTER
Caller: patient    Doctor: Bel Paige    Reason for call: patient is calling in to see if there is a specific neurologists that they should be referred to for their neuropathy    Call back#: [phone]

## 2023-07-31 NOTE — TELEPHONE ENCOUNTER
Left message, try DR Trish Ayoub. But can go anywhere that will see him as a np/accept his insurance.

## 2023-08-01 ENCOUNTER — TELEPHONE (OUTPATIENT)
Dept: NEUROLOGY | Facility: CLINIC | Age: 68
End: 2023-08-01

## 2023-08-01 NOTE — TELEPHONE ENCOUNTER
Patient called in to schedule NP appt with Dr. Georgia Howard for Neuropathy, referred by Ortho. First available Feb 2024. Patient declined at this time and will call his PCP for other options in his area.

## 2023-08-14 ENCOUNTER — HOSPITAL ENCOUNTER (OUTPATIENT)
Dept: RADIOLOGY | Facility: IMAGING CENTER | Age: 68
Discharge: HOME | End: 2023-08-14
Payer: MEDICARE

## 2023-08-14 ENCOUNTER — OFFICE VISIT (OUTPATIENT)
Dept: OBGYN CLINIC | Facility: CLINIC | Age: 68
End: 2023-08-14
Payer: MEDICARE

## 2023-08-14 ENCOUNTER — APPOINTMENT (OUTPATIENT)
Dept: RADIOLOGY | Facility: CLINIC | Age: 68
End: 2023-08-14
Payer: MEDICARE

## 2023-08-14 VITALS
SYSTOLIC BLOOD PRESSURE: 124 MMHG | DIASTOLIC BLOOD PRESSURE: 79 MMHG | WEIGHT: 207.4 LBS | BODY MASS INDEX: 29.03 KG/M2 | HEIGHT: 71 IN | HEART RATE: 82 BPM

## 2023-08-14 DIAGNOSIS — Z98.890 HISTORY OF LUMBAR SURGERY: Primary | ICD-10-CM

## 2023-08-14 DIAGNOSIS — Z98.890 HISTORY OF LUMBAR SURGERY: ICD-10-CM

## 2023-08-14 DIAGNOSIS — G95.9 MYELOPATHY (HCC): ICD-10-CM

## 2023-08-14 PROCEDURE — 72141 MRI NECK SPINE W/O DYE: CPT

## 2023-08-14 PROCEDURE — 99214 OFFICE O/P EST MOD 30 MIN: CPT | Performed by: ORTHOPAEDIC SURGERY

## 2023-08-14 PROCEDURE — G1004 CDSM NDSC: HCPCS

## 2023-08-14 PROCEDURE — 72146 MRI CHEST SPINE W/O DYE: CPT

## 2023-08-14 PROCEDURE — 72070 X-RAY EXAM THORAC SPINE 2VWS: CPT

## 2023-08-14 PROCEDURE — 72100 X-RAY EXAM L-S SPINE 2/3 VWS: CPT

## 2023-08-14 RX ORDER — TRAMADOL HYDROCHLORIDE 50 MG/1
50 TABLET ORAL EVERY 6 HOURS PRN
COMMUNITY

## 2023-08-14 NOTE — PROGRESS NOTES
[address] SIVAKUMAR Walsh MD  [place] [id number]  [phone]    HISTORY OF PRESENT ILLNESS:    Rajinder Pettit is a 80 y.o. male who presents approximately 9 months status post removal of hardware L1-L3, posterior thoracic/ lumbar instrumentation from T8 to pelvis, thoracic osteotomy T11/12, thoracic laminotomy T8/9, T9/10 and T10/11,  posterior thoracic fusion T7-L1, revision instrumentation T8 to pelvis performed on 11/22/22. Prior surgery was 5/24/22. He reports numbness into bilateral feet, right greater than left which began a few months ago. He has been tripping and falling a lot recently which began about 2 months ago. He is having pain in his mid back which has worsened since his last visit. He is accompanied by his wife and notes changes in his gait. ALLERGIES:   Allergies   Allergen Reactions   • Seasonal Ic [Octacosanol] Other (See Comments)     Seasonal allergies        MEDICATIONS:    Current Outpatient Medications:   •  cholecalciferol (VITAMIN D3) 1,000 units tablet, Take 2 tablets (2,000 Units total) by mouth daily, Disp: 60 tablet, Rfl: 0  •  lisinopril (ZESTRIL) 10 mg tablet, Take 1 tablet (10 mg total) by mouth daily, Disp: 30 tablet, Rfl: 0  •  lovastatin (MEVACOR) 10 MG tablet, Take 10 mg by mouth daily at bedtime, Disp: , Rfl:   •  traMADol (ULTRAM) 50 mg tablet, Take 50 mg by mouth every 6 (six) hours as needed for moderate pain Pt.  Stated Dr. Gregory Hamilton, PCP, is prescribing tramadol 50 mgs BID, Disp: , Rfl:   •  aspirin (ECOTRIN LOW STRENGTH) 81 mg EC tablet, Take 1 tablet (81 mg total) by mouth daily, Disp: 30 tablet, Rfl: 0     PAST MEDICAL HISTORY:   Past Medical History:   Diagnosis Date   • Ambulates with cane     "long distance"   • Arthritis    • Back pain    • Chronic pain disorder    • Dental crowns present    • Exercise involving walking     daily -short walks   • Hepatitis C    • History of hepatitis C     per pt "went away on its own" age 13"   • History of transfusion    • Hyperlipidemia    • Hypertension    • Hyponatremia 2022   • Infectious viral hepatitis    • Leg pain     and foot pain   • Leukocytosis 2022   • Limb alert care status     per pt NO IV's LEFT UPPER ARM due to old injury   • Risk for falls    • Spinal stenosis    • Stroke ([address])     per pt in -and no lasting problems   • Wears glasses     readers       PAST SURGICAL HISTORY:  Past Surgical History:   Procedure Laterality Date   • BACK SURGERY      with implants   • COLONOSCOPY     • LUMBAR FUSION N/A 2022    Procedure: L1-S1 revision of segmental hardware, L5-S1 osteotomy, posterior spinal fusion L4-S1, pelvic instrumentation, Transforaminal lumbar interbody fusion L5S1, Cage L5S1, instrumentation L1-pelvis; Surgeon: Naima Milner MD;  Location: [facility];  Service: Orthopedics   • [place] ARTHRODESIS POSTERIOR/PSTLAT TQ 1NTRSP THORACIC N/A 2022    Procedure: removal of hardware L1-L3. Posterior thoracic/ lumbar instrumentation from T8 to pelvis. Thoracic osteotomy T11/12. Thoracic laminotomy T8/9, T9/10 and T10/11  Posterior thoracic fusion T7-L1. Revision instrumentation T8 to pelvis.;  Surgeon: Naima Milner MD;  Location: [facility];  Service: Orthopedics   • WISDOM TOOTH EXTRACTION         SOCIAL HISTORY:  Social History     Tobacco Use   Smoking Status Former   • Packs/day: 0.25   • Years: 30.00   • Total pack years: 7.50   • Types: Cigarettes   • Start date: 1974   • Quit date: 2000   • Years since quittin.6   Smokeless Tobacco Never   Tobacco Comments    only smoked  less than a pack a month          PHYSICAL EXAM:  80 y.o. male sitting comfortably on exam chair in no apparent distress. Patient ambulates without normal gait, leaning forward, mild shuffling. able to walk on heels/toes. able to balance on one leg. Mild diffuse TTP over thoracic and lumbar spine.      Sensation intact to light touch left L2 through S1 dermatomes. Sensation intact to light touch right L2 through S1 dermatomes     Left Motor: 5/5 iliopsoas, 5/5 quadriceps, 5/5 tibialis anterior, 5/5 extensor hallucis longus, and 5/5 gastrocsoleus. Right Motor: 5/5 iliopsoas, 5/5 quadriceps, 5/5 tibialis anterior, 5/5 extensor hallucis longus, and 5/5 gastrocsoleus. Symmetric deep tendon reflexes bilateral upper extremities    hyperreflexive   Symmetric deep tendon reflexes bilateral lower extremities     hyperreflexive    Art's sign negative  Radial release sign positive   Straight leg raise test is negative Bilateral   The patient is well perfused distally. RADIOGRAPHIC STUDIES:  1. X-rays, scoli films, 12/13/2021:  Prior multilevel lumbar fusion with unknown instrumentation.  Significant sagittal coronal balance.  Adjacent segment kyphosis.     2. MRI, lumbar spine, 12/21/2021:  Multilevel moderate to severe thoracic degenerative disc.  Prior instrumentation from L1 to S1.  Kyphosis at the thoracolumbar junction.  No spinal cord compression or myelomalacia.  Malposition right L1 screw.     3. MRI, thoracic spine, 12/21/2021:  1 to S1 posterior instrumentation with kyphotic deformity at the upper lumbar spine.  Flat back deformity.  No significant central lateral recess stenosis.  Malposition right L1 screw.     4. Xrays, lumbar spine, 3/15/2022:  Status post L1-S1 posterior spinal fusion instrumentation.  There was evidence of solid fusion from L2-S1.  Screw cut-out is present at L1. Corina Nose is also evidence of adjacent segment kyphosis and flat back deformity.     5. CT lumbar and thoracic spine 3/18/2022: significant kyphosis of the lumbar and thoracic spine. Robust bony fusion of L1-L5, hardware intact. Screw penetration of the right lumbar spinal canal at L2 and L1 right pedicle screw cut out into the disk space.  Failure of fusion of L5-S1.     6. X-ray lumbar spine 4/18/2022:  intact hardware with lumbar and thoracic kyphosis.     7. X-rays, Lumbar spine 6/6/22:  Stable fusion L1-Pelvis with no evidence of hardware failure.  Interbody implant is in appropriate position. Steph Salter is evidence of correction of sagittal deformity or proximally 10°.      8. X-rays, lumbar spine, 07/05/2022:  Status post prior fusion from L1-S1.  Pelvic instrumentation is in appropriate position. Steph Salter is also hardware complication. Steph Salter is no change in alignment of the spine when compared to prior films from 06/06/2022.     9. X-rays, scoliosis films, 09/12/2022:  Status post L1-S1 revision instrumentation and fusion with pelvic screw instrumentation.  No evidence of hardware complication.  Alignment of the lumbar spine is maintained.  There is significant kyphosis at the thoracolumbar junction.     10. Xrays, lumbar spine, 10/10/2022:  Status post L1 to pelvis posterior spinal fusion instrumentation.  There is also hardware complication.  There was also significant deformity at the thoracolumbar junction.     11. X-rays, L-spine, 1/9/2023: Status post thoracolumbar fusion. Hardware is in appropriate position. There is also hardware complication. 12.  X-rays, thoracic spine, 4/3/2023: Status post thoracolumbar fusion from T8 to pelvis. The upper part of hardware is visualized. There is also hardware complication. X-rays, T-spine, 8/14/23: Status post thoracic instrumentation T8 through pelvis, the upper part of the instrumentation was visualized in the thoracic x-rays. There is also hardware complication. 13. X-rays, L-spine, 8/14/23: Status post thoracolumbar fusion from T8 to pelvis. There is also hardware complication. There may be evidence of screw loosening along the pelvic screws as expected. No evidence of instability present. Spine appears to be fused. ASSESSMENT:  1. History of lumbar surgery  -     XR spine lumbar 2 or 3 views injury;  Future; Expected date: 08/14/2023  -     XR spine thoracic 2 vw; Future; Expected date: 08/14/2023    2. Myelopathy ([address])  -     MRI cervical spine wo contrast; Future; Expected date: 08/14/2023  -     MRI thoracic spine wo contrast; Future; Expected date: 08/14/2023        PLAN:  80 y.o. male approximately 9 months status post removal of hardware L1-L3, posterior thoracic/ lumbar instrumentation from T8 to pelvis, thoracic osteotomy T11/12, thoracic laminotomy T8/9, T9/10 and T10/11,  posterior thoracic fusion T7-L1, revision instrumentation T8 to pelvis performed on 11/22/22. Prior surgery was 5/24/22. The patient's x-rays were reviewed today. Upon exam the patient is hyperreflexive and has mild shuffling gait. STAT MRI's of the cervical and thoracic spine were ordered today to rule out spinal cord compression. The natural history of spinal cord compression was discussed with the patient today. I have seen Mr. Jen Betancourt on a number of occasions in the past.  There is a sniffing and difference in his ambulatory function. He is shuffling and presents with signs of hyperreflexia. I am concerned that he has spinal cord compression. Most likely compression is in the cervical spine. For completeness of we are going to order an MRI study thoracic spine to rule out [place] Class level issues. I did review the x-rays from today. There is notes of hardware complication and there is no evidence of significant progression of degenerative changes at the adjacent T7-T8 level. I will see the patient back once his MRI results are available.            Scribe Attestation    I,:  Irlanda Ibrahim am acting as a scribe while in the presence of the attending physician.:       I,:  Sbaine Carreno MD personally performed the services described in this documentation    as scribed in my presence.:

## 2023-08-14 NOTE — PROGRESS NOTES
[address] SIVAKUMAR Walsh MD  [address] [place] [address]  [phone]      HISTORY OF PRESENT ILLNESS:    Brandon Heart is a 80 y.o. male who presents approximately 9 months status post removal of hardware L1-L3, posterior thoracic/ lumbar instrumentation from T8 to pelvis, thoracic osteotomy T11/12, thoracic laminotomy T8/9, T9/10 and T10/11,  posterior thoracic fusion T7-L1, revision instrumentation T8 to pelvis performed on 11/22/22. [facility] surgery was 5/24/22. He reports numbness into bilateral feet, right greater than left which began a few months ago. He has been tripping and falling a lot recently which began about 2 months ago. He is having pain in his mid back which has worsened since his last visit. He is accompanied by his wife and notes changes in his gait. During the exam on 8/14/2023, I noted that the patient has a shuffling gait. Hyperreflexia was also noted. Stat MRI study of the cervical and thoracic spine was ordered for further evaluation of his spinal cord. He was scheduled to see me for further evaluation and treatment. He does have an upcoming appointment with neurology for EMG nerve conduction studies. He has been suffering from neuropathic symptoms for period of time. That he has improved since Monday. He was able to obtain the radiographic studies following my evaluation on Monday and I did review the films previously and again today.   He has an appointment to see a neurologist in the near future.       ALLERGIES:   Allergies   Allergen Reactions   • Seasonal Ic [Octacosanol] Other (See Comments)     Seasonal allergies        MEDICATIONS:    Current Outpatient Medications:   •  aspirin (ECOTRIN LOW STRENGTH) 81 mg EC tablet, Take 1 tablet (81 mg total) by mouth daily, Disp: 30 tablet, Rfl: 0  •  cholecalciferol (VITAMIN D3) 1,000 units tablet, Take 2 tablets (2,000 Units total) by mouth daily, Disp: 60 tablet, Rfl: 0  •  lisinopril (ZESTRIL) 10 mg tablet, Take 1 tablet (10 mg total) by mouth daily, Disp: 30 tablet, Rfl: 0  •  lovastatin (MEVACOR) 10 MG tablet, Take 10 mg by mouth daily at bedtime, Disp: , Rfl:   •  traMADol (ULTRAM) 50 mg tablet, Take 50 mg by mouth every 6 (six) hours as needed for moderate pain Pt. Stated Dr. Steffi Benson, PCP, is prescribing tramadol 50 mgs BID, Disp: , Rfl:      PAST MEDICAL HISTORY:   Past Medical History:   Diagnosis Date   • Ambulates with cane     "long distance"   • Arthritis    • Back pain    • Chronic pain disorder    • Dental crowns present    • Exercise involving walking     daily -short walks   • Hepatitis C    • History of hepatitis C     per pt "went away on its own" age 13"   • History of transfusion    • Hyperlipidemia    • Hypertension    • Hyponatremia 12/2/2022   • Infectious viral hepatitis 1972   • Leg pain     and foot pain   • Leukocytosis 11/25/2022   • Limb alert care status     per pt NO IV's LEFT UPPER ARM due to old injury   • Risk for falls    • Spinal stenosis    • Stroke ([address])     per pt in 1996-and no lasting problems   • Wears glasses     readers       PAST SURGICAL HISTORY:  Past Surgical History:   Procedure Laterality Date   • BACK SURGERY      with implants   • COLONOSCOPY     • LUMBAR FUSION N/A 5/24/2022    Procedure: L1-S1 revision of segmental hardware, L5-S1 osteotomy, posterior spinal fusion L4-S1, pelvic instrumentation, Transforaminal lumbar interbody fusion L5S1, Cage L5S1, instrumentation L1-pelvis; Surgeon: Bekah Alcaraz MD;  Location: [facility];  Service: Orthopedics   • [place] [facility]/22/2022    Procedure: removal of hardware L1-L3. Posterior thoracic/ lumbar instrumentation from T8 to pelvis. Thoracic osteotomy T11/12. Thoracic laminotomy T8/9, T9/10 and T10/11  Posterior thoracic fusion T7-L1.   Revision instrumentation T8 to pelvis.;  Surgeon: Bekah Alcaraz MD;  Location[facility];  Service: Orthopedics   • NATALIE TOOTH EXTRACTION         SOCIAL HISTORY:  Social History     Tobacco Use   Smoking Status Former   • Packs/day: 0.25   • Years: 30.00   • Total pack years: 7.50   • Types: Cigarettes   • Start date: 1974   • Quit date: 2000   • Years since quittin.6   Smokeless Tobacco Never   Tobacco Comments    only smoked  less than a pack a month          PHYSICAL EXAM:  80 y.o. male with sagittal imbalance and flatback deformity. He has very little discomfort to palpation and is able to ambulate much better than last visit. His neurologic exam is unchanged. He      RADIOGRAPHIC STUDIES:  1. X-rays, scoli films, 2021:  Prior multilevel lumbar fusion with unknown instrumentation.  Significant sagittal coronal balance.  Adjacent segment kyphosis.     2. MRI, lumbar spine, 2021:  Multilevel moderate to severe thoracic degenerative disc.  Prior instrumentation from L1 to S1.  Kyphosis at the thoracolumbar junction.  No spinal cord compression or myelomalacia.  Malposition right L1 screw.     3. MRI, thoracic spine, 2021:  1 to S1 posterior instrumentation with kyphotic deformity at the upper lumbar spine.  Flat back deformity.  No significant central lateral recess stenosis.  Malposition right L1 screw.     4. Xrays, lumbar spine, 3/15/2022:  Status post L1-S1 posterior spinal fusion instrumentation.  There was evidence of solid fusion from L2-S1.  Screw cut-out is present at L1. [place] Erick is also evidence of adjacent segment kyphosis and flat back deformity.     5. CT lumbar and thoracic spine 3/18/2022: significant kyphosis of the lumbar and thoracic spine. Robust bony fusion of L1-L5, hardware intact. Screw penetration of the right lumbar spinal canal at L2 and L1 right pedicle screw cut out into the disk space.  Failure of fusion of L5-S1.     6. X-ray lumbar spine 2022:  intact hardware with lumbar and thoracic kyphosis.     7. X-rays, Lumbar spine 22:  Stable fusion L1-Pelvis with no evidence of hardware failure.  Interbody implant is in appropriate position. Irvin Burgos is evidence of correction of sagittal deformity or proximally 10°.      8. X-rays, lumbar spine, 07/05/2022:  Status post prior fusion from L1-S1.  Pelvic instrumentation is in appropriate position. Irvin Burgos is also hardware complication. Irvin Burgos is no change in alignment of the spine when compared to prior films from 06/06/2022.     9. X-rays, scoliosis films, 09/12/2022:  Status post L1-S1 revision instrumentation and fusion with pelvic screw instrumentation.  No evidence of hardware complication.  Alignment of the lumbar spine is maintained.  There is significant kyphosis at the thoracolumbar junction.     10. Xrays, lumbar spine, 10/10/2022:  Status post L1 to pelvis posterior spinal fusion instrumentation.  There is also hardware complication.  There was also significant deformity at the thoracolumbar junction.     11. X-rays, L-spine, 1/9/2023: Status post thoracolumbar fusion. Lulla Laity is in appropriate position. Irvin Burgos is also hardware complication.     12. X-rays, thoracic spine, 4/3/2023: Status post thoracolumbar fusion from T8 to pelvis. The upper part of hardware is visualized. There is also hardware complication.     13. X-rays, T-spine, 8/14/2023: Status post thoracic instrumentation T8 through pelvis, the upper part of the instrumentation was visualized in the thoracic x-rays. There is also hardware complication.     14. X-rays, L-spine, 8/14/2023: Status post thoracolumbar fusion from T8 to pelvis. There is also hardware complication. There may be evidence of screw loosening along the pelvic screws as expected. No evidence of instability present. Spine appears to be fused. 15. MRI, C-spine, 8/14/2023: Mild multilevel cervical degenerative disc disease. The canal is wide open. There is no evidence of spinal cord compression or nerve root compression. There is no evidence of myelomalacia. 16.   MRI, T-spine, 8/14/2023: Moderate thoracic degenerative disc disease. There is evidence of instrumentation up to the T8 level. There is no evidence of spinal cord compression, spinal stenosis and there is no evidence of adjacent segment stenosis or instability. ASSESSMENT:  1. Flatback syndrome    2. Status post lumbar spinal fusion    3. S/P lumbar fusion    4. Sagittal plane imbalance        PLAN:  80 y.o. male status post thoracolumbar revision fusion and instrumentation with persistent sagittal imbalance and neuropathy. He is an appointment to see a neurologist for evaluation of his neuropathy which is quite reasonable. At the time of my last visit on Monday, 2 days ago was very concerned that he may be myelopathic. After reviewing the MRI study, there is [place] no concern for myelopathy. He does have moderate degenerative disc disease isolated to a single level at its in the cervical spine and mild degenerative changes throughout the remainder of the cervical spine. He also does have moderate degenerative changes at multiple levels of the thoracic spine including the level above the fusion. There is however no evidence of spinal stenosis. The canal is wide open and there is no evidence of nerve root compression. I did suggest a short course of steroids, Medrol Dosepak for treatment of his acute symptoms. I am quite confident that this will be helpful. If he is still symptomatic in 3 weeks we will discuss physical therapy. Most likely will be referred to aqua therapy if he is still symptomatic at that time.          Scribe Attestation    I,:   am acting as a scribe while in the presence of the attending physician.:       I,:   personally performed the services described in this documentation    as scribed in my presence.:

## 2023-08-16 ENCOUNTER — OFFICE VISIT (OUTPATIENT)
Dept: OBGYN CLINIC | Facility: CLINIC | Age: 68
End: 2023-08-16
Payer: MEDICARE

## 2023-08-16 VITALS — HEIGHT: 71 IN | BODY MASS INDEX: 28.98 KG/M2 | WEIGHT: 207 LBS

## 2023-08-16 DIAGNOSIS — M43.8X9 SAGITTAL PLANE IMBALANCE: ICD-10-CM

## 2023-08-16 DIAGNOSIS — M40.30 FLATBACK SYNDROME: Primary | ICD-10-CM

## 2023-08-16 DIAGNOSIS — Z98.1 STATUS POST LUMBAR SPINAL FUSION: ICD-10-CM

## 2023-08-16 DIAGNOSIS — Z98.1 S/P LUMBAR FUSION: ICD-10-CM

## 2023-08-16 PROCEDURE — 99213 OFFICE O/P EST LOW 20 MIN: CPT | Performed by: ORTHOPAEDIC SURGERY

## 2023-08-16 RX ORDER — METHYLPREDNISOLONE 4 MG/1
TABLET ORAL
Qty: 1 EACH | Refills: 0 | Status: SHIPPED | OUTPATIENT
Start: 2023-08-16

## 2023-09-11 ENCOUNTER — OFFICE VISIT (OUTPATIENT)
Dept: OBGYN CLINIC | Facility: CLINIC | Age: 68
End: 2023-09-11
Payer: MEDICARE

## 2023-09-11 VITALS
WEIGHT: 206.8 LBS | BODY MASS INDEX: 28.95 KG/M2 | DIASTOLIC BLOOD PRESSURE: 79 MMHG | SYSTOLIC BLOOD PRESSURE: 138 MMHG | HEIGHT: 71 IN | HEART RATE: 73 BPM

## 2023-09-11 DIAGNOSIS — Z98.1 STATUS POST LUMBAR SPINAL FUSION: ICD-10-CM

## 2023-09-11 DIAGNOSIS — M43.8X9 SAGITTAL PLANE IMBALANCE: ICD-10-CM

## 2023-09-11 DIAGNOSIS — M40.30 FLATBACK SYNDROME: Primary | ICD-10-CM

## 2023-09-11 PROCEDURE — 99213 OFFICE O/P EST LOW 20 MIN: CPT | Performed by: ORTHOPAEDIC SURGERY

## 2023-09-11 NOTE — PROGRESS NOTES
900 SIVAKUMAR Walsh MD  [address] [place] [address]  [phone]    HISTORY OF PRESENT ILLNESS:    Sue Eubanks is a 80 y.o. male who presents for follow-up status post thoracolumbar revision fusion and instrumentation with persistent sagittal imbalance and neuropathy. The patient was last seen in the office on 8/16/2023 where the patient was provided a Medrol Dosepak for his acute symptoms and instructed to follow-up in 3 weeks time. He notes the Medrol Dosepak has provided great relief and the sharp pain has disappeared. He does have back pain still but this is back to his baseline. He has an appointment with neurology outside of the health network, in [place], [place] on the 18th. He is taking Tramadol daily for symptom management. He walks roughly 1/2 a mile per day. He is retired. ALLERGIES:   Allergies   Allergen Reactions   • Seasonal Ic [Octacosanol] Other (See Comments)     Seasonal allergies        MEDICATIONS:    Current Outpatient Medications:   •  cholecalciferol (VITAMIN D3) 1,000 units tablet, Take 2 tablets (2,000 Units total) by mouth daily, Disp: 60 tablet, Rfl: 0  •  lisinopril (ZESTRIL) 10 mg tablet, Take 1 tablet (10 mg total) by mouth daily, Disp: 30 tablet, Rfl: 0  •  lovastatin (MEVACOR) 10 MG tablet, Take 10 mg by mouth daily at bedtime, Disp: , Rfl:   •  traMADol (ULTRAM) 50 mg tablet, Take 50 mg by mouth every 6 (six) hours as needed for moderate pain Pt.  Stated Dr. Jocelyn Horn, PCP, is prescribing tramadol 50 mgs BID, Disp: , Rfl:   •  aspirin (ECOTRIN LOW STRENGTH) 81 mg EC tablet, Take 1 tablet (81 mg total) by mouth daily, Disp: 30 tablet, Rfl: 0  •  methylPREDNISolone 4 MG tablet therapy pack, Use as directed on package, Disp: 1 each, Rfl: 0     PAST MEDICAL HISTORY:   Past Medical History:   Diagnosis Date   • Ambulates with cane     "long distance"   • Arthritis    • Back pain    • Chronic pain disorder    • Dental crowns present • Exercise involving walking     daily -short walks   • Hepatitis C    • History of hepatitis C     per pt "went away on its own" age 13"   • History of transfusion    • Hyperlipidemia    • Hypertension    • Hyponatremia 2022   • Infectious viral hepatitis 1972   • Leg pain     and foot pain   • Leukocytosis 2022   • Limb alert care status     per pt NO IV's LEFT UPPER ARM due to old injury   • Risk for falls    • Spinal stenosis    • Stroke ([address])     per pt in -and no lasting problems   • Wears glasses     readers       PAST SURGICAL HISTORY:  Past Surgical History:   Procedure Laterality Date   • BACK SURGERY      with implants   • COLONOSCOPY     • LUMBAR FUSION N/A 2022    Procedure: L1-S1 revision of segmental hardware, L5-S1 osteotomy, posterior spinal fusion L4-S1, pelvic instrumentation, Transforaminal lumbar interbody fusion L5S1, Cage L5S1, instrumentation L1-pelvis; Surgeon: Medina King MD;  Location: [facility];  Service: Orthopedics   • OR ARTHRODESIS POSTERIOR/PSTLAT TQ 1NTRSPC THORACIC N/A 2022    Procedure: removal of hardware L1-L3. Posterior thoracic/ lumbar instrumentation from T8 to pelvis. Thoracic osteotomy T11/12. Thoracic laminotomy T8/9, T9/10 and T10/11  Posterior thoracic fusion T7-L1. Revision instrumentation T8 to pelvis.;  Surgeon: Medina King MD;  Location: [facility];  Service: Orthopedics   • WISDOM TOOTH EXTRACTION         SOCIAL HISTORY:  Social History     Tobacco Use   Smoking Status Former   • Packs/day: 0.25   • Years: 30.00   • Total pack years: 7.50   • Types: Cigarettes   • Start date: 1974   • Quit date: 2000   • Years since quittin.7   Smokeless Tobacco Never   Tobacco Comments    only smoked  less than a pack a month          PHYSICAL EXAM:  80 y.o. male sitting comfortably on exam chair in no apparent distress.    Patient ambulates without normal gait, leaning slightly forward, mild shuffling, vastly improved from previous visit. No TTP over cervical, thoracic, or lumbar spine. The patient is well perfused distally. RADIOGRAPHIC STUDIES:  1. X-rays, scoli films, 12/13/2021:  Prior multilevel lumbar fusion with unknown instrumentation.  Significant sagittal coronal balance.  Adjacent segment kyphosis.     2. MRI, lumbar spine, 12/21/2021:  Multilevel moderate to severe thoracic degenerative disc.  Prior instrumentation from L1 to S1.  Kyphosis at the thoracolumbar junction.  No spinal cord compression or myelomalacia.  Malposition right L1 screw.     3. MRI, thoracic spine, 12/21/2021:  1 to S1 posterior instrumentation with kyphotic deformity at the upper lumbar spine.  Flat back deformity.  No significant central lateral recess stenosis.  Malposition right L1 screw.     4. Xrays, lumbar spine, 3/15/2022:  Status post L1-S1 posterior spinal fusion instrumentation.  There was evidence of solid fusion from L2-S1.  Screw cut-out is present at L1. Villanueva Heymann is also evidence of adjacent segment kyphosis and flat back deformity.     5. CT lumbar and thoracic spine 3/18/2022: significant kyphosis of the lumbar and thoracic spine. Robust bony fusion of L1-L5, hardware intact. Screw penetration of the right lumbar spinal canal at L2 and L1 right pedicle screw cut out into the disk space.  Failure of fusion of L5-S1.     6. X-ray lumbar spine 4/18/2022:  intact hardware with lumbar and thoracic kyphosis.     7. X-rays, Lumbar spine 6/6/22:  Stable fusion L1-Pelvis with no evidence of hardware failure.  Interbody implant is in appropriate position. Villanueva Heymann is evidence of correction of sagittal deformity or proximally 10°.      8. X-rays, lumbar spine, 07/05/2022:  Status post prior fusion from L1-S1.  Pelvic instrumentation is in appropriate position. Villanueva Heymann is also hardware complication. Villanueva Heymann is no change in alignment of the spine when compared to prior films from 06/06/2022.     9. X-rays, scoliosis films, 09/12/2022:  Status post L1-S1 revision instrumentation and fusion with pelvic screw instrumentation.  No evidence of hardware complication.  Alignment of the lumbar spine is maintained.  There is significant kyphosis at the thoracolumbar junction.     10. Xrays, lumbar spine, 10/10/2022:  Status post L1 to pelvis posterior spinal fusion instrumentation.  There is also hardware complication.  There was also significant deformity at the thoracolumbar junction.     11. X-rays, L-spine, 1/9/2023: Status post thoracolumbar fusion. Saloni Been is in appropriate position. Ella Duck is also hardware complication.     46.  O-BKMW, thoracic spine, 4/3/2023: Status post thoracolumbar fusion from T8 to pelvis.  The upper part of hardware is visualized. Ella Duck is also hardware complication.     13. X-rays, T-spine, 8/14/2023: Status post thoracic instrumentation T8 through pelvis, the upper part of the instrumentation was visualized in the thoracic x-rays.  There is also hardware complication.     14. X-rays, L-spine, 8/14/2023: Status post thoracolumbar fusion from T8 to pelvis.  There is also hardware complication. Ella Duck may be evidence of screw loosening along the pelvic screws as expected.  No evidence of instability present.  Spine appears to be fused.     15. MRI, C-spine, 8/14/2023: Mild multilevel cervical degenerative disc disease. The canal is wide open. There is no evidence of spinal cord compression or nerve root compression. There is no evidence of myelomalacia.     16. MRI, T-spine, 8/14/2023: Moderate thoracic degenerative disc disease. There is evidence of instrumentation up to the T8 level. There is no evidence of spinal cord compression, spinal stenosis and there is no evidence of adjacent segment stenosis or instability. ASSESSMENT:  1. Flatback syndrome    2. Status post lumbar spinal fusion    3.  Sagittal plane imbalance        PLAN:  80 y.o. male with status post thoracolumbar revision fusion and instrumentation with persistent sagittal imbalance and neuropathy. The patient's symptoms have vastly improved from his previous visit with the completion of a Medrol Dosepak. He may hold off on a neurology visit as discussed at his previous appointment. The patient already has an appointment scheduled with [facility]. I asked the patient to obtain a business card for future continuity of care. The patient is approximately 10 months status post removal of hardware L1-L3, posterior thoracic/ lumbar instrumentation from T8 to pelvis, thoracic osteotomy T11/12, thoracic laminotomy T8/9, T9/10 and T10/11,  posterior thoracic fusion T7-L1, revision instrumentation T8 to pelvis performed on 11/22/22. I will see the patient at the 1 year post-op thiago with new x-rays. Follow-up in approximately 2 months. Will evaluate the patient before ordering X-rays. If the patient is asymptomatic, x-rays can wait and do not need to be repeated.        Scribe Attestation    I,:  Jason Hopkins am acting as a scribe while in the presence of the attending physician.:       I,:  Justin Pittman MD personally performed the services described in this documentation    as scribed in my presence.:

## 2023-11-27 ENCOUNTER — APPOINTMENT (OUTPATIENT)
Dept: RADIOLOGY | Facility: CLINIC | Age: 68
End: 2023-11-27
Payer: MEDICARE

## 2023-11-27 ENCOUNTER — OFFICE VISIT (OUTPATIENT)
Dept: OBGYN CLINIC | Facility: CLINIC | Age: 68
End: 2023-11-27
Payer: MEDICARE

## 2023-11-27 VITALS
WEIGHT: 216 LBS | DIASTOLIC BLOOD PRESSURE: 76 MMHG | SYSTOLIC BLOOD PRESSURE: 138 MMHG | BODY MASS INDEX: 30.92 KG/M2 | HEIGHT: 70 IN

## 2023-11-27 DIAGNOSIS — M43.8X9 SAGITTAL PLANE IMBALANCE: ICD-10-CM

## 2023-11-27 DIAGNOSIS — M40.30 FLATBACK SYNDROME: ICD-10-CM

## 2023-11-27 DIAGNOSIS — Z98.1 S/P LUMBAR FUSION: Primary | ICD-10-CM

## 2023-11-27 DIAGNOSIS — Z98.1 S/P LUMBAR FUSION: ICD-10-CM

## 2023-11-27 PROCEDURE — 99213 OFFICE O/P EST LOW 20 MIN: CPT | Performed by: ORTHOPAEDIC SURGERY

## 2023-11-27 PROCEDURE — 72080 X-RAY EXAM THORACOLMB 2/> VW: CPT

## 2023-11-27 RX ORDER — DULOXETIN HYDROCHLORIDE 20 MG/1
20 CAPSULE, DELAYED RELEASE ORAL DAILY
COMMUNITY

## 2023-11-27 NOTE — PROGRESS NOTES
900 SIVAKUMAR Walsh MD  [address] [place] [address]  [phone]    HISTORY OF PRESENT ILLNESS:    Abena Motley is a 80 y.o. male who presents 1 year status post thoracolumbar revision fusion and instrumentation performed on 11/22/22, with persistent sagittal imbalance and neuropathy. The patient was last seen in the office on 9/11/23 where the patient was scheduled to see neurology in [place] and the patient was asked to obtain a business card of the provider for continuity of care. The patient is taking cymbalta which has improved his leg pain. This was prescribed by his neurologist, Dr. Jensen Gipson. He is doing well overall. He has occasional discomfort if he pushes himself. He has no difficulty ambulating. He is walking regularly. The patient will be moving to [place] around February. ALLERGIES:   Allergies   Allergen Reactions    Seasonal Ic [Octacosanol] Other (See Comments)     Seasonal allergies        MEDICATIONS:    Current Outpatient Medications:     DULoxetine (CYMBALTA) 20 mg capsule, Take 20 mg by mouth daily, Disp: , Rfl:     lisinopril (ZESTRIL) 10 mg tablet, Take 1 tablet (10 mg total) by mouth daily, Disp: 30 tablet, Rfl: 0    lovastatin (MEVACOR) 10 MG tablet, Take 10 mg by mouth daily at bedtime, Disp: , Rfl:     aspirin (ECOTRIN LOW STRENGTH) 81 mg EC tablet, Take 1 tablet (81 mg total) by mouth daily, Disp: 30 tablet, Rfl: 0    cholecalciferol (VITAMIN D3) 1,000 units tablet, Take 2 tablets (2,000 Units total) by mouth daily, Disp: 60 tablet, Rfl: 0    methylPREDNISolone 4 MG tablet therapy pack, Use as directed on package, Disp: 1 each, Rfl: 0    traMADol (ULTRAM) 50 mg tablet, Take 50 mg by mouth every 6 (six) hours as needed for moderate pain Pt.  Stated Dr. Tom Salazar, PCP, is prescribing tramadol 50 mgs BID, Disp: , Rfl:      PAST MEDICAL HISTORY:   Past Medical History:   Diagnosis Date    Ambulates with cane     "long distance" Arthritis     Back pain     Chronic pain disorder     Dental crowns present     Exercise involving walking     daily -short walks    Hepatitis C     History of hepatitis C     per pt "went away on its own" age 13"    History of transfusion     Hyperlipidemia     Hypertension     Hyponatremia 2022    Infectious viral hepatitis 1972    Leg pain     and foot pain    Leukocytosis 2022    Limb alert care status     per pt NO IV's LEFT UPPER ARM due to old injury    Risk for falls     Spinal stenosis     Stroke ([address])     per pt in -and no lasting problems    Wears glasses     readers       PAST SURGICAL HISTORY:  Past Surgical History:   Procedure Laterality Date    BACK SURGERY      with implants    COLONOSCOPY      LUMBAR FUSION N/A 2022    Procedure: L1-S1 revision of segmental hardware, L5-S1 osteotomy, posterior spinal fusion L4-S1, pelvic instrumentation, Transforaminal lumbar interbody fusion L5S1, Cage L5S1, instrumentation L1-pelvis; Surgeon: Bud Chiu MD;  Location: [facility];  Service: Orthopedics    AK ARTHRODESIS POSTERIOR/PSTLAT TQ 1NTRSPC THORACIC N/A 2022    Procedure: removal of hardware L1-L3. Posterior thoracic/ lumbar instrumentation from T8 to pelvis. Thoracic osteotomy T11/12. Thoracic laminotomy T8/9, T9/10 and T10/11  Posterior thoracic fusion T7-L1. Revision instrumentation T8 to pelvis.;  Surgeon: Bud Chiu MD;  Location[facility];  Service: Orthopedics    WISDOM TOOTH EXTRACTION         SOCIAL HISTORY:  Social History     Tobacco Use   Smoking Status Former    Packs/day: 0.25    Years: 30.00    Total pack years: 7.50    Types: Cigarettes    Start date: 1974    Quit date: 2000    Years since quittin.9   Smokeless Tobacco Never   Tobacco Comments    only smoked  less than a pack a month          PHYSICAL EXAM:  80 y.o. male sitting comfortably on exam chair in no apparent distress.    Patient ambulates without normal gait, leaning slightly forward with mild shuffling. Unable to stand erect, loss of 15 degrees  No TTP over cervical, thoracic, or lumbar spine. Sensation intact to light touch left L2 through S1 dermatomes. Sensation intact to light touch right L2 through S1 dermatomes     Left Motor: 5/5 iliopsoas, 5/5 quadriceps, 5/5 tibialis anterior, 5/5 extensor hallucis longus, and 5/5 gastrocsoleus. Right Motor: 5/5 iliopsoas, 5/5 quadriceps, 5/5 tibialis anterior, 5/5 extensor hallucis longus, and 5/5 gastrocsoleus. ROM:  Symmetric pain free ROM of hips     Art's sign positive on right  Clonus positive 2 beats on right  Straight leg raise test is negative Bilateral   The patient is well perfused distally. RADIOGRAPHIC STUDIES:  1. X-rays, scoli films, 12/13/2021:  Prior multilevel lumbar fusion with unknown instrumentation. Significant sagittal coronal balance. Adjacent segment kyphosis. 2. MRI, lumbar spine, 12/21/2021:  Multilevel moderate to severe thoracic degenerative disc. Prior instrumentation from L1 to S1. Kyphosis at the thoracolumbar junction. No spinal cord compression or myelomalacia. Malposition right L1 screw. 3. MRI, thoracic spine, 12/21/2021:  1 to S1 posterior instrumentation with kyphotic deformity at the upper lumbar spine. Flat back deformity. No significant central lateral recess stenosis. Malposition right L1 screw. 4. Xrays, lumbar spine, 3/15/2022:  Status post L1-S1 posterior spinal fusion instrumentation. There was evidence of solid fusion from L2-S1. Screw cut-out is present at L1. There is also evidence of adjacent segment kyphosis and flat back deformity. 5. CT lumbar and thoracic spine 3/18/2022: significant kyphosis of the lumbar and thoracic spine. Robust bony fusion of L1-L5, hardware intact. Screw penetration of the right lumbar spinal canal at L2 and L1 right pedicle screw cut out into the disk space. Failure of fusion of L5-S1.      6. X-ray lumbar spine 4/18/2022:  intact hardware with lumbar and thoracic kyphosis. 7. X-rays, Lumbar spine 6/6/22:  Stable fusion L1-Pelvis with no evidence of hardware failure. Interbody implant is in appropriate position. There is evidence of correction of sagittal deformity or proximally 10°. 8. X-rays, lumbar spine, 07/05/2022:  Status post prior fusion from L1-S1. Pelvic instrumentation is in appropriate position. There is also hardware complication. There is no change in alignment of the spine when compared to prior films from 06/06/2022. 9. X-rays, scoliosis films, 09/12/2022:  Status post L1-S1 revision instrumentation and fusion with pelvic screw instrumentation. No evidence of hardware complication. Alignment of the lumbar spine is maintained. There is significant kyphosis at the thoracolumbar junction. 10. Xrays, lumbar spine, 10/10/2022:  Status post L1 to pelvis posterior spinal fusion instrumentation. There is also hardware complication. There was also significant deformity at the thoracolumbar junction. 11. X-rays, L-spine, 1/9/2023: Status post thoracolumbar fusion. Hardware is in appropriate position. There is also hardware complication. 12.  X-rays, thoracic spine, 4/3/2023: Status post thoracolumbar fusion from T8 to pelvis. The upper part of hardware is visualized. There is also hardware complication. 13. X-rays, T-spine, 8/14/2023: Status post thoracic instrumentation T8 through pelvis, the upper part of the instrumentation was visualized in the thoracic x-rays. There is also hardware complication. 14. X-rays, L-spine, 8/14/2023: Status post thoracolumbar fusion from T8 to pelvis. There is also hardware complication. There may be evidence of screw loosening along the pelvic screws as expected. No evidence of instability present. Spine appears to be fused. 15. MRI, C-spine, 8/14/2023: Mild multilevel cervical degenerative disc disease. The canal is wide open. There is no evidence of spinal cord compression or nerve root compression. There is no evidence of myelomalacia. 16. MRI, T-spine, 8/14/2023: Moderate thoracic degenerative disc disease. There is evidence of instrumentation up to the T8 level. There is no evidence of spinal cord compression, spinal stenosis and there is no evidence of adjacent segment stenosis or instability. 17. X-ray, Thoracolumbar spine 11/27/23: Status post thoracic and lumbar fusion. There is no evidence of hardware complication. No changes compared to prior films. ASSESSMENT:  1. S/P lumbar fusion    2. Sagittal plane imbalance    3. Flatback syndrome        PLAN:  80 y.o. male 1 year status post thoracolumbar revision fusion and instrumentation with persistent sagittal imbalance and neuropathy. The patient's x-rays were reviewed today. The patient is doing well overall. He is encouraged to continue to walk as much as possible and remain active within reason. The patient was instructed to call with any questions or concerns. I will see the patient back in 1 year for re-evaluation and new x-rays, 2 view thoracolumbar spine.         Scribe Attestation      I,:  Breezy Mcintosh am acting as a scribe while in the presence of the attending physician.:       I,:  Jone White MD personally performed the services described in this documentation    as scribed in my presence.:

## 2024-02-19 ENCOUNTER — OFFICE VISIT (OUTPATIENT)
Dept: OBGYN CLINIC | Facility: CLINIC | Age: 69
End: 2024-02-19
Payer: MEDICARE

## 2024-02-19 ENCOUNTER — APPOINTMENT (OUTPATIENT)
Dept: RADIOLOGY | Facility: CLINIC | Age: 69
End: 2024-02-19
Payer: MEDICARE

## 2024-02-19 VITALS — WEIGHT: 212 LBS | BODY MASS INDEX: 30.42 KG/M2

## 2024-02-19 DIAGNOSIS — M40.30 FLATBACK SYNDROME: ICD-10-CM

## 2024-02-19 DIAGNOSIS — Z98.1 STATUS POST LUMBAR SPINAL FUSION: ICD-10-CM

## 2024-02-19 DIAGNOSIS — Z98.1 S/P LUMBAR FUSION: ICD-10-CM

## 2024-02-19 DIAGNOSIS — M43.8X9 SAGITTAL PLANE IMBALANCE: ICD-10-CM

## 2024-02-19 DIAGNOSIS — Z98.1 S/P LUMBAR FUSION: Primary | ICD-10-CM

## 2024-02-19 PROCEDURE — 99213 OFFICE O/P EST LOW 20 MIN: CPT | Performed by: ORTHOPAEDIC SURGERY

## 2024-02-19 PROCEDURE — 72080 X-RAY EXAM THORACOLMB 2/> VW: CPT

## 2024-02-19 RX ORDER — METHYLPREDNISOLONE 4 MG/1
TABLET ORAL
Qty: 21 EACH | Refills: 0 | Status: SHIPPED | OUTPATIENT
Start: 2024-02-19

## 2024-02-19 NOTE — PROGRESS NOTES
"[facility] ORTHOPEDIC SPINE SURGERY  DR.AMIR FAUZIA MD  [address] [facility] [address] [phone]    HISTORY OF PRESENT ILLNESS:    Mika Sheridan is a 68 y.o. male who presents 15 months status post thoracolumbar revision fusion and instrumentation performed on 11/22/22. Patient reports he has pain in the left side of low back that has been present for about one week after patient got out of bed.       ALLERGIES:   Allergies   Allergen Reactions    Seasonal Ic [Octacosanol] Other (See Comments)     Seasonal allergies        MEDICATIONS:    Current Outpatient Medications:     lisinopril (ZESTRIL) 10 mg tablet, Take 1 tablet (10 mg total) by mouth daily, Disp: 30 tablet, Rfl: 0    lovastatin (MEVACOR) 10 MG tablet, Take 10 mg by mouth daily at bedtime, Disp: , Rfl:     aspirin (ECOTRIN LOW STRENGTH) 81 mg EC tablet, Take 1 tablet (81 mg total) by mouth daily, Disp: 30 tablet, Rfl: 0    cholecalciferol (VITAMIN D3) 1,000 units tablet, Take 2 tablets (2,000 Units total) by mouth daily, Disp: 60 tablet, Rfl: 0    DULoxetine (CYMBALTA) 20 mg capsule, Take 20 mg by mouth daily, Disp: , Rfl:     methylPREDNISolone 4 MG tablet therapy pack, Use as directed on package, Disp: 1 each, Rfl: 0    traMADol (ULTRAM) 50 mg tablet, Take 50 mg by mouth every 6 (six) hours as needed for moderate pain Pt. Stated Dr. John, PCP, is prescribing tramadol 50 mgs BID, Disp: , Rfl:      PAST MEDICAL HISTORY:   Past Medical History:   Diagnosis Date    Ambulates with cane     \"long distance\"    Arthritis     Back pain     Chronic pain disorder     Dental crowns present     Exercise involving walking     daily -short walks    Hepatitis C     History of hepatitis C     per pt \"went away on its own\" age 17\"    History of transfusion     Hyperlipidemia     Hypertension     Hyponatremia 12/2/2022    Infectious viral hepatitis 1972    Leg pain     and foot pain    Leukocytosis 11/25/2022    Limb alert care status     per pt NO IV's " LEFT UPPER ARM due to old injury    Risk for falls     Spinal stenosis     Stroke (HCC)     per pt in 1996-and no lasting problems    Wears glasses     readers       PAST SURGICAL HISTORY:  Past Surgical History:   Procedure Laterality Date    BACK SURGERY      with implants    COLONOSCOPY      LUMBAR FUSION N/A 2022    Procedure: L1-S1 revision of segmental hardware, L5-S1 osteotomy, posterior spinal fusion L4-S1, pelvic instrumentation, Transforaminal lumbar interbody fusion L5S1, Cage L5S1, instrumentation L1-pelvis;  Surgeon: Davey Colon MD;  Location: [facility];  Service: Orthopedics    SD ARTHRODESIS POSTERIOR/PSTLAT TQ 1NTRSPC THORACIC N/A 2022    Procedure: removal of hardware L1-L3.  Posterior thoracic/ lumbar instrumentation from T8 to pelvis.  Thoracic osteotomy T11/12. Thoracic laminotomy T8/9, T9/10 and T10/11  Posterior thoracic fusion T7-L1.  Revision instrumentation T8 to pelvis.;  Surgeon: Davey Colon MD;  Location: [facility];  Service: Orthopedics    WISDOM TOOTH EXTRACTION         SOCIAL HISTORY:  Social History     Tobacco Use   Smoking Status Former    Current packs/day: 0.00    Average packs/day: 0.3 packs/day for 30.0 years (7.5 ttl pk-yrs)    Types: Cigarettes    Start date: 1974    Quit date: 2000    Years since quittin.1   Smokeless Tobacco Never   Tobacco Comments    only smoked  less than a pack a month          PHYSICAL EXAM:  68 y.o. male sitting comfortably on exam chair in no apparent distress.   Ambulates leaning forward with mild shuffling gait  TTP over left thoracolumbar paraspinal muscles   Brisk 2+ deep tendon reflexes bilateral lower extremities      RADIOGRAPHIC STUDIES:  1. X-rays, scoli films, 2021:  Prior multilevel lumbar fusion with unknown instrumentation.  Significant sagittal coronal balance.  Adjacent segment kyphosis.     2. MRI, lumbar spine, 2021:  Multilevel moderate to severe thoracic degenerative disc.  Prior  instrumentation from L1 to S1.  Kyphosis at the thoracolumbar junction.  No spinal cord compression or myelomalacia.  Malposition right L1 screw.     3. MRI, thoracic spine, 12/21/2021:  1 to S1 posterior instrumentation with kyphotic deformity at the upper lumbar spine.  Flat back deformity.  No significant central lateral recess stenosis.  Malposition right L1 screw.     4. Xrays, lumbar spine, 3/15/2022:  Status post L1-S1 posterior spinal fusion instrumentation.  There was evidence of solid fusion from L2-S1.  Screw cut-out is present at L1.  There is also evidence of adjacent segment kyphosis and flat back deformity.     5. CT lumbar and thoracic spine 3/18/2022: significant kyphosis of the lumbar and thoracic spine. Robust bony fusion of L1-L5, hardware intact. Screw penetration of the right lumbar spinal canal at L2 and L1 right pedicle screw cut out into the disk space. Failure of fusion of L5-S1.     6. X-ray lumbar spine 4/18/2022:  intact hardware with lumbar and thoracic kyphosis.     7. X-rays, Lumbar spine 6/6/22:  Stable fusion L1-Pelvis with no evidence of hardware failure.  Interbody implant is in appropriate position.  There is evidence of correction of sagittal deformity or proximally 10°.      8. X-rays, lumbar spine, 07/05/2022:  Status post prior fusion from L1-S1.  Pelvic instrumentation is in appropriate position.  There is also hardware complication. There is no change in alignment of the spine when compared to prior films from 06/06/2022.     9. X-rays, scoliosis films, 09/12/2022:  Status post L1-S1 revision instrumentation and fusion with pelvic screw instrumentation.  No evidence of hardware complication. Alignment of the lumbar spine is maintained.  There is significant kyphosis at the thoracolumbar junction.     10. Xrays, lumbar spine, 10/10/2022:  Status post L1 to pelvis posterior spinal fusion instrumentation.  There is also hardware complication.  There was also significant  deformity at the thoracolumbar junction.     11. X-rays, L-spine, 1/9/2023: Status post thoracolumbar fusion.  Hardware is in appropriate position.  There is also hardware complication.     12.  X-rays, thoracic spine, 4/3/2023: Status post thoracolumbar fusion from T8 to pelvis.  The upper part of hardware is visualized.  There is also hardware complication.     13. X-rays, T-spine, 8/14/2023: Status post thoracic instrumentation T8 through pelvis, the upper part of the instrumentation was visualized in the thoracic x-rays.  There is also hardware complication.     14. X-rays, L-spine, 8/14/2023: Status post thoracolumbar fusion from T8 to pelvis. There is also hardware complication.  There may be evidence of screw loosening along the pelvic screws as expected.  No evidence of instability present.  Spine appears to be fused.     15.  MRI, C-spine, 8/14/2023: Mild multilevel cervical degenerative disc disease.  The canal is wide open.  There is no evidence of spinal cord compression or nerve root compression.  There is no evidence of myelomalacia.     16.  MRI, T-spine, 8/14/2023: Moderate thoracic degenerative disc disease.  There is evidence of instrumentation up to the T8 level.  There is no evidence of spinal cord compression, spinal stenosis and there is no evidence of adjacent segment stenosis or instability.     17. X-ray, Thoracolumbar spine 11/27/23: Status post thoracic and lumbar fusion.  There is no evidence of hardware complication.  No changes compared to prior films.    18. Xrays, thoracolumbar spine, 2/19/2024: Status post thoracolumbar fusion with instrumentation to pelvis.  There is notes of hardware complication there is no evidence of hardware failure.      ASSESSMENT:  1. S/P lumbar fusion  -     XR spine thoracolumbar 2 vw; Future; Expected date: 02/19/2024    2. Sagittal plane imbalance  -     XR spine thoracolumbar 2 vw; Future; Expected date: 02/19/2024    3. Flatback syndrome  -     XR  spine thoracolumbar 2 vw; Future; Expected date: 02/19/2024    4. Status post lumbar spinal fusion  -     XR spine thoracolumbar 2 vw; Future; Expected date: 02/19/2024        PLAN:  68 y.o. male with acute low back pain status post thoracolumbar revision fusion and instrumentation with persistent sagittal imbalance.     Xrays of thoracolumbar spine were obtained and reviewed in the office today showing hardware in stable alignment without signs of loosening or complication. It was discussed that acute pain is likely due to muscle     Patient will follow-up in one week for re-evaluation.        Scribe Attestation      I,:  Tori Meehan PA-C am acting as a scribe while in the presence of the attending physician.:       I,:  Davey Colon MD personally performed the services described in this documentation    as scribed in my presence.:

## 2024-02-27 ENCOUNTER — TELEPHONE (OUTPATIENT)
Age: 69
End: 2024-02-27

## 2024-02-27 DIAGNOSIS — Z98.1 S/P LUMBAR FUSION: Primary | ICD-10-CM

## 2024-02-27 NOTE — TELEPHONE ENCOUNTER
Caller: mitch    Doctor: Darlene    Reason for call: patient is still in pain and would like to know if he can have a refill of the   methylPREDNISolone  or do you think an MRI is needed  Call back#: [phone]

## 2024-02-27 NOTE — TELEPHONE ENCOUNTER
Called and spoke with the pt and relayed Tori Chapman msg, further gave pt central scheduling phone number to call to schedule MRI. No further questions.

## 2024-03-08 ENCOUNTER — HOSPITAL ENCOUNTER (OUTPATIENT)
Dept: RADIOLOGY | Facility: HOSPITAL | Age: 69
Discharge: HOME | End: 2024-03-08
Payer: MEDICARE

## 2024-03-08 DIAGNOSIS — Z98.1 S/P LUMBAR FUSION: ICD-10-CM

## 2024-03-08 PROCEDURE — A9585 GADOBUTROL INJECTION: HCPCS

## 2024-03-08 PROCEDURE — 72158 MRI LUMBAR SPINE W/O & W/DYE: CPT

## 2024-03-08 RX ORDER — GADOBUTROL 604.72 MG/ML
9 INJECTION INTRAVENOUS
Status: COMPLETED | OUTPATIENT
Start: 2024-03-08 | End: 2024-03-08

## 2024-03-08 RX ADMIN — GADOBUTROL 9 ML: 604.72 INJECTION INTRAVENOUS at 21:00

## 2024-03-19 NOTE — PROGRESS NOTES
"[facility] ORTHOPEDIC SPINE SURGERY  DR.AMIR FAUZIA MD  [address] [facility] [address] [phone]    HISTORY OF PRESENT ILLNESS:    Mika Sheridan is a 68 y.o. male who presents 16 months status post thoracolumbar revision fusion and instrumentation performed on 11/22/22. Patient was last seen in the office on 2/19/2024. MRI of lumbar spine was ordered, and patient presents today to review the MRI. Patient reports his back is feeling better compared to last visit.     ALLERGIES:   Allergies   Allergen Reactions    Seasonal Ic [Octacosanol] Other (See Comments)     Seasonal allergies        MEDICATIONS:    Current Outpatient Medications:     aspirin (ECOTRIN LOW STRENGTH) 81 mg EC tablet, Take 1 tablet (81 mg total) by mouth daily, Disp: 30 tablet, Rfl: 0    cholecalciferol (VITAMIN D3) 1,000 units tablet, Take 2 tablets (2,000 Units total) by mouth daily, Disp: 60 tablet, Rfl: 0    DULoxetine (CYMBALTA) 20 mg capsule, Take 20 mg by mouth daily, Disp: , Rfl:     lisinopril (ZESTRIL) 10 mg tablet, Take 1 tablet (10 mg total) by mouth daily, Disp: 30 tablet, Rfl: 0    lovastatin (MEVACOR) 10 MG tablet, Take 10 mg by mouth daily at bedtime, Disp: , Rfl:     methylPREDNISolone 4 MG tablet therapy pack, Use as directed on package, Disp: 21 each, Rfl: 0    traMADol (ULTRAM) 50 mg tablet, Take 50 mg by mouth every 6 (six) hours as needed for moderate pain Pt. Stated Dr. John, PCP, is prescribing tramadol 50 mgs BID, Disp: , Rfl:      PAST MEDICAL HISTORY:   Past Medical History:   Diagnosis Date    Ambulates with cane     \"long distance\"    Arthritis     Back pain     Chronic pain disorder     Dental crowns present     Exercise involving walking     daily -short walks    Hepatitis C     History of hepatitis C     per pt \"went away on its own\" age 17\"    History of transfusion     Hyperlipidemia     Hypertension     Hyponatremia 12/2/2022    Infectious viral hepatitis 1972    Leg pain     and foot pain    " Leukocytosis 2022    Limb alert care status     per pt NO IV's LEFT UPPER ARM due to old injury    Risk for falls     Spinal stenosis     Stroke (HCC)     per pt in -and no lasting problems    Wears glasses     readers       PAST SURGICAL HISTORY:  Past Surgical History:   Procedure Laterality Date    BACK SURGERY      with implants    COLONOSCOPY      LUMBAR FUSION N/A 2022    Procedure: L1-S1 revision of segmental hardware, L5-S1 osteotomy, posterior spinal fusion L4-S1, pelvic instrumentation, Transforaminal lumbar interbody fusion L5S1, Cage L5S1, instrumentation L1-pelvis;  Surgeon: Davey Colon MD;  Location: [facility];  Service: Orthopedics    [place] ARTHRODESIS POSTERIOR/PSTLAT TQ 1NTRSPC THORACIC N/A 2022    Procedure: removal of hardware L1-L3.  Posterior thoracic/ lumbar instrumentation from T8 to pelvis.  Thoracic osteotomy T11/12. Thoracic laminotomy T8/9, T9/10 and T10/11  Posterior thoracic fusion T7-L1.  Revision instrumentation T8 to pelvis.;  Surgeon: Davey Colon MD;  Location: [facility];  Service: Orthopedics    WISDOM TOOTH EXTRACTION         SOCIAL HISTORY:  Social History     Tobacco Use   Smoking Status Former    Current packs/day: 0.00    Average packs/day: 0.3 packs/day for 30.0 years (7.5 ttl pk-yrs)    Types: Cigarettes    Start date: 1974    Quit date: 2000    Years since quittin.2   Smokeless Tobacco Never   Tobacco Comments    only smoked  less than a pack a month          PHYSICAL EXAM:  68 y.o. male sitting comfortably on exam chair in no apparent distress.   Ambulates leaning forward with mild shuffling gait   Sagittal imbalance noted      RADIOGRAPHIC STUDIES:  1. X-rays, scoli films, 2021:  Prior multilevel lumbar fusion with unknown instrumentation.  Significant sagittal coronal balance.  Adjacent segment kyphosis.     2. MRI, lumbar spine, 2021:  Multilevel moderate to severe thoracic degenerative disc.  Prior instrumentation from L1  to S1.  Kyphosis at the thoracolumbar junction.  No spinal cord compression or myelomalacia.  Malposition right L1 screw.     3. MRI, thoracic spine, 12/21/2021:  1 to S1 posterior instrumentation with kyphotic deformity at the upper lumbar spine.  Flat back deformity.  No significant central lateral recess stenosis.  Malposition right L1 screw.     4. Xrays, lumbar spine, 3/15/2022:  Status post L1-S1 posterior spinal fusion instrumentation.  There was evidence of solid fusion from L2-S1.  Screw cut-out is present at L1.  There is also evidence of adjacent segment kyphosis and flat back deformity.     5. CT lumbar and thoracic spine 3/18/2022: significant kyphosis of the lumbar and thoracic spine. Robust bony fusion of L1-L5, hardware intact. Screw penetration of the right lumbar spinal canal at L2 and L1 right pedicle screw cut out into the disk space. Failure of fusion of L5-S1.     6. X-ray lumbar spine 4/18/2022:  intact hardware with lumbar and thoracic kyphosis.     7. X-rays, Lumbar spine 6/6/22:  Stable fusion L1-Pelvis with no evidence of hardware failure.  Interbody implant is in appropriate position.  There is evidence of correction of sagittal deformity or proximally 10°.      8. X-rays, lumbar spine, 07/05/2022:  Status post prior fusion from L1-S1.  Pelvic instrumentation is in appropriate position.  There is also hardware complication. There is no change in alignment of the spine when compared to prior films from 06/06/2022.     9. X-rays, scoliosis films, 09/12/2022:  Status post L1-S1 revision instrumentation and fusion with pelvic screw instrumentation.  No evidence of hardware complication. Alignment of the lumbar spine is maintained.  There is significant kyphosis at the thoracolumbar junction.     10. Xrays, lumbar spine, 10/10/2022:  Status post L1 to pelvis posterior spinal fusion instrumentation.  There is also hardware complication.  There was also significant deformity at the thoracolumbar  junction.     11. X-rays, L-spine, 1/9/2023: Status post thoracolumbar fusion.  Hardware is in appropriate position.  There is also hardware complication.     12.  X-rays, thoracic spine, 4/3/2023: Status post thoracolumbar fusion from T8 to pelvis.  The upper part of hardware is visualized.  There is also hardware complication.     13. X-rays, T-spine, 8/14/2023: Status post thoracic instrumentation T8 through pelvis, the upper part of the instrumentation was visualized in the thoracic x-rays.  There is also hardware complication.     14. X-rays, L-spine, 8/14/2023: Status post thoracolumbar fusion from T8 to pelvis. There is also hardware complication.  There may be evidence of screw loosening along the pelvic screws as expected.  No evidence of instability present.  Spine appears to be fused.     15.  MRI, C-spine, 8/14/2023: Mild multilevel cervical degenerative disc disease.  The canal is wide open.  There is no evidence of spinal cord compression or nerve root compression.  There is no evidence of myelomalacia.     16.  MRI, T-spine, 8/14/2023: Moderate thoracic degenerative disc disease.  There is evidence of instrumentation up to the T8 level.  There is no evidence of spinal cord compression, spinal stenosis and there is no evidence of adjacent segment stenosis or instability.     17. X-ray, Thoracolumbar spine 11/27/23: Status post thoracic and lumbar fusion.  There is no evidence of hardware complication.  No changes compared to prior films.    18. Xrays, thoracolumbar spine, 2/19/2024: Status post thoracolumbar fusion with instrumentation to pelvis.  There is notes of hardware complication there is no evidence of hardware failure.    19. MRI, lumbar spine, 3/08/2024: Very poor quality MRI study.  Due to artifact the canal could not be visualized.  There was however no evidence of fracture involving the sacrum or the lumbar spine.      ASSESSMENT:  1. S/P lumbar fusion    2. Sagittal plane  imbalance        PLAN:  68 y.o. male with acute low back pain status post thoracolumbar revision fusion and instrumentation with persistent sagittal imbalance.     MRI of lumbar spine was reviewed in the office today. There was significant artifact with the MRI that limited study. Patient has improvement of symptoms at this time. We will order an updated CT of thoracic and lumbar spine for evaluation of fusion after next visit.     Patient will follow-up in 2 months for re-evaluation.        Scribe Attestation      I,:  Tori Meehan PA-C am acting as a scribe while in the presence of the attending physician.:       I,:  Davey Colon MD personally performed the services described in this documentation    as scribed in my presence.:

## 2024-03-20 ENCOUNTER — OFFICE VISIT (OUTPATIENT)
Dept: OBGYN CLINIC | Facility: CLINIC | Age: 69
End: 2024-03-20
Payer: MEDICARE

## 2024-03-20 VITALS
HEART RATE: 74 BPM | BODY MASS INDEX: 30.35 KG/M2 | HEIGHT: 70 IN | DIASTOLIC BLOOD PRESSURE: 80 MMHG | SYSTOLIC BLOOD PRESSURE: 130 MMHG | WEIGHT: 212 LBS

## 2024-03-20 DIAGNOSIS — Z98.1 S/P LUMBAR FUSION: Primary | ICD-10-CM

## 2024-03-20 DIAGNOSIS — M43.8X9 SAGITTAL PLANE IMBALANCE: ICD-10-CM

## 2024-03-20 PROCEDURE — 99213 OFFICE O/P EST LOW 20 MIN: CPT | Performed by: ORTHOPAEDIC SURGERY

## 2024-03-21 ENCOUNTER — APPOINTMENT (OUTPATIENT)
Dept: LAB | Facility: CLINIC | Age: 69
End: 2024-03-21
Payer: MEDICARE

## 2024-03-21 DIAGNOSIS — I10 ESSENTIAL (PRIMARY) HYPERTENSION: ICD-10-CM

## 2024-03-21 DIAGNOSIS — Z12.5 ENCOUNTER FOR SCREENING FOR MALIGNANT NEOPLASM OF PROSTATE: ICD-10-CM

## 2024-03-21 DIAGNOSIS — E78.5 HYPERLIPIDEMIA, UNSPECIFIED: ICD-10-CM

## 2024-03-21 LAB
ALBUMIN SERPL BCP-MCNC: 4.4 G/DL (ref 3.5–5)
ALP SERPL-CCNC: 92 U/L (ref 34–104)
ALT SERPL W P-5'-P-CCNC: 57 U/L (ref 7–52)
ANION GAP SERPL CALCULATED.3IONS-SCNC: 7 MMOL/L (ref 4–13)
AST SERPL W P-5'-P-CCNC: 50 U/L (ref 13–39)
BILIRUB SERPL-MCNC: 0.58 MG/DL (ref 0.2–1)
BUN SERPL-MCNC: 22 MG/DL (ref 5–25)
CALCIUM SERPL-MCNC: 9.5 MG/DL (ref 8.4–10.2)
CHLORIDE SERPL-SCNC: 104 MMOL/L (ref 96–108)
CHOLEST SERPL-MCNC: 177 MG/DL
CO2 SERPL-SCNC: 27 MMOL/L (ref 21–32)
CREAT SERPL-MCNC: 0.85 MG/DL (ref 0.6–1.3)
ERYTHROCYTE [DISTWIDTH] IN BLOOD BY AUTOMATED COUNT: 13.2 % (ref 11.6–15.1)
GFR SERPL CREATININE-BSD FRML MDRD: 90 ML/MIN/1.73SQ M
GLUCOSE P FAST SERPL-MCNC: 87 MG/DL (ref 65–99)
HCT VFR BLD AUTO: 46.6 % (ref 36.5–49.3)
HDLC SERPL-MCNC: 51 MG/DL
HGB BLD-MCNC: 15 G/DL (ref 12–17)
LDLC SERPL CALC-MCNC: 111 MG/DL (ref 0–100)
MCH RBC QN AUTO: 28.8 PG (ref 26.8–34.3)
MCHC RBC AUTO-ENTMCNC: 32.2 G/DL (ref 31.4–37.4)
MCV RBC AUTO: 90 FL (ref 82–98)
NONHDLC SERPL-MCNC: 126 MG/DL
PLATELET # BLD AUTO: 346 THOUSANDS/UL (ref 149–390)
PMV BLD AUTO: 9 FL (ref 8.9–12.7)
POTASSIUM SERPL-SCNC: 4.5 MMOL/L (ref 3.5–5.3)
PROT SERPL-MCNC: 6.7 G/DL (ref 6.4–8.4)
PSA SERPL-MCNC: 1.17 NG/ML (ref 0–4)
RBC # BLD AUTO: 5.2 MILLION/UL (ref 3.88–5.62)
SODIUM SERPL-SCNC: 138 MMOL/L (ref 135–147)
TRIGL SERPL-MCNC: 76 MG/DL
WBC # BLD AUTO: 6.51 THOUSAND/UL (ref 4.31–10.16)

## 2024-03-21 PROCEDURE — 36415 COLL VENOUS BLD VENIPUNCTURE: CPT

## 2024-03-21 PROCEDURE — G0103 PSA SCREENING: HCPCS

## 2024-03-21 PROCEDURE — 80053 COMPREHEN METABOLIC PANEL: CPT

## 2024-03-21 PROCEDURE — 85027 COMPLETE CBC AUTOMATED: CPT

## 2024-03-21 PROCEDURE — 80061 LIPID PANEL: CPT

## 2024-04-19 ENCOUNTER — TELEPHONE (OUTPATIENT)
Age: 69
End: 2024-04-19

## 2024-04-19 DIAGNOSIS — M79.605 LEFT LEG PAIN: Primary | ICD-10-CM

## 2024-04-19 NOTE — TELEPHONE ENCOUNTER
Caller: Patient     Doctor: Darlene     Reason for call: Patient is asking for a referral to Rheumatology, Dr Payne, for hip and LE pain. Please place in chart    Call back#: [phone]  
Referral placed in the system. Patient made aware.  
all other ROS negative except as per HPI

## 2024-05-08 ENCOUNTER — TELEPHONE (OUTPATIENT)
Age: 69
End: 2024-05-08

## 2024-05-08 NOTE — TELEPHONE ENCOUNTER
Caller: patient    Doctor/Office: n/a    Call regarding :  calling to FU with ortho, middle of transfer and patient disconnected

## 2024-06-12 ENCOUNTER — OFFICE VISIT (OUTPATIENT)
Dept: OBGYN CLINIC | Facility: CLINIC | Age: 69
End: 2024-06-12
Payer: MEDICARE

## 2024-06-12 ENCOUNTER — APPOINTMENT (OUTPATIENT)
Dept: RADIOLOGY | Facility: CLINIC | Age: 69
End: 2024-06-12
Payer: MEDICARE

## 2024-06-12 VITALS
BODY MASS INDEX: 30.35 KG/M2 | DIASTOLIC BLOOD PRESSURE: 87 MMHG | HEIGHT: 70 IN | SYSTOLIC BLOOD PRESSURE: 142 MMHG | WEIGHT: 212 LBS | HEART RATE: 78 BPM

## 2024-06-12 DIAGNOSIS — M70.62 GREATER TROCHANTERIC BURSITIS OF LEFT HIP: Primary | ICD-10-CM

## 2024-06-12 DIAGNOSIS — M25.552 LEFT HIP PAIN: ICD-10-CM

## 2024-06-12 DIAGNOSIS — M25.551 RIGHT HIP PAIN: ICD-10-CM

## 2024-06-12 PROCEDURE — 99213 OFFICE O/P EST LOW 20 MIN: CPT | Performed by: ORTHOPAEDIC SURGERY

## 2024-06-12 PROCEDURE — 73502 X-RAY EXAM HIP UNI 2-3 VIEWS: CPT

## 2024-06-12 NOTE — PROGRESS NOTES
Assessment/Plan:  1. Greater trochanteric bursitis of left hip  Ambulatory Referral to Physical Therapy      2. Left hip pain  XR hip/pelv 2-3 vws left if performed    Ambulatory Referral to Physical Therapy      3. Right hip pain  XR hip/pelv 2-3 vws right if performed    Ambulatory Referral to Physical Therapy        Scribe Attestation      I,:  Umairalla Michael am acting as a scribe while in the presence of the attending physician.:       I,:  Phill Lainez, DO personally performed the services described in this documentation    as scribed in my presence.:               Carlos and I reviewed his bilateral hip x-rays together.  There is no evidence of acute fracture or significant degeneration.  He is specifically tender over the left greater trochanteric bursa and both IT bands are in flexible.  The lower extremity musculature is inflexible overall.  My opinion he would benefit from physical therapy.  I would like him to attend physical therapy over the next 4 to 6 weeks and return to see me.  If there is no improvement we can consider a left greater trochanteric bursitis injection.  He was amenable to this plan of care.    Subjective:   Mika Sheridan is a 68 y.o. male who presents for evaluation of bilateral hip pain, the left more so than the right.  Carlos states that he began with hip pain approximately 1 month ago.  He describes an intermittent pain in bilateral groin region that will radiate laterally and into the anterior thigh.  Carlos enjoys going on short walks with his wife.  At the end of his walk he is in significant discomfort and requires rest.  Carlos has a history of extensive spinal fusion surgery.  He was seen by his surgeon who does not feel as if pain is related to his back.  He denies distal paresthesias today.  He denies back pain.      Review of Systems   Constitutional:  Negative for chills, fever and unexpected weight change.   HENT:  Negative for hearing loss, nosebleeds and sore throat.    Eyes:   "Negative for pain, redness and visual disturbance.   Respiratory:  Negative for cough, shortness of breath and wheezing.    Cardiovascular:  Negative for chest pain, palpitations and leg swelling.   Gastrointestinal:  Negative for abdominal pain, nausea and vomiting.   Endocrine: Negative for polyphagia and polyuria.   Genitourinary:  Negative for dysuria and hematuria.   Musculoskeletal:         See HPI   Skin:  Negative for rash and wound.   Neurological:  Negative for dizziness, numbness and headaches.   Psychiatric/Behavioral:  Negative for decreased concentration and suicidal ideas. The patient is not nervous/anxious.          Past Medical History:   Diagnosis Date    Ambulates with cane     \"long distance\"    Arthritis     Back pain     Chronic pain disorder     Dental crowns present     Exercise involving walking     daily -short walks    Hepatitis C     History of hepatitis C     per pt \"went away on its own\" age 17\"    History of transfusion     Hyperlipidemia     Hypertension     Hyponatremia 12/2/2022    Infectious viral hepatitis 1972    Leg pain     and foot pain    Leukocytosis 11/25/2022    Limb alert care status     per pt NO IV's LEFT UPPER ARM due to old injury    Risk for falls     Spinal stenosis     Stroke (HCC)     per pt in 1996-and no lasting problems    Wears glasses     readers       Past Surgical History:   Procedure Laterality Date    BACK SURGERY      with implants    COLONOSCOPY      LUMBAR FUSION N/A 5/24/2022    Procedure: L1-S1 revision of segmental hardware, L5-S1 osteotomy, posterior spinal fusion L4-S1, pelvic instrumentation, Transforaminal lumbar interbody fusion L5S1, Cage L5S1, instrumentation L1-pelvis;  Surgeon: Davey Colon MD;  Location: [facility];  Service: Orthopedics    HI ARTHRODESIS POSTERIOR/PSTLAT TQ 1NTRSPC THORACIC N/A 11/22/2022    Procedure: removal of hardware L1-L3.  Posterior thoracic/ lumbar instrumentation from T8 to pelvis.  Thoracic osteotomy T11/12. " Thoracic laminotomy T8/9, T9/10 and T10/11  Posterior thoracic fusion T7-L1.  Revision instrumentation T8 to pelvis.;  Surgeon: Davey Colon MD;  Location: [facility];  Service: Orthopedics    WISDOM TOOTH EXTRACTION         Family History   Problem Relation Age of Onset    Hypertension Mother         dead    Cancer Mother         cervical    Hypertension Father         dead       Social History     Occupational History    Not on file   Tobacco Use    Smoking status: Former     Current packs/day: 0.00     Average packs/day: 0.3 packs/day for 30.0 years (7.5 ttl pk-yrs)     Types: Cigarettes     Start date: 1974     Quit date: 2000     Years since quittin.4    Smokeless tobacco: Never    Tobacco comments:     only smoked  less than a pack a month   Vaping Use    Vaping status: Never Used   Substance and Sexual Activity    Alcohol use: Yes     Alcohol/week: 4.0 standard drinks of alcohol     Types: 1 Glasses of wine, 3 Cans of beer per week     Comment: drinks once a month    Drug use: Not Currently     Types: Marijuana    Sexual activity: Not Currently     Partners: Female     Birth control/protection: Male Sterilization     Comment: defer         Current Outpatient Medications:     lisinopril (ZESTRIL) 10 mg tablet, Take 1 tablet (10 mg total) by mouth daily, Disp: 30 tablet, Rfl: 0    lovastatin (MEVACOR) 10 MG tablet, Take 10 mg by mouth daily at bedtime, Disp: , Rfl:     traMADol (ULTRAM) 50 mg tablet, Take 50 mg by mouth every 6 (six) hours as needed for moderate pain Pt. Stated Dr. John, PCP, is prescribing tramadol 50 mgs BID, Disp: , Rfl:     aspirin (ECOTRIN LOW STRENGTH) 81 mg EC tablet, Take 1 tablet (81 mg total) by mouth daily, Disp: 30 tablet, Rfl: 0    cholecalciferol (VITAMIN D3) 1,000 units tablet, Take 2 tablets (2,000 Units total) by mouth daily, Disp: 60 tablet, Rfl: 0    DULoxetine (CYMBALTA) 20 mg capsule, Take 20 mg by mouth daily, Disp: , Rfl:     methylPREDNISolone 4 MG tablet  therapy pack, Use as directed on package (Patient not taking: Reported on 3/20/2024), Disp: 21 each, Rfl: 0    No Known Allergies    Objective:  Vitals:    06/12/24 0802   BP: 142/87   Pulse: 78       Right Hip Exam     Tenderness   The patient is experiencing no tenderness.     Range of Motion   External rotation:  60   Internal rotation:  25     Muscle Strength   Abduction: 5/5   Adduction: 5/5   Flexion: 5/5     Other   Sensation: normal      Left Hip Exam     Tenderness   The patient is experiencing tenderness in the greater trochanter.    Range of Motion   External rotation:  60   Internal rotation: 25     Muscle Strength   Abduction: 5/5   Adduction: 5/5   Flexion: 5/5     Other   Sensation: normal      Back Exam     Tenderness   The patient is experiencing tenderness in the sacroiliac.    Muscle Strength   Right Quadriceps:  5/5   Left Quadriceps:  5/5   Right Hamstrings:  5/5   Left Hamstrings:  5/5     Other   Sensation: normal            Physical Exam  Vitals reviewed.   Constitutional:       Appearance: He is well-developed.   HENT:      Head: Normocephalic and atraumatic.   Eyes:      General:         Right eye: No discharge.         Left eye: No discharge.      Conjunctiva/sclera: Conjunctivae normal.   Cardiovascular:      Rate and Rhythm: Regular rhythm.   Pulmonary:      Effort: Pulmonary effort is normal. No respiratory distress.   Musculoskeletal:      Cervical back: Normal range of motion and neck supple.      Comments: As noted in the HPI.   Skin:     General: Skin is warm and dry.   Neurological:      Mental Status: He is alert and oriented to person, place, and time.   Psychiatric:         Behavior: Behavior normal.         I have personally reviewed pertinent films in PACS and my interpretation is as follows:  X-rays of the left and right hip taken today are normal.  There is no evidence of significant degeneration, acute fracture or other osseous abnormality.      This document was created  using speech voice recognition software.   Grammatical errors, random word insertions, pronoun errors, and incomplete sentences are an occasional consequence of this system due to software limitations, ambient noise, and hardware issues.   Any formal questions or concerns about content, text, or information contained within the body of this dictation should be directly addressed to the provider for clarification.

## 2024-06-19 ENCOUNTER — EVALUATION (OUTPATIENT)
Dept: PHYSICAL THERAPY | Facility: CLINIC | Age: 69
End: 2024-06-19
Payer: MEDICARE

## 2024-06-19 DIAGNOSIS — M25.551 RIGHT HIP PAIN: ICD-10-CM

## 2024-06-19 DIAGNOSIS — M70.62 GREATER TROCHANTERIC BURSITIS OF LEFT HIP: ICD-10-CM

## 2024-06-19 DIAGNOSIS — M25.552 LEFT HIP PAIN: ICD-10-CM

## 2024-06-19 PROCEDURE — 97110 THERAPEUTIC EXERCISES: CPT

## 2024-06-19 PROCEDURE — 97162 PT EVAL MOD COMPLEX 30 MIN: CPT

## 2024-06-19 NOTE — PROGRESS NOTES
PT Evaluation     Today's date: 2024  Patient name: Mika Sheridan  : 1955  MRN: [id number]  Referring provider: Phill Lainez DO  Dx:   Encounter Diagnosis     ICD-10-CM    1. Left hip pain  M25.552 Ambulatory Referral to Physical Therapy      2. Right hip pain  M25.551 Ambulatory Referral to Physical Therapy      3. Greater trochanteric bursitis of left hip  M70.62 Ambulatory Referral to Physical Therapy                     Assessment  Impairments: abnormal gait, abnormal or restricted ROM, impaired balance, impaired physical strength, lacks appropriate home exercise program, pain with function and weight-bearing intolerance    Assessment details: Mika Sheridan is a 68 y.o. male who presents with pain, decreased strength, decreased ROM, decreased joint mobility, impaired sensation, ambulatory dysfunction, and balance dysfunction. Due to these impairments, patient has difficulty performing ADL's, recreational activities, engaging in social activities, ambulation, stair negotiation, transfers. Patient's clinical presentation is consistent with their referring diagnosis of Left hip pain, Right hip pain, Greater trochanteric bursitis of left hip. Suspected lumbar component however mechanical assessment demo unclear results at this time. Patient has been educated in home exercise program and plan of care. Patient would benefit from skilled physical therapy services to address their aforementioned functional limitations and progress towards prior level of function and independence with home exercise program.     Goals  Hip  Short Term Goals:  Target Date 4 weeks   STG1. Initiate and advance HEP to maximize progress between therapy sessions  STG2. Pt will demo AROM hip to 0-110 degrees to allow pt to transfer in/out of the car w/o difficulty   STG3. Improve LE strength to 4/5 to allow pt to raise from sit to stand w/o UE assist.  STG4. Reduce pain w/ WB activity to 0-4/10.  STG5: Pt will demo </= mod  ruby in LE flexibility to improve functional ROM and posture    Long Term Goals:  Target Date 12 weeks   LTG1. Pt to be Indep with HEP  to maximize progress between therapy sessions and improve functional mobility  LTG2. Pt will demo knee/hip ROM of 0-115 or greater needed for reciprocal stairs w/ handrail w/o pain and to don shoes/socks independently.  LTG3. Pt to tolerate prolonged walking on uneven terrain w/o asst device.  LTG4. Pt to demo LE strength of 4+/5 or better such that pt can perform reciprocal stair negotiation  LTG5: Pt will improve standing tolerance to 30 min or more as per PLOF to return to work related tasks  LTG6: Pt will demo </= min ruby in LE flexibility to improve functional ROM and posture    Plan  Patient would benefit from: PT eval and skilled physical therapy  Planned modality interventions: cryotherapy, thermotherapy: hydrocollator packs and unattended electrical stimulation    Planned therapy interventions: balance/weight bearing training, joint mobilization, neuromuscular re-education, patient education, self care, therapeutic activities, therapeutic exercise, functional ROM exercises, home exercise program, IASTM, manual therapy, abdominal trunk stabilization, postural training, flexibility, stretching and strengthening    Frequency: 2-3x week  Plan of Care beginning date: 6/19/2024  Plan of Care expiration date: 9/11/2024  Treatment plan discussed with: patient  Plan details: HEP development, stretching, strengthening, A/AA/PROM, joint mobilizations, posture education, STM/MI as needed to reduce muscle tension, muscle reeducation, PLOC discussed and agreed upon with patient.       Subjective Evaluation    History of Present Illness  Mechanism of injury: Patient reports to PT with complaints of B/L hip and thigh pain. Patient reports this began a few month ago. Patient has had falls walking in the yard but feels none of these have caused the hip pain. Patient denies any injury with  falls. Patient reports if he sits for a long time and then gets up his hips bother him. Patient has difficulty with STS From couch and requires use of UE to assist himself up. Patient reports with prolonged walking the hips also get sore. Patient reports the pain is fairly constant but the intensity can vary. Patient reports numbness in the thighs that comes and goes.   Patient Goals  Patient goals for therapy: decreased pain, improved balance, increased motion, independence with ADLs/IADLs and return to sport/leisure activities    Pain  Current pain ratin  At best pain ratin  At worst pain ratin  Location: B/L Hips  Quality: sharp and dull ache  Alleviating factors: waits until pain lowers.  Aggravating factors: walking, standing, stair climbing and sitting  Progression: worsening    Social Support  Steps to enter house: yes  Stairs in house: yes   Lives in: multiple-level home  Lives with: spouse    Employment status: not working  Treatments  Previous treatment: physical therapy      Objective      Hip AROM: deg     R  L  IR (sitting):  25  25  ER (sitting):  60  60   Flexion(sup):  100  105      Lumbar Screen  Flexion: WNL  Extension: mod-severe ruby  RSG: mod-severe ruby  LSG: mod-severe ruby    Mechanical assessment: 4/10 B/L hip pain and thigh pain   RFISit: 1x10 = pain moved to post thigh and remained in calf    1x10 = NE     ALEX: 1x10 = inc/w (numbness in L thigh)         Strength: MMT  Hip    R  L    IR(seated)   4-/5  4-/5*  ER(seated)   3-/5  4-/5   Flexion(sup)   5/5  5/5  Abduction(Seated)  4/5  4/5    Knee    R  L    Extension(Seated)  5/5  5/5  Flexion(Seated)  5/5  5/5    Ankle    R  L    Dorsiflexion(seated)  5/5  5/5  Plantarflexion(seated)  5/5  5/5      Flexibility:   Mod ruby in HS   Mod-severe ruby in hip flexor   Mod ruby in hip ER/ piriformis     Special Tests  JEFFREY: pos  B/L for pain  FADDIR: pos on R for pain and grinding   Scour:     Tenderness/Palpation: reported no increase in  "pain on L (more so numbness) and Pain in R groin and lateral hip    Function  Gait: antalgic gait pattern B/L   Stairs: TBA    Sensation:   LE testing   DERMATOMAL TESTING:          L2 anterior thigh: Impaired on R intact on L           L3 medial knee:  intact B/L           L4 medial maleolus:     intact B/L        L5 1st web space:     intact B/L           S1 lateral/sole foot:   intact B/L           S2 popliteal fossa     intact B/L                    Balance: Balance grid below will be assessed in future session          Precautions:   Past Medical History:   Diagnosis Date    Ambulates with cane     \"long distance\"    Arthritis     Back pain     Chronic pain disorder     Dental crowns present     Exercise involving walking     daily -short walks    Hepatitis C     History of hepatitis C     per pt \"went away on its own\" age 17\"    History of transfusion     Hyperlipidemia     Hypertension     Hyponatremia 12/2/2022    Infectious viral hepatitis 1972    Leg pain     and foot pain    Leukocytosis 11/25/2022    Limb alert care status     per pt NO IV's LEFT UPPER ARM due to old injury    Risk for falls     Spinal stenosis     Stroke (HCC)     per pt in 1996-and no lasting problems    Wears glasses     readers       Cut off scores:  All data taken from [employer] [facility] or Rehab Measures    Simeon: <46/56=falls risk  MDC: 6 pts  Age Norms:  60-69: M - 55   F - 55  70-79: M - 54   F - 53  80-89: M - 53   F - 50 5xSTS: Logan et al 2010  MDC: 2.3 sec  Age Norms:  60-69: 11.1 sec  70-79: 12.6 sec  80-89: 14.8 sec   TUG  MDC: 4.14 sec  Cut off score:  >13.5 sec community dwelling adults  >32.2 sec frail elderly  <20 sec: I for basic transfers  >30: dependent for transfers 10 Meter Walk Test Norms: Veronica and Jarrod et al 2011  20-29: M - 1.35 m/s   F - 1.34 m/s  30-39: M - 1.43 m/s   F - 1.34 m/s  40-49: M - 1.43 m/s   F - 1.39 m/s  50-59: M - 1.43 m/s   F - 1.31 m/s  60-69: M - 1.34 m/s   F - 1.24 m/s  70-79: M - 1.26 " "m/s   F - 1.13 m/s  80-89: M - 0.97 m/s   F - 0.94 m/s   FGA  MCID: 4 pts  Geriatrics/community < 22/30 fall risk  Geriatrics/community < 20/30 unexplained falls DGI  MDC: vestibular - 4 pts  MDC: geriatric/community - 3 pts  Falls risk <19/24   6 Minute Walk Test  Age Norms  60-69: M - 1876 ft (571.80 m)  F - 1765 ft (537.98 m)  70-79: M - 1729 ft (527.00 m)  F - 1545 ft (470.92 m)  80-89: M - 1368 ft (416.97 m)  F - 1286 ft (391.97 m) mCTSIB  Norm: 20-60 yrs  Eyes open firm: norm sway 0.21-0.48  Eyes closed firm: norm sway 0.48-0.99  Eyes open foam: norm sway 0.38-0.71  Eyes closed foam: norm sway 0.70-2.22       Outcome Measures Initial Eval          5xSTS TBA sec        TUG TBA sec        FGA TBA/30        mCTSIB  - FTEO (firm)  - FTEC (firm)  - FTEO (foam)  - FTEC (foam)   TBA sec  TBA sec  TBA sec  TBA sec             SOC: 6/19/2024  FOTO: 6/19/2024  POC Expiration: 9/11/2024  Daily Treatment Log:  Date 6/19/2024       Visit # 1       Auth         Auth exp        Manual                        There Exer        Seated HS stretch  10\" x3 R/L        Pball lumbar stretch seated         Bent knee fallouts         Seated hip ER/IR w/ ball                                 HEP Created and discussed        There Activ                                                        NMReed        Sciatic N glide  Seated 1x10        FSU         LSU                         Modalities                                HEP:   Access Code: [email or web address]  URL: [email or web address]  Date: 06/19/2024  Prepared by: Yolie Miller    Exercises  - Standing Hip Flexor Stretch  - 1 x daily - 7 x weekly - 5 reps - 10 sec hold  - Seated Hamstring Stretch  - 1 x daily - 7 x weekly - 5 reps - 10 sec hold  - Bent Knee Fallouts  - 1 x daily - 7 x weekly - 1 sets - 5 reps - 10 sec hold  - Seated Long Arc Quad  - 1 x daily - 7 x weekly - 1 sets - 10 reps           "

## 2024-06-20 ENCOUNTER — OFFICE VISIT (OUTPATIENT)
Dept: PHYSICAL THERAPY | Facility: CLINIC | Age: 69
End: 2024-06-20
Payer: MEDICARE

## 2024-06-20 DIAGNOSIS — M25.552 LEFT HIP PAIN: Primary | ICD-10-CM

## 2024-06-20 DIAGNOSIS — M70.62 GREATER TROCHANTERIC BURSITIS OF LEFT HIP: ICD-10-CM

## 2024-06-20 DIAGNOSIS — M25.551 RIGHT HIP PAIN: ICD-10-CM

## 2024-06-20 PROCEDURE — 97110 THERAPEUTIC EXERCISES: CPT

## 2024-06-20 NOTE — PROGRESS NOTES
"Daily Note     Today's date: 2024  Patient name: Mika Sheridan  : 1955  MRN: [id number]  Referring provider: Phill Lainez DO  Dx:   Encounter Diagnosis     ICD-10-CM    1. Left hip pain  M25.552       2. Right hip pain  M25.551       3. Greater trochanteric bursitis of left hip  M70.62                      Subjective: Patient reports he is somewhat sore from the evaluation yesterday but not bad.       Objective: See treatment diary below      Assessment: Tolerated treatment and initiation of POC well with cueing required to reduce trunk lean during session. No real changes made after performing lumb flex noted today.  Patient demonstrated fatigue post treatment and would benefit from continued PT.      Plan: Continue per plan of care.      Precautions:   Past Medical History:   Diagnosis Date    Ambulates with cane     \"long distance\"    Arthritis     Back pain     Chronic pain disorder     Dental crowns present     Exercise involving walking     daily -short walks    Hepatitis C     History of hepatitis C     per pt \"went away on its own\" age 17\"    History of transfusion     Hyperlipidemia     Hypertension     Hyponatremia 2022    Infectious viral hepatitis 1972    Leg pain     and foot pain    Leukocytosis 2022    Limb alert care status     per pt NO IV's LEFT UPPER ARM due to old injury    Risk for falls     Spinal stenosis     Stroke (HCC)     per pt in -and no lasting problems    Wears glasses     readers       Cut off scores:  All data taken from [employer] Neuro Section or Rehab Measures    Simeon: <46/56=falls risk  MDC: 6 pts  Age Norms:  60-69: M - 55   F - 55  70-79: M - 54   F - 53  80-89: M - 53   F - 50 5xSTS: Logan et al 2010  MDC: 2.3 sec  Age Norms:  60-69: 11.1 sec  70-79: 12.6 sec  80-89: 14.8 sec   TUG  MDC: 4.14 sec  Cut off score:  >13.5 sec community dwelling adults  >32.2 sec frail elderly  <20 sec: I for basic transfers  >30: dependent for transfers 10 Meter Walk " "Test Norms: Julia et al 2011  20-29: M - 1.35 m/s   F - 1.34 m/s  30-39: M - 1.43 m/s   F - 1.34 m/s  40-49: M - 1.43 m/s   F - 1.39 m/s  50-59: M - 1.43 m/s   F - 1.31 m/s  60-69: M - 1.34 m/s   F - 1.24 m/s  70-79: M - 1.26 m/s   F - 1.13 m/s  80-89: M - 0.97 m/s   F - 0.94 m/s   FGA  MCID: 4 pts  Geriatrics/community < 22/30 fall risk  Geriatrics/community < 20/30 unexplained falls DGI  MDC: vestibular - 4 pts  MDC: geriatric/community - 3 pts  Falls risk <19/24   6 Minute Walk Test  Age Norms  60-69: M - 1876 ft (571.80 m)  F - 1765 ft (537.98 m)  70-79: M - 1729 ft (527.00 m)  F - 1545 ft (470.92 m)  80-89: M - 1368 ft (416.97 m)  F - 1286 ft (391.97 m) mCTSIB  Norm: 20-60 yrs  Eyes open firm: norm sway 0.21-0.48  Eyes closed firm: norm sway 0.48-0.99  Eyes open foam: norm sway 0.38-0.71  Eyes closed foam: norm sway 0.70-2.22       Outcome Measures Initial Eval          5xSTS TBA sec        TUG TBA sec        FGA TBA/30        mCTSIB  - FTEO (firm)  - FTEC (firm)  - FTEO (foam)  - FTEC (foam)   TBA sec  TBA sec  TBA sec  TBA sec             SOC: 6/19/2024  FOTO: 6/19/2024  POC Expiration: 9/11/2024  Daily Treatment Log:  Date 6/19/2024 6/20/24      Visit # 1 2      Auth         Auth exp        Manual                        There Exer  25'      Seated HS stretch  10\" x3 R/L  10\"x3 R/L       Pball lumbar stretch seated   10x5'' hold      Bent knee fallouts   3\"x10       Seated hip ER/IR w/ ball   1x10 ea R/L       Hip flexor str off edge  1'x2 R/L      Hip adductor str window sill  10\"x3 R/L      HR   2x10       Alt std hip abd   1x10 R/L               HEP Created and discussed        There Activ                                                        NMReed  10'      Sciatic N glide  Seated 1x10  Seated 1x10 R/L      Bridges on pball  1x10 5\" hold       FSU         LSU                         Modalities                                HEP:   Access Code: [email or web address]  URL: " [email or web address]  Date: 06/19/2024  Prepared by: Yolie Miller    Exercises  - Standing Hip Flexor Stretch  - 1 x daily - 7 x weekly - 5 reps - 10 sec hold  - Seated Hamstring Stretch  - 1 x daily - 7 x weekly - 5 reps - 10 sec hold  - Bent Knee Fallouts  - 1 x daily - 7 x weekly - 1 sets - 5 reps - 10 sec hold  - Seated Long Arc Quad  - 1 x daily - 7 x weekly - 1 sets - 10 reps

## 2024-06-24 ENCOUNTER — OFFICE VISIT (OUTPATIENT)
Dept: PHYSICAL THERAPY | Facility: CLINIC | Age: 69
End: 2024-06-24
Payer: MEDICARE

## 2024-06-24 DIAGNOSIS — M70.62 GREATER TROCHANTERIC BURSITIS OF LEFT HIP: ICD-10-CM

## 2024-06-24 DIAGNOSIS — M25.552 LEFT HIP PAIN: Primary | ICD-10-CM

## 2024-06-24 DIAGNOSIS — M25.551 RIGHT HIP PAIN: ICD-10-CM

## 2024-06-24 PROCEDURE — 97112 NEUROMUSCULAR REEDUCATION: CPT

## 2024-06-24 PROCEDURE — 97110 THERAPEUTIC EXERCISES: CPT

## 2024-06-24 NOTE — PROGRESS NOTES
"Daily Note     Today's date: 2024  Patient name: Mika Sheridan  : 1955  MRN: [id number]  Referring provider: Phill Lainez DO  Dx:   Encounter Diagnosis     ICD-10-CM    1. Left hip pain  M25.552       2. Right hip pain  M25.551       3. Greater trochanteric bursitis of left hip  M70.62                      Subjective: Patient reports he felt a bit better after last session. Patient reports he gets a bit sore with HEP but it resolves quickly and then he feels a bit better.     Objective: See treatment diary below      Assessment: Tolerated treatment  well with cueing required to reduce trunk lean during bridges on pball. Noted improvement in form with other exercises. Plan to update HEP next session per tolerance. Patient demonstrated fatigue post treatment and would benefit from continued PT.      Plan: Continue per plan of care.      Precautions:   Past Medical History:   Diagnosis Date    Ambulates with cane     \"long distance\"    Arthritis     Back pain     Chronic pain disorder     Dental crowns present     Exercise involving walking     daily -short walks    Hepatitis C     History of hepatitis C     per pt \"went away on its own\" age 17\"    History of transfusion     Hyperlipidemia     Hypertension     Hyponatremia 2022    Infectious viral hepatitis 1972    Leg pain     and foot pain    Leukocytosis 2022    Limb alert care status     per pt NO IV's LEFT UPPER ARM due to old injury    Risk for falls     Spinal stenosis     Stroke (HCC)     per pt in -and no lasting problems    Wears glasses     readers       Cut off scores:  All data taken from [employer] [facility] or Rehab Measures    Simeon: <46/56=falls risk  MDC: 6 pts  Age Norms:  60-69: M - 55   F - 55  70-79: M - 54   F - 53  80-89: M - 53   F - 50 5xSTS: Logan et al 2010  MDC: 2.3 sec  Age Norms:  60-69: 11.1 sec  70-79: 12.6 sec  80-89: 14.8 sec   TUG  MDC: 4.14 sec  Cut off score:  >13.5 sec community dwelling adults  >32.2 " "sec frail elderly  <20 sec: I for basic transfers  >30: dependent for transfers 10 Meter Walk Test Norms: Veronica and Jarrod et al 2011  20-29: M - 1.35 m/s   F - 1.34 m/s  30-39: M - 1.43 m/s   F - 1.34 m/s  40-49: M - 1.43 m/s   F - 1.39 m/s  50-59: M - 1.43 m/s   F - 1.31 m/s  60-69: M - 1.34 m/s   F - 1.24 m/s  70-79: M - 1.26 m/s   F - 1.13 m/s  80-89: M - 0.97 m/s   F - 0.94 m/s   FGA  MCID: 4 pts  Geriatrics/community < 22/30 fall risk  Geriatrics/community < 20/30 unexplained falls DGI  MDC: vestibular - 4 pts  MDC: geriatric/community - 3 pts  Falls risk <19/24   6 Minute Walk Test  Age Norms  60-69: M - 1876 ft (571.80 m)  F - 1765 ft (537.98 m)  70-79: M - 1729 ft (527.00 m)  F - 1545 ft (470.92 m)  80-89: M - 1368 ft (416.97 m)  F - 1286 ft (391.97 m) mCTSIB  Norm: 20-60 yrs  Eyes open firm: norm sway 0.21-0.48  Eyes closed firm: norm sway 0.48-0.99  Eyes open foam: norm sway 0.38-0.71  Eyes closed foam: norm sway 0.70-2.22       Outcome Measures Initial Eval          5xSTS TBA sec        TUG TBA sec        FGA TBA/30        mCTSIB  - FTEO (firm)  - FTEC (firm)  - FTEO (foam)  - FTEC (foam)   TBA sec  TBA sec  TBA sec  TBA sec             SOC: 6/19/2024  FOTO: 6/19/2024  POC Expiration: 9/11/2024  Daily Treatment Log:  Date 6/19/2024 6/20/24 6/24/2024     Visit # 1 2 3     Auth         Auth exp        Manual                        There Exer  25' 30'     Seated HS stretch  10\" x3 R/L  10\"x3 R/L  10\"x3 R/L      Pball lumbar stretch seated   10x5'' hold 5\" x10 fwd      Bent knee fallouts   3\"x10  5\" x10      Seated hip ER/IR w/ ball   1x10 ea R/L  1x10 ea. R/L      Hip flexor str off edge  1'x2 R/L 1'x2 R/L     Hip adductor str window sill  10\"x3 R/L 10\"x3 R/L     HR   2x10  2x10      Alt std hip abd   1x10 R/L  1x10 R/L              HEP Created and discussed        There Activ                                                        NMReed  10' 10'     Sciatic N glide  Seated 1x10  Seated 1x10 R/L Seated " "1x10 R/L     Bridges on pball  1x10 5\" hold  1x10 5\" hold      FSU         LSU                         Modalities                                HEP:   Access Code: [email or web address]  URL: [email or web address]  Date: 06/19/2024  Prepared by: Yolie Miller    Exercises  - Standing Hip Flexor Stretch  - 1 x daily - 7 x weekly - 5 reps - 10 sec hold  - Seated Hamstring Stretch  - 1 x daily - 7 x weekly - 5 reps - 10 sec hold  - Bent Knee Fallouts  - 1 x daily - 7 x weekly - 1 sets - 5 reps - 10 sec hold  - Seated Long Arc Quad  - 1 x daily - 7 x weekly - 1 sets - 10 reps           "

## 2024-06-27 ENCOUNTER — OFFICE VISIT (OUTPATIENT)
Dept: PHYSICAL THERAPY | Facility: CLINIC | Age: 69
End: 2024-06-27
Payer: MEDICARE

## 2024-06-27 DIAGNOSIS — M25.551 RIGHT HIP PAIN: ICD-10-CM

## 2024-06-27 DIAGNOSIS — M25.552 LEFT HIP PAIN: Primary | ICD-10-CM

## 2024-06-27 DIAGNOSIS — M70.62 GREATER TROCHANTERIC BURSITIS OF LEFT HIP: ICD-10-CM

## 2024-06-27 PROCEDURE — 97112 NEUROMUSCULAR REEDUCATION: CPT

## 2024-06-27 PROCEDURE — 97110 THERAPEUTIC EXERCISES: CPT

## 2024-06-27 NOTE — PROGRESS NOTES
"Daily Note     Today's date: 2024  Patient name: Mika Sheridan  : 1955  MRN: [id number]  Referring provider: Phill Lainez DO  Dx:   Encounter Diagnosis     ICD-10-CM    1. Left hip pain  M25.552       2. Right hip pain  M25.551       3. Greater trochanteric bursitis of left hip  M70.62                      Subjective: Patient notes he has been sore following his sessions; slightly more than his normal as he reports he is always sore.       Objective: See treatment diary below      Assessment: Tolerated treatment  well. Improving hip flexor flexibility noted but cont to present w/ significant lumb flexed posture in standing.   Patient demonstrated fatigue post treatment and would benefit from continued PT.      Plan: Continue per plan of care.      Precautions:   Past Medical History:   Diagnosis Date    Ambulates with cane     \"long distance\"    Arthritis     Back pain     Chronic pain disorder     Dental crowns present     Exercise involving walking     daily -short walks    Hepatitis C     History of hepatitis C     per pt \"went away on its own\" age 17\"    History of transfusion     Hyperlipidemia     Hypertension     Hyponatremia 2022    Infectious viral hepatitis 1972    Leg pain     and foot pain    Leukocytosis 2022    Limb alert care status     per pt NO IV's LEFT UPPER ARM due to old injury    Risk for falls     Spinal stenosis     Stroke (HCC)     per pt in -and no lasting problems    Wears glasses     readers       Cut off scores:  All data taken from [employer] [facility] or Rehab Measures    Simeon: <46/56=falls risk  MDC: 6 pts  Age Norms:  60-69: M - 55   F - 55  70-79: M - 54   F - 53  80-89: M - 53   F - 50 5xSTS: Logan et al 2010  MDC: 2.3 sec  Age Norms:  60-69: 11.1 sec  70-79: 12.6 sec  80-89: 14.8 sec   TUG  MDC: 4.14 sec  Cut off score:  >13.5 sec community dwelling adults  >32.2 sec frail elderly  <20 sec: I for basic transfers  >30: dependent for transfers 10 Meter " "Walk Test Norms: Julia et al 2011  20-29: M - 1.35 m/s   F - 1.34 m/s  30-39: M - 1.43 m/s   F - 1.34 m/s  40-49: M - 1.43 m/s   F - 1.39 m/s  50-59: M - 1.43 m/s   F - 1.31 m/s  60-69: M - 1.34 m/s   F - 1.24 m/s  70-79: M - 1.26 m/s   F - 1.13 m/s  80-89: M - 0.97 m/s   F - 0.94 m/s   FGA  MCID: 4 pts  Geriatrics/community < 22/30 fall risk  Geriatrics/community < 20/30 unexplained falls DGI  MDC: vestibular - 4 pts  MDC: geriatric/community - 3 pts  Falls risk <19/24   6 Minute Walk Test  Age Norms  60-69: M - 1876 ft (571.80 m)  F - 1765 ft (537.98 m)  70-79: M - 1729 ft (527.00 m)  F - 1545 ft (470.92 m)  80-89: M - 1368 ft (416.97 m)  F - 1286 ft (391.97 m) mCTSIB  Norm: 20-60 yrs  Eyes open firm: norm sway 0.21-0.48  Eyes closed firm: norm sway 0.48-0.99  Eyes open foam: norm sway 0.38-0.71  Eyes closed foam: norm sway 0.70-2.22       Outcome Measures Initial Eval          5xSTS TBA sec        TUG TBA sec        FGA TBA/30        mCTSIB  - FTEO (firm)  - FTEC (firm)  - FTEO (foam)  - FTEC (foam)   TBA sec  TBA sec  TBA sec  TBA sec             SOC: 6/19/2024  FOTO: 6/19/2024  POC Expiration: 9/11/2024  Daily Treatment Log:  Date 6/19/2024 6/20/24 6/24/2024 6/27/24     Visit # 1 2 3 4    Auth         Auth exp        Manual                        There Exer  25' 30' 25'    Bike    L1x5'     Seated HS stretch  10\" x3 R/L  10\"x3 R/L  10\"x3 R/L  20\"x3 R/L     Pball lumbar stretch seated   10x5'' hold 5\" x10 fwd  5\" 2x10 fwrd    Bent knee fallouts   3\"x10  5\" x10  10\"x5     Seated hip ER/IR w/ ball   1x10 ea R/L  1x10 ea. R/L  1x10 ea R/L     Hip flexor str off edge  1'x2 R/L 1'x2 R/L 1'x2 R/L foot unsupported    Hip adductor str window sill  10\"x3 R/L 10\"x3 R/L 10\"x3 R/L     HR   2x10  2x10  2x10     Alt std hip abd   1x10 R/L  1x10 R/L  2x10 R/L                     HEP Created and discussed        There Activ                                                        NMReed  10' 10' 13'    Sciatic N " "glide  Seated 1x10  Seated 1x10 R/L Seated 1x10 R/L Seated 2x10 R/L     Bridges on pball  1x10 5\" hold  1x10 5\" hold  1x10 5\" hold    Supine clamshells alt    2x10 R/L YTB    FSU         LSU                         Modalities                                HEP:   Access Code: [email or web address]  URL: [email or web address]  Date: 06/19/2024  Prepared by: Yolie Miller    Exercises  - Standing Hip Flexor Stretch  - 1 x daily - 7 x weekly - 5 reps - 10 sec hold  - Seated Hamstring Stretch  - 1 x daily - 7 x weekly - 5 reps - 10 sec hold  - Bent Knee Fallouts  - 1 x daily - 7 x weekly - 1 sets - 5 reps - 10 sec hold  - Seated Long Arc Quad  - 1 x daily - 7 x weekly - 1 sets - 10 reps           "

## 2024-07-01 ENCOUNTER — OFFICE VISIT (OUTPATIENT)
Dept: PHYSICAL THERAPY | Facility: CLINIC | Age: 69
End: 2024-07-01
Payer: MEDICARE

## 2024-07-01 DIAGNOSIS — M25.551 RIGHT HIP PAIN: ICD-10-CM

## 2024-07-01 DIAGNOSIS — M25.552 LEFT HIP PAIN: Primary | ICD-10-CM

## 2024-07-01 DIAGNOSIS — M70.62 GREATER TROCHANTERIC BURSITIS OF LEFT HIP: ICD-10-CM

## 2024-07-01 PROCEDURE — 97110 THERAPEUTIC EXERCISES: CPT

## 2024-07-01 PROCEDURE — 97112 NEUROMUSCULAR REEDUCATION: CPT

## 2024-07-01 NOTE — PROGRESS NOTES
"Daily Note     Today's date: 2024  Patient name: Mika Sheridan  : 1955  MRN: [id number]  Referring provider: Phill Lainez DO  Dx:   Encounter Diagnosis     ICD-10-CM    1. Left hip pain  M25.552       2. Right hip pain  M25.551       3. Greater trochanteric bursitis of left hip  M70.62                      Subjective: Patient notes his hips have been feeling better. He gets some soreness in B/L thigh and calf but states this is tolerable       Objective: See treatment diary below      Assessment: Tolerated treatment  well. Improving hip flexor flexibility noted, but cont to present w/ significant flexed posture in standing. Continue to progress as tolerated. Patient demonstrated fatigue post treatment and would benefit from continued PT to further improved functional ROM       Plan: Continue per plan of care.      Precautions:   Past Medical History:   Diagnosis Date    Ambulates with cane     \"long distance\"    Arthritis     Back pain     Chronic pain disorder     Dental crowns present     Exercise involving walking     daily -short walks    Hepatitis C     History of hepatitis C     per pt \"went away on its own\" age 17\"    History of transfusion     Hyperlipidemia     Hypertension     Hyponatremia 2022    Infectious viral hepatitis 1972    Leg pain     and foot pain    Leukocytosis 2022    Limb alert care status     per pt NO IV's LEFT UPPER ARM due to old injury    Risk for falls     Spinal stenosis     Stroke (HCC)     per pt in -and no lasting problems    Wears glasses     readers     SOC: 2024  FOTO: 2024  POC Expiration: 2024  Daily Treatment Log:  Date 2024   Visit # 1 2 3 4 5 (FOTO)    Auth         Auth exp        Manual                        There Exer  25' 30' 25' 25'   Bike    L1x5'  L1x5'   Seated HS stretch  10\" x3 R/L  10\"x3 R/L  10\"x3 R/L  20\"x3 R/L  20\"x3 R/L    Pball lumbar stretch seated   10x5'' hold 5\" x10 " "fwd  5\" 2x10 fwrd 5\" 2x10 fwrd   Bent knee fallouts   3\"x10  5\" x10  10\"x5  10\" x5    Seated hip ER/IR w/ ball   1x10 ea R/L  1x10 ea. R/L  1x10 ea R/L  1x10 ea R/L    Hip flexor str off edge  1'x2 R/L 1'x2 R/L 1'x2 R/L foot unsupported 1'x2 R/L foot unsupported   Hip adductor str window sill  10\"x3 R/L 10\"x3 R/L 10\"x3 R/L  10\"x3 R/L    HR   2x10  2x10  2x10  2x10    Alt std hip abd   1x10 R/L  1x10 R/L  2x10 R/L  2x10 R/L                    HEP Created and discussed        There Activ                                                        NMReed  10' 10' 13' 15'   Sciatic N glide  Seated 1x10  Seated 1x10 R/L Seated 1x10 R/L Seated 2x10 R/L  Seated 2x10 R/L    Bridges on pball  1x10 5\" hold  1x10 5\" hold  1x10 5\" hold 1x10 5\" hold   Supine clamshells alt    2x10 R/L YTB 2x10 uni LE stab R/L YTB   FSU         LSU                         Modalities                                HEP:   Access Code: [email or web address]  URL: [email or web address]  Date: 06/19/2024  Prepared by: Yolie Miller    Exercises  - Standing Hip Flexor Stretch  - 1 x daily - 7 x weekly - 5 reps - 10 sec hold  - Seated Hamstring Stretch  - 1 x daily - 7 x weekly - 5 reps - 10 sec hold  - Bent Knee Fallouts  - 1 x daily - 7 x weekly - 1 sets - 5 reps - 10 sec hold  - Seated Long Arc Quad  - 1 x daily - 7 x weekly - 1 sets - 10 reps           "

## 2024-07-03 ENCOUNTER — OFFICE VISIT (OUTPATIENT)
Dept: PHYSICAL THERAPY | Facility: CLINIC | Age: 69
End: 2024-07-03
Payer: MEDICARE

## 2024-07-03 DIAGNOSIS — M25.552 LEFT HIP PAIN: Primary | ICD-10-CM

## 2024-07-03 DIAGNOSIS — M25.551 RIGHT HIP PAIN: ICD-10-CM

## 2024-07-03 DIAGNOSIS — M70.62 GREATER TROCHANTERIC BURSITIS OF LEFT HIP: ICD-10-CM

## 2024-07-03 PROCEDURE — 97110 THERAPEUTIC EXERCISES: CPT

## 2024-07-03 NOTE — PROGRESS NOTES
"Daily Note     Today's date: 7/3/2024  Patient name: Mika Sheridan  : 1955  MRN: [id number]  Referring provider: Phill Lainez DO  Dx:   Encounter Diagnosis     ICD-10-CM    1. Left hip pain  M25.552       2. Right hip pain  M25.551       3. Greater trochanteric bursitis of left hip  M70.62                      Subjective: Patient reports he is feeling much better since starting therapy w/ less pain in his hips. He would like to discharge soon due to his improvements.        Objective: See treatment diary below      Assessment: Tolerated treatment  well. Update HEP to reflect current program to help improve mobility and strength to inc antonella to functional activities. Patient w/ good improvements thus far. Patient demonstrated fatigue post treatment and would benefit from continued PT.      Plan: Continue per plan of care.  Patient will trial updated HEP over the next week with DC next visit.      Precautions:   Past Medical History:   Diagnosis Date    Ambulates with cane     \"long distance\"    Arthritis     Back pain     Chronic pain disorder     Dental crowns present     Exercise involving walking     daily -short walks    Hepatitis C     History of hepatitis C     per pt \"went away on its own\" age 17\"    History of transfusion     Hyperlipidemia     Hypertension     Hyponatremia 2022    Infectious viral hepatitis 1972    Leg pain     and foot pain    Leukocytosis 2022    Limb alert care status     per pt NO IV's LEFT UPPER ARM due to old injury    Risk for falls     Spinal stenosis     Stroke (HCC)     per pt in -and no lasting problems    Wears glasses     readers     SOC: 2024  FOTO: 2024  POC Expiration: 2024  Daily Treatment Log:  Date 7/3/24    2024   Visit # 6    5 (FOTO)    Auth         Auth exp        Manual                        There Exer 25'    25'   Bike L1x5'     L1x5'   Seated HS stretch  30\"x3 R/L     20\"x3 R/L    Pball lumbar stretch seated  Lumb flex " "w/o ball 5\"x10     5\" 2x10 fwrd   Bent knee fallouts  30\"x3 for hip ER    10\" x5    Seated hip ER/IR w/ ball  W/ pillow 1x10 ea R/L     1x10 ea R/L    Hip flexor str off edge 1'x2 R/L     1'x2 R/L foot unsupported   Hip adductor str window sill 20\"x3 R/L     10\"x3 R/L    HR      2x10    Alt std hip abd      2x10 R/L                    HEP Updated & performed        There Activ                                                        NMReed 10'    15'   Sciatic N glide  Seated (LAQ w/ DF) 2x10 R/L    Seated 2x10 R/L    Bridges on pball     1x10 5\" hold   Supine clamshells alt 1x10 R/L YTB     2x10 uni LE stab R/L YTB   FSU         LSU                         Modalities                                HEP:   Access Code: [email or web address]  URL: [email or web address]  Date: 07/03/2024  Prepared by: Maria Ines Barraza    Exercises  - Seated Hamstring Stretch  - 1 x daily - 4 x weekly - 3 reps - 30 sec hold  - Seated Long Arc Quad  - 1 x daily - 4 x weekly - 1 sets - 10 reps  - Seated Lumbar Flexion Stretch  - 1 x daily - 4 x weekly - 1 sets - 15 reps - 5 sec hold  - Seated Hip External Rotation AROM  - 1 x daily - 4 x weekly - 2 sets - 10 reps  - Seated Hip Internal Rotation AROM  - 1 x daily - 4 x weekly - 2 sets - 10 reps  - Modified Mika Stretch  - 1 x daily - 4 x weekly - 2-3 reps - 1' hold  - Hooklying Hip External and Internal Rotation  - 1 x daily - 4 x weekly - 1 sets - 3 reps - 30 sec hold  - Hooklying Isometric Clamshell  - 1 x daily - 4 x weekly - 2 sets - 10 reps - 5sec  hold  - Side Lunge Adductor Stretch  - 1 x daily - 4 x weekly - 3 reps - 30sec hold         "

## 2024-07-09 ENCOUNTER — APPOINTMENT (OUTPATIENT)
Dept: PHYSICAL THERAPY | Facility: CLINIC | Age: 69
End: 2024-07-09
Payer: MEDICARE

## 2024-07-11 ENCOUNTER — OFFICE VISIT (OUTPATIENT)
Dept: PHYSICAL THERAPY | Facility: CLINIC | Age: 69
End: 2024-07-11
Payer: MEDICARE

## 2024-07-11 DIAGNOSIS — M70.62 GREATER TROCHANTERIC BURSITIS OF LEFT HIP: ICD-10-CM

## 2024-07-11 DIAGNOSIS — M25.551 RIGHT HIP PAIN: ICD-10-CM

## 2024-07-11 DIAGNOSIS — M25.552 LEFT HIP PAIN: Primary | ICD-10-CM

## 2024-07-11 PROCEDURE — 97110 THERAPEUTIC EXERCISES: CPT

## 2024-07-11 NOTE — PROGRESS NOTES
PT Discharge    Today's date: 2024  Patient name: Mika Sheridan  : 1955  MRN: [id number]  Referring provider: Phill Lainez DO  Dx:   Encounter Diagnosis     ICD-10-CM    1. Left hip pain  M25.552       2. Right hip pain  M25.551       3. Greater trochanteric bursitis of left hip  M70.62                        Assessment  Impairments: abnormal gait, abnormal or restricted ROM, impaired balance, impaired physical strength, lacks appropriate home exercise program, pain with function and weight-bearing intolerance    Assessment details: Mika Sheridan is a 68 y.o. male who presents with improvements in pain, LE strength, lumbar ROM,  and ambulation. Patient is appropriate to D/C at this time with improved function with ADLs and decreased pain levels.  Patient has been educated in home exercise program and feels confident with ability to perform independently. Instructed that he may return should his pain return in the future.     Goals  Hip  Short Term Goals:  Target Date 4 weeks   STG1. Initiate and advance HEP to maximize progress between therapy sessions ---MET  STG2. Pt will demo AROM hip to 0-110 degrees to allow pt to transfer in/out of the car w/o difficulty  ---MET  STG3. Improve LE strength to 4/5 to allow pt to raise from sit to stand w/o UE assist. ---MET  STG4. Reduce pain w/ WB activity to 0-4/10. ---MET  STG5: Pt will demo </= mod ruby in LE flexibility to improve functional ROM and posture ---MET    Long Term Goals:  Target Date 12 weeks   LTG1. Pt to be Indep with HEP  to maximize progress between therapy sessions and improve functional mobility ---MET  LTG2. Pt will demo hip ROM of 0-115 or greater needed for reciprocal stairs w/ handrail w/o pain and to don shoes/socks independently.--Partially met   LTG3. Pt to tolerate prolonged walking on uneven terrain w/o asst device. ---MET  LTG4. Pt to demo LE strength of 4+/5 or better such that pt can perform reciprocal stair negotiation  ---MET  LTG5: Pt will improve standing tolerance to 30 min or more as per PLOF to return to work related tasks --Partially met (improved but not 30 min)  LTG6: Pt will demo </= min ruby in LE flexibility to improve functional ROM and posture ---not met     Plan    Planned therapy interventions: patient education and home exercise program    Plan of Care beginning date: 2024  Plan of Care expiration date: 2024  Treatment plan discussed with: patient  Plan details: Patient to D/C to HEP at this time       Subjective Evaluation    History of Present Illness  Mechanism of injury: Patient reports to PT for re-eval of  B/L hip and thigh pain. Patient reports he is doing well and feels confident in ability to D/C to HEP at this time.   Pain  Current pain ratin  At best pain ratin  At worst pain rating: 3  Location: B/L Hips  Quality: sharp and dull ache  Alleviating factors: waits until pain lowers.  Aggravating factors: walking, standing, stair climbing and sitting  Progression: worsening    Social Support  Steps to enter house: yes  Stairs in house: yes   Lives in: multiple-level home  Lives with: spouse    Employment status: not working  Treatments  Previous treatment: physical therapy        Objective      Hip AROM: deg     R  L  IR (sitting):  30  30  ER (sitting):  60  60   Flexion(sup):  110  110      Lumbar Screen  Flexion: WNL  Extension: mod-severe ruby  RSG: mod ruby  LSG: mod ruby      Strength: MMT  Hip    R  L    IR(seated)   4+/5  4+/5  ER(seated)   4+/5  4+/5   Flexion(sup)   5/5  5/5  Abduction(Seated)  4+/5  4+/5    Knee    R  L    Extension(Seated)  5/5  5/5  Flexion(Seated)  5/5  5/5    Ankle    R  L    Dorsiflexion(seated)  5/5  5/5  Plantarflexion(seated)  5/5  5/5      Flexibility:   Mod ruby in HS   Mod-severe ruby in hip flexor   Mod ruby in hip ER/ piriformis     Special Tests  JEFFREY: neg for pain (limited ROM)  FADDIR: neg for pain (limited ROM)   Scour: neg B/L  "    Tenderness/Palpation: reported no TTP on L and R hip (previously pain on R)     Function  Gait: antalgic gait pattern B/L   Stairs: able to ascend and descend 6\" step w/ uni rail support: noted good form and control     Sensation:   LE testing   DERMATOMAL TESTING:          L2 anterior thigh: Impaired on R intact on L           L3 medial knee:  intact B/L           L4 medial maleolus:     intact B/L        L5 1st web space:     intact B/L           S1 lateral/sole foot:   intact B/L           S2 popliteal fossa     intact B/L                           Precautions:   Past Medical History:   Diagnosis Date    Ambulates with cane     \"long distance\"    Arthritis     Back pain     Chronic pain disorder     Dental crowns present     Exercise involving walking     daily -short walks    Hepatitis C     History of hepatitis C     per pt \"went away on its own\" age 17\"    History of transfusion     Hyperlipidemia     Hypertension     Hyponatremia 12/2/2022    Infectious viral hepatitis 1972    Leg pain     and foot pain    Leukocytosis 11/25/2022    Limb alert care status     per pt NO IV's LEFT UPPER ARM due to old injury    Risk for falls     Spinal stenosis     Stroke (HCC)     per pt in 1996-and no lasting problems    Wears glasses     readers     SOC: 6/19/2024  FOTO: 7/1/2024  POC Expiration: 9/11/2024  Daily Treatment Log:  Date 7/3/24 7/11/2024   7/1/2024   Visit # 6 7 (DC/ FOTO)    5 (FOTO)    Auth         Auth exp        Manual                        There Exer 25' 27'   25'   Bike L1x5'  L1x5'    L1x5'   Seated HS stretch  30\"x3 R/L     20\"x3 R/L    Pball lumbar stretch seated  Lumb flex w/o ball 5\"x10  Lumb flex w/o ball 5\"x10    5\" 2x10 fwrd   Bent knee fallouts  30\"x3 for hip ER    10\" x5    Seated hip ER/IR w/ ball  W/ pillow 1x10 ea R/L     1x10 ea R/L    Hip flexor str off edge 1'x2 R/L     1'x2 R/L foot unsupported   Hip adductor str window sill 20\"x3 R/L  20\"x3 R/L    10\"x3 R/L    HR      2x10    Alt " "std hip abd      2x10 R/L            Objective measures   EG      HEP Updated & performed  Discussed with no patient concerns at this time      There Activ                                                        NMReed 10'    15'   Sciatic N glide  Seated (LAQ w/ DF) 2x10 R/L    Seated 2x10 R/L    Bridges on pball     1x10 5\" hold   Supine clamshells alt 1x10 R/L YTB     2x10 uni LE stab R/L YTB   FSU         LSU                         Modalities                                HEP:   Access Code: [email or web address]  URL: [email or web address]  Date: 07/03/2024  Prepared by: Maria Ines Barraza    Exercises  - Seated Hamstring Stretch  - 1 x daily - 4 x weekly - 3 reps - 30 sec hold  - Seated Long Arc Quad  - 1 x daily - 4 x weekly - 1 sets - 10 reps  - Seated Lumbar Flexion Stretch  - 1 x daily - 4 x weekly - 1 sets - 15 reps - 5 sec hold  - Seated Hip External Rotation AROM  - 1 x daily - 4 x weekly - 2 sets - 10 reps  - Seated Hip Internal Rotation AROM  - 1 x daily - 4 x weekly - 2 sets - 10 reps  - Modified Mika Stretch  - 1 x daily - 4 x weekly - 2-3 reps - 1' hold  - Hooklying Hip External and Internal Rotation  - 1 x daily - 4 x weekly - 1 sets - 3 reps - 30 sec hold  - Hooklying Isometric Clamshell  - 1 x daily - 4 x weekly - 2 sets - 10 reps - 5sec  hold  - Side Lunge Adductor Stretch  - 1 x daily - 4 x weekly - 3 reps - 30sec hold         "

## 2024-07-15 ENCOUNTER — APPOINTMENT (OUTPATIENT)
Dept: PHYSICAL THERAPY | Facility: CLINIC | Age: 69
End: 2024-07-15
Payer: MEDICARE

## 2024-07-16 ENCOUNTER — OFFICE VISIT (OUTPATIENT)
Dept: DERMATOLOGY | Facility: CLINIC | Age: 69
End: 2024-07-16
Payer: MEDICARE

## 2024-07-16 VITALS — WEIGHT: 208.7 LBS | HEIGHT: 70 IN | BODY MASS INDEX: 29.88 KG/M2 | TEMPERATURE: 97.6 F

## 2024-07-16 DIAGNOSIS — D48.5 NEOPLASM OF UNCERTAIN BEHAVIOR OF SKIN: Primary | ICD-10-CM

## 2024-07-16 PROCEDURE — 88305 TISSUE EXAM BY PATHOLOGIST: CPT | Performed by: PATHOLOGY

## 2024-07-16 PROCEDURE — 11102 TANGNTL BX SKIN SINGLE LES: CPT | Performed by: DERMATOLOGY

## 2024-07-16 PROCEDURE — 99204 OFFICE O/P NEW MOD 45 MIN: CPT | Performed by: DERMATOLOGY

## 2024-07-16 NOTE — PATIENT INSTRUCTIONS
"  NEOPLASM OF UNCERTAIN BEHAVIOR OF SKIN    Assessment and Plan:  I have discussed with the patient that a sample of skin via a \"skin biopsy” would be potentially helpful to further make a specific diagnosis under the microscope.  Based on a thorough discussion of this condition and the management approach to it (including a comprehensive discussion of the known risks, side effects and potential benefits of treatment), the patient (family) agrees to implement the following specific plan:    Procedure:  Skin Biopsy.  After a thorough discussion of treatment options and risk/benefits/alternatives (including but not limited to local pain, scarring, dyspigmentation, blistering, possible superinfection, and inability to confirm a diagnosis via histopathology), verbal and written consent were obtained and portion of the rash was biopsied for tissue sample.  See below for consent that was obtained from patient and subsequent Procedure Note.     PROCEDURE TANGENTIAL (SHAVE) BIOPSY NOTE:      After obtaining informed consent  at which time there was a discussion about the purpose of biopsy  and low risks of infection and bleeding.  The area was prepped and draped in the usual fashion. Anesthesia was obtained with 1% lidocaine with epinephrine. A shave biopsy to an appropriate sampling depth was obtained by Shave (Dermablade or 15 blade) The resulting wound was covered with surgical ointment and bandaged appropriately.     The patient tolerated the procedure well without complications and was without signs of functional compromise.      Specimen has been sent for review by Dermatopathology.    Standard post-procedure care has been explained and has been included in written form within the patient's copy of Informed Consent.    INFORMED CONSENT DISCUSSION AND POST-OPERATIVE INSTRUCTIONS FOR PATIENT    I.  RATIONALE FOR PROCEDURE  I understand that a skin biopsy allows the Dermatologist to test a lesion or rash under the " "microscope to obtain a diagnosis.  It usually involves numbing the area with numbing medication and removing a small piece of skin; sometimes the area will be closed with sutures. In this specific procedure, sutures are not usually needed.  If any sutures are placed, then they are usually need to be removed in 2 weeks or less.    I understand that my Dermatologist recommends that a skin \"shave\" biopsy be performed today.  A local anesthetic, similar to the kind that a dentist uses when filling a cavity, will be injected with a very small needle into the skin area to be sampled.  The injected skin and tissue underneath \"will go to sleep” and become numb so no pain should be felt afterwards.  An instrument shaped like a tiny \"razor blade\" (shave biopsy instrument) will be used to cut a small piece of tissue and skin from the area so that a sample of tissue can be taken and examined more closely under the microscope.  A slight amount of bleeding will occur, but it will be stopped with direct pressure and a pressure bandage and any other appropriate methods.  I understands that a scar will form where the wound was created.  Surgical ointment will be applied to help protect the wound.  Sutures are not usually needed.    II.  RISKS AND POTENTIAL COMPLICATIONS   I understand the risks and potential complications of a skin biopsy include but are not limited to the following:  Bleeding  Infection  Pain  Scar/keloid  Skin discoloration  Incomplete Removal  Recurrence  Nerve Damage/Numbness/Loss of Function  Allergic Reaction to Anesthesia  Biopsies are diagnostic procedures and based on findings additional treatment or evaluation may be required  Loss or destruction of specimen resulting in no additional findings    My Dermatologist has explained to me the nature of the condition, the nature of the procedure, and the benefits to be reasonably expected compared with alternative approaches.  My Dermatologist has discussed the " "likelihood of major risks or complications of this procedure including the specific risks listed above, such as bleeding, infection, and scarring/keloid.  I understand that a scar is expected after this procedure.  I understand that my physician cannot predict if the scar will form a \"keloid,\" which extends beyond the borders of the wound that is created.  A keloid is a thick, painful, and bumpy scar.  A keloid can be difficult to treat, as it does not always respond well to therapy, which includes injecting cortisone directly into the keloid every few weeks.  While this usually reduces the pain and size of the scar, it does not eliminate it.      I understand that photographs may be taken before and after the procedure.  These will be maintained as part of the medical providers confidential records and may not be made available to me.  I further authorize the medical provider to use the photographs for teaching purposes or to illustrate scientific papers, books, or lectures if in his/her judgment, medical research, education, or science may benefit from its use.    I have had an opportunity to fully inquire about the risks and benefits of this procedure and its alternatives.   I have been given ample time and opportunity to ask questions and to seek a second opinion if I wished to do so.  I acknowledge that there have specifically been no guarantees as to the cosmetic results from the procedure.  I am aware that with any procedure there is always the possibility of an unexpected complication.    III. POST-PROCEDURAL CARE (WHAT YOU WILL NEED TO DO \"AFTER THE BIOPSY\" TO OPTIMIZE HEALING)    Keep the area clean and dry.  Try NOT to remove the bandage or get it wet for the first 24 hours.    Gently clean the area and apply surgical ointment (such as Vaseline petrolatum ointment, which is available \"over the counter\" and not a prescription) to the biopsy site for up to 2 weeks straight.  This acts to protect the wound " from the outside world.      Sutures are not usually placed in this procedure.  If any sutures were placed, return for suture removal as instructed (generally 1 week for the face, 2 weeks for the body).      Take Acetaminophen (Tylenol) for discomfort, if no contraindications.  Ibuprofen or aspirin could make bleeding worse.    Call our office immediately for signs of infection: fever, chills, increased redness, warmth, tenderness, discomfort/pain, or pus or foul smell coming from the wound.    WHAT TO DO IF THERE IS ANY BLEEDING?  If a small amount of bleeding is noticed, place a clean cloth over the area and apply firm pressure for ten minutes.  Check the wound after 10 minutes of direct pressure.  If bleeding persists, try one more time for an additional 10 minutes of direct pressure on the area.  If the bleeding becomes heavier or does not stop after the second attempt, or if you have any other questions about this procedure, then please call your [facility]'s Dermatologist by calling [phone] (SKIN).     I hereby acknowledge that I have reviewed and verified the site with my Dermatologist and have requested and authorized my Dermatologist to proceed with the procedure.

## 2024-07-16 NOTE — PROGRESS NOTES
"[facility] Dermatology Clinic Note     Patient Name: Mika Sheridan  Encounter Date: 7/16/24     Have you been cared for by a [facility] Dermatologist in the last 3 years and, if so, which description applies to you?    NO.   I am considered a \"new\" patient and must complete all patient intake questions. I am MALE/not capable of bearing children.    REVIEW OF SYSTEMS:  Have you recently had or currently have any of the following? Recent fever or chills? No  Any non-healing wound? No   PAST MEDICAL HISTORY:  Have you personally ever had or currently have any of the following?  If \"YES,\" then please provide more detail. Skin cancer (such as Melanoma, Basal Cell Carcinoma, Squamous Cell Carcinoma?  No  Tuberculosis, HIV/AIDS, Hepatitis B or C: YES, Hepatitis C at [age]  Radiation Treatment No   HISTORY OF IMMUNOSUPPRESSION:   Do you have a history of any of the following:  Systemic Immunosuppression such as Diabetes, Biologic or Immunotherapy, Chemotherapy, Organ Transplantation, Bone Marrow Transplantation?  No     Answering \"YES\" requires the addition of the dotphrase \"IMMUNOSUPPRESSED\" as the first diagnosis of the patient's visit.   FAMILY HISTORY:  Any \"first degree relatives\" (parent, brother, sister, or child) with the following?    Skin Cancer, Pancreatic or Other Cancer? YES, mother cervical cancer, sister lung cancer   PATIENT EXPERIENCE:    Do you want the Dermatologist to perform a COMPLETE skin exam today including a clinical examination under the \"bra and underwear\" areas?  NO  If necessary, do we have your permission to call and leave a detailed message on your Preferred Phone number that includes your specific medical information?  Yes      No Known Allergies   Current Outpatient Medications:     lisinopril (ZESTRIL) 10 mg tablet, Take 1 tablet (10 mg total) by mouth daily, Disp: 30 tablet, Rfl: 0    lovastatin (MEVACOR) 10 MG tablet, Take 10 mg by mouth daily at bedtime, Disp: , Rfl:     traMADol " "(ULTRAM) 50 mg tablet, Take 50 mg by mouth every 6 (six) hours as needed for moderate pain Pt. Stated Dr. John, PCP, is prescribing tramadol 50 mgs BID, Disp: , Rfl:     aspirin (ECOTRIN LOW STRENGTH) 81 mg EC tablet, Take 1 tablet (81 mg total) by mouth daily, Disp: 30 tablet, Rfl: 0    cholecalciferol (VITAMIN D3) 1,000 units tablet, Take 2 tablets (2,000 Units total) by mouth daily, Disp: 60 tablet, Rfl: 0    DULoxetine (CYMBALTA) 20 mg capsule, Take 20 mg by mouth daily, Disp: , Rfl:     methylPREDNISolone 4 MG tablet therapy pack, Use as directed on package (Patient not taking: Reported on 3/20/2024), Disp: 21 each, Rfl: 0          Whom besides the patient is providing clinical information about today's encounter?   NO ADDITIONAL HISTORIAN (patient alone provided history)    Physical Exam and Assessment/Plan by Diagnosis:    NEOPLASM OF UNCERTAIN BEHAVIOR OF SKIN    Physical Exam:  (Anatomic Location); (Size and Morphological Description); (Differential Diagnosis):  Specimen A: Right Mid Back; 1 cm erythematous papule; Rule out Basal Cell Carcinoma  Pertinent Positives:  Pertinent Negatives:              Additional History of Present Condition:  patient reported his wife mentioned about a month ago that there was a lesion on his back. Patient denies bleeding, burning, pain.    Assessment and Plan:  I have discussed with the patient that a sample of skin via a \"skin biopsy” would be potentially helpful to further make a specific diagnosis under the microscope.  Based on a thorough discussion of this condition and the management approach to it (including a comprehensive discussion of the known risks, side effects and potential benefits of treatment), the patient (family) agrees to implement the following specific plan:    Procedure:  Skin Biopsy.  After a thorough discussion of treatment options and risk/benefits/alternatives (including but not limited to local pain, scarring, dyspigmentation, blistering, " possible superinfection, and inability to confirm a diagnosis via histopathology), verbal and written consent were obtained and portion of the rash was biopsied for tissue sample.  See below for consent that was obtained from patient and subsequent Procedure Note.     PROCEDURE TANGENTIAL (SHAVE) BIOPSY NOTE:    Performing Physician:   Anatomic Location; Clinical Description with size (cm); Pre-Op Diagnosis:   Specimen A: Right Mid Back; 1 cm erythematous papule; Rule out Basal Cell Carcinoma  Post-op diagnosis: Same     Local anesthesia: 1% xylocaine with epi      Topical anesthesia: None    Hemostasis: Aluminum chloride       After obtaining informed consent  at which time there was a discussion about the purpose of biopsy  and low risks of infection and bleeding.  The area was prepped and draped in the usual fashion. Anesthesia was obtained with 1% lidocaine with epinephrine. A shave biopsy to an appropriate sampling depth was obtained by Shave (Dermablade or 15 blade) The resulting wound was covered with surgical ointment and bandaged appropriately.     The patient tolerated the procedure well without complications and was without signs of functional compromise.      Specimen has been sent for review by Dermatopathology.    Standard post-procedure care has been explained and has been included in written form within the patient's copy of Informed Consent.    INFORMED CONSENT DISCUSSION AND POST-OPERATIVE INSTRUCTIONS FOR PATIENT    I.  RATIONALE FOR PROCEDURE  I understand that a skin biopsy allows the Dermatologist to test a lesion or rash under the microscope to obtain a diagnosis.  It usually involves numbing the area with numbing medication and removing a small piece of skin; sometimes the area will be closed with sutures. In this specific procedure, sutures are not usually needed.  If any sutures are placed, then they are usually need to be removed in 2 weeks or less.    I understand that my  "Dermatologist recommends that a skin \"shave\" biopsy be performed today.  A local anesthetic, similar to the kind that a dentist uses when filling a cavity, will be injected with a very small needle into the skin area to be sampled.  The injected skin and tissue underneath \"will go to sleep” and become numb so no pain should be felt afterwards.  An instrument shaped like a tiny \"razor blade\" (shave biopsy instrument) will be used to cut a small piece of tissue and skin from the area so that a sample of tissue can be taken and examined more closely under the microscope.  A slight amount of bleeding will occur, but it will be stopped with direct pressure and a pressure bandage and any other appropriate methods.  I understands that a scar will form where the wound was created.  Surgical ointment will be applied to help protect the wound.  Sutures are not usually needed.    II.  RISKS AND POTENTIAL COMPLICATIONS   I understand the risks and potential complications of a skin biopsy include but are not limited to the following:  Bleeding  Infection  Pain  Scar/keloid  Skin discoloration  Incomplete Removal  Recurrence  Nerve Damage/Numbness/Loss of Function  Allergic Reaction to Anesthesia  Biopsies are diagnostic procedures and based on findings additional treatment or evaluation may be required  Loss or destruction of specimen resulting in no additional findings    My Dermatologist has explained to me the nature of the condition, the nature of the procedure, and the benefits to be reasonably expected compared with alternative approaches.  My Dermatologist has discussed the likelihood of major risks or complications of this procedure including the specific risks listed above, such as bleeding, infection, and scarring/keloid.  I understand that a scar is expected after this procedure.  I understand that my physician cannot predict if the scar will form a \"keloid,\" which extends beyond the borders of the wound that is " "created.  A keloid is a thick, painful, and bumpy scar.  A keloid can be difficult to treat, as it does not always respond well to therapy, which includes injecting cortisone directly into the keloid every few weeks.  While this usually reduces the pain and size of the scar, it does not eliminate it.      I understand that photographs may be taken before and after the procedure.  These will be maintained as part of the medical providers confidential records and may not be made available to me.  I further authorize the medical provider to use the photographs for teaching purposes or to illustrate scientific papers, books, or lectures if in his/her judgment, medical research, education, or science may benefit from its use.    I have had an opportunity to fully inquire about the risks and benefits of this procedure and its alternatives.   I have been given ample time and opportunity to ask questions and to seek a second opinion if I wished to do so.  I acknowledge that there have specifically been no guarantees as to the cosmetic results from the procedure.  I am aware that with any procedure there is always the possibility of an unexpected complication.    III. POST-PROCEDURAL CARE (WHAT YOU WILL NEED TO DO \"AFTER THE BIOPSY\" TO OPTIMIZE HEALING)    Keep the area clean and dry.  Try NOT to remove the bandage or get it wet for the first 24 hours.    Gently clean the area and apply surgical ointment (such as Vaseline petrolatum ointment, which is available \"over the counter\" and not a prescription) to the biopsy site for up to 2 weeks straight.  This acts to protect the wound from the outside world.      Sutures are not usually placed in this procedure.  If any sutures were placed, return for suture removal as instructed (generally 1 week for the face, 2 weeks for the body).      Take Acetaminophen (Tylenol) for discomfort, if no contraindications.  Ibuprofen or aspirin could make bleeding worse.    Call our office " immediately for signs of infection: fever, chills, increased redness, warmth, tenderness, discomfort/pain, or pus or foul smell coming from the wound.    WHAT TO DO IF THERE IS ANY BLEEDING?  If a small amount of bleeding is noticed, place a clean cloth over the area and apply firm pressure for ten minutes.  Check the wound after 10 minutes of direct pressure.  If bleeding persists, try one more time for an additional 10 minutes of direct pressure on the area.  If the bleeding becomes heavier or does not stop after the second attempt, or if you have any other questions about this procedure, then please call your [facility]'s Dermatologist by calling [phone] (SKIN).     I hereby acknowledge that I have reviewed and verified the site with my Dermatologist and have requested and authorized my Dermatologist to proceed with the procedure.         Scribe Attestation      I,:  Nkechi Martinez am acting as a scribe while in the presence of the attending physician.:       I,:  Conor Gardner MD personally performed the services described in this documentation    as scribed in my presence.:         Michelle Gregory MD  Dermatology, PGY-2

## 2024-07-17 ENCOUNTER — APPOINTMENT (OUTPATIENT)
Dept: PHYSICAL THERAPY | Facility: CLINIC | Age: 69
End: 2024-07-17
Payer: MEDICARE

## 2024-07-19 ENCOUNTER — TELEPHONE (OUTPATIENT)
Dept: DERMATOLOGY | Facility: CLINIC | Age: 69
End: 2024-07-19

## 2024-07-19 PROCEDURE — 88305 TISSUE EXAM BY PATHOLOGIST: CPT | Performed by: PATHOLOGY

## 2024-07-19 NOTE — TELEPHONE ENCOUNTER
I called patient and discussed biopsy results   Patient OK with excision    Schedule him with either excision clinic at [place] or with me at [place] - I have procedure spots on Wednesdays starting September

## 2024-07-24 ENCOUNTER — APPOINTMENT (OUTPATIENT)
Dept: PHYSICAL THERAPY | Facility: CLINIC | Age: 69
End: 2024-07-24
Payer: MEDICARE

## 2024-07-26 ENCOUNTER — APPOINTMENT (OUTPATIENT)
Dept: PHYSICAL THERAPY | Facility: CLINIC | Age: 69
End: 2024-07-26
Payer: MEDICARE

## 2024-09-17 ENCOUNTER — PROCEDURE VISIT (OUTPATIENT)
Dept: DERMATOLOGY | Facility: CLINIC | Age: 69
End: 2024-09-17
Payer: MEDICARE

## 2024-09-17 VITALS — TEMPERATURE: 96.7 F | BODY MASS INDEX: 30.85 KG/M2 | WEIGHT: 215 LBS

## 2024-09-17 DIAGNOSIS — C44.519 BASAL CELL CARCINOMA (BCC) OF SKIN OF OTHER PART OF TORSO: Primary | ICD-10-CM

## 2024-09-17 PROCEDURE — 11603 EXC TR-EXT MAL+MARG 2.1-3 CM: CPT | Performed by: DERMATOLOGY

## 2024-09-17 PROCEDURE — 88305 TISSUE EXAM BY PATHOLOGIST: CPT | Performed by: PATHOLOGY

## 2024-09-17 PROCEDURE — 12032 INTMD RPR S/A/T/EXT 2.6-7.5: CPT | Performed by: DERMATOLOGY

## 2024-09-17 NOTE — PATIENT INSTRUCTIONS
"YOUR \"AFTER SURGERY\" REVIEW & INSTRUCTIONS    What to Know About Your Procedure  An \"excision\" was performed today to allow the dermatologist to remove a skin lesion. The procedure involves a local numbing medication and removing the entire lesion (or as much as possible). Typically, the lesion is being removed because it does not look \"normal,\" because it is becoming irritated and traumatized, or for significant appearance reasons.  The skin was cut deeply and then repaired - usually with sutures (stitches).  The removed tissue has been sent to the pathologist who will confirm the diagnosis and verify if the lesion has been completely removed.  Surgical “Vaseline-type” ointment has been applied after the procedure to help create a barrier between your wound and the outside world.     The advantage of using sutures (stitches) to repair a skin excision is that it allows the lesion to heal as quickly as possible with the least amount of scarring and risk of infection, Still, there are some risks and potential complications that you should watch out for that include but are not limited to the following:    Some bleeding is normal at the time of procedure and some bleeding on the gauze bandage after the procedure is normal for the first few days after surgery.  Profuse bleeding or bleeding with swelling and pain is NOT normal and should be reported as detailed below.  Infection is uncommon after skin surgery.  Infection should be reported and is indicated by pain, redness, and discharge of purulent material.  Some pain may occur initially the day after surgery.  Persistent pain or increasing pain days after surgery is not expected and should be reported.  Every effort is made to minimize scar, but location, size, and genetics do play a role in scar appearance.  A surgical wound does not achieve its optimal appearance until 6 months.  There are several treatments available if scarring would be problematic including scar " "creams, silicone pad, laser and scar revision.  Skin discoloration can occur especially in people of color.  Its important to avoid sun on wound in first 6 months after surgery.  Treatment is available if pigment is problematic.  Incomplete removal of the lesion or recurrence of lesion can occur and - depending on the lesion - this would then require further treatment and more surgery.  Nerve damage/numbness and/or loss of function is very rare, but is most likely to occur if the lesion being removed is large or if it is in a \"high risk\" location.  Allergic reaction to lidocaine is rare.  More commonly, epinephrine is used with the lidocaine, and, occasionally, epinephrine (a.k.a., adrenalin) may cause a brief feeling of anxiety or jitteriness.  The person at the microscope (pathologist) may provide additional information that was unexpected. This unexpected finding could prompt the need for additional treatment or evaluation.    At-Home Wound Care  Try NOT to remove the pressure bandage for 48 hours. Keep the area clean and dry while this bandage is on.   After removing the bandage for the first time, gently clean the area with soap and water. If the bandage is difficult to remove, getting the bandage wet in the shower will sometimes help soften the adhesive and allow it to be removed more easily.   You will now need to cleanse this area daily in the shower with gentle soap. There is no need to scrub the area. You will need to apply plain Vaseline ointment (this is over the counter and not a prescription) to the site for up to 2 weeks followed by a clean appropriately sized bandage to area.  Non stick dressing and paper tape (or Hypafix) are recommended for sensitive skin but a bandaid is fine if it covers the area well.  In general, sutures (stitches) are removed in about 5-7 days for face wounds and in about 12-14 days for the body wounds.  Your dermatologist wants you to return for suture removal in 14 DAYS. " "    General Restrictions  For TWO (2) DAYS:  You will need to take it very easy as this time is highest risk for bleeding. Being a \"couch potato\" during these two days is generally recommended.   For surgeries on the face/neck/scalp: Avoid leaning down to pick things up off the floor as this brings blood up to your head. Instead, squat down to pick things up.     For TWO WEEKS (14 DAYS):   No heavy lifting (anything greater than 10 pounds)   You can start to do slow, gentle activities such as slow walking but nothing to increase your heart rate and blood pressure too much (such as cardiovascular exercise).  It is important to take it easy as there is still a risk for bleeding and a high risk popping of stitches open during this time.     Site Specific Restrictions  If we did surgery near your eyes (including the nose, forehead, front part of your scalp, cheeks): It is VERY common to get a large amount of swelling around your eyes (puffy eyes). Although less frequent, this can be enough to swell your eyes shut and can also come along with bruising. This should not hurt and is very expected and normal. It is typically worst at ~ 3 days out from your surgery and dramatically better 1 week post-operatively.   If we did surgery around your nose: No blowing your nose as this puts you at higher risk of popping stitches durign this time. Instead dab under your nose with a tissue or use a Q-tip inside your nose.  If we did surgery on the skin above or below your lip or your lip itself: No sipping from straws as this uses a lot of the muscles around your mouth and increases the risk of popping stitches during this time.    Managing Your Pain After Surgery  You can expect to have some pain after surgery. This is normal. The pain is typically worse the first two days after surgery, and quickly begins to get better.   You can use heating pads or ice packs on your incisions to help reduce your pain.   The best strategy for " "controlling your pain after surgery is \"around the clock\" pain control. You can take \"over-the-counter\" (non-prescription) Acetaminophen (Tylenol) for discomfort, unless you have been told not to by your physician.  If you are taking Tylenol at the maximum dose, you can alternate Tylenol with Advil/Motrin (ibuprofen) as long as there are no contraindications.  Alternating these medications with each other (I.e., Tylenol followed by Motrin/Advil) allows you to maximize your pain control.  To alternate these medications properly, you will take a dose of pain medication every three hours, alternating Tylenol (acetaminophen) and Advil/Motrin (ibuprofen).  Start by taking 650 mg of Tylenol (2 pills of 325 mg)  3 hours later take 600 mg of Motrin (3 pills of 200 mg)  3 hours after taking the Motrin take 650 mg of Tylenol  3 hours after that take 600 mg of Motrin.    As an example, if your first dose of Tylenol (acetaminophen) is at 12:00 PM, then you would alternate with Motrin as directed below, continuing usually for no more than a total of 48 hours straight:     12:00 PM  Tylenol 650 mg (2 pills of 325 mg)    3:00 PM  Motrin 600 mg (3 pills of 200 mg)    6:00 PM  Tylenol 650 mg (2 pills of 325 mg)    9:00 PM  Motrin 600 mg (3 pills of 200 mg)      WARNING:  Do NOT take more than 4000 mg of Tylenol or 3200 mg of Motrin in a \"24-hour\" period.       What if you still have pain?    If you have pain that is not controlled with the over-the-counter pain medications (Tylenol and Motrin/Advil), do not hesitate to call our staff using the number provided. We will help make sure you are managing your pain in the best way possible, and if necessary, we can provide a prescription for additional pain medication.     Call our office IMMEDIATELY with any signs of wound infection.  This includes fever, chills, increasing redness, warmth, tenderness, severe discomfort/pain, or pus or foul smell coming from the wound. [facility] can be directly at [phone] (SKIN) and ask for the on-call Dermatologist covering surgery/Mohs.    If Bleeding is Noticed  If bleeding is soaking through the bandage, remove the bandage and see where the bleeding is coming from.  Place a clean cloth over the area and apply firm pressure directly to the area that is bleeding for thirty minutes.    Check the wound ONLY after 30 minutes of direct pressure; do not cheat and sneak a peak, as that does not count (i.e., resets the clock back to zero).  If bleeding persists after 30 minutes of legitimate direct pressure, then try one more round of direct pressure to the area.    Should bleeding become heavier or not stop after the second application of direct pressure for 30 minutes, then call [facility] directly at [phone] (SKIN) and ask to speak with the on-call Dermatologist covering surgery/Mohs.  If after hours, go to your nearest Emergency Room or Urgent Care and have the team call [facility] directly at [phone] (SKIN); you will be connected to our after hours team.

## 2024-09-17 NOTE — LETTER
September 17, 2024     Patient: Mika Sheridan  YOB: 1955  Date of Visit: 9/17/2024      To Whom it May Concern:    Mika Sheridan is under my professional care. Mika was seen in my office on 9/17/2024. Mika may return to work on 9/18/2024 .    If you have any questions or concerns, please don't hesitate to call.         Sincerely,          Conor Gardner MD        CC: No Recipients

## 2024-09-17 NOTE — PROGRESS NOTES
"  PROCEDURE:  EXCISION NOTE     Procedural Plan:     Attending:  & Dr. Gregory  Assistant:  Eileen Paniagua  Lesion Anatomic Location (use description from previous biopsy if applicable): right mid back  Pre-Op Diagnosis: BCC  Lesion is being treated as \"benign\" or \"MALIGNANT\": MALIGNANT; final PATHOLOGICAL STAGING (use \"N/A\" if not reported in Path Report) :  NA  Accession Number of any associated previous biopsy/excision: [id number]    Written and verbal (witnessed) informed consent was obtained. We discussed that \"excision\" is a method of removing lesions, both benign and malignant lesions.  A portion of normal skin is often taken to ensure completeness of removal.  I reviewed that this procedure will include numbing the area, cutting around and under the skin lesion, undermining (\"freeing up\") surrounding tissue, and closing the wound with sutures (stitches) both inside and out.  Risks include and are not limited to the following:  Bleeding, pain, infection, scarring, recurrence, more required treatment, no additional information, numbness and/or loss of function (if nerves are damaged).  These risks were considered against the benefits that we discussed, and the patient opted to continue with the procedure. It was discussed with patient that every effort is made to minimize scarring, but scarring is influenced also by extrinsic factor such as location, age and genetics.     Procedural Time Out:      Correct patient? yes  Correct site per Clinic Report? yes  Correct site per previous Path Report? yes  Correct site per Patient's recollection? yes    Anesthesia:      Local anesthesia: 3:1 1% xylocaine with epi and 1-100,000 buffered    Excision Description:      Post-Op Diagnosis: Same as Pre-Op Diagnosis (above)  Pre-op Size: 2 cm  Margins (enter \"0\" for lipoma/cyst or similar diagnosis): 0.4 cm  TOTAL POSTOPERATIVE DEFECT SIZE (I.e., Pre-op Size + Margins on both sides):  2.8 cm    The patient was seated in " the procedure/exam room, anesthetized locally, prepped and draped in the usual fashion. Using a #15 blade with a scalpel, the lesion was excised in elliptical fashion.     REQUIRED Rocio MELANOMA DATA      This procedure was not performed to treat primary cutaneous melanoma through wide local excision      Closure Description:      The specific type of closure that was utilized:     INTERMEDIATE Closure  INTERMEDIATE CLOSURE     The patient was brought back into the procedure room, anesthetized locally, prepped and draped in the usual fashion. Using a #15 blade with a scalpel, the lesion was excised in elliptical fashion. The wound was  undermined in the fascial plane.  Purpose of undermining was to decrease wound tension and facilitate closure. Hemostasis was achieved with light electrocoagulation.    The wound was closed with subcutaneous sutures as follows:    Deep Suture Throw, Size, and Type (select all that apply):  Interrupted Deeps; 4-0; vicryl     Epidermal edge closure was accomplished with superficial sutures as follows:    Superficial Suture Throw, Size and Type (select all that apply):  Running 4-0 Prolene    FINAL LENGTH OF CLOSURE (please enter a length even for lipoma/cyst or similar diagnosis): 5.6 cm                     Postoperative Care:      The wound was cleaned with sterile saline, dried off, surgical vaseline ointment was applied, and the wound was covered. A pressure dressing was applied for stabilization and light pressure.    Estimated blood loss:  Less than 3ml.  Complications: none  Post-op medications: none  Additional notes: none    Discharge Plans:      Discharge plans: Plan for return to us for suture removal, as scheduled in 14 days.   Patient condition at discharge: STABLE    The patient was given detailed oral and written instructions on postoperative care as detailed in consent. We urged the patient to call us if any problems or question should arise.         Please complete this  "section and then \"cut and paste\" it into the Patient Instructions section.  These notes should be printed and shared directly with the patient for review PRIOR TO leaving our office.         YOUR \"AFTER SURGERY\" REVIEW & INSTRUCTIONS    What to Know About Your Procedure  An \"excision\" was performed today to allow the dermatologist to remove a skin lesion. The procedure involves a local numbing medication and removing the entire lesion (or as much as possible). Typically, the lesion is being removed because it does not look \"normal,\" because it is becoming irritated and traumatized, or for significant appearance reasons.  The skin was cut deeply and then repaired - usually with sutures (stitches).  The removed tissue has been sent to the pathologist who will confirm the diagnosis and verify if the lesion has been completely removed.  Surgical “Vaseline-type” ointment has been applied after the procedure to help create a barrier between your wound and the outside world.     The advantage of using sutures (stitches) to repair a skin excision is that it allows the lesion to heal as quickly as possible with the least amount of scarring and risk of infection, Still, there are some risks and potential complications that you should watch out for that include but are not limited to the following:    Some bleeding is normal at the time of procedure and some bleeding on the gauze bandage after the procedure is normal for the first few days after surgery.  Profuse bleeding or bleeding with swelling and pain is NOT normal and should be reported as detailed below.  Infection is uncommon after skin surgery.  Infection should be reported and is indicated by pain, redness, and discharge of purulent material.  Some pain may occur initially the day after surgery.  Persistent pain or increasing pain days after surgery is not expected and should be reported.  Every effort is made to minimize scar, but location, size, and genetics do " "play a role in scar appearance.  A surgical wound does not achieve its optimal appearance until 6 months.  There are several treatments available if scarring would be problematic including scar creams, silicone pad, laser and scar revision.  Skin discoloration can occur especially in people of color.  Its important to avoid sun on wound in first 6 months after surgery.  Treatment is available if pigment is problematic.  Incomplete removal of the lesion or recurrence of lesion can occur and - depending on the lesion - this would then require further treatment and more surgery.  Nerve damage/numbness and/or loss of function is very rare, but is most likely to occur if the lesion being removed is large or if it is in a \"high risk\" location.  Allergic reaction to lidocaine is rare.  More commonly, epinephrine is used with the lidocaine, and, occasionally, epinephrine (a.k.a., adrenalin) may cause a brief feeling of anxiety or jitteriness.  The person at the microscope (pathologist) may provide additional information that was unexpected. This unexpected finding could prompt the need for additional treatment or evaluation.    At-Home Wound Care  Try NOT to remove the pressure bandage for 48 hours. Keep the area clean and dry while this bandage is on.   After removing the bandage for the first time, gently clean the area with soap and water. If the bandage is difficult to remove, getting the bandage wet in the shower will sometimes help soften the adhesive and allow it to be removed more easily.   You will now need to cleanse this area daily in the shower with gentle soap. There is no need to scrub the area. You will need to apply plain Vaseline ointment (this is over the counter and not a prescription) to the site for up to 2 weeks followed by a clean appropriately sized bandage to area.  Non stick dressing and paper tape (or Hypafix) are recommended for sensitive skin but a bandaid is fine if it covers the area well.  In " "general, sutures (stitches) are removed in about 5-7 days for face wounds and in about 12-14 days for the body wounds.  Your dermatologist wants you to return for suture removal in 14 DAYS.     General Restrictions  For TWO (2) DAYS:  You will need to take it very easy as this time is highest risk for bleeding. Being a \"couch potato\" during these two days is generally recommended.   For surgeries on the face/neck/scalp: Avoid leaning down to pick things up off the floor as this brings blood up to your head. Instead, squat down to pick things up.     For TWO WEEKS (14 DAYS):   No heavy lifting (anything greater than 10 pounds)   You can start to do slow, gentle activities such as slow walking but nothing to increase your heart rate and blood pressure too much (such as cardiovascular exercise).  It is important to take it easy as there is still a risk for bleeding and a high risk popping of stitches open during this time.     Site Specific Restrictions  If we did surgery near your eyes (including the nose, forehead, front part of your scalp, cheeks): It is VERY common to get a large amount of swelling around your eyes (puffy eyes). Although less frequent, this can be enough to swell your eyes shut and can also come along with bruising. This should not hurt and is very expected and normal. It is typically worst at ~ 3 days out from your surgery and dramatically better 1 week post-operatively.   If we did surgery around your nose: No blowing your nose as this puts you at higher risk of popping stitches durign this time. Instead dab under your nose with a tissue or use a Q-tip inside your nose.  If we did surgery on the skin above or below your lip or your lip itself: No sipping from straws as this uses a lot of the muscles around your mouth and increases the risk of popping stitches during this time.    Managing Your Pain After Surgery  You can expect to have some pain after surgery. This is normal. The pain is " "typically worse the first two days after surgery, and quickly begins to get better.   You can use heating pads or ice packs on your incisions to help reduce your pain.   The best strategy for controlling your pain after surgery is \"around the clock\" pain control. You can take \"over-the-counter\" (non-prescription) Acetaminophen (Tylenol) for discomfort, unless you have been told not to by your physician.  If you are taking Tylenol at the maximum dose, you can alternate Tylenol with Advil/Motrin (ibuprofen) as long as there are no contraindications.  Alternating these medications with each other (I.e., Tylenol followed by Motrin/Advil) allows you to maximize your pain control.  To alternate these medications properly, you will take a dose of pain medication every three hours, alternating Tylenol (acetaminophen) and Advil/Motrin (ibuprofen).  Start by taking 650 mg of Tylenol (2 pills of 325 mg)  3 hours later take 600 mg of Motrin (3 pills of 200 mg)  3 hours after taking the Motrin take 650 mg of Tylenol  3 hours after that take 600 mg of Motrin.    As an example, if your first dose of Tylenol (acetaminophen) is at 12:00 PM, then you would alternate with Motrin as directed below, continuing usually for no more than a total of 48 hours straight:     12:00 PM  Tylenol 650 mg (2 pills of 325 mg)    3:00 PM  Motrin 600 mg (3 pills of 200 mg)    6:00 PM  Tylenol 650 mg (2 pills of 325 mg)    9:00 PM  Motrin 600 mg (3 pills of 200 mg)      WARNING:  Do NOT take more than 4000 mg of Tylenol or 3200 mg of Motrin in a \"24-hour\" period.       What if you still have pain?    If you have pain that is not controlled with the over-the-counter pain medications (Tylenol and Motrin/Advil), do not hesitate to call our staff using the number provided. We will help make sure you are managing your pain in the best way possible, and if necessary, we can provide a prescription for additional pain medication.     Call our office IMMEDIATELY " with any signs of wound infection.  This includes fever, chills, increasing redness, warmth, tenderness, severe discomfort/pain, or pus or foul smell coming from the wound. [facility] can be directly at [phone] (SKIN) and ask for the on-call Dermatologist covering surgery/Mohs.    If Bleeding is Noticed  If bleeding is soaking through the bandage, remove the bandage and see where the bleeding is coming from.  Place a clean cloth over the area and apply firm pressure directly to the area that is bleeding for thirty minutes.    Check the wound ONLY after 30 minutes of direct pressure; do not cheat and sneak a peak, as that does not count (i.e., resets the clock back to zero).  If bleeding persists after 30 minutes of legitimate direct pressure, then try one more round of direct pressure to the area.    Should bleeding become heavier or not stop after the second application of direct pressure for 30 minutes, then call . [facility] directly at [phone] (SKIN) and ask to speak with the on-call Dermatologist covering surgery/Mohs.  If after hours, go to your nearest Emergency Room or Urgent Care and have the team call [facility] directly at [phone] (SKIN); you will be connected to our after hours team.      Scribe Attestation      I,:  Eileen Paniagua am acting as a scribe while in the presence of the attending physician.:       I,:  Conor Gardner MD personally performed the services described in this documentation    as scribed in my presence.:

## 2024-09-20 PROCEDURE — 88305 TISSUE EXAM BY PATHOLOGIST: CPT | Performed by: PATHOLOGY

## 2024-10-01 ENCOUNTER — CLINICAL SUPPORT (OUTPATIENT)
Dept: DERMATOLOGY | Facility: CLINIC | Age: 69
End: 2024-10-01

## 2024-10-01 DIAGNOSIS — Z48.02 ENCOUNTER FOR REMOVAL OF SUTURES: Primary | ICD-10-CM

## 2024-10-01 PROCEDURE — RECHECK

## 2024-10-01 NOTE — PROGRESS NOTES
"Suture removal    Date/Time: 10/1/2024 9:00 AM    Performed by: Cornelio Flores RN  Authorized by: Conor Gardner MD  Universal Protocol:  procedure performed by consultantConsent: Verbal consent obtained. Written consent not obtained.  Risks and benefits: risks, benefits and alternatives were discussed  Consent given by: patient  Time out: Immediately prior to procedure a \"time out\" was called to verify the correct patient, procedure, equipment, support staff and site/side marked as required.  Timeout called at: 10/1/2024 8:49 AM.  Patient understanding: patient states understanding of the procedure being performed  Patient consent: the patient's understanding of the procedure matches consent given  Procedure consent: procedure consent matches procedure scheduled  Relevant documents: relevant documents not present or verified  Test results: test results not available  Site marked: the operative site was not marked  Radiology Images displayed and confirmed. If images not available, report reviewed: imaging studies not available  Patient identity confirmed: verbally with patient      Patient location:  Clinic  Location:     Laterality:  Right    Location:  Trunk    Trunk location:  Back (Mid)  Procedure details:     Tools used:  Suture removal kit    Wound appearance:  No sign(s) of infection, good wound healing, clean, moist, pink, nontender and nonpurulent    Number of sutures removed:  15  Post-procedure details:     Post-removal:  Band-Aid applied (Vaseline applied)    Patient tolerance of procedure:  Tolerated well, no immediate complications  Comments:      Patient was encouraged to continue to clean and care for the wound until fully healed. Patient was encouraged to continue to follow up for regular full body skin exams as scheduled.         "

## 2024-11-26 NOTE — PROGRESS NOTES
Daily Note     Today's date: 1/10/2023  Patient name: Mnia Wilkinson  : 1955  MRN: [id number]  Referring provider: Corbin Melvin MD  Dx:   Encounter Diagnosis     ICD-10-CM    1  S/P lumbar fusion  Z98 1       2  Status post lumbar spinal fusion  Z98 1                      Subjective: Pt reports his whole back hurts 5-6/10  He reports some relief after various stretches (pulleys, wall slides and modif pirif stretch)  Objective: See treatment diary below      Assessment: Tolerated treatment well and is benefitting from stretching for which he needs cues for form for wall slides and new hip flexor stretch  Patient would benefit from continued PT      Plan: Continue per plan of care        Precautions: PT CANNOT LIE SUPINE - LONGITUDINAL INCISION STILL HEALING  Past Medical History:   Diagnosis Date   • Ambulates with cane     "long distance"   • Arthritis    • Back pain    • Chronic pain disorder    • Dental crowns present    • Exercise involving walking     daily -short walks   • History of hepatitis C     per pt "went away on its own" age 13"   • History of transfusion    • Hyperlipidemia    • Hypertension    • Leg pain     and foot pain   • Limb alert care status     per pt NO IV's LEFT UPPER ARM due to old injury   • Risk for falls    • Spinal stenosis    • Stroke [facility])     per pt in -and no lasting problems   • Wears glasses     readers     SOC: 2022  FOTO: 2022  POC EXP: 3/9/2023  DAILY TREATMENT LOG:  date 2023 1/10/2023      Visit #/auth 6 7                              Neuro Re-Ed 20'  15'      FT w/ EC CGA 30"x2 CGA       FT w/ EO on foam FT EO w/ head turns 2x10 CS       Step stance balance with EC Tandem walk CGA 2 laps near rail        Sidestep along rail RTB @ knees 3 laps near window CS        Monster walks along rail  RTB @ nears 3 laps near window CS        Alt taps to step To cone CGA 1x10 R/L  2 cones 1x10 CGA      FSU near rail CGA 6" step  8" step 2x10 R/L step to pattern no rail CS      Retro walk near rail 2 laps near rail CGA  2 laps near rail CS      Ther Ex 18'  30'      B/L HR Off step 2x10 CGA Off step 2x15      LAQ w/ add  2#        TB hams Blue        Hip flexor stretch foot on step  8" step 5"x10 r/L      Stand hip abd RTB @ knees 2x10 R/L RTB @ knees 2x10 R/L      Stand hip exten  RTB @ knees 2x10 R/L RTB @ knees 2x10 R/L      STS from chair W/ B/L OH press 1# DB's 2x10  TRX squat 1x10; STS low mat butt taps w/ OH press 1x10 2# DB's      Long stepping along rail for LE ROM  No rail 20'x2      B/L UE ext vs tubing yulisa 7# 2x15        Sit modif fig 4 stretch w/ foot on stool 20"x3  20"x3 R/L      B/L UE wall slides flexion 2x10  1x10      pulleys  3' feels back stretch      Ther Activity                        Gait Training                        Modalities                        Access Code: [email or web address]  URL: [email or web address]  Date: 12/23/2022  Prepared by:  Annmarie Sanchez    Exercises  Sidelying Hip Abduction - 1 x daily - 2 sets - 10 reps  Reverse clamshell with resistance - 1 x daily - 2 sets - 10 reps  Seated Long Arc Quad with Hip Adduction - 1 x daily - 2 sets - 10 reps - 3 hold  Seated Hamstring Curl with Anchored Resistance - 1 x daily - 2 sets - 10 reps  Standing Hip Abduction with Resistance at Ankles and Counter Support - 1 x daily - 2 sets - 10 reps  Hip Extension with Resistance Loop - 1 x daily - 2 sets - 10 reps  Side Stepping with Resistance at Ankles - 1 x daily - 3 sets - 10 reps  Standing Heel Raises - 1 x daily - 2 sets - 10 reps  Squat with Chair Touch - 1 x daily - 2 sets - 10 reps 26-Nov-2024 15:48

## (undated) DEVICE — CHLORAPREP HI-LITE 26ML ORANGE

## (undated) DEVICE — 3M™ STERI-STRIP™ REINFORCED ADHESIVE SKIN CLOSURES, R1547, 1/2 IN X 4 IN (12 MM X 100 MM), 6 STRIPS/ENVELOPE: Brand: 3M™ STERI-STRIP™

## (undated) DEVICE — SUT VICRYL 2-0 CT-2 27 IN J269H

## (undated) DEVICE — ADHESIVE SKIN HIGH VISCOSITY EXOFIN 1ML

## (undated) DEVICE — DRAPE C-ARM X-RAY

## (undated) DEVICE — PENCIL ELECTROSURG E-Z CLEAN -0035H

## (undated) DEVICE — TOOL 14MH30 LEGEND 14CM 3MM: Brand: MIDAS REX ™

## (undated) DEVICE — DRAPE C-ARMOUR

## (undated) DEVICE — JACKSON TABLE FOAM POSITIONING KIT: Brand: CARDINAL HEALTH

## (undated) DEVICE — BETHLEHEM UNIVERSAL SPINE, KIT: Brand: CARDINAL HEALTH

## (undated) DEVICE — PETRI DISH STERILE

## (undated) DEVICE — DRAPE SHEET X-LG

## (undated) DEVICE — HEMOSTATIC MATRIX SURGIFLO 8ML W/THROMBIN

## (undated) DEVICE — SUT VICRYL 1 CTX 36 IN J977H

## (undated) DEVICE — Device

## (undated) DEVICE — SCD SEQUENTIAL COMPRESSION COMFORT SLEEVE MEDIUM KNEE LENGTH: Brand: KENDALL SCD

## (undated) DEVICE — NEURO PATTIES 1/2 X 1/2

## (undated) DEVICE — NEURO PATTIES 1/2 X 1

## (undated) DEVICE — SPONGE LAP 18 X 4 IN STRL RFD

## (undated) DEVICE — TOOL MC30 LEGEND 9CM 3.0MM CARBIDE: Brand: MIDAS REX™

## (undated) DEVICE — INTENDED FOR TISSUE SEPARATION, AND OTHER PROCEDURES THAT REQUIRE A SHARP SURGICAL BLADE TO PUNCTURE OR CUT.: Brand: BARD-PARKER SAFETY BLADES SIZE 10, STERILE

## (undated) DEVICE — SUT VICRYL 1 CT-1 27 IN J261H

## (undated) DEVICE — GLOVE SRG BIOGEL 8

## (undated) DEVICE — GAUZE SPONGES,16 PLY: Brand: CURITY

## (undated) DEVICE — SURGIFOAM 8.5 X 12.5

## (undated) DEVICE — GLOVE INDICATOR PI UNDERGLOVE SZ 8 BLUE

## (undated) DEVICE — ELECTRODE BLADE MOD E-Z CLEAN 4IN -0014AM

## (undated) DEVICE — INTENDED FOR TISSUE SEPARATION, AND OTHER PROCEDURES THAT REQUIRE A SHARP SURGICAL BLADE TO PUNCTURE OR CUT.: Brand: BARD-PARKER ® CARBON RIB-BACK BLADES

## (undated) DEVICE — TRAY FOLEY 16FR URIMETER SURESTEP

## (undated) DEVICE — 4-PORT MANIFOLD: Brand: NEPTUNE 2

## (undated) DEVICE — NEEDLE HYPO 22G X 1-1/2 IN

## (undated) DEVICE — DRAPE LAPAROTOMY W/POUCHES

## (undated) DEVICE — VIPER SYSTEM GUIDEWIRE, BLUNT AND DISPOSABLE 1.45MM: Brand: VIPER

## (undated) DEVICE — DRESSING MEPILEX AG BORDER POST-OP 4 X 10 IN

## (undated) DEVICE — SUT GORTEX CV-6 TTC-9 30 IN 6M02A MICROPOROUS COATED

## (undated) DEVICE — BIPOLAR SEALER 23-113-1 AQM 2.3: Brand: AQUAMANTYS™

## (undated) DEVICE — DRESSING MEPILEX AG BORDER 4 X 12 IN

## (undated) DEVICE — GUIDEPIN 27MM

## (undated) DEVICE — SYRINGE 10ML LL CONTROL TOP

## (undated) DEVICE — SUT MONOCRYL 3-0 PS-2 27 IN Y427H

## (undated) DEVICE — KAIRISON TUBING SET PNEUMATIC, (3000 MM), STERILE, DISPOSABLE, TO BE USED WITH: FK898R, PACKAGE OF 10 PIECES: Brand: KAIRISON

## (undated) DEVICE — SUT MONOCRYL 3-0 PS-2 18 IN Y497G